# Patient Record
Sex: MALE | Race: BLACK OR AFRICAN AMERICAN | NOT HISPANIC OR LATINO | Employment: OTHER | URBAN - METROPOLITAN AREA
[De-identification: names, ages, dates, MRNs, and addresses within clinical notes are randomized per-mention and may not be internally consistent; named-entity substitution may affect disease eponyms.]

---

## 2017-01-03 ENCOUNTER — TELEPHONE (OUTPATIENT)
Dept: UROLOGY | Facility: CLINIC | Age: 62
End: 2017-01-03

## 2017-01-09 DIAGNOSIS — N52.9 ED (ERECTILE DYSFUNCTION): Primary | ICD-10-CM

## 2017-01-09 RX ORDER — SILDENAFIL 50 MG/1
25-50 TABLET, FILM COATED ORAL DAILY PRN
Qty: 12 TABLET | Refills: 11 | Status: SHIPPED | OUTPATIENT
Start: 2017-01-09 | End: 2018-09-17

## 2017-01-09 NOTE — TELEPHONE ENCOUNTER
sildenafil      Last Written Prescription Date: 7/8/14  Last Fill Quantity: 10,  # refills: 11   Last Office Visit with [facility], [facility] or [facility] prescribing provider: 10/13/16  Refill done per RN refill protocol  Luis Kiser RN, BSN

## 2017-02-02 ENCOUNTER — TELEPHONE (OUTPATIENT)
Dept: OTHER | Facility: CLINIC | Age: 62
End: 2017-02-02

## 2017-02-14 ENCOUNTER — HOSPITAL ENCOUNTER (OUTPATIENT)
Dept: LAB | Facility: CLINIC | Age: 62
Discharge: HOME | End: 2017-02-14
Attending: ORTHOPAEDIC SURGERY | Admitting: ORTHOPAEDIC SURGERY
Payer: COMMERCIAL

## 2017-02-14 DIAGNOSIS — Z47.1 ORTHOPEDIC AFTERCARE FOR JOINT REPLACEMENT: ICD-10-CM

## 2017-02-14 DIAGNOSIS — Z47.1 AFTERCARE FOLLOWING JOINT REPLACEMENT SURGERY: ICD-10-CM

## 2017-02-14 DIAGNOSIS — Z47.1 AFTERCARE FOLLOWING JOINT REPLACEMENT: Primary | ICD-10-CM

## 2017-02-14 LAB
CRP SERPL-MCNC: 19 MG/L (ref 0–8)
ERYTHROCYTE [SEDIMENTATION RATE] IN BLOOD BY WESTERGREN METHOD: 13 MM/H (ref 0–20)

## 2017-02-14 PROCEDURE — 86140 C-REACTIVE PROTEIN: CPT | Performed by: ORTHOPAEDIC SURGERY

## 2017-02-14 PROCEDURE — 36415 COLL VENOUS BLD VENIPUNCTURE: CPT | Performed by: ORTHOPAEDIC SURGERY

## 2017-02-14 PROCEDURE — 85652 RBC SED RATE AUTOMATED: CPT | Performed by: ORTHOPAEDIC SURGERY

## 2017-03-20 ENCOUNTER — TELEPHONE (OUTPATIENT)
Dept: OTHER | Facility: CLINIC | Age: 62
End: 2017-03-20

## 2017-03-20 NOTE — TELEPHONE ENCOUNTER
Pt called with c/o continued leg swelling despite previous direction given by PCP.  Patient had multiple other questions.  He is scheduled to see PCP Wed.  Luis Kiser RN, BSN

## 2017-03-23 DIAGNOSIS — N52.01 ERECTILE DYSFUNCTION DUE TO ARTERIAL INSUFFICIENCY: Primary | ICD-10-CM

## 2017-03-23 DIAGNOSIS — R30.0 DYSURIA: ICD-10-CM

## 2017-03-23 RX ORDER — TADALAFIL 5 MG/1
5 TABLET ORAL DAILY
Qty: 30 TABLET | Refills: 1 | Status: SHIPPED | OUTPATIENT
Start: 2017-03-23 | End: 2018-04-12 | Stop reason: ALTCHOICE

## 2017-03-23 NOTE — TELEPHONE ENCOUNTER
Pt should be rescheduled to address any other issues not already addressed to his satisfaction. We could not help that epic had failed yesterday. See rx for Cialis.

## 2017-03-23 NOTE — TELEPHONE ENCOUNTER
"Patient was UA to see provider because [facility] was not able to be accessed.  Pt spoke with RN about multiple issues in a 45 minute conversation surmised below.     Patient wanted a second opinion for his knee.  He is still UA to tolerate full weight bearing and feels that it will give way if he leans to the left side too much.  Dr. Xie, orthopedic surgeon name given for patient to seek second opinion.  He still has +2 pitting edema on the left lower leg and 2+ on the right lower leg.  He states that the immobility is causing weight gain issues; declined changing diet.    Patient states that he is going to see Dr. Pisano for some of his \"male issues\" and urinary symptoms that he has been experiencing over the last six months.  He states that he feels that he has incomplete bladder emptying, and feels the need to urinate 15 minutes to an hour later, even after attempts to urinate again.  He denies burning sensation.  (UA/UC ordered for PCP vanessa).    He also had concerns about his sexual function:  He states that  He has been using Viagra as prescribed and sometimes take the maximum dose allowed to produce an erection but is not always able to achieve one.  He states that he has the urge but no erection.  He states that when is is not able to produce an erection that he experiences a headache that he describes as only \"slightly bothersome\" for three to four hours after taking the medication.  I asked if the patient would like to attempt another medication to produce erection.  He states that he would like to attempt Cialis. (ordered for PCP vanessa, coupon available for patient as no ED medications are covered with insurance plan).  All questions and concerns answered.   Blood pressure taken per pt request.  146/80 (states he's usually 1- teens/ 60's), HR 52. RR 20.  Routed to PCP Luis Kiser RN, BSN      "

## 2017-03-24 DIAGNOSIS — R30.0 DYSURIA: Primary | ICD-10-CM

## 2017-03-27 ENCOUNTER — HOSPITAL ENCOUNTER (OUTPATIENT)
Dept: LAB | Facility: CLINIC | Age: 62
Discharge: HOME | End: 2017-03-27
Attending: INTERNAL MEDICINE | Admitting: INTERNAL MEDICINE
Payer: COMMERCIAL

## 2017-03-27 DIAGNOSIS — N17.9 ACUTE KIDNEY INJURY (H): ICD-10-CM

## 2017-03-27 DIAGNOSIS — R53.81 PHYSICAL DECONDITIONING: ICD-10-CM

## 2017-03-27 DIAGNOSIS — E78.2 MIXED HYPERLIPIDEMIA: ICD-10-CM

## 2017-03-27 DIAGNOSIS — E87.6 HYPOKALEMIA: ICD-10-CM

## 2017-03-27 DIAGNOSIS — E78.2 MIXED HYPERLIPIDEMIA: Primary | ICD-10-CM

## 2017-03-27 DIAGNOSIS — R30.0 DYSURIA: Primary | ICD-10-CM

## 2017-03-27 LAB
ALBUMIN UR-MCNC: NEGATIVE MG/DL
ANION GAP SERPL CALCULATED.3IONS-SCNC: 8 MMOL/L (ref 3–14)
APPEARANCE UR: CLEAR
BILIRUB UR QL STRIP: NEGATIVE
BUN SERPL-MCNC: 22 MG/DL (ref 7–30)
CALCIUM SERPL-MCNC: 8.7 MG/DL (ref 8.5–10.1)
CHLORIDE SERPL-SCNC: 107 MMOL/L (ref 94–109)
CO2 SERPL-SCNC: 25 MMOL/L (ref 20–32)
COLOR UR AUTO: YELLOW
CREAT SERPL-MCNC: 0.99 MG/DL (ref 0.66–1.25)
CREAT UR-MCNC: 120 MG/DL
GFR SERPL CREATININE-BSD FRML MDRD: 76 ML/MIN/1.7M2
GLUCOSE SERPL-MCNC: 99 MG/DL (ref 70–99)
GLUCOSE UR STRIP-MCNC: NEGATIVE MG/DL
HBA1C MFR BLD: 4.8 % (ref 4.3–6)
HGB UR QL STRIP: NEGATIVE
KETONES UR STRIP-MCNC: NEGATIVE MG/DL
LEUKOCYTE ESTERASE UR QL STRIP: NEGATIVE
MICROALBUMIN UR-MCNC: 6 MG/L
MICROALBUMIN/CREAT UR: 5.06 MG/G CR (ref 0–17)
NITRATE UR QL: NEGATIVE
PH UR STRIP: 5.5 PH (ref 5–7)
POTASSIUM SERPL-SCNC: 3.8 MMOL/L (ref 3.4–5.3)
SODIUM SERPL-SCNC: 140 MMOL/L (ref 133–144)
SP GR UR STRIP: 1.02 (ref 1–1.03)
URN SPEC COLLECT METH UR: NORMAL
UROBILINOGEN UR STRIP-ACNC: 0.2 EU/DL (ref 0.2–1)

## 2017-03-27 PROCEDURE — 82043 UR ALBUMIN QUANTITATIVE: CPT | Performed by: INTERNAL MEDICINE

## 2017-03-27 PROCEDURE — 83036 HEMOGLOBIN GLYCOSYLATED A1C: CPT | Performed by: INTERNAL MEDICINE

## 2017-03-27 PROCEDURE — 36415 COLL VENOUS BLD VENIPUNCTURE: CPT | Performed by: INTERNAL MEDICINE

## 2017-03-27 PROCEDURE — 81003 URINALYSIS AUTO W/O SCOPE: CPT | Performed by: INTERNAL MEDICINE

## 2017-03-27 PROCEDURE — 80048 BASIC METABOLIC PNL TOTAL CA: CPT | Performed by: INTERNAL MEDICINE

## 2017-04-10 DIAGNOSIS — N52.9 ERECTILE DYSFUNCTION, UNSPECIFIED ERECTILE DYSFUNCTION TYPE: Primary | ICD-10-CM

## 2017-07-21 ENCOUNTER — TRANSFERRED RECORDS (OUTPATIENT)
Dept: HEALTH INFORMATION MANAGEMENT | Facility: CLINIC | Age: 62
End: 2017-07-21

## 2017-07-24 ENCOUNTER — HOSPITAL ENCOUNTER (OUTPATIENT)
Dept: LAB | Facility: CLINIC | Age: 62
Discharge: HOME | End: 2017-07-24
Attending: ORTHOPAEDIC SURGERY | Admitting: INTERNAL MEDICINE
Payer: COMMERCIAL

## 2017-07-24 DIAGNOSIS — Z47.1 AFTERCARE FOLLOWING JOINT REPLACEMENT: Primary | ICD-10-CM

## 2017-07-24 LAB
CRP SERPL-MCNC: <2.9 MG/L (ref 0–8)
ERYTHROCYTE [SEDIMENTATION RATE] IN BLOOD BY WESTERGREN METHOD: 11 MM/H (ref 0–20)

## 2017-07-24 PROCEDURE — 85652 RBC SED RATE AUTOMATED: CPT | Performed by: INTERNAL MEDICINE

## 2017-07-24 PROCEDURE — 36415 COLL VENOUS BLD VENIPUNCTURE: CPT | Performed by: INTERNAL MEDICINE

## 2017-07-24 PROCEDURE — 86140 C-REACTIVE PROTEIN: CPT | Performed by: INTERNAL MEDICINE

## 2017-07-25 ENCOUNTER — OFFICE VISIT (OUTPATIENT)
Dept: UROLOGY | Facility: CLINIC | Age: 62
End: 2017-07-25
Payer: COMMERCIAL

## 2017-07-25 VITALS — WEIGHT: 225 LBS | OXYGEN SATURATION: 98 % | HEIGHT: 69 IN | BODY MASS INDEX: 33.33 KG/M2 | HEART RATE: 54 BPM

## 2017-07-25 DIAGNOSIS — N52.1 ERECTILE DYSFUNCTION DUE TO DISEASES CLASSIFIED ELSEWHERE: Primary | ICD-10-CM

## 2017-07-25 LAB
ALBUMIN UR-MCNC: NEGATIVE MG/DL
APPEARANCE UR: CLEAR
BILIRUB UR QL STRIP: NEGATIVE
COLOR UR AUTO: YELLOW
GLUCOSE UR STRIP-MCNC: NEGATIVE MG/DL
HGB UR QL STRIP: NEGATIVE
KETONES UR STRIP-MCNC: NEGATIVE MG/DL
LEUKOCYTE ESTERASE UR QL STRIP: NEGATIVE
NITRATE UR QL: NEGATIVE
PH UR STRIP: 5 PH (ref 5–7)
SP GR UR STRIP: 1.01 (ref 1–1.03)
URN SPEC COLLECT METH UR: NORMAL
UROBILINOGEN UR STRIP-ACNC: 0.2 EU/DL (ref 0.2–1)

## 2017-07-25 PROCEDURE — 99213 OFFICE O/P EST LOW 20 MIN: CPT | Performed by: UROLOGY

## 2017-07-25 PROCEDURE — 81003 URINALYSIS AUTO W/O SCOPE: CPT | Performed by: UROLOGY

## 2017-07-25 RX ORDER — SILDENAFIL 100 MG/1
100 TABLET, FILM COATED ORAL DAILY PRN
Qty: 12 TABLET | Refills: 11 | Status: SHIPPED | OUTPATIENT
Start: 2017-07-25 | End: 2018-04-12

## 2017-07-25 ASSESSMENT — PAIN SCALES - GENERAL: PAINLEVEL: NO PAIN (0)

## 2017-07-25 NOTE — LETTER
7/25/2017       RE: Patience Yao  [id number]  [place] [id number]     Dear Colleague,    Thank you for referring your patient, Patience Yao, to the [facility] [place] [facility]. Please see a copy of my visit note below.    .  History: This is a pleasant to see this pleasant 62-year-old gentleman in follow-up consultation today.  He is troubled about some problems with erectile dysfunction and also a sensation of incomplete bladder emptying.  He has been using sildenafil to help facilitate erections but this has been only partially successful.    He is also noticing feels to be incomplete emptying of the bladder.  He also has some mild frequency of micturition.  However the postvoid residual today is only 107 cc.  Urinalysis is negative.  Most recent PSA is very low at 0.63.  His general health is otherwise satisfactory    Past Medical History:   Diagnosis Date     Arthritis      CAD (coronary artery disease) 2010    a subtotally occluded obtuse marginal vessel which was stented with a drug-coated stent.  He had 50% to 60% LAD disease and 25% to 30% right coronary disease     Colonic polyps 2009    tubular adenomae with mildly dysplastic features     Disturbance of skin sensation      Encephalocele (H) 2009    left frontal     Epilepsy, partial (H) 2009    with secondary generalization     GERD (gastroesophageal reflux disease)      Heart attack (H)     5 years ago. 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      Social History Main Topics     Smoking status: Former Smoker     Packs/day: 0.50     Years: 20.00     Quit date: 8/4/2003     Smokeless tobacco: Never Used     Alcohol use No     Drug use: No     Sexual activity: Yes     Other Topics Concern     Seat Belt Yes     Social History Narrative    SD - CHECKED AT LEAST TWICE YEARLY.    GUNS: NONE.       Past Surgical History:   Procedure Laterality Date     ARTHROPLASTY KNEE Left 5/16/2016    Procedure: ARTHROPLASTY KNEE;  Surgeon: Chet Leonardo MD;  Location: [facility] OR     BACK SURGERY       C ANESTH,DX ARTHROSCOPIC PROC KNEE JOINT      CARTILEGE REPAIR.     C NONSPECIFIC PROCEDURE      stint in heart     CYSTOSCOPY       ESOPHAGOSCOPY, GASTROSCOPY, DUODENOSCOPY (EGD), COMBINED  5/24/2012    Procedure:COMBINED ESOPHAGOSCOPY, GASTROSCOPY, DUODENOSCOPY (EGD); ESOPHAGOSCOPY, GASTROSCOPY, DUODENOSCOPY (EGD) ; Surgeon:GILLIAN JOHNSON; Location: GI     HC COLONOSCOPY THRU STOMA, DIAGNOSTIC  2009    tubular adenomae, recommended annual colonoscopy     KNEE SURGERY Left     repair of cartilage     SPINE SURGERY      repair of herniated disc lumbar     STENT         Family History   Problem Relation Age of Onset     Cardiovascular Mother      CHF, HTN, VASC. INSUFF., DM     Family History Negative Father      Thyroid Disease Sister      HYPOTHYROID     Genitourinary Problems Brother      RENAL FAILURE AND H/O MVA WITH LE PARAPLEGIA     Cancer - colorectal No family hx of          Current Outpatient Prescriptions:      sildenafil (REVATIO/VIAGRA) 100 MG cap/tab, Take 1 tablet (100 mg) by mouth daily as needed for erectile dysfunction Take 30 min to 4 hours before intercourse.  Never use with nitroglycerin, terazosin or doxazosin., Disp: 12 tablet, Rfl: 11     sildenafil (REVATIO/VIAGRA) 50 MG  cap/tab, Take 0.5-1 tablets (25-50 mg) by mouth daily as needed for erectile dysfunction Take 30 min to 4 hours before intercourse.  ., Disp: 12 tablet, Rfl: 11     esomeprazole (NEXIUM) 40 MG capsule, Take 1 capsule (40 mg) by mouth 2 times daily, Disp: 180 capsule, Rfl: 3     simvastatin (ZOCOR) 40 MG tablet, Take 1 tablet (40 mg) by mouth At Bedtime, Disp: 90 tablet, Rfl: 3     metoprolol (LOPRESSOR) 50 MG tablet, Take 1 tablet (50 mg) by mouth 2 times daily, Disp: 180 tablet, Rfl: 3     pregabalin (LYRICA) 25 MG capsule, Take 3 capsules (75 mg) by mouth 2 times daily, Disp: 90 capsule, Rfl: 1     Ranitidine HCl (ZANTAC PO), Take 75 mg by mouth At Bedtime, Disp: , Rfl:      lamoTRIgine (LAMICTAL) 100 MG tablet, 250 mg At Bedtime , Disp: , Rfl: 3     naproxen (NAPROSYN) 500 MG tablet, Take 1 tablet by mouth 2 times daily, Disp: , Rfl:      sildenafil (VIAGRA) 50 MG tablet, Take 1 tablet (50 mg) by mouth daily as needed for erectile dysfunction, Disp: 10 tablet, Rfl: 11     lamoTRIgine (LAMICTAL) 200 MG tablet, Take 200 mg by mouth every morning , Disp: 90 tablet, Rfl: 1     tadalafil (CIALIS) 5 MG tablet, Take 1 tablet (5 mg) by mouth daily Never use with nitroglycerin, terazosin or doxazosin. (Patient not taking: Reported on 7/25/2017), Disp: 30 tablet, Rfl: 1     bumetanide (BUMEX) 2 MG tablet, Take 1 tablet (2 mg) by mouth daily (Patient not taking: Reported on 7/25/2017), Disp: 60 tablet, Rfl: 0     Potassium Chloride ER 20 MEQ TBCR, Take 1 tablet (20 mEq) by mouth daily, Disp: 30 tablet, Rfl: 1     ferrous gluconate (FERGON) 324 (38 FE) MG tablet, Take 1 tablet (324 mg) by mouth daily (Patient not taking: Reported on 7/25/2017), Disp: 100 tablet, Rfl: 1     polyethylene glycol (MIRALAX/GLYCOLAX) powder, Take 17 g by mouth daily as needed for constipation, Disp: 119 g, Rfl:      senna-docusate (SENOKOT-S;PERICOLACE) 8.6-50 MG per tablet, Take 1 tablet by mouth daily as needed for constipation, Disp: 100  "tablet, Rfl:      acetaminophen (TYLENOL) 325 MG tablet, Take 3 tablets (975 mg) by mouth every 8 hours, Disp: 500 tablet, Rfl: 0     tamsulosin (FLOMAX) 0.4 MG 24 hr capsule, Take 1 capsule (0.4 mg) by mouth daily (Patient not taking: Reported on 7/25/2017), Disp: 30 capsule, Rfl: 0     zolpidem (AMBIEN) 10 MG tablet, Take 1 tablet (10 mg) by mouth nightly as needed for sleep at bedtime., Disp: 30 tablet, Rfl: 0     mometasone (NASONEX) 50 MCG/ACT nasal spray, Spray 2 sprays into both nostrils daily as needed, Disp: , Rfl:     10 point ROS of systems including Constitutional, Eyes, Respiratory, Cardiovascular, Gastroenterology, Genitourinary, Integumentary, Muscularskeletal, Psychiatric were all negative except for pertinent positives noted in my HPI.    Examination:   Pulse 54  Ht 1.753 m (5' 9\")  Wt 102.1 kg (225 lb)  SpO2 98%  BMI 33.23 kg/m2  General Impression: Very pleasant gentleman in no acute distress, well-oriented to time place and person  Mental Status: Normal.  HEENT.  There is no evidence of jaundice and mucous membranes are normal  Skin: His skin is otherwise normal to examination  Respiratory System: The respiratory cycle is normal  Lymph Nodes: Not examined  Back/Flank Tenderness: Not examined  Cardiovascular System: Not examined  Abdominal Examination: Not examined  Extremities: Not examined  Genitial: Not examined  Rectal Examination: Good sphincter tone, normal perianal sensation.  Smooth rectal mucosa without hemorrhoids or fissures.  Smooth soft and mildly enlarged prostate without evidence of tenderness, bogginess or nodules.  Seminal vesicles.  Not palpable  Neurologic System: There has been no significant change in his clinical neurological signs previous examination    Impression: The patient has 2 concerns  1.  A sensation of incomplete bladder emptying.  However bladder scan today indicates a postvoid residual of only 107 cc.  He is drinking some caffeinated beverages which I " "recommended he discontinue, and drink water only when thirsty as well as kind out fluids after 6 PM.  In addition taking a regular warm baths in the evening will help relax the pelvic floor muscles.  He continues to take tamsulosin 0.4 mg q.d.  2.  Issues of erectile dysfunction have continued.  We did have a careful discussion about other potential treatments including the use of an erect aid device, intracorporeal pharmacotherapy, and even a penile prosthesis.  These options are not the ones he currently wishes to consider.  I would recommend that he try Viagra in the full dose of 100 mg to see if that is helpful.  He has not been taking the full dose in the past due to cost.  I'm therefore given a paper prescription for Viagra to take to her pharmacy of his discretion and wishes.  I advised him to contact me should he have further concerns.  I did discuss the entire situation with the patient in detail today.  I answered all his questions    Plan: I will see her on a p.r.n. basis    Time: 20 minutes.  Greater than 50% was spent in discussion and consultation    \"This dictation was performed with voice recognition software and may contain errors,  omissions and inadvertent word substitution.\"        Again, thank you for allowing me to participate in the care of your patient.      Sincerely,    Arthur Pisano MD      "

## 2017-07-25 NOTE — PROGRESS NOTES
.  History: This is a pleasant to see this pleasant 62-year-old gentleman in follow-up consultation today.  He is troubled about some problems with erectile dysfunction and also a sensation of incomplete bladder emptying.  He has been using sildenafil to help facilitate erections but this has been only partially successful.    He is also noticing feels to be incomplete emptying of the bladder.  He also has some mild frequency of micturition.  However the postvoid residual today is only 107 cc.  Urinalysis is negative.  Most recent PSA is very low at 0.63.  His general health is otherwise satisfactory    Past Medical History:   Diagnosis Date     Arthritis      CAD (coronary artery disease) 2010    a subtotally occluded obtuse marginal vessel which was stented with a drug-coated stent.  He had 50% to 60% LAD disease and 25% to 30% right coronary disease     Colonic polyps 2009    tubular adenomae with mildly dysplastic features     Disturbance of skin sensation      Encephalocele (H) 2009    left frontal     Epilepsy, partial (H) 2009    with secondary generalization     GERD (gastroesophageal reflux disease)      Heart attack (H)     5 years ago. 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      Social History Main Topics     Smoking status: Former Smoker     Packs/day: 0.50     Years: 20.00     Quit date: 8/4/2003     Smokeless  tobacco: Never Used     Alcohol use No     Drug use: No     Sexual activity: Yes     Other Topics Concern     Seat Belt Yes     Social History Narrative    SD - CHECKED AT LEAST TWICE YEARLY.    GUNS: NONE.       Past Surgical History:   Procedure Laterality Date     ARTHROPLASTY KNEE Left 5/16/2016    Procedure: ARTHROPLASTY KNEE;  Surgeon: Chet Leonardo MD;  Location: [facility] OR     BACK SURGERY       C ANESTH,DX ARTHROSCOPIC PROC KNEE JOINT      CARTILEGE REPAIR.     C NONSPECIFIC PROCEDURE      stint in heart     CYSTOSCOPY       ESOPHAGOSCOPY, GASTROSCOPY, DUODENOSCOPY (EGD), COMBINED  5/24/2012    Procedure:COMBINED ESOPHAGOSCOPY, GASTROSCOPY, DUODENOSCOPY (EGD); ESOPHAGOSCOPY, GASTROSCOPY, DUODENOSCOPY (EGD) ; Surgeon:GILLIAN JOHNSON; Location: GI     HC COLONOSCOPY THRU STOMA, DIAGNOSTIC  2009    tubular adenomae, recommended annual colonoscopy     KNEE SURGERY Left     repair of cartilage     SPINE SURGERY      repair of herniated disc lumbar     STENT         Family History   Problem Relation Age of Onset     Cardiovascular Mother      CHF, HTN, VASC. INSUFF., DM     Family History Negative Father      Thyroid Disease Sister      HYPOTHYROID     Genitourinary Problems Brother      RENAL FAILURE AND H/O MVA WITH LE PARAPLEGIA     Cancer - colorectal No family hx of          Current Outpatient Prescriptions:      sildenafil (REVATIO/VIAGRA) 100 MG cap/tab, Take 1 tablet (100 mg) by mouth daily as needed for erectile dysfunction Take 30 min to 4 hours before intercourse.  Never use with nitroglycerin, terazosin or doxazosin., Disp: 12 tablet, Rfl: 11     sildenafil (REVATIO/VIAGRA) 50 MG cap/tab, Take 0.5-1 tablets (25-50 mg) by mouth daily as needed for erectile dysfunction Take 30 min to 4 hours before intercourse.  ., Disp: 12 tablet, Rfl: 11     esomeprazole (NEXIUM) 40 MG capsule, Take 1 capsule (40 mg) by mouth 2 times daily, Disp: 180 capsule, Rfl: 3     simvastatin (ZOCOR) 40 MG tablet, Take 1  tablet (40 mg) by mouth At Bedtime, Disp: 90 tablet, Rfl: 3     metoprolol (LOPRESSOR) 50 MG tablet, Take 1 tablet (50 mg) by mouth 2 times daily, Disp: 180 tablet, Rfl: 3     pregabalin (LYRICA) 25 MG capsule, Take 3 capsules (75 mg) by mouth 2 times daily, Disp: 90 capsule, Rfl: 1     Ranitidine HCl (ZANTAC PO), Take 75 mg by mouth At Bedtime, Disp: , Rfl:      lamoTRIgine (LAMICTAL) 100 MG tablet, 250 mg At Bedtime , Disp: , Rfl: 3     naproxen (NAPROSYN) 500 MG tablet, Take 1 tablet by mouth 2 times daily, Disp: , Rfl:      sildenafil (VIAGRA) 50 MG tablet, Take 1 tablet (50 mg) by mouth daily as needed for erectile dysfunction, Disp: 10 tablet, Rfl: 11     lamoTRIgine (LAMICTAL) 200 MG tablet, Take 200 mg by mouth every morning , Disp: 90 tablet, Rfl: 1     tadalafil (CIALIS) 5 MG tablet, Take 1 tablet (5 mg) by mouth daily Never use with nitroglycerin, terazosin or doxazosin. (Patient not taking: Reported on 7/25/2017), Disp: 30 tablet, Rfl: 1     bumetanide (BUMEX) 2 MG tablet, Take 1 tablet (2 mg) by mouth daily (Patient not taking: Reported on 7/25/2017), Disp: 60 tablet, Rfl: 0     Potassium Chloride ER 20 MEQ TBCR, Take 1 tablet (20 mEq) by mouth daily, Disp: 30 tablet, Rfl: 1     ferrous gluconate (FERGON) 324 (38 FE) MG tablet, Take 1 tablet (324 mg) by mouth daily (Patient not taking: Reported on 7/25/2017), Disp: 100 tablet, Rfl: 1     polyethylene glycol (MIRALAX/GLYCOLAX) powder, Take 17 g by mouth daily as needed for constipation, Disp: 119 g, Rfl:      senna-docusate (SENOKOT-S;PERICOLACE) 8.6-50 MG per tablet, Take 1 tablet by mouth daily as needed for constipation, Disp: 100 tablet, Rfl:      acetaminophen (TYLENOL) 325 MG tablet, Take 3 tablets (975 mg) by mouth every 8 hours, Disp: 500 tablet, Rfl: 0     tamsulosin (FLOMAX) 0.4 MG 24 hr capsule, Take 1 capsule (0.4 mg) by mouth daily (Patient not taking: Reported on 7/25/2017), Disp: 30 capsule, Rfl: 0     zolpidem (AMBIEN) 10 MG tablet, Take  "1 tablet (10 mg) by mouth nightly as needed for sleep at bedtime., Disp: 30 tablet, Rfl: 0     mometasone (NASONEX) 50 MCG/ACT nasal spray, Spray 2 sprays into both nostrils daily as needed, Disp: , Rfl:     10 point ROS of systems including Constitutional, Eyes, Respiratory, Cardiovascular, Gastroenterology, Genitourinary, Integumentary, Muscularskeletal, Psychiatric were all negative except for pertinent positives noted in my HPI.    Examination:   Pulse 54  Ht 1.753 m (5' 9\")  Wt 102.1 kg (225 lb)  SpO2 98%  BMI 33.23 kg/m2  General Impression: Very pleasant gentleman in no acute distress, well-oriented to time place and person  Mental Status: Normal.  HEENT.  There is no evidence of jaundice and mucous membranes are normal  Skin: His skin is otherwise normal to examination  Respiratory System: The respiratory cycle is normal  Lymph Nodes: Not examined  Back/Flank Tenderness: Not examined  Cardiovascular System: Not examined  Abdominal Examination: Not examined  Extremities: Not examined  Genitial: Not examined  Rectal Examination: Good sphincter tone, normal perianal sensation.  Smooth rectal mucosa without hemorrhoids or fissures.  Smooth soft and mildly enlarged prostate without evidence of tenderness, bogginess or nodules.  Seminal vesicles.  Not palpable  Neurologic System: There has been no significant change in his clinical neurological signs previous examination    Impression: The patient has 2 concerns  1.  A sensation of incomplete bladder emptying.  However bladder scan today indicates a postvoid residual of only 107 cc.  He is drinking some caffeinated beverages which I recommended he discontinue, and drink water only when thirsty as well as kind out fluids after 6 PM.  In addition taking a regular warm baths in the evening will help relax the pelvic floor muscles.  He continues to take tamsulosin 0.4 mg q.d.  2.  Issues of erectile dysfunction have continued.  We did have a careful discussion " "about other potential treatments including the use of an erect aid device, intracorporeal pharmacotherapy, and even a penile prosthesis.  These options are not the ones he currently wishes to consider.  I would recommend that he try Viagra in the full dose of 100 mg to see if that is helpful.  He has not been taking the full dose in the past due to cost.  I'm therefore given a paper prescription for Viagra to take to her pharmacy of his discretion and wishes.  I advised him to contact me should he have further concerns.  I did discuss the entire situation with the patient in detail today.  I answered all his questions    Plan: I will see her on a p.r.n. basis    Time: 20 minutes.  Greater than 50% was spent in discussion and consultation    \"This dictation was performed with voice recognition software and may contain errors,  omissions and inadvertent word substitution.\"      "

## 2017-07-25 NOTE — NURSING NOTE
Icj=[id number]  Pt co freq and incontience  Pt has interrupted sleep. And once he wakes up he voids.  Pt leaks when bladder is too full.  Pt denies dysuria.  Pt states his urine is not cloudy.  Pt has hemmhroids.    JODY Karimi CMA

## 2017-07-25 NOTE — MR AVS SNAPSHOT
"              After Visit Summary   7/25/2017    Patience Yao    MRN: [id number]           Patient Information     Date Of Birth          1955        Visit Information        Provider Department      7/25/2017 4:00 PM Arthur Pisano MD [facility] Urology [facility]        Today's Diagnoses     Erectile dysfunction due to diseases classified elsewhere    -  1       Follow-ups after your visit        Follow-up notes from your care team     Return if symptoms worsen or fail to improve.      Who to contact     If you have questions or need follow up information about today's clinic visit or your schedule please contact [facility] UROLOGY [facility] directly at [phone].  Normal or non-critical lab and imaging results will be communicated to you by MyChart, letter or phone within 4 business days after the clinic has received the results. If you do not hear from us within 7 days, please contact the clinic through [facility] or phone. If you have a critical or abnormal lab result, we will notify you by phone as soon as possible.  Submit refill requests through [facility] or call your pharmacy and they will forward the refill request to us. Please allow 3 business days for your refill to be completed.          Additional Information About Your Visit        MyChart Information     [facility] lets you send messages to your doctor, view your test results, renew your prescriptions, schedule appointments and more. To sign up, go to [email or web address] . Click on \"Log in\" on the left side of the screen, which will take you to the Welcome page. Then click on \"Sign up Now\" on the right side of the page.     You will be asked to enter the access code listed below, as well as some personal information. Please follow the directions to create your username and password.     Your access code is: [email or web address]  Expires: 10/23/2017  5:15 PM     Your access code will " " in 90 days. If you need help or a new code, please call your [place] clinic or [phone].        Care EveryWhere ID     This is your Care EveryWhere ID. This could be used by other organizations to access your [place] medical records  [id number]        Your Vitals Were     Pulse Height Pulse Oximetry BMI (Body Mass Index)          54 1.753 m (5' 9\") 98% 33.23 kg/m2         Blood Pressure from Last 3 Encounters:   10/17/16 137/76   16 120/74   09/15/16 129/67    Weight from Last 3 Encounters:   17 102.1 kg (225 lb)   10/17/16 99.3 kg (219 lb)   16 100 kg (220 lb 6.4 oz)              We Performed the Following     UA without Microscopic          Today's Medication Changes          These changes are accurate as of: 17  5:15 PM.  If you have any questions, ask your nurse or doctor.               These medicines have changed or have updated prescriptions.        Dose/Directions    * sildenafil 50 MG cap/tab   Commonly known as:  REVATIO/VIAGRA   This may have changed:  Another medication with the same name was added. Make sure you understand how and when to take each.   Used for:  Erectile dysfunction   Changed by:  Hakeem Cano MD        Dose:  50 mg   Take 1 tablet (50 mg) by mouth daily as needed for erectile dysfunction   Quantity:  10 tablet   Refills:  11       * sildenafil 50 MG cap/tab   Commonly known as:  REVATIO/VIAGRA   This may have changed:  Another medication with the same name was added. Make sure you understand how and when to take each.   Used for:  ED (erectile dysfunction)   Changed by:  Hakeem Cano MD        Dose:  25-50 mg   Take 0.5-1 tablets (25-50 mg) by mouth daily as needed for erectile dysfunction Take 30 min to 4 hours before intercourse.  .   Quantity:  12 tablet   Refills:  11       * sildenafil 100 MG cap/tab   Commonly known as:  REVATIO/VIAGRA   This may have changed:  You were already taking a medication with the same " name, and this prescription was added. Make sure you understand how and when to take each.   Used for:  Erectile dysfunction due to diseases classified elsewhere   Changed by:  Arthur Pisano MD        Dose:  100 mg   Take 1 tablet (100 mg) by mouth daily as needed for erectile dysfunction Take 30 min to 4 hours before intercourse.  Never use with nitroglycerin, terazosin or doxazosin.   Quantity:  12 tablet   Refills:  11       * Notice:  This list has 3 medication(s) that are the same as other medications prescribed for you. Read the directions carefully, and ask your doctor or other care provider to review them with you.         Where to get your medicines      Some of these will need a paper prescription and others can be bought over the counter.  Ask your nurse if you have questions.     Bring a paper prescription for each of these medications     sildenafil 100 MG cap/tab                Primary Care Provider Office Phone # Fax #    Vinniecody Jose Angel Cano [phone]       [place] [facility] [address] [place] [facility]  [place] [address]        Equal Access to Services     LAURA CAMPOS AH: Hadii aad ku hadasho Soomaali, waaxda luqadaha, qaybta kaalmada adeegyada, waxay idiin haysean borges . So [facility] [phone].    ATENCIÓN: Si habla español, tiene a olivo disposición servicios gratuitos de asistencia lingüística. [facility] [phone].    We comply with applicable federal civil rights laws and [place] laws. We do not discriminate on the basis of race, color, national origin, age, disability sex, sexual orientation or gender identity.            Thank you!     Thank you for choosing [facility]OLOG[facility] [place]  for your care. Our goal is always to provide you with excellent care. Hearing back from our patients is one way we can continue to improve our services. Please take a few minutes to complete the written survey that you may receive in the mail  after your visit with us. Thank you!             Your Updated Medication List - Protect others around you: Learn how to safely use, store and throw away your medicines at www.disposemymeds.org.          This list is accurate as of: 7/25/17  5:15 PM.  Always use your most recent med list.                   Brand Name Dispense Instructions for use Diagnosis    acetaminophen 325 MG tablet    TYLENOL    500 tablet    Take 3 tablets (975 mg) by mouth every 8 hours    Status post total left knee replacement       bumetanide 2 MG tablet    BUMEX    60 tablet    Take 1 tablet (2 mg) by mouth daily    Edema, unspecified type       esomeprazole 40 MG CR capsule    nexIUM    180 capsule    Take 1 capsule (40 mg) by mouth 2 times daily    Gastroesophageal reflux disease with esophagitis       ferrous gluconate 324 (38 FE) MG tablet    FERGON    100 tablet    Take 1 tablet (324 mg) by mouth daily    Anemia       * lamoTRIgine 200 MG tablet    LaMICtal    90 tablet    Take 200 mg by mouth every morning    Seizure disorder (H)       * lamoTRIgine 100 MG tablet    LaMICtal     250 mg At Bedtime        metoprolol 50 MG tablet    LOPRESSOR    180 tablet    Take 1 tablet (50 mg) by mouth 2 times daily    Essential hypertension with goal blood pressure less than 130/80       mometasone 50 MCG/ACT spray    NASONEX     Spray 2 sprays into both nostrils daily as needed        naproxen 500 MG tablet    NAPROSYN     Take 1 tablet by mouth 2 times daily        polyethylene glycol powder    MIRALAX/GLYCOLAX    119 g    Take 17 g by mouth daily as needed for constipation        Potassium Chloride ER 20 MEQ Tbcr     30 tablet    Take 1 tablet (20 mEq) by mouth daily    Localized edema       pregabalin 25 MG capsule    LYRICA    90 capsule    Take 3 capsules (75 mg) by mouth 2 times daily    Convulsions, unspecified convulsion type (H)       senna-docusate 8.6-50 MG per tablet    SENOKOT-S;PERICOLACE    100 tablet    Take 1 tablet by mouth daily  as needed for constipation        * sildenafil 50 MG cap/tab    REVATIO/VIAGRA    10 tablet    Take 1 tablet (50 mg) by mouth daily as needed for erectile dysfunction    Erectile dysfunction       * sildenafil 50 MG cap/tab    REVATIO/VIAGRA    12 tablet    Take 0.5-1 tablets (25-50 mg) by mouth daily as needed for erectile dysfunction Take 30 min to 4 hours before intercourse.  .    ED (erectile dysfunction)       * sildenafil 100 MG cap/tab    REVATIO/VIAGRA    12 tablet    Take 1 tablet (100 mg) by mouth daily as needed for erectile dysfunction Take 30 min to 4 hours before intercourse.  Never use with nitroglycerin, terazosin or doxazosin.    Erectile dysfunction due to diseases classified elsewhere       simvastatin 40 MG tablet    ZOCOR    90 tablet    Take 1 tablet (40 mg) by mouth At Bedtime    Hyperlipidemia LDL goal <100       tadalafil 5 MG tablet    CIALIS    30 tablet    Take 1 tablet (5 mg) by mouth daily Never use with nitroglycerin, terazosin or doxazosin.    Erectile dysfunction due to arterial insufficiency       tamsulosin 0.4 MG capsule    FLOMAX    30 capsule    Take 1 capsule (0.4 mg) by mouth daily    Benign non-nodular prostatic hyperplasia without lower urinary tract symptoms       ZANTAC PO      Take 75 mg by mouth At Bedtime        zolpidem 10 MG tablet    AMBIEN    30 tablet    Take 1 tablet (10 mg) by mouth nightly as needed for sleep at bedtime.    Primary insomnia       * Notice:  This list has 5 medication(s) that are the same as other medications prescribed for you. Read the directions carefully, and ask your doctor or other care provider to review them with you.

## 2017-07-26 ENCOUNTER — TRANSFERRED RECORDS (OUTPATIENT)
Dept: HEALTH INFORMATION MANAGEMENT | Facility: CLINIC | Age: 62
End: 2017-07-26

## 2017-07-31 ENCOUNTER — TELEPHONE (OUTPATIENT)
Dept: UROLOGY | Facility: CLINIC | Age: 62
End: 2017-07-31

## 2017-07-31 NOTE — TELEPHONE ENCOUNTER
Patient called inquiring about a pharmacy to help get his meds covered and said Dr. Pisano was going to get some information for him. Will forward to MD. Vangie Martinez LPN

## 2017-08-03 NOTE — TELEPHONE ENCOUNTER
Called patient with information from MD. Patient has a hard RX at home to sent to outside pharmacy. He will call us if he has any more questions. Vangie Martinez LPN

## 2017-08-08 ENCOUNTER — HOSPITAL ENCOUNTER (OUTPATIENT)
Dept: NUCLEAR MEDICINE | Facility: CLINIC | Age: 62
Setting detail: NUCLEAR MEDICINE
Discharge: HOME | End: 2017-08-08
Attending: ORTHOPAEDIC SURGERY | Admitting: ORTHOPAEDIC SURGERY
Payer: COMMERCIAL

## 2017-08-08 ENCOUNTER — HOSPITAL ENCOUNTER (OUTPATIENT)
Dept: NUCLEAR MEDICINE | Facility: CLINIC | Age: 62
Setting detail: NUCLEAR MEDICINE
End: 2017-08-08
Attending: ORTHOPAEDIC SURGERY
Payer: COMMERCIAL

## 2017-08-08 DIAGNOSIS — Z47.1 AFTERCARE FOLLOWING JOINT REPLACEMENT: ICD-10-CM

## 2017-08-08 DIAGNOSIS — M25.362 KNEE INSTABILITY, LEFT: ICD-10-CM

## 2017-08-08 PROCEDURE — 78315 BONE IMAGING 3 PHASE: CPT

## 2017-08-08 RX ADMIN — Medication 25 MCI.: at 10:21

## 2017-08-10 ENCOUNTER — TRANSFERRED RECORDS (OUTPATIENT)
Dept: HEALTH INFORMATION MANAGEMENT | Facility: CLINIC | Age: 62
End: 2017-08-10

## 2017-08-24 ENCOUNTER — TELEPHONE (OUTPATIENT)
Dept: OTHER | Facility: CLINIC | Age: 62
End: 2017-08-24

## 2017-08-24 DIAGNOSIS — E78.5 HYPERLIPIDEMIA LDL GOAL <100: ICD-10-CM

## 2017-08-24 DIAGNOSIS — E78.2 MIXED HYPERLIPIDEMIA: ICD-10-CM

## 2017-08-24 DIAGNOSIS — I10 BENIGN ESSENTIAL HYPERTENSION: Primary | ICD-10-CM

## 2017-08-24 NOTE — TELEPHONE ENCOUNTER
Pt called and states that he is having leg swelling (pitting edema) and would like a lab for the swelling and appointment to see Dr. Cano.  Noted that annual labs were not done yet  Labs ordered per scope  Patient will get them done at [facility] tomorrow.  Luis Kiser, RN, BSN

## 2017-08-28 ENCOUNTER — TELEPHONE (OUTPATIENT)
Dept: OTHER | Facility: CLINIC | Age: 62
End: 2017-08-28

## 2017-08-28 DIAGNOSIS — K21.00 GASTROESOPHAGEAL REFLUX DISEASE WITH ESOPHAGITIS: Primary | ICD-10-CM

## 2017-08-31 DIAGNOSIS — E78.2 MIXED HYPERLIPIDEMIA: ICD-10-CM

## 2017-08-31 DIAGNOSIS — E78.5 HYPERLIPIDEMIA LDL GOAL <100: ICD-10-CM

## 2017-08-31 DIAGNOSIS — I10 BENIGN ESSENTIAL HYPERTENSION: ICD-10-CM

## 2017-08-31 LAB
ALBUMIN SERPL-MCNC: 4.2 G/DL (ref 3.4–5)
ALP SERPL-CCNC: 98 U/L (ref 40–150)
ALT SERPL W P-5'-P-CCNC: 47 U/L (ref 0–70)
ANION GAP SERPL CALCULATED.3IONS-SCNC: 9 MMOL/L (ref 3–14)
AST SERPL W P-5'-P-CCNC: 29 U/L (ref 0–45)
BASOPHILS # BLD AUTO: 0 10E9/L (ref 0–0.2)
BASOPHILS NFR BLD AUTO: 0.3 %
BILIRUB DIRECT SERPL-MCNC: 0.2 MG/DL (ref 0–0.2)
BILIRUB SERPL-MCNC: 0.8 MG/DL (ref 0.2–1.3)
BUN SERPL-MCNC: 22 MG/DL (ref 7–30)
CALCIUM SERPL-MCNC: 9 MG/DL (ref 8.5–10.1)
CHLORIDE SERPL-SCNC: 106 MMOL/L (ref 94–109)
CHOLEST SERPL-MCNC: 107 MG/DL
CO2 SERPL-SCNC: 25 MMOL/L (ref 20–32)
CREAT SERPL-MCNC: 0.98 MG/DL (ref 0.66–1.25)
DIFFERENTIAL METHOD BLD: ABNORMAL
EOSINOPHIL # BLD AUTO: 0.1 10E9/L (ref 0–0.7)
EOSINOPHIL NFR BLD AUTO: 1.5 %
ERYTHROCYTE [DISTWIDTH] IN BLOOD BY AUTOMATED COUNT: 14.8 % (ref 10–15)
GFR SERPL CREATININE-BSD FRML MDRD: 77 ML/MIN/1.7M2
GLUCOSE SERPL-MCNC: 92 MG/DL (ref 70–99)
HCT VFR BLD AUTO: 38.4 % (ref 40–53)
HDLC SERPL-MCNC: 38 MG/DL
HGB BLD-MCNC: 12.7 G/DL (ref 13.3–17.7)
LDLC SERPL CALC-MCNC: 48 MG/DL
LYMPHOCYTES # BLD AUTO: 1.2 10E9/L (ref 0.8–5.3)
LYMPHOCYTES NFR BLD AUTO: 30.3 %
MCH RBC QN AUTO: 28.4 PG (ref 26.5–33)
MCHC RBC AUTO-ENTMCNC: 33.1 G/DL (ref 31.5–36.5)
MCV RBC AUTO: 86 FL (ref 78–100)
MONOCYTES # BLD AUTO: 0.4 10E9/L (ref 0–1.3)
MONOCYTES NFR BLD AUTO: 10.7 %
NEUTROPHILS # BLD AUTO: 2.3 10E9/L (ref 1.6–8.3)
NEUTROPHILS NFR BLD AUTO: 57.2 %
NONHDLC SERPL-MCNC: 69 MG/DL
PLATELET # BLD AUTO: 170 10E9/L (ref 150–450)
POTASSIUM SERPL-SCNC: 4.2 MMOL/L (ref 3.4–5.3)
PROT SERPL-MCNC: 7.4 G/DL (ref 6.8–8.8)
PSA SERPL-ACNC: 0.93 UG/L (ref 0–4)
RBC # BLD AUTO: 4.47 10E12/L (ref 4.4–5.9)
SODIUM SERPL-SCNC: 140 MMOL/L (ref 133–144)
T4 FREE SERPL-MCNC: 1.21 NG/DL (ref 0.76–1.46)
TRIGL SERPL-MCNC: 107 MG/DL
TSH SERPL DL<=0.005 MIU/L-ACNC: 1.48 MU/L (ref 0.4–4)
WBC # BLD AUTO: 3.9 10E9/L (ref 4–11)

## 2017-08-31 PROCEDURE — 80061 LIPID PANEL: CPT | Performed by: INTERNAL MEDICINE

## 2017-08-31 PROCEDURE — 84439 ASSAY OF FREE THYROXINE: CPT | Performed by: INTERNAL MEDICINE

## 2017-08-31 PROCEDURE — 80076 HEPATIC FUNCTION PANEL: CPT | Performed by: INTERNAL MEDICINE

## 2017-08-31 PROCEDURE — 36415 COLL VENOUS BLD VENIPUNCTURE: CPT | Performed by: INTERNAL MEDICINE

## 2017-08-31 PROCEDURE — 84443 ASSAY THYROID STIM HORMONE: CPT | Performed by: INTERNAL MEDICINE

## 2017-08-31 PROCEDURE — G0103 PSA SCREENING: HCPCS | Performed by: INTERNAL MEDICINE

## 2017-08-31 PROCEDURE — 85025 COMPLETE CBC W/AUTO DIFF WBC: CPT | Performed by: INTERNAL MEDICINE

## 2017-08-31 PROCEDURE — 80048 BASIC METABOLIC PNL TOTAL CA: CPT | Performed by: INTERNAL MEDICINE

## 2017-09-05 DIAGNOSIS — N52.9 ERECTILE DYSFUNCTION, UNSPECIFIED ERECTILE DYSFUNCTION TYPE: ICD-10-CM

## 2017-09-05 LAB
ALBUMIN UR-MCNC: NEGATIVE MG/DL
APPEARANCE UR: CLEAR
BILIRUB UR QL STRIP: NEGATIVE
COLOR UR AUTO: YELLOW
GLUCOSE UR STRIP-MCNC: NEGATIVE MG/DL
HGB UR QL STRIP: NEGATIVE
KETONES UR STRIP-MCNC: NEGATIVE MG/DL
LEUKOCYTE ESTERASE UR QL STRIP: NEGATIVE
NITRATE UR QL: NEGATIVE
PH UR STRIP: 6 PH (ref 5–7)
SOURCE: NORMAL
SP GR UR STRIP: 1.02 (ref 1–1.03)
UROBILINOGEN UR STRIP-ACNC: 0.2 EU/DL (ref 0.2–1)

## 2017-09-05 PROCEDURE — 81003 URINALYSIS AUTO W/O SCOPE: CPT | Performed by: INTERNAL MEDICINE

## 2017-09-06 ENCOUNTER — OFFICE VISIT (OUTPATIENT)
Dept: OTHER | Facility: CLINIC | Age: 62
End: 2017-09-06
Attending: INTERNAL MEDICINE
Payer: COMMERCIAL

## 2017-09-06 VITALS
BODY MASS INDEX: 31.16 KG/M2 | HEART RATE: 56 BPM | OXYGEN SATURATION: 98 % | DIASTOLIC BLOOD PRESSURE: 68 MMHG | SYSTOLIC BLOOD PRESSURE: 117 MMHG | WEIGHT: 210.4 LBS | HEIGHT: 69 IN

## 2017-09-06 DIAGNOSIS — I87.2 VENOUS (PERIPHERAL) INSUFFICIENCY: ICD-10-CM

## 2017-09-06 DIAGNOSIS — L81.1 MELASMA: Primary | ICD-10-CM

## 2017-09-06 DIAGNOSIS — N52.01 ERECTILE DYSFUNCTION DUE TO ARTERIAL INSUFFICIENCY: ICD-10-CM

## 2017-09-06 PROCEDURE — 99211 OFF/OP EST MAY X REQ PHY/QHP: CPT

## 2017-09-06 PROCEDURE — 99214 OFFICE O/P EST MOD 30 MIN: CPT | Mod: ZP | Performed by: INTERNAL MEDICINE

## 2017-09-06 NOTE — MR AVS SNAPSHOT
After Visit Summary   9/6/2017    Patience Yao    MRN: [id number]           Patient Information     Date Of Birth          1955        Visit Information        Provider Department      9/6/2017 11:30 AM Hakeem Cano MD [facility] [place] [facility]      Today's Diagnoses     Melasma    -  1    Venous (peripheral) insufficiency        Erectile dysfunction due to arterial insufficiency           Follow-ups after your visit        Additional Services     DERMATOLOGY REFERRAL       Your provider has referred you to: [place] [facility] [phone]   [email or web address]    Any available dermatologist to address melasma.    Please be aware that coverage of these services is subject to the terms and limitations of your health insurance plan.  Call member services at your health plan with any benefit or coverage questions.      Please bring the following with you to your appointment:    (1) Any X-Rays, CTs or MRIs which have been performed.  Contact the facility where they were done to arrange for  prior to your scheduled appointment.    (2) List of current medications  (3) This referral request   (4) Any documents/labs given to you for this referral                  Who to contact     If you have questions or need follow up information about today's clinic visit or your schedule please contact [facility] directly at [phone].  Normal or non-critical lab and imaging results will be communicated to you by MyChart, letter or phone within 4 business days after the clinic has received the results. If you do not hear from us within 7 days, please contact the clinic through MyChart or phone. If you have a critical or abnormal lab result, we will notify you by phone as soon as possible.  Submit refill requests through [facility] [employer] or call your pharmacy and they will forward the refill  "request to us. Please allow 3 business days for your refill to be completed.          Additional Information About Your Visit        MyChart Information     [employer] lets you send messages to your doctor, view your test results, renew your prescriptions, schedule appointments and more. To sign up, go to [email or web address] . Click on \"Log in\" on the left side of the screen, which will take you to the Welcome page. Then click on \"Sign up Now\" on the right side of the page.     You will be asked to enter the access code listed below, as well as some personal information. Please follow the directions to create your username and password.     Your access code is: [email or web address]  Expires: 10/23/2017  5:15 PM     Your access code will  in 90 days. If you need help or a new code, please call your [place] clinic or [phone].        Care EveryWhere ID     This is your [facility] EveryWhere ID. This could be used by other organizations to access your [place] medical records  [id number]        Your Vitals Were     Pulse Height Pulse Oximetry BMI (Body Mass Index)          56 5' 9\" (1.753 m) 98% 31.07 kg/m2         Blood Pressure from Last 3 Encounters:   17 117/68   10/17/16 137/76   16 120/74    Weight from Last 3 Encounters:   17 210 lb 6.4 oz (95.4 kg)   17 225 lb (102.1 kg)   10/17/16 219 lb (99.3 kg)              We Performed the Following     DERMATOLOGY REFERRAL        Primary Care Provider Office Phone # Fax #    Hakeem Cano [phone]       [place] [facility] [address]  [place] [address]        Equal Access to Services     LAURA CAMPOS : Buddy Power, ferny salinas, erick kaalmada sunny, norma todd. So [facility] [phone].    ATENCIÓN: Si habla español, tiene a olivo disposición servicios gratuitos de asistencia lingüística. Lladrien al [phone].    We comply with applicable federal civil " rights laws and [place] laws. We do not discriminate on the basis of race, color, national origin, age, disability sex, sexual orientation or gender identity.            Thank you!     Thank you for choosing [facility] VASCULAR [place]  for your care. Our goal is always to provide you with excellent care. Hearing back from our patients is one way we can continue to improve our services. Please take a few minutes to complete the written survey that you may receive in the mail after your visit with us. Thank you!             Your Updated Medication List - Protect others around you: Learn how to safely use, store and throw away your medicines at www.disposemymeds.org.          This list is accurate as of: 9/6/17 12:55 PM.  Always use your most recent med list.                   Brand Name Dispense Instructions for use Diagnosis    acetaminophen 325 MG tablet    TYLENOL    500 tablet    Take 3 tablets (975 mg) by mouth every 8 hours    Status post total left knee replacement       bumetanide 2 MG tablet    BUMEX    60 tablet    Take 1 tablet (2 mg) by mouth daily    Edema, unspecified type       esomeprazole 40 MG CR capsule    nexIUM    180 capsule    Take 1 capsule (40 mg) by mouth 2 times daily    Gastroesophageal reflux disease with esophagitis       ferrous gluconate 324 (38 FE) MG tablet    FERGON    100 tablet    Take 1 tablet (324 mg) by mouth daily    Anemia       * lamoTRIgine 200 MG tablet    LaMICtal    90 tablet    Take 200 mg by mouth every morning    Seizure disorder (H)       * lamoTRIgine 100 MG tablet    LaMICtal     250 mg At Bedtime        metoprolol 50 MG tablet    LOPRESSOR    180 tablet    Take 1 tablet (50 mg) by mouth 2 times daily    Essential hypertension with goal blood pressure less than 130/80       mometasone 50 MCG/ACT spray    NASONEX     Spray 2 sprays into both nostrils daily as needed        naproxen 500 MG tablet    NAPROSYN     Take 1 tablet by mouth 2 times daily         polyethylene glycol powder    MIRALAX/GLYCOLAX    119 g    Take 17 g by mouth daily as needed for constipation        Potassium Chloride ER 20 MEQ Tbcr     30 tablet    Take 1 tablet (20 mEq) by mouth daily    Localized edema       pregabalin 25 MG capsule    LYRICA    90 capsule    Take 3 capsules (75 mg) by mouth 2 times daily    Convulsions, unspecified convulsion type (H)       senna-docusate 8.6-50 MG per tablet    SENOKOT-S;PERICOLACE    100 tablet    Take 1 tablet by mouth daily as needed for constipation        * sildenafil 50 MG cap/tab    REVATIO/VIAGRA    10 tablet    Take 1 tablet (50 mg) by mouth daily as needed for erectile dysfunction    Erectile dysfunction       * sildenafil 50 MG cap/tab    REVATIO/VIAGRA    12 tablet    Take 0.5-1 tablets (25-50 mg) by mouth daily as needed for erectile dysfunction Take 30 min to 4 hours before intercourse.  .    ED (erectile dysfunction)       * sildenafil 100 MG cap/tab    REVATIO/VIAGRA    12 tablet    Take 1 tablet (100 mg) by mouth daily as needed for erectile dysfunction Take 30 min to 4 hours before intercourse.  Never use with nitroglycerin, terazosin or doxazosin.    Erectile dysfunction due to diseases classified elsewhere       simvastatin 40 MG tablet    ZOCOR    90 tablet    Take 1 tablet (40 mg) by mouth At Bedtime    Hyperlipidemia LDL goal <100       tadalafil 5 MG tablet    CIALIS    30 tablet    Take 1 tablet (5 mg) by mouth daily Never use with nitroglycerin, terazosin or doxazosin.    Erectile dysfunction due to arterial insufficiency       tamsulosin 0.4 MG capsule    FLOMAX    30 capsule    Take 1 capsule (0.4 mg) by mouth daily    Benign non-nodular prostatic hyperplasia without lower urinary tract symptoms       ZANTAC PO      Take 75 mg by mouth At Bedtime        zolpidem 10 MG tablet    AMBIEN    30 tablet    Take 1 tablet (10 mg) by mouth nightly as needed for sleep at bedtime.    Primary insomnia       * Notice:  This list has 5  medication(s) that are the same as other medications prescribed for you. Read the directions carefully, and ask your doctor or other care provider to review them with you.

## 2017-09-06 NOTE — NURSING NOTE
"Chief Complaint   Patient presents with     RECHECK     Multiple Concerns       Initial /68 (BP Location: Right arm, Patient Position: Chair, Cuff Size: Adult Regular)  Pulse 56  Ht 5' 9\" (1.753 m)  Wt 210 lb 6.4 oz (95.4 kg)  SpO2 98%  BMI 31.07 kg/m2 Estimated body mass index is 31.07 kg/(m^2) as calculated from the following:    Height as of this encounter: 5' 9\" (1.753 m).    Weight as of this encounter: 210 lb 6.4 oz (95.4 kg).  Medication Reconciliation: complete    Face to Face time 5 minutes  Anna Hawk CMA      "

## 2017-09-06 NOTE — PROGRESS NOTES
Patience Yao is a 62 year old male who is presenting at the current time to discuss his diagnosi(es) of :       Melasma  Venous (peripheral) insufficiency  Erectile dysfunction due to arterial insufficiency .      Review Of Systems  Skin: dark facial discoloration  Eyes: negative  Ears/Nose/Throat: negative  Respiratory: No shortness of breath, dyspnea on exertion, cough, or hemoptysis  Cardiovascular: negative  Gastrointestinal: negative  Genitourinary: positive for ED  Musculoskeletal: negative  Neurologic: negative  Psychiatric: negative  Hematologic/Lymphatic/Immunologic: negative  Endocrine: negative    PAST MEDICAL HISTORY:                  Past Medical History:   Diagnosis Date     Arthritis      CAD (coronary artery disease) 2010    a subtotally occluded obtuse marginal vessel which was stented with a drug-coated stent.  He had 50% to 60% LAD disease and 25% to 30% right coronary disease     Colonic polyps 2009    tubular adenomae with mildly dysplastic features     Disturbance of skin sensation      Encephalocele (H) 2009    left frontal     Epilepsy, partial (H) 2009    with secondary generalization     GERD (gastroesophageal reflux disease)      Heart attack (H)     5 years ago. 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     C NONSPECIFIC PROCEDURE      stint in heart     CYSTOSCOPY       ESOPHAGOSCOPY, GASTROSCOPY, DUODENOSCOPY (EGD), COMBINED  5/24/2012    Procedure:COMBINED ESOPHAGOSCOPY, GASTROSCOPY, DUODENOSCOPY (EGD); ESOPHAGOSCOPY, GASTROSCOPY, DUODENOSCOPY (EGD) ; Surgeon:GILLIAN JOHNSON; Location: GI     HC COLONOSCOPY THRU STOMA, DIAGNOSTIC  2009    tubular adenomae, recommended annual colonoscopy     KNEE SURGERY Left     repair of cartilage     SPINE SURGERY      repair of herniated disc lumbar     STENT         CURRENT MEDICATIONS:                  Current Outpatient Prescriptions   Medication Sig Dispense Refill     sildenafil (REVATIO/VIAGRA) 100 MG cap/tab Take 1 tablet (100 mg) by mouth daily as needed for erectile dysfunction Take 30 min to 4 hours before intercourse.  Never use with nitroglycerin, terazosin or doxazosin. 12 tablet 11     tadalafil (CIALIS) 5 MG tablet Take 1 tablet (5 mg) by mouth daily Never use with nitroglycerin, terazosin or doxazosin. 30 tablet 1     sildenafil (REVATIO/VIAGRA) 50 MG cap/tab Take 0.5-1 tablets (25-50 mg) by mouth daily as needed for erectile dysfunction Take 30 min to 4 hours before intercourse.  . 12 tablet 11     esomeprazole (NEXIUM) 40 MG capsule Take 1 capsule (40 mg) by mouth 2 times daily 180 capsule 3     simvastatin (ZOCOR) 40 MG tablet Take 1 tablet (40 mg) by mouth At Bedtime 90 tablet 3     bumetanide (BUMEX) 2 MG tablet Take 1 tablet (2 mg) by mouth daily 60 tablet 0     Potassium Chloride ER 20 MEQ TBCR Take 1 tablet (20 mEq) by mouth daily 30 tablet 1     metoprolol (LOPRESSOR) 50 MG tablet Take 1 tablet (50 mg) by mouth 2 times daily 180 tablet 3     ferrous gluconate (FERGON) 324 (38 FE) MG tablet Take 1 tablet (324 mg) by mouth daily 100 tablet 1     polyethylene glycol (MIRALAX/GLYCOLAX) powder Take 17 g by mouth daily as needed for constipation 119 g      senna-docusate (SENOKOT-S;PERICOLACE) 8.6-50 MG per tablet Take 1 tablet by mouth  daily as needed for constipation 100 tablet      acetaminophen (TYLENOL) 325 MG tablet Take 3 tablets (975 mg) by mouth every 8 hours 500 tablet 0     tamsulosin (FLOMAX) 0.4 MG 24 hr capsule Take 1 capsule (0.4 mg) by mouth daily 30 capsule 0     zolpidem (AMBIEN) 10 MG tablet Take 1 tablet (10 mg) by mouth nightly as needed for sleep at bedtime. 30 tablet 0     pregabalin (LYRICA) 25 MG capsule Take 3 capsules (75 mg) by mouth 2 times daily 90 capsule 1     mometasone (NASONEX) 50 MCG/ACT nasal spray Spray 2 sprays into both nostrils daily as needed       Ranitidine HCl (ZANTAC PO) Take 75 mg by mouth At Bedtime       lamoTRIgine (LAMICTAL) 100 MG tablet 250 mg At Bedtime   3     naproxen (NAPROSYN) 500 MG tablet Take 1 tablet by mouth 2 times daily       sildenafil (VIAGRA) 50 MG tablet Take 1 tablet (50 mg) by mouth daily as needed for erectile dysfunction 10 tablet 11     lamoTRIgine (LAMICTAL) 200 MG tablet Take 200 mg by mouth every morning  90 tablet 1       ALLERGIES:                  Allergies   Allergen Reactions     Codeine      inability to sleep after taking codeine     Vicodin [Hydrocodone-Acetaminophen]      Perspiring, hyper, itchy       SOCIAL HISTORY:                  Social History     Social History     Marital status:      Spouse name: N/A     Number of children: 3     Years of education: N/A     Occupational History      - [employer]      Social History Main Topics     Smoking status: Former Smoker     Packs/day: 0.50     Years: 20.00     Quit date: 8/4/2003     Smokeless tobacco: Never Used     Alcohol use No     Drug use: No     Sexual activity: Yes     Other Topics Concern     Seat Belt Yes     Social History Narrative    SD - CHECKED AT LEAST TWICE YEARLY.    GUNS: NONE.       FAMILY HISTORY:                   Family History   Problem Relation Age of Onset     Cardiovascular Mother      CHF, HTN, VASC. INSUFF., DM     Family History Negative Father      Thyroid  Disease Sister      HYPOTHYROID     Genitourinary Problems Brother      RENAL FAILURE AND H/O MVA WITH LE PARAPLEGIA     Cancer - colorectal No family hx of        .cfope      A/P:    (L81.1) Melasma  (primary encounter diagnosis)  Comment: he is given reassurance about this but wants to see a dermatologist  Plan: DERMATOLOGY REFERRAL           (I87.2) Venous (peripheral) insufficiency  Comment: this is the etiology of his bilateral WARREN; venous competency study results were reviewed wth the patient; he was offered continued compression or sutrgical referral; he chose continued compression garment utilization  Plan:    (N52.01) Erectile dysfunction due to arterial insufficiency  Comment: he is responding well to Viagra, but had multiple questions regarding the etiology of ED  Plan: he is advised to return to Dr. Pisano to discuss other treatment options should he be

## 2017-09-07 NOTE — TELEPHONE ENCOUNTER
PA Denied for esomeprazole magnesium max of 1 capsule per day for 120 days.  Please advise  Luis Kiser RN, BSN

## 2017-09-11 NOTE — TELEPHONE ENCOUNTER
Please appeal the PA. Reason is he has failed to control GERD with once daily dosing. If they deny appeal then send him for a GI referral to consider getting an EGD.

## 2017-10-03 DIAGNOSIS — I10 ESSENTIAL HYPERTENSION WITH GOAL BLOOD PRESSURE LESS THAN 130/80: ICD-10-CM

## 2017-10-03 RX ORDER — METOPROLOL TARTRATE 50 MG
50 TABLET ORAL 2 TIMES DAILY
Qty: 180 TABLET | Refills: 3 | Status: SHIPPED | OUTPATIENT
Start: 2017-10-03 | End: 2018-04-12

## 2017-10-03 NOTE — TELEPHONE ENCOUNTER
metoprolol      Last Written Prescription Date: 9/15/16  Last Fill Quantity: 180, # refills: 3    Last Office Visit with [facility] or [facility] prescribing provider:  9/6/17   Future Office Visit:        BP Readings from Last 3 Encounters:   09/06/17 117/68   10/17/16 137/76   09/28/16 120/74     Refill done per RN refill protocol  Luis Ksier RN, BSN

## 2017-10-17 NOTE — TELEPHONE ENCOUNTER
Appeal denied  GI referral loaded for patient as per previous documentation  Patient notified, referral faxed  Luis Kiser, RN, BSN

## 2017-10-31 DIAGNOSIS — E78.5 HYPERLIPIDEMIA LDL GOAL <100: ICD-10-CM

## 2017-10-31 RX ORDER — SIMVASTATIN 40 MG
TABLET ORAL
Qty: 270 TABLET | Refills: 0 | Status: SHIPPED | OUTPATIENT
Start: 2017-10-31 | End: 2018-04-12

## 2017-10-31 NOTE — TELEPHONE ENCOUNTER
simvastatin     Last Written Prescription Date: 10/28/16  Last Fill Quantity: 90, # refills: 3  Last Office Visit with [facility] [place], [email or web address] or [facility] prescribing provider: 9/6/17       Lab Results   Component Value Date    CHOL 107 08/31/2017     Lab Results   Component Value Date    HDL 38 08/31/2017     Lab Results   Component Value Date    LDL 48 08/31/2017     Lab Results   Component Value Date    TRIG 107 08/31/2017     Lab Results   Component Value Date    CHOLHDLRATIO 2.8 10/07/2015     Refill done per RN refill protocol  Luis Kiser, RN, BSN

## 2017-11-01 ENCOUNTER — TRANSFERRED RECORDS (OUTPATIENT)
Dept: HEALTH INFORMATION MANAGEMENT | Facility: CLINIC | Age: 62
End: 2017-11-01

## 2017-11-07 DIAGNOSIS — K21.00 GASTROESOPHAGEAL REFLUX DISEASE WITH ESOPHAGITIS: ICD-10-CM

## 2017-11-07 RX ORDER — ESOMEPRAZOLE MAGNESIUM 40 MG/1
CAPSULE, DELAYED RELEASE ORAL
Qty: 180 CAPSULE | Refills: 2 | Status: SHIPPED | OUTPATIENT
Start: 2017-11-07 | End: 2020-01-02

## 2017-11-07 NOTE — TELEPHONE ENCOUNTER
esomeprazole      Last Written Prescription Date: 11/2/16  Last Fill Quantity: 180,  # refills: 3   Last Office Visit with [facility], [facility] or [facility] prescribing provider: 9/6/17  Refill done per RN refill protocol  Luis Kiser, RN, BSN                                                  DISPLAY PLAN FREE TEXT

## 2017-11-16 ENCOUNTER — TRANSFERRED RECORDS (OUTPATIENT)
Dept: HEALTH INFORMATION MANAGEMENT | Facility: CLINIC | Age: 62
End: 2017-11-16

## 2017-11-29 ENCOUNTER — TRANSFERRED RECORDS (OUTPATIENT)
Dept: HEALTH INFORMATION MANAGEMENT | Facility: CLINIC | Age: 62
End: 2017-11-29

## 2018-01-29 DIAGNOSIS — I10 HTN (HYPERTENSION): Primary | ICD-10-CM

## 2018-01-29 DIAGNOSIS — I25.10 CAD (CORONARY ARTERY DISEASE): ICD-10-CM

## 2018-01-31 ENCOUNTER — OFFICE VISIT (OUTPATIENT)
Dept: FAMILY MEDICINE | Facility: CLINIC | Age: 63
End: 2018-01-31
Payer: COMMERCIAL

## 2018-01-31 VITALS
WEIGHT: 201.1 LBS | HEART RATE: 63 BPM | RESPIRATION RATE: 14 BRPM | DIASTOLIC BLOOD PRESSURE: 71 MMHG | TEMPERATURE: 97.5 F | OXYGEN SATURATION: 97 % | BODY MASS INDEX: 29.7 KG/M2 | SYSTOLIC BLOOD PRESSURE: 115 MMHG

## 2018-01-31 DIAGNOSIS — N48.1 BALANITIS: ICD-10-CM

## 2018-01-31 DIAGNOSIS — J01.00 ACUTE MAXILLARY SINUSITIS, RECURRENCE NOT SPECIFIED: Primary | ICD-10-CM

## 2018-01-31 PROCEDURE — 99214 OFFICE O/P EST MOD 30 MIN: CPT | Performed by: FAMILY MEDICINE

## 2018-01-31 RX ORDER — AZITHROMYCIN 250 MG/1
TABLET, FILM COATED ORAL
Qty: 6 TABLET | Refills: 0 | Status: SHIPPED | OUTPATIENT
Start: 2018-01-31 | End: 2018-09-17

## 2018-01-31 RX ORDER — MICONAZOLE NITRATE 20 MG/G
CREAM TOPICAL 2 TIMES DAILY
Qty: 20 G | Refills: 3 | Status: SHIPPED | OUTPATIENT
Start: 2018-01-31 | End: 2018-09-17

## 2018-01-31 NOTE — PROGRESS NOTES
SUBJECTIVE:   Patience Yao is a 62 year old male who presents to clinic today for the following health issues:      Patient is here for itchy and painful blisters in the groin area for the past 2 days.    RESPIRATORY SYMPTOMS      Duration: 1 week    Description  nasal congestion, cough and fatigue/malaise    Severity: moderate    Accompanying signs and symptoms: constipation    History (predisposing factors):  none    Precipitating or alleviating factors: sick family members    Therapies tried and outcome:  Tylenol    Rash has redness, red and mucouslymorphology, location glans penis and foreskin groove  and duration one week   It is sticky and painful, slightly  pruritic   Associated symptoms include   History of no known  exposure or contact  He is not diabetic   PAST MEDICAL HISTORY:   Past Medical History:   Diagnosis Date     Arthritis      CAD (coronary artery disease) 2010    a subtotally occluded obtuse marginal vessel which was stented with a drug-coated stent.  He had 50% to 60% LAD disease and 25% to 30% right coronary disease     Colonic polyps 2009    tubular adenomae with mildly dysplastic features     Disturbance of skin sensation      Encephalocele (H) 2009    left frontal     Epilepsy, partial (H) 2009    with secondary generalization     GERD (gastroesophageal reflux disease)      Heart attack     5 years ago. 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     C NONSPECIFIC PROCEDURE      stint in heart     CYSTOSCOPY       ESOPHAGOSCOPY, GASTROSCOPY, DUODENOSCOPY (EGD), COMBINED  5/24/2012    Procedure:COMBINED ESOPHAGOSCOPY, GASTROSCOPY, DUODENOSCOPY (EGD); ESOPHAGOSCOPY, GASTROSCOPY, DUODENOSCOPY (EGD) ; Surgeon:GILLIAN JOHNSON; Location: GI     HC COLONOSCOPY THRU STOMA, DIAGNOSTIC  2009    tubular adenomae, recommended annual colonoscopy     KNEE SURGERY Left     repair of cartilage     SPINE SURGERY      repair of herniated disc lumbar     STENT         FAMILY HISTORY:   Family History   Problem Relation Age of Onset     Cardiovascular Mother      CHF, HTN, VASC. INSUFF., DM     Family History Negative Father      Thyroid Disease Sister      HYPOTHYROID     Genitourinary Problems Brother      RENAL FAILURE AND H/O MVA WITH LE PARAPLEGIA     Cancer - colorectal No family hx of        SOCIAL HISTORY:   Social History   Substance Use Topics     Smoking status: Former Smoker     Packs/day: 0.50     Years: 20.00     Quit date: 8/4/2003     Smokeless tobacco: Never Used     Alcohol use No        REVIEW OF SYSTEMS    Generally has been previously feeling well until this episode. No problems with vision, hearing, dental or neck pain.Has moderate  airborne or ingestion allergy  No chest pain, palpitations, dyspnea, change in bowel habits, blood  in stool or dyspepsia.  No rashes, changing moles, weakness, lassitude or back problems.  No chronic issues . No dysuria  Patient not  a smoker. No problems with significant headaches.  On exam the vital signs are stable  Weight is Body mass index is 29.7 kg/(m^2).   Eyes show ravin   No neck masses or thyromegaly.Ear nose and throat shows normal   No bruits, murmers, rubs or extrasounds. No cardiomegaly or chest wall tenderness. Lungs clear, no abdominal masses or organomegaly. No CVA  "tenderness.  Skin eval has balanitis with raw redness and exudate, no sexual exposure   No hernias, good range of motion neck, back and extremities. No abnormal skin lesions. Normal genitalia. Good peripheral pulses. No adenopathy.  Normal gait and stance. Neck is supple.  Back exam shows hph left knee and back issues, \"I can't balance with my left knee since TKA\"  Previous back issues \" Dr. Artemio Wyman\".      (J01.00) Acute maxillary sinusitis, recurrence not specified  (primary encounter diagnosis)  Comment: with congestion and post nasal drip cough   Plan: azithromycin (ZITHROMAX) 250 MG tablet,         miconazole ( MICONAZOLE NITRATE) 2 % cream            (N48.1) Balanitis  Comment:   Plan: azithromycin (ZITHROMAX) 250 MG tablet,         miconazole ( MICONAZOLE NITRATE) 2 % cream        Use topical on the balanitis, discussed liza soaks       "

## 2018-02-08 ENCOUNTER — TRANSFERRED RECORDS (OUTPATIENT)
Dept: HEALTH INFORMATION MANAGEMENT | Facility: CLINIC | Age: 63
End: 2018-02-08

## 2018-04-05 DIAGNOSIS — I10 HTN (HYPERTENSION): ICD-10-CM

## 2018-04-05 DIAGNOSIS — I25.10 CAD (CORONARY ARTERY DISEASE): ICD-10-CM

## 2018-04-05 LAB
ALBUMIN SERPL-MCNC: 4.4 G/DL (ref 3.4–5)
ALP SERPL-CCNC: 107 U/L (ref 40–150)
ALT SERPL W P-5'-P-CCNC: 62 U/L (ref 0–70)
ANION GAP SERPL CALCULATED.3IONS-SCNC: 1 MMOL/L (ref 3–14)
AST SERPL W P-5'-P-CCNC: 33 U/L (ref 0–45)
BASOPHILS # BLD AUTO: 0 10E9/L (ref 0–0.2)
BASOPHILS NFR BLD AUTO: 0.6 %
BILIRUB DIRECT SERPL-MCNC: 0.2 MG/DL (ref 0–0.2)
BILIRUB SERPL-MCNC: 0.7 MG/DL (ref 0.2–1.3)
BUN SERPL-MCNC: 31 MG/DL (ref 7–30)
CALCIUM SERPL-MCNC: 9.3 MG/DL (ref 8.5–10.1)
CHLORIDE SERPL-SCNC: 107 MMOL/L (ref 94–109)
CHOLEST SERPL-MCNC: 126 MG/DL
CO2 SERPL-SCNC: 32 MMOL/L (ref 20–32)
CREAT SERPL-MCNC: 1.11 MG/DL (ref 0.66–1.25)
DIFFERENTIAL METHOD BLD: ABNORMAL
EOSINOPHIL # BLD AUTO: 0.1 10E9/L (ref 0–0.7)
EOSINOPHIL NFR BLD AUTO: 2.1 %
ERYTHROCYTE [DISTWIDTH] IN BLOOD BY AUTOMATED COUNT: 14.5 % (ref 10–15)
GFR SERPL CREATININE-BSD FRML MDRD: 67 ML/MIN/1.7M2
GLUCOSE SERPL-MCNC: 90 MG/DL (ref 70–99)
HBA1C MFR BLD: 4.8 % (ref 0–6.4)
HCT VFR BLD AUTO: 39 % (ref 40–53)
HDLC SERPL-MCNC: 41 MG/DL
HGB BLD-MCNC: 13.3 G/DL (ref 13.3–17.7)
LDLC SERPL CALC-MCNC: 68 MG/DL
LYMPHOCYTES # BLD AUTO: 1.4 10E9/L (ref 0.8–5.3)
LYMPHOCYTES NFR BLD AUTO: 29.4 %
MCH RBC QN AUTO: 28.9 PG (ref 26.5–33)
MCHC RBC AUTO-ENTMCNC: 34.1 G/DL (ref 31.5–36.5)
MCV RBC AUTO: 85 FL (ref 78–100)
MONOCYTES # BLD AUTO: 0.5 10E9/L (ref 0–1.3)
MONOCYTES NFR BLD AUTO: 9.4 %
NEUTROPHILS # BLD AUTO: 2.9 10E9/L (ref 1.6–8.3)
NEUTROPHILS NFR BLD AUTO: 58.5 %
NONHDLC SERPL-MCNC: 85 MG/DL
PLATELET # BLD AUTO: 168 10E9/L (ref 150–450)
POTASSIUM SERPL-SCNC: 4 MMOL/L (ref 3.4–5.3)
PROT SERPL-MCNC: 7.5 G/DL (ref 6.8–8.8)
RBC # BLD AUTO: 4.61 10E12/L (ref 4.4–5.9)
SODIUM SERPL-SCNC: 140 MMOL/L (ref 133–144)
T3FREE SERPL-MCNC: 2.6 PG/ML (ref 2.3–4.2)
T4 FREE SERPL-MCNC: 1.04 NG/DL (ref 0.76–1.46)
TRIGL SERPL-MCNC: 87 MG/DL
TSH SERPL DL<=0.005 MIU/L-ACNC: 3.16 MU/L (ref 0.4–4)
WBC # BLD AUTO: 4.9 10E9/L (ref 4–11)

## 2018-04-05 PROCEDURE — 84481 FREE ASSAY (FT-3): CPT | Performed by: INTERNAL MEDICINE

## 2018-04-05 PROCEDURE — 83036 HEMOGLOBIN GLYCOSYLATED A1C: CPT | Performed by: FAMILY MEDICINE

## 2018-04-05 PROCEDURE — 80076 HEPATIC FUNCTION PANEL: CPT | Performed by: INTERNAL MEDICINE

## 2018-04-05 PROCEDURE — 84443 ASSAY THYROID STIM HORMONE: CPT | Performed by: FAMILY MEDICINE

## 2018-04-05 PROCEDURE — 80048 BASIC METABOLIC PNL TOTAL CA: CPT | Performed by: INTERNAL MEDICINE

## 2018-04-05 PROCEDURE — 80061 LIPID PANEL: CPT | Performed by: INTERNAL MEDICINE

## 2018-04-05 PROCEDURE — 36415 COLL VENOUS BLD VENIPUNCTURE: CPT | Performed by: INTERNAL MEDICINE

## 2018-04-05 PROCEDURE — 84439 ASSAY OF FREE THYROXINE: CPT | Performed by: FAMILY MEDICINE

## 2018-04-05 PROCEDURE — 85025 COMPLETE CBC W/AUTO DIFF WBC: CPT | Performed by: FAMILY MEDICINE

## 2018-04-12 ENCOUNTER — TELEPHONE (OUTPATIENT)
Dept: OTHER | Facility: CLINIC | Age: 63
End: 2018-04-12

## 2018-04-12 ENCOUNTER — OFFICE VISIT (OUTPATIENT)
Dept: OTHER | Facility: CLINIC | Age: 63
End: 2018-04-12
Attending: INTERNAL MEDICINE
Payer: COMMERCIAL

## 2018-04-12 VITALS
DIASTOLIC BLOOD PRESSURE: 68 MMHG | BODY MASS INDEX: 29.8 KG/M2 | SYSTOLIC BLOOD PRESSURE: 115 MMHG | WEIGHT: 201.8 LBS | HEART RATE: 57 BPM

## 2018-04-12 DIAGNOSIS — K59.00 CONSTIPATION, UNSPECIFIED CONSTIPATION TYPE: ICD-10-CM

## 2018-04-12 DIAGNOSIS — Z00.00 ROUTINE GENERAL MEDICAL EXAMINATION AT A HEALTH CARE FACILITY: Primary | ICD-10-CM

## 2018-04-12 DIAGNOSIS — G89.29 CHRONIC BILATERAL LOW BACK PAIN WITHOUT SCIATICA: ICD-10-CM

## 2018-04-12 DIAGNOSIS — M54.50 CHRONIC BILATERAL LOW BACK PAIN WITHOUT SCIATICA: ICD-10-CM

## 2018-04-12 DIAGNOSIS — I10 ESSENTIAL HYPERTENSION WITH GOAL BLOOD PRESSURE LESS THAN 130/80: ICD-10-CM

## 2018-04-12 DIAGNOSIS — R56.9 CONVULSIONS, UNSPECIFIED CONVULSION TYPE (H): ICD-10-CM

## 2018-04-12 DIAGNOSIS — D36.9 TUBULAR ADENOMA: ICD-10-CM

## 2018-04-12 DIAGNOSIS — R60.9 EDEMA: Primary | ICD-10-CM

## 2018-04-12 DIAGNOSIS — E78.5 HYPERLIPIDEMIA LDL GOAL <100: ICD-10-CM

## 2018-04-12 DIAGNOSIS — N52.01 ERECTILE DYSFUNCTION DUE TO ARTERIAL INSUFFICIENCY: ICD-10-CM

## 2018-04-12 DIAGNOSIS — Z96.652 STATUS POST TOTAL LEFT KNEE REPLACEMENT: ICD-10-CM

## 2018-04-12 DIAGNOSIS — F51.01 PRIMARY INSOMNIA: ICD-10-CM

## 2018-04-12 PROCEDURE — 99396 PREV VISIT EST AGE 40-64: CPT | Mod: ZP | Performed by: INTERNAL MEDICINE

## 2018-04-12 PROCEDURE — 99213 OFFICE O/P EST LOW 20 MIN: CPT | Mod: 25 | Performed by: INTERNAL MEDICINE

## 2018-04-12 PROCEDURE — G0463 HOSPITAL OUTPT CLINIC VISIT: HCPCS

## 2018-04-12 RX ORDER — SILDENAFIL 100 MG/1
100 TABLET, FILM COATED ORAL DAILY PRN
Qty: 12 TABLET | Refills: 11 | Status: ON HOLD | OUTPATIENT
Start: 2018-04-12 | End: 2018-10-29

## 2018-04-12 RX ORDER — BUMETANIDE 2 MG/1
2 TABLET ORAL DAILY
Qty: 90 TABLET | Refills: 3 | Status: SHIPPED | OUTPATIENT
Start: 2018-04-12 | End: 2018-09-17

## 2018-04-12 RX ORDER — SIMVASTATIN 40 MG
40 TABLET ORAL AT BEDTIME
Qty: 90 TABLET | Refills: 3 | Status: SHIPPED | OUTPATIENT
Start: 2018-04-12 | End: 2018-09-17

## 2018-04-12 RX ORDER — FERROUS GLUCONATE 324(38)MG
324 TABLET ORAL DAILY
Qty: 100 TABLET | Refills: 3 | Status: SHIPPED | OUTPATIENT
Start: 2018-04-12 | End: 2018-09-17

## 2018-04-12 RX ORDER — TADALAFIL 5 MG/1
5 TABLET ORAL DAILY
Qty: 30 TABLET | Refills: 1 | Status: CANCELLED | OUTPATIENT
Start: 2018-04-12

## 2018-04-12 RX ORDER — ACETAMINOPHEN 325 MG/1
975 TABLET ORAL EVERY 8 HOURS
Qty: 500 TABLET | Refills: 3 | Status: ON HOLD | OUTPATIENT
Start: 2018-04-12 | End: 2018-10-29

## 2018-04-12 RX ORDER — POLYETHYLENE GLYCOL 3350 17 G/17G
17 POWDER, FOR SOLUTION ORAL DAILY PRN
Qty: 170 G | Refills: 3 | Status: SHIPPED | OUTPATIENT
Start: 2018-04-12 | End: 2018-09-17

## 2018-04-12 RX ORDER — ZOLPIDEM TARTRATE 10 MG/1
10 TABLET ORAL
Qty: 90 TABLET | Refills: 1 | Status: SHIPPED | OUTPATIENT
Start: 2018-04-12 | End: 2019-01-07

## 2018-04-12 RX ORDER — TAMSULOSIN HYDROCHLORIDE 0.4 MG/1
0.4 CAPSULE ORAL DAILY
Qty: 30 CAPSULE | Refills: 0 | Status: CANCELLED | OUTPATIENT
Start: 2018-04-12

## 2018-04-12 RX ORDER — PREGABALIN 25 MG/1
75 CAPSULE ORAL 2 TIMES DAILY
Qty: 540 CAPSULE | Refills: 1 | Status: SHIPPED | OUTPATIENT
Start: 2018-04-12 | End: 2018-04-13

## 2018-04-12 RX ORDER — METOPROLOL TARTRATE 50 MG
50 TABLET ORAL 2 TIMES DAILY
Qty: 180 TABLET | Refills: 3 | Status: SHIPPED | OUTPATIENT
Start: 2018-04-12 | End: 2019-04-29

## 2018-04-12 RX ORDER — AZITHROMYCIN 250 MG/1
TABLET, FILM COATED ORAL
Qty: 6 TABLET | Refills: 0 | Status: CANCELLED | OUTPATIENT
Start: 2018-04-12

## 2018-04-12 RX ORDER — POTASSIUM CHLORIDE 1500 MG/1
20 TABLET, EXTENDED RELEASE ORAL DAILY
Qty: 90 TABLET | Refills: 3 | Status: SHIPPED | OUTPATIENT
Start: 2018-04-12 | End: 2018-04-13

## 2018-04-12 NOTE — TELEPHONE ENCOUNTER
"WHo rx'd Lyrica for the patient originally? That MD should do the PA for Lyrica, as I simply \"inherited\" the Rxing of this med. Please enter in an equivalent dose of any K supplement you can find , associated to the same dx, and route to me for cosign.  "

## 2018-04-12 NOTE — TELEPHONE ENCOUNTER
Patients plan does not cover lyrica or potassium chloride  (looks like it will cover gabapentin, no alternatives listed for potassium chloride ER)  Please advise  Luis Kiser, RN, BSN

## 2018-04-12 NOTE — MR AVS SNAPSHOT
After Visit Summary   4/12/2018    Patience Yao    MRN: [id number]           Patient Information     Date Of Birth          1955        Visit Information        Provider Department      4/12/2018 11:30 AM Hakeem Cano MD [facility]      Today's Diagnoses     Routine general medical examination at a health care facility    -  1    Erectile dysfunction due to arterial insufficiency        Hyperlipidemia LDL goal <100        Essential hypertension with goal blood pressure less than 130/80        chronic knee pain after total left knee replacement 2016, with no firther treatment options per the patient according to Dr. Leonardo        Chronic bilateral low back pain without sciatica        Primary insomnia        Convulsions, unspecified convulsion type (H)        Tubular adenoma        Constipation, unspecified constipation type           Follow-ups after your visit        Additional Services     ORTHOPEDICS ADULT REFERRAL       Your provider has referred you to: [facility] [place] [phone]   [email or web address] regarding continuing knee pain after 2016 Lt TKA by dr. Leonardo.    Dr. Arthur Elias regarding continuing ongoing back pain.    Please be aware that coverage of these services is subject to the terms and limitations of your health insurance plan.  Call member services at your health plan with any benefit or coverage questions.      Please bring the following to your appointment:    >>   Any x-rays, CTs or MRIs which have been performed.  Contact the facility where they were done to arrange for  prior to your scheduled appointment.    >>   List of current medications   >>   This referral request   >>   Any documents/labs given to you for this referral            Follow-Up with Vascular Medicine                 Future tests that were ordered for you  "today     Open Future Orders        Priority Expected Expires Ordered    Follow-Up with Vascular Medicine Routine 2019    CBC with platelets differential Routine 2019    T3 Free Routine 2019    T4 free Routine 2019    TSH Routine 2019    Basic metabolic panel Routine 2019    Hepatic panel Routine 2019    Lipid Profile Routine 2019            Who to contact     If you have questions or need follow up information about today's clinic visit or your schedule please contact [facility] directly at [phone].  Normal or non-critical lab and imaging results will be communicated to you by MyChart, letter or phone within 4 business days after the clinic has received the results. If you do not hear from us within 7 days, please contact the clinic through [employer] or phone. If you have a critical or abnormal lab result, we will notify you by phone as soon as possible.  Submit refill requests through [employer] or call your pharmacy and they will forward the refill request to us. Please allow 3 business days for your refill to be completed.          Additional Information About Your Visit        [employer] Information     [employer] lets you send messages to your doctor, view your test results, renew your prescriptions, schedule appointments and more. To sign up, go to [email or web address] . Click on \"Log in\" on the left side of the screen, which will take you to the Welcome page. Then click on \"Sign up Now\" on the right side of the page.     You will be asked to enter the access code listed below, as well as some personal information. Please follow the directions to create your username and password.     Your access code is: [email or web address]  Expires: 2018  3:12 PM     Your access code will  in 90 days. If " you need help or a new code, please call your [facility] clinic or [phone].        Care EveryWhere ID     This is your Care EveryWhere ID. This could be used by other organizations to access your [facility] medical records  [id number]        Your Vitals Were     Pulse BMI (Body Mass Index)                57 29.8 kg/m2           Blood Pressure from Last 3 Encounters:   04/12/18 115/68   01/31/18 115/71   09/06/17 117/68    Weight from Last 3 Encounters:   04/12/18 201 lb 12.8 oz (91.5 kg)   01/31/18 201 lb 1.6 oz (91.2 kg)   09/06/17 210 lb 6.4 oz (95.4 kg)              We Performed the Following     Follow-Up with Vascular Medicine     [facility]     [facility]ULT [facility]FERR[facility]          Today's Medication Changes          These changes are accurate as of 4/12/18 12:30 PM.  If you have any questions, ask your nurse or doctor.               These medicines have changed or have updated prescriptions.        Dose/Directions    * sildenafil 50 MG tablet   Commonly known as:  VIAGRA   This may have changed:  Another medication with the same name was changed. Make sure you understand how and when to take each.   Used for:  Erectile dysfunction   Changed by:  Hakeem Cano MD        Dose:  50 mg   Take 1 tablet (50 mg) by mouth daily as needed for erectile dysfunction   Quantity:  10 tablet   Refills:  11       * sildenafil 50 MG tablet   Commonly known as:  VIAGRA   This may have changed:  Another medication with the same name was changed. Make sure you understand how and when to take each.   Used for:  ED (erectile dysfunction)   Changed by:  Hakeem Cano MD        Dose:  25-50 mg   Take 0.5-1 tablets (25-50 mg) by mouth daily as needed for erectile dysfunction Take 30 min to 4 hours before intercourse.  .   Quantity:  12 tablet   Refills:  11       * sildenafil 100 MG tablet   Commonly known as:  VIAGRA   This may have changed:  reasons to take this   Used for:  Erectile  dysfunction due to arterial insufficiency   Changed by:  Hakeem Cano MD        Dose:  100 mg   Take 1 tablet (100 mg) by mouth daily as needed Take 30 min to 4 hours before intercourse.  Never use with nitroglycerin, terazosin or doxazosin.   Quantity:  12 tablet   Refills:  11       simvastatin 40 MG tablet   Commonly known as:  ZOCOR   This may have changed:  See the new instructions.   Used for:  Hyperlipidemia LDL goal <100   Changed by:  Hakeem Cano MD        Dose:  40 mg   Take 1 tablet (40 mg) by mouth At Bedtime   Quantity:  90 tablet   Refills:  3       * Notice:  This list has 3 medication(s) that are the same as other medications prescribed for you. Read the directions carefully, and ask your doctor or other care provider to review them with you.      Stop taking these medicines if you haven't already. Please contact your care team if you have questions.     tadalafil 5 MG tablet   Commonly known as:  CIALIS   Stopped by:  Hakeem Cano MD                Where to get your medicines      These medications were sent to [facility] [address] [place] [address] [address] [facility] [address] [facility] [address] [facility] [address]     Phone:  [phone]     acetaminophen 325 MG tablet    bumetanide 2 MG tablet    ferrous gluconate 324 (38 Fe) MG tablet    metoprolol tartrate 50 MG tablet    polyethylene glycol powder    Potassium Chloride ER 20 MEQ Tbcr    simvastatin 40 MG tablet         Some of these will need a paper prescription and others can be bought over the counter.  Ask your nurse if you have questions.     Bring a paper prescription for each of these medications     pregabalin 25 MG capsule    sildenafil 100 MG tablet    zolpidem 10 MG tablet                Primary Care Provider Office Phone # Fax #    Hakeem Cano [phone]       [facility] [address]  [place] [address]         Equal Access to Services     [facility] : Hadii aad ku hadjeremyeduardo Vickiali, waaxda luqadaha, qaybta kamickbrittany correa, norma todd. So [facility] [phone].    ATENCIÓN: Si habla español, tiene a olivo disposición servicios gratuitos de asistencia lingüística. Marcelinoame al [phone].    We comply with applicable federal civil rights laws and [place] laws. We do not discriminate on the basis of race, color, national origin, age, disability, sex, sexual orientation, or gender identity.            Thank you!     Thank you for choosing [facility] [place]  for your care. Our goal is always to provide you with excellent care. Hearing back from our patients is one way we can continue to improve our services. Please take a few minutes to complete the written survey that you may receive in the mail after your visit with us. Thank you!             Your Updated Medication List - Protect others around you: Learn how to safely use, store and throw away your medicines at www.disposemymeds.org.          This list is accurate as of 4/12/18 12:30 PM.  Always use your most recent med list.                   Brand Name Dispense Instructions for use Diagnosis    acetaminophen 325 MG tablet    TYLENOL    500 tablet    Take 3 tablets (975 mg) by mouth every 8 hours    Status post total left knee replacement       azithromycin 250 MG tablet    ZITHROMAX    6 tablet    Two tablets first day, then one tablet daily for four days.    Acute maxillary sinusitis, recurrence not specified, Balanitis       bumetanide 2 MG tablet    BUMEX    90 tablet    Take 1 tablet (2 mg) by mouth daily        esomeprazole 40 MG CR capsule    nexIUM    180 capsule    TAKE 1 CAPSULE BY MOUTH TWICE DAILY    Gastroesophageal reflux disease with esophagitis       ferrous gluconate 324 (38 Fe) MG tablet    FERGON    100 tablet    Take 1 tablet (324 mg) by mouth daily        * lamoTRIgine 200 MG tablet    LaMICtal    90  tablet    Take 200 mg by mouth every morning    Seizure disorder (H)       * lamoTRIgine 100 MG tablet    LaMICtal     250 mg At Bedtime        metoprolol tartrate 50 MG tablet    LOPRESSOR    180 tablet    Take 1 tablet (50 mg) by mouth 2 times daily    Essential hypertension with goal blood pressure less than 130/80       miconazole 2 % cream    KP MICONAZOLE NITRATE    20 g    Apply topically 2 times daily    Acute maxillary sinusitis, recurrence not specified, Balanitis       mometasone 50 MCG/ACT spray    NASONEX     Spray 2 sprays into both nostrils daily as needed        naproxen 500 MG tablet    NAPROSYN     Take 1 tablet by mouth 2 times daily        polyethylene glycol powder    MIRALAX/GLYCOLAX    170 g    Take 17 g by mouth daily as needed for constipation    Constipation, unspecified constipation type       Potassium Chloride ER 20 MEQ Tbcr     90 tablet    Take 1 tablet (20 mEq) by mouth daily        pregabalin 25 MG capsule    LYRICA    540 capsule    Take 3 capsules (75 mg) by mouth 2 times daily    Convulsions, unspecified convulsion type (H)       senna-docusate 8.6-50 MG per tablet    SENOKOT-S;PERICOLACE    100 tablet    Take 1 tablet by mouth daily as needed for constipation        * sildenafil 50 MG tablet    VIAGRA    10 tablet    Take 1 tablet (50 mg) by mouth daily as needed for erectile dysfunction    Erectile dysfunction       * sildenafil 50 MG tablet    VIAGRA    12 tablet    Take 0.5-1 tablets (25-50 mg) by mouth daily as needed for erectile dysfunction Take 30 min to 4 hours before intercourse.  .    ED (erectile dysfunction)       * sildenafil 100 MG tablet    VIAGRA    12 tablet    Take 1 tablet (100 mg) by mouth daily as needed Take 30 min to 4 hours before intercourse.  Never use with nitroglycerin, terazosin or doxazosin.    Erectile dysfunction due to arterial insufficiency       simvastatin 40 MG tablet    ZOCOR    90 tablet    Take 1 tablet (40 mg) by mouth At Bedtime     Hyperlipidemia LDL goal <100       ZANTAC PO      Take 75 mg by mouth At Bedtime        zolpidem 10 MG tablet    AMBIEN    90 tablet    Take 1 tablet (10 mg) by mouth nightly as needed for sleep at bedtime.    Primary insomnia       * Notice:  This list has 5 medication(s) that are the same as other medications prescribed for you. Read the directions carefully, and ask your doctor or other care provider to review them with you.

## 2018-04-12 NOTE — NURSING NOTE
"Chief Complaint   Patient presents with     RECHECK     fu labs       Initial /68 (BP Location: Right arm, Patient Position: Chair, Cuff Size: Adult Large)  Pulse 57  Wt 201 lb 12.8 oz (91.5 kg)  BMI 29.8 kg/m2 Estimated body mass index is 29.8 kg/(m^2) as calculated from the following:    Height as of 9/6/17: 5' 9\" (1.753 m).    Weight as of this encounter: 201 lb 12.8 oz (91.5 kg).  Medication Reconciliation: chani Chester on 4/12/2018 at 11:38 AM      "

## 2018-04-12 NOTE — PROGRESS NOTES
SUBJECTIVE:    CC: Patience Yao is a 62 year old male who presents for:       Routine general medical examination at a health care facility  Erectile dysfunction due to arterial insufficiency  Hyperlipidemia LDL goal <100  Essential hypertension with goal blood pressure less than 130/80  Status post total left knee replacement  Chronic bilateral low back pain without sciatica  Primary insomnia  Convulsions, unspecified convulsion type (H)  Tubular adenoma  Constipation, unspecified constipation type          HISTORIES:    PROBLEM LIST:                   Patient Active Problem List   Diagnosis     Mixed hyperlipidemia     Esophageal reflux     Anxiety state     GERD (gastroesophageal reflux disease)     RIC (obstructive sleep apnea)     HYPERLIPIDEMIA LDL GOAL <100     Health Care Home     S/P TKR (total knee replacement)     Epilepsy (H)     Benign essential hypertension     Physical deconditioning     Constipation       PAST MEDICAL HISTORY:                  Past Medical History:   Diagnosis Date     Arthritis      CAD (coronary artery disease) 2010    a subtotally occluded obtuse marginal vessel which was stented with a drug-coated stent.  He had 50% to 60% LAD disease and 25% to 30% right coronary disease     Colonic polyps 2009    tubular adenomae with mildly dysplastic features     Disturbance of skin sensation      Encephalocele (H) 2009    left frontal     Epilepsy, partial (H) 2009    with secondary generalization     GERD (gastroesophageal reflux disease)      Heart attack     5 years ago. 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     C NONSPECIFIC PROCEDURE      stint in heart     CYSTOSCOPY       ESOPHAGOSCOPY, GASTROSCOPY, DUODENOSCOPY (EGD), COMBINED  5/24/2012    Procedure:COMBINED ESOPHAGOSCOPY, GASTROSCOPY, DUODENOSCOPY (EGD); ESOPHAGOSCOPY, GASTROSCOPY, DUODENOSCOPY (EGD) ; Surgeon:GILLIAN JOHNSON; Location: [facility]     HC COLONOSCOPY THRU STOMA, DIAGNOSTIC  2009    tubular adenomae, recommended annual colonoscopy     KNEE SURGERY Left     repair of cartilage     SPINE SURGERY      repair of herniated disc lumbar     STENT         CURRENT MEDICATIONS:                  Current Outpatient Prescriptions   Medication Sig Dispense Refill     simvastatin (ZOCOR) 40 MG tablet Take 1 tablet (40 mg) by mouth At Bedtime 90 tablet 3     metoprolol tartrate (LOPRESSOR) 50 MG tablet Take 1 tablet (50 mg) by mouth 2 times daily 180 tablet 3     sildenafil (VIAGRA) 100 MG tablet Take 1 tablet (100 mg) by mouth daily as needed Take 30 min to 4 hours before intercourse.  Never use with nitroglycerin, terazosin or doxazosin. 12 tablet 11     Potassium Chloride ER 20 MEQ TBCR Take 1 tablet (20 mEq) by mouth daily 90 tablet 3     bumetanide (BUMEX) 2 MG tablet Take 1 tablet (2 mg) by mouth daily 90 tablet 3     ferrous gluconate (FERGON) 324 (38 Fe) MG tablet Take 1 tablet (324 mg) by mouth daily 100 tablet 3     polyethylene glycol (MIRALAX/GLYCOLAX) powder Take 17 g by mouth daily as needed for constipation 170 g 3     acetaminophen (TYLENOL) 325 MG tablet Take 3 tablets (975 mg) by mouth every 8 hours 500 tablet 3     zolpidem (AMBIEN) 10 MG tablet Take 1 tablet (10 mg) by mouth nightly as needed for sleep at bedtime. 90 tablet 1      pregabalin (LYRICA) 25 MG capsule Take 3 capsules (75 mg) by mouth 2 times daily 540 capsule 1     azithromycin (ZITHROMAX) 250 MG tablet Two tablets first day, then one tablet daily for four days. 6 tablet 0     miconazole (KP MICONAZOLE NITRATE) 2 % cream Apply topically 2 times daily 20 g 3     esomeprazole (NEXIUM) 40 MG CR capsule TAKE 1 CAPSULE BY MOUTH TWICE DAILY 180 capsule 2     sildenafil (REVATIO/VIAGRA) 50 MG cap/tab Take 0.5-1 tablets (25-50 mg) by mouth daily as needed for erectile dysfunction Take 30 min to 4 hours before intercourse.  . 12 tablet 11     senna-docusate (SENOKOT-S;PERICOLACE) 8.6-50 MG per tablet Take 1 tablet by mouth daily as needed for constipation 100 tablet      mometasone (NASONEX) 50 MCG/ACT nasal spray Spray 2 sprays into both nostrils daily as needed       Ranitidine HCl (ZANTAC PO) Take 75 mg by mouth At Bedtime       lamoTRIgine (LAMICTAL) 100 MG tablet 250 mg At Bedtime   3     naproxen (NAPROSYN) 500 MG tablet Take 1 tablet by mouth 2 times daily       sildenafil (VIAGRA) 50 MG tablet Take 1 tablet (50 mg) by mouth daily as needed for erectile dysfunction 10 tablet 11     lamoTRIgine (LAMICTAL) 200 MG tablet Take 200 mg by mouth every morning  90 tablet 1     [DISCONTINUED] simvastatin (ZOCOR) 40 MG tablet TAKE 1 TABLET BY MOUTH EVERY NIGHT AT BEDTIME 270 tablet 0     [DISCONTINUED] metoprolol (LOPRESSOR) 50 MG tablet Take 1 tablet (50 mg) by mouth 2 times daily 180 tablet 3     [DISCONTINUED] sildenafil (REVATIO/VIAGRA) 100 MG cap/tab Take 1 tablet (100 mg) by mouth daily as needed for erectile dysfunction Take 30 min to 4 hours before intercourse.  Never use with nitroglycerin, terazosin or doxazosin. 12 tablet 11     [DISCONTINUED] bumetanide (BUMEX) 2 MG tablet Take 1 tablet (2 mg) by mouth daily 60 tablet 0     [DISCONTINUED] zolpidem (AMBIEN) 10 MG tablet Take 1 tablet (10 mg) by mouth nightly as needed for sleep at bedtime. 30 tablet 0     [DISCONTINUED]  pregabalin (LYRICA) 25 MG capsule Take 3 capsules (75 mg) by mouth 2 times daily 90 capsule 1       ALLERGIES:                  Allergies   Allergen Reactions     Codeine      inability to sleep after taking codeine     Vicodin [Hydrocodone-Acetaminophen]      Perspiring, hyper, itchy       SOCIAL HISTORY:                  Social History     Social History     Marital status:      Spouse name: N/A     Number of children: 3     Years of education: N/A     Occupational History      - [employer]      Social History Main Topics     Smoking status: Former Smoker     Packs/day: 0.50     Years: 20.00     Quit date: 8/4/2003     Smokeless tobacco: Never Used     Alcohol use No     Drug use: No     Sexual activity: Yes     Other Topics Concern     Seat Belt Yes     Social History Narrative    SD - CHECKED AT LEAST TWICE YEARLY.    GUNS: NONE.       FAMILY HISTORY:                   Family History   Problem Relation Age of Onset     Cardiovascular Mother      CHF, HTN, VASC. INSUFF., DM     Family History Negative Father      Thyroid Disease Sister      HYPOTHYROID     Genitourinary Problems Brother      RENAL FAILURE AND H/O MVA WITH LE PARAPLEGIA     Cancer - colorectal No family hx of                              REVIEW OF SYSTEMS:  CONSTITUTIONAL:no malaise, fatigue, or other general symptoms  EYES: no subjective changes in visual acuity, no photophobia  ENT/MOUTH: no complaints of rhinorrhea, nasal congestion, sore throat, hearing changes  RESP:no SOB  CV: no c/o exertional chest pressure or NAGY  GI: No abdominal pain, constipation, change in bowel movements, nausea, pyrosis, BRBPR  :no polyuria or polydipsia, no dysuria, no gross hematuria  MUSCULOSKELATAL:POSITIVE  for chronic low back pain without sciatica for which he has been by multiple MDs across multiple health systems ([facility], [facility]); also c/o chronic left knee pain for which he had Lt TKA 2016 by Dr. Leonardo of [facility], who, per the patient, has  told him he has nothing further to offer the patient (it should be noted the patient seeks multiple opinions regarding the same issues historically form multiple MDs in multiple health systems) - he is now requesting another orthopedic referral to discuss treatment options for both knee and back pain  INTEGUMENTARY/SKIN: no pruritis, rashes, or moles with recent change in size, shape, or pigmentation  BREAST: no lumps, masses, or discharges  NEURO: no gross sensory or motor symptoms, no dizziness, no confusion  ENDOCRINE: no polyuria or polydipsia, no heat or cold intolerance  HEME/ALLERGY/IMMUNE: no fevers, chills, night sweats, or unwanted weight loss  PSYCHIATRIC: no depression, anxiety, or internal stimuli    EXAM:    /68 (BP Location: Right arm, Patient Position: Chair, Cuff Size: Adult Large)  Pulse 57  Wt 201 lb 12.8 oz (91.5 kg)  BMI 29.8 kg/m2  BMI: Body mass index is 29.8 kg/(m^2).  GENERAL APPEARANCE: healthy, alert and no distress   EXAM:  EYES: clear conjunctiva, no cataracts, no obvious fundoscopic abnormalities  HENT: oropharynx, nares, and TMs are WNL  NECK: no JVD, thyromegaly or lymphadenopathy, no cervical bruits  RESP: clear to auscultation without rales, wheezes, or rhonchi  CV: RRR, no murmurs, gallops, or rubs  LYMPH: no cervical , axillary, or inguinal lymphadenopathy appreciated  GI: NABS, ND/NT, no masses or organomegally appreciated  : normal external genitalia and anus, no lumps, masses, or tenderness noted on palpation of the normally sized prostate  MS: no obvoius clinicallly relevant arthropathy, no evidence vasculitis  SKIN: no nevi clinically suspicious for malignancy are noted  NEURO: CN II-XII intact, no localizing sensory or motor abnoramlities noted, DTRs symmetrical bilaterally  PSYCH: Mental status exam reveals the pt to have normal mood and affect. There is no disorder of thought form or content. There is no response to internal stimuli. There is no suicidal or  homicidal ideation.          Component      Latest Ref Rng & Units 4/5/2018   WBC      4.0 - 11.0 10e9/L 4.9   RBC Count      4.4 - 5.9 10e12/L 4.61   Hemoglobin      13.3 - 17.7 g/dL 13.3   Hematocrit      40.0 - 53.0 % 39.0 (L)   MCV      78 - 100 fl 85   MCH      26.5 - 33.0 pg 28.9   MCHC      31.5 - 36.5 g/dL 34.1   RDW      10.0 - 15.0 % 14.5   Platelet Count      150 - 450 10e9/L 168   Diff Method       Automated Method   % Neutrophils      % 58.5   % Lymphocytes      % 29.4   % Monocytes      % 9.4   % Eosinophils      % 2.1   % Basophils      % 0.6   Absolute Neutrophil      1.6 - 8.3 10e9/L 2.9   Absolute Lymphocytes      0.8 - 5.3 10e9/L 1.4   Absolute Monocytes      0.0 - 1.3 10e9/L 0.5   Absolute Eosinophils      0.0 - 0.7 10e9/L 0.1   Absolute Basophils      0.0 - 0.2 10e9/L 0.0   Sodium      133 - 144 mmol/L 140   Potassium      3.4 - 5.3 mmol/L 4.0   Chloride      94 - 109 mmol/L 107   Carbon Dioxide      20 - 32 mmol/L 32   Anion Gap      3 - 14 mmol/L 1 (L)   Glucose      70 - 99 mg/dL 90   Urea Nitrogen      7 - 30 mg/dL 31 (H)   Creatinine      0.66 - 1.25 mg/dL 1.11   GFR Estimate      >60 mL/min/1.7m2 67   GFR Estimate If Black      >60 mL/min/1.7m2 81   Calcium      8.5 - 10.1 mg/dL 9.3   Bilirubin Direct      0.0 - 0.2 mg/dL 0.2   Bilirubin Total      0.2 - 1.3 mg/dL 0.7   Albumin      3.4 - 5.0 g/dL 4.4   Protein Total      6.8 - 8.8 g/dL 7.5   Alkaline Phosphatase      40 - 150 U/L 107   ALT      0 - 70 U/L 62   AST      0 - 45 U/L 33   Cholesterol      <200 mg/dL 126   Triglycerides      <150 mg/dL 87   HDL Cholesterol      >39 mg/dL 41   LDL Cholesterol Calculated      <100 mg/dL 68   Non HDL Cholesterol      <130 mg/dL 85   Hemoglobin A1C      0 - 6.4 % 4.8   TSH      0.40 - 4.00 mU/L 3.16   Free T3      2.3 - 4.2 pg/mL 2.6   T4 Free      0.76 - 1.46 ng/dL 1.04       (Z00.00) Routine general medical examination at a health care facility  (primary encounter diagnosis)  Comment: RTC one  year  Plan: Follow-Up with Vascular Medicine, CBC with         platelets differential, T3 Free, T4 free, TSH,         Basic metabolic panel, Hepatic panel, Lipid         Profile           (N52.01) Erectile dysfunction due to arterial insufficiency  Comment: he needs the below  Plan: sildenafil (VIAGRA) 100 MG tablet, OFFICE/OUTPT        VISIT,EST,LEVL III, Follow-Up with Vascular         Medicine           (E78.5) Hyperlipidemia LDL goal <100  Comment: at goal, no med changes  Plan: simvastatin (ZOCOR) 40 MG tablet, OFFICE/OUTPT         VISIT,EST,LEVL III, Follow-Up with Vascular         Medicine, T3 Free, T4 free, TSH, Lipid Profile           (I10) Essential hypertension with goal blood pressure less than 130/80  Comment: at goal  Plan: metoprolol tartrate (LOPRESSOR) 50 MG tablet,         OFFICE/OUTPT VISIT,EST,LEVL III, Follow-Up with        Vascular Medicine, Basic metabolic panel,         Hepatic panel           (Z96.652) chronic knee pain after total left knee replacement 2016, with no firther treatment options per the patient according to Dr. Leonardo  Comment: I told the patient that id his orthopod who replaced the left knee states there are no other treatment options for ongoing pain, then that is likely truly the case; nonetheless, he insists upon obtianing another opinion so I have referred him to Dr. Easton, also with [facility]  Plan: acetaminophen (TYLENOL) 325 MG tablet,         ORTHOPEDICS ADULT REFERRAL, OFFICE/OUTPT         VISIT,EST,LEVL III, Follow-Up with Vascular         Medicine           (M54.5,  G89.29) Chronic bilateral low back pain without sciatica  Comment: he has seen multiple MDs in multiple health systems, has had back imaging by MRI in other health systems (I do not have those records) , he has been offered no definitive treatment options, he has a history of extreme anxiety but is not clinically anxious presently; he will be given a referral to Dr. Elias to be evaluated  Plan: ORTHOPEDICS  ADULT REFERRAL, OFFICE/OUTPT         VISIT,EST,LEVL III, Follow-Up with Vascular         Medicine           (F51.01) Primary insomnia  Comment: he needs the below  Plan: zolpidem (AMBIEN) 10 MG tablet, OFFICE/OUTPT         VISIT,EST,LEVL III, Follow-Up with Vascular         Medicine           (R56.9) Convulsions, unspecified convulsion type (H)  Comment: he needs the below, and also uses lyrica to treat his chronic pain; I have told him in the past I am not a chronic pain MD; in the future, he will need to receive this form a chronic pain manaagement physician  Plan: pregabalin (LYRICA) 25 MG capsule, OFFICE/OUTPT        VISIT,EST,LEVL III, Follow-Up with Vascular         Medicine           (D36.9) Tubular adenoma  Comment: he needs a colonosocpy in 2019  Plan: OFFICE/OUTPT VISIT,EST,LEVL III, Follow-Up with        Vascular Medicine            (K59.00) Constipation, unspecified constipation type  Comment: he needs the below  Plan: polyethylene glycol (MIRALAX/GLYCOLAX) powder,         OFFICE/OUTPT VISIT,EST,LEVL III, Follow-Up with        Vascular Medicine           Greater than one half the 15 minutes not spent on preventive care during this visit was spent providing aducation and counselling regarding item(s) # 2 onward as delineated above.       I have discussed with patient the risks, benefits, medications, treatment options and modalities.   I have instructed the patient to call or schedule a follow-up appointment if any problems or failure to improve.

## 2018-04-13 ENCOUNTER — TELEPHONE (OUTPATIENT)
Dept: OTHER | Facility: CLINIC | Age: 63
End: 2018-04-13

## 2018-04-13 RX ORDER — POTASSIUM CHLORIDE 1.5 G/1.58G
20 POWDER, FOR SOLUTION ORAL DAILY
Qty: 90 TABLET | Refills: 3 | Status: SHIPPED | OUTPATIENT
Start: 2018-04-13 | End: 2018-09-17

## 2018-04-13 RX ORDER — PREGABALIN 25 MG/1
75 CAPSULE ORAL 2 TIMES DAILY
Qty: 540 CAPSULE | Refills: 1 | Status: SHIPPED | OUTPATIENT
Start: 2018-04-13 | End: 2020-05-27

## 2018-04-13 RX ORDER — GABAPENTIN 300 MG/1
300 CAPSULE ORAL 3 TIMES DAILY
Qty: 270 CAPSULE | Refills: 3 | Status: SHIPPED | OUTPATIENT
Start: 2018-04-13 | End: 2018-04-13

## 2018-04-13 NOTE — LETTER
[facility]  [address]  [place] [address]  Phone: [phone]  Fax: [phone]      April 17, 2018      Patience Yao  [address] [facility] [address]          To whom it may concern,         I would like to appeal decision to deny my patient pregabalin.   He has already tried five or six different medications, although he cannot remember what they were as he sees many different physicians.  This medication is one that he can tolerate the best and informs me that even this medication is not without some side effects but are tolerable.The other medications that he tried he experienced side effects of dizziness, extreme sleepiness and he was unable to safely operate a motor vehicle.      Sincerely,          Hakeem Cano MD    [facility]  [address]  [place] [address]  Phone: [phone]  Fax: [phone]

## 2018-04-13 NOTE — TELEPHONE ENCOUNTER
PA Initiation    Medication: LYRICA 25MG  Insurance Company: [employer] - Phone [phone] Fax [phone]  Pharm[facility] the [facility] [address] [place] [address] [address] [address]  Filling Pharmacy Phone: [phone]  Filling Pharmacy Fax:    Start Date: 4/13/2018

## 2018-04-13 NOTE — TELEPHONE ENCOUNTER
Prior Authorization Retail Medication Request    Medication/Dose: pregabalin  ICD code (if different than what is on RX):    Previously Tried and Failed:  Gabapentin allergy, lamoTRIgine  Rationale:  Medication controls symptoms well    Insurance Name:  [employer] [facility]: [phone]   Insurance ID:  [id number]      Pharmacy Information (if different than what is on RX)  Name:    Phone:

## 2018-04-13 NOTE — TELEPHONE ENCOUNTER
I did not realize that. See Rx for gabapentin, inform patient of change, have him call us if this is not working as well for his pain, and we will do a PA then.

## 2018-04-13 NOTE — TELEPHONE ENCOUNTER
PRIOR AUTHORIZATION DENIED    Medication: LYRICA 25MG - DENIED    Denial Date: 4/13/2018    Denial Rational:     Appeal Information:

## 2018-04-13 NOTE — TELEPHONE ENCOUNTER
Patient called back and states that he has an allergy to gabapentin (fever), allergy list updated.  Lyrica reloaded with previous dose sent to PCP.  RN can initiate PA immediately  Luis Kiser RN, BSN

## 2018-04-17 NOTE — TELEPHONE ENCOUNTER
RN called patient who would like to appeal decision below as he states that he has already tried five or six different medications (although he cannot remember what they were) and that this medication is one that he can tolerate the best.  He states that even this one is not perfect but at least he is not experiencing side effects of  Dizziness, extreme sleepiness and unable to safely operate a motor vehicle.  Letter of appeals created for PCP with the above for review.  Placed in RM 12  Luis Kiser RN, BSN

## 2018-04-17 NOTE — TELEPHONE ENCOUNTER
Appeals letter signed and faxed to   Confirmed delivery via right fax at 1025  Awaiting return response  Luis Kiser RN, BSN

## 2018-05-19 ENCOUNTER — TRANSFERRED RECORDS (OUTPATIENT)
Dept: HEALTH INFORMATION MANAGEMENT | Facility: CLINIC | Age: 63
End: 2018-05-19

## 2018-05-21 ENCOUNTER — TRANSFERRED RECORDS (OUTPATIENT)
Dept: HEALTH INFORMATION MANAGEMENT | Facility: CLINIC | Age: 63
End: 2018-05-21

## 2018-06-11 ENCOUNTER — TRANSFERRED RECORDS (OUTPATIENT)
Dept: HEALTH INFORMATION MANAGEMENT | Facility: CLINIC | Age: 63
End: 2018-06-11

## 2018-07-10 ENCOUNTER — TRANSFERRED RECORDS (OUTPATIENT)
Dept: HEALTH INFORMATION MANAGEMENT | Facility: CLINIC | Age: 63
End: 2018-07-10

## 2018-07-12 ENCOUNTER — TRANSFERRED RECORDS (OUTPATIENT)
Dept: HEALTH INFORMATION MANAGEMENT | Facility: CLINIC | Age: 63
End: 2018-07-12

## 2018-08-15 ENCOUNTER — TELEPHONE (OUTPATIENT)
Dept: OTHER | Facility: CLINIC | Age: 63
End: 2018-08-15

## 2018-08-15 NOTE — TELEPHONE ENCOUNTER
Patient called to report increased acid reflux after stopping Nexium- pt reports he is taking Zantac daily and it has not helped. He is also c/o urinary frequency for the past few days- denies dysuria.    Patient is wondering if he should come into clinic to discuss further.     Will route to covering provider-Please advise.    Mary Kate CONNELLYN, RN

## 2018-09-17 ENCOUNTER — OFFICE VISIT (OUTPATIENT)
Dept: OTHER | Facility: CLINIC | Age: 63
End: 2018-09-17
Attending: INTERNAL MEDICINE
Payer: COMMERCIAL

## 2018-09-17 VITALS
HEART RATE: 52 BPM | BODY MASS INDEX: 30.95 KG/M2 | SYSTOLIC BLOOD PRESSURE: 114 MMHG | OXYGEN SATURATION: 97 % | WEIGHT: 209.6 LBS | DIASTOLIC BLOOD PRESSURE: 72 MMHG

## 2018-09-17 DIAGNOSIS — R33.9 URINARY RETENTION: Primary | ICD-10-CM

## 2018-09-17 DIAGNOSIS — K21.00 GASTROESOPHAGEAL REFLUX DISEASE WITH ESOPHAGITIS: ICD-10-CM

## 2018-09-17 PROCEDURE — G0463 HOSPITAL OUTPT CLINIC VISIT: HCPCS

## 2018-09-17 PROCEDURE — 99213 OFFICE O/P EST LOW 20 MIN: CPT | Mod: ZP | Performed by: INTERNAL MEDICINE

## 2018-09-17 RX ORDER — PANTOPRAZOLE SODIUM 40 MG/1
40 TABLET, DELAYED RELEASE ORAL DAILY
Qty: 180 TABLET | Refills: 3 | Status: ON HOLD | OUTPATIENT
Start: 2018-09-17 | End: 2018-10-29

## 2018-09-17 RX ORDER — LAMOTRIGINE 100 MG/1
250 TABLET ORAL AT BEDTIME
Qty: 60 TABLET | Refills: 3 | Status: CANCELLED | OUTPATIENT
Start: 2018-09-17

## 2018-09-17 RX ORDER — LAMOTRIGINE 200 MG/1
200 TABLET ORAL EVERY MORNING
Qty: 90 TABLET | Refills: 1 | Status: CANCELLED | OUTPATIENT
Start: 2018-09-17

## 2018-09-17 RX ORDER — PREGABALIN 25 MG/1
75 CAPSULE ORAL 2 TIMES DAILY
Qty: 540 CAPSULE | Refills: 1 | Status: CANCELLED | OUTPATIENT
Start: 2018-09-17

## 2018-09-17 RX ORDER — SILDENAFIL 50 MG/1
50 TABLET, FILM COATED ORAL DAILY PRN
Qty: 10 TABLET | Refills: 11 | Status: CANCELLED | OUTPATIENT
Start: 2018-09-17

## 2018-09-17 RX ORDER — METOPROLOL TARTRATE 50 MG
50 TABLET ORAL 2 TIMES DAILY
Qty: 180 TABLET | Refills: 3 | Status: CANCELLED | OUTPATIENT
Start: 2018-09-17

## 2018-09-17 RX ORDER — SILDENAFIL 100 MG/1
100 TABLET, FILM COATED ORAL DAILY PRN
Qty: 12 TABLET | Refills: 11 | Status: CANCELLED | OUTPATIENT
Start: 2018-09-17

## 2018-09-17 RX ORDER — ESOMEPRAZOLE MAGNESIUM 40 MG/1
CAPSULE, DELAYED RELEASE ORAL
Qty: 180 CAPSULE | Refills: 2 | Status: CANCELLED | OUTPATIENT
Start: 2018-09-17

## 2018-09-17 RX ORDER — ACETAMINOPHEN 325 MG/1
975 TABLET ORAL EVERY 8 HOURS
Qty: 500 TABLET | Refills: 3 | Status: CANCELLED | OUTPATIENT
Start: 2018-09-17

## 2018-09-17 RX ORDER — NAPROXEN 500 MG/1
500 TABLET ORAL 2 TIMES DAILY
Qty: 60 TABLET | Status: CANCELLED | OUTPATIENT
Start: 2018-09-17

## 2018-09-17 RX ORDER — SIMVASTATIN 40 MG
40 TABLET ORAL AT BEDTIME
Qty: 90 TABLET | Refills: 3 | Status: CANCELLED | OUTPATIENT
Start: 2018-09-17

## 2018-09-17 RX ORDER — ZOLPIDEM TARTRATE 10 MG/1
10 TABLET ORAL
Qty: 90 TABLET | Refills: 1 | Status: CANCELLED | OUTPATIENT
Start: 2018-09-17

## 2018-09-17 NOTE — MR AVS SNAPSHOT
"              After Visit Summary   2018    Kashmir Yao    MRN: [id number]           Patient Information     Date Of Birth          1955        Visit Information        Provider Department      2018 12:40 PM Hakeem Cano MD [facility] [place]      Today's Diagnoses     Urinary retention    -  1    Gastroesophageal reflux disease with esophagitis           Follow-ups after your visit        Who to contact     If you have questions or need follow up information about today's clinic visit or your schedule please contact [facility] directly at [phone].  Normal or non-critical lab and imaging results will be communicated to you by PVPowerhart, letter or phone within 4 business days after the clinic has received the results. If you do not hear from us within 7 days, please contact the clinic through PVPowerhart or phone. If you have a critical or abnormal lab result, we will notify you by phone as soon as possible.  Submit refill requests through Uploadcare or call your pharmacy and they will forward the refill request to us. Please allow 3 business days for your refill to be completed.          Additional Information About Your Visit        MyChart Information     Uploadcare lets you send messages to your doctor, view your test results, renew your prescriptions, schedule appointments and more. To sign up, go to [email or web address] . Click on \"Log in\" on the left side of the screen, which will take you to the Welcome page. Then click on \"Sign up Now\" on the right side of the page.     You will be asked to enter the access code listed below, as well as some personal information. Please follow the directions to create your username and password.     Your access code is: [email or web address]  Expires: 2018  1:12 PM     Your access code will  in 90 days. If you need help or a new code, please call your [place] " clinic or [phone].        Care EveryWhere ID     This is your Care EveryWhere ID. This could be used by other organizations to access your [place] medical records  [id number]        Your Vitals Were     Pulse Pulse Oximetry BMI (Body Mass Index)             52 97% 30.95 kg/m2          Blood Pressure from Last 3 Encounters:   09/17/18 114/72   04/12/18 115/68   01/31/18 115/71    Weight from Last 3 Encounters:   09/17/18 209 lb 9.6 oz (95.1 kg)   04/12/18 201 lb 12.8 oz (91.5 kg)   01/31/18 201 lb 1.6 oz (91.2 kg)              Today, you had the following     No orders found for display         Today's Medication Changes          These changes are accurate as of 9/17/18  2:17 PM.  If you have any questions, ask your nurse or doctor.               Start taking these medicines.        Dose/Directions    pantoprazole 40 MG EC tablet   Commonly known as:  PROTONIX   Used for:  Gastroesophageal reflux disease with esophagitis   Started by:  Hakeem Cano MD        Dose:  40 mg   Take 1 tablet (40 mg) by mouth daily Take 30-60 minutes before a meal.   Quantity:  180 tablet   Refills:  3            Where to get your medicines      [facility] [address] [place] [address] [address] AT [facility] [id number]  [address] [facility] [address]     Phone:  [phone]     pantoprazole 40 MG EC tablet                Primary Care Provider Office Phone # Fax #    Hakeem Cano [phone] [address] [facility] [address]  [place] [address]        Equal Access to Services     UMESH CAMPOS AH: Hadii deloris larson Soaustyn, waaxda luqadaha, qaybta kaalmada sunny, norma todd. So [facility] [phone].    ATENCIÓN: Si habla español, tiene a olivo disposición servicios gratuitos de asistencia lingüística. Llame al [phone].    We comply with applicable federal civil rights laws and [place] laws. We do not  discriminate on the basis of race, color, national origin, age, disability, sex, sexual orientation, or gender identity.            Thank you!     Thank you for choosing [facility]  for your care. Our goal is always to provide you with excellent care. Hearing back from our patients is one way we can continue to improve our services. Please take a few minutes to complete the written survey that you may receive in the mail after your visit with us. Thank you!             Your Updated Medication List - Protect others around you: Learn how to safely use, store and throw away your medicines at www.disposemymeds.org.          This list is accurate as of 9/17/18  2:17 PM.  Always use your most recent med list.                   Brand Name Dispense Instructions for use Diagnosis    acetaminophen 325 MG tablet    TYLENOL    500 tablet    Take 3 tablets (975 mg) by mouth every 8 hours    Status post total left knee replacement       esomeprazole 40 MG CR capsule    nexIUM    180 capsule    TAKE 1 CAPSULE BY MOUTH TWICE DAILY    Gastroesophageal reflux disease with esophagitis       * lamoTRIgine 200 MG tablet    LaMICtal    90 tablet    Take 200 mg by mouth every morning    Seizure disorder (H)       * lamoTRIgine 100 MG tablet    LaMICtal     250 mg At Bedtime        metoprolol tartrate 50 MG tablet    LOPRESSOR    180 tablet    Take 1 tablet (50 mg) by mouth 2 times daily    Essential hypertension with goal blood pressure less than 130/80       naproxen 500 MG tablet    NAPROSYN     Take 1 tablet by mouth 2 times daily        pantoprazole 40 MG EC tablet    PROTONIX    180 tablet    Take 1 tablet (40 mg) by mouth daily Take 30-60 minutes before a meal.    Gastroesophageal reflux disease with esophagitis       pregabalin 25 MG capsule    LYRICA    540 capsule    Take 3 capsules (75 mg) by mouth 2 times daily    Convulsions, unspecified convulsion type (H)       * sildenafil 50 MG tablet    VIAGRA     10 tablet    Take 1 tablet (50 mg) by mouth daily as needed for erectile dysfunction    Erectile dysfunction       * sildenafil 100 MG tablet    VIAGRA    12 tablet    Take 1 tablet (100 mg) by mouth daily as needed Take 30 min to 4 hours before intercourse.  Never use with nitroglycerin, terazosin or doxazosin.    Erectile dysfunction due to arterial insufficiency       ZANTAC PO      Take 75 mg by mouth At Bedtime        zolpidem 10 MG tablet    AMBIEN    90 tablet    Take 1 tablet (10 mg) by mouth nightly as needed for sleep at bedtime.    Primary insomnia       * Notice:  This list has 4 medication(s) that are the same as other medications prescribed for you. Read the directions carefully, and ask your doctor or other care provider to review them with you.

## 2018-09-17 NOTE — PROGRESS NOTES
Kashmir Yao is a 63 year old male who is presenting at the current time to discuss his diagnosi(es) of :       Gastroesophageal reflux disease with esophagitis  Urinary retention     Review Of Systems  Skin: negative  Eyes: negative  Ears/Nose/Throat: negative  Respiratory: No shortness of breath, dyspnea on exertion, cough, or hemoptysis  Cardiovascular: negative  Gastrointestinal: positive for negative, prominent reflux and solid as well as liquid dysphagia; alleviated with milk and Zantac; started two weeks after running out of Nexium that insurance denied payment for ; last EGD 2012 WNL at [facility]  Genitourinary: sensation of perpetual bladder fullness with a sense of incomplete voiding.  Musculoskeletal: negative  Neurologic: negative  Psychiatric: negative  Hematologic/Lymphatic/Immunologic: negative  Endocrine: negative     PAST MEDICAL HISTORY:                  Past Medical History:   Diagnosis Date     Arthritis      CAD (coronary artery disease) 2010    a subtotally occluded obtuse marginal vessel which was stented with a drug-coated stent.  He had 50% to 60% LAD disease and 25% to 30% right coronary disease     Colonic polyps 2009    tubular adenomae with mildly dysplastic features     Disturbance of skin sensation      Encephalocele (H) 2009    left frontal     Epilepsy, partial (H) 2009    with secondary generalization     GERD (gastroesophageal reflux disease)      Heart attack     5 years ago. 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     C NONSPECIFIC PROCEDURE      stint in heart     CYSTOSCOPY       ESOPHAGOSCOPY, GASTROSCOPY, DUODENOSCOPY (EGD), COMBINED  5/24/2012    Procedure:COMBINED ESOPHAGOSCOPY, GASTROSCOPY, DUODENOSCOPY (EGD); ESOPHAGOSCOPY, GASTROSCOPY, DUODENOSCOPY (EGD) ; Surgeon:GILLIAN JOHNSON; Location: GI     HC COLONOSCOPY THRU STOMA, DIAGNOSTIC  2009    tubular adenomae, recommended annual colonoscopy     KNEE SURGERY Left     repair of cartilage     SPINE SURGERY      repair of herniated disc lumbar     STENT         CURRENT MEDICATIONS:                  Current Outpatient Prescriptions   Medication Sig Dispense Refill     pantoprazole (PROTONIX) 40 MG EC tablet Take 1 tablet (40 mg) by mouth daily Take 30-60 minutes before a meal. 180 tablet 3     acetaminophen (TYLENOL) 325 MG tablet Take 3 tablets (975 mg) by mouth every 8 hours 500 tablet 3     esomeprazole (NEXIUM) 40 MG CR capsule TAKE 1 CAPSULE BY MOUTH TWICE DAILY 180 capsule 2     lamoTRIgine (LAMICTAL) 100 MG tablet 250 mg At Bedtime   3     lamoTRIgine (LAMICTAL) 200 MG tablet Take 200 mg by mouth every morning  90 tablet 1     metoprolol tartrate (LOPRESSOR) 50 MG tablet Take 1 tablet (50 mg) by mouth 2 times daily 180 tablet 3     naproxen (NAPROSYN) 500 MG tablet Take 1 tablet by mouth 2 times daily       pregabalin (LYRICA) 25 MG capsule Take 3 capsules (75 mg) by mouth 2 times daily 540 capsule 1     Ranitidine HCl (ZANTAC PO) Take 75 mg by mouth At Bedtime       sildenafil (VIAGRA) 100 MG tablet Take 1 tablet (100 mg) by mouth daily as needed Take 30 min to 4 hours before intercourse.  Never use with nitroglycerin, terazosin or doxazosin. 12 tablet 11     sildenafil (VIAGRA) 50 MG tablet Take 1 tablet (50 mg) by mouth daily  as needed for erectile dysfunction 10 tablet 11     zolpidem (AMBIEN) 10 MG tablet Take 1 tablet (10 mg) by mouth nightly as needed for sleep at bedtime. 90 tablet 1     [DISCONTINUED] sildenafil (REVATIO/VIAGRA) 50 MG cap/tab Take 0.5-1 tablets (25-50 mg) by mouth daily as needed for erectile dysfunction Take 30 min to 4 hours before intercourse.  . 12 tablet 11       ALLERGIES:                  Allergies   Allergen Reactions     Codeine      inability to sleep after taking codeine     Gabapentin      Fever       Vicodin [Hydrocodone-Acetaminophen]      Perspiring, hyper, itchy       SOCIAL HISTORY:                  Social History     Social History     Marital status:      Spouse name: N/A     Number of children: 3     Years of education: N/A     Occupational History      - [employer].      Social History Main Topics     Smoking status: Former Smoker     Packs/day: 0.50     Years: 20.00     Quit date: 8/4/2003     Smokeless tobacco: Never Used     Alcohol use No     Drug use: No     Sexual activity: Yes     Other Topics Concern     Seat Belt Yes     Social History Narrative    SD - CHECKED AT LEAST TWICE YEARLY.    GUNS: NONE.       FAMILY HISTORY:                   Family History   Problem Relation Age of Onset     Cardiovascular Mother      CHF, HTN, VASC. INSUFF., DM     Family History Negative Father      Thyroid Disease Sister      HYPOTHYROID     Genitourinary Problems Brother      RENAL FAILURE AND H/O MVA WITH LE PARAPLEGIA     Cancer - colorectal No family hx of          Physical exam Reveals:    O/P: WNL  HEENT: WNL  NECK: No JVD, thyromegaly, or lymphadenopathy  HEART: RRR, no murmurs, gallops, or rubs  LUNGS: CTA bilaterally without rales, wheezes, or rhonchi  GI: NABS, nondistended, nontender, soft  EXT:without cyanosis, clubbing, or edema  NEURO: nonfocal  : no flank tenderness    A/P:    (R33.9) Urinary retention  (primary encounter diagnosis)  Comment: he has sxs of urinary  retntion  Plan: he has seen Dr. Pisano regarding this. He had a minimal PVR. Recent UA, PSA, BMP have been unconcenring. He is advised to f/u with Dr. Pisano regarding this concenr    (K21.0) Gastroesophageal reflux disease with esophagitis  Comment: the patient has tried Nexium BID which works but his insurance will not pay for it. He has tried omeprazole which does not work in alleviating GERD sxs as is the case with Zantac; If insurance denies payment for Protonix, we will advise the insurance company that we will refer the patient to GI to consider EGD.  Plan: pantoprazole (PROTONIX) 40 MG EC tablet

## 2018-09-17 NOTE — NURSING NOTE
"Kashmir Yao is a 63 year old male who presents for:  Chief Complaint   Patient presents with     RECHECK     f/u for urinary frequency and acid reflux        Vitals:    Vitals:    09/17/18 1233   BP: 114/72   BP Location: Right arm   Patient Position: Chair   Cuff Size: Adult Regular   Pulse: 52   SpO2: 97%   Weight: 209 lb 9.6 oz (95.1 kg)       BMI:  Estimated body mass index is 30.95 kg/(m^2) as calculated from the following:    Height as of 9/6/17: 5' 9\" (1.753 m).    Weight as of this encounter: 209 lb 9.6 oz (95.1 kg).    Pain Score:  Data Unavailable        Milka Chester MA      "

## 2018-10-28 ENCOUNTER — APPOINTMENT (OUTPATIENT)
Dept: CT IMAGING | Facility: CLINIC | Age: 63
End: 2018-10-28
Attending: EMERGENCY MEDICINE
Payer: COMMERCIAL

## 2018-10-28 ENCOUNTER — HOSPITAL ENCOUNTER (OUTPATIENT)
Facility: CLINIC | Age: 63
Setting detail: OBSERVATION
Discharge: HOME | End: 2018-10-29
Attending: EMERGENCY MEDICINE | Admitting: SURGERY
Payer: COMMERCIAL

## 2018-10-28 DIAGNOSIS — S22.32XA CLOSED FRACTURE OF ONE RIB OF LEFT SIDE, INITIAL ENCOUNTER: ICD-10-CM

## 2018-10-28 PROBLEM — T14.90XA TRAUMA: Status: ACTIVE | Noted: 2018-10-28

## 2018-10-28 LAB
ANION GAP SERPL CALCULATED.3IONS-SCNC: 4 MMOL/L (ref 3–14)
BASOPHILS # BLD AUTO: 0 10E9/L (ref 0–0.2)
BASOPHILS NFR BLD AUTO: 0.3 %
BUN SERPL-MCNC: 30 MG/DL (ref 7–30)
CALCIUM SERPL-MCNC: 9 MG/DL (ref 8.5–10.1)
CHLORIDE SERPL-SCNC: 106 MMOL/L (ref 94–109)
CO2 SERPL-SCNC: 28 MMOL/L (ref 20–32)
CREAT BLD-MCNC: 1.3 MG/DL (ref 0.66–1.25)
CREAT SERPL-MCNC: 1.28 MG/DL (ref 0.66–1.25)
DIFFERENTIAL METHOD BLD: NORMAL
EOSINOPHIL # BLD AUTO: 0.1 10E9/L (ref 0–0.7)
EOSINOPHIL NFR BLD AUTO: 1.3 %
ERYTHROCYTE [DISTWIDTH] IN BLOOD BY AUTOMATED COUNT: 13.6 % (ref 10–15)
GFR SERPL CREATININE-BSD FRML MDRD: 56 ML/MIN/1.7M2
GFR SERPL CREATININE-BSD FRML MDRD: 57 ML/MIN/1.7M2
GLUCOSE SERPL-MCNC: 108 MG/DL (ref 70–99)
HCT VFR BLD AUTO: 40.7 % (ref 40–53)
HGB BLD-MCNC: 13.5 G/DL (ref 13.3–17.7)
IMM GRANULOCYTES # BLD: 0.1 10E9/L (ref 0–0.4)
IMM GRANULOCYTES NFR BLD: 1.3 %
LYMPHOCYTES # BLD AUTO: 0.9 10E9/L (ref 0.8–5.3)
LYMPHOCYTES NFR BLD AUTO: 14.9 %
MCH RBC QN AUTO: 28.2 PG (ref 26.5–33)
MCHC RBC AUTO-ENTMCNC: 33.2 G/DL (ref 31.5–36.5)
MCV RBC AUTO: 85 FL (ref 78–100)
MONOCYTES # BLD AUTO: 0.4 10E9/L (ref 0–1.3)
MONOCYTES NFR BLD AUTO: 6 %
NEUTROPHILS # BLD AUTO: 4.7 10E9/L (ref 1.6–8.3)
NEUTROPHILS NFR BLD AUTO: 76.2 %
NRBC # BLD AUTO: 0 10*3/UL
NRBC BLD AUTO-RTO: 0 /100
PLATELET # BLD AUTO: 181 10E9/L (ref 150–450)
POTASSIUM SERPL-SCNC: 4 MMOL/L (ref 3.4–5.3)
RBC # BLD AUTO: 4.79 10E12/L (ref 4.4–5.9)
SODIUM SERPL-SCNC: 138 MMOL/L (ref 133–144)
WBC # BLD AUTO: 6.2 10E9/L (ref 4–11)

## 2018-10-28 PROCEDURE — 96376 TX/PRO/DX INJ SAME DRUG ADON: CPT | Mod: 59

## 2018-10-28 PROCEDURE — 96375 TX/PRO/DX INJ NEW DRUG ADDON: CPT

## 2018-10-28 PROCEDURE — 74177 CT ABD & PELVIS W/CONTRAST: CPT

## 2018-10-28 PROCEDURE — 82565 ASSAY OF CREATININE: CPT

## 2018-10-28 PROCEDURE — 96374 THER/PROPH/DIAG INJ IV PUSH: CPT

## 2018-10-28 PROCEDURE — 99285 EMERGENCY DEPT VISIT HI MDM: CPT | Mod: 25

## 2018-10-28 PROCEDURE — 25000128 H RX IP 250 OP 636: Performed by: EMERGENCY MEDICINE

## 2018-10-28 PROCEDURE — 80048 BASIC METABOLIC PNL TOTAL CA: CPT | Performed by: EMERGENCY MEDICINE

## 2018-10-28 PROCEDURE — 85025 COMPLETE CBC W/AUTO DIFF WBC: CPT | Performed by: EMERGENCY MEDICINE

## 2018-10-28 PROCEDURE — 25000132 ZZH RX MED GY IP 250 OP 250 PS 637: Performed by: EMERGENCY MEDICINE

## 2018-10-28 RX ORDER — MORPHINE SULFATE 2 MG/ML
2 INJECTION, SOLUTION INTRAMUSCULAR; INTRAVENOUS ONCE
Status: COMPLETED | OUTPATIENT
Start: 2018-10-28 | End: 2018-10-28

## 2018-10-28 RX ORDER — POLYETHYLENE GLYCOL 3350 17 G/17G
17 POWDER, FOR SOLUTION ORAL DAILY PRN
Status: DISCONTINUED | OUTPATIENT
Start: 2018-10-28 | End: 2018-10-29 | Stop reason: HOSPADM

## 2018-10-28 RX ORDER — IOPAMIDOL 755 MG/ML
500 INJECTION, SOLUTION INTRAVASCULAR ONCE
Status: COMPLETED | OUTPATIENT
Start: 2018-10-28 | End: 2018-10-28

## 2018-10-28 RX ORDER — NALOXONE HYDROCHLORIDE 0.4 MG/ML
.1-.4 INJECTION, SOLUTION INTRAMUSCULAR; INTRAVENOUS; SUBCUTANEOUS
Status: DISCONTINUED | OUTPATIENT
Start: 2018-10-28 | End: 2018-10-29 | Stop reason: HOSPADM

## 2018-10-28 RX ORDER — ONDANSETRON 4 MG/1
4 TABLET, ORALLY DISINTEGRATING ORAL EVERY 6 HOURS PRN
Status: DISCONTINUED | OUTPATIENT
Start: 2018-10-28 | End: 2018-10-29 | Stop reason: HOSPADM

## 2018-10-28 RX ORDER — ONDANSETRON 2 MG/ML
4 INJECTION INTRAMUSCULAR; INTRAVENOUS EVERY 6 HOURS PRN
Status: DISCONTINUED | OUTPATIENT
Start: 2018-10-28 | End: 2018-10-29 | Stop reason: HOSPADM

## 2018-10-28 RX ORDER — MORPHINE SULFATE 2 MG/ML
1-2 INJECTION, SOLUTION INTRAMUSCULAR; INTRAVENOUS
Status: DISCONTINUED | OUTPATIENT
Start: 2018-10-28 | End: 2018-10-29 | Stop reason: HOSPADM

## 2018-10-28 RX ORDER — TRAMADOL HYDROCHLORIDE 50 MG/1
50 TABLET ORAL EVERY 6 HOURS PRN
Status: DISCONTINUED | OUTPATIENT
Start: 2018-10-28 | End: 2018-10-29

## 2018-10-28 RX ORDER — OXYCODONE AND ACETAMINOPHEN 5; 325 MG/1; MG/1
1-2 TABLET ORAL EVERY 4 HOURS PRN
Qty: 12 TABLET | Refills: 0 | Status: SHIPPED | OUTPATIENT
Start: 2018-10-28 | End: 2020-01-02

## 2018-10-28 RX ORDER — HYDROMORPHONE HYDROCHLORIDE 1 MG/ML
0.5 INJECTION, SOLUTION INTRAMUSCULAR; INTRAVENOUS; SUBCUTANEOUS ONCE
Status: COMPLETED | OUTPATIENT
Start: 2018-10-28 | End: 2018-10-28

## 2018-10-28 RX ORDER — MORPHINE SULFATE 4 MG/ML
4 INJECTION, SOLUTION INTRAMUSCULAR; INTRAVENOUS ONCE
Status: COMPLETED | OUTPATIENT
Start: 2018-10-28 | End: 2018-10-28

## 2018-10-28 RX ORDER — ACETAMINOPHEN 650 MG/1
650 SUPPOSITORY RECTAL EVERY 4 HOURS PRN
Status: DISCONTINUED | OUTPATIENT
Start: 2018-10-28 | End: 2018-10-29

## 2018-10-28 RX ORDER — SODIUM CHLORIDE 9 MG/ML
INJECTION, SOLUTION INTRAVENOUS CONTINUOUS
Status: DISCONTINUED | OUTPATIENT
Start: 2018-10-28 | End: 2018-10-29 | Stop reason: HOSPADM

## 2018-10-28 RX ORDER — OXYCODONE AND ACETAMINOPHEN 5; 325 MG/1; MG/1
1 TABLET ORAL ONCE
Status: COMPLETED | OUTPATIENT
Start: 2018-10-28 | End: 2018-10-28

## 2018-10-28 RX ORDER — HYDROMORPHONE HCL/0.9% NACL/PF 0.2MG/0.2
0.2 SYRINGE (ML) INTRAVENOUS
Status: CANCELLED | OUTPATIENT
Start: 2018-10-28

## 2018-10-28 RX ORDER — ACETAMINOPHEN 325 MG/1
650 TABLET ORAL EVERY 4 HOURS PRN
Status: DISCONTINUED | OUTPATIENT
Start: 2018-10-28 | End: 2018-10-29

## 2018-10-28 RX ADMIN — MORPHINE SULFATE 4 MG: 4 INJECTION INTRAVENOUS at 22:01

## 2018-10-28 RX ADMIN — Medication 0.5 MG: at 23:34

## 2018-10-28 RX ADMIN — MORPHINE SULFATE 2 MG: 2 INJECTION, SOLUTION INTRAMUSCULAR; INTRAVENOUS at 20:42

## 2018-10-28 RX ADMIN — IOPAMIDOL 100 ML: 755 INJECTION, SOLUTION INTRAVENOUS at 21:43

## 2018-10-28 RX ADMIN — OXYCODONE HYDROCHLORIDE AND ACETAMINOPHEN 1 TABLET: 5; 325 TABLET ORAL at 22:40

## 2018-10-28 RX ADMIN — SODIUM CHLORIDE 65 ML: 9 INJECTION, SOLUTION INTRAVENOUS at 21:43

## 2018-10-28 ASSESSMENT — ENCOUNTER SYMPTOMS
ABDOMINAL PAIN: 1
NAUSEA: 0
BACK PAIN: 0
VOMITING: 0

## 2018-10-28 NOTE — IP AVS SNAPSHOT
MRN:[id number]                      After Visit Summary   10/28/2018    Kashmir Yao    MRN: [id number]           Thank you!     Thank you for choosing [facility] for your care. Our goal is always to provide you with excellent care. Hearing back from our patients is one way we can continue to improve our services. Please take a few minutes to complete the written survey that you may receive in the mail after you visit. If you would like to speak to someone directly about your visit please contact Patient Relations at [phone]. Thank you!          Patient Information     Date Of Birth          1955        About your hospital stay     You were admitted on:  October 28, 2018 You last received care in the:  [facility] Observation Department    You were discharged on:  October 29, 2018        Reason for your hospital stay       7th rib fracture on the left s/p fall                  Who to Call     For medical emergencies, please call 911.  For non-urgent questions about your medical care, please call your primary care provider or clinic, [phone]          Attending Provider     Provider Specialty    John Tabares DO Emergency Medicine    Clif Godoy MD General Surgery       Primary Care Provider Office Phone # Fax #    Hakeem Cano [phone]      After Care Instructions     Activity       Your activity upon discharge: activity as tolerated, use incentive spirometry every hour            Diet       Follow this diet upon discharge: Orders Placed This Encounter      Regular Diet Adult                  Follow-up Appointments     Follow-up and recommended labs and tests        Follow up with primary care provider, Hakeem Cano, within 7 days for hospital follow- up.  The following labs/tests are recommended: Repeat basic metabolic panel in 1 week .                   Follow-up Information     Follow up with  Hakeem Cano MD. Call in 2 days.    Specialty:  Cardiology    Why:  For symptom recheck    Contact information:    [address]  [place] [address]  [phone]          Follow up with [facility] Emergency Department.    Specialty:  EMERGENCY MEDICINE    Contact information:    [address]  [facility] [address] [phone]                Further instructions from your care team       HOME CARE FOLLOWING TRAUMA ADMISSION - NONSURGICAL FRACTURES/ABRASIONS  QING Martinez, JODY Dillon R. O Donnell, TYSON Neff    NEXT APPOINTMENT:  Follow-up with your primary care provider in 2-3 days for recheck of your injuries and overall medical status.  At that time you may address ongoing care recommendations and activity restrictions.    WOUND CARE:  Apply bacitracin to abrasions twice a day and cover sites with non-stick dressings afterwards.      ACTIVITY:  Light Activity -- you may immediately be up and about as tolerated.  Driving -- you may drive when comfortable and off narcotic pain medications.  Light Work -- resume when comfortable off pain medications.  (If you can drive, you probably can work.)  Strenuous Work/Activity -- limit lifting to 10 pounds for 2 weeks.  Restrictions after this time should be recommended by your primary care provider.    DISCOMFORT:  Use pain medications as prescribed by your surgeon.  Take the pain medication with some food, when possible, to minimize side effects.  Expect gradual improvement.    DIET:  Return to diet you were on before surgery.  Drink plenty of fluids.  While taking pain medications, increase dietary fiber or add a fiber supplementation like Metamucil or Citrucel to help prevent constipation - a possible side effect of pain medications.    Surgeon office contact number:  Office Phone:  [phone]      FREQUENTLY ASKED QUESTIONS:    Q:  What can I do to minimize constipation (very hard stools, or  lack of stools)?  A:  Stay well hydrated.  Increase your dietary fiber intake or take a fiber supplement -with plenty of water.  Walk around frequently.  You may consider an over-the-counter stool-softener.  Your Pharmacist can assist you with choosing one that is stocked at your pharmacy.  Constipation is also one of the most common side effects of pain medication.  If you are using pain medication, be pro-active and try to PREVENT problems with constipation by taking the steps above BEFORE constipation becomes a problem.    Q:  Why am I having a hard time sleeping now that I am at home?  A:  Many medications you receive while you are in the hospital can impact your sleep for a number of days after your surgery/hospitalization.  Decreased level of activity and naps during the day may also make sleeping at night difficult.  Try to minimize day-time naps, and get up frequently during the day to walk around your home during your recovery time.  Sleep aides may be of some help, but are not recommended for long-term use.            If you have other questions, please call the office Monday thru Friday between 8am and 5pm to discuss with the nurse or physician assistant.  #[phone]    There is a surgeon ON CALL on weekday evenings and over the weekend in case of urgent need only, and may be contacted at the same number.    If you are having an emergency, call 911 or proceed to your nearest emergency department.    Rib Fracture    You broke one or more ribs. This is called a rib fracture. Rib fractures do not require a cast like other bones. They will heal by themselves in about 4 to 6 weeks. The first 3 to 4 weeks will be the most painful because deep breathing, coughing, or changing position from sitting to lying down, may cause the broken ends to move slightly.  Home care    Rest. You should not be doing any heavy lifting or strenuous exertion until the pain goes away.    It hurts to breathe when you have a  broken rib. This puts you at risk of getting pneumonia from poor airflow through your lungs. To prevent this:  ? Take several very deep breaths once an hour while you're awake. Exhale through pursed lips as if you are blowing up a balloon. If possible, actually blow up a balloon or a rubber glove. This exercise builds up pressure inside the lung and prevents collapse of the small air sacs of the lung. This exercise may cause some pain at the site of injury, which is normal.  ? You may have gotten a breathing exercise device called an incentive spirometer. Use it at least 4 times a day, or as directed.    Apply an ice pack over the injured area for 15 to 20 minutes every 1 to 2 hours. You should do this for the first 24 to 48 hours. You can make an ice pack by filling a plastic bag that seals at the top with ice cubes and then wrapping it with a thin towel. Continue with ice packs as needed for the relief of pain and swelling.    You may use over-the-counter pain medicine to control pain, unless another pain medicine was prescribed. If you have chronic liver or kidney disease or ever had a stomach ulcer or GI bleeding, talk with your healthcare provider before using these medicines.    If your pain is not controlled, contact your healthcare provider. Sometimes a stronger pain medicine may be needed. A nerve block can be done in case of severe pain. It will numb the nerve between the ribs.  Follow-up care  Follow up with your healthcare provider, or as advised. Rarely, a broken rib will cause complications within the first few days that may not be evident during your initial exam. This can include collapsed lung, bleeding around the lung or into the abdomen, or pneumonia. Therefore, watch for the signs below.  If X-rays were taken, you will be told of any new findings that may affect your care.  Call 911  Call 911 if you have:    Dizziness, weakness or fainting    Shortness of breath with or without chest  discomfort    New or worsening abdominal pain    Discomfort in other areas of your upper body such as your shoulders, jaw, neck, or arms  When to seek medical advice  Call your healthcare provider right away if any of these occur:    Increasing chest pain with breathing    Fever of 100.4 F (38 C) or higher, or as directed by your healthcare provider    Congested cough  Date Last Reviewed: 12/3/2015    1993-8431 [employer]. [address] [place] [address], [place] [id number]. All rights reserved. This information is not intended as a substitute for professional medical care. Always follow your healthcare professional's instructions.        Using an Incentive Spirometer    An incentive spirometer is a device that helps you do deep breathing exercises. These exercises expand your lungs, aid in circulation, and help prevent pneumonia. Deep breathing exercises also help you breathe better and improve the function of your lungs by:    Keeping your lungs clear    Strengthening your breathing muscles    Helping prevent respiratory complications or problems  The incentive spirometer gives you a way to take an active part in your recovery. A nurse or therapist will teach you breathing exercises. To do these exercises, you will breathe in through your mouth and not your nose. The incentive spirometer only works correctly if you breathe in through your mouth.  Steps to clear lungs  Step 1. Exhale normally. Then, inhale normally.    Relax and breathe out.  Step 2. Place your lips tightly around the mouthpiece.    Make sure the device is upright and not tilted.  Step 3. Inhale as much air as you can through the mouthpiece (don't breathe through your nose).    Inhale slowly and deeply.    Hold your breath long enough to keep the balls or disk raised for at least 3 to 5 seconds, or as instructed by your healthcare provider.    Some spirometers have an indicator to let you know that you are breathing in too fast. If the  indicator goes off, breathe in more slowly.  Step 4. Repeat the exercise regularly.    Do this exercise every hour while you're awake, or as instructed by your healthcare provider.    If you were taught deep breathing and coughing exercises, do them regularly as instructed by your healthcare provider.   Follow-up care  Make a follow up appointment, or as directed. Also, follow up with your healthcare provider as advised or if your symptoms don't improve or continue to get worse.  When to call your healthcare provider  Call your healthcare provider right away if you have any of the following:    Fever 100.4  (38 C) or higher, or as directed by your healthcare provider    Brownish, bloody, or smelly sputum (phlegm that you cough up)  Call 911  Call 911 if any of these occur:     Shortness of breath that doesn't get better after taking your medicine    Cool, moist, pale or blue skin    Trouble breathing or swallowing, wheezing    Fainting or loss of consciousness    Feeling of dizziness or weakness, or a sudden drop in blood pressure    Feeling very ill    Lightheadedness    Chest pain or rapid heart rate  Date Last Reviewed: 1/1/2017 2000-2017 [employer]. [address] [place] [address]. All rights reserved. This information is not intended as a substitute for professional medical care. Always follow your healthcare professional's instructions.          Pending Results     No orders found for last 3 day(s).            Statement of Approval     Ordered          10/29/18 1541  I have reviewed and agree with all the recommendations and orders detailed in this document.  EFFECTIVE NOW     Approved and electronically signed by:  Shefali Del Rio PA-C           10/29/18 1005  I have reviewed and agree with all the recommendations and orders detailed in this document.  EFFECTIVE NOW     Approved and electronically signed by:  Enmanuel Lee PA-C             Admission Information   "   Date & Time Provider Department Dept. Phone    10/28/2018 Clif Godoy MD [facility] Observation Department [phone]      Your Vitals Were     Blood Pressure Pulse Temperature Respirations Height Weight    138/75 (BP Location: Left arm) 61 97.4  F (36.3  C) (Oral) 20 1.753 m (5' 9\") 94.3 kg (208 lb)    Pulse Oximetry BMI (Body Mass Index)                96% 30.72 kg/m2          [employer] Information     [employer] lets you send messages to your doctor, view your test results, renew your prescriptions, schedule appointments and more. To sign up, go to [email or web address] . Click on \"Log in\" on the left side of the screen, which will take you to the Welcome page. Then click on \"Sign up Now\" on the right side of the page.     You will be asked to enter the access code listed below, as well as some personal information. Please follow the directions to create your username and password.     Your access code is: [email or web address]  Expires: 2018  1:12 PM     Your access code will  in 90 days. If you need help or a new code, please call your [place] clinic or [phone].        Care EveryWhere ID     This is your Care EveryWhere ID. This could be used by other organizations to access your [place] medical records  [id number]        Equal Access to Services     [facility] : Hadii deloris cortez hadjeremyo Soaustyn, waaxda luqadaha, qaybta kaalmada racheleda, norma borges . So [facility] [phone].    ATENCIÓN: Si habla español, tiene a olivo disposición servicios gratuitos de asistencia lingüística. Llame al [phone].    We comply with applicable federal civil rights laws and [place] laws. We do not discriminate on the basis of race, color, national origin, age, disability, sex, sexual orientation, or gender identity.               Review of your medicines      START taking        Dose / Directions    oxyCODONE-acetaminophen 5-325 MG per tablet   Commonly known as:  " PERCOCET        Dose:  1-2 tablet   Take 1-2 tablets by mouth every 4 hours as needed for pain   Quantity:  12 tablet   Refills:  0       traMADol 50 MG tablet   Commonly known as:  ULTRAM   Used for:  Closed fracture of one rib of left side, initial encounter        Dose:  100 mg   Take 2 tablets (100 mg) by mouth every 6 hours as needed for moderate pain   Quantity:  25 tablet   Refills:  0         CONTINUE these medicines which have NOT CHANGED        Dose / Directions    acetaminophen 500 MG tablet   Commonly known as:  TYLENOL        Dose:  1000 mg   Take 1,000 mg by mouth every 8 hours as needed for mild pain   Refills:  0       aspirin 81 MG tablet        Dose:  81 mg   Take 81 mg by mouth daily   Refills:  0       esomeprazole 40 MG CR capsule   Commonly known as:  nexIUM   Used for:  Gastroesophageal reflux disease with esophagitis        TAKE 1 CAPSULE BY MOUTH TWICE DAILY   Quantity:  180 capsule   Refills:  2       * lamoTRIgine 200 MG tablet   Commonly known as:  LaMICtal   Indication:  pt is taking 200 mg every am and 250 mg at night   Used for:  Seizure disorder (H)        Dose:  200 mg   Take 200 mg by mouth every morning   Quantity:  90 tablet   Refills:  1       * lamoTRIgine 100 MG tablet   Commonly known as:  LaMICtal        Dose:  250 mg   250 mg At Bedtime   Refills:  3       metoprolol tartrate 50 MG tablet   Commonly known as:  LOPRESSOR   Used for:  Essential hypertension with goal blood pressure less than 130/80        Dose:  50 mg   Take 1 tablet (50 mg) by mouth 2 times daily   Quantity:  180 tablet   Refills:  3       naproxen 500 MG tablet   Commonly known as:  NAPROSYN        Dose:  1 tablet   Take 1 tablet by mouth 2 times daily   Refills:  0       pregabalin 25 MG capsule   Commonly known as:  LYRICA   Used for:  Convulsions, unspecified convulsion type (H)        Dose:  75 mg   Take 3 capsules (75 mg) by mouth 2 times daily   Quantity:  540 capsule   Refills:  1       sildenafil 50  MG tablet   Commonly known as:  VIAGRA   Used for:  Erectile dysfunction        Dose:  50 mg   Take 1 tablet (50 mg) by mouth daily as needed for erectile dysfunction   Quantity:  10 tablet   Refills:  11       simvastatin 40 MG tablet   Commonly known as:  ZOCOR        Dose:  40 mg   Take 40 mg by mouth At Bedtime   Refills:  0       ZANTAC PO        Dose:  75 mg   Take 75 mg by mouth nightly as needed   Refills:  0       zolpidem 10 MG tablet   Commonly known as:  AMBIEN   Used for:  Primary insomnia        Dose:  10 mg   Take 1 tablet (10 mg) by mouth nightly as needed for sleep at bedtime.   Quantity:  90 tablet   Refills:  1       * Notice:  This list has 2 medication(s) that are the same as other medications prescribed for you. Read the directions carefully, and ask your doctor or other care provider to review them with you.         Where to get your medicines      Some of these will need a paper prescription and others can be bought over the counter. Ask your nurse if you have questions.     Bring a paper prescription for each of these medications     oxyCODONE-acetaminophen 5-325 MG per tablet    traMADol 50 MG tablet                Protect others around you: Learn how to safely use, store and throw away your medicines at www.disposemymeds.org.        Information about OPIOIDS     PRESCRIPTION OPIOIDS: WHAT YOU NEED TO KNOW   We gave you an opioid (narcotic) pain medicine. It is important to manage your pain, but opioids are not always the best choice. You should first try all the other options your care team gave you. Take this medicine for as short a time (and as few doses) as possible.    Some activities can increase your pain, such as bandage changes or therapy sessions. It may help to take your pain medicine 30 to 60 minutes before these activities. Reduce your stress by getting enough sleep, working on hobbies you enjoy and practicing relaxation or meditation. Talk to your care team about ways to  manage your pain beyond prescription opioids.    These medicines have risks:    DO NOT drive when on new or higher doses of pain medicine. These medicines can affect your alertness and reaction times, and you could be arrested for driving under the influence (DUI). If you need to use opioids long-term, talk to your care team about driving.    DO NOT operate heavy machinery    DO NOT do any other dangerous activities while taking these medicines.    DO NOT drink any alcohol while taking these medicines.     If the opioid prescribed includes acetaminophen, DO NOT take with any other medicines that contain acetaminophen. Read all labels carefully. Look for the word  acetaminophen  or  Tylenol.  Ask your pharmacist if you have questions or are unsure.    You can get addicted to pain medicines, especially if you have a history of addiction (chemical, alcohol or substance dependence). Talk to your care team about ways to reduce this risk.    All opioids tend to cause constipation. Drink plenty of water and eat foods that have a lot of fiber, such as fruits, vegetables, prune juice, apple juice and high-fiber cereal. Take a laxative (Miralax, milk of magnesia, Colace, Senna) if you don t move your bowels at least every other day. Other side effects include upset stomach, sleepiness, dizziness, throwing up, tolerance (needing more of the medicine to have the same effect), physical dependence and slowed breathing.    Store your pills in a secure place, locked if possible. We will not replace any lost or stolen medicine. If you don t finish your medicine, please throw away (dispose) as directed by your pharmacist. The [place] Pollution Control Agency has more information about safe disposal: https://www.pca.[facility].mn.us/living-green/managing-unwanted-medications             Medication List: This is a list of all your medications and when to take them. Check marks below indicate your daily home schedule. Keep this list as a  reference.      Medications           Morning Afternoon Evening Bedtime As Needed    acetaminophen 500 MG tablet   Commonly known as:  TYLENOL   Take 1,000 mg by mouth every 8 hours as needed for mild pain   Last time this was given:  975 mg on 10/29/2018  2:18 PM                                aspirin 81 MG tablet   Take 81 mg by mouth daily                                esomeprazole 40 MG CR capsule   Commonly known as:  nexIUM   TAKE 1 CAPSULE BY MOUTH TWICE DAILY                                * lamoTRIgine 200 MG tablet   Commonly known as:  LaMICtal   Take 200 mg by mouth every morning   Last time this was given:  200 mg on 10/29/2018 12:18 PM                                * lamoTRIgine 100 MG tablet   Commonly known as:  LaMICtal   250 mg At Bedtime   Last time this was given:  200 mg on 10/29/2018 12:18 PM                                metoprolol tartrate 50 MG tablet   Commonly known as:  LOPRESSOR   Take 1 tablet (50 mg) by mouth 2 times daily   Last time this was given:  50 mg on 10/29/2018 12:19 PM                                naproxen 500 MG tablet   Commonly known as:  NAPROSYN   Take 1 tablet by mouth 2 times daily                                oxyCODONE-acetaminophen 5-325 MG per tablet   Commonly known as:  PERCOCET   Take 1-2 tablets by mouth every 4 hours as needed for pain   Last time this was given:  1 tablet on 10/28/2018 10:40 PM                                pregabalin 25 MG capsule   Commonly known as:  LYRICA   Take 3 capsules (75 mg) by mouth 2 times daily   Last time this was given:  75 mg on 10/29/2018 12:19 PM                                sildenafil 50 MG tablet   Commonly known as:  VIAGRA   Take 1 tablet (50 mg) by mouth daily as needed for erectile dysfunction                                simvastatin 40 MG tablet   Commonly known as:  ZOCOR   Take 40 mg by mouth At Bedtime                                traMADol 50 MG tablet   Commonly known as:  ULTRAM   Take 2 tablets  (100 mg) by mouth every 6 hours as needed for moderate pain   Last time this was given:  100 mg on 10/29/2018  3:56 PM                                ZANTAC PO   Take 75 mg by mouth nightly as needed                                zolpidem 10 MG tablet   Commonly known as:  AMBIEN   Take 1 tablet (10 mg) by mouth nightly as needed for sleep at bedtime.                                * Notice:  This list has 2 medication(s) that are the same as other medications prescribed for you. Read the directions carefully, and ask your doctor or other care provider to review them with you.

## 2018-10-28 NOTE — IP AVS SNAPSHOT
[facility] Observation Department    [address]    [facility] [address]    Phone:  [phone]                                       After Visit Summary   10/28/2018    Kashmir Yao    MRN: [id number]           After Visit Summary Signature Page     I have received my discharge instructions, and my questions have been answered. I have discussed any challenges I see with this plan with the nurse or doctor.    ..........................................................................................................................................  Patient/Patient Representative Signature      ..........................................................................................................................................  Patient Representative Print Name and Relationship to Patient    ..................................................               ................................................  Date                                   Time    ..........................................................................................................................................  Reviewed by Signature/Title    ...................................................              ..............................................  Date                                               Time          22EPIC Rev 08/18

## 2018-10-29 VITALS
HEIGHT: 69 IN | BODY MASS INDEX: 30.81 KG/M2 | RESPIRATION RATE: 20 BRPM | SYSTOLIC BLOOD PRESSURE: 138 MMHG | OXYGEN SATURATION: 96 % | WEIGHT: 208 LBS | HEART RATE: 61 BPM | TEMPERATURE: 97.4 F | DIASTOLIC BLOOD PRESSURE: 75 MMHG

## 2018-10-29 PROCEDURE — 99219 ZZC INITIAL OBSERVATION CARE,LEVL II: CPT | Performed by: SURGERY

## 2018-10-29 PROCEDURE — G0378 HOSPITAL OBSERVATION PER HR: HCPCS

## 2018-10-29 PROCEDURE — 25000132 ZZH RX MED GY IP 250 OP 250 PS 637: Performed by: PHYSICIAN ASSISTANT

## 2018-10-29 PROCEDURE — 96361 HYDRATE IV INFUSION ADD-ON: CPT

## 2018-10-29 PROCEDURE — 25000132 ZZH RX MED GY IP 250 OP 250 PS 637: Performed by: SURGERY

## 2018-10-29 PROCEDURE — 96376 TX/PRO/DX INJ SAME DRUG ADON: CPT

## 2018-10-29 PROCEDURE — 99204 OFFICE O/P NEW MOD 45 MIN: CPT | Performed by: PHYSICIAN ASSISTANT

## 2018-10-29 PROCEDURE — 99207 ZZC CONSULT E&M CHANGED TO INITIAL LEVEL: CPT | Performed by: PHYSICIAN ASSISTANT

## 2018-10-29 PROCEDURE — 25000128 H RX IP 250 OP 636: Performed by: SURGERY

## 2018-10-29 PROCEDURE — 25000128 H RX IP 250 OP 636

## 2018-10-29 RX ORDER — MORPHINE SULFATE 4 MG/ML
INJECTION, SOLUTION INTRAMUSCULAR; INTRAVENOUS
Status: COMPLETED
Start: 2018-10-29 | End: 2018-10-29

## 2018-10-29 RX ORDER — PREGABALIN 75 MG/1
75 CAPSULE ORAL 2 TIMES DAILY
Status: DISCONTINUED | OUTPATIENT
Start: 2018-10-29 | End: 2018-10-29 | Stop reason: HOSPADM

## 2018-10-29 RX ORDER — HYDROMORPHONE HYDROCHLORIDE 2 MG/1
2-4 TABLET ORAL EVERY 4 HOURS PRN
Status: DISCONTINUED | OUTPATIENT
Start: 2018-10-29 | End: 2018-10-29 | Stop reason: HOSPADM

## 2018-10-29 RX ORDER — PANTOPRAZOLE SODIUM 40 MG/1
40 TABLET, DELAYED RELEASE ORAL
Status: DISCONTINUED | OUTPATIENT
Start: 2018-10-29 | End: 2018-10-29

## 2018-10-29 RX ORDER — SIMVASTATIN 40 MG
40 TABLET ORAL AT BEDTIME
COMMUNITY
End: 2019-12-12

## 2018-10-29 RX ORDER — METOPROLOL TARTRATE 50 MG
50 TABLET ORAL 2 TIMES DAILY
Status: DISCONTINUED | OUTPATIENT
Start: 2018-10-29 | End: 2018-10-29

## 2018-10-29 RX ORDER — METOPROLOL TARTRATE 50 MG
50 TABLET ORAL 2 TIMES DAILY
Status: DISCONTINUED | OUTPATIENT
Start: 2018-10-29 | End: 2018-10-29 | Stop reason: HOSPADM

## 2018-10-29 RX ORDER — IBUPROFEN 400 MG/1
400 TABLET, FILM COATED ORAL 3 TIMES DAILY
Status: DISCONTINUED | OUTPATIENT
Start: 2018-10-29 | End: 2018-10-29

## 2018-10-29 RX ORDER — SIMVASTATIN 40 MG
40 TABLET ORAL AT BEDTIME
Status: DISCONTINUED | OUTPATIENT
Start: 2018-10-29 | End: 2018-10-29 | Stop reason: HOSPADM

## 2018-10-29 RX ORDER — ASPIRIN 81 MG/1
81 TABLET ORAL DAILY
Status: DISCONTINUED | OUTPATIENT
Start: 2018-10-29 | End: 2018-10-29 | Stop reason: HOSPADM

## 2018-10-29 RX ORDER — PREGABALIN 75 MG/1
75 CAPSULE ORAL 2 TIMES DAILY
Status: DISCONTINUED | OUTPATIENT
Start: 2018-10-29 | End: 2018-10-29

## 2018-10-29 RX ORDER — LAMOTRIGINE 100 MG/1
200 TABLET ORAL EVERY MORNING
Status: DISCONTINUED | OUTPATIENT
Start: 2018-10-29 | End: 2018-10-29 | Stop reason: HOSPADM

## 2018-10-29 RX ORDER — TRAMADOL HYDROCHLORIDE 50 MG/1
50-100 TABLET ORAL EVERY 6 HOURS PRN
Status: DISCONTINUED | OUTPATIENT
Start: 2018-10-29 | End: 2018-10-29 | Stop reason: HOSPADM

## 2018-10-29 RX ORDER — ACETAMINOPHEN 325 MG/1
975 TABLET ORAL 3 TIMES DAILY
Status: DISCONTINUED | OUTPATIENT
Start: 2018-10-29 | End: 2018-10-29 | Stop reason: HOSPADM

## 2018-10-29 RX ORDER — TRAMADOL HYDROCHLORIDE 50 MG/1
100 TABLET ORAL EVERY 6 HOURS PRN
Qty: 25 TABLET | Refills: 0 | Status: SHIPPED | OUTPATIENT
Start: 2018-10-29 | End: 2018-11-01

## 2018-10-29 RX ORDER — ZOLPIDEM TARTRATE 5 MG/1
10 TABLET ORAL
Status: DISCONTINUED | OUTPATIENT
Start: 2018-10-29 | End: 2018-10-29 | Stop reason: HOSPADM

## 2018-10-29 RX ORDER — LAMOTRIGINE 100 MG/1
200 TABLET ORAL EVERY MORNING
Status: DISCONTINUED | OUTPATIENT
Start: 2018-10-29 | End: 2018-10-29

## 2018-10-29 RX ORDER — ACETAMINOPHEN 500 MG
1000 TABLET ORAL EVERY 8 HOURS PRN
COMMUNITY

## 2018-10-29 RX ORDER — PANTOPRAZOLE SODIUM 40 MG/1
40 TABLET, DELAYED RELEASE ORAL
Status: DISCONTINUED | OUTPATIENT
Start: 2018-10-29 | End: 2018-10-29 | Stop reason: HOSPADM

## 2018-10-29 RX ADMIN — PREGABALIN 75 MG: 75 CAPSULE ORAL at 12:19

## 2018-10-29 RX ADMIN — LAMOTRIGINE 200 MG: 100 TABLET ORAL at 12:18

## 2018-10-29 RX ADMIN — ACETAMINOPHEN 975 MG: 325 TABLET, FILM COATED ORAL at 14:18

## 2018-10-29 RX ADMIN — PANTOPRAZOLE SODIUM 40 MG: 40 TABLET, DELAYED RELEASE ORAL at 12:18

## 2018-10-29 RX ADMIN — MORPHINE SULFATE 2 MG: 2 INJECTION, SOLUTION INTRAMUSCULAR; INTRAVENOUS at 05:13

## 2018-10-29 RX ADMIN — METOPROLOL TARTRATE 50 MG: 50 TABLET ORAL at 12:19

## 2018-10-29 RX ADMIN — TRAMADOL HYDROCHLORIDE 50 MG: 50 TABLET, COATED ORAL at 01:17

## 2018-10-29 RX ADMIN — LAMOTRIGINE 250 MG: 100 TABLET ORAL at 01:16

## 2018-10-29 RX ADMIN — ACETAMINOPHEN 650 MG: 325 TABLET, FILM COATED ORAL at 01:18

## 2018-10-29 RX ADMIN — SODIUM CHLORIDE: 9 INJECTION, SOLUTION INTRAVENOUS at 01:16

## 2018-10-29 RX ADMIN — TRAMADOL HYDROCHLORIDE 100 MG: 50 TABLET, FILM COATED ORAL at 15:56

## 2018-10-29 RX ADMIN — TRAMADOL HYDROCHLORIDE 100 MG: 50 TABLET, FILM COATED ORAL at 09:44

## 2018-10-29 RX ADMIN — MORPHINE SULFATE 2 MG: 4 INJECTION INTRAVENOUS at 05:12

## 2018-10-29 RX ADMIN — HYDROMORPHONE HYDROCHLORIDE 2 MG: 2 TABLET ORAL at 12:19

## 2018-10-29 ASSESSMENT — PAIN DESCRIPTION - DESCRIPTORS: DESCRIPTORS: ACHING

## 2018-10-29 NOTE — PROGRESS NOTES
"[facility]   General Surgery Progress Note          Assessment and Plan:   Assessment:   Trauma admission for fall with displaced left 7th rib fracture  Pain control improving      Plan:   -Pain control: Tramadol and tyelnol  -frequent IS use  -Increase activity as tolerated  -Hospitalist consulted to help manage medical issues and meds  -Disposition: spoke with hospitalist, plan for discharge later today and will go home when pain under control with oral meds. Discharge instructions in chart. F/u with primary care.         Interval History:   Resting in bed. States his pain is under much better control this morning. He has been up in room, voiding independently and tolerating clears. Has some issues/allergies with narcotics and he is concerned about adequate pain control. He wants to go home.         Physical Exam:   Blood pressure 117/72, pulse 61, temperature 97.7  F (36.5  C), temperature source Oral, resp. rate 20, height 1.753 m (5' 9\"), weight 94.3 kg (208 lb), SpO2 97 %.       Lungs - clear to ausculation bilaterally, no wheezes or rales  Heart - RRR  Abdomen: soft, ND, NT, mild tenderness at left thoracic area          Data:     Recent Labs   Lab Test  10/28/18   2044  04/05/18   1028  08/31/17   1443   HGB  13.5  13.3  12.7*   WBC  6.2  4.9  3.9*          Enmanuel Lee PA-C    As above, more comfortable, planning on discharge later today  Trying to decide on tramadol vs dilaudid for discharge Rx  Clif Godoy MD  10/29/2018 1:15 PM    "

## 2018-10-29 NOTE — CONSULTS
Hospitalist Consultation      Kashmir Yao MRN# [id number]   YOB: 1955 Age: 63 year old   Date of Admission: 10/28/2018     Requesting Physician:  Srinath  Reason for consult: Medical management            Assessment and Plan:   This patient is a 63 year old male with a PMH significant for CAD, encephalopathy, epilepsy, GERD, who sustained a rib fracture after mechanical fall. We were consulted for medical management while patient is admitted for pain control.    1.  Left seventh rib fracture status post fall patient admitted as trauma.:  Pain regimen includes scheduled Tylenol and trial of tramadol versus Dilaudid.  Discussed with primary team, able to discharge with pain meds once pain is controlled.  He will need aggressive incentive spirometry.    2.  History of epilepsy-stable.  No recent exacerbation.  Continue Lamictal.  3.  Hypertension-blood pressure stable currently continue metoprolol  4.  Hyperlipidemia-continue statin  5.  Mild renal insufficiency-creatinine slightly elevated 1.28 today usual baseline is 1.1.  Follow-up with PCP and repeat BMP    Stable from medical standpoint.  Patient is anxious to leave.  Will find pain control that works and otherwise agree with discharge later today.  Follow-up with PCP as outpatient with repeat BMP.           History of Present Illness:   Patient is a 63-year-old gentleman with a past medical history significant coronary disease encephalopathy, epilepsy, history of gait and balance who presents to the emergency room with complaints of left-sided chest pain after a mechanical fall.  History is obtained by speaking with the ER physician patient interview and chart review.  He apparently lost balance and fell on his left side sustaining a left rib fracture.  He has been admitted by the trauma team and we have been requested to see him for medical management.  Patient states that he has allergies to narcotics but is able to tolerate tramadol.   Otherwise no recent illness.  He denies any history of syncope palpitations he states that this is more of a gait imbalance leading to fall.  Currently he states he feels better today, he is trying on tramadol with some improvement in his pain.              Past Medical History:     Past Medical History:   Diagnosis Date     Arthritis      CAD (coronary artery disease) 2010    a subtotally occluded obtuse marginal vessel which was stented with a drug-coated stent.  He had 50% to 60% LAD disease and 25% to 30% right coronary disease     Colonic polyps 2009    tubular adenomae with mildly dysplastic features     Disturbance of skin sensation      Encephalocele (H) 2009    left frontal     Epilepsy, partial (H) 2009    with secondary generalization     GERD (gastroesophageal reflux disease)      Heart attack     5 years ago. 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     C NONSPECIFIC PROCEDURE      stint in heart     CYSTOSCOPY       ESOPHAGOSCOPY, GASTROSCOPY, DUODENOSCOPY (EGD), COMBINED  5/24/2012    Procedure:COMBINED ESOPHAGOSCOPY, GASTROSCOPY, DUODENOSCOPY (EGD); ESOPHAGOSCOPY, GASTROSCOPY, DUODENOSCOPY (EGD) ; Surgeon:GILLIAN JOHNSON; Location: GI     HC COLONOSCOPY THRU STOMA, DIAGNOSTIC  2009    tubular adenomae,  recommended annual colonoscopy     KNEE SURGERY Left     repair of cartilage     SPINE SURGERY      repair of herniated disc lumbar     STENT                   Social History:     Social History   Substance Use Topics     Smoking status: Former Smoker     Packs/day: 0.50     Years: 20.00     Quit date: 8/4/2003     Smokeless tobacco: Never Used     Alcohol use No                 Family History:     family history includes Cardiovascular in his mother; Family History Negative in his father; Genitourinary Problems in his brother; Thyroid Disease in his sister. There is no history of Cancer - colorectal.  Family Hx fully reviewed and is non contributory to this admission.             Allergies:     Allergies   Allergen Reactions     Codeine      inability to sleep after taking codeine     Gabapentin      Fever       Vicodin [Hydrocodone-Acetaminophen]      Perspiring, hyper, itchy             Medications:     Prior to Admission medications    Medication Sig Last Dose Taking? Auth Provider   acetaminophen (TYLENOL) 500 MG tablet Take 1,000 mg by mouth every 8 hours as needed for mild pain 10/28/2018 at Unknown time Yes Unknown, Entered By History   aspirin 81 MG tablet Take 81 mg by mouth daily 10/28/2018 at Unknown time Yes Unknown, Entered By History   esomeprazole (NEXIUM) 40 MG CR capsule TAKE 1 CAPSULE BY MOUTH TWICE DAILY 10/28/2018 at Unknown time Yes Hakeem Cano MD   lamoTRIgine (LAMICTAL) 100 MG tablet 250 mg At Bedtime  10/28/2018 at Unknown time Yes Reported, Patient   lamoTRIgine (LAMICTAL) 200 MG tablet Take 200 mg by mouth every morning  10/28/2018 at Unknown time Yes Hakeem Cano MD   metoprolol tartrate (LOPRESSOR) 50 MG tablet Take 1 tablet (50 mg) by mouth 2 times daily 10/28/2018 at Unknown time Yes Hakeem Cano MD   naproxen (NAPROSYN) 500 MG tablet Take 1 tablet by mouth 2 times daily 10/28/2018 at Unknown time Yes Reported, Patient  "  oxyCODONE-acetaminophen (PERCOCET) 5-325 MG per tablet Take 1-2 tablets by mouth every 4 hours as needed for pain  Yes John Tabares,    pregabalin (LYRICA) 25 MG capsule Take 3 capsules (75 mg) by mouth 2 times daily 10/28/2018 at Unknown time Yes Hakeem Cano MD   Ranitidine HCl (ZANTAC PO) Take 75 mg by mouth nightly as needed  Past Week at Unknown time Yes Unknown, Entered By History   simvastatin (ZOCOR) 40 MG tablet Take 40 mg by mouth At Bedtime 10/28/2018 at Unknown time Yes Unknown, Entered By History   zolpidem (AMBIEN) 10 MG tablet Take 1 tablet (10 mg) by mouth nightly as needed for sleep at bedtime. Past Week at Unknown time Yes Hakeem Cano MD   sildenafil (VIAGRA) 50 MG tablet Take 1 tablet (50 mg) by mouth daily as needed for erectile dysfunction   Hakeem Cano MD               Review of Systems:   A comprehensive greater than 10 system review of systems was carried out.  Pertinent positives and negatives are noted above.  Otherwise negative for contributory info.            Physical Exam:   Vitals were reviewed  Blood pressure 117/72, pulse 61, temperature 97.7  F (36.5  C), temperature source Oral, resp. rate 20, height 1.753 m (5' 9\"), weight 94.3 kg (208 lb), SpO2 97 %.  Exam:    GENERAL:  Comfortable.  PSYCH: pleasant, oriented, No acute distress.  HEENT:  PERRLA. Normal conjunctiva, normal hearing, nasal mucosa and Oropharynx are normal.  NECK:  Supple, no neck vein distention, adenopathy or bruits, normal thyroid.  HEART:  Normal S1, S2 with no murmur, no pericardial rub, S3 or S4.  LUNGS:  Clear to auscultation, normal Respiratory effort. TTP over the left chest wall   ABDOMEN:  Soft, no hepatosplenomegaly, normal bowel sounds.  EXTREMITIES:  No pedal edema, +2 pulses bilateral and equal.  SKIN:  Dry to touch, No rash, wound or ulcerations.  NEUROLOGIC:  Nonfocal with normal cranial nerve and motor power and sensation.            Data: "   Past 24 hours labs, studies, and imaging were reviewed.    Recent Labs  Lab 10/28/18  2044   WBC 6.2   HGB 13.5   HCT 40.7   MCV 85          Recent Labs  Lab 10/28/18  2044 10/28/18  2041     --    POTASSIUM 4.0  --    CHLORIDE 106  --    CO2 28  --    ANIONGAP 4  --    *  --    BUN 30  --    CR 1.28*  --    GFRESTIMATED 57* 56*   GFRESTBLACK 68 67   MOE 9.0  --        Shefali Del Rio PA-C

## 2018-10-29 NOTE — PLAN OF CARE
"Problem: Patient Care Overview  Goal: Plan of Care/Patient Progress Review  Outcome: Improving  Acute Traumatic Pain     1.  Pain controlled with oral analgesia: is taking oral analgesics and states is getting some relief      2.  Vital signs stable: Yes.  /84 (BP Location: Left arm)  Pulse 55  Temp 97.8  F (36.6  C) (Oral)  Resp 20  Ht 1.753 m (5' 9\")  Wt 94.3 kg (208 lb)  SpO2 100%  BMI 30.72 kg/m2        3.  Diagnotic testing complete: N/A      4.  Cleared from consultants (if applicable): No      5. Return to near baseline physical activity: No, needs assist to get OOB to bathroom here in hospital.    Patient has IV fluids infusing.  Taking oral analgesic.  Was shown how to use his I.S.   Explained how important it is to use to help expand his lungs to prevent moisture to build in his lungs, to prevent pneumonia.  His son is here and is going to stay with him.  Educated him on fall precautions and that he should call if he needs to get up to bathroom.         "

## 2018-10-29 NOTE — PLAN OF CARE
Problem: Patient Care Overview  Goal: Plan of Care/Patient Progress Review  Outcome: No Change  ROOM # 208-2    Living Situation (if not independent, order SW consult): lives with spouse  Facility name:  : [facility]    Activity level at baseline: ind  Activity level on admit: 1-2 assist with activity      Patient registered to observation; given Patient Bill of Rights; given the opportunity to ask questions about observation status and their plan of care.  Patient has been oriented to the observation room, bathroom and call light is in place.    Discussed discharge goals and expectations with patient/family.

## 2018-10-29 NOTE — PLAN OF CARE
"Problem: Patient Care Overview  Goal: Plan of Care/Patient Progress Review  Outcome: Improving  Acute Traumatic Pain     1.  Pain controlled with oral analgesia: is taking oral analgesics and states is getting some relief       2.  Vital signs stable: Yes.  BP /75 (BP Location: Left arm)  Pulse 55  Temp 97.6  F (36.4  C) (Oral)  Resp 16  Ht 1.753 m (5' 9\")  Wt 94.3 kg (208 lb)  SpO2 98%  BMI 30.72 kg/m2            3.  Diagnotic testing complete: N/A       4.  Cleared from consultants (if applicable): No       5. Return to near baseline physical activity: No, needs assist to get OOB to bathroom here in hospital.     Patient has IV fluids infusing.  Taking oral analgesic.  Was shown how to use his I.S.   Explained how important it is to use to help expand his lungs to prevent moisture to build in his lungs, to prevent pneumonia.  His son is here and is going to stay with him.  Educated him on fall precautions and that he should call if he needs to get up to bathroom.       "

## 2018-10-29 NOTE — PLAN OF CARE
Problem: Patient Care Overview  Goal: Plan of Care/Patient Progress Review  Outcome: Adequate for Discharge Date Met: 10/29/18  Patient's After Visit Summary was reviewed with patient and/or family.   Patient verbalized understanding of After Visit Summary, recommended follow up and was given an opportunity to ask questions.   Discharge medications sent home with patient/family: YES, Tramadol   Discharged with son        OBSERVATION patient END time: 6:42 PM

## 2018-10-29 NOTE — PLAN OF CARE
Problem: Patient Care Overview  Goal: Plan of Care/Patient Progress Review  Acute Traumatic Pain     1.  Pain controlled with oral analgesia: Yes- improving, pt trying PO Dilaudid.      2.  Vital signs stable: Yes      3.  Diagnotic testing complete: Yes      4.  Cleared from consultants (if applicable): Yes      5. Return to near baseline physical activity: No    Pt A&O, VSS. Plan for probable discharge when pain is under control. PO dilaudid started.

## 2018-10-29 NOTE — H&P
[facility] Surgery Consultation    Kashmir Yao MRN# [id number]   Age: 63 year old YOB: 1955     Date of Admission:  10/28/2018                                Assessment and Plan:   Assessment:   Fall with left seventh rib fracture  Patient Active Problem List    Diagnosis Date Noted     Health Care Home 06/14/2011     Priority: High     EMERGENCY CARE PLAN  Presenting Problem Signs and Symptoms Treatment Plan    Questions or conerns during clinic hours    I will call the clinic directly     Questions or conerns outside clinic hours    I will call the 24 hour nurse line at [phone]    Patient needs to schedule an appointment    I will call the 24 hour scheduling team at [phone] or clinic directly    Same day treatment     I will call the clinic first, nurse line if after hours, urgent care and express care if needed                            DX V65.8 REPLACED WITH 96935 HEALTH CARE HOME (04/08/2013)       Constipation 05/25/2016     Priority: Medium     Epilepsy (H) 05/20/2016     Priority: Medium     Benign essential hypertension 05/20/2016     Priority: Medium     Physical deconditioning 05/20/2016     Priority: Medium     S/P TKR (total knee replacement) 05/16/2016     Priority: Medium     HYPERLIPIDEMIA LDL GOAL <100 10/31/2010     Priority: Medium     GERD (gastroesophageal reflux disease)      Priority: Medium     RIC (obstructive sleep apnea)      Priority: Medium     Esophageal reflux 04/10/2008     Priority: Medium     Anxiety state 04/10/2008     Priority: Medium     Problem list name updated by automated process. Provider to review       Mixed hyperlipidemia 02/14/2007     Priority: Medium           Plan:   Admission is for pain control for his rib fracture  Incentive spirometry to reduce the risk of atelectasis and/or pneumonia  Hospitalist to assist with management of medical problems            Chief Complaint:   Left upper quadrant abdominal pain and left lower back  pain     History is obtained from the patient, electronic health record and emergency department physician         History of Present Illness:   This patient is a 63 year old  male with a significant past medical history of hernia artery disease, encephalocele, epilepsy, GE reflux, hypertension, sleep apnea and a history of spinal cord injury who presents with the following condition requiring a hospital admission: Fall in his bathroom, striking his left upper quadrant/left lower ribs on a shelf.  He tried to ice the area for a few hours but the pain was too significant and he came to the emergency room for evaluation.  There was no dizziness, no head trauma, no loss of consciousness.  He reports pain is worse with twisting and deep breathing.  He does not have a history of falls.  He does report that he has had back (spinal stenosis 4 years ago) and left knee surgery (2 years ago) with resultant weakness of his left leg and is currently undergoing rehab for this.  He feels this was the cause of his fall.            Past Medical History:     Past Medical History:   Diagnosis Date     Arthritis      CAD (coronary artery disease) 2010    a subtotally occluded obtuse marginal vessel which was stented with a drug-coated stent.  He had 50% to 60% LAD disease and 25% to 30% right coronary disease     Colonic polyps 2009    tubular adenomae with mildly dysplastic features     Disturbance of skin sensation      Encephalocele (H) 2009    left frontal     Epilepsy, partial (H) 2009    with secondary generalization     GERD (gastroesophageal reflux disease)      Heart attack     5 years ago. 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     C NONSPECIFIC PROCEDURE      stint in heart     CYSTOSCOPY       ESOPHAGOSCOPY, GASTROSCOPY, DUODENOSCOPY (EGD), COMBINED  5/24/2012    Procedure:COMBINED ESOPHAGOSCOPY, GASTROSCOPY, DUODENOSCOPY (EGD); ESOPHAGOSCOPY, GASTROSCOPY, DUODENOSCOPY (EGD) ; Surgeon:GILLIAN JOHNSON; Location: GI     HC COLONOSCOPY THRU STOMA, DIAGNOSTIC  2009    tubular adenomae, recommended annual colonoscopy     KNEE SURGERY Left     repair of cartilage     SPINE SURGERY      repair of herniated disc lumbar     STENT               Social History:     Social History   Substance Use Topics     Smoking status: Former Smoker     Packs/day: 0.50     Years: 20.00     Quit date: 8/4/2003     Smokeless tobacco: Never Used     Alcohol use No             Family History:     Family History   Problem Relation Age of Onset     Cardiovascular Mother      CHF, HTN, VASC. INSUFF., DM     Family History Negative Father      Thyroid Disease Sister      HYPOTHYROID     Genitourinary Problems Brother      RENAL FAILURE AND H/O MVA WITH LE PARAPLEGIA     Cancer - colorectal No family hx of              Immunizations:     VACCINE/DOSE   Diptheria   DPT   DTAP   HBIG   Hepatitis A   Hepatitis B   HIB   Influenza   Measles   Meningococcal   MMR   Mumps   Pneumococcal   Polio   Rubella   Small Pox   TDAP   Varicella   Zoster             Allergies:     Allergies   Allergen Reactions     Codeine      inability to sleep after taking codeine     Gabapentin      Fever       Vicodin [Hydrocodone-Acetaminophen]      Perspiring, hyper, itchy             Medications:      Current Facility-Administered Medications   Medication     HYDROmorphone (PF) (DILAUDID) injection 0.5 mg     Current Outpatient Prescriptions   Medication Sig     oxyCODONE-acetaminophen (PERCOCET) 5-325 MG per tablet Take 1-2 tablets by mouth every 4 hours as needed for pain     acetaminophen (TYLENOL) 325 MG tablet Take 3 tablets (975 mg) by mouth every 8 hours     esomeprazole (NEXIUM) 40 MG CR capsule TAKE 1 CAPSULE BY MOUTH TWICE DAILY     lamoTRIgine (LAMICTAL) 100 MG tablet 250 mg At Bedtime      lamoTRIgine (LAMICTAL) 200 MG tablet Take 200 mg by mouth every morning      metoprolol tartrate (LOPRESSOR) 50 MG tablet Take 1 tablet (50 mg) by mouth 2 times daily     naproxen (NAPROSYN) 500 MG tablet Take 1 tablet by mouth 2 times daily     pantoprazole (PROTONIX) 40 MG EC tablet Take 1 tablet (40 mg) by mouth daily Take 30-60 minutes before a meal.     pregabalin (LYRICA) 25 MG capsule Take 3 capsules (75 mg) by mouth 2 times daily     Ranitidine HCl (ZANTAC PO) Take 75 mg by mouth At Bedtime     sildenafil (VIAGRA) 100 MG tablet Take 1 tablet (100 mg) by mouth daily as needed Take 30 min to 4 hours before intercourse.  Never use with nitroglycerin, terazosin or doxazosin.     sildenafil (VIAGRA) 50 MG tablet Take 1 tablet (50 mg) by mouth daily as needed for erectile dysfunction     zolpidem (AMBIEN) 10 MG tablet Take 1 tablet (10 mg) by mouth nightly as needed for sleep at bedtime.             Review of Systems:   CV: NEGATIVE for chest pain, palpitations or peripheral edema  C: NEGATIVE for fever, chills, change in weight  E/M: NEGATIVE for ear, mouth and throat problems  R: NEGATIVE for significant cough or SOB          Physical Exam:   All vitals have been reviewed  Patient Vitals for the past 24 hrs:   BP Temp Temp src Pulse Heart Rate Resp SpO2 Height Weight   10/28/18 2317 - - - - - - 98 % - -   10/28/18 2315 - - - - - - 91 % - -   10/28/18 2230 129/74 - - - - - 96 % - -   10/28/18 2159 - - - - - -  "98 % - -   10/28/18 2155 - - - - 63 - 98 % - -   10/28/18 2154 - - - - - - 98 % - -   10/28/18 1943 115/74 97.5  F (36.4  C) Temporal 52 - 18 99 % 1.753 m (5' 9\") 95.3 kg (210 lb)     No intake or output data in the 24 hours ending 10/28/18 2328  Head-atraumatic  Eyes-pupils equally round, reactive to light, extraocular muscles appear intact  Ears-no external trauma, grossly normal acuity  Mouth-  Neck:   supple, symmetrical, trachea midline, skin normal and no stridor     Chest / Breast:   Normal respiratory effort, tenderness left lower ribs     Abdomen:   Left upper quadrant mildly tender to palpation     Musculoskeletal:   no lower extremity pitting edema present  there is no redness, warmth, or swelling of the joints.  Postop changes left knee  full range of motion noted  motor strength is 5 out of 5 all extremities bilaterally             Data:   All laboratory data reviewed  Results for orders placed or performed during the hospital encounter of 10/28/18   CT Chest/Abdomen/Pelvis w Contrast    Narrative    CT CHEST/ABDOMEN/PELVIS WITH CONTRAST 10/28/2018 9:53 PM     HISTORY: Fall, left lower rib and LUQ pain concerning for spleen  injury.      TECHNIQUE: 100mL Isovue-370. Radiation dose for this scan was reduced  using automated exposure control, adjustment of the mA and/or kV  according to patient size, or iterative reconstruction technique.    COMPARISON: CT abdomen pelvis from 9/14/2011.     FINDINGS:   Chest CT: No mediastinal or hilar adenopathy. No evidence of aortic  aneurysm or dissection. Lungs are clear. No pleural fluid. No  pneumothorax. Small mildly displaced left seventh rib fracture.    Abdomen pelvis CT: The liver, gallbladder, spleen, and pancreas are  normal. No adrenal lesions. Kidneys are normal. No retroperitoneal  adenopathy or evidence of aortic aneurysm.    Scans through the pelvis demonstrate a normal-appearing bladder.    No evidence of diverticulitis. No evidence of appendicitis. No " free  air or free fluid.      Impression    IMPRESSION:  1. Small mildly displaced left seventh rib fracture. No pneumothorax  or pleural fluid. Lungs are clear.  2. No evidence of splenic injury or other solid organ injury in the  abdomen or pelvis. Otherwise unremarkable.     SOLO HERNANDEZ MD   CBC with platelets differential   Result Value Ref Range    WBC 6.2 4.0 - 11.0 10e9/L    RBC Count 4.79 4.4 - 5.9 10e12/L    Hemoglobin 13.5 13.3 - 17.7 g/dL    Hematocrit 40.7 40.0 - 53.0 %    MCV 85 78 - 100 fl    MCH 28.2 26.5 - 33.0 pg    MCHC 33.2 31.5 - 36.5 g/dL    RDW 13.6 10.0 - 15.0 %    Platelet Count 181 150 - 450 10e9/L    Diff Method Automated Method     % Neutrophils 76.2 %    % Lymphocytes 14.9 %    % Monocytes 6.0 %    % Eosinophils 1.3 %    % Basophils 0.3 %    % Immature Granulocytes 1.3 %    Nucleated RBCs 0 0 /100    Absolute Neutrophil 4.7 1.6 - 8.3 10e9/L    Absolute Lymphocytes 0.9 0.8 - 5.3 10e9/L    Absolute Monocytes 0.4 0.0 - 1.3 10e9/L    Absolute Eosinophils 0.1 0.0 - 0.7 10e9/L    Absolute Basophils 0.0 0.0 - 0.2 10e9/L    Abs Immature Granulocytes 0.1 0 - 0.4 10e9/L    Absolute Nucleated RBC 0.0    Basic metabolic panel   Result Value Ref Range    Sodium 138 133 - 144 mmol/L    Potassium 4.0 3.4 - 5.3 mmol/L    Chloride 106 94 - 109 mmol/L    Carbon Dioxide 28 20 - 32 mmol/L    Anion Gap 4 3 - 14 mmol/L    Glucose 108 (H) 70 - 99 mg/dL    Urea Nitrogen 30 7 - 30 mg/dL    Creatinine 1.28 (H) 0.66 - 1.25 mg/dL    GFR Estimate 57 (L) >60 mL/min/1.7m2    GFR Estimate If Black 68 >60 mL/min/1.7m2    Calcium 9.0 8.5 - 10.1 mg/dL   Creatinine POCT   Result Value Ref Range    Creatinine 1.3 (H) 0.66 - 1.25 mg/dL    GFR Estimate 56 (L) >60 mL/min/1.7m2    GFR Estimate If Black 67 >60 mL/min/1.7m2          Attestation:  I have reviewed today's vital signs, notes, medications, labs and imaging.  Amount of time performed on this consult: 60 minutes.    Clif Godoy MD

## 2018-10-29 NOTE — ED TRIAGE NOTES
Pt fell on lt side, now pain to lt lower rib area, worse when turning body to the lt.  Denies LOC.

## 2018-10-29 NOTE — PLAN OF CARE
Problem: Patient Care Overview  Goal: Plan of Care/Patient Progress Review  Acute Traumatic Pain     1.  Pain controlled with oral analgesia: Pt states pain is controlled with use of ultram and IV Morphine      2.  Vital signs stable: Yes      3.  Diagnotic testing complete: Yes      4.  Cleared from consultants (if applicable): No      5. Return to near baseline physical activity: No      Pt A&O, Up w/assistance of 2. IV fluids infusing , NS @ 50/HR. Pt states pain is controlled w/ IV morphine and PO Ultram. Pt using IS independently. Probable discharge today if pain is under control and transferring improves.

## 2018-10-29 NOTE — DISCHARGE INSTRUCTIONS
HOME CARE FOLLOWING TRAUMA ADMISSION - NONSURGICAL FRACTURES/ABRASIONS  QING Martinez, JODY Dillon, TYSON Mayo    NEXT APPOINTMENT:  Follow-up with your primary care provider in 2-3 days for recheck of your injuries and overall medical status.  At that time you may address ongoing care recommendations and activity restrictions.    WOUND CARE:  Apply bacitracin to abrasions twice a day and cover sites with non-stick dressings afterwards.      ACTIVITY:  Light Activity -- you may immediately be up and about as tolerated.  Driving -- you may drive when comfortable and off narcotic pain medications.  Light Work -- resume when comfortable off pain medications.  (If you can drive, you probably can work.)  Strenuous Work/Activity -- limit lifting to 10 pounds for 2 weeks.  Restrictions after this time should be recommended by your primary care provider.    DISCOMFORT:  Use pain medications as prescribed by your surgeon.  Take the pain medication with some food, when possible, to minimize side effects.  Expect gradual improvement.    DIET:  Return to diet you were on before surgery.  Drink plenty of fluids.  While taking pain medications, increase dietary fiber or add a fiber supplementation like Metamucil or Citrucel to help prevent constipation - a possible side effect of pain medications.    Surgeon office contact number:  Office Phone:  [phone]      FREQUENTLY ASKED QUESTIONS:    Q:  What can I do to minimize constipation (very hard stools, or lack of stools)?  A:  Stay well hydrated.  Increase your dietary fiber intake or take a fiber supplement -with plenty of water.  Walk around frequently.  You may consider an over-the-counter stool-softener.  Your Pharmacist can assist you with choosing one that is stocked at your pharmacy.  Constipation is also one of the most common side effects of pain medication.  If you are using pain medication, be pro-active and try to PREVENT  problems with constipation by taking the steps above BEFORE constipation becomes a problem.    Q:  Why am I having a hard time sleeping now that I am at home?  A:  Many medications you receive while you are in the hospital can impact your sleep for a number of days after your surgery/hospitalization.  Decreased level of activity and naps during the day may also make sleeping at night difficult.  Try to minimize day-time naps, and get up frequently during the day to walk around your home during your recovery time.  Sleep aides may be of some help, but are not recommended for long-term use.            If you have other questions, please call the office Monday thru Friday between 8am and 5pm to discuss with the nurse or physician assistant.  #[phone]    There is a surgeon ON CALL on weekday evenings and over the weekend in case of urgent need only, and may be contacted at the same number.    If you are having an emergency, call 911 or proceed to your nearest emergency department.    Rib Fracture    You broke one or more ribs. This is called a rib fracture. Rib fractures do not require a cast like other bones. They will heal by themselves in about 4 to 6 weeks. The first 3 to 4 weeks will be the most painful because deep breathing, coughing, or changing position from sitting to lying down, may cause the broken ends to move slightly.  Home care    Rest. You should not be doing any heavy lifting or strenuous exertion until the pain goes away.    It hurts to breathe when you have a broken rib. This puts you at risk of getting pneumonia from poor airflow through your lungs. To prevent this:  ? Take several very deep breaths once an hour while you're awake. Exhale through pursed lips as if you are blowing up a balloon. If possible, actually blow up a balloon or a rubber glove. This exercise builds up pressure inside the lung and prevents collapse of the small air sacs of the lung. This exercise may cause some pain at  the site of injury, which is normal.  ? You may have gotten a breathing exercise device called an incentive spirometer. Use it at least 4 times a day, or as directed.    Apply an ice pack over the injured area for 15 to 20 minutes every 1 to 2 hours. You should do this for the first 24 to 48 hours. You can make an ice pack by filling a plastic bag that seals at the top with ice cubes and then wrapping it with a thin towel. Continue with ice packs as needed for the relief of pain and swelling.    You may use over-the-counter pain medicine to control pain, unless another pain medicine was prescribed. If you have chronic liver or kidney disease or ever had a stomach ulcer or GI bleeding, talk with your healthcare provider before using these medicines.    If your pain is not controlled, contact your healthcare provider. Sometimes a stronger pain medicine may be needed. A nerve block can be done in case of severe pain. It will numb the nerve between the ribs.  Follow-up care  Follow up with your healthcare provider, or as advised. Rarely, a broken rib will cause complications within the first few days that may not be evident during your initial exam. This can include collapsed lung, bleeding around the lung or into the abdomen, or pneumonia. Therefore, watch for the signs below.  If X-rays were taken, you will be told of any new findings that may affect your care.  Call 911  Call 911 if you have:    Dizziness, weakness or fainting    Shortness of breath with or without chest discomfort    New or worsening abdominal pain    Discomfort in other areas of your upper body such as your shoulders, jaw, neck, or arms  When to seek medical advice  Call your healthcare provider right away if any of these occur:    Increasing chest pain with breathing    Fever of 100.4 F (38 C) or higher, or as directed by your healthcare provider    Congested cough  Date Last Reviewed: 12/3/2015    [id number] [employer]. [address] [place] [address]  [place] [address]. All rights reserved. This information is not intended as a substitute for professional medical care. Always follow your healthcare professional's instructions.        Using an Incentive Spirometer    An incentive spirometer is a device that helps you do deep breathing exercises. These exercises expand your lungs, aid in circulation, and help prevent pneumonia. Deep breathing exercises also help you breathe better and improve the function of your lungs by:    Keeping your lungs clear    Strengthening your breathing muscles    Helping prevent respiratory complications or problems  The incentive spirometer gives you a way to take an active part in your recovery. A nurse or therapist will teach you breathing exercises. To do these exercises, you will breathe in through your mouth and not your nose. The incentive spirometer only works correctly if you breathe in through your mouth.  Steps to clear lungs  Step 1. Exhale normally. Then, inhale normally.    Relax and breathe out.  Step 2. Place your lips tightly around the mouthpiece.    Make sure the device is upright and not tilted.  Step 3. Inhale as much air as you can through the mouthpiece (don't breathe through your nose).    Inhale slowly and deeply.    Hold your breath long enough to keep the balls or disk raised for at least 3 to 5 seconds, or as instructed by your healthcare provider.    Some spirometers have an indicator to let you know that you are breathing in too fast. If the indicator goes off, breathe in more slowly.  Step 4. Repeat the exercise regularly.    Do this exercise every hour while you're awake, or as instructed by your healthcare provider.    If you were taught deep breathing and coughing exercises, do them regularly as instructed by your healthcare provider.   Follow-up care  Make a follow up appointment, or as directed. Also, follow up with your healthcare provider as advised or if your symptoms don't improve or  continue to get worse.  When to call your healthcare provider  Call your healthcare provider right away if you have any of the following:    Fever 100.4  (38 C) or higher, or as directed by your healthcare provider    Brownish, bloody, or smelly sputum (phlegm that you cough up)  Call 911  Call 911 if any of these occur:     Shortness of breath that doesn't get better after taking your medicine    Cool, moist, pale or blue skin    Trouble breathing or swallowing, wheezing    Fainting or loss of consciousness    Feeling of dizziness or weakness, or a sudden drop in blood pressure    Feeling very ill    Lightheadedness    Chest pain or rapid heart rate  Date Last Reviewed: 1/1/2017 2000-2017 [employer]. [address] [place] [address], [place] [address]. All rights reserved. This information is not intended as a substitute for professional medical care. Always follow your healthcare professional's instructions.

## 2018-10-29 NOTE — ED NOTES
[facility]  ED Nurse Handoff Report    Kashmir Yao is a 63 year old male   ED Chief complaint: Rib Pain  . ED Diagnosis:   Final diagnoses:   Closed fracture of one rib of left side, initial encounter     Allergies:   Allergies   Allergen Reactions     Codeine      inability to sleep after taking codeine     Gabapentin      Fever       Vicodin [Hydrocodone-Acetaminophen]      Perspiring, hyper, itchy       Code Status: Full Code  Activity level - Baseline/Home:  Independent. Activity Level - Current:   Stand with Assist of 2. Lift room needed: No. Bariatric: No   Needed: No   Isolation: No. Infection: Not Applicable.     Vital Signs:   Vitals:    10/28/18 2159 10/28/18 2230 10/28/18 2315 10/28/18 2317   BP:  129/74     Pulse:       Resp:       Temp:       TempSrc:       SpO2: 98% 96% 91% 98%   Weight:       Height:           Cardiac Rhythm:  ,      Pain level: 0-10 Pain Scale: 8  Patient confused: No. Patient Falls Risk: No.   Elimination Status: Has voided   Patient Report - Initial Complaint: fall. Focused Assessment: Kashmir Yao is a 63 year old male who presents to the emergency department today for evaluation of rib pain. The patient reports he fell about 3 hours ago in his bathroom.  He states he then had severe pain in his left side.  He reports he iced the area and took tylenol with no relief. He states the pain is worse with movement and deep breathing. The patient denies back pain, nausea, vomiting, and syncope   Tests Performed:   CT Chest/Abdomen/Pelvis w Contrast   Final Result   IMPRESSION:   1. Small mildly displaced left seventh rib fracture. No pneumothorax   or pleural fluid. Lungs are clear.   2. No evidence of splenic injury or other solid organ injury in the   abdomen or pelvis. Otherwise unremarkable.       SOLO HERNANDEZ MD        Labs Ordered and Resulted from Time of ED Arrival Up to the Time of Departure from the ED   BASIC METABOLIC PANEL - Abnormal; Notable  for the following:        Result Value    Glucose 108 (*)     Creatinine 1.28 (*)     GFR Estimate 57 (*)     All other components within normal limits   CREATININE POCT - Abnormal; Notable for the following:     Creatinine 1.3 (*)     GFR Estimate 56 (*)     All other components within normal limits   CBC WITH PLATELETS DIFFERENTIAL   PERIPHERAL IV CATHETER   ISTAT CREATININE NURSING POCT       . Abnormal Results: see above.   Treatments provided: see MAR  Family Comments: wife present-appropriate interactions  OBS brochure/video discussed/provided to patient:  Yes  ED Medications:   Medications   HYDROmorphone (PF) (DILAUDID) injection 0.5 mg (not administered)   morphine (PF) injection 2 mg (2 mg Intravenous Given 10/28/18 2042)   iopamidol (ISOVUE-370) solution 500 mL (100 mLs Intravenous Given 10/28/18 2143)   0.9% sodium chloride BOLUS (0 mLs Intravenous Stopped 10/28/18 2145)   morphine (PF) injection 4 mg (4 mg Intravenous Given 10/28/18 2201)   oxyCODONE-acetaminophen (PERCOCET) 5-325 MG per tablet 1 tablet (1 tablet Oral Given 10/28/18 2240)     Drips infusing:  No  For the majority of the shift, the patient's behavior Green. Interventions performed were none.     Severe Sepsis OR Septic Shock Diagnosis Present: No      ED Nurse Name/Phone Number: Pablito Lieberman,   11:24 PM    RECEIVING UNIT ED HANDOFF REVIEW    Above ED Nurse Handoff Report was reviewed: Yes  Reviewed by: Chanelle Louis on October 29, 2018 at 12:21 AM

## 2018-10-29 NOTE — ED PROVIDER NOTES
History     Chief Complaint:  Rib Pain    HPI   Kashmir Yao is a 63 year old male who presents to the emergency department today for evaluation of rib pain. The patient reports he fell about 3 hours ago in his bathroom.  He states he then had severe pain in his left side.  He reports he iced the area and took tylenol with no relief. He states the pain is worse with movement and deep breathing. The patient denies back pain, nausea, vomiting, and syncope.     Allergies:  Codeine  Gabapentin  Vicodin     Medications:    Nexium   Lamictal   Lopressor  Naprosyn  Protonix   Lyrica  Zantac  Viagra   Ambien     Past Medical History:    Arthritis   CAD (coronary artery disease)   Colonic polyps  Disturbance of skin sensation   Encephalocele   Epilepsy, partial   GERD (gastroesophageal reflux disease)   Heart attack   History of spinal cord injury   Hypertension   RIC (obstructive sleep apnea)   Paraneoplastic Antibody  positive  Seizures    Stented coronary artery   Tobacco use disorder   Ventricular Assymetry    Past Surgical History:    Arthroplasty knee  Back surgery  Stint in heart   Stent    Family History:    Mother: CHF, hypertension  Sister: hypothyroid  Brother: renal failure    Social History:  The patient was accompanied to the ED by his wife.  Smoking Status: Former Smoker   Packs/day: 0.5   Years: 20   Quit date: 8/4/2003  Smokeless Tobacco: Never Used  Alcohol Use: Negative   Marital Status:        Review of Systems   Gastrointestinal: Positive for abdominal pain. Negative for nausea and vomiting.   Musculoskeletal: Negative for back pain.        Rib pain   Neurological: Negative for syncope.   All other systems reviewed and are negative.    Physical Exam     Patient Vitals for the past 24 hrs:   BP Temp Temp src Pulse Heart Rate Resp SpO2 Height Weight   10/28/18 2317 - - - - - - 98 % - -   10/28/18 2315 - - - - - - 91 % - -   10/28/18 2230 129/74 - - - - - 96 % - -   10/28/18 2159 - - - - - - 98  "% - -   10/28/18 2155 - - - - 63 - 98 % - -   10/28/18 2154 - - - - - - 98 % - -   10/28/18 1943 115/74 97.5  F (36.4  C) Temporal 52 - 18 99 % 1.753 m (5' 9\") 95.3 kg (210 lb)      Physical Exam  Constitutional: Vital signs reviewed as above.    Head: No external signs of trauma noted.  Eyes: Pupils are equal, round, and reactive to light.   Neck: No JVD noted  Cardiovascular: Normal rate, regular rhythm and normal heart sounds.  No murmur heard. Equal B/L peripheral pulses.  Pulmonary/Chest: Effort normal and breath sounds normal. No respiratory distress. Patient has no wheezes. Patient has no rales. There is left lower chest wall/rib tenderness  Abdominal: Soft. There is LUQ tenderness.   Musculoskeletal/Extremities: No edema noted  Neurological: Patient is alert and oriented to person, place, and time.   Skin: Skin is warm and dry. There is no diaphoresis noted. No chest wall or flank bruising noted.  Psychiatric: The patient appears calm.      Emergency Department Course     Imaging:  Radiology findings were communicated with the patient who voiced understanding of the findings.    CT Chest/Abdomen/Pelvis w Contrast  1. Small mildly displaced left seventh rib fracture. No pneumothorax  or pleural fluid. Lungs are clear.  2. No evidence of splenic injury or other solid organ injury in the  abdomen or pelvis. Otherwise unremarkable.   Reading per radiology    Laboratory:  Laboratory findings were communicated with the patient who voiced understanding of the findings.    CBC: WBC 6.2, HGB 13.5,   BMP: glucose 108 (H), GFR 57 (L) o/w WNL (Creatinine 1.28(H))  Creatinine POCT: 1.3 (H), GFR 56 (L)    Interventions:  2042 Morphine 2 mg IV  2201 Morphine 4 mg IV  2240 Percocet 1 tablet PO    Medications   HYDROmorphone (PF) (DILAUDID) injection 0.5 mg (not administered)   morphine (PF) injection 2 mg (2 mg Intravenous Given 10/28/18 2042)   iopamidol (ISOVUE-370) solution 500 mL (100 mLs Intravenous Given " 10/28/18 2143)   0.9% sodium chloride BOLUS (0 mLs Intravenous Stopped 10/28/18 2145)   morphine (PF) injection 4 mg (4 mg Intravenous Given 10/28/18 2201)   oxyCODONE-acetaminophen (PERCOCET) 5-325 MG per tablet 1 tablet (1 tablet Oral Given 10/28/18 2240)         Emergency Department Course:    1948 Nursing notes and vitals reviewed.    2003 I performed an exam of the patient as documented above.     2044 IV was inserted and blood was drawn for laboratory testing, results above.     2136 The patient was sent for a CT chest/abdomen/pelvis while in the emergency department, results above.      2237 Recheck and update.     2325    Recheck and update. I personally reviewed the laboratory and imaging results with the patient and answered all related questions prior to admission.    2327 D/W Dr. Godoy from surgery. OK for Obs.    Impression & Plan      Medical Decision Making:  This 63-year-old male patient presents the ED after a fall.  Please see the HPI and exam for specifics.  Given the location of his and symptoms of his injury I did order a CT of his chest abdomen and pelvis.  There was no intra-abdominal injury.  A left seventh rib fracture was noted.  The patient still has significant pain and functional limitations.  As such I am going to bring him in to trauma surgery for pain control.    Diagnosis:      ICD-10-CM    1. Closed fracture of one rib of left side, initial encounter S22.32XA      Disposition:   Placed on observation    Discharge Medications:    New Prescriptions    OXYCODONE-ACETAMINOPHEN (PERCOCET) 5-325 MG PER TABLET    Take 1-2 tablets by mouth every 4 hours as needed for pain     Scribe Disclosure:  I, Veda Arreola, am serving as a scribe at 7:47 PM on 10/28/2018 to document services personally performed by John Tabares DO based on my observations and the provider's statements to me.     [facility] EMERGENCY DEPARTMENT     John Tabares DO  10/28/18 1261

## 2018-10-29 NOTE — PHARMACY-ADMISSION MEDICATION HISTORY
Admission medication history interview status for this patient is complete. See [facility] admission navigator for allergy information, prior to admission medications and immunization status.     Medication history interview source(s):Patient  Medication history resources (including written lists, pill bottles, clinic record):None  Primary pharmacy: [facility] [address]    Changes made to PTA medication list:  Added: aspirin, simvastatin  Deleted: viagra (duplicate), pantoprazole (pt states it doesn't work as well as Nexium)  Changed: ranitidine (changed to prn), acetaminophen (changed dose from 325mg to 500mg)    Actions taken by pharmacist (provider contacted, etc):None     Additional medication history information:None    Medication reconciliation/reorder completed by provider prior to medication history? No    Do you take OTC medications (eg tylenol, ibuprofen, fish oil, eye/ear drops, etc)? Y (Y/N)    For patients on insulin therapy: N (Y/N)      Prior to Admission medications    Medication Sig Last Dose Taking? Auth Provider   acetaminophen (TYLENOL) 500 MG tablet Take 1,000 mg by mouth every 8 hours as needed for mild pain 10/28/2018 at Unknown time Yes Unknown, Entered By History   aspirin 81 MG tablet Take 81 mg by mouth daily 10/28/2018 at Unknown time Yes Unknown, Entered By History   esomeprazole (NEXIUM) 40 MG CR capsule TAKE 1 CAPSULE BY MOUTH TWICE DAILY 10/28/2018 at Unknown time Yes Hakeem Cano MD   lamoTRIgine (LAMICTAL) 100 MG tablet 250 mg At Bedtime  10/28/2018 at Unknown time Yes Reported, Patient   lamoTRIgine (LAMICTAL) 200 MG tablet Take 200 mg by mouth every morning  10/28/2018 at Unknown time Yes Hakeem Cano MD   metoprolol tartrate (LOPRESSOR) 50 MG tablet Take 1 tablet (50 mg) by mouth 2 times daily 10/28/2018 at Unknown time Yes Hakeem Cano MD   naproxen (NAPROSYN) 500 MG tablet Take 1 tablet by mouth 2 times daily 10/28/2018 at Unknown  time Yes Reported, Patient   oxyCODONE-acetaminophen (PERCOCET) 5-325 MG per tablet Take 1-2 tablets by mouth every 4 hours as needed for pain  Yes John Tabares,    pregabalin (LYRICA) 25 MG capsule Take 3 capsules (75 mg) by mouth 2 times daily 10/28/2018 at Unknown time Yes Hakeem Cano MD   Ranitidine HCl (ZANTAC PO) Take 75 mg by mouth nightly as needed  Past Week at Unknown time Yes Unknown, Entered By History   simvastatin (ZOCOR) 40 MG tablet Take 40 mg by mouth At Bedtime 10/28/2018 at Unknown time Yes Unknown, Entered By History   zolpidem (AMBIEN) 10 MG tablet Take 1 tablet (10 mg) by mouth nightly as needed for sleep at bedtime. Past Week at Unknown time Yes Hakeem Cano MD   sildenafil (VIAGRA) 50 MG tablet Take 1 tablet (50 mg) by mouth daily as needed for erectile dysfunction   Hakeem Cano MD

## 2018-10-31 ENCOUNTER — TELEPHONE (OUTPATIENT)
Dept: FAMILY MEDICINE | Facility: CLINIC | Age: 63
End: 2018-10-31

## 2018-10-31 NOTE — DISCHARGE SUMMARY
[facility]    Discharge Summary  Surgery    Date of Admission:  10/28/2018  Date of Discharge:  10/29/2018  6:50 PM  Discharging Provider: Enmanuel Lee PA-C and Clif Godoy MD  Discharge Summary Note completed by: Margi Dickerson PA-C on 10/31/2018  Date of Service: The patient was personally seen by Discharging Providers on the day of discharge.    Discharge Diagnoses   Trauma admission after fall  Displaced left 7th fib fracture due to fall  Pain management    History of Present Illness   Kashmir Yao is a 63 year old male who presented with chest pain uncontrolled with home meds and icing after a fall in his bathroom which he attributes to leg weakness s/p spine and left knee surgeries.  He had no dizziness/syncope, head trauma, or loss of consciousness.  Imaging in ED revealed displaced left rib fracture without pneumothorax.  Pt admitted for pain control.  Hospitalist consulted for assistance with home meds and medical management.      Hospital Course   Kashmir Yao was admitted on 10/28/2018.  The following problems were addressed during his hospitalization:  Patient Active Problem List   Diagnosis     Mixed hyperlipidemia     Esophageal reflux     Anxiety state     GERD (gastroesophageal reflux disease)     RIC (obstructive sleep apnea)     HYPERLIPIDEMIA LDL GOAL <100     Health Care Home     S/P TKR (total knee replacement)     Epilepsy (H)     Benign essential hypertension     Physical deconditioning     Constipation     Trauma     Post-operative pain control: was via IV until able to tolerate PO intake and transitioned to PO pain meds.    Remarkable hospital course events: Pt started on IS frequently and was able to attain pain control on oral meds by 10/29.  Mild renal insufficiency noted with creatinine slightly elevated at 1.28.  Please see Hospitalist notes for further details if needed.  Follow-up recommended with PCP with repeat BMP at that time.     Kashmir met all  criteria for release on 10/29/2018  6:50 PM.  He was afebrile, tolerating diet, pain controlled on PO meds, ambulating well, and had return of bowel function.    Medications discontinued or adjusted during this hospitalization: see discharge med list below.    Discharge Instructions and Follow-Up:  Discharge diet: Regular   Discharge activity: Activity as tolerated  Limit lifting with left arm    Discharge follow-up: Follow up with primary care provider   Site care: Ice to area for comfort       Margi Dickerson PA-C      Discharge Disposition   Discharged to home   Condition at discharge: Stable    Pending Results   Unresulted Labs Ordered in the Past 30 Days of this Admission     No orders found from 8/29/2018 to 10/29/2018.          Primary Care Physician   Hakeem Cano    Consultations This Hospital Stay   HOSPITALIST IP CONSULT    Discharge Orders     Reason for your hospital stay   7th rib fracture on the left s/p fall     Follow-up and recommended labs and tests    Follow up with primary care provider, Hakeem Cano, within 7 days for hospital follow- up.  The following labs/tests are recommended: Repeat basic metabolic panel in 1 week .     Activity   Your activity upon discharge: activity as tolerated, use incentive spirometry every hour     Diet   Follow this diet upon discharge: Orders Placed This Encounter     Regular Diet Adult       Discharge Medications   Discharge Medication List as of 10/29/2018  5:54 PM      START taking these medications    Details   oxyCODONE-acetaminophen (PERCOCET) 5-325 MG per tablet Take 1-2 tablets by mouth every 4 hours as needed for pain, Disp-12 tablet, R-0, Local PrintDo not exceed 3000 mg acetaminophen per day from all sources. Do not drive or operate machinery while taking this medication.      traMADol (ULTRAM) 50 MG tablet Take 2 tablets (100 mg) by mouth every 6 hours as needed for moderate pain, Disp-25 tablet, R-0, Local Print          CONTINUE these medications which have NOT CHANGED    Details   acetaminophen (TYLENOL) 500 MG tablet Take 1,000 mg by mouth every 8 hours as needed for mild pain, Historical      aspirin 81 MG tablet Take 81 mg by mouth daily, Historical      esomeprazole (NEXIUM) 40 MG CR capsule TAKE 1 CAPSULE BY MOUTH TWICE DAILY, Disp-180 capsule, R-2, E-Prescribe      !! lamoTRIgine (LAMICTAL) 100 MG tablet 250 mg At Bedtime , R-3, Historical      !! lamoTRIgine (LAMICTAL) 200 MG tablet Take 200 mg by mouth every morning , Disp-90 tablet, R-1, Historical      metoprolol tartrate (LOPRESSOR) 50 MG tablet Take 1 tablet (50 mg) by mouth 2 times daily, Disp-180 tablet, R-3, E-Prescribe      naproxen (NAPROSYN) 500 MG tablet Take 1 tablet by mouth 2 times daily, Historical      pregabalin (LYRICA) 25 MG capsule Take 3 capsules (75 mg) by mouth 2 times daily, Disp-540 capsule, R-1, Local PrintPatient has allergy to gabapentin      Ranitidine HCl (ZANTAC PO) Take 75 mg by mouth nightly as needed , Historical      sildenafil (VIAGRA) 50 MG tablet Take 1 tablet (50 mg) by mouth daily as needed for erectile dysfunction, Disp-10 tablet, R-11, E-Prescribe      simvastatin (ZOCOR) 40 MG tablet Take 40 mg by mouth At Bedtime, Historical      zolpidem (AMBIEN) 10 MG tablet Take 1 tablet (10 mg) by mouth nightly as needed for sleep at bedtime., Disp-90 tablet, R-1, Local Print       !! - Potential duplicate medications found. Please discuss with provider.        Allergies   Allergies   Allergen Reactions     Codeine      inability to sleep after taking codeine     Gabapentin      Fever       Vicodin [Hydrocodone-Acetaminophen]      Perspiring, hyper, itchy     Data   Most Recent 3 CBC's:  Recent Labs   Lab Test  10/28/18   2044  04/05/18   1028  08/31/17   1443   WBC  6.2  4.9  3.9*   HGB  13.5  13.3  12.7*   MCV  85  85  86   PLT  181  168  170      Most Recent 3 BMP's:  Recent Labs   Lab Test  10/28/18   2044  04/05/18   1028  08/31/17    1443   NA  138  140  140   POTASSIUM  4.0  4.0  4.2   CHLORIDE  106  107  106   CO2  28  32  25   BUN  30  31*  22   CR  1.28*  1.11  0.98   ANIONGAP  4  1*  9   MOE  9.0  9.3  9.0   GLC  108*  90  92     Results for orders placed or performed during the hospital encounter of 10/28/18   CT Chest/Abdomen/Pelvis w Contrast    Narrative    CT CHEST/ABDOMEN/PELVIS WITH CONTRAST 10/28/2018 9:53 PM     HISTORY: Fall, left lower rib and LUQ pain concerning for spleen  injury.      TECHNIQUE: 100mL Isovue-370. Radiation dose for this scan was reduced  using automated exposure control, adjustment of the mA and/or kV  according to patient size, or iterative reconstruction technique.    COMPARISON: CT abdomen pelvis from 9/14/2011.     FINDINGS:   Chest CT: No mediastinal or hilar adenopathy. No evidence of aortic  aneurysm or dissection. Lungs are clear. No pleural fluid. No  pneumothorax. Small mildly displaced left seventh rib fracture.    Abdomen pelvis CT: The liver, gallbladder, spleen, and pancreas are  normal. No adrenal lesions. Kidneys are normal. No retroperitoneal  adenopathy or evidence of aortic aneurysm.    Scans through the pelvis demonstrate a normal-appearing bladder.    No evidence of diverticulitis. No evidence of appendicitis. No free  air or free fluid.      Impression    IMPRESSION:  1. Small mildly displaced left seventh rib fracture. No pneumothorax  or pleural fluid. Lungs are clear.  2. No evidence of splenic injury or other solid organ injury in the  abdomen or pelvis. Otherwise unremarkable.     SOLO HERNANDEZ MD

## 2018-10-31 NOTE — TELEPHONE ENCOUNTER
Inpatient discharge from [facility] on 10/29/2018 Closed Fracture Of One Rib Of Left Side, Initial Encounter    Emily Rodas/

## 2018-10-31 NOTE — TELEPHONE ENCOUNTER
ED / Discharge Outreach Protocol    Patient Contact    Attempt # No, not a pt here, see PCP listed  Grace Malone RN, BSN  Message handled by Nurse Triage.

## 2018-11-01 DIAGNOSIS — S22.32XA CLOSED FRACTURE OF ONE RIB OF LEFT SIDE, INITIAL ENCOUNTER: ICD-10-CM

## 2018-11-01 RX ORDER — TRAMADOL HYDROCHLORIDE 50 MG/1
100 TABLET ORAL EVERY 6 HOURS PRN
Qty: 25 TABLET | Refills: 0 | Status: SHIPPED | OUTPATIENT
Start: 2018-11-01 | End: 2020-01-02

## 2018-11-01 NOTE — TELEPHONE ENCOUNTER
Patent called and states that he is still having pain after falling and fracturing rib 10/28/18.  He was discharged on tramadol and only has a few pills left and is still having a lot of pain.  He would like a refill of the medication  Med and pharm loaded for covering provider  Luis Kiser, RN, BSN

## 2018-11-02 ENCOUNTER — TELEPHONE (OUTPATIENT)
Dept: OTHER | Facility: CLINIC | Age: 63
End: 2018-11-02

## 2018-11-02 NOTE — TELEPHONE ENCOUNTER
Patient called asking if refill for Tramadol has been completed. It was faxed yesterday @4230 to [facility] in [place] by Luis CALDERON.     Mary Kate CONNELLYN, RN

## 2018-11-09 ENCOUNTER — OFFICE VISIT (OUTPATIENT)
Dept: OTHER | Facility: CLINIC | Age: 63
End: 2018-11-09
Attending: INTERNAL MEDICINE
Payer: COMMERCIAL

## 2018-11-09 VITALS
DIASTOLIC BLOOD PRESSURE: 84 MMHG | BODY MASS INDEX: 30.98 KG/M2 | HEART RATE: 65 BPM | OXYGEN SATURATION: 97 % | WEIGHT: 209.8 LBS | SYSTOLIC BLOOD PRESSURE: 134 MMHG

## 2018-11-09 DIAGNOSIS — M79.662 PAIN OF LEFT LOWER LEG: Primary | ICD-10-CM

## 2018-11-09 PROCEDURE — 99214 OFFICE O/P EST MOD 30 MIN: CPT | Mod: ZP | Performed by: INTERNAL MEDICINE

## 2018-11-09 PROCEDURE — G0463 HOSPITAL OUTPT CLINIC VISIT: HCPCS

## 2018-11-09 NOTE — MR AVS SNAPSHOT
After Visit Summary   11/9/2018    Kashmir Yao    MRN: [id number]           Patient Information     Date Of Birth          1955        Visit Information        Provider Department      11/9/2018 10:40 AM Hakeem Cano MD [facility]      Today's Diagnoses     Pain of left lower leg    -  1       Follow-ups after your visit        Your next 10 appointments already scheduled     Nov 13, 2018 12:30 PM CST   MR LUMBAR SPINE W/O CONTRAST with [id number]   [facility] ([facility])    [address]  [facility] [address] [phone]           How do I prepare for my exam? (Food and drink instructions) **If you will be receiving sedation or general anesthesia, please see special notes below.**  How do I prepare for my exam? (Other instructions) Take your medicines as usual, unless your doctor tells you not to. Please remove any body piercings and hair extensions before you arrive. Follow your doctor s orders. If you do not, we may have to postpone your exam. You may or may not receive IV contrast for this exam pending the discretion of the Radiologist.  You do not need to do anything special to prepare. **If you will be receiving sedation or general anesthesia, please see special notes below.**  What should I wear:  The MRI machine uses a strong magnet. Please wear clothes without metal (snaps, zippers). A sweatsuit works well, or we may give you a hospital gown.  How long does the exam take: Most tests take 30 to 60 minutes.  HOWEVER, IF YOUR DOCTOR PRESCRIBES ANESTHESIA please plan on spending four to five hours in the recovery room.  What should I bring: Bring a list of your current medicines to your exam (including vitamins, minerals and over-the-counter drugs). Also bring the results of similar scans you may have had.  Do I need a : **If you will be  receiving sedation or general anesthesia, please see special notes below.**  What should I do after the exam? No Restrictions, You may resume normal activities.  What is this test: MRI (magnetic resonance imaging) uses a strong magnet and radio waves to look inside the body. An MRA (magnetic resonance angiogram) does the same thing, but it lets us look at your blood vessels. A computer turns the radio waves into pictures showing cross sections of the body, much like slices of bread. This helps us see any problems more clearly.  Who should I call with questions: Please call the Imaging Department at your exam site with any questions. Directions, parking instructions, and other information is available on our website, [email or web address].  How do I prepare if I m having sedation or anesthesia? **IMPORTANT** THE INSTRUCTIONS BELOW ARE ONLY FOR THOSE PATIENTS WHO HAVE BEEN TOLD THEY WILL RECEIVE SEDATION OR GENERAL ANESTHESIA DURING THEIR MRI PROCEDURE:  IF YOU WILL RECEIVE SEDATION (take medicine to help you relax during your exam): You must get the medicine from your doctor before you arrive. Bring the medicine to the exam. Do not take it at home. Arrive one hour early. Bring someone who can take you home after the test. Your medicine will make you sleepy. After the exam, you may not drive, take a bus or take a taxi by yourself. No eating 8 hours before your exam. You may have clear liquids up until 4 hours before your exam. (Clear liquids include water, clear tea, black coffee and fruit juice without pulp.)  IF YOU WILL RECEIVE ANESTHESIA (be asleep for your exam): Arrive 1 1/2 hours early. Bring someone who can take you home after the test. You may not drive, take a bus or take a taxi by yourself. No eating 8 hours before your exam. You may have clear liquids up until 4 hours before your exam. (Clear liquids include water, clear tea, black coffee and fruit juice without pulp.) You will spend four to five hours in  the recovery room.            Nov 26, 2018  2:10 PM CST   Return Visit with Hakeem Cano MD   [facility] ([facility])    [address]  [place] [address] [phone]              Future tests that were ordered for you today     Open Future Orders        Priority Expected Expires Ordered    MR Lumbar Spine w/o Contrast Routine 11/13/2018 11/13/2018 11/9/2018            Who to contact     If you have questions or need follow up information about today's clinic visit or your schedule please contact [facility] [place] directly at [phone].  Normal or non-critical lab and imaging results will be communicated to you by MyChart, letter or phone within 4 business days after the clinic has received the results. If you do not hear from us within 7 days, please contact the clinic through MyChart or phone. If you have a critical or abnormal lab result, we will notify you by phone as soon as possible.  Submit refill requests through [employer] or call your pharmacy and they will forward the refill request to us. Please allow 3 business days for your refill to be completed.          Additional Information About Your Visit        Care EveryWhere ID     This is your Care EveryWhere ID. This could be used by other organizations to access your [place] medical records  [id number]        Your Vitals Were     Pulse Pulse Oximetry BMI (Body Mass Index)             65 97% 30.98 kg/m2          Blood Pressure from Last 3 Encounters:   11/09/18 134/84   10/29/18 138/75   09/17/18 114/72    Weight from Last 3 Encounters:   11/09/18 209 lb 12.8 oz (95.2 kg)   10/29/18 208 lb (94.3 kg)   09/17/18 209 lb 9.6 oz (95.1 kg)               Primary Care Provider Office Phone # Fax #    Hakeem Cano [phone]       [address]  [place] [address]        Equal Access to Services     LAURA CAMPOS  AH: Buddy ahumadajeremyeduardo Jannet, waaxda luqadaha, qaybta kalaya correa, waxcherise brent mirlandetomas bradfordmarcoxiomara todd. So [facility] [phone].    ATENCIÓN: Si habla español, tiene a olivo disposición servicios gratuitos de asistencia lingüística. Llame al [phone].    We comply with applicable federal civil rights laws and [place] laws. We do not discriminate on the basis of race, color, national origin, age, disability, sex, sexual orientation, or gender identity.            Thank you!     Thank you for choosing [facility] [place]  for your care. Our goal is always to provide you with excellent care. Hearing back from our patients is one way we can continue to improve our services. Please take a few minutes to complete the written survey that you may receive in the mail after your visit with us. Thank you!             Your Updated Medication List - Protect others around you: Learn how to safely use, store and throw away your medicines at www.disposemymeds.org.          This list is accurate as of 11/9/18 11:29 AM.  Always use your most recent med list.                   Brand Name Dispense Instructions for use Diagnosis    acetaminophen 500 MG tablet    TYLENOL     Take 1,000 mg by mouth every 8 hours as needed for mild pain        aspirin 81 MG tablet      Take 81 mg by mouth daily        esomeprazole 40 MG CR capsule    nexIUM    180 capsule    TAKE 1 CAPSULE BY MOUTH TWICE DAILY    Gastroesophageal reflux disease with esophagitis       * lamoTRIgine 200 MG tablet    LaMICtal    90 tablet    Take 200 mg by mouth every morning    Seizure disorder (H)       * lamoTRIgine 100 MG tablet    LaMICtal     250 mg At Bedtime        metoprolol tartrate 50 MG tablet    LOPRESSOR    180 tablet    Take 1 tablet (50 mg) by mouth 2 times daily    Essential hypertension with goal blood pressure less than 130/80       naproxen 500 MG tablet    NAPROSYN     Take 1 tablet by mouth 2 times daily         oxyCODONE-acetaminophen 5-325 MG per tablet    PERCOCET    12 tablet    Take 1-2 tablets by mouth every 4 hours as needed for pain        pregabalin 25 MG capsule    LYRICA    540 capsule    Take 3 capsules (75 mg) by mouth 2 times daily    Convulsions, unspecified convulsion type (H)       sildenafil 50 MG tablet    VIAGRA    10 tablet    Take 1 tablet (50 mg) by mouth daily as needed for erectile dysfunction    Erectile dysfunction       simvastatin 40 MG tablet    ZOCOR     Take 40 mg by mouth At Bedtime        traMADol 50 MG tablet    ULTRAM    25 tablet    Take 2 tablets (100 mg) by mouth every 6 hours as needed for moderate pain    Closed fracture of one rib of left side, initial encounter       ZANTAC PO      Take 75 mg by mouth nightly as needed        zolpidem 10 MG tablet    AMBIEN    90 tablet    Take 1 tablet (10 mg) by mouth nightly as needed for sleep at bedtime.    Primary insomnia       * Notice:  This list has 2 medication(s) that are the same as other medications prescribed for you. Read the directions carefully, and ask your doctor or other care provider to review them with you.

## 2018-11-09 NOTE — NURSING NOTE
"Kashmir Yao is a 63 year old male who presents for:  Chief Complaint   Patient presents with     RECHECK     coordination issues-        Vitals:    Vitals:    11/09/18 1053   BP: 134/84   BP Location: Right arm   Patient Position: Chair   Cuff Size: Adult Large   Pulse: 65   SpO2: 97%   Weight: 209 lb 12.8 oz (95.2 kg)       BMI:  Estimated body mass index is 30.98 kg/(m^2) as calculated from the following:    Height as of 10/29/18: 5' 9\" (1.753 m).    Weight as of this encounter: 209 lb 12.8 oz (95.2 kg).    Pain Score:  Data Unavailable        Milka Chester MA      "

## 2018-11-09 NOTE — PROGRESS NOTES
Kashmir Yao is a 63 year old male who is presenting at the current time to discuss his sxs of gait instability with pain upon standing or walking limiting his ambualtory stability due to left knee pain which does not have an orthopedic etiology per the patient.      Review Of Systems  Skin: negative  Eyes: negative  Ears/Nose/Throat: negative  Respiratory: No shortness of breath, dyspnea on exertion, cough, or hemoptysis  Cardiovascular: negative  Gastrointestinal: negative  Genitourinary: negative  Musculoskeletal: negative  Neurologic: positive for gait instability without focal neurologic sxs  Psychiatric: negative  Hematologic/Lymphatic/Immunologic: negative  Endocrine: negative      PAST MEDICAL HISTORY:                  Past Medical History:   Diagnosis Date     Arthritis      CAD (coronary artery disease) 2010    a subtotally occluded obtuse marginal vessel which was stented with a drug-coated stent.  He had 50% to 60% LAD disease and 25% to 30% right coronary disease     Colonic polyps 2009    tubular adenomae with mildly dysplastic features     Disturbance of skin sensation      Encephalocele (H) 2009    left frontal     Epilepsy, partial (H) 2009    with secondary generalization     GERD (gastroesophageal reflux disease)      Heart attack (H)     5 years ago. 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     C NONSPECIFIC PROCEDURE      stint in heart     CYSTOSCOPY       ESOPHAGOSCOPY, GASTROSCOPY, DUODENOSCOPY (EGD), COMBINED  5/24/2012    Procedure:COMBINED ESOPHAGOSCOPY, GASTROSCOPY, DUODENOSCOPY (EGD); ESOPHAGOSCOPY, GASTROSCOPY, DUODENOSCOPY (EGD) ; Surgeon:GILLIAN JOHNSON; Location: GI     HC COLONOSCOPY THRU STOMA, DIAGNOSTIC  2009    tubular adenomae, recommended annual colonoscopy     KNEE SURGERY Left     repair of cartilage     SPINE SURGERY      repair of herniated disc lumbar     STENT         CURRENT MEDICATIONS:                  Current Outpatient Prescriptions   Medication Sig Dispense Refill     acetaminophen (TYLENOL) 500 MG tablet Take 1,000 mg by mouth every 8 hours as needed for mild pain       aspirin 81 MG tablet Take 81 mg by mouth daily       esomeprazole (NEXIUM) 40 MG CR capsule TAKE 1 CAPSULE BY MOUTH TWICE DAILY 180 capsule 2     lamoTRIgine (LAMICTAL) 100 MG tablet 250 mg At Bedtime   3     lamoTRIgine (LAMICTAL) 200 MG tablet Take 200 mg by mouth every morning  90 tablet 1     metoprolol tartrate (LOPRESSOR) 50 MG tablet Take 1 tablet (50 mg) by mouth 2 times daily 180 tablet 3     naproxen (NAPROSYN) 500 MG tablet Take 1 tablet by mouth 2 times daily       oxyCODONE-acetaminophen (PERCOCET) 5-325 MG per tablet Take 1-2 tablets by mouth every 4 hours as needed for pain 12 tablet 0     pregabalin (LYRICA) 25 MG capsule Take 3 capsules (75 mg) by mouth 2 times daily 540 capsule 1     Ranitidine HCl (ZANTAC PO) Take 75 mg by mouth nightly as needed        sildenafil (VIAGRA) 50 MG tablet Take 1 tablet (50 mg) by mouth daily as needed for erectile dysfunction 10 tablet 11     simvastatin (ZOCOR) 40 MG tablet Take 40 mg by mouth At Bedtime       traMADol (ULTRAM) 50 MG tablet Take 2 tablets (100 mg) by mouth every 6 hours as needed for moderate pain 25 tablet 0     zolpidem (AMBIEN) 10 MG tablet  Take 1 tablet (10 mg) by mouth nightly as needed for sleep at bedtime. 90 tablet 1       ALLERGIES:                  Allergies   Allergen Reactions     Codeine      inability to sleep after taking codeine     Gabapentin      Fever       Vicodin [Hydrocodone-Acetaminophen]      Perspiring, hyper, itchy       SOCIAL HISTORY:                  Social History     Social History     Marital status:      Spouse name: N/A     Number of children: 3     Years of education: N/A     Occupational History      - [employer]      Social History Main Topics     Smoking status: Former Smoker     Packs/day: 0.50     Years: 20.00     Quit date: 8/4/2003     Smokeless tobacco: Never Used     Alcohol use No     Drug use: No     Sexual activity: Yes     Other Topics Concern     Seat Belt Yes     Social History Narrative    SD - CHECKED AT LEAST TWICE YEARLY.    GUNS: NONE.       FAMILY HISTORY:                   Family History   Problem Relation Age of Onset     Cardiovascular Mother      CHF, HTN, VASC. INSUFF., DM     Family History Negative Father      Thyroid Disease Sister      HYPOTHYROID     Genitourinary Problems Brother      RENAL FAILURE AND H/O MVA WITH LE PARAPLEGIA     Cancer - colorectal No family hx of           A/P:    (M79.682) Pain of left lower leg  (primary encounter diagnosis)  Comment: he has had multiple ortho exams per the patient. I do not have those records. Per the patient, his prosthetic knee is correctly seated. Ortho has said, per the patient, that there is NOT an orthopedic issue ausing knee pain and weakness. I explained to the patient we should next check a lumbar MRI. If that is normal, I would defer further diagnostic studies to his neurologist  Plan: MR Lumbar Spine w/o Contrast

## 2018-11-13 ENCOUNTER — HOSPITAL ENCOUNTER (OUTPATIENT)
Dept: MRI IMAGING | Facility: CLINIC | Age: 63
Discharge: HOME | End: 2018-11-13
Attending: INTERNAL MEDICINE | Admitting: INTERNAL MEDICINE
Payer: COMMERCIAL

## 2018-11-13 DIAGNOSIS — M79.662 PAIN OF LEFT LOWER LEG: ICD-10-CM

## 2018-11-13 PROCEDURE — 25500064 ZZH RX 255 OP 636: Performed by: INTERNAL MEDICINE

## 2018-11-13 PROCEDURE — A9585 GADOBUTROL INJECTION: HCPCS | Performed by: INTERNAL MEDICINE

## 2018-11-13 PROCEDURE — 72158 MRI LUMBAR SPINE W/O & W/DYE: CPT

## 2018-11-13 RX ORDER — GADOBUTROL 604.72 MG/ML
10 INJECTION INTRAVENOUS ONCE
Status: COMPLETED | OUTPATIENT
Start: 2018-11-13 | End: 2018-11-13

## 2018-11-13 RX ADMIN — GADOBUTROL 9 ML: 604.72 INJECTION INTRAVENOUS at 14:39

## 2018-11-19 ENCOUNTER — TELEPHONE (OUTPATIENT)
Dept: OTHER | Facility: CLINIC | Age: 63
End: 2018-11-19

## 2018-11-19 NOTE — TELEPHONE ENCOUNTER
He has narrowings as delineated in the report. What can be done or not done about them will be discussed at his visit with me on 11-26.

## 2018-11-26 ENCOUNTER — OFFICE VISIT (OUTPATIENT)
Dept: OTHER | Facility: CLINIC | Age: 63
End: 2018-11-26
Attending: INTERNAL MEDICINE
Payer: COMMERCIAL

## 2018-11-26 VITALS
BODY MASS INDEX: 31.28 KG/M2 | DIASTOLIC BLOOD PRESSURE: 78 MMHG | OXYGEN SATURATION: 98 % | SYSTOLIC BLOOD PRESSURE: 123 MMHG | WEIGHT: 211.8 LBS | HEART RATE: 59 BPM

## 2018-11-26 DIAGNOSIS — M79.662 PAIN OF LEFT LOWER LEG: Primary | ICD-10-CM

## 2018-11-26 PROCEDURE — G0463 HOSPITAL OUTPT CLINIC VISIT: HCPCS

## 2018-11-26 PROCEDURE — 99213 OFFICE O/P EST LOW 20 MIN: CPT | Mod: ZP | Performed by: INTERNAL MEDICINE

## 2018-11-26 NOTE — PROGRESS NOTES
Kashmir Yao is a 63 year old male who is presenting at the current time to discuss his diagnosi(es) of Pain of left lower leg .      Review Of Systems  Skin: negative  Eyes: negative  Ears/Nose/Throat: negative  Respiratory: No shortness of breath, dyspnea on exertion, cough, or hemoptysis  Cardiovascular: negative  Gastrointestinal: negative  Genitourinary: negative  Musculoskeletal: positive for LLE pain which causes his leg to give out intermittently  Neurologic: negative  Psychiatric: negative  Hematologic/Lymphatic/Immunologic: negative  Endocrine: negative      PAST MEDICAL HISTORY:                  Past Medical History:   Diagnosis Date     Arthritis      CAD (coronary artery disease) 2010    a subtotally occluded obtuse marginal vessel which was stented with a drug-coated stent.  He had 50% to 60% LAD disease and 25% to 30% right coronary disease     Colonic polyps 2009    tubular adenomae with mildly dysplastic features     Disturbance of skin sensation      Encephalocele (H) 2009    left frontal     Epilepsy, partial (H) 2009    with secondary generalization     GERD (gastroesophageal reflux disease)      Heart attack (H)     5 years ago. 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     C NONSPECIFIC PROCEDURE       stint in heart     CYSTOSCOPY       ESOPHAGOSCOPY, GASTROSCOPY, DUODENOSCOPY (EGD), COMBINED  5/24/2012    Procedure:COMBINED ESOPHAGOSCOPY, GASTROSCOPY, DUODENOSCOPY (EGD); ESOPHAGOSCOPY, GASTROSCOPY, DUODENOSCOPY (EGD) ; Surgeon:GILLIAN JOHNSON; Location: [facility]     HC COLONOSCOPY THRU STOMA, DIAGNOSTIC  2009    tubular adenomae, recommended annual colonoscopy     KNEE SURGERY Left     repair of cartilage     SPINE SURGERY      repair of herniated disc lumbar     STENT         CURRENT MEDICATIONS:                  Current Outpatient Prescriptions   Medication Sig Dispense Refill     acetaminophen (TYLENOL) 500 MG tablet Take 1,000 mg by mouth every 8 hours as needed for mild pain       aspirin 81 MG tablet Take 81 mg by mouth daily       esomeprazole (NEXIUM) 40 MG CR capsule TAKE 1 CAPSULE BY MOUTH TWICE DAILY 180 capsule 2     lamoTRIgine (LAMICTAL) 100 MG tablet 250 mg At Bedtime   3     lamoTRIgine (LAMICTAL) 200 MG tablet Take 200 mg by mouth every morning  90 tablet 1     metoprolol tartrate (LOPRESSOR) 50 MG tablet Take 1 tablet (50 mg) by mouth 2 times daily 180 tablet 3     naproxen (NAPROSYN) 500 MG tablet Take 1 tablet by mouth 2 times daily       oxyCODONE-acetaminophen (PERCOCET) 5-325 MG per tablet Take 1-2 tablets by mouth every 4 hours as needed for pain 12 tablet 0     pregabalin (LYRICA) 25 MG capsule Take 3 capsules (75 mg) by mouth 2 times daily 540 capsule 1     Ranitidine HCl (ZANTAC PO) Take 75 mg by mouth nightly as needed        sildenafil (VIAGRA) 50 MG tablet Take 1 tablet (50 mg) by mouth daily as needed for erectile dysfunction 10 tablet 11     simvastatin (ZOCOR) 40 MG tablet Take 40 mg by mouth At Bedtime       traMADol (ULTRAM) 50 MG tablet Take 2 tablets (100 mg) by mouth every 6 hours as needed for moderate pain 25 tablet 0     zolpidem (AMBIEN) 10 MG tablet Take 1 tablet (10 mg) by mouth nightly as needed for sleep at bedtime. 90 tablet 1       ALLERGIES:                   Allergies   Allergen Reactions     Codeine      inability to sleep after taking codeine     Gabapentin      Fever       Vicodin [Hydrocodone-Acetaminophen]      Perspiring, hyper, itchy       SOCIAL HISTORY:                  Social History     Social History     Marital status:      Spouse name: N/A     Number of children: 3     Years of education: N/A     Occupational History      - [employer]      Social History Main Topics     Smoking status: Former Smoker     Packs/day: 0.50     Years: 20.00     Quit date: 8/4/2003     Smokeless tobacco: Never Used     Alcohol use No     Drug use: No     Sexual activity: Yes     Other Topics Concern     Seat Belt Yes     Social History Narrative    SD - CHECKED AT LEAST TWICE YEARLY.    GUNS: NONE.       FAMILY HISTORY:                   Family History   Problem Relation Age of Onset     Cardiovascular Mother      CHF, HTN, VASC. INSUFF., DM     Family History Negative Father      Thyroid Disease Sister      HYPOTHYROID     Genitourinary Problems Brother      RENAL FAILURE AND H/O MVA WITH LE PARAPLEGIA     Cancer - colorectal No family hx of               Physical exam Reveals:    O/P: WNL  HEENT: WNL  NECK: No JVD, thyromegaly, or lymphadenopathy  HEART: RRR, no murmurs, gallops, or rubs  LUNGS: CTA bilaterally without rales, wheezes, or rhonchi  GI: NABS, nondistended, nontender, soft  EXT:without cyanosis, clubbing, or edema  NEURO: nonfocal  : no flank tenderness      MR LUMBAR SPINE WITHOUT AND WITH CONTRAST 11/13/2018 3:03 PM     HISTORY: Patient has weakness in his left leg with knee pain, and has  a structurally normal knee per his orthopedic surgeon. Pain of left  lower leg.     TECHNIQUE: Sagittal and transverse T1 and T2, sagittal inversion  recovery, and post gadolinium enhanced sagittal and transverse  T1-weighted pulse sequences.     DOSE: 9 mL Gadavist     FINDINGS: Correlation with 10/28/2018 CT demonstrates 5 nonrib-bearing  or  lumbar-type vertebra with a transitional S1 segment and rudimentary  disc at the S1-2 level. Advanced apophyseal joint degenerative  arthrosis is noted in the mid and lower lumbar spine without  degenerative anterior spondylolisthesis. There is multilevel  degenerative disc disease, most advanced at L4-5 and L2-3 where there  is complete loss of disc height and degenerative retrolisthesis.  Discogenic endplate reactive marrow edema is noted laterally on the  right at L4-5. Vertebral body heights are normal. The conus medullaris  is unremarkable in appearance on the sagittal images.       L1-2: Minimal if any annular bulge. No central or foraminal stenosis.     L2-3: Degenerative retrolisthesis and mild to moderate bilateral  foraminal stenosis. No disc herniation or central stenosis.     L3-4: Lateral annular bulging and mild bilateral foraminal stenosis.  Although there is prominence of the dorsal epidural fat, there is only  borderline narrowing of the central canal.     L4-5: There has been a posterior decompression. No central stenosis.  Loss of height, lateral annular bulging, and endplate spurring results  in severe bilateral L4 foraminal stenosis.     L5-S1: There has been a prior posterior decompression. There is a 0.5  cm synovial cyst along the anteromedial aspect of the left apophyseal  joint. However, there is no apparent central stenosis or S1 nerve root  compression. Moderate to severe foraminal stenosis is noted.     S1-2: No disc bulge or herniation. No central or foraminal stenosis.         IMPRESSION:    1. Lumbar spine segmentation anomaly. There are 5 nonrib-bearing or  lumbar-type vertebra with a transitional S1 segment and disc at the  S1-2 segment.    2. Multilevel degenerative disc disease, most advanced at L4-5 and  L2-3 where there is complete loss of disc height and degenerative  retrolisthesis. Discogenic endplate reactive marrow edema is also  noted laterally on the right at L4-5.    3.  "Posterior decompressions at L4-5 and L5-S1. L5-S1 left 0.5 cm  synovial cyst is noted without apparent neural impingement. No central  stenosis at these levels.    4. Multilevel foraminal stenosis, greatest and severe bilaterally at  L4-5.     YASMIN KAUFFMAN MD         A/P:    (B62.100) Pain of left lower leg  (primary encounter diagnosis)  Comment: since having last been seen, the patient reports no improvement in his ability to walk. He states he \"has been seen by the most famous orthopedic group in [place]\", but does not remember their name, or the healthcare system they are in. He does report having been seen by a Dr. Leonardo about this, but can not recall his first name. I have tried to find records in care everywhere but have been unsuccessful. He states Dr. Leonardo has told him their is no knee or hip ortho issue, and unlikely there is a spinal issue casing his sxs.   Plan: Obtain primary source records. Pt called his wife to get above treating orthopedic group name. I will obtain their records, reviewing them in toto prior to referring to a spinal surgeon in our system (if appropriate to do so).    Greater than one half the fifteen minutes total spent on the pt's visit were spent providing education and counselling to the patient regarding the above matters.        "

## 2018-11-26 NOTE — NURSING NOTE
"Kashmir Yao is a 63 year old male who presents for:  Chief Complaint   Patient presents with     RECHECK     follow up to MRI        Vitals:    Vitals:    11/26/18 1409   BP: 123/78   BP Location: Left arm   Patient Position: Chair   Cuff Size: Adult Regular   Pulse: 59   SpO2: 98%   Weight: 211 lb 12.8 oz (96.1 kg)       BMI:  Estimated body mass index is 31.28 kg/(m^2) as calculated from the following:    Height as of 10/29/18: 5' 9\" (1.753 m).    Weight as of this encounter: 211 lb 12.8 oz (96.1 kg).    Pain Score:  Data Unavailable        Milka Chester MA      "

## 2018-11-26 NOTE — MR AVS SNAPSHOT
After Visit Summary   11/26/2018    Kashmir Yao    MRN: [id number]           Patient Information     Date Of Birth          1955        Visit Information        Provider Department      11/26/2018 2:10 PM Hakeem Cano MD [facility]      Today's Diagnoses     Pain of left lower leg    -  1       Follow-ups after your visit        Who to contact     If you have questions or need follow up information about today's clinic visit or your schedule please contact [facility] directly at [phone].  Normal or non-critical lab and imaging results will be communicated to you by MyChart, letter or phone within 4 business days after the clinic has received the results. If you do not hear from us within 7 days, please contact the clinic through MyChart or phone. If you have a critical or abnormal lab result, we will notify you by phone as soon as possible.  Submit refill requests through [employer] or call your pharmacy and they will forward the refill request to us. Please allow 3 business days for your refill to be completed.          Additional Information About Your Visit        Care EveryWhere ID     This is your Care EveryWhere ID. This could be used by other organizations to access your [place] medical records  [id number]        Your Vitals Were     Pulse Pulse Oximetry BMI (Body Mass Index)             59 98% 31.28 kg/m2          Blood Pressure from Last 3 Encounters:   11/26/18 123/78   11/09/18 134/84   10/29/18 138/75    Weight from Last 3 Encounters:   11/26/18 211 lb 12.8 oz (96.1 kg)   11/09/18 209 lb 12.8 oz (95.2 kg)   10/29/18 208 lb (94.3 kg)              Today, you had the following     No orders found for display       Primary Care Provider Office Phone # Fax #    Hakeem Cano [phone] [address]  [place] [address]        Equal Access  to Services     LAURA CAMPOS : Buddy [employer], wamax salinas, qabhakticrispin martinezmicknorma sims. So [facility] [phone].    ATENCIÓN: Si habla espluca, tiene a olivo disposición servicios gratuitos de asistencia lingüística. Llame al [phone].    We comply with applicable federal civil rights laws and [place] laws. We do not discriminate on the basis of race, color, national origin, age, disability, sex, sexual orientation, or gender identity.            Thank you!     Thank you for choosing [facility] [place]  for your care. Our goal is always to provide you with excellent care. Hearing back from our patients is one way we can continue to improve our services. Please take a few minutes to complete the written survey that you may receive in the mail after your visit with us. Thank you!             Your Updated Medication List - Protect others around you: Learn how to safely use, store and throw away your medicines at www.disposemymeds.org.          This list is accurate as of 11/26/18  3:01 PM.  Always use your most recent med list.                   Brand Name Dispense Instructions for use Diagnosis    acetaminophen 500 MG tablet    TYLENOL     Take 1,000 mg by mouth every 8 hours as needed for mild pain        aspirin 81 MG tablet    ASA     Take 81 mg by mouth daily        esomeprazole 40 MG DR capsule    nexIUM    180 capsule    TAKE 1 CAPSULE BY MOUTH TWICE DAILY    Gastroesophageal reflux disease with esophagitis       * lamoTRIgine 200 MG tablet    LaMICtal    90 tablet    Take 200 mg by mouth every morning    Seizure disorder (H)       * lamoTRIgine 100 MG tablet    LaMICtal     250 mg At Bedtime        metoprolol tartrate 50 MG tablet    LOPRESSOR    180 tablet    Take 1 tablet (50 mg) by mouth 2 times daily    Essential hypertension with goal blood pressure less than 130/80       naproxen 500 MG tablet    NAPROSYN     Take 1 tablet by  mouth 2 times daily        oxyCODONE-acetaminophen 5-325 MG tablet    PERCOCET    12 tablet    Take 1-2 tablets by mouth every 4 hours as needed for pain        pregabalin 25 MG capsule    LYRICA    540 capsule    Take 3 capsules (75 mg) by mouth 2 times daily    Convulsions, unspecified convulsion type (H)       sildenafil 50 MG tablet    VIAGRA    10 tablet    Take 1 tablet (50 mg) by mouth daily as needed for erectile dysfunction    Erectile dysfunction       simvastatin 40 MG tablet    ZOCOR     Take 40 mg by mouth At Bedtime        traMADol 50 MG tablet    ULTRAM    25 tablet    Take 2 tablets (100 mg) by mouth every 6 hours as needed for moderate pain    Closed fracture of one rib of left side, initial encounter       ZANTAC PO      Take 75 mg by mouth nightly as needed        zolpidem 10 MG tablet    AMBIEN    90 tablet    Take 1 tablet (10 mg) by mouth nightly as needed for sleep at bedtime.    Primary insomnia       * Notice:  This list has 2 medication(s) that are the same as other medications prescribed for you. Read the directions carefully, and ask your doctor or other care provider to review them with you.

## 2018-12-05 ENCOUNTER — TELEPHONE (OUTPATIENT)
Dept: OTHER | Facility: CLINIC | Age: 63
End: 2018-12-05

## 2018-12-05 DIAGNOSIS — R26.9 GAIT DIFFICULTY: Primary | ICD-10-CM

## 2018-12-05 NOTE — TELEPHONE ENCOUNTER
Patient called and states that his neurologist asked him to get an MRI of his head for further information and then have the results sent via disc to his neurologist to review.  Please see note in EPIC  MRI loaded for PCP   Luis Kiser RN, BSN

## 2019-01-03 ENCOUNTER — TELEPHONE (OUTPATIENT)
Dept: OTHER | Facility: CLINIC | Age: 64
End: 2019-01-03

## 2019-01-03 NOTE — TELEPHONE ENCOUNTER
Patient called and insists that he have MRI of his brain ordered by PCP.  He states that he spoke with [facility] MD and they will not schedule out side of [place] system to complete the MRI.  He is insistent that PCP order exam.  Please advise  Luis Kiser RN, BSN

## 2019-01-03 NOTE — TELEPHONE ENCOUNTER
Patient advised to have requesting provider fax order to nurse so that he can be scheduled for requested MRI  Patient states understanding  Luis Kiser RN, BSN

## 2019-01-03 NOTE — TELEPHONE ENCOUNTER
I will not order this as I am not treating him for the problem the [facility] MDs are treating him for. He should have it done within the [facility] system, or contact them to insist of them that they order it under their name in our system.

## 2019-01-07 DIAGNOSIS — F51.01 PRIMARY INSOMNIA: ICD-10-CM

## 2019-01-07 RX ORDER — ZOLPIDEM TARTRATE 10 MG/1
10 TABLET ORAL
Qty: 90 TABLET | Refills: 1 | Status: SHIPPED | OUTPATIENT
Start: 2019-01-07 | End: 2020-04-03

## 2019-02-13 ENCOUNTER — ANCILLARY PROCEDURE (OUTPATIENT)
Dept: GENERAL RADIOLOGY | Facility: CLINIC | Age: 64
End: 2019-02-13
Attending: FAMILY MEDICINE
Payer: COMMERCIAL

## 2019-02-13 ENCOUNTER — TRANSFERRED RECORDS (OUTPATIENT)
Dept: HEALTH INFORMATION MANAGEMENT | Facility: CLINIC | Age: 64
End: 2019-02-13

## 2019-02-13 ENCOUNTER — HOSPITAL ENCOUNTER (EMERGENCY)
Facility: CLINIC | Age: 64
Discharge: HOME | End: 2019-02-13
Attending: EMERGENCY MEDICINE | Admitting: EMERGENCY MEDICINE
Payer: COMMERCIAL

## 2019-02-13 ENCOUNTER — OFFICE VISIT (OUTPATIENT)
Dept: ORTHOPEDICS | Facility: CLINIC | Age: 64
End: 2019-02-13
Payer: COMMERCIAL

## 2019-02-13 VITALS
SYSTOLIC BLOOD PRESSURE: 118 MMHG | OXYGEN SATURATION: 97 % | HEIGHT: 69 IN | RESPIRATION RATE: 16 BRPM | DIASTOLIC BLOOD PRESSURE: 70 MMHG | BODY MASS INDEX: 31.84 KG/M2 | TEMPERATURE: 97.9 F | WEIGHT: 215 LBS | HEART RATE: 77 BPM

## 2019-02-13 VITALS
WEIGHT: 211 LBS | HEIGHT: 69 IN | BODY MASS INDEX: 31.25 KG/M2 | SYSTOLIC BLOOD PRESSURE: 132 MMHG | DIASTOLIC BLOOD PRESSURE: 82 MMHG

## 2019-02-13 DIAGNOSIS — G51.0 BELL'S PALSY: ICD-10-CM

## 2019-02-13 DIAGNOSIS — M18.0 OSTEOARTHRITIS OF CARPOMETACARPAL (CMC) JOINT OF BOTH THUMBS: Primary | ICD-10-CM

## 2019-02-13 DIAGNOSIS — M79.642 BILATERAL HAND PAIN: ICD-10-CM

## 2019-02-13 DIAGNOSIS — M79.641 BILATERAL HAND PAIN: ICD-10-CM

## 2019-02-13 DIAGNOSIS — M19.042 LOCALIZED PRIMARY OSTEOARTHRITIS OF LEFT HAND: ICD-10-CM

## 2019-02-13 DIAGNOSIS — M19.041 LOCALIZED OSTEOARTHRITIS OF RIGHT HAND: ICD-10-CM

## 2019-02-13 DIAGNOSIS — M79.642 LEFT HAND PAIN: ICD-10-CM

## 2019-02-13 DIAGNOSIS — M65.342 TRIGGER RING FINGER OF LEFT HAND: ICD-10-CM

## 2019-02-13 LAB — GLUCOSE BLDC GLUCOMTR-MCNC: 121 MG/DL (ref 70–99)

## 2019-02-13 PROCEDURE — 00000146 ZZHCL STATISTIC GLUCOSE BY METER IP

## 2019-02-13 PROCEDURE — 73130 X-RAY EXAM OF HAND: CPT | Mod: LT | Performed by: FAMILY MEDICINE

## 2019-02-13 PROCEDURE — 99283 EMERGENCY DEPT VISIT LOW MDM: CPT

## 2019-02-13 PROCEDURE — 99203 OFFICE O/P NEW LOW 30 MIN: CPT | Performed by: FAMILY MEDICINE

## 2019-02-13 PROCEDURE — 25000125 ZZHC RX 250: Performed by: EMERGENCY MEDICINE

## 2019-02-13 RX ORDER — PREDNISONE 20 MG/1
60 TABLET ORAL ONCE
Status: COMPLETED | OUTPATIENT
Start: 2019-02-13 | End: 2019-02-13

## 2019-02-13 RX ORDER — PREDNISONE 20 MG/1
TABLET ORAL
Qty: 18 TABLET | Refills: 0 | Status: SHIPPED | OUTPATIENT
Start: 2019-02-13 | End: 2019-02-19

## 2019-02-13 RX ADMIN — PREDNISONE 60 MG: 20 TABLET ORAL at 21:49

## 2019-02-13 ASSESSMENT — ENCOUNTER SYMPTOMS
NUMBNESS: 0
FACIAL ASYMMETRY: 1
VOICE CHANGE: 0
TROUBLE SWALLOWING: 0
WEAKNESS: 0
SPEECH DIFFICULTY: 0

## 2019-02-13 ASSESSMENT — MIFFLIN-ST. JEOR
SCORE: 1755.61
SCORE: 1737.47

## 2019-02-13 NOTE — LETTER
2/13/2019         RE: Kashmir Yao  [address] [address] [address]        Dear Colleague,    Thank you for referring your patient, Kashmir Yao, to the [facility]. Please see a copy of my visit note below.    ASSESSMENT & PLAN  Patient Instructions     1. Osteoarthritis of carpometacarpal (CMC) joint of both thumbs    2. Bilateral hand pain    3. Left hand pain    4. Trigger ring finger of left hand    5. Localized primary osteoarthritis of left hand    6. Localized osteoarthritis of right hand      -Patient has bilateral hand wrist pain due to osteoarthritis of multiple joints.  -Patient will start formal physical therapy and home exercise program.  -Patient should avoid oral anti-inflammatories if at all possible.  Patient may take 2 tablets of Tylenol 500 mg 2-3 times a day as needed for pain.  Patient may ice the hands and wrists as needed.  Patient may continue with cockup wrist splints as needed.  -Patient will follow-up in 6 weeks for reevaluation and progression of activity.  -If the ring finger and left hand catches more frequently we can consider a trigger finger injection.  -Call direct clinic number [[phone]] at any time with questions or concerns.    Albert Yeo MD [facility] and Sports Medicine  [facility] [place]          -----    SUBJECTIVE  Kashmir Yao is a/an 63 year old Right handed male who is seen as a self referral for evaluation of bilateral hand pain - left hand worse than right hand. The patient is seen by themselves.    Onset: 1 years(s) ago. Patient describes pain in bilateral hands occurred after multiple falls due to poor balance.  Location of Pain: bilateral radial aspect of hands/wrists and bilateral thumbs  Rating of Pain at worst: 9/10  Rating of Pain Currently: 0/10  Worsened by: pushing up from a chair, use of the hands  Better with: rest/activity avoidance, splints  Treatments tried: rest/activity  avoidance, ice and casting/splinting/bracing  Associated symptoms: no distal numbness or tingling; denies swelling or warmth  Orthopedic history: NO  Relevant surgical history: NO  Social history: social history: retired    Past Medical History:   Diagnosis Date     Arthritis      CAD (coronary artery disease) 2010    a subtotally occluded obtuse marginal vessel which was stented with a drug-coated stent.  He had 50% to 60% LAD disease and 25% to 30% right coronary disease     Colonic polyps 2009    tubular adenomae with mildly dysplastic features     Disturbance of skin sensation      Encephalocele (H) 2009    left frontal     Epilepsy, partial (H) 2009    with secondary generalization     GERD (gastroesophageal reflux disease)      Heart attack (H)     5 years ago. 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   Tobacco Use     Smoking status: Former Smoker     Packs/day: 0.50     Years: 20.00     Pack years: 10.00     Last attempt to quit: 8/4/2003     Years since quitting: 15.5     Smokeless tobacco: Never Used  "  Substance and Sexual Activity     Alcohol use: No     Alcohol/week: 0.0 oz     Drug use: No     Sexual activity: Yes   Other Topics Concern      Service Not Asked     Blood Transfusions Not Asked     Caffeine Concern Not Asked     Occupational Exposure Not Asked     Hobby Hazards Not Asked     Sleep Concern Not Asked     Stress Concern Not Asked     Weight Concern Not Asked     Special Diet Not Asked     Back Care Not Asked     Exercise Not Asked     Bike Helmet Not Asked     Seat Belt Yes     Self-Exams Not Asked     Parent/sibling w/ CABG, MI or angioplasty before 65F 55M? Not Asked   Social History Narrative    SD - CHECKED AT LEAST TWICE YEARLY.    GUNS: NONE.         Patient's past medical, surgical, social, and family histories were reviewed today and no changes are noted.    REVIEW OF SYSTEMS:  10 point ROS is negative other than symptoms noted above in HPI, Past Medical History or as stated below  Constitutional: NEGATIVE for fever, chills, change in weight  Skin: NEGATIVE for worrisome rashes, moles or lesions  GI/: NEGATIVE for bowel or bladder changes  Neuro: NEGATIVE for weakness, dizziness or paresthesias    OBJECTIVE:  /82   Ht 1.753 m (5' 9\")   Wt 95.7 kg (211 lb)   BMI 31.16 kg/m      General: healthy, alert and in no distress  HEENT: no scleral icterus or conjunctival erythema  Skin: no suspicious lesions or rash. No jaundice.  CV: regular rhythm by palpation  Resp: normal respiratory effort without conversational dyspnea   Psych: normal mood and affect  Gait: normal steady gait with appropriate coordination and balance  Neuro: normal light touch sensory exam of the bilateral hands.    MSK:  BILATERAL HAND  Inspection:    No swelling or obvious deformity or asymmetry  Palpation:   Carpals: normal   Metacarpals: normal   Thumb: MCP joint, CMC   Fingers: mild triggering, 4th digit left hand  Range of Motion:    Full active flexion and extension at MCP, PIP, and DIP joints; normal " finger cascade without malrotation.  Wrist pronation, supination, and ulnar/radial deviation normal.  Strength:    5/5 all directions  Special Tests:    Positive: CMC grind, mild palpable triggering of 4th digit left hand    Negative: UCL laxity, Tinel's, Phalen's, Finkelstein's, flexor digitorum superficialis testing, flexor digitorum profundus testing    Independent visualization of the below image:  Recent Results (from the past 24 hour(s))   XR Hand Left G/E 3 Views    Narrative    No acute fracture, dislocation.  Mild first CMC, MCP and IP joint space   narrowing and osteophytes.     Right hand x-rays taken at [facility] on 11/1/2017 reveals no acute fracture, dislocation.  At first CMC MCP and IP joint space narrowing, mild osteophytes and degenerative changes seen.        Albert Yeo MD [facility]      Again, thank you for allowing me to participate in the care of your patient.        Sincerely,        Albert Yeo, MD

## 2019-02-13 NOTE — PATIENT INSTRUCTIONS
1. Osteoarthritis of carpometacarpal (CMC) joint of both thumbs    2. Bilateral hand pain    3. Left hand pain    4. Trigger ring finger of left hand    5. Localized primary osteoarthritis of left hand    6. Localized osteoarthritis of right hand      -Patient has bilateral hand wrist pain due to osteoarthritis of multiple joints.  -Patient will start formal physical therapy and home exercise program.  -Patient should avoid oral anti-inflammatories if at all possible.  Patient may take 2 tablets of Tylenol 500 mg 2-3 times a day as needed for pain.  Patient may ice the hands and wrists as needed.  Patient may continue with cockup wrist splints as needed.  -Patient will follow-up in 6 weeks for reevaluation and progression of activity.  -If the ring finger and left hand catches more frequently we can consider a trigger finger injection.  -Call direct clinic number [[phone]] at any time with questions or concerns.    Albert Yeo MD [facility]  [facility]

## 2019-02-13 NOTE — PROGRESS NOTES
ASSESSMENT & PLAN  Patient Instructions     1. Osteoarthritis of carpometacarpal (CMC) joint of both thumbs    2. Bilateral hand pain    3. Left hand pain    4. Trigger ring finger of left hand    5. Localized primary osteoarthritis of left hand    6. Localized osteoarthritis of right hand      -Patient has bilateral hand wrist pain due to osteoarthritis of multiple joints.  -Patient will start formal physical therapy and home exercise program.  -Patient should avoid oral anti-inflammatories if at all possible.  Patient may take 2 tablets of Tylenol 500 mg 2-3 times a day as needed for pain.  Patient may ice the hands and wrists as needed.  Patient may continue with cockup wrist splints as needed.  -Patient will follow-up in 6 weeks for reevaluation and progression of activity.  -If the ring finger and left hand catches more frequently we can consider a trigger finger injection.  -Call direct clinic number [[phone]] at any time with questions or concerns.    Albert Yeo MD [facility]  [facility]          -----    SUBJECTIVE  Zarinaponce Yao is a/an 63 year old Right handed male who is seen as a self referral for evaluation of bilateral hand pain - left hand worse than right hand. The patient is seen by themselves.    Onset: 1 years(s) ago. Patient describes pain in bilateral hands occurred after multiple falls due to poor balance.  Location of Pain: bilateral radial aspect of hands/wrists and bilateral thumbs  Rating of Pain at worst: 9/10  Rating of Pain Currently: 0/10  Worsened by: pushing up from a chair, use of the hands  Better with: rest/activity avoidance, splints  Treatments tried: rest/activity avoidance, ice and casting/splinting/bracing  Associated symptoms: no distal numbness or tingling; denies swelling or warmth  Orthopedic history: NO  Relevant surgical history: NO  Social history: social history: retired    Past Medical History:   Diagnosis  Date     Arthritis      CAD (coronary artery disease) 2010    a subtotally occluded obtuse marginal vessel which was stented with a drug-coated stent.  He had 50% to 60% LAD disease and 25% to 30% right coronary disease     Colonic polyps 2009    tubular adenomae with mildly dysplastic features     Disturbance of skin sensation      Encephalocele (H) 2009    left frontal     Epilepsy, partial (H) 2009    with secondary generalization     GERD (gastroesophageal reflux disease)      Heart attack (H)     5 years ago. 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   Tobacco Use     Smoking status: Former Smoker     Packs/day: 0.50     Years: 20.00     Pack years: 10.00     Last attempt to quit: 8/4/2003     Years since quitting: 15.5     Smokeless tobacco: Never Used   Substance and Sexual Activity     Alcohol use: No     Alcohol/week: 0.0 oz     Drug use: No     Sexual activity: Yes   Other Topics Concern      Service Not Asked     Blood Transfusions Not Asked     Caffeine Concern Not Asked     Occupational Exposure  "Not Asked     Hobby Hazards Not Asked     Sleep Concern Not Asked     Stress Concern Not Asked     Weight Concern Not Asked     Special Diet Not Asked     Back Care Not Asked     Exercise Not Asked     Bike Helmet Not Asked     Seat Belt Yes     Self-Exams Not Asked     Parent/sibling w/ CABG, MI or angioplasty before 65F 55M? Not Asked   Social History Narrative    SD - CHECKED AT LEAST TWICE YEARLY.    GUNS: NONE.         Patient's past medical, surgical, social, and family histories were reviewed today and no changes are noted.    REVIEW OF SYSTEMS:  10 point ROS is negative other than symptoms noted above in HPI, Past Medical History or as stated below  Constitutional: NEGATIVE for fever, chills, change in weight  Skin: NEGATIVE for worrisome rashes, moles or lesions  GI/: NEGATIVE for bowel or bladder changes  Neuro: NEGATIVE for weakness, dizziness or paresthesias    OBJECTIVE:  /82   Ht 1.753 m (5' 9\")   Wt 95.7 kg (211 lb)   BMI 31.16 kg/m     General: healthy, alert and in no distress  HEENT: no scleral icterus or conjunctival erythema  Skin: no suspicious lesions or rash. No jaundice.  CV: regular rhythm by palpation  Resp: normal respiratory effort without conversational dyspnea   Psych: normal mood and affect  Gait: normal steady gait with appropriate coordination and balance  Neuro: normal light touch sensory exam of the bilateral hands.    MSK:  BILATERAL HAND  Inspection:    No swelling or obvious deformity or asymmetry  Palpation:   Carpals: normal   Metacarpals: normal   Thumb: MCP joint, CMC   Fingers: mild triggering, 4th digit left hand  Range of Motion:    Full active flexion and extension at MCP, PIP, and DIP joints; normal finger cascade without malrotation.  Wrist pronation, supination, and ulnar/radial deviation normal.  Strength:    5/5 all directions  Special Tests:    Positive: CMC grind, mild palpable triggering of 4th digit left hand    Negative: UCL laxity, Tinel's, " Phalen's, Finkelstein's, flexor digitorum superficialis testing, flexor digitorum profundus testing    Independent visualization of the below image:  Recent Results (from the past 24 hour(s))   XR Hand Left G/E 3 Views    Narrative    No acute fracture, dislocation.  Mild first CMC, MCP and IP joint space   narrowing and osteophytes.     Right hand x-rays taken at [facility] on 11/1/2017 reveals no acute fracture, dislocation.  At first CMC MCP and IP joint space narrowing, mild osteophytes and degenerative changes seen.        Albert Yeo MD [facility]

## 2019-02-13 NOTE — ED AVS SNAPSHOT
Emergency Department  [address] [place] [id number]  Phone:  [phone]  Fax:  [phone]                                    Kashmir Yao   MRN: [id number]    Department:   Emergency Department   Date of Visit:  2/13/2019           After Visit Summary Signature Page    I have received my discharge instructions, and my questions have been answered. I have discussed any challenges I see with this plan with the nurse or doctor.    ..........................................................................................................................................  Patient/Patient Representative Signature      ..........................................................................................................................................  Patient Representative Print Name and Relationship to Patient    ..................................................               ................................................  Date                                   Time    ..........................................................................................................................................  Reviewed by Signature/Title    ...................................................              ..............................................  Date                                               Time          22EPIC Rev 08/18

## 2019-02-14 ENCOUNTER — ANCILLARY PROCEDURE (OUTPATIENT)
Dept: GENERAL RADIOLOGY | Facility: CLINIC | Age: 64
End: 2019-02-14
Attending: ORTHOPAEDIC SURGERY
Payer: COMMERCIAL

## 2019-02-14 ENCOUNTER — OFFICE VISIT (OUTPATIENT)
Dept: ORTHOPEDICS | Facility: CLINIC | Age: 64
End: 2019-02-14
Payer: COMMERCIAL

## 2019-02-14 VITALS
WEIGHT: 215 LBS | BODY MASS INDEX: 31.84 KG/M2 | HEIGHT: 69 IN | SYSTOLIC BLOOD PRESSURE: 118 MMHG | DIASTOLIC BLOOD PRESSURE: 88 MMHG

## 2019-02-14 DIAGNOSIS — G89.29 CHRONIC PAIN OF LEFT KNEE: ICD-10-CM

## 2019-02-14 DIAGNOSIS — M25.562 CHRONIC PAIN OF LEFT KNEE: ICD-10-CM

## 2019-02-14 DIAGNOSIS — Z96.652 HISTORY OF TOTAL KNEE ARTHROPLASTY, LEFT: ICD-10-CM

## 2019-02-14 DIAGNOSIS — M25.562 CHRONIC PAIN OF LEFT KNEE: Primary | ICD-10-CM

## 2019-02-14 DIAGNOSIS — R26.81 UNSTEADY GAIT: ICD-10-CM

## 2019-02-14 DIAGNOSIS — G89.29 CHRONIC PAIN OF LEFT KNEE: Primary | ICD-10-CM

## 2019-02-14 PROCEDURE — 73562 X-RAY EXAM OF KNEE 3: CPT | Mod: LT | Performed by: ORTHOPAEDIC SURGERY

## 2019-02-14 PROCEDURE — 99203 OFFICE O/P NEW LOW 30 MIN: CPT | Performed by: ORTHOPAEDIC SURGERY

## 2019-02-14 ASSESSMENT — MIFFLIN-ST. JEOR: SCORE: 1755.61

## 2019-02-14 NOTE — PROGRESS NOTES
HISTORY OF PRESENT ILLNESS:    Kashmir Yao is a 63 year old male who is seen as self referral for left knee pain.     Present symptoms: pain and weakness in left knee after total knee arthroplasty. Location of Pain: left knee anterior , nonradiating. Duration of Pain: 3 year(s). Rating of Pain at worst: 6/10. Rating of Pain Currently: 3/10. Pain is better with: nothing  Pain is worse with: stair climbing and walking.  Associated symptoms: weakness of lower leg, knee and ankle.     Treatments tried to this point: physical therapy.  Orthopedic PMH: left total knee arthroplasty, [facility], Dr Leonardo March 2016,  X-rays completed [facility] May 2018 in [facility].    Past Medical History:   Diagnosis Date     Arthritis      CAD (coronary artery disease) 2010    a subtotally occluded obtuse marginal vessel which was stented with a drug-coated stent.  He had 50% to 60% LAD disease and 25% to 30% right coronary disease     Colonic polyps 2009    tubular adenomae with mildly dysplastic features     Disturbance of skin sensation      Encephalocele (H) 2009    left frontal     Epilepsy, partial (H) 2009    with secondary generalization     GERD (gastroesophageal reflux disease)      Heart attack (H)     5 years ago. 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     C NONSPECIFIC PROCEDURE       stint in heart     CYSTOSCOPY       ESOPHAGOSCOPY, GASTROSCOPY, DUODENOSCOPY (EGD), COMBINED  5/24/2012    Procedure:COMBINED ESOPHAGOSCOPY, GASTROSCOPY, DUODENOSCOPY (EGD); ESOPHAGOSCOPY, GASTROSCOPY, DUODENOSCOPY (EGD) ; Surgeon:GILLIAN JOHNSON; Location: GI     HC COLONOSCOPY THRU STOMA, DIAGNOSTIC  2009    tubular adenomae, recommended annual colonoscopy     KNEE SURGERY Left     repair of cartilage     SPINE SURGERY      repair of herniated disc lumbar     STENT         Family History   Problem Relation Age of Onset     Cardiovascular Mother         CHF, HTN, VASC. INSUFF., DM     Family History Negative Father      Thyroid Disease Sister         HYPOTHYROID     Genitourinary Problems Brother         RENAL FAILURE AND H/O MVA WITH LE PARAPLEGIA     Cancer - colorectal No family hx of        Social History     Socioeconomic History     Marital status:      Spouse name: Not on file     Number of children: 3     Years of education: Not on file     Highest education level: Not on file   Social Needs     Financial resource strain: Not on file     Food insecurity - worry: Not on file     Food insecurity - inability: Not on file     Transportation needs - medical: Not on file     Transportation needs - non-medical: Not on file   Occupational History     Occupation:  - [employer]   Tobacco Use     Smoking status: Former Smoker     Packs/day: 0.50     Years: 20.00     Pack years: 10.00     Last attempt to quit: 8/4/2003     Years since quitting: 15.5     Smokeless tobacco: Never Used   Substance and Sexual Activity     Alcohol use: No     Alcohol/week: 0.0 oz     Drug use: No     Sexual activity: Yes   Other Topics Concern      Service Not Asked     Blood Transfusions Not Asked     Caffeine Concern Not Asked     Occupational Exposure Not Asked     Hobby Hazards Not Asked     Sleep Concern Not Asked     Stress Concern Not Asked     Weight Concern Not Asked     Special Diet Not Asked      Back Care Not Asked     Exercise Not Asked     Bike Helmet Not Asked     Seat Belt Yes     Self-Exams Not Asked     Parent/sibling w/ CABG, MI or angioplasty before 65F 55M? Not Asked   Social History Narrative    SD - CHECKED AT LEAST TWICE YEARLY.    GUNS: NONE.       Current Outpatient Medications   Medication Sig Dispense Refill     acetaminophen (TYLENOL) 500 MG tablet Take 1,000 mg by mouth every 8 hours as needed for mild pain       aspirin 81 MG tablet Take 81 mg by mouth daily       esomeprazole (NEXIUM) 40 MG CR capsule TAKE 1 CAPSULE BY MOUTH TWICE DAILY 180 capsule 2     lamoTRIgine (LAMICTAL) 100 MG tablet 250 mg At Bedtime   3     lamoTRIgine (LAMICTAL) 200 MG tablet Take 200 mg by mouth every morning  90 tablet 1     metoprolol tartrate (LOPRESSOR) 50 MG tablet Take 1 tablet (50 mg) by mouth 2 times daily 180 tablet 3     naproxen (NAPROSYN) 500 MG tablet Take 1 tablet by mouth 2 times daily       oxyCODONE-acetaminophen (PERCOCET) 5-325 MG per tablet Take 1-2 tablets by mouth every 4 hours as needed for pain 12 tablet 0     predniSONE (DELTASONE) 20 MG tablet Take three tablets (= 60mg) each day for 6 (six) days. 18 tablet 0     pregabalin (LYRICA) 25 MG capsule Take 3 capsules (75 mg) by mouth 2 times daily 540 capsule 1     Ranitidine HCl (ZANTAC PO) Take 75 mg by mouth nightly as needed        sildenafil (VIAGRA) 50 MG tablet Take 1 tablet (50 mg) by mouth daily as needed for erectile dysfunction 10 tablet 11     simvastatin (ZOCOR) 40 MG tablet Take 40 mg by mouth At Bedtime       traMADol (ULTRAM) 50 MG tablet Take 2 tablets (100 mg) by mouth every 6 hours as needed for moderate pain 25 tablet 0     zolpidem (AMBIEN) 10 MG tablet Take 1 tablet (10 mg) by mouth nightly as needed for sleep at bedtime. 90 tablet 1       Allergies   Allergen Reactions     Codeine      inability to sleep after taking codeine     Gabapentin      Fever       Vicodin [Hydrocodone-Acetaminophen]      Perspiring, hyper,  "itchy       REVIEW OF SYSTEMS:  CONSTITUTIONAL:  NEGATIVE for fever, chills, change in weight  INTEGUMENTARY/SKIN:  NEGATIVE for worrisome rashes, moles or lesions  EYES:  NEGATIVE for vision changes or irritation  ENT/MOUTH:  NEGATIVE for ear, mouth and throat problems  RESP:  NEGATIVE for significant cough or SOB  BREAST:  NEGATIVE for masses, tenderness or discharge  CV:  NEGATIVE for chest pain, palpitations or peripheral edema  GI:  NEGATIVE for nausea, abdominal pain, heartburn, or change in bowel habits  :  Negative   MUSCULOSKELETAL:  See HPI above  NEURO:  NEGATIVE for weakness, dizziness or paresthesias  ENDOCRINE:  NEGATIVE for temperature intolerance, skin/hair changes  HEME/ALLERGY/IMMUNE:  NEGATIVE for bleeding problems  PSYCHIATRIC:  NEGATIVE for changes in mood or affect      PHYSICAL EXAM:  /88   Ht 1.753 m (5' 9\")   Wt 97.5 kg (215 lb)   BMI 31.75 kg/m    Body mass index is 31.75 kg/m .   GENERAL APPEARANCE: healthy, alert and no distress   HEENT: No apparent thyroid megaly. Clear sclera with normal ocular movement  RESPIRATORY: No labored breathing  SKIN: no suspicious lesions or rashes  NEURO: Normal strength and tone, mentation intact and speech normal  VASCULAR: Good pulses, and capillary refill   LYMPH: no lymphadenopathy   PSYCH:  mentation appears normal and affect normal/bright    MUSCULOSKELETAL:    Left knee incision is healed well  Range of motion is 0-110  No erythema noted  Normal patellofemoral tracking  Slightly unsteady gait pattern  Circulation is intact  No calf swelling  Extensor mechanism is intact  Right knee range of motion is full  He has slight lack of full extension of the left knee when he walks        ASSESSMENT:  Unsteady gait probably from intracranial pathology  No evidence of mechanical problems related to total knee arthroplasty, left      PLAN:  He has been evaluated rather extensively through different physicians including orthopedist at the [facility] " and neurologist whom he follows.  Previous MRI scan lumbar spine demonstrate some disc degenerative pathology but not severe enough to cause his unsteady gait.  With history of a seizure, the main reason for his unsteady gait was felt to be most likely central neurologic issue which has not been identified.  Unfortunately, I do not have anything new to offer to him regarding his left knee.  Follow-up on as-needed basis.  We visualized x-rays from today and confirmed that mechanically there is no particular issues with the components such as loosening.    Imaging Interpretation:   3 views of the left knee demonstrate postsurgical changes of total knee arthroplasty with a satisfactory positions and sizes of the components without any evidence of loosening, shama-prosthetic fractures or other osseous pathology.  Patellofemoral tracking is noted to be satisfactory.      Rick Frias MD  Department of Orthopedic Surgery        Disclaimer: This note consists of symbols derived from keyboarding, dictation and/or voice recognition software. As a result, there may be errors in the script that have gone undetected. Please consider this when interpreting information found in this chart.

## 2019-02-14 NOTE — PATIENT INSTRUCTIONS
Continue to follow-up with a neurologist at [facility]  Follow-up with me on as needed basis

## 2019-02-14 NOTE — LETTER
2/14/2019         RE: Kashmir Yao  [address]  [facility] [address]        Dear Colleague,    Thank you for referring your patient, Kashmir Yao, to the [facility] ORTHOPEDIC SURGERY. Please see a copy of my visit note below.    HISTORY OF PRESENT ILLNESS:    Kashmir Yao is a 63 year old male who is seen as self referral for left knee pain.     Present symptoms: pain and weakness in left knee after total knee arthroplasty. Location of Pain: left knee anterior , nonradiating. Duration of Pain: 3 year(s). Rating of Pain at worst: 6/10. Rating of Pain Currently: 3/10. Pain is better with: nothing  Pain is worse with: stair climbing and walking.  Associated symptoms: weakness of lower leg, knee and ankle.     Treatments tried to this point: physical therapy.  Orthopedic PMH: left total knee arthroplasty, [facility], Dr Leonardo March 2016,  X-rays completed [facility] May 2018 in [facility].    Past Medical History:   Diagnosis Date     Arthritis      CAD (coronary artery disease) 2010    a subtotally occluded obtuse marginal vessel which was stented with a drug-coated stent.  He had 50% to 60% LAD disease and 25% to 30% right coronary disease     Colonic polyps 2009    tubular adenomae with mildly dysplastic features     Disturbance of skin sensation      Encephalocele (H) 2009    left frontal     Epilepsy, partial (H) 2009    with secondary generalization     GERD (gastroesophageal reflux disease)      Heart attack (H)     5 years ago. 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     C NONSPECIFIC PROCEDURE      stint in heart     CYSTOSCOPY       ESOPHAGOSCOPY, GASTROSCOPY, DUODENOSCOPY (EGD), COMBINED  5/24/2012    Procedure:COMBINED ESOPHAGOSCOPY, GASTROSCOPY, DUODENOSCOPY (EGD); ESOPHAGOSCOPY, GASTROSCOPY, DUODENOSCOPY (EGD) ; Surgeon:GILLIAN JOHNSON; Location: GI     HC COLONOSCOPY THRU STOMA, DIAGNOSTIC  2009    tubular adenomae, recommended annual colonoscopy     KNEE SURGERY Left     repair of cartilage     SPINE SURGERY      repair of herniated disc lumbar     STENT         Family History   Problem Relation Age of Onset     Cardiovascular Mother         CHF, HTN, VASC. INSUFF., DM     Family History Negative Father      Thyroid Disease Sister         HYPOTHYROID     Genitourinary Problems Brother         RENAL FAILURE AND H/O MVA WITH LE PARAPLEGIA     Cancer - colorectal No family hx of        Social History     Socioeconomic History     Marital status:      Spouse name: Not on file     Number of children: 3     Years of education: Not on file     Highest education level: Not on file   Social Needs     Financial resource strain: Not on file     Food insecurity - worry: Not on file     Food insecurity - inability: Not on file     Transportation needs - medical: Not on file     Transportation needs - non-medical: Not on file   Occupational History     Occupation:  - [employer]   Tobacco Use     Smoking status: Former Smoker     Packs/day: 0.50     Years: 20.00     Pack years: 10.00     Last attempt to quit: 8/4/2003     Years since quitting: 15.5     Smokeless tobacco: Never Used   Substance and Sexual Activity     Alcohol use: No     Alcohol/week: 0.0 oz     Drug use: No     Sexual activity: Yes   Other Topics Concern       Service Not Asked     Blood Transfusions Not Asked     Caffeine Concern Not Asked     Occupational Exposure Not Asked     Hobby Hazards Not Asked     Sleep Concern Not Asked     Stress Concern Not Asked     Weight Concern Not Asked     Special Diet Not Asked     Back Care Not Asked     Exercise Not Asked     Bike Helmet Not Asked     Seat Belt Yes     Self-Exams Not Asked     Parent/sibling w/ CABG, MI or angioplasty before 65F 55M? Not Asked   Social History Narrative    SD - CHECKED AT LEAST TWICE YEARLY.    GUNS: NONE.       Current Outpatient Medications   Medication Sig Dispense Refill     acetaminophen (TYLENOL) 500 MG tablet Take 1,000 mg by mouth every 8 hours as needed for mild pain       aspirin 81 MG tablet Take 81 mg by mouth daily       esomeprazole (NEXIUM) 40 MG CR capsule TAKE 1 CAPSULE BY MOUTH TWICE DAILY 180 capsule 2     lamoTRIgine (LAMICTAL) 100 MG tablet 250 mg At Bedtime   3     lamoTRIgine (LAMICTAL) 200 MG tablet Take 200 mg by mouth every morning  90 tablet 1     metoprolol tartrate (LOPRESSOR) 50 MG tablet Take 1 tablet (50 mg) by mouth 2 times daily 180 tablet 3     naproxen (NAPROSYN) 500 MG tablet Take 1 tablet by mouth 2 times daily       oxyCODONE-acetaminophen (PERCOCET) 5-325 MG per tablet Take 1-2 tablets by mouth every 4 hours as needed for pain 12 tablet 0     predniSONE (DELTASONE) 20 MG tablet Take three tablets (= 60mg) each day for 6 (six) days. 18 tablet 0     pregabalin (LYRICA) 25 MG capsule Take 3 capsules (75 mg) by mouth 2 times daily 540 capsule 1     Ranitidine HCl (ZANTAC PO) Take 75 mg by mouth nightly as needed        sildenafil (VIAGRA) 50 MG tablet Take 1 tablet (50 mg) by mouth daily as needed for erectile dysfunction 10 tablet 11     simvastatin (ZOCOR) 40 MG tablet Take 40 mg by mouth At Bedtime       traMADol (ULTRAM) 50 MG tablet Take 2 tablets (100 mg) by mouth every 6 hours as needed for moderate pain 25 tablet 0     zolpidem (AMBIEN) 10 MG tablet Take 1  "tablet (10 mg) by mouth nightly as needed for sleep at bedtime. 90 tablet 1       Allergies   Allergen Reactions     Codeine      inability to sleep after taking codeine     Gabapentin      Fever       Vicodin [Hydrocodone-Acetaminophen]      Perspiring, hyper, itchy       REVIEW OF SYSTEMS:  CONSTITUTIONAL:  NEGATIVE for fever, chills, change in weight  INTEGUMENTARY/SKIN:  NEGATIVE for worrisome rashes, moles or lesions  EYES:  NEGATIVE for vision changes or irritation  ENT/MOUTH:  NEGATIVE for ear, mouth and throat problems  RESP:  NEGATIVE for significant cough or SOB  BREAST:  NEGATIVE for masses, tenderness or discharge  CV:  NEGATIVE for chest pain, palpitations or peripheral edema  GI:  NEGATIVE for nausea, abdominal pain, heartburn, or change in bowel habits  :  Negative   MUSCULOSKELETAL:  See HPI above  NEURO:  NEGATIVE for weakness, dizziness or paresthesias  ENDOCRINE:  NEGATIVE for temperature intolerance, skin/hair changes  HEME/ALLERGY/IMMUNE:  NEGATIVE for bleeding problems  PSYCHIATRIC:  NEGATIVE for changes in mood or affect      PHYSICAL EXAM:  /88   Ht 1.753 m (5' 9\")   Wt 97.5 kg (215 lb)   BMI 31.75 kg/m     Body mass index is 31.75 kg/m .   GENERAL APPEARANCE: healthy, alert and no distress   HEENT: No apparent thyroid megaly. Clear sclera with normal ocular movement  RESPIRATORY: No labored breathing  SKIN: no suspicious lesions or rashes  NEURO: Normal strength and tone, mentation intact and speech normal  VASCULAR: Good pulses, and capillary refill   LYMPH: no lymphadenopathy   PSYCH:  mentation appears normal and affect normal/bright    MUSCULOSKELETAL:    Left knee incision is healed well  Range of motion is 0-110  No erythema noted  Normal patellofemoral tracking  Slightly unsteady gait pattern  Circulation is intact  No calf swelling  Extensor mechanism is intact  Right knee range of motion is full  He has slight lack of full extension of the left knee when he walks      "   ASSESSMENT:  Unsteady gait probably from intracranial pathology  No evidence of mechanical problems related to total knee arthroplasty, left      PLAN:  He has been evaluated rather extensively through different physicians including orthopedist at the [facility] and neurologist whom he follows.  Previous MRI scan lumbar spine demonstrate some disc degenerative pathology but not severe enough to cause his unsteady gait.  With history of a seizure, the main reason for his unsteady gait was felt to be most likely central neurologic issue which has not been identified.  Unfortunately, I do not have anything new to offer to him regarding his left knee.  Follow-up on as-needed basis.  We visualized x-rays from today and confirmed that mechanically there is no particular issues with the components such as loosening.    Imaging Interpretation:   3 views of the left knee demonstrate postsurgical changes of total knee arthroplasty with a satisfactory positions and sizes of the components without any evidence of loosening, shama-prosthetic fractures or other osseous pathology.  Patellofemoral tracking is noted to be satisfactory.      Rick Frias MD  Department of Orthopedic Surgery        Disclaimer: This note consists of symbols derived from keyboarding, dictation and/or voice recognition software. As a result, there may be errors in the script that have gone undetected. Please consider this when interpreting information found in this chart.      Again, thank you for allowing me to participate in the care of your patient.        Sincerely,        Rick Frias MD

## 2019-02-14 NOTE — ED NOTES
Bed: ED13  Expected date: 2/13/19  Expected time: 8:47 PM  Means of arrival: Ambulance  Comments:  598 CVA, face twitch ETA 2054

## 2019-02-14 NOTE — ED PROVIDER NOTES
History     Chief Complaint:  Facial Droop    HPI   Kashmir Yao is a 63 year old male with history of seizures, MI, hypertension, CAD, and RIC who presents for evaluation of a left facial droop. Last night he went to bed feeling well. This morning he woke up around 0900, and lay in be for about 1-2 hours. He does not remember feeling abnormal, but when he eventually got up he noticed his left eye was twitching and he then noticed that his left lip was drooping. He initially went to urgent care, and then was sent here to the ED for further evaluation. His main concern is the twitching to his eye, and feels that the left side of his lip feels abnormal. Denies numbness, or change in sensation to his face or extremities, no weakness to extremities, no change in speech, trouble swallowing, change in vision, or trouble walking. Denies any known tick exposure. He has a history of seizures, which his family reports is his whole body shaking, and he passes out. No known cause for his seizures has been found, and he saw his doctor two weeks ago and reports no issues.     Allergies:  Codeine  Gabapentin  Vicodin [Hydrocodone-Acetaminophen]      Medications:    acetaminophen (TYLENOL) 500 MG tablet  aspirin 81 MG tablet  esomeprazole (NEXIUM) 40 MG CR capsule  lamoTRIgine (LAMICTAL) 200 MG tablet  metoprolol tartrate (LOPRESSOR) 50 MG tablet  naproxen (NAPROSYN) 500 MG tablet  oxyCODONE-acetaminophen (PERCOCET) 5-325 MG per tablet  pregabalin (LYRICA) 25 MG capsule  Ranitidine HCl (ZANTAC PO)  sildenafil (VIAGRA) 50 MG tablet  simvastatin (ZOCOR) 40 MG tablet  traMADol (ULTRAM) 50 MG tablet  zolpidem (AMBIEN) 10 MG tablet     Past Medical History:    Arthritis  CAD (coronary artery disease)   Colonic polyps  Encephalocele  Epilepsy, partial  GERD (gastroesophageal reflux disease)   Heart attack  Hypertension  RIC (obstructive sleep apnea)   Tobacco use disorder  Ventricular assymetry    Past Surgical History:   "  Arthroplasty knee, left  Back surgery - repair herniated disc lumbar  Arthroscopic procedure knee joint, cartilage repair  Cardiac stent placement  Cystoscopy  EGD  Colonoscopy    Family History:    Cardiovascular  Hypothyroidism  Renal failure    Social History:  Relationship status:   Tobacco use: Former, quit date 2003.  Alcohol use: No  Drug use: No  The patient presents with his wife, son and daughter.    Marital Status:   [2]     Review of Systems   HENT: Negative for trouble swallowing and voice change.    Eyes: Negative for visual disturbance.   Musculoskeletal: Negative for gait problem.   Neurological: Positive for facial asymmetry. Negative for speech difficulty, weakness and numbness.   All other systems reviewed and are negative.    Physical Exam     Patient Vitals for the past 24 hrs:   BP Temp Temp src Pulse Resp SpO2 Height Weight   02/13/19 2103 137/79 97.9  F (36.6  C) Temporal 57 16 96 % 1.753 m (5' 9\") 97.5 kg (215 lb)        Physical Exam  Constitutional:  male supine  HENT: Unable to close right upper lid fully. Facial asymmetry present, unable to lift right corner mouth fully. No bruits. No signs of trauma.   Eyes: EOM are normal. Pupils are equal, round, and reactive to light.   Neck: Normal range of motion. No JVD present. No cervical adenopathy.  Cardiovascular: Regular rhythm.  Exam reveals no gallop and no friction rub.    No murmur heard.  Pulmonary/Chest: Bilateral breath sounds normal. No wheezes, rhonchi or rales.  Abdominal: Soft. No tenderness. No rebound or guarding.   Musculoskeletal: No edema. No tenderness.   Lymphadenopathy: No lymphadenopathy.   Neurological: Alert and oriented to person, place, and time. Normal strength. Coordination normal. Fluent speech, peripheral right 7th nerve palsy. Motor normal. All major extremities sensation intact to light touch. Gait stable. Finger-nose-finger intact.   Skin: Skin is warm and dry. No rash noted. No " erythema.      Emergency Department Course     Laboratory:  Laboratory findings were communicated with the patient and family who voiced understanding of the findings.    Glucose by meter: 121    Interventions:  2149 Deltasone 60 mg Oral      Emergency Department Course:  Nursing notes and vitals reviewed.    2124 I performed an exam of the patient as documented above.   2135 I answered the family's questions.     Findings and plan explained to the patient and family. Patient discharged home with instructions regarding supportive care, medications, and reasons to return. The importance of close follow-up was reviewed.      I personally reviewed the laboratory results with the patient and family and answered all related questions prior to discharge.    Impression & Plan      Medical Decision Making:  Kashmir Yao is a 63 year old male who presents to the emergency department with a right sided facial droop. The patient woke up around 12 hours ago and he went back to sleep, but when he got up he noted something was not right. He thought it was the left side, and had some twitching of the left eye, however on exam here he has right facial weakness and inability to keep his right eye closed with some slight tearing. Patient has not neck bruits, no heart murmurs, and otherwise is nonfocal. There are narrow lesions in his ear. His symptoms are consistent with Alas's palsy, he has no exposure to ticks or tick bites. I will start him empirically on prednisone 60 mg today, and for 6 more days. His neurologist is at [place], he can follow up there but he does see Nando Cano locally, and he should follow up with him for sure in the next week. I have also talked to family about placing a soft cotton ball over the right eyelid and taping this shut at night to make sure that the eye does dry out.     Diagnosis:    ICD-10-CM    1. Bell's palsy G51.0        Disposition:   Discharged to home     Discharge Medications:  Discharge  Medication List as of 2/13/2019  9:58 PM      START taking these medications    Details   predniSONE (DELTASONE) 20 MG tablet Take three tablets (= 60mg) each day for 6 (six) days., Disp-18 tablet, R-0, Local Print          Scribe Disclosure:  I, Bre Encinas, am serving as a scribe at 9:28 PM on 2/13/2019 to document services personally performed by Chet Hauser MD, based on my observations and the provider's statements to me.     Bre Encinas  2/13/2019    EMERGENCY DEPARTMENT       Chet Hauser MD  02/14/19 0004

## 2019-02-15 ENCOUNTER — TELEPHONE (OUTPATIENT)
Dept: OTHER | Facility: CLINIC | Age: 64
End: 2019-02-15

## 2019-02-15 NOTE — TELEPHONE ENCOUNTER
Called and left a message for the patient to call us back to schedule an appointment with Dr. Cano.     Patient needs an ER follow up for [place] Palsy.     Per Luis Cano's nurse patient should be put on the schedule for Monday 2/18/19 at 12:10pm. Needs to be a 60 minute appointment.

## 2019-02-18 NOTE — TELEPHONE ENCOUNTER
Patient returned call to nurses line and left a voicemail to schedule.     Called him back to schedule and provided the scheduling phone number again and asked him to call that number to schedule appointments with Dr. Cano.

## 2019-02-18 NOTE — TELEPHONE ENCOUNTER
Kashmir called back.  We were given directive by nurse to schedule patient today at 12:10 but since that time is passed, I checked with Ali to verify when she would like patient scheduled.  She ok'd me using the 9:10AM time for tomorrow even though it is a 30 minute spot. Sherita Hensley,

## 2019-02-19 ENCOUNTER — TELEPHONE (OUTPATIENT)
Dept: OTHER | Facility: CLINIC | Age: 64
End: 2019-02-19

## 2019-02-19 ENCOUNTER — OFFICE VISIT (OUTPATIENT)
Dept: OTHER | Facility: CLINIC | Age: 64
End: 2019-02-19
Attending: INTERNAL MEDICINE
Payer: COMMERCIAL

## 2019-02-19 VITALS
SYSTOLIC BLOOD PRESSURE: 122 MMHG | OXYGEN SATURATION: 98 % | HEIGHT: 69 IN | DIASTOLIC BLOOD PRESSURE: 76 MMHG | HEART RATE: 71 BPM | RESPIRATION RATE: 18 BRPM | BODY MASS INDEX: 32.29 KG/M2 | WEIGHT: 218 LBS

## 2019-02-19 DIAGNOSIS — G51.0 BELL'S PALSY: Primary | ICD-10-CM

## 2019-02-19 PROCEDURE — G0463 HOSPITAL OUTPT CLINIC VISIT: HCPCS

## 2019-02-19 PROCEDURE — 99214 OFFICE O/P EST MOD 30 MIN: CPT | Mod: ZP | Performed by: INTERNAL MEDICINE

## 2019-02-19 RX ORDER — PREGABALIN 25 MG/1
75 CAPSULE ORAL 2 TIMES DAILY
Qty: 540 CAPSULE | Refills: 1 | Status: CANCELLED | OUTPATIENT
Start: 2019-02-19

## 2019-02-19 RX ORDER — ACETAMINOPHEN 500 MG
1000 TABLET ORAL EVERY 8 HOURS PRN
Status: CANCELLED | OUTPATIENT
Start: 2019-02-19

## 2019-02-19 RX ORDER — PREDNISONE 10 MG/1
TABLET ORAL
Qty: 30 TABLET | Refills: 0 | Status: SHIPPED | OUTPATIENT
Start: 2019-02-19 | End: 2019-12-12

## 2019-02-19 RX ORDER — VALACYCLOVIR HYDROCHLORIDE 1 G/1
1000 TABLET, FILM COATED ORAL 3 TIMES DAILY
Qty: 21 TABLET | Refills: 0 | Status: SHIPPED | OUTPATIENT
Start: 2019-02-19 | End: 2020-02-28

## 2019-02-19 RX ORDER — TRAMADOL HYDROCHLORIDE 50 MG/1
100 TABLET ORAL EVERY 6 HOURS PRN
Qty: 25 TABLET | Refills: 0 | Status: CANCELLED | OUTPATIENT
Start: 2019-02-19

## 2019-02-19 RX ORDER — METOPROLOL TARTRATE 50 MG
50 TABLET ORAL 2 TIMES DAILY
Qty: 180 TABLET | Refills: 3 | Status: CANCELLED | OUTPATIENT
Start: 2019-02-19

## 2019-02-19 RX ORDER — OXYCODONE AND ACETAMINOPHEN 5; 325 MG/1; MG/1
1-2 TABLET ORAL EVERY 4 HOURS PRN
Qty: 12 TABLET | Refills: 0 | Status: CANCELLED | OUTPATIENT
Start: 2019-02-19

## 2019-02-19 ASSESSMENT — MIFFLIN-ST. JEOR: SCORE: 1769.22

## 2019-02-19 NOTE — TELEPHONE ENCOUNTER
Patient would like to know if vitamin B12 would be helpful in treating his [place] Palsy  Please advise  Luis Kiser, RN, BSN

## 2019-02-19 NOTE — PROGRESS NOTES
Kashmir Yao is a 63 year old male who is presenting at the current time to discuss his diagnosi(es) of     Bell's palsy .    See recent ER records.      Review Of Systems  Skin: negative  Eyes: negative  Ears/Nose/Throat: negative  Respiratory: No shortness of breath, dyspnea on exertion, cough, or hemoptysis  Cardiovascular: negative  Gastrointestinal: negative  Genitourinary: negative  Musculoskeletal: negative  Neurologic: positive for right facial droop  Psychiatric: negative  Hematologic/Lymphatic/Immunologic: negative  Endocrine: negative       PAST MEDICAL HISTORY:                  Past Medical History:   Diagnosis Date     Arthritis      CAD (coronary artery disease) 2010    a subtotally occluded obtuse marginal vessel which was stented with a drug-coated stent.  He had 50% to 60% LAD disease and 25% to 30% right coronary disease     Colonic polyps 2009    tubular adenomae with mildly dysplastic features     Disturbance of skin sensation      Encephalocele (H) 2009    left frontal     Epilepsy, partial (H) 2009    with secondary generalization     GERD (gastroesophageal reflux disease)      Heart attack (H)     5 years ago. 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     C NONSPECIFIC PROCEDURE      stint in heart      CYSTOSCOPY       ESOPHAGOSCOPY, GASTROSCOPY, DUODENOSCOPY (EGD), COMBINED  5/24/2012    Procedure:COMBINED ESOPHAGOSCOPY, GASTROSCOPY, DUODENOSCOPY (EGD); ESOPHAGOSCOPY, GASTROSCOPY, DUODENOSCOPY (EGD) ; Surgeon:GILLIAN JOHNSON; Location: GI     HC COLONOSCOPY THRU STOMA, DIAGNOSTIC  2009    tubular adenomae, recommended annual colonoscopy     KNEE SURGERY Left     repair of cartilage     SPINE SURGERY      repair of herniated disc lumbar     STENT         CURRENT MEDICATIONS:                  Current Outpatient Medications   Medication Sig Dispense Refill     acetaminophen (TYLENOL) 500 MG tablet Take 1,000 mg by mouth every 8 hours as needed for mild pain       aspirin 81 MG tablet Take 81 mg by mouth daily       esomeprazole (NEXIUM) 40 MG CR capsule TAKE 1 CAPSULE BY MOUTH TWICE DAILY 180 capsule 2     lamoTRIgine (LAMICTAL) 100 MG tablet 250 mg At Bedtime   3     lamoTRIgine (LAMICTAL) 200 MG tablet Take 200 mg by mouth every morning  90 tablet 1     naproxen (NAPROSYN) 500 MG tablet Take 1 tablet by mouth 2 times daily       predniSONE (DELTASONE) 10 MG tablet 60 mg daily times seven days then  50 mg daily times one day then  40 mg daily times one day then   30 mg daily times one day then   20 mg daily times one day then  10 mg daily times one day then stop. 30 tablet 0     Ranitidine HCl (ZANTAC PO) Take 75 mg by mouth nightly as needed        sildenafil (VIAGRA) 50 MG tablet Take 1 tablet (50 mg) by mouth daily as needed for erectile dysfunction 10 tablet 11     simvastatin (ZOCOR) 40 MG tablet Take 40 mg by mouth At Bedtime       valACYclovir (VALTREX) 1000 mg tablet Take 1 tablet (1,000 mg) by mouth 3 times daily for 7 days 21 tablet 0     zolpidem (AMBIEN) 10 MG tablet Take 1 tablet (10 mg) by mouth nightly as needed for sleep at bedtime. 90 tablet 1     metoprolol tartrate (LOPRESSOR) 50 MG tablet Take 1 tablet (50 mg) by mouth 2 times daily 180 tablet 3     oxyCODONE-acetaminophen (PERCOCET) 5-325  MG per tablet Take 1-2 tablets by mouth every 4 hours as needed for pain 12 tablet 0     pregabalin (LYRICA) 25 MG capsule Take 3 capsules (75 mg) by mouth 2 times daily 540 capsule 1     traMADol (ULTRAM) 50 MG tablet Take 2 tablets (100 mg) by mouth every 6 hours as needed for moderate pain 25 tablet 0       ALLERGIES:                  Allergies   Allergen Reactions     Codeine      inability to sleep after taking codeine     Gabapentin      Fever       Vicodin [Hydrocodone-Acetaminophen]      Perspiring, hyper, itchy       SOCIAL HISTORY:                  Social History     Socioeconomic History     Marital status:      Spouse name: Not on file     Number of children: 3     Years of education: Not on file     Highest education level: Not on file   Social Needs     Financial resource strain: Not on file     Food insecurity - worry: Not on file     Food insecurity - inability: Not on file     Transportation needs - medical: Not on file     Transportation needs - non-medical: Not on file   Occupational History     Occupation:  - [employer]   Tobacco Use     Smoking status: Former Smoker     Packs/day: 0.50     Years: 20.00     Pack years: 10.00     Last attempt to quit: 8/4/2003     Years since quitting: 15.5     Smokeless tobacco: Never Used   Substance and Sexual Activity     Alcohol use: No     Alcohol/week: 0.0 oz     Drug use: No     Sexual activity: Yes   Other Topics Concern      Service Not Asked     Blood Transfusions Not Asked     Caffeine Concern Not Asked     Occupational Exposure Not Asked     Hobby Hazards Not Asked     Sleep Concern Not Asked     Stress Concern Not Asked     Weight Concern Not Asked     Special Diet Not Asked     Back Care Not Asked     Exercise Not Asked     Bike Helmet Not Asked     Seat Belt Yes     Self-Exams Not Asked     Parent/sibling w/ CABG, MI or angioplasty before 65F 55M? Not Asked   Social History Narrative    SD - CHECKED AT LEAST TWICE  YEARLY.    GUNS: NONE.       FAMILY HISTORY:                   Family History   Problem Relation Age of Onset     Cardiovascular Mother         CHF, HTN, VASC. INSUFF., DM     Family History Negative Father      Thyroid Disease Sister         HYPOTHYROID     Genitourinary Problems Brother         RENAL FAILURE AND H/O MVA WITH LE PARAPLEGIA     Cancer - colorectal No family hx of       Physical exam Reveals:    O/P: WNL  HEENT: WNL  NECK: No JVD, thyromegaly, or lymphadenopathy  HEART: RRR, no murmurs, gallops, or rubs  LUNGS: CTA bilaterally without rales, wheezes, or rhonchi  GI: NABS, nondistended, nontender, soft  EXT:without cyanosis, clubbing, or edema  NEURO: right facial droop, right eye patched presently, patch not taken off to examine OD  : no flank tenderness      A/P:    (G51.0) Bell's palsy  (primary encounter diagnosis)  Comment: he has not responded to a prednisone dose of 60 mg daily for one week, so I will resume this for one more week, then taper down by ten mg daily. I will also add Valtrex, and advise that he be seen by a neurologist locally for f/u. Given that he has not significantly improved after almost a week of treatment, I suspect he may have ongoing problems in this regard , and as such , would like him to establish with a local neurologist.  Plan: predniSONE (DELTASONE) 10 MG tablet,         valACYclovir (VALTREX) 1000 mg tablet

## 2019-02-19 NOTE — PATIENT INSTRUCTIONS
Please go to your pharmacy to  Valtrx and fill prednisone prescription.    You appointment with [facility] at the [place] office on Thursday 2/21/19 at 3pm with Dr Romero  Address:  [address] [place] [address], [address]   Phone

## 2019-02-19 NOTE — PROGRESS NOTES
"Kashmir Yao is a 63 year old male who presents for:  Chief Complaint   Patient presents with     RECHECK     ER follow up for [place] Palsy. Time ok'd by Geena kessler 02/18/19        Vitals:    Vitals:    02/19/19 0912 02/19/19 0913   BP: (!) 144/92 166/90   BP Location: Right arm    Patient Position: Chair    Cuff Size: Adult Regular    Pulse: 71    Resp: 18    SpO2: 98%    Weight: 218 lb (98.9 kg)    Height: 5' 9\" (1.753 m)        BMI:  Estimated body mass index is 32.19 kg/m  as calculated from the following:    Height as of this encounter: 5' 9\" (1.753 m).    Weight as of this encounter: 218 lb (98.9 kg).    Pain Score:  Data Unavailable        Paul Jewell    "

## 2019-02-20 NOTE — TELEPHONE ENCOUNTER
Several attempts to reach patient about the below but voicemail disconnects call.  Luis Kiser, RN, BSN

## 2019-03-22 DIAGNOSIS — E78.5 HYPERLIPIDEMIA LDL GOAL <70: Primary | ICD-10-CM

## 2019-03-22 NOTE — TELEPHONE ENCOUNTER
Dr. Cano has not prescribed Simvastatin per Epic MAR and I explained that I am unable to refill. Kashmir is asking that this medication be prescribed and is nervous because he is now out of it.  Will route to Pam Diaz for review early next week since Dr. Cano is out of office. Per 4/12/18 OV note with  patient is to be on Simvastatin 40 mg daily but no RX sent.   4/12/18 OV:   (E78.5) Hyperlipidemia LDL goal <100  Comment: at goal, no med changes  Plan: simvastatin (ZOCOR) 40 MG tablet, OFFICE/OUTPT         VISIT,EST,LEVL III, Follow-Up with Vascular         Medicine, T3 Free, T4 free, TSH, Lipid Profile    Med and pharm loaded.  Mary Kate Arevalo BSN, RN                      Mary Kate Arevalo BSN, RN

## 2019-03-22 NOTE — TELEPHONE ENCOUNTER
Patience is requesting refill of Simvastatin to [facility], phone: [phone]. Routing to [facility] RN Med Triage.Erika Javed -  at Vascular [facility]

## 2019-03-25 ENCOUNTER — TELEPHONE (OUTPATIENT)
Dept: OTHER | Facility: CLINIC | Age: 64
End: 2019-03-25

## 2019-03-25 NOTE — TELEPHONE ENCOUNTER
Kashmir called to schedule a f/u appointment with Dr. Cano to address several concerns regarding his recent consult with neurology and his leg weakness he is now experiencing.     Mary Kate CONNELLYN, RN

## 2019-03-26 RX ORDER — SIMVASTATIN 40 MG
40 TABLET ORAL AT BEDTIME
Qty: 90 TABLET | Refills: 3 | Status: SHIPPED | OUTPATIENT
Start: 2019-03-26 | End: 2020-05-27

## 2019-03-27 ENCOUNTER — OFFICE VISIT (OUTPATIENT)
Dept: ORTHOPEDICS | Facility: CLINIC | Age: 64
End: 2019-03-27
Payer: COMMERCIAL

## 2019-03-27 VITALS
WEIGHT: 218 LBS | SYSTOLIC BLOOD PRESSURE: 118 MMHG | DIASTOLIC BLOOD PRESSURE: 78 MMHG | HEIGHT: 69 IN | BODY MASS INDEX: 32.29 KG/M2

## 2019-03-27 DIAGNOSIS — M19.041 LOCALIZED OSTEOARTHRITIS OF RIGHT HAND: ICD-10-CM

## 2019-03-27 DIAGNOSIS — M77.8 TENDINITIS OF FINGER OF LEFT HAND: ICD-10-CM

## 2019-03-27 DIAGNOSIS — M19.042 LOCALIZED PRIMARY OSTEOARTHRITIS OF LEFT HAND: Primary | ICD-10-CM

## 2019-03-27 PROCEDURE — 99213 OFFICE O/P EST LOW 20 MIN: CPT | Performed by: FAMILY MEDICINE

## 2019-03-27 ASSESSMENT — MIFFLIN-ST. JEOR: SCORE: 1769.22

## 2019-03-27 NOTE — PROGRESS NOTES
"ASSESSMENT & PLAN  Patient Instructions     1. Localized primary osteoarthritis of left hand    2. Localized osteoarthritis of right hand    3. Tendinitis of finger of left hand      -Patient has ongoing bilateral hand pain due to arthritis and tendinitis.  -Patient will start formal hand therapy and home exercise program.  -Patient may take Tylenol 1000 mg 2-3 times a day as needed for pain.  Patient may continue bracing his wrists as needed.  -Patient will follow-up in 6 weeks for reevaluation and progression of activity.  -Call direct clinic number [[phone]] at any time with questions or concerns.    Albert Yeo MD [facility] Medicine  [facility]          -----    SUBJECTIVE:  Kashmir Yao is a 63 year old male who is seen in follow-up for bilateral hand and wrist pain.They were last seen 2/13/2019.     Since their last visit reports worsening pain. They indicate that their current pain level is 6/10 in left hand and 3/10 in right hand. They have tried rest/activity avoidance, Tylenol, previous imaging (xray 2/13/19) and casting/splinting/bracing. Patient states that he is still having sharp, shooting pain at night in bilateral hands. Patient notes occasional swelling in bilateral hands with left hand worse than right.     The patient is seen with their son.    Patient's past medical, surgical, social, and family histories were reviewed today and no changes are noted.    REVIEW OF SYSTEMS:  Constitutional: NEGATIVE for fever, chills, change in weight  Skin: NEGATIVE for worrisome rashes, moles or lesions  GI/: NEGATIVE for bowel or bladder changes  Neuro: NEGATIVE for weakness, dizziness or paresthesias    OBJECTIVE:  /78   Ht 1.753 m (5' 9\")   Wt 98.9 kg (218 lb)   BMI 32.19 kg/m     General: healthy, alert and in no distress  HEENT: no scleral icterus or conjunctival erythema  Skin: no suspicious lesions or rash. No jaundice.  CV: regular rhythm by " palpation, no pedal edema  Resp: normal respiratory effort without conversational dyspnea   Psych: normal mood and affect  Gait: normal steady gait with appropriate coordination and balance  Neuro: normal light touch sensory exam of the extremities.    MSK:  BILATERAL HAND  Inspection:    No swelling or obvious deformity or asymmetry  Palpation:   Carpals: normal   Metacarpals: normal   Thumb: MCP joint, CMC   Fingers: mild triggering, 4th digit left hand  Range of Motion:    Full active flexion and extension at MCP, PIP, and DIP joints; normal finger cascade without malrotation.  Wrist pronation, supination, and ulnar/radial deviation normal.  Strength:    5/5 all directions  Special Tests:    Positive: CMC grind, mild palpable triggering of 4th digit left hand    Negative: UCL laxity, Tinel's, Phalen's, Finkelstein's, flexor digitorum superficialis testing, flexor digitorum profundus testing        Albert Yeo MD, [facility]

## 2019-03-27 NOTE — LETTER
3/27/2019         RE: Kashmir Yao  [address]  [place] [place] [facility] [address]        Dear Colleague,    Thank you for referring your patient, Kashmir Yao, to the [facility]. Please see a copy of my visit note below.    ASSESSMENT & PLAN  Patient Instructions     1. Localized primary osteoarthritis of left hand    2. Localized osteoarthritis of right hand    3. Tendinitis of finger of left hand      -Patient has ongoing bilateral hand pain due to arthritis and tendinitis.  -Patient will start formal hand therapy and home exercise program.  -Patient may take Tylenol 1000 mg 2-3 times a day as needed for pain.  Patient may continue bracing his wrists as needed.  -Patient will follow-up in 6 weeks for reevaluation and progression of activity.  -Call direct clinic number [[phone]] at any time with questions or concerns.    Albert Yeo MD [facility] and [facility] Medicine  [facility]          -----    SUBJECTIVE:  Kashmir Yao is a 63 year old male who is seen in follow-up for bilateral hand and wrist pain.They were last seen 2/13/2019.     Since their last visit reports worsening pain. They indicate that their current pain level is 6/10 in left hand and 3/10 in right hand. They have tried rest/activity avoidance, Tylenol, previous imaging (xray 2/13/19) and casting/splinting/bracing. Patient states that he is still having sharp, shooting pain at night in bilateral hands. Patient notes occasional swelling in bilateral hands with left hand worse than right.     The patient is seen with their son.    Patient's past medical, surgical, social, and family histories were reviewed today and no changes are noted.    REVIEW OF SYSTEMS:  Constitutional: NEGATIVE for fever, chills, change in weight  Skin: NEGATIVE for worrisome rashes, moles or lesions  GI/: NEGATIVE for bowel or bladder changes  Neuro: NEGATIVE for weakness, dizziness or  "paresthesias    OBJECTIVE:  /78   Ht 1.753 m (5' 9\")   Wt 98.9 kg (218 lb)   BMI 32.19 kg/m      General: healthy, alert and in no distress  HEENT: no scleral icterus or conjunctival erythema  Skin: no suspicious lesions or rash. No jaundice.  CV: regular rhythm by palpation, no pedal edema  Resp: normal respiratory effort without conversational dyspnea   Psych: normal mood and affect  Gait: normal steady gait with appropriate coordination and balance  Neuro: normal light touch sensory exam of the extremities.    MSK:  BILATERAL HAND  Inspection:    No swelling or obvious deformity or asymmetry  Palpation:   Carpals: normal   Metacarpals: normal   Thumb: MCP joint, CMC   Fingers: mild triggering, 4th digit left hand  Range of Motion:    Full active flexion and extension at MCP, PIP, and DIP joints; normal finger cascade without malrotation.  Wrist pronation, supination, and ulnar/radial deviation normal.  Strength:    5/5 all directions  Special Tests:    Positive: CMC grind, mild palpable triggering of 4th digit left hand    Negative: UCL laxity, Tinel's, Phalen's, Finkelstein's, flexor digitorum superficialis testing, flexor digitorum profundus testing        Albert Yeo MD, [facility]          Again, thank you for allowing me to participate in the care of your patient.        Sincerely,        Albert Yeo, MD    "

## 2019-03-27 NOTE — PATIENT INSTRUCTIONS
1. Localized primary osteoarthritis of left hand    2. Localized osteoarthritis of right hand    3. Tendinitis of finger of left hand      -Patient has ongoing bilateral hand pain due to arthritis and tendinitis.  -Patient will start formal hand therapy and home exercise program.  -Patient may take Tylenol 1000 mg 2-3 times a day as needed for pain.  Patient may continue bracing his wrists as needed.  -Patient will follow-up in 6 weeks for reevaluation and progression of activity.  -Call direct clinic number [[phone]] at any time with questions or concerns.    Albert Yeo MD [facility]  [facility] [place]

## 2019-04-04 ENCOUNTER — THERAPY VISIT (OUTPATIENT)
Dept: OCCUPATIONAL THERAPY | Facility: CLINIC | Age: 64
End: 2019-04-04
Payer: COMMERCIAL

## 2019-04-04 DIAGNOSIS — M77.8 TENDINITIS OF FINGER OF LEFT HAND: ICD-10-CM

## 2019-04-04 DIAGNOSIS — M79.641 BILATERAL HAND PAIN: Primary | ICD-10-CM

## 2019-04-04 DIAGNOSIS — M19.042 LOCALIZED PRIMARY OSTEOARTHRITIS OF LEFT HAND: ICD-10-CM

## 2019-04-04 DIAGNOSIS — M19.041 LOCALIZED OSTEOARTHRITIS OF RIGHT HAND: ICD-10-CM

## 2019-04-04 DIAGNOSIS — M79.642 BILATERAL HAND PAIN: Primary | ICD-10-CM

## 2019-04-04 PROCEDURE — 97166 OT EVAL MOD COMPLEX 45 MIN: CPT | Mod: GO | Performed by: OCCUPATIONAL THERAPIST

## 2019-04-04 PROCEDURE — 97760 ORTHOTIC MGMT&TRAING 1ST ENC: CPT | Mod: GO | Performed by: OCCUPATIONAL THERAPIST

## 2019-04-04 NOTE — PROGRESS NOTES
"Hand Therapy Initial Evaluation    Current Date:  4/4/2019    Diagnosis:  Localized primary osteoarthritis of left hand                      Localized osteoarthritis of right hand                      Tendinitis of finger of left hand   DOI/MD order:  3/27/19  Referring MD: Yeo, Albert, MD     X-ray:  \"Mild first CMC, MCP and IP joint space narrowing and osteophytes\"    Subjective:  Kashmir Yao is a 63 year old R hand dominant male.    Patient reports symptoms of pain, stiffness/loss of motion, weakness/loss of strength and edema of the bilateral hands which occurred due to Arthritis. Since onset symptoms are Gradually getting worse.  Special tests:  x-ray.  Previous treatment: none.    General health as reported by patient is: pt did not rate Pertinent medical history includes:see EHR, hx of [place] Palsy  Medical allergies:see EHR.  Surgical history: other: see EHR.  Medication history: see EHR.    Occupational Profile Information:  Prior functional level:  independent-have help in some areas  Barriers include:Fall risk  Mobility: Ambulates with aid of 4WW  Fall risk (concerned about falling, recent history of falling)  Transportation: drives    Objective:  Pain Level Report  VAS(0-10) 4/4/2019   At Rest: 5/10   With Use: 8/10     Report of Pain:  Location:  wrist, thumb, index finger, long finger, ring finger, small finger, pain is worse in the SF  Pain Quality:  Aching and Sharp  Frequency: constant    Pain is worst:  daytime  Exacerbated by:  Weight bearing with walker   Relieved by:  none  Progression:  Worsening   ROM:   Pain Report:  - none    + mild    ++ moderate    +++ severe   Thumb 4/4/2019    AROM(PROM) R L   MP 60 + 60 +   IP 65  65   RAbd  40 40   PAbd 50  50    Kapandji Opposition Scale (0-10/10)  10 10   Wrist      Flexion 60 60   Extension  55 55   Digits     Index  WNL WNL   Middle WNL WNL   Ring      WNL WNL   Small Finger              MCP 0/45 0/WNL            PIP 15/65 15/WNL            DIP " 0/55 0/WNL     Ulnar Drift   Left hand  4/4/2019      Small finger PIP 15 degrees      Thumb Observation/Appearance:  Key: + = present/ - = not observed    4/4/2019   Shoulder deformity present over CMC R:+  L:+   Volar subluxation present R:-  L:+   Edema over the CMC joint R:+  L: +++   Noted collapse of MP into hyperextension during pinch R:-  L:+   Tenderness at CMC R:+  L:+        Stage of Stenosing Tenosynovitis (SST):   4/4/2019   Left Triggering of RF and SF finger   1   Stage 1:  Normal  Stage 2:  Uneven motion of tendon  Stage 3:  Triggering, clicking, catching  Stage 4:  Locking in extension or flexion; unlocked by active motion  Stage 5:  Locking in extension or flexion; unlocked by passive motion  Stage 6:  Finger locked in extension or flexion    Palpation:  Left 4/4/2019   A1 pulley SF and RF finger None     Provocative Tests:  Pain Report:  - none    + mild    ++ moderate    +++ severe     4/4/2019   CMC Joint line/pain R: +  L: +   Finkelstein's R: -  L: -      Strength:   NT due to increased pain after clinical exam of ROM   Edema: Moderate in thumb and wrist of L hand    Assessment:  Patient presents with symptoms consistent with diagnosis of B hand arthritis, with conservative intervention.    Patient s limitations or Problem List includes: Pain, Decreased ROM/motion, weakness, decreased stability of the CMC joint, decreased  and pinch strength of the thumb which interferes with patients ability to perform  Self Care Tasks (dressing), Sleep Patterns, Recreational Activities and Household Chores as compared to previous level of function.    Rehab Potential: Good- Return to full activity, some limitations.    Patient will benefit from skilled Occupational Therapy to increased ROM, flexibility, pinch strength, and stability of the thumb and decrease pain to return to previous activity level and resume normal daily tasks and to reach their rehab potential.    Barriers to Learning:  No  barrier    Communication Issues: Patient appears to be able to clearly communicate and understand verbal and written communication and follow directions correctly.    Chart Review: Chart Review and Simple history review with patient    Identified Performance Deficits: dressing, health management and maintenance, home establishment and management, meal preparation and cleanup, shopping, sleep and leisure activities    Assessment of Occupational Performance:  3-5 Performance Deficits    Clinical Decision Making (Complexity): Moderate complexity    Treatment Explanations:  The following has been discussed with the patient,  Rx ordered/plan of care  Anticipated outcomes  Possible risks and side effects    Plan:  Frequency:  1 X week, once daily  Duration:  for 5 weeks    Treatment Plan:  Modalities:  Paraffin  Therapeutic Exercise: AROM, Isometrics, and Stabilization exercises of the Thumb CMC, including active and resisted abduction, 1st DI strengthening  Manual Techniques: Joint Mobilization or reseating of the trapezium, self MFR to thumb adductor with clip  Orthosis fabrication:  Hand based Thumb Spica, Custom neoprene support   Education: Anatomy of CMC, joint protection principles, adaptive equipment as needed    Discharge Plan:  Achieve all LTG  [place] in home treatment program.  Reach maximal therapeutic benefit.    Home Program:  L Neoprene wrist wrap with custom ulnar sided wrist support  L Edema glove    Next Visit:  Adaptive equipment as needed.  Paraffin   Tendon glides  Gutter for SF

## 2019-04-17 ENCOUNTER — OFFICE VISIT (OUTPATIENT)
Dept: OTHER | Facility: CLINIC | Age: 64
End: 2019-04-17
Attending: INTERNAL MEDICINE
Payer: COMMERCIAL

## 2019-04-17 VITALS
WEIGHT: 219 LBS | BODY MASS INDEX: 32.44 KG/M2 | HEART RATE: 50 BPM | SYSTOLIC BLOOD PRESSURE: 122 MMHG | OXYGEN SATURATION: 99 % | HEIGHT: 69 IN | RESPIRATION RATE: 16 BRPM | DIASTOLIC BLOOD PRESSURE: 74 MMHG

## 2019-04-17 DIAGNOSIS — R60.9 EDEMA, UNSPECIFIED TYPE: ICD-10-CM

## 2019-04-17 DIAGNOSIS — R79.89 LOW VITAMIN D LEVEL: ICD-10-CM

## 2019-04-17 DIAGNOSIS — M54.40 LOW BACK PAIN WITH SCIATICA, SCIATICA LATERALITY UNSPECIFIED, UNSPECIFIED BACK PAIN LATERALITY, UNSPECIFIED CHRONICITY: ICD-10-CM

## 2019-04-17 DIAGNOSIS — G51.0 BELL'S PALSY: Primary | ICD-10-CM

## 2019-04-17 PROCEDURE — G0463 HOSPITAL OUTPT CLINIC VISIT: HCPCS

## 2019-04-17 PROCEDURE — 99215 OFFICE O/P EST HI 40 MIN: CPT | Mod: ZP | Performed by: INTERNAL MEDICINE

## 2019-04-17 PROCEDURE — 40000809 ZZH STATISTIC NO DOCUMENTATION TO SUPPORT CHARGE

## 2019-04-17 ASSESSMENT — MIFFLIN-ST. JEOR: SCORE: 1773.76

## 2019-04-17 NOTE — PROGRESS NOTES
"Kashmir Yao is a 63 year old male who presents for:  Chief Complaint   Patient presents with     RECHECK     f/u to leg weakness- pt requesting appt with Dr. Cano to discuss multiple issues.         Vitals:    Vitals:    04/17/19 1516 04/17/19 1518   BP: 147/85 142/81   BP Location: Left arm Right arm   Patient Position: Chair Chair   Cuff Size: Adult Regular Adult Regular   Pulse: 50    Resp: 16    SpO2: 99%    Weight: 219 lb (99.3 kg)    Height: 5' 9\" (1.753 m)        BMI:  Estimated body mass index is 32.34 kg/m  as calculated from the following:    Height as of this encounter: 5' 9\" (1.753 m).    Weight as of this encounter: 219 lb (99.3 kg).    Pain Score:  Data Unavailable        Paul Jewell    "

## 2019-04-18 ENCOUNTER — TRANSFERRED RECORDS (OUTPATIENT)
Dept: HEALTH INFORMATION MANAGEMENT | Facility: CLINIC | Age: 64
End: 2019-04-18

## 2019-04-29 DIAGNOSIS — I10 ESSENTIAL HYPERTENSION WITH GOAL BLOOD PRESSURE LESS THAN 130/80: ICD-10-CM

## 2019-04-29 RX ORDER — METOPROLOL TARTRATE 50 MG
50 TABLET ORAL 2 TIMES DAILY
Qty: 180 TABLET | Refills: 3 | Status: SHIPPED | OUTPATIENT
Start: 2019-04-29 | End: 2020-04-23

## 2019-04-29 NOTE — TELEPHONE ENCOUNTER
"Last Written Prescription Date:  4/12/18  Last Fill Quantity: 180,  # refills: 3   Last office visit: No previous visit found with prescribing provider:  4/17/19   Future Office Visit:    Requested Prescriptions   Pending Prescriptions Disp Refills     metoprolol tartrate (LOPRESSOR) 50 MG tablet 180 tablet 3     Sig: Take 1 tablet (50 mg) by mouth 2 times daily       Beta-Blockers Protocol Passed - 4/29/2019  7:51 AM        Passed - Blood pressure under 140/90 in past 12 months     BP Readings from Last 3 Encounters:   04/17/19 122/74   03/27/19 118/78   02/19/19 122/76                 Passed - Patient is age 6 or older        Passed - Recent (12 mo) or future (30 days) visit within the authorizing provider's specialty     Patient had office visit in the last 12 months or has a visit in the next 30 days with authorizing provider or within the authorizing provider's specialty.  See \"Patient Info\" tab in inbasket, or \"Choose Columns\" in Meds & Orders section of the refill encounter.              Passed - Medication is active on med list        Prescription approved per [facility] Refill Protocol.  Luis Kiser, RN, BSN    "

## 2019-05-01 ENCOUNTER — TRANSFERRED RECORDS (OUTPATIENT)
Dept: HEALTH INFORMATION MANAGEMENT | Facility: CLINIC | Age: 64
End: 2019-05-01

## 2019-05-01 ENCOUNTER — MEDICAL CORRESPONDENCE (OUTPATIENT)
Dept: HEALTH INFORMATION MANAGEMENT | Facility: CLINIC | Age: 64
End: 2019-05-01

## 2019-05-08 ENCOUNTER — TELEPHONE (OUTPATIENT)
Dept: OTHER | Facility: CLINIC | Age: 64
End: 2019-05-08

## 2019-05-08 NOTE — TELEPHONE ENCOUNTER
Kashmir called  in regards to his recent surgery at [facility]. Dr. Sanders performed a right endoscopic third ventriculostomy for Hydrocephalus and Stenosis Aqueductal on 4/30/19. Kashmir is requesting f/u with Dr. Cano to discuss how he is doing- he is scheduled for 5/14/19 appt with Dr. Cano.     Mary Kate Arevalo BSN, RN

## 2019-05-14 ENCOUNTER — OFFICE VISIT (OUTPATIENT)
Dept: OTHER | Facility: CLINIC | Age: 64
End: 2019-05-14
Attending: INTERNAL MEDICINE
Payer: MEDICARE

## 2019-05-14 VITALS
HEART RATE: 69 BPM | BODY MASS INDEX: 33.62 KG/M2 | OXYGEN SATURATION: 98 % | RESPIRATION RATE: 16 BRPM | DIASTOLIC BLOOD PRESSURE: 74 MMHG | SYSTOLIC BLOOD PRESSURE: 130 MMHG | WEIGHT: 227 LBS | HEIGHT: 69 IN

## 2019-05-14 DIAGNOSIS — G91.9 HYDROCEPHALUS (H): Primary | ICD-10-CM

## 2019-05-14 DIAGNOSIS — R63.5 WEIGHT GAIN: ICD-10-CM

## 2019-05-14 PROCEDURE — G0463 HOSPITAL OUTPT CLINIC VISIT: HCPCS

## 2019-05-14 PROCEDURE — 99214 OFFICE O/P EST MOD 30 MIN: CPT | Mod: ZP | Performed by: INTERNAL MEDICINE

## 2019-05-14 ASSESSMENT — MIFFLIN-ST. JEOR: SCORE: 1810.05

## 2019-05-14 NOTE — PROGRESS NOTES
Kashmir Yao is a 63 year old male who is presenting at the current time to discuss his diagnosi(es) of        Hydrocephalus  Weight gain .      Review Of Systems  Skin: negative  Eyes: negative  Ears/Nose/Throat: negative  Respiratory: No shortness of breath, dyspnea on exertion, cough, or hemoptysis  Cardiovascular: negative  Gastrointestinal: negative  Genitourinary: negative  Musculoskeletal: positive for cranial postop incision  Neurologic: negative  Psychiatric: negative  Hematologic/Lymphatic/Immunologic: negative  Endocrine: negative    PAST MEDICAL HISTORY:                  Past Medical History:   Diagnosis Date     Arthritis      CAD (coronary artery disease) 2010    a subtotally occluded obtuse marginal vessel which was stented with a drug-coated stent.  He had 50% to 60% LAD disease and 25% to 30% right coronary disease     Colonic polyps 2009    tubular adenomae with mildly dysplastic features     Disturbance of skin sensation      Encephalocele (H) 2009    left frontal     Epilepsy, partial (H) 2009    with secondary generalization     GERD (gastroesophageal reflux disease)      Heart attack (H)     5 years ago. 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     C NONSPECIFIC PROCEDURE      stint in heart      CYSTOSCOPY       ESOPHAGOSCOPY, GASTROSCOPY, DUODENOSCOPY (EGD), COMBINED  5/24/2012    Procedure:COMBINED ESOPHAGOSCOPY, GASTROSCOPY, DUODENOSCOPY (EGD); ESOPHAGOSCOPY, GASTROSCOPY, DUODENOSCOPY (EGD) ; Surgeon:GILLIAN JOHNSON; Location: GI     HC COLONOSCOPY THRU STOMA, DIAGNOSTIC  2009    tubular adenomae, recommended annual colonoscopy     KNEE SURGERY Left     repair of cartilage     SPINE SURGERY      repair of herniated disc lumbar     STENT         CURRENT MEDICATIONS:                  Current Outpatient Medications   Medication Sig Dispense Refill     acetaminophen (TYLENOL) 500 MG tablet Take 1,000 mg by mouth every 8 hours as needed for mild pain       aspirin 81 MG tablet Take 81 mg by mouth daily       esomeprazole (NEXIUM) 40 MG CR capsule TAKE 1 CAPSULE BY MOUTH TWICE DAILY 180 capsule 2     lamoTRIgine (LAMICTAL) 100 MG tablet 250 mg At Bedtime   3     lamoTRIgine (LAMICTAL) 200 MG tablet Take 200 mg by mouth every morning  90 tablet 1     metoprolol tartrate (LOPRESSOR) 50 MG tablet Take 1 tablet (50 mg) by mouth 2 times daily 180 tablet 3     naproxen (NAPROSYN) 500 MG tablet Take 1 tablet by mouth 2 times daily       oxyCODONE-acetaminophen (PERCOCET) 5-325 MG per tablet Take 1-2 tablets by mouth every 4 hours as needed for pain 12 tablet 0     predniSONE (DELTASONE) 10 MG tablet 60 mg daily times seven days then  50 mg daily times one day then  40 mg daily times one day then   30 mg daily times one day then   20 mg daily times one day then  10 mg daily times one day then stop. 30 tablet 0     pregabalin (LYRICA) 25 MG capsule Take 3 capsules (75 mg) by mouth 2 times daily 540 capsule 1     Ranitidine HCl (ZANTAC PO) Take 75 mg by mouth nightly as needed        sildenafil (VIAGRA) 50 MG tablet Take 1 tablet (50 mg) by mouth daily as needed for erectile dysfunction 10 tablet 11     simvastatin (ZOCOR) 40 MG tablet Take 1 tablet (40 mg) by mouth At Bedtime 90 tablet 3     simvastatin (ZOCOR) 40 MG  tablet Take 40 mg by mouth At Bedtime       traMADol (ULTRAM) 50 MG tablet Take 2 tablets (100 mg) by mouth every 6 hours as needed for moderate pain 25 tablet 0     zolpidem (AMBIEN) 10 MG tablet Take 1 tablet (10 mg) by mouth nightly as needed for sleep at bedtime. 90 tablet 1     valACYclovir (VALTREX) 1000 mg tablet Take 1 tablet (1,000 mg) by mouth 3 times daily for 7 days 21 tablet 0       ALLERGIES:                  Allergies   Allergen Reactions     Codeine      inability to sleep after taking codeine     Gabapentin      Fever       Vicodin [Hydrocodone-Acetaminophen]      Perspiring, hyper, itchy       SOCIAL HISTORY:                  Social History     Socioeconomic History     Marital status:      Spouse name: Not on file     Number of children: 3     Years of education: Not on file     Highest education level: Not on file   Occupational History     Occupation:  - [employer].   Social Needs     Financial resource strain: Not on file     Food insecurity:     Worry: Not on file     Inability: Not on file     Transportation needs:     Medical: Not on file     Non-medical: Not on file   Tobacco Use     Smoking status: Former Smoker     Packs/day: 0.50     Years: 20.00     Pack years: 10.00     Last attempt to quit: 8/4/2003     Years since quitting: 15.7     Smokeless tobacco: Never Used   Substance and Sexual Activity     Alcohol use: No     Alcohol/week: 0.0 oz     Drug use: No     Sexual activity: Yes   Lifestyle     Physical activity:     Days per week: Not on file     Minutes per session: Not on file     Stress: Not on file   Relationships     Social connections:     Talks on phone: Not on file     Gets together: Not on file     Attends Restorationism service: Not on file     Active member of club or organization: Not on file     Attends meetings of clubs or organizations: Not on file     Relationship status: Not on file     Intimate partner violence:     Fear of current or ex partner:  Not on file     Emotionally abused: Not on file     Physically abused: Not on file     Forced sexual activity: Not on file   Other Topics Concern      Service Not Asked     Blood Transfusions Not Asked     Caffeine Concern Not Asked     Occupational Exposure Not Asked     Hobby Hazards Not Asked     Sleep Concern Not Asked     Stress Concern Not Asked     Weight Concern Not Asked     Special Diet Not Asked     Back Care Not Asked     Exercise Not Asked     Bike Helmet Not Asked     Seat Belt Yes     Self-Exams Not Asked     Parent/sibling w/ CABG, MI or angioplasty before 65F 55M? Not Asked   Social History Narrative    SD - CHECKED AT LEAST TWICE YEARLY.    GUNS: NONE.       FAMILY HISTORY:                   Family History   Problem Relation Age of Onset     Cardiovascular Mother         CHF, HTN, VASC. INSUFF., DM     Family History Negative Father      Thyroid Disease Sister         HYPOTHYROID     Genitourinary Problems Brother         RENAL FAILURE AND H/O MVA WITH LE PARAPLEGIA     Cancer - colorectal No family hx of           Physical exam Reveals:    O/P: WNL  HEENT: WNL, has postop surgical incision which is C/D?I , healing well  NECK: No JVD, thyromegaly, or lymphadenopathy  HEART: RRR, no murmurs, gallops, or rubs  LUNGS: CTA bilaterally without rales, wheezes, or rhonchi  GI: NABS, nondistended, nontender, soft  EXT:without cyanosis, clubbing, or edema  NEURO: nonfocal  : no flank tenderness      A/P:    (G91.9) Hydrocephalus  (primary encounter diagnosis)  Comment: His discharge summary form [facility] is reviewed in Care Everywhere. He states the [facility] team wanted me to see him to refill his diuretic, but there is no diuretic on his discharge summary. He is preset euvolemic. He has no NAGY, his exam is normal. He has no rales. His legs have trace edema, and they are symmetrical. He has no PND, he has no orthopnea. In short, he has no sxs of either CHF, or a PE. He did not bring any of his  documentation from [facility]. He was told by [facility] to get PT locally. He has not made an appointment. I gave him a referral but told him to call to set up an appointment with his orders from [facility] in [facility].   Plan: [facility], [facility] REFERRAL            (R63.5) postop Weight gain  Comment: his weight is up five to six pounds from preop  Plan: he is not showing signs or sxs of respiratory compromise, CHF, or PE. He is told to fax his prescription in from [facility] to me so I may review it.

## 2019-05-14 NOTE — PROGRESS NOTES
"Kashmir Yao is a 63 year old male who presents for:  Chief Complaint   Patient presents with     RECHECK     patient requesting appt to f/u to right endoscopic third ventriculostomy  on 4/30/19 at [facility].         Vitals:    Vitals:    05/14/19 1316   BP: 130/74   BP Location: Right arm   Patient Position: Chair   Cuff Size: Adult Regular   Pulse: 69   Resp: 16   SpO2: 98%   Weight: 227 lb (103 kg)   Height: 5' 9\" (1.753 m)       BMI:  Estimated body mass index is 33.52 kg/m  as calculated from the following:    Height as of this encounter: 5' 9\" (1.753 m).    Weight as of this encounter: 227 lb (103 kg).    Pain Score:  Data Unavailable        Paul Jewell    "

## 2019-05-15 ENCOUNTER — HOSPITAL ENCOUNTER (OUTPATIENT)
Dept: PHYSICAL THERAPY | Facility: CLINIC | Age: 64
Setting detail: THERAPIES SERIES
End: 2019-05-15
Attending: INTERNAL MEDICINE
Payer: MEDICARE

## 2019-05-15 DIAGNOSIS — G91.9 HYDROCEPHALUS (H): ICD-10-CM

## 2019-05-15 PROCEDURE — 97161 PT EVAL LOW COMPLEX 20 MIN: CPT | Mod: GP | Performed by: PHYSICAL THERAPIST

## 2019-05-15 PROCEDURE — 97110 THERAPEUTIC EXERCISES: CPT | Mod: GP | Performed by: PHYSICAL THERAPIST

## 2019-05-21 NOTE — PROGRESS NOTES
05/15/19 1000   Quick Adds   Type of Visit Initial OP PT Evaluation   General Information   Start of Care Date 05/15/19   Referring Physician Sancho Mcrae  (phone [phone]  [facility])   Orders Evaluate and Treat as Indicated   Order Date 05/01/19   Medical Diagnosis Hydrocephalus   Onset of illness/injury or Date of Surgery 05/01/19  (symptoms began 6-8 months ago)   Precautions/Limitations other (see comments)  (no lifting more than 15-20 pounds for 4 weeks,  )   Special Instructions no strenuous actiivity or exercise besides walking x 4 weeks.     Surgical/Medical history reviewed Yes  (no access to [facility] records)   Pertinent history of current problem (include personal factors and/or comorbidities that impact the POC) Kashmir is a pleasant gentleman who reports onset of balance and gait difficulties and progressing frequency of falls along with issues in memory that began 6 months ago.  Feeling pain and weakness in knees. h/o TKA  and back surgery.  Went to see orthopedist for symptoms but could not find orthopedic cause of his issues. With one of his falls he injured his right hand and so saw hand therapy for a few visits.  Incidentally he saw a neuologist for Bell's Palsy and this MD assessed him further which led to his dx of hydrocephalus due to blockage. Fairly quickly  then he was sent to [facility] for neurosurgery consultation and was recommended to have a vetriculostomy procedure in order to drain the CSF by bypassing the obstruction.  this was performed as an alternative to shunt placement.  He will have monitoring over the next several months to be sure that it is successful.  If not successful may need to have shunt placed.   PMH includes TKA and back surgery   Prior level of function comment Pt was active and independent with all cares. Was able to drive, do grocery shopping, yard work, finances and set up all medications.     Current Community Support Family/friend caregiver  (lives with wife)    Patient role/Employment history Retired   Living environment House/[facility]   Home/Community Accessibility Comments flight of stairs in the home with railing.  1 to 2 steps to enter, no railing.    Current Assistive Devices Standard Cane;Front Wheeled Walker   Assistive Devices Comments uses walker first thing in the morning or in the middle of the night.  mostly uses cane outside and a little in the house.  Does walk short distaces without AD.     General Information Comments gets woozy when first sits up.  needs to sit for 10-15 sec.       Fall Risk Screen   Fall screen completed by PT   Have you fallen 2 or more times in the past year? Yes   Have you fallen and had an injury in the past year? Yes   Is patient a fall risk? Yes   Fall screen comments fell and injured hand.  Was seeing hand therapist but did not complete therapy due to needing procedure for hydrocephalus.    Pain   Patient currently in pain Yes   Pain location surgical incision   Pain rating not rated   Pain description comment only when incision is touched   Cognitive Status Examination   Orientation orientation to person, place and time   Level of Consciousness alert   Follows Commands and Answers Questions 100% of the time;able to follow multistep instructions   Personal Safety and Judgment intact   Memory impaired  (reports memory impairment)   Cognitive Comment appears intact, not formally assessed   Integumentary   Integumentary Comments healing incision at top skull, right of midline   Posture   Posture Forward head position   Posture Comments mild forward posture   Range of Motion (ROM)   ROM Quick Adds no deficits were identified   Strength   Strength Comments 4+ to 5/5 bilateral LEs.  Symmetric with good motor recruitment   Transfer Skills   Transfer Comments able to rise to stand and lower to sit to 18inch surface without UE support.    Gait Special Tests   Gait Special Tests 25 FOOT TIMED WALK;DYNAMIC GAIT INDEX   Gait Special Tests 25  Foot Timed Walk   Seconds 31.3   Steps 33 Steps   Comments no AD.     Gait Special Tests Dynamic Gait Index   Score out of 24 10/24   Comments using cane for 2 of items, mostly without cane   Balance   Balance Comments standing feet apart EO 30 sec mild sway, feet together EO 30 sec with increased sway.  SLS L 3 R 1-2   Sensory Examination   Sensory Perception Comments denies numbness/tingling    Coordination   Coordination Comments impaired LE coordination with gait.    Muscle Tone   Muscle Tone no deficits were identified   Modality Interventions   Planned Modality Interventions Comments per PT    Planned Therapy Interventions   Planned Therapy Interventions balance training;gait training;motor coordination training;neuromuscular re-education;strengthening;stretching   Clinical Impression   Criteria for Skilled Therapeutic Interventions Met yes, treatment indicated   PT Diagnosis impaired gait and balance   Influenced by the following impairments deconditioning,    Functional limitations due to impairments risk for falls   Clinical Presentation Stable/Uncomplicated   Clinical Presentation Rationale minimal strength deficits but generally deconditioned and impaired balance.  Has noticed mild improvement in symptoms since surgery.    Clinical Decision Making (Complexity) Low complexity   Therapy Frequency 2 times/Week   Predicted Duration of Therapy Intervention (days/wks) 6 weeks    Risk & Benefits of therapy have been explained Yes   Patient, Family & other staff in agreement with plan of care Yes   GOALS   PT Eval Goals 1;2;3;4   Goal 1   Goal Identifier 1 DGI   Goal Description Patience will score as 20/24 or greater on the DGI without use of an AD in order to demonstrate improved gait abilities and decreased falls risk for home and community ambulation.    Target Date 06/26/19   Goal 2   Goal Identifier 2 Gait speed    Goal Description Patience will be able to ambulate 25 feet in 15 seconds or less and 15 steps  or less in order to demonstrate more efficient and safe gait for community mobility.    Target Date 06/26/19   Goal 3   Goal Identifier 3 household mobility   Goal Description Patience will be able to ambulate in the home without and AD with appropriate and safe gait pattern for progression toward baseline independence.    Target Date 06/26/19   Goal 4   Goal Identifier 4 Stairs   Goal Description Patience will be able to negotiate a flight of stairs with light contact on one railing in a reciprocal pattern both up and down for safe and efficient access to all levels of his home.   Target Date 06/26/19   Total Evaluation Time   PT Eval, Low Complexity Minutes (00874) 47

## 2019-05-21 NOTE — ADDENDUM NOTE
Encounter addended by: Snow Mantilla, PT on: 5/21/2019 3:32 PM   Actions taken: Sign clinical note, Flowsheet accepted

## 2019-06-06 PROBLEM — M79.641 BILATERAL HAND PAIN: Status: RESOLVED | Noted: 2019-04-04 | Resolved: 2019-06-06

## 2019-06-06 PROBLEM — M79.642 BILATERAL HAND PAIN: Status: RESOLVED | Noted: 2019-04-04 | Resolved: 2019-06-06

## 2019-06-11 ENCOUNTER — HOSPITAL ENCOUNTER (OUTPATIENT)
Dept: PHYSICAL THERAPY | Facility: CLINIC | Age: 64
Setting detail: THERAPIES SERIES
End: 2019-06-11
Attending: INTERNAL MEDICINE
Payer: MEDICARE

## 2019-06-11 PROCEDURE — 97116 GAIT TRAINING THERAPY: CPT | Mod: GP | Performed by: PHYSICAL THERAPIST

## 2019-06-11 PROCEDURE — 97112 NEUROMUSCULAR REEDUCATION: CPT | Mod: GP | Performed by: PHYSICAL THERAPIST

## 2019-06-11 PROCEDURE — 97110 THERAPEUTIC EXERCISES: CPT | Mod: GP | Performed by: PHYSICAL THERAPIST

## 2019-06-12 NOTE — PROGRESS NOTES
06/11/19 1400   Signing Clinician's Name / Credentials   Signing clinician's name / credentials Nelly Ramey, PT   Session Number   Session Number 2   Authorization status [employer] - no limits   Progress Note/Recertification   Progress Note Due Date 06/26/19   Goal 1   Goal Description Patience will score as 20/24 or greater on the DGI without use of an AD in order to demonstrate improved gait abilities and decreased falls risk for home and community ambulation.    Target Date 06/26/19   Goal Identifier 1 DGI   Goal 2   Goal Description Patience will be able to ambulate 25 feet in 15 seconds or less and 15 steps or less in order to demonstrate more efficient and safe gait for community mobility.    Target Date 06/26/19   Goal Identifier 2 Gait speed    Goal 3   Goal Description Patience will be able to ambulate in the home without and AD with appropriate and safe gait pattern for progression toward baseline independence.    Target Date 06/26/19   Goal Identifier 3 household mobility   Goal 4   Goal Description Patience will be able to negotiate a flight of stairs with light contact on one railing in a reciprocal pattern both up and down for safe and efficient access to all levels of his home.   Target Date 06/26/19   Goal Identifier 4 Stairs   Subjective Report   Subjective Report states that his sensation , coordination and balance is also improving.  Has an appointment with surgeon at [facility] on Thursday.   Pt wondering if he has to do the BeST program recommended by [facility] as he would have to stay there M-F x 3 weeks.    Therapeutic Procedure/exercise   Therapeutic Procedures: strength, endurance, ROM, flexibillity minutes (79025) 47   Treatment Detail Stance stability on the L, needing UE support B and lightly decreasing hand support.  sitting quad set  2 set x 8 reps with cues, sit to stand with proper alignment of CRISSY and placement of feet for strengthenign LE and cues to continue to bring COM forward past  CRISSY for greater knee and hip extension 5 reps.   Education in HEP.  Education in strengthening and to avoid strenous activities until precautions fully lifted by MD.  Education regarding positioning on bike to assist in avoiding knee pain.  Kineseotape for activation of quads, VMO with Y strip mid quad to past patella and I strip for VMO activation.  Ed in use and removal of the kineseotape.    Skilled Intervention education , cues, facilitation, demonstration of HEP   Patient Response weakness terminal knee extension, history of L TKA.    Progress working on improving his L LE hip extensor strength, VMO strength , general strength to improve balance and motor coordination.    Neuromuscular Re-education   Neuromuscular re-ed of mvmt, balance, coord, kinesthetic sense, posture, proprioception minutes (03292) 10   Skilled Intervention education , cues   Patient Response pt with a lot of questions regarding his impairments, why and treatment of   Treatment Detail Education regarding pt's impairments and neuro re-ed for improved alignment and muscle function. Compensations/ [place]vary movement patterns affecting strength and balance.  How the weakness VMO/terminal knee extension affects his gait and balance, stance stability on the R LE and also how it affects his back - forward flexed posture due to weakness hip extensors and quads causing more demand to back in flexed posture.   Standing challenging forward COM past CRISSY with CGA, holding alignment 10 sec.   education on how the weakness from the hydrocephalous also affected prior weakness L knee.  Education that The [place] Palsy and hydrocephalous are separate.    Gait Training   Gait Training Minutes, includes stair climbing (00848) 9   Skilled Intervention faciliation and cues.    Patient Response decreased mid to end stance L    Treatment Detail gait without a device faciltiation of hip extensors mid to end stance on the L 100 feet x 2.  cues for quad activation/knee  extension mid to end stance as well.    Progress decreased knee and hip extension on the L mid to end stance, mild decreased coordination L LE with gait.    Education   Learner Patient   Readiness Acceptance   Method Explanation;Booklet/handout;Demonstration   Response Verbalizes Understanding;Demonstrates Understanding;Needs Reinforcement   Education Comments Pt with many questions during the session. Ed as above , also ed. that we do not have the BeST program at our facility.  However, we are Certified NDT therapists and we could treat his impairments as well. Pt concerned about having to stay in [place] for 3 weeks.  He appears to have made improvements in his function compared to the PT evaluation, PMR notes from [facility]. and that I feel we could treat him/address his impairments here. I do feel the weakness of VMO, lack of terminal knee extension strength after TKA does play a role .   I did tell him to speak to his MD however regarding if there were specific reasons they wanted him to do the BeST program.    Plan   Plan for next session strengthening terminal knee extension, hip extensors , stance stability R  , working mid to end ranges R LE.    Total Session Time   Timed Code Treatment Minutes 65   Total Treatment Time (sum of timed and untimed services) 65

## 2019-06-19 ENCOUNTER — OFFICE VISIT (OUTPATIENT)
Dept: FAMILY MEDICINE | Facility: CLINIC | Age: 64
End: 2019-06-19
Payer: MEDICARE

## 2019-06-19 VITALS
HEART RATE: 59 BPM | OXYGEN SATURATION: 99 % | WEIGHT: 230.1 LBS | RESPIRATION RATE: 16 BRPM | BODY MASS INDEX: 33.98 KG/M2 | TEMPERATURE: 97.5 F | DIASTOLIC BLOOD PRESSURE: 81 MMHG | SYSTOLIC BLOOD PRESSURE: 131 MMHG

## 2019-06-19 DIAGNOSIS — R35.0 URINARY FREQUENCY: ICD-10-CM

## 2019-06-19 DIAGNOSIS — R30.0 DYSURIA: ICD-10-CM

## 2019-06-19 DIAGNOSIS — R82.90 NONSPECIFIC FINDING ON EXAMINATION OF URINE: ICD-10-CM

## 2019-06-19 DIAGNOSIS — N39.0 ACUTE UTI: Primary | ICD-10-CM

## 2019-06-19 LAB
ALBUMIN UR-MCNC: NEGATIVE MG/DL
APPEARANCE UR: CLEAR
BILIRUB UR QL STRIP: NEGATIVE
COLOR UR AUTO: YELLOW
GLUCOSE UR STRIP-MCNC: NEGATIVE MG/DL
HGB UR QL STRIP: ABNORMAL
KETONES UR STRIP-MCNC: NEGATIVE MG/DL
LEUKOCYTE ESTERASE UR QL STRIP: ABNORMAL
NITRATE UR QL: NEGATIVE
PH UR STRIP: 6 PH (ref 5–7)
RBC #/AREA URNS AUTO: ABNORMAL /HPF
SOURCE: ABNORMAL
SP GR UR STRIP: <=1.005 (ref 1–1.03)
UROBILINOGEN UR STRIP-ACNC: 0.2 EU/DL (ref 0.2–1)
WBC #/AREA URNS AUTO: ABNORMAL /HPF

## 2019-06-19 PROCEDURE — 81001 URINALYSIS AUTO W/SCOPE: CPT | Performed by: PHYSICIAN ASSISTANT

## 2019-06-19 PROCEDURE — 99213 OFFICE O/P EST LOW 20 MIN: CPT | Performed by: PHYSICIAN ASSISTANT

## 2019-06-19 RX ORDER — NITROFURANTOIN 25; 75 MG/1; MG/1
100 CAPSULE ORAL 2 TIMES DAILY
Qty: 10 CAPSULE | Refills: 0 | Status: SHIPPED | OUTPATIENT
Start: 2019-06-19 | End: 2019-06-19

## 2019-06-19 RX ORDER — NITROFURANTOIN 25; 75 MG/1; MG/1
100 CAPSULE ORAL 2 TIMES DAILY
Qty: 14 CAPSULE | Refills: 0 | Status: SHIPPED | OUTPATIENT
Start: 2019-06-19 | End: 2019-12-12

## 2019-06-19 NOTE — PROGRESS NOTES
Subjective     Kashmir Yao is a 64 year old male who presents to clinic today for the following health issues:    HPI   Genitourinary - Male  Onset: 3 days    Description:   Dysuria (painful urination): YES  Hematuria (blood in urine): YES, noticing clots  Frequency: YES  Are you urinating at night : YES  Hesitancy (delay in urine): YES  Retention (unable to empty): Sometimes  Decrease in urinary flow: YES   Incontinence: no     Progression of Symptoms:  worsening    Accompanying Signs & Symptoms:  Fever: no   Back/Flank pain: no   Urethral discharge: no   Testicle lumps/masses/pain: no   Nausea and/or vomiting: no   Abdominal pain: no     History:   History of frequent UTI's: YES, a couple in the past  History of kidney stones: YES, 1-2 small ureteral stones in the past  History of hernias: no   Personal or Family history of Prostate problems: no, reports prostate/PSA checks have been normal in the past.  Sexually active: YES - one partner - wife     Precipitating factors:   None    Alleviating factors:  None    Patient Active Problem List   Diagnosis     Mixed hyperlipidemia     Esophageal reflux     Anxiety state     GERD (gastroesophageal reflux disease)     RIC (obstructive sleep apnea)     HYPERLIPIDEMIA LDL GOAL <100     Health Care Home     S/P TKR (total knee replacement)     Epilepsy (H)     Benign essential hypertension     Physical deconditioning     Constipation     Trauma     Past Surgical History:   Procedure Laterality Date     ARTHROPLASTY KNEE Left 5/16/2016    Procedure: ARTHROPLASTY KNEE;  Surgeon: Chet Leonardo MD;  Location: [facility] OR     BACK SURGERY       C ANESTH,DX ARTHROSCOPIC PROC KNEE JOINT      CARTILEGE REPAIR.     C NONSPECIFIC PROCEDURE      stint in heart     CYSTOSCOPY       ESOPHAGOSCOPY, GASTROSCOPY, DUODENOSCOPY (EGD), COMBINED  5/24/2012    Procedure:COMBINED ESOPHAGOSCOPY, GASTROSCOPY, DUODENOSCOPY (EGD); ESOPHAGOSCOPY, GASTROSCOPY, DUODENOSCOPY (EGD) ; Surgeon:ELIZABETH  GILLIAN NAVARRO; Location:[facility] GI     HC COLONOSCOPY THRU STOMA, DIAGNOSTIC  2009    tubular adenomae, recommended annual colonoscopy     KNEE SURGERY Left     repair of cartilage     SPINE SURGERY      repair of herniated disc lumbar     STENT         Social History     Tobacco Use     Smoking status: Former Smoker     Packs/day: 0.50     Years: 20.00     Pack years: 10.00     Last attempt to quit: 8/4/2003     Years since quitting: 15.8     Smokeless tobacco: Never Used   Substance Use Topics     Alcohol use: No     Alcohol/week: 0.0 oz     Family History   Problem Relation Age of Onset     Cardiovascular Mother         CHF, HTN, VASC. INSUFF., DM     Family History Negative Father      Thyroid Disease Sister         HYPOTHYROID     Genitourinary Problems Brother         RENAL FAILURE AND H/O MVA WITH LE PARAPLEGIA     Cancer - colorectal No family hx of          Current Outpatient Medications   Medication Sig Dispense Refill     nitroFURantoin macrocrystal-monohydrate (MACROBID) 100 MG capsule Take 1 capsule (100 mg) by mouth 2 times daily for 7 days 14 capsule 0     acetaminophen (TYLENOL) 500 MG tablet Take 1,000 mg by mouth every 8 hours as needed for mild pain       aspirin 81 MG tablet Take 81 mg by mouth daily       esomeprazole (NEXIUM) 40 MG CR capsule TAKE 1 CAPSULE BY MOUTH TWICE DAILY 180 capsule 2     lamoTRIgine (LAMICTAL) 100 MG tablet 250 mg At Bedtime   3     lamoTRIgine (LAMICTAL) 200 MG tablet Take 200 mg by mouth every morning  90 tablet 1     metoprolol tartrate (LOPRESSOR) 50 MG tablet Take 1 tablet (50 mg) by mouth 2 times daily 180 tablet 3     naproxen (NAPROSYN) 500 MG tablet Take 1 tablet by mouth 2 times daily       oxyCODONE-acetaminophen (PERCOCET) 5-325 MG per tablet Take 1-2 tablets by mouth every 4 hours as needed for pain 12 tablet 0     predniSONE (DELTASONE) 10 MG tablet 60 mg daily times seven days then  50 mg daily times one day then  40 mg daily times one day then   30 mg daily times  one day then   20 mg daily times one day then  10 mg daily times one day then stop. 30 tablet 0     pregabalin (LYRICA) 25 MG capsule Take 3 capsules (75 mg) by mouth 2 times daily 540 capsule 1     Ranitidine HCl (ZANTAC PO) Take 75 mg by mouth nightly as needed        sildenafil (VIAGRA) 50 MG tablet Take 1 tablet (50 mg) by mouth daily as needed for erectile dysfunction 10 tablet 11     simvastatin (ZOCOR) 40 MG tablet Take 1 tablet (40 mg) by mouth At Bedtime 90 tablet 3     simvastatin (ZOCOR) 40 MG tablet Take 40 mg by mouth At Bedtime       traMADol (ULTRAM) 50 MG tablet Take 2 tablets (100 mg) by mouth every 6 hours as needed for moderate pain 25 tablet 0     valACYclovir (VALTREX) 1000 mg tablet Take 1 tablet (1,000 mg) by mouth 3 times daily for 7 days 21 tablet 0     zolpidem (AMBIEN) 10 MG tablet Take 1 tablet (10 mg) by mouth nightly as needed for sleep at bedtime. 90 tablet 1     Allergies   Allergen Reactions     Codeine      inability to sleep after taking codeine     Gabapentin      Fever       Vicodin [Hydrocodone-Acetaminophen]      Perspiring, hyper, itchy       Reviewed and updated as needed this visit by Provider         Review of Systems    ROS: 10 point ROS neg other than the symptoms noted above in the HPI.        Objective    /81 (BP Location: Right arm, Patient Position: Chair, Cuff Size: Adult Large)   Pulse 59   Temp 97.5  F (36.4  C) (Oral)   Resp 16   Wt 104.4 kg (230 lb 1.6 oz)   SpO2 99%   BMI 33.98 kg/m    Body mass index is 33.98 kg/m .  Physical Exam   Constitutional: He is oriented to person, place, and time. He appears well-developed and well-nourished.   HENT:   Head: Normocephalic and atraumatic.   Mouth/Throat: Oropharynx is clear and moist.   Eyes: Conjunctivae and EOM are normal.   Neck: Normal range of motion.   Cardiovascular: Normal rate, regular rhythm and normal heart sounds.   Pulmonary/Chest: Effort normal and breath sounds normal.   Abdominal: Soft.  Bowel sounds are normal. He exhibits no distension and no mass. There is no tenderness. There is no rebound, no guarding and no CVA tenderness.   Musculoskeletal: Normal range of motion.   Neurological: He is alert and oriented to person, place, and time.   Skin: Skin is warm and dry.   Psychiatric: He has a normal mood and affect. His behavior is normal.   Nursing note and vitals reviewed.     Diagnostic Test Results:  Results for orders placed or performed in visit on 06/19/19 (from the past 24 hour(s))   UA reflex to Microscopic   Result Value Ref Range    Color Urine Yellow     Appearance Urine Clear     Glucose Urine Negative NEG^Negative mg/dL    Bilirubin Urine Negative NEG^Negative    Ketones Urine Negative NEG^Negative mg/dL    Specific Gravity Urine <=1.005 1.003 - 1.035    Blood Urine Large (A) NEG^Negative    pH Urine 6.0 5.0 - 7.0 pH    Protein Albumin Urine Negative NEG^Negative mg/dL    Urobilinogen Urine 0.2 0.2 - 1.0 EU/dL    Nitrite Urine Negative NEG^Negative    Leukocyte Esterase Urine Moderate (A) NEG^Negative    Source Midstream Urine    Urine Microscopic   Result Value Ref Range    WBC Urine 10-25 (A) OTO5^0 - 5 /HPF    RBC Urine O - 2 OTO2^O - 2 /HPF           Assessment & Plan   Problem List Items Addressed This Visit     None      Visit Diagnoses     Acute UTI    -  Primary    Relevant Medications    nitroFURantoin macrocrystal-monohydrate (MACROBID) 100 MG capsule    Dysuria        Relevant Orders    UA reflex to Microscopic (Completed)    Urine Microscopic (Completed)    Nonspecific finding on examination of urine        Urinary frequency        Relevant Orders    UROLOGY ADULT REFERRAL      64 year old male presenting for evaluation of 3 day worsening of baseline urinary symptoms. UA remarkable for leukocyte esterase and WBCs. Referral placed to urology for further evaluation of patient's urinary symptoms as he reports some baseline hesitancy and retention symptoms. Patient will be  started on 7 day course of Macrobid for acute UTI. Will return to care with new or worsening symptoms.       Patient Instructions     Patient Education     Urinary Tract Infections in Men    Urinary tract infections (UTIs) are most often caused by bacteria that invade the urinary tract. The bacteria may come from outside the body. Or they may travel from the skin outside of the rectum into the urethra. Pain in or around the urinary tract is a common symptom for most UTIs. But the only way to know for sure if you have a UTI is to have a urinalysis and urine culture.   Types of UTIs    Cystitis. This is a bladder infection. It is often linked to a blockage from an enlarged prostate. You may have an urgent or frequent need to urinate, and bloody urine. Treatment includes antibiotics and medicine to relax or shrink the prostate. Sometimes you will need surgery.    Urethritis. This is an infection of the urethra. You may have a discharge from the urethra or burning when you urinate. You may also have pain in the urethra or penis. It is treated with antibiotics.    Prostatitis. This is an inflammation or infection of the prostate. You may have an urgent or frequent need to urinate, fever, or burning when you urinate. Or you may have a tender prostate, or a vague feeling of pressure. Prostatitis is treated with a range of medicines, depending on the cause.    Pyelonephritis. This is a kidney infection. If not treated, it can be serious and damage your kidneys. In severe cases you may need to stay in the hospital. You may have a fever and lower back pain.  Medicines to treat a UTI  Most UTIs are treated with antibiotics. These kill the bacteria. The length of time you need to take them depends on the type of infection. Take antibiotics exactly as directed until all of the medicine is gone. If you don't, the infection may not go away and may become harder to treat. For certain types of UTIs, you may be given other medicine  to help treat your symptoms.  Lifestyle changes to treat and prevent UTIs  The lifestyle changes below will help get rid of your current infection. They may also help prevent future UTIs.    Drink plenty of fluids such as water, juice, or other caffeine-free drinks. This helps flush bacteria out of your system.    Empty your bladder when you feel the urge to urinate and before going to sleep. Urine that stays in your bladder promotes infection.    Use condoms during sex. These help prevent UTIs caused by sexually transmitted bacteria.    Keep follow-up appointments with your healthcare provider. He or she can may do tests to make sure the infection has cleared. If needed, more treatment can be started.  Other treatments to prevent UTIs  Most UTIs respond to medicine. But sometimes you will need a procedure or surgery. This can treat an enlarged prostate, or remove a kidney stone or other blockage. Surgery may also treat problems caused by scarring or long-term infections.  Date Last Reviewed: 1/1/2017 2000-2017 [employer] [address] [place] [address], [place] [address]. All rights reserved. This information is not intended as a substitute for professional medical care. Always follow your healthcare professional's instructions.             Return if symptoms worsen or fail to improve.    Ashvin Stevenson PA-C  [facility]

## 2019-06-19 NOTE — PATIENT INSTRUCTIONS
Patient Education     Urinary Tract Infections in Men    Urinary tract infections (UTIs) are most often caused by bacteria that invade the urinary tract. The bacteria may come from outside the body. Or they may travel from the skin outside of the rectum into the urethra. Pain in or around the urinary tract is a common symptom for most UTIs. But the only way to know for sure if you have a UTI is to have a urinalysis and urine culture.   Types of UTIs    Cystitis. This is a bladder infection. It is often linked to a blockage from an enlarged prostate. You may have an urgent or frequent need to urinate, and bloody urine. Treatment includes antibiotics and medicine to relax or shrink the prostate. Sometimes you will need surgery.    Urethritis. This is an infection of the urethra. You may have a discharge from the urethra or burning when you urinate. You may also have pain in the urethra or penis. It is treated with antibiotics.    Prostatitis. This is an inflammation or infection of the prostate. You may have an urgent or frequent need to urinate, fever, or burning when you urinate. Or you may have a tender prostate, or a vague feeling of pressure. Prostatitis is treated with a range of medicines, depending on the cause.    Pyelonephritis. This is a kidney infection. If not treated, it can be serious and damage your kidneys. In severe cases you may need to stay in the hospital. You may have a fever and lower back pain.  Medicines to treat a UTI  Most UTIs are treated with antibiotics. These kill the bacteria. The length of time you need to take them depends on the type of infection. Take antibiotics exactly as directed until all of the medicine is gone. If you don't, the infection may not go away and may become harder to treat. For certain types of UTIs, you may be given other medicine to help treat your symptoms.  Lifestyle changes to treat and prevent UTIs  The lifestyle changes below will help get rid of your  current infection. They may also help prevent future UTIs.    Drink plenty of fluids such as water, juice, or other caffeine-free drinks. This helps flush bacteria out of your system.    Empty your bladder when you feel the urge to urinate and before going to sleep. Urine that stays in your bladder promotes infection.    Use condoms during sex. These help prevent UTIs caused by sexually transmitted bacteria.    Keep follow-up appointments with your healthcare provider. He or she can may do tests to make sure the infection has cleared. If needed, more treatment can be started.  Other treatments to prevent UTIs  Most UTIs respond to medicine. But sometimes you will need a procedure or surgery. This can treat an enlarged prostate, or remove a kidney stone or other blockage. Surgery may also treat problems caused by scarring or long-term infections.  Date Last Reviewed: 1/1/2017 2000-2017 [employer]. [address] [place] [address], [place] [address]. All rights reserved. This information is not intended as a substitute for professional medical care. Always follow your healthcare professional's instructions.

## 2019-07-03 ENCOUNTER — HOSPITAL ENCOUNTER (OUTPATIENT)
Dept: PHYSICAL THERAPY | Facility: CLINIC | Age: 64
Setting detail: THERAPIES SERIES
End: 2019-07-03
Attending: INTERNAL MEDICINE
Payer: MEDICARE

## 2019-07-03 PROCEDURE — 97116 GAIT TRAINING THERAPY: CPT | Mod: GP | Performed by: PHYSICAL THERAPIST

## 2019-07-03 PROCEDURE — 97110 THERAPEUTIC EXERCISES: CPT | Mod: GP | Performed by: PHYSICAL THERAPIST

## 2019-07-08 ENCOUNTER — HOSPITAL ENCOUNTER (OUTPATIENT)
Dept: LAB | Facility: CLINIC | Age: 64
Discharge: HOME | End: 2019-07-08
Attending: INTERNAL MEDICINE | Admitting: INTERNAL MEDICINE
Payer: MEDICARE

## 2019-07-08 ENCOUNTER — HOSPITAL ENCOUNTER (OUTPATIENT)
Dept: PHYSICAL THERAPY | Facility: CLINIC | Age: 64
Setting detail: THERAPIES SERIES
End: 2019-07-08
Attending: INTERNAL MEDICINE
Payer: MEDICARE

## 2019-07-08 DIAGNOSIS — R60.0 LEG EDEMA: ICD-10-CM

## 2019-07-08 DIAGNOSIS — I50.32 CHRONIC HEART FAILURE WITH PRESERVED EJECTION FRACTION (H): Primary | ICD-10-CM

## 2019-07-08 LAB
ANION GAP SERPL CALCULATED.3IONS-SCNC: 8 MMOL/L (ref 3–14)
BUN SERPL-MCNC: 26 MG/DL (ref 7–30)
CALCIUM SERPL-MCNC: 9.1 MG/DL (ref 8.5–10.1)
CHLORIDE SERPL-SCNC: 107 MMOL/L (ref 94–109)
CO2 SERPL-SCNC: 24 MMOL/L (ref 20–32)
CREAT SERPL-MCNC: 1.02 MG/DL (ref 0.66–1.25)
GFR SERPL CREATININE-BSD FRML MDRD: 77 ML/MIN/{1.73_M2}
GLUCOSE SERPL-MCNC: 89 MG/DL (ref 70–99)
NT-PROBNP SERPL-MCNC: 51 PG/ML (ref 0–125)
POTASSIUM SERPL-SCNC: 4.1 MMOL/L (ref 3.4–5.3)
SODIUM SERPL-SCNC: 139 MMOL/L (ref 133–144)

## 2019-07-08 PROCEDURE — 36415 COLL VENOUS BLD VENIPUNCTURE: CPT | Performed by: INTERNAL MEDICINE

## 2019-07-08 PROCEDURE — 80048 BASIC METABOLIC PNL TOTAL CA: CPT | Performed by: INTERNAL MEDICINE

## 2019-07-08 PROCEDURE — 83880 ASSAY OF NATRIURETIC PEPTIDE: CPT | Performed by: INTERNAL MEDICINE

## 2019-07-08 PROCEDURE — 97140 MANUAL THERAPY 1/> REGIONS: CPT | Mod: GP | Performed by: PHYSICAL THERAPIST

## 2019-07-08 PROCEDURE — 97110 THERAPEUTIC EXERCISES: CPT | Mod: GP | Performed by: PHYSICAL THERAPIST

## 2019-07-09 NOTE — PROGRESS NOTES
[facility]        OUTPATIENT PHYSICAL THERAPY FUNCTIONAL EVALUATION  PLAN OF TREATMENT FOR OUTPATIENT REHABILITATION  (COMPLETE FOR INITIAL CLAIMS ONLY)  Patient's Last Name, First Name, M.I.  YOB: 1955  Kashmir Yao     Provider's Name   [facility]   Medical Record No.  [id number]     Start of Care Date:  05/15/19   Onset Date:  05/01/19(symptoms began 6-8 months ago)   Type:     _X__PT   ____OT  ____SLP Medical Diagnosis:  Hydrocephalus     PT Diagnosis:  impaired gait and balance Visits from SOC:  1                              __________________________________________________________________________________  Plan of Treatment/Functional Goals:  balance training, gait training, motor coordination training, neuromuscular re-education, strengthening, stretching           GOALS  1 DGI  Patience will score as 20/24 or greater on the DGI without use of an AD in order to demonstrate improved gait abilities and decreased falls risk for home and community ambulation.   06/26/19    2 Gait speed   Patience will be able to ambulate 25 feet in 15 seconds or less and 15 steps or less in order to demonstrate more efficient and safe gait for community mobility.   06/26/19    3 household mobility  Patience will be able to ambulate in the home without and AD with appropriate and safe gait pattern for progression toward baseline independence.   06/26/19    4 Stairs  Patience will be able to negotiate a flight of stairs with light contact on one railing in a reciprocal pattern both up and down for safe and efficient access to all levels of his home.  06/26/19                                                Therapy Frequency:  2 times/Week   Predicted Duration of Therapy Intervention:  6 weeks     Snow Mantilla, PT                                    I CERTIFY THE NEED FOR THESE  SERVICES FURNISHED UNDER        THIS PLAN OF TREATMENT AND WHILE UNDER MY CARE .             Physician Signature               Date    X_____________________________________________________                      Certification Date From:  05/15/19   Certification Date To:  06/26/19    Referring Provider:  Sancho Mcrae(phone [phone]  [facility])    Initial Assessment  See Epic Evaluation- Start of Care Date: 05/15/19

## 2019-07-09 NOTE — ADDENDUM NOTE
Encounter addended by: Snow Mantilla, PT on: 7/9/2019 12:27 PM   Actions taken: Sign clinical note, Flowsheet accepted

## 2019-07-11 ENCOUNTER — HOSPITAL ENCOUNTER (OUTPATIENT)
Dept: CARDIOLOGY | Facility: CLINIC | Age: 64
Discharge: HOME | End: 2019-07-11
Attending: INTERNAL MEDICINE | Admitting: INTERNAL MEDICINE
Payer: MEDICARE

## 2019-07-11 ENCOUNTER — HOSPITAL ENCOUNTER (OUTPATIENT)
Dept: PHYSICAL THERAPY | Facility: CLINIC | Age: 64
Setting detail: THERAPIES SERIES
End: 2019-07-11
Attending: INTERNAL MEDICINE
Payer: MEDICARE

## 2019-07-11 DIAGNOSIS — R60.0 LEG EDEMA: ICD-10-CM

## 2019-07-11 PROCEDURE — 93306 TTE W/DOPPLER COMPLETE: CPT | Mod: 26 | Performed by: INTERNAL MEDICINE

## 2019-07-11 PROCEDURE — 97110 THERAPEUTIC EXERCISES: CPT | Mod: GP | Performed by: PHYSICAL THERAPIST

## 2019-07-11 PROCEDURE — 97750 PHYSICAL PERFORMANCE TEST: CPT | Mod: GP | Performed by: PHYSICAL THERAPIST

## 2019-07-11 PROCEDURE — 93306 TTE W/DOPPLER COMPLETE: CPT

## 2019-07-11 NOTE — PROGRESS NOTES
07/11/19 1500   Signing Clinician's Name / Credentials   Signing clinician's name / credentials Snow Mantilla PT   Dynamic Gait Index (Pee[employer]e and [employer], 1995)   Gait Level Surface 2  (decreased step clearance left LE)   Change in Gait Speed 2  (mild gait deviations)   Gait and Horizontal Head Turns 2  (veers left with head turn to left)   Gait with Vertical Head Turns 3   Gait and Pivot Turns 2   Step Over Obstacle 1   Step Around Obstacles 2   Steps 2   Total Dynamic Gait Index Score  (A score of 19 or less has been correlated to an increased risk of falls in community dwelling older adults, patients with vestibular disorders, and patients with MS.)   Total Score (out of 24) 16     Dynamic Gait Index (DGI):The DGI is a measure of balance during gait that is reliable and valid for the elderly and individuals with Parkinson's disease, MS, vestibular disorders, or s/p stroke. Gait assistive device used: none    Patient score: 16/24  Scores ?19/24 indicate an increased risk for falls according to Bailey et al 2000  Minimal Detectable Change = 2.9 in community dwelling elderly according to Geoffrey et al 2011    Assessment (rationale for performing, application to patient s function & care plan): progress toward goals, falls risk  Minutes billed as physical performance test: 20

## 2019-07-16 ENCOUNTER — HOSPITAL ENCOUNTER (OUTPATIENT)
Dept: PHYSICAL THERAPY | Facility: CLINIC | Age: 64
Setting detail: THERAPIES SERIES
End: 2019-07-16
Attending: INTERNAL MEDICINE
Payer: MEDICARE

## 2019-07-16 PROCEDURE — 97116 GAIT TRAINING THERAPY: CPT | Mod: GP | Performed by: PHYSICAL THERAPIST

## 2019-07-16 PROCEDURE — 97110 THERAPEUTIC EXERCISES: CPT | Mod: GP | Performed by: PHYSICAL THERAPIST

## 2019-07-16 PROCEDURE — 97140 MANUAL THERAPY 1/> REGIONS: CPT | Mod: GP | Performed by: PHYSICAL THERAPIST

## 2019-07-16 NOTE — PROGRESS NOTES
[facility]      OUTPATIENT PHYSICAL THERAPY  PLAN OF TREATMENT FOR OUTPATIENT REHABILITATION    Patient's Last Name, First Name, M.I.                YOB: 1955  Kashmir Yao                        Provider's Name  [facility] Medical Record No.  [id number]                               Onset Date: 5/1/19   Start of Care Date: 5/15/19   Type:     _X_PT   ___OT   ___SLP Medical Diagnosis: hydrocephalus                       PT Diagnosis: impaired gait and balance      _________________________________________________________________________________  Plan of Treatment:    Frequency/Duration: 12 visits in 90 days      Goals:  Goal Identifier 1 DGI   Goal Description Patience will score as 20/24 or greater on the DGI without use of an AD in order to demonstrate improved gait abilities and decreased falls risk for home and community ambulation.    Target Date 06/26/19   Date Met      Progress: in progress     Goal Identifier 2 Gait speed    Goal Description Patience will be able to ambulate 25 feet in 15 seconds or less and 15 steps or less in order to demonstrate more efficient and safe gait for community mobility.    Target Date 06/26/19   Date Met     Progress: in progress     Goal Identifier 3 household mobility   Goal Description Patience will be able to ambulate in the home without and AD with appropriate and safe gait pattern for progression toward baseline independence.    Target Date 06/26/19   Date Met      Progress:Patience is partially meeting this goal. He is ambulating household distances without an assistive device , however, he continues to have antalgic gait on the L LE and forward flexed trunk.  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   Target Date 06/26/19   Date Met   progressing to this goal   Progress:           Progress Toward Goals:   Progress this reporting period: Patience has only been seen for his eval and one visit during the original cert period.  He continues to be appropriate for skilled PT intervention.      Certification date from 6/27/19   to 9/24/19.    Nelly Ramey, PT          I CERTIFY THE NEED FOR THESE SERVICES FURNISHED UNDER        THIS PLAN OF TREATMENT AND WHILE UNDER MY CARE .             Physician Signature               Date    X_____________________________________________________                      Referring Provider: Dr. Sancho Mcrae (phone [phone] [facility])

## 2019-07-26 ENCOUNTER — HOSPITAL ENCOUNTER (OUTPATIENT)
Dept: PHYSICAL THERAPY | Facility: CLINIC | Age: 64
Setting detail: THERAPIES SERIES
End: 2019-07-26
Attending: INTERNAL MEDICINE
Payer: MEDICARE

## 2019-07-26 PROCEDURE — 97140 MANUAL THERAPY 1/> REGIONS: CPT | Mod: GP | Performed by: PHYSICAL THERAPIST

## 2019-07-26 PROCEDURE — 97116 GAIT TRAINING THERAPY: CPT | Mod: GP | Performed by: PHYSICAL THERAPIST

## 2019-07-26 PROCEDURE — 97112 NEUROMUSCULAR REEDUCATION: CPT | Mod: GP | Performed by: PHYSICAL THERAPIST

## 2019-07-26 PROCEDURE — 97110 THERAPEUTIC EXERCISES: CPT | Mod: GP | Performed by: PHYSICAL THERAPIST

## 2019-07-30 ENCOUNTER — HOSPITAL ENCOUNTER (OUTPATIENT)
Dept: PHYSICAL THERAPY | Facility: CLINIC | Age: 64
Setting detail: THERAPIES SERIES
End: 2019-07-30
Attending: INTERNAL MEDICINE
Payer: MEDICARE

## 2019-07-30 PROCEDURE — 97140 MANUAL THERAPY 1/> REGIONS: CPT | Mod: GP | Performed by: PHYSICAL THERAPIST

## 2019-07-30 PROCEDURE — 97110 THERAPEUTIC EXERCISES: CPT | Mod: GP | Performed by: PHYSICAL THERAPIST

## 2019-07-30 PROCEDURE — 97112 NEUROMUSCULAR REEDUCATION: CPT | Mod: GP | Performed by: PHYSICAL THERAPIST

## 2019-08-01 ENCOUNTER — HOSPITAL ENCOUNTER (OUTPATIENT)
Dept: PHYSICAL THERAPY | Facility: CLINIC | Age: 64
Setting detail: THERAPIES SERIES
End: 2019-08-01
Attending: INTERNAL MEDICINE
Payer: MEDICARE

## 2019-08-01 PROCEDURE — 97140 MANUAL THERAPY 1/> REGIONS: CPT | Mod: GP | Performed by: PHYSICAL THERAPIST

## 2019-08-01 PROCEDURE — 97110 THERAPEUTIC EXERCISES: CPT | Mod: GP | Performed by: PHYSICAL THERAPIST

## 2019-08-06 ENCOUNTER — HOSPITAL ENCOUNTER (OUTPATIENT)
Dept: PHYSICAL THERAPY | Facility: CLINIC | Age: 64
Setting detail: THERAPIES SERIES
End: 2019-08-06
Attending: INTERNAL MEDICINE
Payer: MEDICARE

## 2019-08-06 PROCEDURE — 97140 MANUAL THERAPY 1/> REGIONS: CPT | Mod: GP | Performed by: PHYSICAL THERAPIST

## 2019-08-06 PROCEDURE — 97116 GAIT TRAINING THERAPY: CPT | Mod: GP | Performed by: PHYSICAL THERAPIST

## 2019-08-06 PROCEDURE — 97110 THERAPEUTIC EXERCISES: CPT | Mod: GP | Performed by: PHYSICAL THERAPIST

## 2019-08-13 ENCOUNTER — HOSPITAL ENCOUNTER (OUTPATIENT)
Dept: PHYSICAL THERAPY | Facility: CLINIC | Age: 64
Setting detail: THERAPIES SERIES
End: 2019-08-13
Attending: INTERNAL MEDICINE
Payer: MEDICARE

## 2019-08-13 PROCEDURE — 97116 GAIT TRAINING THERAPY: CPT | Mod: GP | Performed by: PHYSICAL THERAPIST

## 2019-08-13 PROCEDURE — 97140 MANUAL THERAPY 1/> REGIONS: CPT | Mod: GP | Performed by: PHYSICAL THERAPIST

## 2019-08-13 PROCEDURE — 97110 THERAPEUTIC EXERCISES: CPT | Mod: GP | Performed by: PHYSICAL THERAPIST

## 2019-08-27 ENCOUNTER — HOSPITAL ENCOUNTER (OUTPATIENT)
Dept: PHYSICAL THERAPY | Facility: CLINIC | Age: 64
Setting detail: THERAPIES SERIES
End: 2019-08-27
Attending: INTERNAL MEDICINE
Payer: MEDICARE

## 2019-08-27 PROCEDURE — 97140 MANUAL THERAPY 1/> REGIONS: CPT | Mod: GP | Performed by: PHYSICAL THERAPIST

## 2019-08-27 PROCEDURE — 97112 NEUROMUSCULAR REEDUCATION: CPT | Mod: GP | Performed by: PHYSICAL THERAPIST

## 2019-08-27 PROCEDURE — 97110 THERAPEUTIC EXERCISES: CPT | Mod: GP | Performed by: PHYSICAL THERAPIST

## 2019-08-30 ENCOUNTER — HOSPITAL ENCOUNTER (OUTPATIENT)
Dept: PHYSICAL THERAPY | Facility: CLINIC | Age: 64
Setting detail: THERAPIES SERIES
End: 2019-08-30
Attending: INTERNAL MEDICINE
Payer: MEDICARE

## 2019-08-30 PROCEDURE — 97112 NEUROMUSCULAR REEDUCATION: CPT | Mod: GP | Performed by: PHYSICAL THERAPIST

## 2019-08-30 PROCEDURE — 97110 THERAPEUTIC EXERCISES: CPT | Mod: GP | Performed by: PHYSICAL THERAPIST

## 2019-09-05 ENCOUNTER — HOSPITAL ENCOUNTER (OUTPATIENT)
Dept: PHYSICAL THERAPY | Facility: CLINIC | Age: 64
Setting detail: THERAPIES SERIES
End: 2019-09-05
Attending: INTERNAL MEDICINE
Payer: MEDICARE

## 2019-09-05 PROCEDURE — 97110 THERAPEUTIC EXERCISES: CPT | Mod: GP | Performed by: PHYSICAL THERAPIST

## 2019-09-09 ENCOUNTER — APPOINTMENT (OUTPATIENT)
Dept: GENERAL RADIOLOGY | Facility: CLINIC | Age: 64
End: 2019-09-09
Attending: EMERGENCY MEDICINE
Payer: MEDICARE

## 2019-09-09 ENCOUNTER — HOSPITAL ENCOUNTER (EMERGENCY)
Facility: CLINIC | Age: 64
Discharge: HOME | End: 2019-09-09
Attending: EMERGENCY MEDICINE | Admitting: EMERGENCY MEDICINE
Payer: MEDICARE

## 2019-09-09 VITALS
TEMPERATURE: 98.4 F | HEART RATE: 51 BPM | DIASTOLIC BLOOD PRESSURE: 76 MMHG | RESPIRATION RATE: 18 BRPM | SYSTOLIC BLOOD PRESSURE: 135 MMHG | OXYGEN SATURATION: 98 %

## 2019-09-09 DIAGNOSIS — K59.00 CONSTIPATION, UNSPECIFIED CONSTIPATION TYPE: ICD-10-CM

## 2019-09-09 DIAGNOSIS — R10.9 ABDOMINAL PAIN, UNSPECIFIED ABDOMINAL LOCATION: ICD-10-CM

## 2019-09-09 PROCEDURE — 99283 EMERGENCY DEPT VISIT LOW MDM: CPT

## 2019-09-09 PROCEDURE — 74019 RADEX ABDOMEN 2 VIEWS: CPT

## 2019-09-09 PROCEDURE — 25000132 ZZH RX MED GY IP 250 OP 250 PS 637: Mod: GY | Performed by: EMERGENCY MEDICINE

## 2019-09-09 RX ORDER — DICYCLOMINE HCL 20 MG
20 TABLET ORAL ONCE
Status: COMPLETED | OUTPATIENT
Start: 2019-09-09 | End: 2019-09-09

## 2019-09-09 RX ORDER — OXYCODONE HYDROCHLORIDE 5 MG/1
5 TABLET ORAL ONCE
Status: COMPLETED | OUTPATIENT
Start: 2019-09-09 | End: 2019-09-09

## 2019-09-09 RX ORDER — POLYETHYLENE GLYCOL 3350 17 G/17G
1 POWDER, FOR SOLUTION ORAL DAILY
Qty: 527 G | Refills: 0 | Status: SHIPPED | OUTPATIENT
Start: 2019-09-09 | End: 2019-12-12

## 2019-09-09 RX ADMIN — DICYCLOMINE HYDROCHLORIDE 20 MG: 20 TABLET ORAL at 07:36

## 2019-09-09 RX ADMIN — OXYCODONE HYDROCHLORIDE 5 MG: 5 TABLET ORAL at 08:52

## 2019-09-09 ASSESSMENT — ENCOUNTER SYMPTOMS
NAUSEA: 1
CONSTIPATION: 1
COUGH: 1
ABDOMINAL PAIN: 1
APPETITE CHANGE: 0
VOMITING: 0

## 2019-09-09 NOTE — ED AVS SNAPSHOT
[facility]  [address]  [facility] [address]  Phone:  [phone]  Fax:  [phone]                                    Kashmir Yao   MRN: [id number]    Department:  [facility]   Date of Visit:  9/9/2019           After Visit Summary Signature Page    I have received my discharge instructions, and my questions have been answered. I have discussed any challenges I see with this plan with the nurse or doctor.    ..........................................................................................................................................  Patient/Patient Representative Signature      ..........................................................................................................................................  Patient Representative Print Name and Relationship to Patient    ..................................................               ................................................  Date                                   Time    ..........................................................................................................................................  Reviewed by Signature/Title    ...................................................              ..............................................  Date                                               Time          22EPIC Rev 08/18

## 2019-09-09 NOTE — ED TRIAGE NOTES
Here for abdominal pain and constipation. Last BM 10 days ago, taking ducolax but only have alittle out. Denies any n/v. ABCs intact.

## 2019-09-09 NOTE — ED PROVIDER NOTES
History     Chief Complaint:    Abdominal Pain    The history is provided by the patient.      Kashmir Yao is a 64 year old male with a history of hypertension, hyperlipidemia, coronary artery disease, epilepsy, and a coronary stent placement who presents with abdominal pain and constipation. The patient reports not having a bowel movement for the last 10 days. He has tried taking dulcolax, but it has not been very successful. He reports nausea, but denies vomiting. The patient denies any loss of appetite. He also reports having a persistent, productive cough for the last 3 weeks. The patient is still able to pass gas.The patient denies being around anyone else who is sick lately. Of note: the patient recently had a brain surgery to drain excess fluids.    Allergies:  Codeine  Gabapentin  Vicodin [Hydrocodone-Acetaminophen]    Medications:    Tylenol  Aspirin 81 mg  Nexium  Lamictal  Lopressor  Naprosyn  Percocet  Deltasone  Lyrica  Viagra  Zocor  Ultram  Klor-con  Bumex    Past Medical History:    Ataxia  Hydrocephalus  Arteriosclerosis of coronary artery\  Constipation  Chronic low back pain  Physical deconditioning  Spinal stenosis of lumbar region  Anxiety  Arthritis  Coronary artery disease  Colonic polyps  Disturbance of skin sensation  Encephalocele  Epilepsy, partial  GERD  Heart attack  Coronary artery disease  Dyslipidemia  Sleep apnea  History of spinal cord injury  Hypertension  Obstructive sleep apnea  Paraneoplastic antibody positive  Seizures  Stented coronary artery  Tobacco use disorder  Ventricular Asymmetry  Mixed hyperlipidemia  Anxiety state  Stenosis Aqueductal  Radiculopathy lumbar fifth right  Concussion    Past Surgical History:    Arthroplasty knee  Back surgery  C anesth, dx arthroscopic proc knee joint  Stint in heart  Cystoscopy  EGD, combined  HC colonoscopy thru stoma, diagnostic  Knee surgery - repair of cartilage  Repair of herniated disc lumbar  Coronary stent placement  Brain  surgery  Laminoplasty  Ventriculostomy endoscopy    Family History:    Coronary artery disease - father  Coronary artery disease - brother  Cardiovascular (CHF, HYPERTENSION, Vasc. Insuff., DM) - mother  Hypothyroid - sister  Renal failure - brother  Diabetes - father  Stroke - brother  Diabetes - father  Stroke - father  Coronary artery disease - mother    Social History:  Negative for tobacco use - former smoker, quit 2003.  Negative for alcohol use.  Negative for drug use.  Patient accompanied by his son to the ER.  Marital Status:   [2]    Review of Systems   Constitutional: Negative for appetite change.   Respiratory: Positive for cough.    Gastrointestinal: Positive for abdominal pain, constipation and nausea. Negative for vomiting.   All other systems reviewed and are negative.    Physical Exam     Patient Vitals for the past 24 hrs:   BP Temp Temp src Pulse Heart Rate Resp SpO2   09/09/19 0807 -- -- -- -- -- -- 98 %   09/09/19 0806 (!) 146/85 -- -- 50 -- -- --   09/09/19 0600 (!) 158/100 98.4  F (36.9  C) Oral 108 108 18 95 %     Physical Exam    Constitutional: Vital signs reviewed as above.   HENT:    Head: No external signs of trauma noted.   Eyes: Conjunctivae are normal. Pupils are equal, round, and reactive to light.   Cardiovascular:    Normal rate at the time of my exam, regular rhythm and normal heart sounds.     Exam reveals no friction rub.     No murmur heard.  Pulmonary/Chest:    Effort normal and breath sounds normal.    No respiratory distress.    There are no wheezes.    There are no rales.   Gastrointestinal:    Soft.    Bowel sounds normal.    There is no distension.    There is mild B/L upper abdominal tenderness.    There is no rebound or guarding.   Musculoskeletal:    Normal range of motion.    Normal Tone  Neurological: Patient is alert and oriented to person, place, and time.   Skin: Skin is warm and dry. Patient is not diaphoretic  Psychiatric: The patient appears  "calm      Emergency Department Course     Imaging:  Radiology findings were communicated with the patient who voiced understanding of the findings.    XR  Abdomen, 2 vw, flat and upright:   Since CT exam of 2018, there remains no  intraperitoneal air. No dilated air-filled loops of small bowel. Mild  amount of stool scattered throughout portions of the colon, as per radiology.     Interventions:  0736: Bentyl 20 mg PO  0852: Roxicodone 5 mg PO    Emergency Department Course:  Past medical records, nursing notes, and vitals reviewed.    0712: I performed an exam of the patient as documented above.     The patient was sent for an abdomen x-ray while in the emergency department, results above.     0818: Patient rechecked and updated.      I personally reviewed the imaging results with the Patient and answered all related questions prior to discharge.    Findings and plan explained to the Patient. Patient discharged home with instructions regarding supportive care, medications, and reasons to return. The importance of close follow-up was reviewed.      Impression & Plan     Medical Decision Makin-year-old male patient presents the ED due to abdominal pain.  Please see the HPI and exam for specifics.  The patient notes that he is a constipation basically all of his life.  He did try some Dulcolax recently but noted that it was  as of August of this year.  After taking it he noted quite a bit of abdominal discomfort prompting his ED visit.  He states that he has had a bowel movement today.  He notes that these did not seem like small clint but not quite the consistency of a \"normal\" bowel movement.     The patient also noted that over the last couple hours she began to feel more lightheaded.  The patient does take metoprolol but states that he has not had any dose changes of any of his medication in quite a while.  His heart rate was initially triaged as tachycardic though it seems normal at the " time of my examination bradycardic at rate about 50 during most of his ED stay.  Sitting down the patient otherwise does not feel bad.  He states that he has not taken his Viagra recently.  I will encourage him to perhaps hold the metoprolol if he continues to feel lightheaded and certainly talk with his primary care physician about this as well.    An x-ray was ordered that did not show evidence of obstruction.  The patient was given Bentyl but did not note any change in his symptoms.  At this time I believe the patient can be discharged.  He has expressed interest in trying magnesium citrate and MiraLAX at home.  He should return if there are worsening symptoms including vomiting, fever, or worsening in uncontrollable pain but otherwise follow-up in the outpatient setting.  Anticipatory guidance given prior to discharge.    Discharge Diagnosis:    ICD-10-CM   1. Abdominal pain, unspecified abdominal location R10.9   2. Constipation, unspecified constipation type K59.00     Disposition:  Discharged to home.    Discharge Medications:  New Prescriptions    MAGNESIUM CITRATE SOLUTION    Take 296 mLs by mouth once for 1 dose    POLYETHYLENE GLYCOL (MIRALAX) POWDER    Take 17 g (1 capful) by mouth daily     Scribe Disclosure:  ISophia, am serving as a scribe at 7:21 AM on 9/9/2019 to document services personally performed by John Tabares DO based on my observations and the provider's statements to me.     Sophia Meadows  9/9/2019   [facility] EMERGENCY DEPARTMENT       John Tabares DO  09/09/19 3760

## 2019-09-09 NOTE — ED NOTES
Pt states his medications he takes at home make him dizzy.    Skin normal color for race, warm, dry and intact. No evidence of rash.

## 2019-09-12 ENCOUNTER — HOSPITAL ENCOUNTER (OUTPATIENT)
Dept: PHYSICAL THERAPY | Facility: CLINIC | Age: 64
Setting detail: THERAPIES SERIES
End: 2019-09-12
Attending: INTERNAL MEDICINE
Payer: MEDICARE

## 2019-09-12 PROCEDURE — 97110 THERAPEUTIC EXERCISES: CPT | Mod: GP | Performed by: PHYSICAL THERAPIST

## 2019-09-20 ENCOUNTER — TRANSFERRED RECORDS (OUTPATIENT)
Dept: HEALTH INFORMATION MANAGEMENT | Facility: CLINIC | Age: 64
End: 2019-09-20

## 2019-09-24 ENCOUNTER — TRANSFERRED RECORDS (OUTPATIENT)
Dept: HEALTH INFORMATION MANAGEMENT | Facility: CLINIC | Age: 64
End: 2019-09-24

## 2019-09-30 ENCOUNTER — HOSPITAL ENCOUNTER (OUTPATIENT)
Dept: PHYSICAL THERAPY | Facility: CLINIC | Age: 64
Setting detail: THERAPIES SERIES
End: 2019-09-30
Attending: INTERNAL MEDICINE
Payer: MEDICARE

## 2019-09-30 PROCEDURE — 97116 GAIT TRAINING THERAPY: CPT | Mod: GP | Performed by: PHYSICAL THERAPIST

## 2019-09-30 PROCEDURE — 97110 THERAPEUTIC EXERCISES: CPT | Mod: GP | Performed by: PHYSICAL THERAPIST

## 2019-09-30 PROCEDURE — 97112 NEUROMUSCULAR REEDUCATION: CPT | Mod: GP | Performed by: PHYSICAL THERAPIST

## 2019-10-03 ENCOUNTER — ANCILLARY PROCEDURE (OUTPATIENT)
Dept: GENERAL RADIOLOGY | Facility: CLINIC | Age: 64
End: 2019-10-03
Attending: NURSE PRACTITIONER
Payer: MEDICARE

## 2019-10-03 ENCOUNTER — OFFICE VISIT (OUTPATIENT)
Dept: FAMILY MEDICINE | Facility: CLINIC | Age: 64
End: 2019-10-03
Payer: MEDICARE

## 2019-10-03 VITALS
HEART RATE: 52 BPM | BODY MASS INDEX: 35 KG/M2 | RESPIRATION RATE: 16 BRPM | OXYGEN SATURATION: 99 % | WEIGHT: 223 LBS | TEMPERATURE: 98.1 F | SYSTOLIC BLOOD PRESSURE: 116 MMHG | DIASTOLIC BLOOD PRESSURE: 69 MMHG | HEIGHT: 67 IN

## 2019-10-03 DIAGNOSIS — R05.9 COUGH: ICD-10-CM

## 2019-10-03 DIAGNOSIS — J20.9 ACUTE BRONCHITIS WITH SYMPTOMS > 10 DAYS: Primary | ICD-10-CM

## 2019-10-03 PROCEDURE — 71046 X-RAY EXAM CHEST 2 VIEWS: CPT

## 2019-10-03 PROCEDURE — 99213 OFFICE O/P EST LOW 20 MIN: CPT | Performed by: NURSE PRACTITIONER

## 2019-10-03 RX ORDER — BENZONATATE 200 MG/1
200 CAPSULE ORAL 3 TIMES DAILY PRN
Qty: 30 CAPSULE | Refills: 1 | Status: SHIPPED | OUTPATIENT
Start: 2019-10-03 | End: 2019-12-12

## 2019-10-03 RX ORDER — PREDNISONE 20 MG/1
40 TABLET ORAL DAILY
Qty: 10 TABLET | Refills: 0 | Status: SHIPPED | OUTPATIENT
Start: 2019-10-03 | End: 2019-12-12

## 2019-10-03 RX ORDER — AZITHROMYCIN 250 MG/1
TABLET, FILM COATED ORAL
Qty: 6 TABLET | Refills: 0 | Status: SHIPPED | OUTPATIENT
Start: 2019-10-03 | End: 2019-12-12

## 2019-10-03 ASSESSMENT — MIFFLIN-ST. JEOR: SCORE: 1759.11

## 2019-10-03 NOTE — PROGRESS NOTES
Subjective     Kashmir Yao is a 64 year old male who presents to clinic today for the following health issues:    HPI   Acute Illness   Acute illness concerns: cough  Onset: 2 months    Fever: no    Chills/Sweats: no    Headache (location?): no    Sinus Pressure:no    Conjunctivitis:  no    Ear Pain: no    Rhinorrhea: no    Congestion: no    Sore Throat: no     Cough: YES-non-productive    Wheeze: no    Decreased Appetite: no    Nausea: no    Vomiting: no    Diarrhea:  no    Dysuria/Freq.: no    Fatigue/Achiness: no    Sick/Strep Exposure: no     Therapies Tried and outcome:   Dry cough for several months with occasional mucous production.  Wheezing is present at times.  Cough is triggered by taking deep breaths and with talking.  Denies seasonal allergies or history of asthma.  History of smoking, stopped in 2003.  Has GERD that is well controlled with Nexium and ranitidine.  Denies chest pain, shortness of breath or lower extremity edema.         Current Outpatient Medications   Medication Sig Dispense Refill     azithromycin (ZITHROMAX) 250 MG tablet Two tablets first day, then one tablet daily for four days. 6 tablet 0     benzonatate (TESSALON) 200 MG capsule Take 1 capsule (200 mg) by mouth 3 times daily as needed for cough 30 capsule 1     predniSONE (DELTASONE) 20 MG tablet Take 2 tablets (40 mg) by mouth daily 10 tablet 0     acetaminophen (TYLENOL) 500 MG tablet Take 1,000 mg by mouth every 8 hours as needed for mild pain       aspirin 81 MG tablet Take 81 mg by mouth daily       esomeprazole (NEXIUM) 40 MG CR capsule TAKE 1 CAPSULE BY MOUTH TWICE DAILY 180 capsule 2     lamoTRIgine (LAMICTAL) 100 MG tablet 250 mg At Bedtime   3     lamoTRIgine (LAMICTAL) 200 MG tablet Take 200 mg by mouth every morning  90 tablet 1     metoprolol tartrate (LOPRESSOR) 50 MG tablet Take 1 tablet (50 mg) by mouth 2 times daily 180 tablet 3     naproxen (NAPROSYN) 500 MG tablet Take 1 tablet by mouth 2 times daily        oxyCODONE-acetaminophen (PERCOCET) 5-325 MG per tablet Take 1-2 tablets by mouth every 4 hours as needed for pain 12 tablet 0     polyethylene glycol (MIRALAX) powder Take 17 g (1 capful) by mouth daily 527 g 0     predniSONE (DELTASONE) 10 MG tablet 60 mg daily times seven days then  50 mg daily times one day then  40 mg daily times one day then   30 mg daily times one day then   20 mg daily times one day then  10 mg daily times one day then stop. 30 tablet 0     pregabalin (LYRICA) 25 MG capsule Take 3 capsules (75 mg) by mouth 2 times daily 540 capsule 1     Ranitidine HCl (ZANTAC PO) Take 75 mg by mouth nightly as needed        sildenafil (VIAGRA) 50 MG tablet Take 1 tablet (50 mg) by mouth daily as needed for erectile dysfunction 10 tablet 11     simvastatin (ZOCOR) 40 MG tablet Take 1 tablet (40 mg) by mouth At Bedtime 90 tablet 3     simvastatin (ZOCOR) 40 MG tablet Take 40 mg by mouth At Bedtime       traMADol (ULTRAM) 50 MG tablet Take 2 tablets (100 mg) by mouth every 6 hours as needed for moderate pain 25 tablet 0     valACYclovir (VALTREX) 1000 mg tablet Take 1 tablet (1,000 mg) by mouth 3 times daily for 7 days 21 tablet 0     zolpidem (AMBIEN) 10 MG tablet Take 1 tablet (10 mg) by mouth nightly as needed for sleep at bedtime. 90 tablet 1     BP Readings from Last 3 Encounters:   10/03/19 116/69   09/09/19 135/76   06/19/19 131/81    Wt Readings from Last 3 Encounters:   10/03/19 101.2 kg (223 lb)   06/19/19 104.4 kg (230 lb 1.6 oz)   05/14/19 103 kg (227 lb)                    Reviewed and updated as needed this visit by Provider         Review of Systems   ROS COMP: CONSTITUTIONAL: NEGATIVE for fever, chills, change in weight  ENT/MOUTH: NEGATIVE for ear, mouth and throat problems  RESP: NEGATIVE for significant cough or SOB  CV: NEGATIVE for chest pain, palpitations or peripheral edema  PSYCHIATRIC: NEGATIVE for changes in mood or affect      Objective    /69 (BP Location: Right arm, Patient  "Position: Chair, Cuff Size: Adult Large)   Pulse 52   Temp 98.1  F (36.7  C) (Oral)   Resp 16   Ht 1.708 m (5' 7.25\")   Wt 101.2 kg (223 lb)   SpO2 99%   BMI 34.67 kg/m    Body mass index is 34.67 kg/m .  Physical Exam   GENERAL: healthy, alert and no distress  HENT: ear canals and TM's normal, nose and mouth without ulcers or lesions  NECK: no adenopathy, no asymmetry, masses, or scars and thyroid normal to palpation  RESP: lungs clear to auscultation - no rales, rhonchi or wheezes  CV: regular rate and rhythm, normal S1 S2, no S3 or S4, no murmur, click or rub, no peripheral edema   PSYCH: mentation appears normal, affect normal/bright            Assessment & Plan   Assessment      Plan  1. Acute bronchitis with symptoms > 10 days: CXR appears without infiltrate, patient informed.  Prolonged cough, denies allergies as a cause, GERD is well controlled.  Will treat with azithromycin and prednisone.  Take tessalon as needed tid.    - azithromycin (ZITHROMAX) 250 MG tablet; Two tablets first day, then one tablet daily for four days.  Dispense: 6 tablet; Refill: 0  - benzonatate (TESSALON) 200 MG capsule; Take 1 capsule (200 mg) by mouth 3 times daily as needed for cough  Dispense: 30 capsule; Refill: 1  - predniSONE (DELTASONE) 20 MG tablet; Take 2 tablets (40 mg) by mouth daily  Dispense: 10 tablet; Refill: 0    2. Cough  - XR Chest 2 Views; Future    BMI:   Estimated body mass index is 34.67 kg/m  as calculated from the following:    Height as of this encounter: 1.708 m (5' 7.25\").    Weight as of this encounter: 101.2 kg (223 lb).       Return in about 4 weeks (around 10/31/2019). if cough does not resolve, sooner as needed.      Susan Haase, APRN [facility]"

## 2019-10-07 ENCOUNTER — HOSPITAL ENCOUNTER (OUTPATIENT)
Dept: PHYSICAL THERAPY | Facility: CLINIC | Age: 64
Setting detail: THERAPIES SERIES
End: 2019-10-07
Attending: INTERNAL MEDICINE
Payer: MEDICARE

## 2019-10-07 PROCEDURE — 97112 NEUROMUSCULAR REEDUCATION: CPT | Mod: GP | Performed by: PHYSICAL THERAPIST

## 2019-10-07 PROCEDURE — 97140 MANUAL THERAPY 1/> REGIONS: CPT | Mod: GP | Performed by: PHYSICAL THERAPIST

## 2019-10-07 NOTE — PROGRESS NOTES
"PROGRESS NOTE     09/30/19 1300   Signing Clinician's Name / Credentials   Signing clinician's name / credentials Nestor Ramey, PT   Session Number   Session Number 16   Authorization status [employer] - no limits   Goal 1   Goal Identifier 1 DGI   Goal Description Patience will score as 20/24 or greater on the DGI without use of an AD in order to demonstrate improved gait abilities and decreased falls risk for home and community ambulation.    Target Date 11/30/19   Goal 2   Goal Identifier 2 Gait speed    Goal Description Patience will be able to ambulate 25 feet in 15 seconds or less and 15 steps or less in order to demonstrate more efficient and safe gait for community mobility.    Target Date 06/26/19   Date Met 07/11/19   Goal 3   Goal Identifier 3 household mobility   Goal Description Patience will be able to ambulate in the home without an AD with appropriate and safe gait pattern for progression toward baseline independence.    Target Date 09/24/19   Date Met 08/01/19   Goal 4   Goal Identifier 4 Stairs   Goal Description Patience will be able to negotiate a flight of stairs with light contact on one railing in a reciprocal pattern both up and down for safe and efficient access to all levels of his home.   Target Date 11/30/19   Subjective Report   Subjective Report \"not good\"  states that he is in pain   Knee to the ankle on the R.   He states that he went to the [facility] - orthopedic surgeon and received a cortizone injection in the R knee.   Only had a day and a half or relief and plans on calling surgeon back   Dr. Chet Leonardo.    Other possibilities include theory - \"chicken or egg\"  is it the back or the knee.    Therapeutic Procedure/exercise   Therapeutic Procedures: strength, endurance, ROM, flexibillity minutes (77533) 35   Skilled Intervention cues, education   Patient Response OMNI 5-6 with stepper, HR 74 bpm after stepper.    Treatment Detail r stepper seat starting at 19, change to 18 cues for greater " knee extension, femoral neutral, on the R  alternating useing UE and LE  and just LE total time 20 min , WL b/w 1-5.      kineseotape for shin splints R lower leg with ant tib inhibition and myofascial Y strips x 2 with anchor medial and tails to lateral.   ed on stepper at gym try to have greater knee extension and activation of posterior LE and foot alignment increase intensity to challenge strength of the LE's      Progress tolerated stepper.  Taping to see if this will assist iwth pain R lower leg knee down.  May be due to alignment and force to ant tibialis muscles vs spinal pathology   Neuromuscular Re-education   Neuromuscular re-ed of mvmt, balance, coord, kinesthetic sense, posture, proprioception minutes (02934) 10   Skilled Intervention assessment of alignment and education on how affecting muscle functoin   Patient Response keeps COM posterior with static stand and gait,  He had ER femoral and tibia, decreased activation gluteals R and inner thigh L. rotated pelvis in tranverse plane forward with correction of femoral and tibial alignment .    Treatment Detail education and faciliation into better alignment in all planes, pt feels twisted when femur R neutral and pelvis netural.  Standing reqular stance and working on anterior sway/COM past CRISSY.    Gait Training   Gait Training Minutes, includes stair climbing (71492) 8   Skilled Intervention cues, tibial alignment and longer step mid to end stance.    Patient Response short steps, hesitant to bring COM forward past CRISSY Mid to end stance B   Treatment Detail gait with correction tibial alignment - neutral - toes out on the R/tibial ER. 50-90 feet x 2  no device.   Education   Learner Patient   Readiness Acceptance   Method Explanation;Demonstration   Response Verbalizes Understanding;Needs Reinforcement   Education Comments as above.  To contact his orthopedic MD regarding the knee injection not working (as pt wondering if he should do this) also ok to  tell MD that he is working with PT to address alignmen tand muscle function.   MD may want to assess back.    Plan   Plan for next session correct pelvic alignment in transverse plane, gluteal strengthening R and forward CRISSY over ankles B   Comments   Comments PN covers dates from 5/11/19 to 9/30/19 .   Pt has demonstrated improvement since start of treatment, He has less pain in the L leg and L knee, less cramping pain from proximal hips down into the legs.  He continues to have pain R side knee down and points to the anterior tib.   Areas with impairments include difficulty bringing COM forward past CRISSY , gluteal strengthening on the R, and improve alignment tibia in rotational plane - tibial ER occuring.  He continues to hold shoulders posterior to pelvis keeping hips in a slight locked position, challenged in open pack positions on the R with weakness hip stabilizers, hamstrings and adductors.   He has multiple co morbidites affecting his movement and function including most recent hydrocephalus, L knee TKA, back surgery.  He has many questions at each session  He has missed some due to no shows.  He continues to be appropriate for PT and wishes to continue.    Anticipate ability to continue to improve with continuation of HEP and regular attendance to PT. Goals 1, and 4 not met.  Continue to be appropriate.  New to be met date of 11/30/19   Total Session Time   Timed Code Treatment Minutes 53   Total Treatment Time (sum of timed and untimed services) 53

## 2019-10-09 ENCOUNTER — HOSPITAL ENCOUNTER (OUTPATIENT)
Dept: PHYSICAL THERAPY | Facility: CLINIC | Age: 64
Setting detail: THERAPIES SERIES
End: 2019-10-09
Attending: INTERNAL MEDICINE
Payer: MEDICARE

## 2019-10-09 PROCEDURE — 97110 THERAPEUTIC EXERCISES: CPT | Mod: GP | Performed by: PHYSICAL THERAPIST

## 2019-10-09 PROCEDURE — 97112 NEUROMUSCULAR REEDUCATION: CPT | Mod: GP | Performed by: PHYSICAL THERAPIST

## 2019-10-14 ENCOUNTER — HOSPITAL ENCOUNTER (OUTPATIENT)
Dept: PHYSICAL THERAPY | Facility: CLINIC | Age: 64
Setting detail: THERAPIES SERIES
End: 2019-10-14
Attending: INTERNAL MEDICINE
Payer: MEDICARE

## 2019-10-14 PROCEDURE — 97140 MANUAL THERAPY 1/> REGIONS: CPT | Mod: GP | Performed by: PHYSICAL THERAPIST

## 2019-10-14 PROCEDURE — 97112 NEUROMUSCULAR REEDUCATION: CPT | Mod: GP | Performed by: PHYSICAL THERAPIST

## 2019-10-14 PROCEDURE — 97110 THERAPEUTIC EXERCISES: CPT | Mod: GP | Performed by: PHYSICAL THERAPIST

## 2019-10-16 ENCOUNTER — HOSPITAL ENCOUNTER (OUTPATIENT)
Dept: PHYSICAL THERAPY | Facility: CLINIC | Age: 64
Setting detail: THERAPIES SERIES
End: 2019-10-16
Attending: INTERNAL MEDICINE
Payer: MEDICARE

## 2019-10-16 PROCEDURE — 97140 MANUAL THERAPY 1/> REGIONS: CPT | Mod: GP | Performed by: PHYSICAL THERAPIST

## 2019-10-16 PROCEDURE — 97110 THERAPEUTIC EXERCISES: CPT | Mod: GP | Performed by: PHYSICAL THERAPIST

## 2019-10-24 ENCOUNTER — HOSPITAL ENCOUNTER (OUTPATIENT)
Dept: PHYSICAL THERAPY | Facility: CLINIC | Age: 64
Setting detail: THERAPIES SERIES
End: 2019-10-24
Attending: INTERNAL MEDICINE
Payer: MEDICARE

## 2019-10-24 PROCEDURE — 97140 MANUAL THERAPY 1/> REGIONS: CPT | Mod: GP | Performed by: PHYSICAL THERAPIST

## 2019-10-24 PROCEDURE — 97112 NEUROMUSCULAR REEDUCATION: CPT | Mod: GP | Performed by: PHYSICAL THERAPIST

## 2019-10-31 ENCOUNTER — HOSPITAL ENCOUNTER (OUTPATIENT)
Dept: PHYSICAL THERAPY | Facility: CLINIC | Age: 64
Setting detail: THERAPIES SERIES
End: 2019-10-31
Attending: INTERNAL MEDICINE
Payer: MEDICARE

## 2019-10-31 PROCEDURE — 97112 NEUROMUSCULAR REEDUCATION: CPT | Mod: GP | Performed by: PHYSICAL THERAPIST

## 2019-10-31 PROCEDURE — 97750 PHYSICAL PERFORMANCE TEST: CPT | Mod: GP | Performed by: PHYSICAL THERAPIST

## 2019-11-01 NOTE — PROGRESS NOTES
10/31/19 1600   Signing Clinician's Name / Credentials   Signing clinician's name / credentials Snow Mantilla PT   Dynamic Gait Index ([employer]ee[employer]e and [employer]ar [place], 1995)   Gait Level Surface 2  (mild deviations, slow pace)   Change in Gait Speed 3   Gait and Horizontal Head Turns 2  (hesitant to turn head, slows down a little.  No stagger)   Gait with Vertical Head Turns 3   Gait and Pivot Turns 2   Step Over Obstacle 1   Step Around Obstacles 2   Steps 2   Total Dynamic Gait Index Score  (A score of 19 or less has been correlated to an increased risk of falls in community dwelling older adults, patients with vestibular disorders, and patients with MS.)   Total Score (out of 24) 17

## 2019-11-04 ENCOUNTER — OFFICE VISIT (OUTPATIENT)
Dept: FAMILY MEDICINE | Facility: CLINIC | Age: 64
End: 2019-11-04
Payer: MEDICARE

## 2019-11-04 ENCOUNTER — HOSPITAL ENCOUNTER (OUTPATIENT)
Dept: PHYSICAL THERAPY | Facility: CLINIC | Age: 64
Setting detail: THERAPIES SERIES
End: 2019-11-04
Attending: INTERNAL MEDICINE
Payer: MEDICARE

## 2019-11-04 VITALS
TEMPERATURE: 97.8 F | OXYGEN SATURATION: 100 % | WEIGHT: 227.06 LBS | DIASTOLIC BLOOD PRESSURE: 80 MMHG | BODY MASS INDEX: 35.3 KG/M2 | HEART RATE: 63 BPM | SYSTOLIC BLOOD PRESSURE: 138 MMHG

## 2019-11-04 DIAGNOSIS — R05.3 CHRONIC COUGH: Primary | ICD-10-CM

## 2019-11-04 DIAGNOSIS — H61.22 EXCESSIVE CERUMEN IN EAR CANAL, LEFT: ICD-10-CM

## 2019-11-04 PROCEDURE — 97112 NEUROMUSCULAR REEDUCATION: CPT | Mod: GP | Performed by: PHYSICAL THERAPIST

## 2019-11-04 PROCEDURE — 99213 OFFICE O/P EST LOW 20 MIN: CPT | Mod: 25 | Performed by: PHYSICIAN ASSISTANT

## 2019-11-04 PROCEDURE — 97116 GAIT TRAINING THERAPY: CPT | Mod: GP,KX | Performed by: PHYSICAL THERAPIST

## 2019-11-04 PROCEDURE — 69209 REMOVE IMPACTED EAR WAX UNI: CPT | Performed by: PHYSICIAN ASSISTANT

## 2019-11-04 RX ORDER — FLUTICASONE PROPIONATE 50 MCG
1 SPRAY, SUSPENSION (ML) NASAL DAILY
Qty: 16 G | Refills: 0 | Status: SHIPPED | OUTPATIENT
Start: 2019-11-04 | End: 2020-05-27

## 2019-11-04 RX ORDER — BENZONATATE 200 MG/1
200 CAPSULE ORAL 3 TIMES DAILY PRN
Qty: 30 CAPSULE | Refills: 0 | Status: SHIPPED | OUTPATIENT
Start: 2019-11-04 | End: 2020-01-02

## 2019-11-04 RX ORDER — CETIRIZINE HYDROCHLORIDE 10 MG/1
10 TABLET ORAL DAILY
Qty: 90 TABLET | Refills: 0 | Status: SHIPPED | OUTPATIENT
Start: 2019-11-04 | End: 2020-08-21

## 2019-11-04 RX ORDER — METHYLPREDNISOLONE 4 MG
TABLET, DOSE PACK ORAL
Qty: 21 TABLET | Refills: 0 | Status: SHIPPED | OUTPATIENT
Start: 2019-11-04 | End: 2019-12-12

## 2019-11-04 NOTE — PROGRESS NOTES
Subjective     Kashmir Yao is a 64 year old male who presents to clinic today for the following health issues:    HPI   Acute Illness   Acute illness concerns: cough   Onset: about three months     Fever: no    Chills/Sweats: no    Headache (location?): no    Sinus Pressure:no    Conjunctivitis:  no    Ear Pain: no    Rhinorrhea: YES    Congestion: no    Sore Throat: no     Cough: YES-non-productive    Wheeze: no    Decreased Appetite: no    Nausea: no    Vomiting: no    Diarrhea:  no    Dysuria/Freq.: no    Fatigue/Achiness: Yes     Sick/Strep Exposure: no     Therapies Tried and outcome: azithromycin, prednisone with some relief for the first two weeks     Uses Cpap mask at night. Wonders if this is causing his cough. Cleans mask every other day. Changes filters every other week.         Patient Active Problem List   Diagnosis     Mixed hyperlipidemia     Esophageal reflux     Anxiety state     GERD (gastroesophageal reflux disease)     RIC (obstructive sleep apnea)     HYPERLIPIDEMIA LDL GOAL <100     Health Care Home     S/P TKR (total knee replacement)     Epilepsy (H)     Benign essential hypertension     Physical deconditioning     Constipation     Trauma     Past Surgical History:   Procedure Laterality Date     ARTHROPLASTY KNEE Left 5/16/2016    Procedure: ARTHROPLASTY KNEE;  Surgeon: Chet Leonardo MD;  Location:  OR     BACK SURGERY       C ANESTH,DX ARTHROSCOPIC PROC KNEE JOINT      CARTILEGE REPAIR.     C NONSPECIFIC PROCEDURE      stint in heart     CYSTOSCOPY       ESOPHAGOSCOPY, GASTROSCOPY, DUODENOSCOPY (EGD), COMBINED  5/24/2012    Procedure:COMBINED ESOPHAGOSCOPY, GASTROSCOPY, DUODENOSCOPY (EGD); ESOPHAGOSCOPY, GASTROSCOPY, DUODENOSCOPY (EGD) ; Surgeon:GILLIAN JOHNSON; Location: GI     HC COLONOSCOPY THRU STOMA, DIAGNOSTIC  2009    tubular adenomae, recommended annual colonoscopy     KNEE SURGERY Left     repair of cartilage     SPINE SURGERY      repair of herniated disc lumbar      STENT         Social History     Tobacco Use     Smoking status: Former Smoker     Packs/day: 0.50     Years: 20.00     Pack years: 10.00     Last attempt to quit: 2003     Years since quittin.2     Smokeless tobacco: Never Used   Substance Use Topics     Alcohol use: No     Alcohol/week: 0.0 standard drinks     Family History   Problem Relation Age of Onset     Cardiovascular Mother         CHF, HTN, VASC. INSUFF., DM     Family History Negative Father      Thyroid Disease Sister         HYPOTHYROID     Genitourinary Problems Brother         RENAL FAILURE AND H/O MVA WITH LE PARAPLEGIA     Cancer - colorectal No family hx of          Current Outpatient Medications   Medication Sig Dispense Refill     acetaminophen (TYLENOL) 500 MG tablet Take 1,000 mg by mouth every 8 hours as needed for mild pain       aspirin 81 MG tablet Take 81 mg by mouth daily       azithromycin (ZITHROMAX) 250 MG tablet Two tablets first day, then one tablet daily for four days. 6 tablet 0     benzonatate (TESSALON) 200 MG capsule Take 1 capsule (200 mg) by mouth 3 times daily as needed for cough 30 capsule 1     esomeprazole (NEXIUM) 40 MG CR capsule TAKE 1 CAPSULE BY MOUTH TWICE DAILY 180 capsule 2     lamoTRIgine (LAMICTAL) 100 MG tablet 250 mg At Bedtime   3     lamoTRIgine (LAMICTAL) 200 MG tablet Take 200 mg by mouth every morning  90 tablet 1     metoprolol tartrate (LOPRESSOR) 50 MG tablet Take 1 tablet (50 mg) by mouth 2 times daily 180 tablet 3     naproxen (NAPROSYN) 500 MG tablet Take 1 tablet by mouth 2 times daily       oxyCODONE-acetaminophen (PERCOCET) 5-325 MG per tablet Take 1-2 tablets by mouth every 4 hours as needed for pain 12 tablet 0     predniSONE (DELTASONE) 10 MG tablet 60 mg daily times seven days then  50 mg daily times one day then  40 mg daily times one day then   30 mg daily times one day then   20 mg daily times one day then  10 mg daily times one day then stop. 30 tablet 0     predniSONE  (DELTASONE) 20 MG tablet Take 2 tablets (40 mg) by mouth daily 10 tablet 0     pregabalin (LYRICA) 25 MG capsule Take 3 capsules (75 mg) by mouth 2 times daily 540 capsule 1     Ranitidine HCl (ZANTAC PO) Take 75 mg by mouth nightly as needed        sildenafil (VIAGRA) 50 MG tablet Take 1 tablet (50 mg) by mouth daily as needed for erectile dysfunction 10 tablet 11     simvastatin (ZOCOR) 40 MG tablet Take 1 tablet (40 mg) by mouth At Bedtime 90 tablet 3     simvastatin (ZOCOR) 40 MG tablet Take 40 mg by mouth At Bedtime       traMADol (ULTRAM) 50 MG tablet Take 2 tablets (100 mg) by mouth every 6 hours as needed for moderate pain 25 tablet 0     valACYclovir (VALTREX) 1000 mg tablet Take 1 tablet (1,000 mg) by mouth 3 times daily for 7 days 21 tablet 0     zolpidem (AMBIEN) 10 MG tablet Take 1 tablet (10 mg) by mouth nightly as needed for sleep at bedtime. 90 tablet 1     Allergies   Allergen Reactions     Codeine      inability to sleep after taking codeine     Gabapentin      Fever       Vicodin [Hydrocodone-Acetaminophen]      Perspiring, hyper, itchy         Reviewed and updated as needed this visit by Provider         Review of Systems   ROS COMP: Constitutional, HEENT, cardiovascular, pulmonary, gi and gu systems are negative, except as otherwise noted.        Objective    /80 (BP Location: Right arm, Patient Position: Chair, Cuff Size: Adult Large)   Pulse 63   Temp 97.8  F (36.6  C) (Oral)   Wt 103 kg (227 lb 1 oz)   SpO2 100%   BMI 35.30 kg/m    Body mass index is 35.3 kg/m .         Physical Exam   GENERAL: healthy, alert and no distress  EYES: Eyes grossly normal to inspection, PERRL and conjunctivae and sclerae normal  HENT: normal cephalic/atraumatic, right ear: normal: no effusions, no erythema, normal landmarks, left ear: occluded with wax, nose and mouth without ulcers or lesions, oropharynx clear and oral mucous membranes moist  RESP: lungs clear to auscultation - no rales, rhonchi or  wheezes  CV: regular rate and rhythm, normal S1 S2, no S3 or S4, no murmur, click or rub, no peripheral edema and peripheral pulses strong  MS: no gross musculoskeletal defects noted, no edema  SKIN: no suspicious lesions or rashes  NEURO: Normal strength and tone, mentation intact and speech normal  PSYCH: mentation appears normal, affect normal/bright  LYMPH: no cervical, supraclavicular, axillary, or inguinal adenopathy    Diagnostic Test Results:  none         Assessment & Plan     (R05) Chronic cough  (primary encounter diagnosis)  Comment: Possibly allergy or post-nasal drip related. Will try medrol dose pack as there may be some inflammation. Start allergy medication and Flonase. Try tessalon if covered.    Plan: fluticasone (FLONASE) 50 MCG/ACT nasal spray,         methylPREDNISolone (MEDROL DOSEPAK) 4 MG tablet        therapy pack, cetirizine (ZYRTEC) 10 MG tablet,        benzonatate (TESSALON) 200 MG capsule              (H61.22) Excessive cerumen in ear canal, left    Comment: Ear lavage was unsuccessful. Will use debrox and return for nurse only ear lavage in about 1 week.    Plan: carbamide peroxide (DEBROX) 6.5 % otic         solution, HC REMOVAL IMPACTED CERUMEN         IRRIGATION/LVG UNILAT              Patient Instructions   Start taking Zyrtec daily.    Use Flonase nasal spray once a day.    Take the complete course of the steroid.    Use tessalon as needed up to 3 times a day.    Use debrox ear drops and return for a nurse only ear wash in about 1 week.      No follow-ups on file.    Shady Treviño PA-C  [facility]

## 2019-11-04 NOTE — PATIENT INSTRUCTIONS
Start taking Zyrtec daily.    Use Flonase nasal spray once a day.    Take the complete course of the steroid.    Use tessalon as needed up to 3 times a day.    Use debrox ear drops and return for a nurse only ear wash in about 1 week.

## 2019-11-11 ENCOUNTER — ALLIED HEALTH/NURSE VISIT (OUTPATIENT)
Dept: FAMILY MEDICINE | Facility: CLINIC | Age: 64
End: 2019-11-11
Payer: MEDICARE

## 2019-11-11 DIAGNOSIS — H61.21 IMPACTED CERUMEN OF RIGHT EAR: Primary | ICD-10-CM

## 2019-11-11 PROCEDURE — 99207 ZZC NO CHARGE NURSE ONLY: CPT

## 2019-11-11 NOTE — PROGRESS NOTES
Patient identified using two patient identifiers.  Ear exam showing wax occlusion completed by provider.  Solution: warm water was placed in the right ear(s) via syringe. Provider checked ear and recommended to still use ear drops. Per Shady Llamas MA on 11/11/2019 at 2:35 PM

## 2019-11-19 ENCOUNTER — HOSPITAL ENCOUNTER (OUTPATIENT)
Dept: PHYSICAL THERAPY | Facility: CLINIC | Age: 64
Setting detail: THERAPIES SERIES
End: 2019-11-19
Attending: INTERNAL MEDICINE
Payer: MEDICARE

## 2019-11-19 DIAGNOSIS — G91.8 OTHER HYDROCEPHALUS (H): Primary | ICD-10-CM

## 2019-11-19 PROCEDURE — 97112 NEUROMUSCULAR REEDUCATION: CPT | Mod: GP,KX | Performed by: PHYSICAL THERAPIST

## 2019-11-21 ENCOUNTER — HOSPITAL ENCOUNTER (OUTPATIENT)
Dept: PHYSICAL THERAPY | Facility: CLINIC | Age: 64
Setting detail: THERAPIES SERIES
End: 2019-11-21
Attending: INTERNAL MEDICINE
Payer: MEDICARE

## 2019-11-21 PROCEDURE — 97112 NEUROMUSCULAR REEDUCATION: CPT | Mod: GP | Performed by: PHYSICAL THERAPIST

## 2019-11-21 PROCEDURE — 97110 THERAPEUTIC EXERCISES: CPT | Mod: GP | Performed by: PHYSICAL THERAPIST

## 2019-11-21 PROCEDURE — 97116 GAIT TRAINING THERAPY: CPT | Mod: GP | Performed by: PHYSICAL THERAPIST

## 2019-12-02 ENCOUNTER — TELEPHONE (OUTPATIENT)
Dept: FAMILY MEDICINE | Facility: CLINIC | Age: 64
End: 2019-12-02

## 2019-12-02 NOTE — TELEPHONE ENCOUNTER
Patient calling and states was in 10/3/19 and saw Ana Paula for a cough.  Had CXR and was given Tessalon, Prednisone and Z-Willie.  Came back for 2nd visit with Shady 11/4/19 and given Flonase, Zyrtec, Tessalon and Prednisone.  States still has ongoing cough.  States was told if not better may need MRI.  I do not see note of that.  He is wondering if needs to come back?  Please advise.  Also if to come in please advise on plan due to access.  Beth Quintero RN

## 2019-12-04 NOTE — TELEPHONE ENCOUNTER
Called patient back.  States up all night coughing.  Offered appointment with Ana Paula on Friday.  States can not come between 12-3 pm due to he has prayer during that time.  Discussed RX with Ana Paula.  no other options for cough other than OTC if Tessalong not covered.  Can also but Tessalon RX if desired.  Advised to come 12/12/19 and arrive at 3:10 pm.  Message to GERI Cole to schedule.  Beth Quintero RN

## 2019-12-05 ENCOUNTER — HOSPITAL ENCOUNTER (OUTPATIENT)
Dept: PHYSICAL THERAPY | Facility: CLINIC | Age: 64
Setting detail: THERAPIES SERIES
End: 2019-12-05
Attending: INTERNAL MEDICINE
Payer: MEDICARE

## 2019-12-05 PROCEDURE — 97112 NEUROMUSCULAR REEDUCATION: CPT | Mod: GP,KX | Performed by: PHYSICAL THERAPIST

## 2019-12-05 PROCEDURE — 97750 PHYSICAL PERFORMANCE TEST: CPT | Mod: GP | Performed by: PHYSICAL THERAPIST

## 2019-12-05 PROCEDURE — 97110 THERAPEUTIC EXERCISES: CPT | Mod: GP | Performed by: PHYSICAL THERAPIST

## 2019-12-05 NOTE — PROGRESS NOTES
12/05/19 1300   Signing Clinician's Name / Credentials   Signing clinician's name / credentials Nelly Ramey PT   Dynamic Gait Index (Peewee and Spear [place], 1995)   Gait Level Surface 2  (mild antalgic on the L)   Change in Gait Speed 3   Gait and Horizontal Head Turns 3   Gait with Vertical Head Turns 2   Gait and Pivot Turns 3   Step Over Obstacle 3   Step Around Obstacles 3   Steps 2   Total Dynamic Gait Index Score  (A score of 19 or less has been correlated to an increased risk of falls in community dwelling older adults, patients with vestibular disorders, and patients with MS.)   Total Score (out of 24) 21     Dynamic Gait Index (DGI):The DGI is a measure of balance during gait that is reliable and valid for the elderly and individuals with Parkinson's disease, MS, vestibular disorders, or s/p stroke. Gait assistive device used: None    Patient score: 21/24  Scores ?19/24 indicate an increased risk for falls according to Bailey et al 2000  Minimal Detectable Change = 2.9 in community dwelling elderly according to Geoffrey et al 2011    Assessment (rationale for performing, application to patient s function & care plan): fall risk, progress  Minutes billed as physical performance test 15

## 2019-12-05 NOTE — PROGRESS NOTES
[facility]      OUTPATIENT PHYSICAL THERAPY  PLAN OF TREATMENT FOR OUTPATIENT REHABILITATION    Patient's Last Name, First Name, M.I.                YOB: 1955  Kashmir Yao                        Provider's Name  [facility] Medical Record No.  [id number]                               Onset Date: 5/1/19   Start of Care Date: 5/15/19   Type:     _X_PT   ___OT   ___SLP Medical Diagnosis: hydrocephalus                       PT Diagnosis: impaired gait and balance      _________________________________________________________________________________  Plan of Treatment:    Frequency/Duration: 4 sessions in 45 days     Goals:  Goal Identifier 1 DGI   Goal Description Patience will score as 20/24 or greater on the DGI without use of an AD in order to demonstrate improved gait abilities and decreased falls risk for home and community ambulation.    Target Date 11/30/19   Date Met     Progress:     Goal Identifier 2 Gait speed    Goal Description Patience will be able to ambulate 25 feet in 15 seconds or less and 15 steps or less in order to demonstrate more efficient and safe gait for community mobility.    Target Date 06/26/19   Date Met  07/11/19   Progress:     Goal Identifier 3 household mobility   Goal Description Patience will be able to ambulate in the home without an AD with appropriate and safe gait pattern for progression toward baseline independence.    Target Date 09/24/19   Date Met  08/01/19   Progress:     Goal Identifier 4 Stairs   Goal Description Patience will be able to negotiate a flight of stairs with light contact on one railing in a reciprocal pattern both up and down for safe and efficient access to all levels of his home.   Target Date 11/30/19   Date Met     Progress:         Progress Toward Goals:   Progress this reporting period: Naveen continues to make  progress with his strength, balance and activity tolerance. He continues to have difficulties with motor planning and motor coordination.   He has mentioned difficulty with STM and will be having an OT evaluation to assess this.     Will see pt for 2-4 more sessions with focus on motor planning and motor coordination and solidify HEP    Certification date from 11/27/19 to 1/10/20.    Nelly Ramey, PT          I CERTIFY THE NEED FOR THESE SERVICES FURNISHED UNDER        THIS PLAN OF TREATMENT AND WHILE UNDER MY CARE .             Physician Signature               Date    X_____________________________________________________                      Referring Provider:  Dr. Sancho Mcrae (phone [phone] [facility])

## 2019-12-10 ENCOUNTER — HOSPITAL ENCOUNTER (OUTPATIENT)
Dept: OCCUPATIONAL THERAPY | Facility: CLINIC | Age: 64
Setting detail: THERAPIES SERIES
End: 2019-12-10
Attending: INTERNAL MEDICINE
Payer: MEDICARE

## 2019-12-10 ENCOUNTER — HOSPITAL ENCOUNTER (OUTPATIENT)
Dept: PHYSICAL THERAPY | Facility: CLINIC | Age: 64
Setting detail: THERAPIES SERIES
End: 2019-12-10
Attending: INTERNAL MEDICINE
Payer: MEDICARE

## 2019-12-10 PROCEDURE — 97165 OT EVAL LOW COMPLEX 30 MIN: CPT | Mod: GO | Performed by: OCCUPATIONAL THERAPIST

## 2019-12-10 PROCEDURE — 97535 SELF CARE MNGMENT TRAINING: CPT | Mod: GO | Performed by: OCCUPATIONAL THERAPIST

## 2019-12-10 PROCEDURE — 97112 NEUROMUSCULAR REEDUCATION: CPT | Mod: GP | Performed by: PHYSICAL THERAPIST

## 2019-12-10 PROCEDURE — 97166 OT EVAL MOD COMPLEX 45 MIN: CPT | Mod: GO | Performed by: OCCUPATIONAL THERAPIST

## 2019-12-10 PROCEDURE — 97750 PHYSICAL PERFORMANCE TEST: CPT | Mod: GP | Performed by: PHYSICAL THERAPIST

## 2019-12-10 NOTE — PROGRESS NOTES
12/10/19 1500   Signing Clinician's Name / Credentials   Signing clinician's name / credentials Nestor Ramey PT   Dynamic Gait Index (Peewee and Spear [place], 1995)   Gait Level Surface 2   Change in Gait Speed 3   Gait and Horizontal Head Turns 3   Gait with Vertical Head Turns 3   Gait and Pivot Turns 3   Step Over Obstacle 3   Step Around Obstacles 3   Steps 2   Total Dynamic Gait Index Score  (A score of 19 or less has been correlated to an increased risk of falls in community dwelling older adults, patients with vestibular disorders, and patients with MS.)   Total Score (out of 24) 22     Dynamic Gait Index (DGI):The DGI is a measure of balance during gait that is reliable and valid for the elderly and individuals with Parkinson's disease, MS, vestibular disorders, or s/p stroke. Gait assistive device used: None    Patient score: 22/24  Scores ?19/24 indicate an increased risk for falls according to Bailey et al 2000  Minimal Detectable Change = 2.9 in community dwelling elderly according to Hale et al 2011    Assessment (rationale for performing, application to patient s function & care plan): progress   Minutes billed as physical performance test  11

## 2019-12-10 NOTE — PROGRESS NOTES
Outpatient Physical Therapy Discharge Note     Patient: Kashmir Yao  : 1955    Beginning/End Dates of Reporting Period:  5/15/19 to 12/10/2019    Referring Provider: Dr. Sancho Mcrae ([facility])    Therapy Diagnosis: impaired gait and balance     Client Self Report: Had his OT eval today and will see OT for a couple more times.  He states taht he has been doing his exercises issued last time except for the soccer ball foot drills.  He feels he is ready for d/c and is without questions.     Objective Measurements:      DGI - 12/10/19 22/24.    19   Eval 10/24    25 foot walk  - eval 31.3 sec, 33 steps, no Assistive device. 19 10.8 sec, 18 steps without device      SLS - eval  L 3 sec   R 1-2 seconds      19 R 8  L 2         Goals:  Goal Identifier 1 DGI   Goal Description Patience will score as 20/24 or greater on the DGI without use of an AD in order to demonstrate improved gait abilities and decreased falls risk for home and community ambulation.    Target Date 19   Date Met  19   Progress:  As above     Goal Identifier 2 Gait speed    Goal Description Patience will be able to ambulate 25 feet in 15 seconds or less and 15 steps or less in order to demonstrate more efficient and safe gait for community mobility.    Target Date 19   Date Met  19   Progress:as above     Goal Identifier 3 household mobility   Goal Description Patience will be able to ambulate in the home without an AD with appropriate and safe gait pattern for progression toward baseline independence.    Target Date 19   Date Met  19   Progress: ambulating at all times without an assistive device.  Still challenged with balance with sudden distractions in gait or disrtaction when turning  This is being addressed with HEP/multi tasking with motor coordination drills.      Goal Identifier 4 Stairs   Goal Description Patience will be able to negotiate a flight of stairs with light  contact on one railing in a reciprocal pattern both up and down for safe and efficient access to all levels of his home.   Target Date 11/30/19   Date Met  12/05/19   Progress:              Progress Toward Goals:   Progress this reporting period: Naveen has made good progress with PT as noted above. Initially he had decreased tolerance to standing due to c/o back pain and L knee pain.  He currently has not c/o of either for several weeks.  He has also had improved L knee strength and ROM /extension with previous knee surgery.  He is exercising at a gym and is doing coordination drills as well for motor coordination, balance.    He is without questions and appropriate for discharge.     Plan:  Discharge from therapy.    Discharge:    Reason for Discharge: Patient has met all goals.    Equipment Issued: HEP    Discharge Plan: Patient to continue home program.

## 2019-12-11 NOTE — PROGRESS NOTES
Cognitive Assessment of [place]    SUMMARY OF TEST:  The Cognitive Assessment of [place] (CAM) is a standardized measure used to assess cognitive abilities and deficits.  The CAM is formatted to assess cognitive skills in the areas of attention, orientation, memory, following directions, temporal awareness, discrimination, sequencing, calculation, planning, verbal safety/judgment, problem solving and abstract reasoning.    DATE OF TESTIN/10/2019    RESULTS OF TESTING:    The patient scores with no impairment in Remote memory, Recent memory, Visual neglect, Appropriate yes/no, One step verbal directions, Written directions, Immediate auditory memory, Immediate motor memory, Temporal awareness, Matching, Object identification, Motor recall/recognition, [place] recognition, Money skills mental manipulation, Single digit math skills, Multiple digit math skills and Abstract reasoning    The patient scores with mild impairment in Planning and Safety/judgment    The patient scores with moderate impairment in Attention, Visual memory/sequencing forward, Visual memory/sequencing backward, Auditory recall/recognition, Auditory memory/sequencing forward, Auditory memory/sequencing backward, Simple problem solving and Moderate problem solving    The patient scores with severe impairment in Mental flexibility and Complex problem solving      INTERPRETATION OF TEST RESULTS:      Kashmir demonstrates significant deficits in problem solving and organizational skills, mental flexibility, visual/auditory memory, and attention to task. These deficits likely impact Kashmir's ability to safely and independently complete higher level IADL tasks such as medication management, financial management, shopping, driving, and meal preparation.     Factors affecting performance:    New or complex medical issue: Brain Surgery Noted 2019 secondary to Hydrocephalus     Recommendations: Recommend that Kashmir have over sight and  supervision with higher level cognitive tasks such as financial management, medication management, and driving at this time until further assessment and intervention is provided. Recommend occupational therapy skilled intervention to address cognitive deficits to ensure safe and independent completion of IADLs.     TIME ADMINISTERING TEST: 40 minutes    TIME FOR INTERPRETATION AND PREPARATION OF REPORT: 35 minutes    TOTAL TIME: 75 minutes    Adriane Montejo OTR/L  Occupational Therapist     [facility] [employer]  [address] [place], [place]  [email or web address]   Cell or pager: [phone]  Employed by [facility]

## 2019-12-11 NOTE — PROGRESS NOTES
12/10/19 1300   Quick Adds   Quick Adds Certification   Type of Visit Initial Outpatient Occupational Therapy Evaluation       Present No   General Information   Start Of Care Date 12/10/19   Referring Physician Hakeem Cano MD   Orders Evaluate and treat as indicated   Other Orders Cognitive Assessment. Pt reporting difficulties with STM and wanting to know strategies to improve   Orders Date 11/20/19   Medical Diagnosis Other hydrocephalus (H) G91.8  - Primary    Onset of Illness/Injury or Date of Surgery 04/30/19   Surgical/Medical History Reviewed Yes   Additional Occupational Profile Info/Pertinent History of Current Problem Kashmir is 64 year old male who presents to occupational therapy evaluation due to concerns regarding short term memory loss and cognitive deficits, likely secondary to brain surgery that occurred on 4/30/2019 through the [facility] system for treating Hydrocephalus. Kashmir does demonstrate definite memory deficits as he is a poor historian, lacking details when narrating past information, and often recalling details incorrectly. He is currently receiving physical therapy services at his clinic location ([facility]). PMH includes TKA and back surgery.   Role/Living Environment   Current Community Support Family/friend caregiver  (Wife offers support; 19 year old child. )   Patient role/Employment history Retired;Disabled   Community/Avocational Activities Exercise at the gym; social activities; watching TV   Current Living Environment House   Number of Stairs to Enter Home 2   Number of Stairs Within Home 8-10   Primary Bathroom Location/Comments Upstairs near the bedroom and on the main living area   Home/Community Accessibility Comments No concerns regarding accessibility in his home or the community.   Prior Responsibilities - IADL Yardwork;Shopping;Medication management;Finances;Driving   Role/Living Environment Comments Kashmir  lives at home with his wife and 19 year old son. He also has a 24-year old son who is living outside the home. Prior to brain surgery, Kashmir was completing all I/ADLs independently. Since his surgery, he reports continued completion of these tasks, except for yardwork, which he has hired someone to complete. Kashmir reports most difficulty recalling new names and detailed information.    Patient/family Goals Statement Kashmir would like to see improvements in his memory and cognitive skills.    Pain   Patient currently in pain No   Fall Risk Screen   Fall screen completed by OT   Have you fallen 2 or more times in the past year? Yes   Have you fallen and had an injury in the past year? Yes   Is patient a fall risk? Yes   Abuse Screen (yes response referral indicated)   Feels Unsafe at Home or Work/School no   Feels Threatened by Someone no   Does Anyone Try to Keep You From Having Contact with Others or Doing Things Outside Your Home? no   Physical Signs of Abuse Present no   Cognitive Status Examination   Orientation Orientation to person, place and time   Level of Consciousness Alert   Follows Commands and Answers Questions Able to follow multistep instructions   Personal Safety and Judgment Intact   Memory Impaired   Attention Alternating attention impaired, difficulty shifting between tasks;Divided attention impaired, difficulty with simultaneous tasks   Organization/Problem Solving Categorization of information impaired;Prioritizing of information/tasks impaired   Executive Function Cognitive flexibility impaired;Planning ability impaired   Cognitive Comment aKshmir demonstrates significant deficits in problem solving and organizational skills, mental flexibility, visual/auditory memory, and attention to task. He reports similar deficits as observed during evaluation, but is most aware of memory impairment. He does appear to lack awareness of higher level planning/organizational skill deficits.    Visual  Perception   Visual Perception Wears glasses  (Cheaters for near distance reading)   Visual Perception Comments Kashmir reports no difficulties with visual perceputal skills. No obvious deficits identified. Recommend further assessment.     Strength   Strength Comments WFL   Hand Strength   Hand Strength Comments WFL   Muscle Tone   Muscle Tone No deficits were identified   Coordination   Coordination Comments Kashmir reports no concerns related to fine motor coordination.    Bathing   Level of [place] - Bathing independent   Upper Body Dressing   Level of [place]: Dress Upper Body independent   Lower Body Dressing   Level of [place]: Dress Lower Body independent   Toileting   Level of [place]: Toilet independent   Grooming   Level of [place]: Grooming independent   Eating/Self-Feeding   Level of [place]: Eating independent   Instrumental Activities of Daily Living Assessment   IADL Assessment/Observations Kashmir continues to complete medication management, financial management, driving, and shopping independently. When discussing how these tasks are completed, he reports walking down all aisles at the grocery store (vs remembering where all items are located) in order to complete the shopping. He states that he utilizes automatic payments and pillboxes for completion of financial and medication management. Recommendation of over sight with these higher level cognitive tasks until further assessment and skilled intervention take place.    Planned Therapy Interventions   Planned Therapy Interventions IADL training;Cognitive skills;Therapeutic activities;Cognitive performance testing   Adult OT Eval Goals   OT Eval Goals (Adult) 1;2;3;4    OT Goal 1   Goal Identifier Driving   Goal Description Kashmir will demonstrate WFLs visual reaction time and divided attention skills using compensatory strategies prn, for safe independent community mobility (crossing the street, driving etc.) and  increased safety with ADL/IADLs at home  by scoring WNLs on all modes of the Dynavision.   Target Date 03/09/20    OT Goal 2   Goal Identifier Medication Management   Goal Description Patient will independently set up personal medications, using a pillbox, on 2 separate occasions for increased accuracy and independence with managing medications.   Target Date 03/09/20    OT Goal 3   Goal Identifier Financial Management   Goal Description Kashmir will demonstrate the ability to complete moderately complex tasks requiring numerical reasoning, problem solving and new learning skills with 90% accuracy to complete financial, home and community tasks accurately, for maximum independence to function at or near baseline at home and in community.   Target Date 03/09/20   OT Goal 4   Goal Identifier Problem Solving/Sequencing   Goal Description Kashmir will be able to complete a multi-item errand list with at least 90% accuracy in the appropriate amount of time and be most efficient to improve problem solving ability and sequencing in order to plan his day, complete sequencing tasks, etc. for return to independence with grocery shopping and running errands in the community.   Target Date 03/09/20   Clinical Impression   Criteria for Skilled Therapeutic Interventions Met Yes, treatment indicated   OT Diagnosis Decreased I/ADL   Influenced by the following impairments Memory Impairments, Higher level Cognitive Skill Deficits, Poor divided attention skills   Assessment of Occupational Performance 3-5 Performance Deficits   Identified Performance Deficits Driving, Medication Management, Financial Management, Errands/Shopping   Clinical Decision Making (Complexity) Moderate complexity   Therapy Frequency 1x/week   Predicted Duration of Therapy Intervention (days/wks) 8 weeks   Risks and Benefits of Treatment have been explained. Yes   Patient, Family & other staff in agreement with plan of care Yes   Clinical Impression Comments  Kashmir is 64 year old male who presents to occupational therapy evaluation due to concerns regarding short term memory loss and cognitive deficits, likely secondary to brain surgery that occurred on 4/30/2019 through the [facility] system for treating Hydrocephalus. Kashmir does demonstrate definite memory deficits as he is a poor historian, lacking details when narrating past information, and often recalling details incorrectly. He is currently receiving physical therapy services at his clinic location ([facility]). PMH includes TKA and back surgery. Kashmir participates in the CAM, demonstrating significant cognitive deficits in the areas of problem solving and organizational skills, mental flexibility, visual/auditory memory, and attention to task. Recommend occupational therapy services for improvements in the above listed areas, as well as instruction regarding compensatory strategies for increased safety and independence in IADL completion.    Education Assessment   Barriers To Learning Cognitive   Preferred Learning Style Demonstration;Pictures/video   Therapy Certification   Certification date from 12/10/19   Certification date to 03/09/20   Certification I certify the need for these services furnished under this plan of treatment and while under my care.  (Physician co-signature of this document indicates review and certification of the therapy plan)   Total Evaluation Time   OT Eval, Moderate Complexity Minutes (61030) 50     Adriane Montejo, OTR/L  Occupational Therapist     [facility]  [employer]  [address]  [place], [place]  [email or web address]   Cell or pager: [phone]  Employed by [facility]

## 2019-12-11 NOTE — PROGRESS NOTES
[facility]          OUTPATIENT OCCUPATIONAL THERAPY  EVALUATION  PLAN OF TREATMENT FOR OUTPATIENT REHABILITATION  (COMPLETE FOR INITIAL CLAIMS ONLY)  Patient's Last Name, First Name, M.I.  YOB: 1955  Kashmir Yao                        Provider's Name  [facility] Medical Record No.  [id number]                               Onset Date:     04/30/19   Start of Care Date:     12/10/19   Type:     ___PT   _X_OT   ___SLP Medical Diagnosis:     Other hydrocephalus (H) G91.8  - Primary                           OT Diagnosis:     Decreased I/ADL Visits from SOC:  1   _________________________________________________________________________________  Plan of Treatment/Functional Goals:  IADL training, Cognitive skills, Therapeutic activities, Cognitive performance testing                    Goals  Goal Identifier: Driving  Goal Description: Kashmir will demonstrate WFLs visual reaction time and divided attention skills using compensatory strategies prn, for safe independent community mobility (crossing the street, driving etc.) and increased safety with ADL/IADLs at home  by scoring WNLs on all modes of the Dynavision.  Target Date: 03/09/20     Goal Identifier: Medication Management  Goal Description: Patient will independently set up personal medications, using a pillbox, on 2 separate occasions for increased accuracy and independence with managing medications.  Target Date: 03/09/20     Goal Identifier: Financial Management  Goal Description: Kashmir will demonstrate the ability to complete moderately complex tasks requiring numerical reasoning, problem solving and new learning skills with 90% accuracy to complete financial, home and community tasks accurately, for maximum independence to function at or near baseline at home and in community.  Target Date: 03/09/20     Goal  Identifier: Problem Solving/Sequencing  Goal Description: Kashmir will be able to complete a multi-item errand list with at least 90% accuracy in the appropriate amount of time and be most efficient to improve problem solving ability and sequencing in order to plan his day, complete sequencing tasks, etc. for return to independence with grocery shopping and running errands in the community.  Target Date: 03/09/20       Therapy Frequency: 1x/week     Predicted Duration of Therapy Intervention (days/wks): 8 weeks  Adriane Montejo OT          I CERTIFY THE NEED FOR THESE SERVICES FURNISHED UNDER        THIS PLAN OF TREATMENT AND WHILE UNDER MY CARE     (Physician co-signature of this document indicates review and certification of the therapy plan).                 ,    Certification date from: 12/10/19, Certification date to: 03/09/20               Referring Physician: Hakeem Cano MD     Initial Assessment        See Epic Evaluation      Start Of Care Date: 12/10/19

## 2019-12-31 ENCOUNTER — HOSPITAL ENCOUNTER (OUTPATIENT)
Dept: OCCUPATIONAL THERAPY | Facility: CLINIC | Age: 64
Setting detail: THERAPIES SERIES
End: 2019-12-31
Attending: INTERNAL MEDICINE
Payer: MEDICARE

## 2019-12-31 PROCEDURE — 97535 SELF CARE MNGMENT TRAINING: CPT | Mod: GO | Performed by: OCCUPATIONAL THERAPIST

## 2020-01-02 ENCOUNTER — TELEPHONE (OUTPATIENT)
Dept: FAMILY MEDICINE | Facility: CLINIC | Age: 65
End: 2020-01-02

## 2020-01-02 ENCOUNTER — ANCILLARY PROCEDURE (OUTPATIENT)
Dept: GENERAL RADIOLOGY | Facility: CLINIC | Age: 65
End: 2020-01-02
Attending: FAMILY MEDICINE
Payer: MEDICARE

## 2020-01-02 ENCOUNTER — OFFICE VISIT (OUTPATIENT)
Dept: FAMILY MEDICINE | Facility: CLINIC | Age: 65
End: 2020-01-02
Payer: MEDICARE

## 2020-01-02 VITALS
HEART RATE: 61 BPM | WEIGHT: 235 LBS | DIASTOLIC BLOOD PRESSURE: 70 MMHG | TEMPERATURE: 98.5 F | SYSTOLIC BLOOD PRESSURE: 132 MMHG | OXYGEN SATURATION: 100 % | BODY MASS INDEX: 36.53 KG/M2

## 2020-01-02 DIAGNOSIS — E66.01 MORBID OBESITY (H): ICD-10-CM

## 2020-01-02 DIAGNOSIS — J43.1 PANLOBULAR EMPHYSEMA (H): ICD-10-CM

## 2020-01-02 DIAGNOSIS — R05.9 COUGH: ICD-10-CM

## 2020-01-02 DIAGNOSIS — R41.89 COGNITIVE CHANGE: ICD-10-CM

## 2020-01-02 DIAGNOSIS — G47.33 OSA (OBSTRUCTIVE SLEEP APNEA): ICD-10-CM

## 2020-01-02 DIAGNOSIS — M19.041 PRIMARY OSTEOARTHRITIS OF BOTH HANDS: ICD-10-CM

## 2020-01-02 DIAGNOSIS — G40.802 OTHER EPILEPSY WITHOUT STATUS EPILEPTICUS, NOT INTRACTABLE (H): ICD-10-CM

## 2020-01-02 DIAGNOSIS — Z23 NEED FOR PROPHYLACTIC VACCINATION AND INOCULATION AGAINST INFLUENZA: ICD-10-CM

## 2020-01-02 DIAGNOSIS — M19.042 PRIMARY OSTEOARTHRITIS OF BOTH HANDS: ICD-10-CM

## 2020-01-02 DIAGNOSIS — K21.9 GASTROESOPHAGEAL REFLUX DISEASE, ESOPHAGITIS PRESENCE NOT SPECIFIED: Primary | ICD-10-CM

## 2020-01-02 PROBLEM — R51.9 HEADACHE: Status: ACTIVE | Noted: 2020-01-02

## 2020-01-02 LAB
FEF 25/75: 66
FEV-1: 88
FEV1/FVC: 83
FVC: 104

## 2020-01-02 PROCEDURE — 90662 IIV NO PRSV INCREASED AG IM: CPT | Performed by: FAMILY MEDICINE

## 2020-01-02 PROCEDURE — G0009 ADMIN PNEUMOCOCCAL VACCINE: HCPCS | Performed by: FAMILY MEDICINE

## 2020-01-02 PROCEDURE — 99214 OFFICE O/P EST MOD 30 MIN: CPT | Mod: 25 | Performed by: FAMILY MEDICINE

## 2020-01-02 PROCEDURE — G0008 ADMIN INFLUENZA VIRUS VAC: HCPCS | Performed by: FAMILY MEDICINE

## 2020-01-02 PROCEDURE — 90670 PCV13 VACCINE IM: CPT | Performed by: FAMILY MEDICINE

## 2020-01-02 PROCEDURE — 94010 BREATHING CAPACITY TEST: CPT | Performed by: FAMILY MEDICINE

## 2020-01-02 PROCEDURE — 71046 X-RAY EXAM CHEST 2 VIEWS: CPT

## 2020-01-02 RX ORDER — NAPROXEN 500 MG/1
500 TABLET ORAL 2 TIMES DAILY
Qty: 90 TABLET | Refills: 0 | COMMUNITY
Start: 2020-01-02 | End: 2020-04-13

## 2020-01-02 RX ORDER — FAMOTIDINE 40 MG/1
40 TABLET, FILM COATED ORAL DAILY
Qty: 90 TABLET | Refills: 3 | Status: SHIPPED | OUTPATIENT
Start: 2020-01-02 | End: 2020-05-27

## 2020-01-02 NOTE — TELEPHONE ENCOUNTER
1/2/19    Pt called wondering why his Naproxen was discontinue today? Pt stated that he needs it for his back pain.    Please contact Pt @ [phone]

## 2020-01-02 NOTE — TELEPHONE ENCOUNTER
PCP:    Pt also calling wanting to know why his acid reflux medications were changed. I see in your note you mentioning his ongoing cough. Was this the reason for changing acid meds?    Shirley Dubois RN -- [facility]

## 2020-01-02 NOTE — TELEPHONE ENCOUNTER
Called patient and discussed questions.  Advised to call back if increased GI symptoms with Famotidine.  [facility] pharmacist told him not to take Naproxen when using Voltaren gel.  [facility] does normally take Naproxen twice a day.  Put back on med list.  Beth Quintero RN

## 2020-01-02 NOTE — PROGRESS NOTES
Subjective     Kashmir Yao is a 64 year old male who presents to clinic today for the following health issues:    HPI   Concern - Cough  Onset: follow up     Progression of Symptoms: He has had this a few months.  He has been treated a variety of ways including prednisone.  He always improves but always comes back      Therapies Tried and outcome: the medication he was given at appt. Helped at the time but when medication stopped the cough came back      Hand Pain    Onset: 2 weeks he was evaluated remotely with a diagnosis of osteoarthritis.    Description:   Location: right hand  Character: Cramping    Intensity: severe 8-9/10 pain skill    Progression of Symptoms: same    Accompanying Signs & Symptoms:  Other symptoms: radiation of pain to wrist    History:   Previous similar pain: no       Precipitating factors:   Trauma or overuse: no - had brain surg 8 months ago and was dizzy and used his hands to help support him when falling    Alleviating factors:    Therapies Tried and outcome: Advil - helps and Naproxen       Gastroesophageal reflux disease, esophagitis presence not specified  (primary encounter diagnosis)- No heartburn. Stopped taking Zantac because of recent recall.     Morbid obesity (H)- Patient talks about trying to exercise and follow a [employer] program to lose weight without success.  His weight is increasing  Wt Readings from Last 2 Encounters:   01/02/20 106.6 kg (235 lb)   11/04/19 103 kg (227 lb 1 oz)       Other epilepsy without status epilepticus, not intractable (H)-he is on antiepileptics    RIC (obstructive sleep apnea)- Using CPAP, acquired new machine about 2 months ago.         Cough- Patient complains of a persistent dry cough. No congestion. No heartburn. Stopped taking Zantac.  Cough appeared about the same time    Cognitive change- Patient had brain surgery 8 months ago x3. Post surgery he has struggled with balance, he has fallen multiple times, he used his hands to  "help support him when falling. \"Struggling with memories.\"  He was told that he will improve for 1 year    First complaint of headache thereafter talks a lot his cough.  This appears to be word substitution .  May be related to his neurosurgery    Need for prophylactic vaccination and inoculation against influenza- Patient accepts.        Past Medical History:   Diagnosis Date     Arthritis      CAD (coronary artery disease) 2010    a subtotally occluded obtuse marginal vessel which was stented with a drug-coated stent.  He had 50% to 60% LAD disease and 25% to 30% right coronary disease     Cognitive change 1/2/2020     Colonic polyps 2009    tubular adenomae with mildly dysplastic features     Disturbance of skin sensation      Encephalocele (H) 2009    left frontal     Epilepsy, partial (H) 2009    with secondary generalization     GERD (gastroesophageal reflux disease)      Heart attack (H)     5 years ago. 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     C NONSPECIFIC PROCEDURE      stint in heart     CYSTOSCOPY       ESOPHAGOSCOPY, GASTROSCOPY, DUODENOSCOPY (EGD), COMBINED  5/24/2012    Procedure:COMBINED ESOPHAGOSCOPY, GASTROSCOPY, DUODENOSCOPY (EGD); ESOPHAGOSCOPY, GASTROSCOPY, DUODENOSCOPY (EGD) ; " Surgeon:GILLIAN JOHNSON; Location: GI     HC COLONOSCOPY THRU STOMA, DIAGNOSTIC  2009    tubular adenomae, recommended annual colonoscopy     KNEE SURGERY Left     repair of cartilage     SPINE SURGERY      repair of herniated disc lumbar     STENT       VENTRICULOSTOMY      [place]       Family History   Problem Relation Age of Onset     Cardiovascular Mother         CHF, HTN, VASC. INSUFF., DM     Family History Negative Father      Thyroid Disease Sister         HYPOTHYROID     Genitourinary Problems Brother         RENAL FAILURE AND H/O MVA WITH LE PARAPLEGIA     Cancer - colorectal No family hx of        Social History     Tobacco Use     Smoking status: Former Smoker     Packs/day: 0.50     Years: 20.00     Pack years: 10.00     Last attempt to quit: 2003     Years since quittin.4     Smokeless tobacco: Never Used   Substance Use Topics     Alcohol use: No     Alcohol/week: 0.0 standard drinks       Reviewed and updated as needed this visit by Provider       Review of Systems   CONSTITUTIONAL: NEGATIVE for fever, chills  CV: NEGATIVE for chest pain, palpitations or peripheral edema  GI: NEGATIVE for nausea, abdominal pain, heartburn, or change in bowel habits  He describes his hand pain as stocking glove  This document serves as a record of the services and decisions personally performed and made by Chad Noel MD. It was created on his behalf by Silvino Ewing, a trained medical scribe. The creation of this document is based on the provider's statements to the medical scribe.  Silvino Ewing 2020 11:22 AM          Objective    /70 (BP Location: Right arm, Patient Position: Sitting, Cuff Size: Adult Large)   Pulse 61   Temp 98.5  F (36.9  C) (Oral)   Wt 106.6 kg (235 lb)   SpO2 100%   BMI 36.53 kg/m    Body mass index is 36.53 kg/m .     Physical Exam     HENT: ear canals clear after lavage and TM's normal, nose and mouth without ulcers or lesions   RESP: lungs clear to auscultation -  no rales, no rhonchi, no wheezes  Hands without synovitis  Chest x-ray clear.  Spirometry shows obstructive defect  Diagnostic Test Results:  Labs reviewed in Epic  FEV1/FVC ratio 65%    Assessment & Plan   Problem List Items Addressed This Visit        Nervous and Auditory    Epilepsy (H)     No seizures on current therapy            Respiratory    RIC (obstructive sleep apnea)     Compliance discussed positive strokes given         Cough     OLMAN emphysema.  He is appalled at the amount of steroid he has received in the last year.  We shall not offer an oral steroid pulse         Relevant Medications    Fluticasone-Umeclidin-Vilanterol (TRELEGY ELLIPTA) 100-62.5-25 MCG/INH oral inhaler    Other Relevant Orders    Spirometry, Breathing Capacity    XR Chest 2 Views (Completed)    Spirometry, Breathing Capacity: Normal Order, Clinic Performed (Completed)    COPD ACTION PLAN (Completed)    Panlobular emphysema (H)     Reversibility has not been studied         Relevant Medications    Fluticasone-Umeclidin-Vilanterol (TRELEGY ELLIPTA) 100-62.5-25 MCG/INH oral inhaler       Digestive    Esophageal reflux - Primary     Resume substituting Pepcid         Relevant Medications    famotidine (PEPCID) 40 MG tablet    Obesity (BMI 35.0-39.9) with comorbidity (H)     Discussed dietary modification            Musculoskeletal and Integumentary    Primary osteoarthritis of both hands     Treat as needed exacerbation.  I am concerned about the possibility of central pain after his neurosurgery.  He will bring this up with his neurologist at [place]         Relevant Medications    diclofenac (VOLTAREN) 1 % topical gel       Other    Cognitive change     Post neurosurgery.  He should improve for the rest of the year.  Memory defects evident today         Need for prophylactic vaccination and inoculation against influenza    Relevant Orders    INFLUENZA (HIGH DOSE) 3 VALENT VACCINE [96706] (Completed)    Vaccine Administration, Each  Additional [65255] (Completed)          Accepts Pneumonia and Influenza vaccine.    Return in about 6 weeks (around 2/13/2020).    The information in this document, created by the medical scribe for me, accurately reflects the services I personally performed and the decisions made by me. I have reviewed and approved this document for accuracy prior to leaving the patient care area.  January 2, 2020 11:22 AM    Chad Noel MD  [facility]

## 2020-01-02 NOTE — PATIENT INSTRUCTIONS
30 grams fiber (3 servings vegetables /  fruits each meal)  1 serving carbohydrate (bread potatoes) daily                             And/or                  Fast MWF until supper  Tell neurologist about hand pain

## 2020-01-02 NOTE — TELEPHONE ENCOUNTER
The GI meds were changed because of his concerns with contaminants  He said he was not taking naproxen  Re enter if he is  He has cognitive defects, thus the questions  Chad Noel MD

## 2020-01-02 NOTE — LETTER
My COPD Action Plan     Name: Kashmir Yao    YOB: 1955   Date: 1/2/2020    My doctor: Chad Noel MD   My clinic: [address] [address] [phone]  My Controller Medicine:    Dose:      My Rescue Medicine: Albuterol (Proair/Ventolin/Proventil) inhaler   Dose: 108     My Flare Up Medicine: n/a   Dose:  FEV-1 (no units)   Date Value   01/02/2020 88     FEV1/FVC (no units)   Date Value   01/02/2020 83      My COPD Severity: Mild = FeV1 > 80%      Use of Oxygen: Oxygen Not Prescribed      Make sure you've had your pneumonia   vaccines.          GREEN ZONE       Doing well today      Usual level of activity and exercise    Usual amount of cough and mucus    No shortness of breath    Usual level of health (thinking clearly, sleeping well, feel like eating) Actions:      Take daily medicines    Use oxygen as prescribed    Follow regular exercise and diet plan    Avoid cigarette smoke and other irritants that harm the lungs           YELLOW ZONE          Having a bad day or flare up      Short of breath more than usual    A lot more sputum (mucus) than usual    Sputum looks yellow, green, tan, brown or bloody    More coughing or wheezing    Fever or chills    Less energy; trouble completing activities    Trouble thinking or focusing    Using quick relief inhaler or nebulizer more often    Poor sleep; symptoms wake me up    Do not feel like eating Actions:      Get plenty of rest    Take daily medicines    Use quick relief inhaler every  hours    If you use oxygen, call you doctor to see if you should adjust your oxygen    Do breathing exercises or other things to help you relax    Let a loved one, friend or neighbor know you are feeling worse    Call your care team if you have 2 or more symptoms.  Start taking steroids or antibiotics if directed by your care team           RED ZONE       Need medical care now      Severe shortness of  breath (feel you can't breathe)    Fever, chills    Not enough breath to do any activity    Trouble coughing up mucus, walking or talking    Blood in mucus    Frequent coughing   Rescue medicines are not working    Not able to sleep because of breathing    Feel confused or drowsy    Chest pain    Actions:      Call your health care team.  If you cannot reach your care team, call 911 or go to the emergency room.        Annual Reminders:  Meet with Care Team, Flu Shot every Fall  Pharmacy:    [facility] EPRESCRIB[facility] ONLY  [id number] - [place], [place] - [address]  [facility] [place] [address] [id number] - [place], [place] - [address]  AT [facility] [address]  [id number] - [place], [place] - [address] AT [place] [address]

## 2020-01-03 NOTE — ASSESSMENT & PLAN NOTE
OLMAN emphysema.  He is appalled at the amount of steroid he has received in the last year.  We shall not offer an oral steroid pulse

## 2020-01-03 NOTE — ASSESSMENT & PLAN NOTE
This appears to be word substitution.  After initial complaint, no further concerns.  Headaches denied

## 2020-01-03 NOTE — ASSESSMENT & PLAN NOTE
Treat as needed exacerbation.  I am concerned about the possibility of central pain after his neurosurgery.  He will bring this up with his neurologist at [place]

## 2020-01-08 ENCOUNTER — TELEPHONE (OUTPATIENT)
Dept: FAMILY MEDICINE | Facility: CLINIC | Age: 65
End: 2020-01-08

## 2020-01-08 NOTE — TELEPHONE ENCOUNTER
Patient calling, was seen by Dr. Noel on 1/2/2020 and is not feeling better. His cough has not gotten better and is sometimes worse. He states that he sometimes is coughing for 5 min at a time and is very tired afterward. He is not sure if there is another recommendation or if he needs to be seen again. Please return call at [phone]       Shelley Martinez-Patient Rep

## 2020-01-08 NOTE — TELEPHONE ENCOUNTER
Reason for Call:  Other call back    Detailed comments: Patient has a follow up question regarding his last visit. He says he was advised to stop taken Naproxen. Wondering if there is an alternative he can take because he has been taking Naproxen for the last 2 years twice a day for his back. Please advise. Thank you.    Phone Number Patient can be reached at: Home number on file [phone] (home)    Best Time: Anytime.    Can we leave a detailed message on this number? YES    Call taken on 1/8/2020 at 5:09 PM by Donnie Delatorre

## 2020-01-09 NOTE — TELEPHONE ENCOUNTER
1/9/2020    Pt returned called would like a RN to call back when possible.   [phone]     Meagan Osman Patient Representive

## 2020-01-09 NOTE — TELEPHONE ENCOUNTER
Called the Pt to discuss below. No answer. Unable to leave VM as mailbox is full. Will need to recall.     Shirley Dubois RN -- [facility]

## 2020-01-22 ENCOUNTER — TELEPHONE (OUTPATIENT)
Dept: OTHER | Facility: CLINIC | Age: 65
End: 2020-01-22

## 2020-01-22 ENCOUNTER — HOSPITAL ENCOUNTER (OUTPATIENT)
Dept: OCCUPATIONAL THERAPY | Facility: CLINIC | Age: 65
Setting detail: THERAPIES SERIES
End: 2020-01-22
Attending: INTERNAL MEDICINE
Payer: MEDICARE

## 2020-01-22 DIAGNOSIS — R79.89 LOW VITAMIN D LEVEL: ICD-10-CM

## 2020-01-22 DIAGNOSIS — Z00.00 ANNUAL PHYSICAL EXAM: ICD-10-CM

## 2020-01-22 DIAGNOSIS — M19.041 PRIMARY OSTEOARTHRITIS OF BOTH HANDS: Primary | ICD-10-CM

## 2020-01-22 DIAGNOSIS — M19.042 PRIMARY OSTEOARTHRITIS OF BOTH HANDS: Primary | ICD-10-CM

## 2020-01-22 DIAGNOSIS — E78.5 HYPERLIPIDEMIA LDL GOAL <70: ICD-10-CM

## 2020-01-22 DIAGNOSIS — R26.9 GAIT DIFFICULTY: ICD-10-CM

## 2020-01-22 DIAGNOSIS — Z12.5 SCREENING FOR PROSTATE CANCER: Primary | ICD-10-CM

## 2020-01-22 DIAGNOSIS — I10 ESSENTIAL HYPERTENSION WITH GOAL BLOOD PRESSURE LESS THAN 130/80: ICD-10-CM

## 2020-01-22 DIAGNOSIS — G51.0 BELL'S PALSY: ICD-10-CM

## 2020-01-22 DIAGNOSIS — R60.9 EDEMA, UNSPECIFIED TYPE: ICD-10-CM

## 2020-01-22 DIAGNOSIS — K21.00 GASTROESOPHAGEAL REFLUX DISEASE WITH ESOPHAGITIS: ICD-10-CM

## 2020-01-22 DIAGNOSIS — F51.01 PRIMARY INSOMNIA: ICD-10-CM

## 2020-01-22 PROCEDURE — 97535 SELF CARE MNGMENT TRAINING: CPT | Mod: GO | Performed by: OCCUPATIONAL THERAPIST

## 2020-01-22 NOTE — TELEPHONE ENCOUNTER
Patient left voice message requesting information for disability parking.    Patient was due for an annual exam in April of 2019.    Called him back, left voice message indicating that Kashmir needs to have fasting labs and an office visit one week later.  His forms for disability parking can be addressed at the office visit.    Routing to scheduling to arrange for fasting labs and an office visit one week later.  (Non-urgent appointment).    Kavita Ochoa RN BSN  [facility]            I

## 2020-01-22 NOTE — TELEPHONE ENCOUNTER
Pt calling about hand pain he states he discuss this with Dr. Noel at his last OV and MD suggested he discuss with neurology     Pt states neuro does not feel this is from previous surgery and suggested pt see PCP again.     Pt did not want to talk further to RN in [place] he wants AV RN to call him back to discuss this and help schedule OV is needed.     Margi Choe RN

## 2020-01-23 NOTE — TELEPHONE ENCOUNTER
LM for patient to call us back to schedule to arrange for fasting labs and an office visit one week later.  (Non-urgent appointment).

## 2020-01-23 NOTE — TELEPHONE ENCOUNTER
Called patient back.  States he called neurology and nurse spoke to doctor and they advised it is not from his surgery.  He is feeling he is back to square one and looking for help.  Wondering if Dr. Noel wants to see him again or knows someone he should see.  Please advise.  Beth Quintero RN

## 2020-01-29 ENCOUNTER — HOSPITAL ENCOUNTER (OUTPATIENT)
Dept: OCCUPATIONAL THERAPY | Facility: CLINIC | Age: 65
Setting detail: THERAPIES SERIES
End: 2020-01-29
Attending: INTERNAL MEDICINE
Payer: MEDICARE

## 2020-01-29 DIAGNOSIS — M79.641 BILATERAL HAND PAIN: ICD-10-CM

## 2020-01-29 DIAGNOSIS — M19.042 LOCALIZED PRIMARY OSTEOARTHRITIS OF LEFT HAND: Primary | ICD-10-CM

## 2020-01-29 DIAGNOSIS — M19.041 LOCALIZED OSTEOARTHRITIS OF RIGHT HAND: ICD-10-CM

## 2020-01-29 DIAGNOSIS — M77.8 TENDINITIS OF FINGER OF LEFT HAND: ICD-10-CM

## 2020-01-29 DIAGNOSIS — M79.642 BILATERAL HAND PAIN: ICD-10-CM

## 2020-01-29 PROCEDURE — 97535 SELF CARE MNGMENT TRAINING: CPT | Mod: GO | Performed by: OCCUPATIONAL THERAPIST

## 2020-01-29 NOTE — TELEPHONE ENCOUNTER
Pt calls, informed, provided phone number to call and schedule, pt agrees  Grace Malone RN, BSN  Message handled by Nurse Triage.

## 2020-02-03 ENCOUNTER — OFFICE VISIT (OUTPATIENT)
Dept: ORTHOPEDICS | Facility: CLINIC | Age: 65
End: 2020-02-03
Payer: MEDICARE

## 2020-02-03 VITALS
HEIGHT: 67 IN | BODY MASS INDEX: 36.88 KG/M2 | DIASTOLIC BLOOD PRESSURE: 78 MMHG | WEIGHT: 235 LBS | SYSTOLIC BLOOD PRESSURE: 126 MMHG

## 2020-02-03 DIAGNOSIS — E78.5 HYPERLIPIDEMIA LDL GOAL <100: ICD-10-CM

## 2020-02-03 DIAGNOSIS — M19.042 PRIMARY OSTEOARTHRITIS OF BOTH HANDS: ICD-10-CM

## 2020-02-03 DIAGNOSIS — R20.0 NUMBNESS IN BOTH HANDS: Primary | ICD-10-CM

## 2020-02-03 DIAGNOSIS — R79.89 LOW VITAMIN D LEVEL: ICD-10-CM

## 2020-02-03 DIAGNOSIS — R60.9 EDEMA, UNSPECIFIED TYPE: ICD-10-CM

## 2020-02-03 DIAGNOSIS — I10 ESSENTIAL HYPERTENSION WITH GOAL BLOOD PRESSURE LESS THAN 130/80: ICD-10-CM

## 2020-02-03 DIAGNOSIS — Z00.00 ROUTINE GENERAL MEDICAL EXAMINATION AT A HEALTH CARE FACILITY: ICD-10-CM

## 2020-02-03 DIAGNOSIS — Z12.5 SCREENING FOR PROSTATE CANCER: ICD-10-CM

## 2020-02-03 DIAGNOSIS — M19.041 PRIMARY OSTEOARTHRITIS OF BOTH HANDS: ICD-10-CM

## 2020-02-03 LAB
BASOPHILS # BLD AUTO: 0 10E9/L (ref 0–0.2)
BASOPHILS NFR BLD AUTO: 0.5 %
DIFFERENTIAL METHOD BLD: ABNORMAL
EOSINOPHIL # BLD AUTO: 0.1 10E9/L (ref 0–0.7)
EOSINOPHIL NFR BLD AUTO: 1.2 %
ERYTHROCYTE [DISTWIDTH] IN BLOOD BY AUTOMATED COUNT: 14 % (ref 10–15)
HCT VFR BLD AUTO: 36.1 % (ref 40–53)
HGB BLD-MCNC: 12.3 G/DL (ref 13.3–17.7)
LYMPHOCYTES # BLD AUTO: 1 10E9/L (ref 0.8–5.3)
LYMPHOCYTES NFR BLD AUTO: 25 %
MCH RBC QN AUTO: 28 PG (ref 26.5–33)
MCHC RBC AUTO-ENTMCNC: 34.1 G/DL (ref 31.5–36.5)
MCV RBC AUTO: 82 FL (ref 78–100)
MONOCYTES # BLD AUTO: 0.4 10E9/L (ref 0–1.3)
MONOCYTES NFR BLD AUTO: 10.7 %
NEUTROPHILS # BLD AUTO: 2.6 10E9/L (ref 1.6–8.3)
NEUTROPHILS NFR BLD AUTO: 62.6 %
PLATELET # BLD AUTO: 149 10E9/L (ref 150–450)
RBC # BLD AUTO: 4.4 10E12/L (ref 4.4–5.9)
T3FREE SERPL-MCNC: 2.9 PG/ML (ref 2.3–4.2)
WBC # BLD AUTO: 4.1 10E9/L (ref 4–11)

## 2020-02-03 PROCEDURE — 85025 COMPLETE CBC W/AUTO DIFF WBC: CPT | Performed by: INTERNAL MEDICINE

## 2020-02-03 PROCEDURE — 82043 UR ALBUMIN QUANTITATIVE: CPT | Performed by: INTERNAL MEDICINE

## 2020-02-03 PROCEDURE — 36415 COLL VENOUS BLD VENIPUNCTURE: CPT | Performed by: INTERNAL MEDICINE

## 2020-02-03 PROCEDURE — 80061 LIPID PANEL: CPT | Performed by: INTERNAL MEDICINE

## 2020-02-03 PROCEDURE — 99214 OFFICE O/P EST MOD 30 MIN: CPT | Performed by: FAMILY MEDICINE

## 2020-02-03 PROCEDURE — 80076 HEPATIC FUNCTION PANEL: CPT | Performed by: INTERNAL MEDICINE

## 2020-02-03 PROCEDURE — G0103 PSA SCREENING: HCPCS | Performed by: INTERNAL MEDICINE

## 2020-02-03 PROCEDURE — 80048 BASIC METABOLIC PNL TOTAL CA: CPT | Performed by: INTERNAL MEDICINE

## 2020-02-03 PROCEDURE — 82306 VITAMIN D 25 HYDROXY: CPT | Performed by: INTERNAL MEDICINE

## 2020-02-03 PROCEDURE — 84439 ASSAY OF FREE THYROXINE: CPT | Performed by: INTERNAL MEDICINE

## 2020-02-03 PROCEDURE — 84481 FREE ASSAY (FT-3): CPT | Performed by: INTERNAL MEDICINE

## 2020-02-03 PROCEDURE — 84443 ASSAY THYROID STIM HORMONE: CPT | Performed by: INTERNAL MEDICINE

## 2020-02-03 ASSESSMENT — MIFFLIN-ST. JEOR: SCORE: 1813.54

## 2020-02-03 NOTE — LETTER
2/3/2020         RE: Kashmir Yao  [address]  [place] [facility] [address]        Dear Colleague,    Thank you for referring your patient, Kashmir Yao, to the [facility]. Please see a copy of my visit note below.    ASSESSMENT & PLAN    1. Numbness in both hands    2. Primary osteoarthritis of both hands      Suspect that hand pain and burning is related to nerve (carpal tunnel)  EMG ordered and pending results can discuss options    Follow-up in the office 5-7 days after EMG to discuss results / next steps.    -----    SUBJECTIVE:  Kashmir Yao is a 64 year old male who is seen in follow up for bilateral hand pain. They were last seen on 3/27/19 by Dr. Yeo who referred patient to hand therapy. Patient completed 1 session of therapy.    Since their last visit reports worsening pain. He notes pain in bilateral palms, fingers, and volar aspect of wrists. He also notes pain over the dorsal aspect of right hand. He notes pain in right hand is worse than pain in left. He describes pain is burning and occurs most often early in the morning and lateral in the evening (sunrise and sunset). He reports pain is worse with pushing up out of a chair. They indicate that their current pain level is 3/10 and pain at worst is 10/10. They have tried rest/activity avoidance, ice, heat, Tylenol, ibuprofen, cock up wrist braces, diclofenac gel (has made his skin very dry), and previous imaging (xray 2/13/19 left hand, 11/1/17 right hand @ [facility]).      The patient is seen by themselves.    Patient's past medical, surgical, social, and family histories were reviewed today and no pertinent history related to patient's presenting problem.    REVIEW OF SYSTEMS:  Constitutional: NEGATIVE for fever, chills, change in weight  Skin: NEGATIVE for worrisome rashes, moles or lesions  GI/: NEGATIVE for bowel or bladder changes  Neuro: NEGATIVE for weakness, dizziness or paresthesias    OBJECTIVE:  /78    "Ht 1.708 m (5' 7.25\")   Wt 106.6 kg (235 lb)   BMI 36.53 kg/m      General: healthy, alert and in no distress  HEENT: no scleral icterus or conjunctival erythema  Skin: no suspicious lesions or rash. No jaundice.  CV: regular rhythm by palpation, no pedal edema  Resp: normal respiratory effort without conversational dyspnea   Psych: normal mood and affect  Gait: normal steady gait with appropriate coordination and balance  Neuro: normal light touch sensory exam of the extremities although he perceives burning in the median distribution especially in the morning  MSK:  bilateral HAND  Inspection:    No swelling or obvious deformity or asymmetry  Palpation:  Diffuse wrist tenderness   Range of Motion:  Full flexion and extension of the wrist  Strength:     full, pinch strength slightly diminished   Special Tests:    Positive: Tinel's, Phalen's    Cheryl Castro DO [facility]            Again, thank you for allowing me to participate in the care of your patient.        Sincerely,        Cheryl Castro DO    "

## 2020-02-03 NOTE — PROGRESS NOTES
"ASSESSMENT & PLAN    1. Numbness in both hands    2. Primary osteoarthritis of both hands      Suspect that hand pain and burning is related to nerve (carpal tunnel)  EMG ordered and pending results can discuss options    Follow-up in the office 5-7 days after EMG to discuss results / next steps.    -----    SUBJECTIVE:  Kashmir Yao is a 64 year old male who is seen in follow up for bilateral hand pain. They were last seen on 3/27/19 by Dr. Yeo who referred patient to hand therapy. Patient completed 1 session of therapy.    Since their last visit reports worsening pain. He notes pain in bilateral palms, fingers, and volar aspect of wrists. He also notes pain over the dorsal aspect of right hand. He notes pain in right hand is worse than pain in left. He describes pain is burning and occurs most often early in the morning and lateral in the evening (sunrise and sunset). He reports pain is worse with pushing up out of a chair. They indicate that their current pain level is 3/10 and pain at worst is 10/10. They have tried rest/activity avoidance, ice, heat, Tylenol, ibuprofen, cock up wrist braces, diclofenac gel (has made his skin very dry), and previous imaging (xray 2/13/19 left hand, 11/1/17 right hand @ [facility]).      The patient is seen by themselves.    Patient's past medical, surgical, social, and family histories were reviewed today and no pertinent history related to patient's presenting problem.    REVIEW OF SYSTEMS:  Constitutional: NEGATIVE for fever, chills, change in weight  Skin: NEGATIVE for worrisome rashes, moles or lesions  GI/: NEGATIVE for bowel or bladder changes  Neuro: NEGATIVE for weakness, dizziness or paresthesias    OBJECTIVE:  /78   Ht 1.708 m (5' 7.25\")   Wt 106.6 kg (235 lb)   BMI 36.53 kg/m     General: healthy, alert and in no distress  HEENT: no scleral icterus or conjunctival erythema  Skin: no suspicious lesions or rash. No jaundice.  CV: regular rhythm by palpation, no " pedal edema  Resp: normal respiratory effort without conversational dyspnea   Psych: normal mood and affect  Gait: normal steady gait with appropriate coordination and balance  Neuro: normal light touch sensory exam of the extremities although he perceives burning in the median distribution especially in the morning  MSK:  bilateral HAND  Inspection:    No swelling or obvious deformity or asymmetry  Palpation:  Diffuse wrist tenderness   Range of Motion:  Full flexion and extension of the wrist  Strength:     full, pinch strength slightly diminished   Special Tests:    Positive: Tinel's, Phalen's    Cheryl Castro DO [facility]

## 2020-02-03 NOTE — PATIENT INSTRUCTIONS
1. Numbness in both hands    2. Primary osteoarthritis of both hands      Suspect that hand pain and burning is related to nerve (carpal tunnel)  EMG ordered and pending results can discuss options    Follow-up in the office 5-7 days after EMG to discuss results / next steps.

## 2020-02-04 LAB
ALBUMIN SERPL-MCNC: 4.1 G/DL (ref 3.4–5)
ALP SERPL-CCNC: 104 U/L (ref 40–150)
ALT SERPL W P-5'-P-CCNC: 44 U/L (ref 0–70)
ANION GAP SERPL CALCULATED.3IONS-SCNC: 5 MMOL/L (ref 3–14)
AST SERPL W P-5'-P-CCNC: 24 U/L (ref 0–45)
BILIRUB DIRECT SERPL-MCNC: 0.2 MG/DL (ref 0–0.2)
BILIRUB SERPL-MCNC: 0.6 MG/DL (ref 0.2–1.3)
BUN SERPL-MCNC: 26 MG/DL (ref 7–30)
CALCIUM SERPL-MCNC: 9.1 MG/DL (ref 8.5–10.1)
CHLORIDE SERPL-SCNC: 111 MMOL/L (ref 94–109)
CHOLEST SERPL-MCNC: 122 MG/DL
CO2 SERPL-SCNC: 23 MMOL/L (ref 20–32)
CREAT SERPL-MCNC: 0.93 MG/DL (ref 0.66–1.25)
CREAT UR-MCNC: 141 MG/DL
DEPRECATED CALCIDIOL+CALCIFEROL SERPL-MC: 14 UG/L (ref 20–75)
GFR SERPL CREATININE-BSD FRML MDRD: 85 ML/MIN/{1.73_M2}
GLUCOSE SERPL-MCNC: 94 MG/DL (ref 70–99)
HDLC SERPL-MCNC: 36 MG/DL
LDLC SERPL CALC-MCNC: 64 MG/DL
MICROALBUMIN UR-MCNC: 10 MG/L
MICROALBUMIN/CREAT UR: 7.38 MG/G CR (ref 0–17)
NONHDLC SERPL-MCNC: 86 MG/DL
POTASSIUM SERPL-SCNC: 3.8 MMOL/L (ref 3.4–5.3)
PROT SERPL-MCNC: 7.3 G/DL (ref 6.8–8.8)
PSA SERPL-ACNC: 1.31 UG/L (ref 0–4)
SODIUM SERPL-SCNC: 139 MMOL/L (ref 133–144)
T4 FREE SERPL-MCNC: 1.09 NG/DL (ref 0.76–1.46)
TRIGL SERPL-MCNC: 109 MG/DL
TSH SERPL DL<=0.005 MIU/L-ACNC: 2.21 MU/L (ref 0.4–4)

## 2020-02-05 ENCOUNTER — TRANSFERRED RECORDS (OUTPATIENT)
Dept: HEALTH INFORMATION MANAGEMENT | Facility: CLINIC | Age: 65
End: 2020-02-05

## 2020-02-07 ENCOUNTER — TELEPHONE (OUTPATIENT)
Dept: OTHER | Facility: CLINIC | Age: 65
End: 2020-02-07

## 2020-02-07 ENCOUNTER — TELEPHONE (OUTPATIENT)
Dept: ORTHOPEDICS | Facility: CLINIC | Age: 65
End: 2020-02-07

## 2020-02-07 NOTE — TELEPHONE ENCOUNTER
Received preliminary EMG results completed on 2/5/2020 from Dr. Cesar's office.    Impression:  1.  Moderately severe right carpal tunnel syndrome  2.  Mild left carpal tunnel syndrome  3.  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    Patient has appointment scheduled on 2/11/2020 to review results.    Kellie Contreras ATC

## 2020-02-07 NOTE — TELEPHONE ENCOUNTER
EMG results reviewed.  Plan was to follow-up in the  to discuss results and next steps.  Has follow-up scheduled for 2/10/20.    Cheryl Castro DO, CAM  [facility]

## 2020-02-07 NOTE — TELEPHONE ENCOUNTER
Patient called to cancel 1:10 pm appt with Dr. Cano on Monday  Feb. 10 2020. Appt cancelled in Epic. I attempted to call Nixoncarlyponce back but he did not - holding spot on 2/12/20 at 0840 to rescheduled OV. Routing to scheduling to contact and get scheduled.    Mary Kate ENCINAS, RN    [facility]  Office: [phone]  Fax: [phone]

## 2020-02-10 NOTE — TELEPHONE ENCOUNTER
LM for patient to call us back to reschedule his 02/10/20 appointment with Dr Cano.  Holding spot on 2/12/20 at 0840. This is our 2nd and final attempt to reschedule this appointment.

## 2020-02-11 ENCOUNTER — OFFICE VISIT (OUTPATIENT)
Dept: ORTHOPEDICS | Facility: CLINIC | Age: 65
End: 2020-02-11
Payer: MEDICARE

## 2020-02-11 VITALS
HEIGHT: 67 IN | WEIGHT: 235 LBS | SYSTOLIC BLOOD PRESSURE: 114 MMHG | DIASTOLIC BLOOD PRESSURE: 72 MMHG | BODY MASS INDEX: 36.88 KG/M2

## 2020-02-11 DIAGNOSIS — G56.03 CARPAL TUNNEL SYNDROME, BILATERAL: Primary | ICD-10-CM

## 2020-02-11 PROCEDURE — 99214 OFFICE O/P EST MOD 30 MIN: CPT | Performed by: FAMILY MEDICINE

## 2020-02-11 ASSESSMENT — MIFFLIN-ST. JEOR: SCORE: 1813.54

## 2020-02-11 NOTE — PROGRESS NOTES
"ASSESSMENT & PLAN    1. Carpal tunnel syndrome, bilateral      Reviewed EMG results, right wrist has severe carpal tunnel syndrome. Left wrist is mild  Given severity, recommend surgical consultation and referral placed.    Follow-up with Dr. Frias    -----    SUBJECTIVE:  Kashmir aYo is a 64 year old male who is seen in follow-up for bilateral hand pain. They were last seen 2/3/2020 and had EMG of bilateral upper extremity completed 2/5/20.     Since their last visit reports 0% - (About the same as last time). They indicate that their current pain level is 4/10. They have tried rest/activity avoidance, ice, heat, Tylenol, naproxen, ibuprofen, cock up wrist braces, diclofenac gel (has made his skin very dry), and previous imaging (xray 2/13/19 left hand, 11/1/17 right hand @ [facility]).      The patient is seen by themselves.    Patient's past medical, surgical, social, and family histories were reviewed today and no pertinent history related to patient's presenting problem.    REVIEW OF SYSTEMS:  Constitutional: NEGATIVE for fever, chills, change in weight  Skin: NEGATIVE for worrisome rashes, moles or lesions  GI/: NEGATIVE for bowel or bladder changes  Neuro: NEGATIVE for weakness, dizziness or paresthesias    OBJECTIVE:  /72   Ht 1.708 m (5' 7.25\")   Wt 106.6 kg (235 lb)   BMI 36.53 kg/m     General: healthy, alert and in no distress  HEENT: no scleral icterus or conjunctival erythema  Skin: no suspicious lesions or rash. No jaundice.  CV: regular rhythm by palpation, no pedal edema  Resp: normal respiratory effort without conversational dyspnea   Psych: normal mood and affect  Gait: normal steady gait with appropriate coordination and balance  Neuro: normal light touch sensory exam of the extremities.    MSK:  Deferred    Independent visualization of the below image:  EMG completed on 2/5/20  Interpretation:  This is an abnormal study.  Today's study is indicative of a moderately severe disorder of the " right median nerve at the wrist as seen in the carpal tunnel syndrome.  There was evidence for a mild disorder of the left median nerve at the wrist is seen in the carpal tunnel syndrome.  There is also evidence for mild bilateral ulnar neuropathy is at the elbow.  The right median neuropathy at the wrist is the most significant finding.    Patient's conditions were thoroughly discussed during today's visit with greater than 50% of the visit spent counseling the patient with total time spent face-to-face with the patient being 25 minutes.    Cheryl Castro DO [facility]

## 2020-02-11 NOTE — PATIENT INSTRUCTIONS
1. Carpal tunnel syndrome, bilateral      Reviewed EMG results, right wrist has severe carpal tunnel syndrome. Left wrist is mild  Given severity, recommend surgical consultation and referral placed.    Follow-up with Dr. Frias

## 2020-02-11 NOTE — LETTER
"    2/11/2020         RE: Kashmir Yao  [address] [facility] [address]        Dear Colleague,    Thank you for referring your patient, Kashmir Yao, to the [facility]. Please see a copy of my visit note below.    ASSESSMENT & PLAN    1. Carpal tunnel syndrome, bilateral      Reviewed EMG results, right wrist has severe carpal tunnel syndrome. Left wrist is mild  Given severity, recommend surgical consultation and referral placed.    Follow-up with Dr. Frias    -----    SUBJECTIVE:  Kashmir Yao is a 64 year old male who is seen in follow-up for bilateral hand pain. They were last seen 2/3/2020 and had EMG of bilateral upper extremity completed 2/5/20.     Since their last visit reports 0% - (About the same as last time). They indicate that their current pain level is 4/10. They have tried rest/activity avoidance, ice, heat, Tylenol, naproxen, ibuprofen, cock up wrist braces, diclofenac gel (has made his skin very dry), and previous imaging (xray 2/13/19 left hand, 11/1/17 right hand @ [facility]).      The patient is seen by themselves.    Patient's past medical, surgical, social, and family histories were reviewed today and no pertinent history related to patient's presenting problem.    REVIEW OF SYSTEMS:  Constitutional: NEGATIVE for fever, chills, change in weight  Skin: NEGATIVE for worrisome rashes, moles or lesions  GI/: NEGATIVE for bowel or bladder changes  Neuro: NEGATIVE for weakness, dizziness or paresthesias    OBJECTIVE:  /72   Ht 1.708 m (5' 7.25\")   Wt 106.6 kg (235 lb)   BMI 36.53 kg/m      General: healthy, alert and in no distress  HEENT: no scleral icterus or conjunctival erythema  Skin: no suspicious lesions or rash. No jaundice.  CV: regular rhythm by palpation, no pedal edema  Resp: normal respiratory effort without conversational dyspnea   Psych: normal mood and affect  Gait: normal steady gait with appropriate coordination and balance  Neuro: " normal light touch sensory exam of the extremities.    MSK:  Deferred    Independent visualization of the below image:  EMG completed on 2/5/20  Interpretation:  This is an abnormal study.  Today's study is indicative of a moderately severe disorder of the right median nerve at the wrist as seen in the carpal tunnel syndrome.  There was evidence for a mild disorder of the left median nerve at the wrist is seen in the carpal tunnel syndrome.  There is also evidence for mild bilateral ulnar neuropathy is at the elbow.  The right median neuropathy at the wrist is the most significant finding.    Patient's conditions were thoroughly discussed during today's visit with greater than 50% of the visit spent counseling the patient with total time spent face-to-face with the patient being 25 minutes.    Cheryl Castro DO [facility]          Again, thank you for allowing me to participate in the care of your patient.        Sincerely,        Cheryl Castro DO

## 2020-02-13 ENCOUNTER — OFFICE VISIT (OUTPATIENT)
Dept: FAMILY MEDICINE | Facility: CLINIC | Age: 65
End: 2020-02-13
Payer: MEDICARE

## 2020-02-13 ENCOUNTER — TELEPHONE (OUTPATIENT)
Dept: ORTHOPEDICS | Facility: CLINIC | Age: 65
End: 2020-02-13

## 2020-02-13 ENCOUNTER — OFFICE VISIT (OUTPATIENT)
Dept: ORTHOPEDICS | Facility: CLINIC | Age: 65
End: 2020-02-13
Payer: MEDICARE

## 2020-02-13 VITALS
RESPIRATION RATE: 16 BRPM | HEART RATE: 60 BPM | TEMPERATURE: 98.3 F | SYSTOLIC BLOOD PRESSURE: 112 MMHG | HEIGHT: 67 IN | WEIGHT: 235 LBS | OXYGEN SATURATION: 100 % | DIASTOLIC BLOOD PRESSURE: 74 MMHG | BODY MASS INDEX: 36.88 KG/M2

## 2020-02-13 VITALS
WEIGHT: 235 LBS | BODY MASS INDEX: 35.61 KG/M2 | HEIGHT: 68 IN | SYSTOLIC BLOOD PRESSURE: 112 MMHG | DIASTOLIC BLOOD PRESSURE: 74 MMHG

## 2020-02-13 DIAGNOSIS — R60.0 PEDAL EDEMA: ICD-10-CM

## 2020-02-13 DIAGNOSIS — G56.01 CARPAL TUNNEL SYNDROME OF RIGHT WRIST: Primary | ICD-10-CM

## 2020-02-13 DIAGNOSIS — R05.9 COUGH: Primary | ICD-10-CM

## 2020-02-13 DIAGNOSIS — R41.89 COGNITIVE CHANGE: ICD-10-CM

## 2020-02-13 DIAGNOSIS — G56.03 BILATERAL CARPAL TUNNEL SYNDROME: ICD-10-CM

## 2020-02-13 DIAGNOSIS — Z00.00 ENCOUNTER FOR PREVENTIVE CARE: ICD-10-CM

## 2020-02-13 DIAGNOSIS — K21.9 GASTROESOPHAGEAL REFLUX DISEASE, ESOPHAGITIS PRESENCE NOT SPECIFIED: ICD-10-CM

## 2020-02-13 DIAGNOSIS — G56.23 ULNAR NEUROPATHY OF BOTH UPPER EXTREMITIES: ICD-10-CM

## 2020-02-13 DIAGNOSIS — L30.9 DERMATITIS: ICD-10-CM

## 2020-02-13 PROCEDURE — 99214 OFFICE O/P EST MOD 30 MIN: CPT | Performed by: ORTHOPAEDIC SURGERY

## 2020-02-13 PROCEDURE — 99214 OFFICE O/P EST MOD 30 MIN: CPT | Performed by: FAMILY MEDICINE

## 2020-02-13 ASSESSMENT — MIFFLIN-ST. JEOR
SCORE: 1813.54
SCORE: 1817.51

## 2020-02-13 NOTE — PROGRESS NOTES
Subjective     Kashmir Yao is a 64 year old male who presents to clinic today for the following health issues:    HPI   Acute Illness   Acute illness concerns: Cough  Onset: follow up     Fever: no     Chills/Sweats: no     Headache (location?): no     Sinus Pressure:no    Conjunctivitis:  no    Ear Pain: no    Rhinorrhea: no     Congestion: no     Sore Throat: no      Cough: YES-non-productive    Wheeze: no     Decreased Appetite: no     Nausea: no     Vomiting: no     Diarrhea:  no     Dysuria/Freq.: no     Fatigue/Achiness: YES    Sick/Strep Exposure: no      Therapies Tried and outcome:     Edema in both legs x 7-8 months and rash on left arm x week     Cough  (primary encounter diagnosis) longstanding, exam unrevealing.  Multidirectional empiric therapies ineffective.  Gastroesophageal reflux disease, esophagitis presence not specified no heartburn on famotidine  Bilateral carpal tunnel syndrome right is severe.  Ulnar neuropathy of both upper extremities mild bilateral  Encounter for preventive care Universal HIV testing introduced, rationale reviewed, all questions answered, permission granted to test  Dermatitis 4 x 55 patch resolving eczema left forearm smaller right forearm.  Asymptomatic.  He blames Voltaren gel   pedal edema longstanding.  Previous evaluation reassuring.  He has compression hose at home that he does not wear.    Past Medical History:   Diagnosis Date     Arthritis      Bilateral carpal tunnel syndrome 2/13/2020     CAD (coronary artery disease) 2010    a subtotally occluded obtuse marginal vessel which was stented with a drug-coated stent.  He had 50% to 60% LAD disease and 25% to 30% right coronary disease     Cognitive change 1/2/2020     Colonic polyps 2009    tubular adenomae with mildly dysplastic features     Disturbance of skin sensation      Encephalocele (H) 2009    left frontal     Epilepsy, partial (H) 2009    with secondary generalization     GERD (gastroesophageal reflux  disease)      Heart attack (H)     5 years ago. 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     C NONSPECIFIC PROCEDURE      stint in heart     CYSTOSCOPY       ESOPHAGOSCOPY, GASTROSCOPY, DUODENOSCOPY (EGD), COMBINED  2012    Procedure:COMBINED ESOPHAGOSCOPY, GASTROSCOPY, DUODENOSCOPY (EGD); ESOPHAGOSCOPY, GASTROSCOPY, DUODENOSCOPY (EGD) ; Surgeon:GILLIAN JOHNSON; Location: [facility]     HC COLONOSCOPY THRU STOMA, DIAGNOSTIC      tubular adenomae, recommended annual colonoscopy     KNEE SURGERY Left     repair of cartilage     SPINE SURGERY      repair of herniated disc lumbar     STENT       VENTRICULOSTOMY      [place]       Family History   Problem Relation Age of Onset     Cardiovascular Mother         CHF, HTN, VASC. INSUFF., DM     Family History Negative Father      Thyroid Disease Sister         HYPOTHYROID     Genitourinary Problems Brother         RENAL FAILURE AND H/O MVA WITH LE PARAPLEGIA     Cancer - colorectal No family hx of        Social History     Tobacco Use     Smoking status: Former Smoker     Packs/day: 0.50     Years: 20.00     Pack years: 10.00     Last attempt to quit: 2003     Years since quittin.5  "    Smokeless tobacco: Never Used   Substance Use Topics     Alcohol use: No     Alcohol/week: 0.0 standard drinks           Reviewed and updated as needed this visit by Provider         Review of Systems   Constitutional: Negative for fever.   Respiratory: Positive for cough.    Cardiovascular: Positive for chest pain.   Gastrointestinal: Negative for heartburn.   Skin: Positive for rash.            Objective    There were no vitals taken for this visit.  There is no height or weight on file to calculate BMI.   /74 (BP Location: Right arm, Patient Position: Chair, Cuff Size: Adult Large)   Pulse 60   Temp 98.3  F (36.8  C) (Oral)   Resp 16   Ht 1.708 m (5' 7.25\")   Wt 106.6 kg (235 lb)   SpO2 100%   BMI 36.53 kg/m      Physical Exam  Cardiovascular:      Rate and Rhythm: Regular rhythm.   Musculoskeletal:      Right lower leg: Edema present.      Left lower leg: Edema present.   Skin:     Findings: Rash present.      Comments: Post inflammatory eczematous patches forearms   Neurological:      Mental Status: He is alert.      Comments: Slightly scattered train of thought        Problem List Items Addressed This Visit        Nervous and Auditory    Bilateral carpal tunnel syndrome     Bilateral, severe one side. Severe Right. He is pursuing surgical release          Ulnar neuropathy of both upper extremities     On recent EMG.  He is pursuing carpal tunnel release            Respiratory    Cough - Primary     Unchanged with our empiric therapies.evaluation unrevealing. Broaden data base           Relevant Orders    PULMONARY MEDICINE REFERRAL       Digestive    GERD (gastroesophageal reflux disease)     Previously diagnosed.  On Pepcid            Musculoskeletal and Integumentary    Dermatitis     Appears to be resolving.  Forearms bilaterally.  He blames Voltaren gel.  No therapies         Pedal edema       Other    Cognitive change     Clinically evident.           Other Visit Diagnoses     Encounter " for preventive care        Relevant Orders    Hepatitis C antibody    HIV Antigen Antibody Combo            Chad Noel MD

## 2020-02-13 NOTE — TELEPHONE ENCOUNTER
/Scheduled surgery    Type of surgery: right carpal tunnel release  Location of surgery: Other: [facility]  Date and time of surgery: 3/11/20 @ 1:30pm   Surgeon: Altagracia  Pre-Op Appt Date: local  Post-Op Appt Date: 3/19/20  Packet sent out: Yes  Pre-cert/Authorization completed:  No  Date: 2/13/20      Paz Castañeda, Surgery Scheduler

## 2020-02-13 NOTE — LETTER
2/13/2020         RE: Kashmir Yao  [id number] [facility] [id number]        Dear Colleague,    Thank you for referring your patient, Kashmir Yao, to the [facility] ORTHOPEDIC SURGERY. Please see a copy of my visit note below.    HISTORY OF PRESENT ILLNESS:    Kashmir Yao is a 64 year old male who is seen in consultation at the request of Dr. Cheryl Castro for  Bilateral carpal tunnel syndrome. Patient is right hand dominant, and retired.       Present symptoms: Right> left hand. Patient reports extreme pain and tingling of his hands. He states at sunrise and sunset he has extreme pain in his hands bilaterally.  He reports symptoms are present from the wrists to his finger tips encompassing his entire hand. He reports mild relief of symptoms with tapping his fingers on a table top.   Current pain level: 4/10, Worst pain level: 10/10  Treatments tried to this point: bracing, topical cream, warm water soaking  Orthopedic PMH: hand arthritis, bilateral carpal tunnel syndrome     Past Medical History:   Diagnosis Date     Arthritis      Bilateral carpal tunnel syndrome 2/13/2020     CAD (coronary artery disease) 2010    a subtotally occluded obtuse marginal vessel which was stented with a drug-coated stent.  He had 50% to 60% LAD disease and 25% to 30% right coronary disease     Cognitive change 1/2/2020     Colonic polyps 2009    tubular adenomae with mildly dysplastic features     Disturbance of skin sensation      Encephalocele (H) 2009    left frontal     Epilepsy, partial (H) 2009    with secondary generalization     GERD (gastroesophageal reflux disease)      Heart attack (H)     5 years ago. 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     C NONSPECIFIC PROCEDURE      stint in heart     CYSTOSCOPY       ESOPHAGOSCOPY, GASTROSCOPY, DUODENOSCOPY (EGD), COMBINED  5/24/2012    Procedure:COMBINED ESOPHAGOSCOPY, GASTROSCOPY, DUODENOSCOPY (EGD); ESOPHAGOSCOPY, GASTROSCOPY, DUODENOSCOPY (EGD) ; Surgeon:GILLIAN JOHNSON; Location: [facility]     HC COLONOSCOPY THRU STOMA, DIAGNOSTIC  2009    tubular adenomae, recommended annual colonoscopy     KNEE SURGERY Left     repair of cartilage     SPINE SURGERY      repair of herniated disc lumbar     STENT       VENTRICULOSTOMY      [place]       Family History   Problem Relation Age of Onset     Cardiovascular Mother         CHF, HTN, VASC. INSUFF., DM     Family History Negative Father      Thyroid Disease Sister         HYPOTHYROID     Genitourinary Problems Brother         RENAL FAILURE AND H/O MVA WITH LE PARAPLEGIA     Cancer - colorectal No family hx of        Social History     Socioeconomic History     Marital status:      Spouse name: Not on file     Number of children: 3     Years of education: Not on file     Highest education level: Not on file   Occupational History     Occupation:  - [employer]   Social Needs     Financial resource strain: Not on file     Food insecurity:     Worry: Not on file     Inability: Not on file     Transportation needs:     Medical: Not on file     Non-medical: Not on file   Tobacco Use     Smoking status: Former Smoker     Packs/day: 0.50     Years:  20.00     Pack years: 10.00     Last attempt to quit: 2003     Years since quittin.5     Smokeless tobacco: Never Used   Substance and Sexual Activity     Alcohol use: No     Alcohol/week: 0.0 standard drinks     Drug use: No     Sexual activity: Yes     Partners: Female   Lifestyle     Physical activity:     Days per week: Not on file     Minutes per session: Not on file     Stress: Not on file   Relationships     Social connections:     Talks on phone: Not on file     Gets together: Not on file     Attends Buddhist service: Not on file     Active member of club or organization: Not on file     Attends meetings of clubs or organizations: Not on file     Relationship status: Not on file     Intimate partner violence:     Fear of current or ex partner: Not on file     Emotionally abused: Not on file     Physically abused: Not on file     Forced sexual activity: Not on file   Other Topics Concern      Service Not Asked     Blood Transfusions Not Asked     Caffeine Concern Not Asked     Occupational Exposure Not Asked     Hobby Hazards Not Asked     Sleep Concern Not Asked     Stress Concern Not Asked     Weight Concern Not Asked     Special Diet Not Asked     Back Care Not Asked     Exercise Not Asked     Bike Helmet Not Asked     Seat Belt Yes     Self-Exams Not Asked     Parent/sibling w/ CABG, MI or angioplasty before 65F 55M? Not Asked   Social History Narrative    SD - CHECKED AT LEAST TWICE YEARLY.    GUNS: NONE.       Current Outpatient Medications   Medication Sig Dispense Refill     acetaminophen (TYLENOL) 500 MG tablet Take 1,000 mg by mouth every 8 hours as needed for mild pain       aspirin 81 MG tablet Take 81 mg by mouth daily       cetirizine (ZYRTEC) 10 MG tablet Take 1 tablet (10 mg) by mouth daily 90 tablet 0     famotidine (PEPCID) 40 MG tablet Take 1 tablet (40 mg) by mouth daily 90 tablet 3     fluticasone (FLONASE) 50 MCG/ACT nasal spray Spray 1 spray into both nostrils daily 16  g 0     Fluticasone-Umeclidin-Vilanterol (TRELEGY ELLIPTA) 100-62.5-25 MCG/INH oral inhaler Inhale 1 puff into the lungs daily 1 Inhaler 3     lamoTRIgine (LAMICTAL) 100 MG tablet 250 mg At Bedtime   3     lamoTRIgine (LAMICTAL) 200 MG tablet Take 200 mg by mouth every morning  90 tablet 1     metoprolol tartrate (LOPRESSOR) 50 MG tablet Take 1 tablet (50 mg) by mouth 2 times daily 180 tablet 3     naproxen (NAPROSYN) 500 MG tablet Take 1 tablet (500 mg) by mouth 2 times daily 90 tablet 0     pregabalin (LYRICA) 25 MG capsule Take 3 capsules (75 mg) by mouth 2 times daily 540 capsule 1     sildenafil (VIAGRA) 50 MG tablet Take 1 tablet (50 mg) by mouth daily as needed for erectile dysfunction 10 tablet 11     simvastatin (ZOCOR) 40 MG tablet Take 1 tablet (40 mg) by mouth At Bedtime 90 tablet 3     zolpidem (AMBIEN) 10 MG tablet Take 1 tablet (10 mg) by mouth nightly as needed for sleep at bedtime. 90 tablet 1     valACYclovir (VALTREX) 1000 mg tablet Take 1 tablet (1,000 mg) by mouth 3 times daily for 7 days 21 tablet 0       Allergies   Allergen Reactions     Codeine      inability to sleep after taking codeine     Gabapentin      Fever       Vicodin [Hydrocodone-Acetaminophen]      Perspiring, hyper, itchy       REVIEW OF SYSTEMS:  CONSTITUTIONAL:  NEGATIVE for fever, chills, change in weight  INTEGUMENTARY/SKIN:  NEGATIVE for worrisome rashes, moles or lesions  EYES:  NEGATIVE for vision changes or irritation  ENT/MOUTH:  NEGATIVE for ear, mouth and throat problems  RESP:  Asthma, sleep apnea  BREAST:  NEGATIVE for masses, tenderness or discharge  CV:  Chest pains, hypertension  GI:  Reflux/ heart burn, constipation  :  Negative   MUSCULOSKELETAL:  See HPI above  NEURO:  seizures  ENDOCRINE:  NEGATIVE for temperature intolerance, skin/hair changes  HEME/ALLERGY/IMMUNE:  NEGATIVE for bleeding problems  PSYCHIATRIC:  anxiety    PHYSICAL EXAM:  /74 (BP Location: Right arm, Patient Position: Chair, Cuff  "Size: Adult Regular)   Ht 1.715 m (5' 7.5\")   Wt 106.6 kg (235 lb)   BMI 36.26 kg/m     Body mass index is 36.26 kg/m .   GENERAL APPEARANCE: healthy, alert and no distress   HEENT: No apparent thyroid megaly. Clear sclera with normal ocular movement  RESPIRATORY: No labored breathing  SKIN: no suspicious lesions or rashes  NEURO: Normal strength and tone, mentation intact and speech normal  VASCULAR: Good pulses, and capillary refill   LYMPH: no lymphadenopathy   PSYCH:  mentation appears normal and affect normal/bright    MUSCULOSKELETAL:  He walks in without significant limp  Right foot is slightly externally rotated when he walks  Parents of the upper extremities is basically symmetrical  Minimal thenar eminence atrophy, right  No noticeable dorsal interosseous atrophy  Wrist range of motion is full  No swelling in the wrist, bilateral  Grossly intact sensation at the fingertips  Normal pinching and  strength  Normal adduction and abduction finger strength, bilateral  Negative Tinel signs at the palms  Negative Tinel signs at the cubital tunnels  No catching or locking with the finger movements throughout          ASSESSMENT:  Moderate to severe right carpal tunnel syndrome  Mild the left carpal tunnel syndrome  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 Cesar's office performed on 2/5/20 shows, copy of report can be found in Media:   Moderately-severe disorder of the right median nerve at the wrist as seen in the carpal tunnel syndrome.  There was evidence of a mild disorder of the left median nerve at the wrist as seen in the carpal tunnel syndrome.  There is also evidence for mild bilateral ulnar neuropathies at the elbow. The right median neuropathy at the wrist is most significant finding.     PLAN:        The EMG report from Dr. Cesar's office was reviewed and findings were explained.  He feels that the right hand symptoms are severe that he wants to go ahead with the " surgical intervention.  Given the fact that right ulnar nerve involvement is relatively mild, we recommended considering carpal tunnel release only for the time being.  He understands that if little finger sensation is bothersome enough in the future, we may have to come back and consider ulnar nerve transposition.  If he does well with the right hand surgery, he will consider doing the same thing on the left.  Nature of the surgery and potential complications were discussed.  Choices for anesthesia were discussed.  He feels comfortable doing the surgery with a local anesthetic alone.  Right carpal tunnel release will be scheduled according to his convenience.    Imaging Interpretation:   None taken today.      Rick Frias MD  Department of Orthopedic Surgery        Disclaimer: This note consists of symbols derived from keyboarding, dictation and/or voice recognition software. As a result, there may be errors in the script that have gone undetected. Please consider this when interpreting information found in this chart.      Again, thank you for allowing me to participate in the care of your patient.        Sincerely,        Rick Frias MD

## 2020-02-13 NOTE — PROGRESS NOTES
HISTORY OF PRESENT ILLNESS:    Kashmir Yao is a 64 year old male who is seen in consultation at the request of Dr. Cheryl Castro for  Bilateral carpal tunnel syndrome. Patient is right hand dominant, and retired.       Present symptoms: Right> left hand. Patient reports extreme pain and tingling of his hands. He states at sunrise and sunset he has extreme pain in his hands bilaterally.  He reports symptoms are present from the wrists to his finger tips encompassing his entire hand. He reports mild relief of symptoms with tapping his fingers on a table top.   Current pain level: 4/10, Worst pain level: 10/10  Treatments tried to this point: bracing, topical cream, warm water soaking  Orthopedic PMH: hand arthritis, bilateral carpal tunnel syndrome     Past Medical History:   Diagnosis Date     Arthritis      Bilateral carpal tunnel syndrome 2/13/2020     CAD (coronary artery disease) 2010    a subtotally occluded obtuse marginal vessel which was stented with a drug-coated stent.  He had 50% to 60% LAD disease and 25% to 30% right coronary disease     Cognitive change 1/2/2020     Colonic polyps 2009    tubular adenomae with mildly dysplastic features     Disturbance of skin sensation      Encephalocele (H) 2009    left frontal     Epilepsy, partial (H) 2009    with secondary generalization     GERD (gastroesophageal reflux disease)      Heart attack (H)     5 years ago. 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     C NONSPECIFIC PROCEDURE      stint in heart     CYSTOSCOPY       ESOPHAGOSCOPY, GASTROSCOPY, DUODENOSCOPY (EGD), COMBINED  2012    Procedure:COMBINED ESOPHAGOSCOPY, GASTROSCOPY, DUODENOSCOPY (EGD); ESOPHAGOSCOPY, GASTROSCOPY, DUODENOSCOPY (EGD) ; Surgeon:GILLIAN JOHNSON; Location: GI     HC COLONOSCOPY THRU STOMA, DIAGNOSTIC      tubular adenomae, recommended annual colonoscopy     KNEE SURGERY Left     repair of cartilage     SPINE SURGERY      repair of herniated disc lumbar     STENT       VENTRICULOSTOMY      [place]       Family History   Problem Relation Age of Onset     Cardiovascular Mother         CHF, HTN, VASC. INSUFF., DM     Family History Negative Father      Thyroid Disease Sister         HYPOTHYROID     Genitourinary Problems Brother         RENAL FAILURE AND H/O MVA WITH LE PARAPLEGIA     Cancer - colorectal No family hx of        Social History     Socioeconomic History     Marital status:      Spouse name: Not on file     Number of children: 3     Years of education: Not on file     Highest education level: Not on file   Occupational History     Occupation:  - [employer]   Social Needs     Financial resource strain: Not on file     Food insecurity:     Worry: Not on file     Inability: Not on file     Transportation needs:     Medical: Not on file     Non-medical: Not on file   Tobacco Use     Smoking status: Former Smoker     Packs/day: 0.50     Years: 20.00     Pack years: 10.00     Last attempt to quit: 2003     Years since quittin.5     Smokeless tobacco: Never Used   Substance and Sexual Activity     Alcohol use: No     Alcohol/week: 0.0 standard drinks     Drug use: No     Sexual  activity: Yes     Partners: Female   Lifestyle     Physical activity:     Days per week: Not on file     Minutes per session: Not on file     Stress: Not on file   Relationships     Social connections:     Talks on phone: Not on file     Gets together: Not on file     Attends Sikh service: Not on file     Active member of club or organization: Not on file     Attends meetings of clubs or organizations: Not on file     Relationship status: Not on file     Intimate partner violence:     Fear of current or ex partner: Not on file     Emotionally abused: Not on file     Physically abused: Not on file     Forced sexual activity: Not on file   Other Topics Concern      Service Not Asked     Blood Transfusions Not Asked     Caffeine Concern Not Asked     Occupational Exposure Not Asked     Hobby Hazards Not Asked     Sleep Concern Not Asked     Stress Concern Not Asked     Weight Concern Not Asked     Special Diet Not Asked     Back Care Not Asked     Exercise Not Asked     Bike Helmet Not Asked     Seat Belt Yes     Self-Exams Not Asked     Parent/sibling w/ CABG, MI or angioplasty before 65F 55M? Not Asked   Social History Narrative    SD - CHECKED AT LEAST TWICE YEARLY.    GUNS: NONE.       Current Outpatient Medications   Medication Sig Dispense Refill     acetaminophen (TYLENOL) 500 MG tablet Take 1,000 mg by mouth every 8 hours as needed for mild pain       aspirin 81 MG tablet Take 81 mg by mouth daily       cetirizine (ZYRTEC) 10 MG tablet Take 1 tablet (10 mg) by mouth daily 90 tablet 0     famotidine (PEPCID) 40 MG tablet Take 1 tablet (40 mg) by mouth daily 90 tablet 3     fluticasone (FLONASE) 50 MCG/ACT nasal spray Spray 1 spray into both nostrils daily 16 g 0     Fluticasone-Umeclidin-Vilanterol (TRELEGY ELLIPTA) 100-62.5-25 MCG/INH oral inhaler Inhale 1 puff into the lungs daily 1 Inhaler 3     lamoTRIgine (LAMICTAL) 100 MG tablet 250 mg At Bedtime   3     lamoTRIgine (LAMICTAL) 200 MG tablet  "Take 200 mg by mouth every morning  90 tablet 1     metoprolol tartrate (LOPRESSOR) 50 MG tablet Take 1 tablet (50 mg) by mouth 2 times daily 180 tablet 3     naproxen (NAPROSYN) 500 MG tablet Take 1 tablet (500 mg) by mouth 2 times daily 90 tablet 0     pregabalin (LYRICA) 25 MG capsule Take 3 capsules (75 mg) by mouth 2 times daily 540 capsule 1     sildenafil (VIAGRA) 50 MG tablet Take 1 tablet (50 mg) by mouth daily as needed for erectile dysfunction 10 tablet 11     simvastatin (ZOCOR) 40 MG tablet Take 1 tablet (40 mg) by mouth At Bedtime 90 tablet 3     zolpidem (AMBIEN) 10 MG tablet Take 1 tablet (10 mg) by mouth nightly as needed for sleep at bedtime. 90 tablet 1     valACYclovir (VALTREX) 1000 mg tablet Take 1 tablet (1,000 mg) by mouth 3 times daily for 7 days 21 tablet 0       Allergies   Allergen Reactions     Codeine      inability to sleep after taking codeine     Gabapentin      Fever       Vicodin [Hydrocodone-Acetaminophen]      Perspiring, hyper, itchy       REVIEW OF SYSTEMS:  CONSTITUTIONAL:  NEGATIVE for fever, chills, change in weight  INTEGUMENTARY/SKIN:  NEGATIVE for worrisome rashes, moles or lesions  EYES:  NEGATIVE for vision changes or irritation  ENT/MOUTH:  NEGATIVE for ear, mouth and throat problems  RESP:  Asthma, sleep apnea  BREAST:  NEGATIVE for masses, tenderness or discharge  CV:  Chest pains, hypertension  GI:  Reflux/ heart burn, constipation  :  Negative   MUSCULOSKELETAL:  See HPI above  NEURO:  seizures  ENDOCRINE:  NEGATIVE for temperature intolerance, skin/hair changes  HEME/ALLERGY/IMMUNE:  NEGATIVE for bleeding problems  PSYCHIATRIC:  anxiety    PHYSICAL EXAM:  /74 (BP Location: Right arm, Patient Position: Chair, Cuff Size: Adult Regular)   Ht 1.715 m (5' 7.5\")   Wt 106.6 kg (235 lb)   BMI 36.26 kg/m    Body mass index is 36.26 kg/m .   GENERAL APPEARANCE: healthy, alert and no distress   HEENT: No apparent thyroid megaly. Clear sclera with normal ocular " movement  RESPIRATORY: No labored breathing  SKIN: no suspicious lesions or rashes  NEURO: Normal strength and tone, mentation intact and speech normal  VASCULAR: Good pulses, and capillary refill   LYMPH: no lymphadenopathy   PSYCH:  mentation appears normal and affect normal/bright    MUSCULOSKELETAL:  He walks in without significant limp  Right foot is slightly externally rotated when he walks  Parents of the upper extremities is basically symmetrical  Minimal thenar eminence atrophy, right  No noticeable dorsal interosseous atrophy  Wrist range of motion is full  No swelling in the wrist, bilateral  Grossly intact sensation at the fingertips  Normal pinching and  strength  Normal adduction and abduction finger strength, bilateral  Negative Tinel signs at the palms  Negative Tinel signs at the cubital tunnels  No catching or locking with the finger movements throughout          ASSESSMENT:  Moderate to severe right carpal tunnel syndrome  Mild the left carpal tunnel syndrome  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 Cesar's office performed on 2/5/20 shows, copy of report can be found in Media:   Moderately-severe disorder of the right median nerve at the wrist as seen in the carpal tunnel syndrome.  There was evidence of a mild disorder of the left median nerve at the wrist as seen in the carpal tunnel syndrome.  There is also evidence for mild bilateral ulnar neuropathies at the elbow. The right median neuropathy at the wrist is most significant finding.     PLAN:        The EMG report from Dr. Cesar's office was reviewed and findings were explained.  He feels that the right hand symptoms are severe that he wants to go ahead with the surgical intervention.  Given the fact that right ulnar nerve involvement is relatively mild, we recommended considering carpal tunnel release only for the time being.  He understands that if little finger sensation is bothersome enough in the  future, we may have to come back and consider ulnar nerve transposition.  If he does well with the right hand surgery, he will consider doing the same thing on the left.  Nature of the surgery and potential complications were discussed.  Choices for anesthesia were discussed.  He feels comfortable doing the surgery with a local anesthetic alone.  Right carpal tunnel release will be scheduled according to his convenience.    Imaging Interpretation:   None taken today.      Rick Frias MD  Department of Orthopedic Surgery        Disclaimer: This note consists of symbols derived from keyboarding, dictation and/or voice recognition software. As a result, there may be errors in the script that have gone undetected. Please consider this when interpreting information found in this chart.

## 2020-02-14 PROBLEM — R60.0 PEDAL EDEMA: Status: ACTIVE | Noted: 2020-02-14

## 2020-02-14 ASSESSMENT — ENCOUNTER SYMPTOMS
COUGH: 1
HEARTBURN: 0
FEVER: 0

## 2020-02-18 ENCOUNTER — ALLIED HEALTH/NURSE VISIT (OUTPATIENT)
Dept: FAMILY MEDICINE | Facility: CLINIC | Age: 65
End: 2020-02-18
Payer: MEDICARE

## 2020-02-18 VITALS — DIASTOLIC BLOOD PRESSURE: 78 MMHG | SYSTOLIC BLOOD PRESSURE: 118 MMHG | HEART RATE: 56 BPM

## 2020-02-18 DIAGNOSIS — Z00.00 ENCOUNTER FOR PREVENTIVE CARE: ICD-10-CM

## 2020-02-18 DIAGNOSIS — I10 BENIGN ESSENTIAL HYPERTENSION: Primary | ICD-10-CM

## 2020-02-18 PROCEDURE — 87389 HIV-1 AG W/HIV-1&-2 AB AG IA: CPT | Performed by: FAMILY MEDICINE

## 2020-02-18 PROCEDURE — 99207 ZZC NO CHARGE NURSE ONLY: CPT

## 2020-02-18 PROCEDURE — G0472 HEP C SCREEN HIGH RISK/OTHER: HCPCS | Performed by: FAMILY MEDICINE

## 2020-02-18 PROCEDURE — 36415 COLL VENOUS BLD VENIPUNCTURE: CPT | Performed by: FAMILY MEDICINE

## 2020-02-18 NOTE — LETTER
[facility]  [address] [place], [address]  [phone]        February 20, 2020    Kashmir Yao                                                                                                                                                       [address] [facility] [id number]            Dear Kashmir,    The screening tests are normal.  There are done once a lifetime.  Thank you for coming in       Results for orders placed or performed in visit on 02/18/20   HIV Antigen Antibody Combo     Status: None   Result Value Ref Range    HIV Antigen Antibody Combo Nonreactive NR^Nonreactive       Hepatitis C antibody     Status: None   Result Value Ref Range    Hepatitis C Antibody Nonreactive NR^Nonreactive             JAGDISH GILBERT

## 2020-02-18 NOTE — PROGRESS NOTES
Kashmir Yao is a 64 year old patient who comes in today for a Blood Pressure check.  Initial BP:  /78 (BP Location: Right arm, Patient Position: Chair, Cuff Size: Adult Large)   Pulse 56      Data Unavailable  Disposition: pt was wondering why is BP is being rechecked, states his BP is always good    BP Readings from Last 6 Encounters:   02/18/20 118/78   02/13/20 112/74   02/13/20 112/74   02/11/20 114/72   02/03/20 126/78   01/02/20 132/70       Talked with Roxanna RN and Dena RN and we reviewed his note from a week ago, but there was no indication of needing BP rechecked.  Talked with patient and he states would like us to talk with Dr. ALEJANDRE before he leaves, because he is not wanting to come back to clinic.  Talked with Dr. ALEJANDRE and he states unsure why he was scheduled for nurse only BP, but had his labs done today. Informed patient unsure, also he wanted his referral to Pulmonology printed off so he can call and schedule. Printed off for patient, also informed him will call him if anything arises.    Jenni Chapman/[facility]---[facility]

## 2020-02-19 LAB
HCV AB SERPL QL IA: NONREACTIVE
HIV 1+2 AB+HIV1 P24 AG SERPL QL IA: NONREACTIVE

## 2020-02-20 NOTE — RESULT ENCOUNTER NOTE
The screening tests are normal.  There are done once a lifetime.  Thank you for coming in  JAGDISH GILBERT

## 2020-02-28 ENCOUNTER — OFFICE VISIT (OUTPATIENT)
Dept: INTERNAL MEDICINE | Facility: CLINIC | Age: 65
End: 2020-02-28
Payer: MEDICARE

## 2020-02-28 VITALS
OXYGEN SATURATION: 98 % | HEART RATE: 50 BPM | BODY MASS INDEX: 34.69 KG/M2 | WEIGHT: 228.9 LBS | RESPIRATION RATE: 18 BRPM | HEIGHT: 68 IN | SYSTOLIC BLOOD PRESSURE: 90 MMHG | DIASTOLIC BLOOD PRESSURE: 56 MMHG | TEMPERATURE: 97.6 F

## 2020-02-28 DIAGNOSIS — R21 RASH: Primary | ICD-10-CM

## 2020-02-28 PROCEDURE — 99213 OFFICE O/P EST LOW 20 MIN: CPT | Performed by: INTERNAL MEDICINE

## 2020-02-28 RX ORDER — FLUOCINONIDE 0.5 MG/G
OINTMENT TOPICAL 2 TIMES DAILY PRN
Qty: 30 G | Refills: 0 | Status: SHIPPED | OUTPATIENT
Start: 2020-02-28 | End: 2023-04-10

## 2020-02-28 ASSESSMENT — MIFFLIN-ST. JEOR: SCORE: 1789.84

## 2020-02-28 NOTE — PATIENT INSTRUCTIONS
Try this prescription ointment to the areas of rash on the arms twice daily as needed.     For the whole body dry skin, recommend using Eucerin cream or Aquaphor ointment.     If no improvement recommend calling one of the listed phone numbers to see the dermatologist.

## 2020-02-28 NOTE — NURSING NOTE
"BP 90/56 (BP Location: Left arm, Patient Position: Sitting, Cuff Size: Adult Large)   Pulse 50   Temp 97.6  F (36.4  C) (Oral)   Resp 18   Ht 1.715 m (5' 7.5\")   Wt 103.8 kg (228 lb 14.4 oz)   SpO2 98%   BMI 35.32 kg/m    Jen Diaz CMA    "

## 2020-02-28 NOTE — PROGRESS NOTES
"Subjective     Kashmir Yao is a 64 year old male who presents to clinic today for the following health issues:  Skin rash    HPI   The patient has recently noted a slightly itchy red rash on his forearms.   He reports using topical ketoprofen gel a while ago. He is not aware of exposures or contacts with a similar rash.     Past medical, family and social histories as well as medications reviewed and updated as needed.  Reviewed and updated as needed this visit by Provider         Review of Systems   As above. No systemic illness symptoms.         Vitals: BP 90/56 (BP Location: Left arm, Patient Position: Sitting, Cuff Size: Adult Large)   Pulse 50   Temp 97.6  F (36.4  C) (Oral)   Resp 18   Ht 1.715 m (5' 7.5\")   Wt 103.8 kg (228 lb 14.4 oz)   SpO2 98%   BMI 35.32 kg/m    BMI= Body mass index is 35.32 kg/m .     Physical Exam   GENERAL: healthy, alert and no distress  SKIN: three erythematous eczematous small patches, each about 2-3 cm2 in diameter, located over the left proximal forearm, the right proximal forearm, and the left ulnar wrist.   Each one has a follicular, scabbed and eczematous pattern to the erythematous rash.         Assessment & Plan     (R21) Rash  (primary encounter diagnosis)  Comment: Dry, follicular and eczematous appearance to each patch, raising a suspicion for dry skin with secondary scratching/excoriation.   Will offer symptomatic treatment, dermatology consult if not improving.   Plan: DERMATOLOGY REFERRAL, fluocinonide (LIDEX) 0.05        % external ointment           BMI:   Estimated body mass index is 35.32 kg/m  as calculated from the following:    Height as of this encounter: 1.715 m (5' 7.5\").    Weight as of this encounter: 103.8 kg (228 lb 14.4 oz).   Weight management plan: deferred        Patient Instructions   Try this prescription ointment to the areas of rash on the arms twice daily as needed.     For the whole body dry skin, recommend using Eucerin cream or " Aquaphor ointment.     If no improvement recommend calling one of the listed phone numbers to see the dermatologist.       No follow-ups on file.    Kody Casarez MD,   [facility]

## 2020-03-03 ENCOUNTER — TRANSFERRED RECORDS (OUTPATIENT)
Dept: HEALTH INFORMATION MANAGEMENT | Facility: CLINIC | Age: 65
End: 2020-03-03

## 2020-03-11 ENCOUNTER — TRANSFERRED RECORDS (OUTPATIENT)
Dept: HEALTH INFORMATION MANAGEMENT | Facility: CLINIC | Age: 65
End: 2020-03-11

## 2020-03-12 ENCOUNTER — OFFICE VISIT (OUTPATIENT)
Dept: ORTHOPEDICS | Facility: CLINIC | Age: 65
End: 2020-03-12
Payer: MEDICARE

## 2020-03-12 ENCOUNTER — TELEPHONE (OUTPATIENT)
Dept: ORTHOPEDICS | Facility: CLINIC | Age: 65
End: 2020-03-12

## 2020-03-12 DIAGNOSIS — G89.18 POST-OP PAIN: Primary | ICD-10-CM

## 2020-03-12 PROCEDURE — 99024 POSTOP FOLLOW-UP VISIT: CPT | Performed by: PHYSICIAN ASSISTANT

## 2020-03-12 RX ORDER — TRAMADOL HYDROCHLORIDE 50 MG/1
50-100 TABLET ORAL EVERY 6 HOURS PRN
Qty: 20 TABLET | Refills: 0 | Status: SHIPPED | OUTPATIENT
Start: 2020-03-12 | End: 2020-05-27

## 2020-03-12 RX ORDER — TRAMADOL HYDROCHLORIDE 50 MG/1
50 TABLET ORAL EVERY 6 HOURS PRN
Qty: 20 TABLET | Refills: 0 | Status: SHIPPED | OUTPATIENT
Start: 2020-03-12 | End: 2020-03-12

## 2020-03-12 RX ORDER — CELECOXIB 100 MG/1
100 CAPSULE ORAL 2 TIMES DAILY
Qty: 10 CAPSULE | Refills: 0 | Status: SHIPPED | OUTPATIENT
Start: 2020-03-12 | End: 2020-05-21

## 2020-03-12 NOTE — TELEPHONE ENCOUNTER
Patient had right carpal tunnel surgery on 3/11/20 under local anesthesia      Patient stated that he is in a lot of pain and has been taking the prescribed pain medicine every 4 hours.  He now only has 2 left and can't sleep at night due to the pain.  Patient requesting refill and advice on how to manage his pain.   He can be reached at [phone]/        Paz Castañeda Surgery Scheduler

## 2020-03-12 NOTE — PATIENT INSTRUCTIONS
Elevate the hand and apply ice to the area for up to 20 minutes every 2 hours.    Celebrex every 12 hours.  Tramadol  mg every 6 hours.    Move fingers every hour for a couple minutes.

## 2020-03-12 NOTE — PROGRESS NOTES
HISTORY OF PRESENT ILLNESS:    Kashmir Yao is a 64 year old male who is seen in follow up for Right CTR, DOS 03/11/20 Carpal tunnel release.  Present symptoms: Pt returns early due to complaints of overwhelming pain.  States could not sleep last night, taking 10 mg oxycodone Q 4 hours, has 2 left.  Taking tylenol and elevating.  Has not removed dressing.  Moving fingers, not cold, sensation intact.  States pain is burning, squeezing at all 5 fingers. 9-10/10.   Is perseverating on statement Dr. Frias made in surgery, that the tunnel was very tight.  Denies Chest pain, Calve pain, Fever, Chills.    Current Treatment: Postop.    PHYSICAL EXAM:  No vitals taken for this visit.  There is no weight on file to calculate BMI.   GENERAL APPEARANCE: healthy, alert and no distress   PSYCH: mentation appears normal and affect normal/bright.      MSK:  Right:  Volar wrist.  Presents in clean surgical dressing, removed for exam.  Incision clean and dry, Sutures present, healing.  no incisional erythema.   Ecchymosis present at palm and volar forearm.  Edema min at wrist, hand and digits.  CMS: shama incisional numbness, otherwise grossly intact to digits.  Warm with cap refill and sensation to light touch at all digits.  AROM: mild restriction in palmar wrist finger flexion, otherwise grossly  NL finger and hand motion..      ASSESSMENT:  Kashmir Yao is a 64 year old male  S/P  Right CTR, DOS 03/11/20 Carpal tunnel release.  - CTR.    Unclear etiology of the pain as all neurologic and circulatory intact with no signs of drainage or infection.  Most likely mild hematoma in setting of previously irritated median nerve.    PLAN:  - Surgical dressing removed and replaced with adaptic, gauze, cast padding and loosely wrapped ace.  - Celebrex and tramadol.  - RICE  - move fingers.    Return to clinic PRN and for suture removal as scheduled.    Hakeem Ortega PA-C    Dept. Orthopedic Surgery  [facility]      3/12/2020

## 2020-03-12 NOTE — TELEPHONE ENCOUNTER
Pt seen in clinic today for follow up .    Hakeem Ortega PA-C  [facility] - Surgery

## 2020-03-12 NOTE — LETTER
3/12/2020         RE: Kashmir Yao  [address] [facility] [id number]        Dear Colleague,    Thank you for referring your patient, Kashmir Yao, to the [facility] ORTHOPEDIC SURGERY. Please see a copy of my visit note below.    HISTORY OF PRESENT ILLNESS:    Kashmir Yao is a 64 year old male who is seen in follow up for Right CTR, DOS 03/11/20 Carpal tunnel release.  Present symptoms: Pt returns early due to complaints of overwhelming pain.  States could not sleep last night, taking 10 mg oxycodone Q 4 hours, has 2 left.  Taking tylenol and elevating.  Has not removed dressing.  Moving fingers, not cold, sensation intact.  States pain is burning, squeezing at all 5 fingers. 9-10/10.   Is perseverating on statement Dr. Frias made in surgery, that the tunnel was very tight.  Denies Chest pain, Calve pain, Fever, Chills.    Current Treatment: Postop.    PHYSICAL EXAM:  No vitals taken for this visit.  There is no weight on file to calculate BMI.   GENERAL APPEARANCE: healthy, alert and no distress   PSYCH: mentation appears normal and affect normal/bright.      MSK:  Right:  Volar wrist.  Presents in clean surgical dressing, removed for exam.  Incision clean and dry, Sutures present, healing.  no incisional erythema.   Ecchymosis present at palm and volar forearm.  Edema min at wrist, hand and digits.  CMS: shama incisional numbness, otherwise grossly intact to digits.  Warm with cap refill and sensation to light touch at all digits.  AROM: mild restriction in palmar wrist finger flexion, otherwise grossly  NL finger and hand motion..      ASSESSMENT:  Kashmir Yao is a 64 year old male  S/P  Right CTR, DOS 03/11/20 Carpal tunnel release.  - CTR.    Unclear etiology of the pain as all neurologic and circulatory intact with no signs of drainage or infection.  Most likely mild hematoma in setting of previously irritated median nerve.    PLAN:  - Surgical dressing removed and replaced with  adaptic, gauze, cast padding and loosely wrapped ace.  - Celebrex and tramadol.  - RICE  - move fingers.    Return to clinic PRN and for suture removal as scheduled.    Hakeem Ortega PA-C    Dept. Orthopedic Surgery  Health system     3/12/2020          Again, thank you for allowing me to participate in the care of your patient.        Sincerely,        Hakeem Ortega PA-C

## 2020-03-19 ENCOUNTER — OFFICE VISIT (OUTPATIENT)
Dept: ORTHOPEDICS | Facility: CLINIC | Age: 65
End: 2020-03-19
Payer: MEDICARE

## 2020-03-19 DIAGNOSIS — Z47.89 ORTHOPEDIC AFTERCARE: Primary | ICD-10-CM

## 2020-03-19 PROCEDURE — 99024 POSTOP FOLLOW-UP VISIT: CPT | Performed by: PHYSICIAN ASSISTANT

## 2020-03-19 NOTE — PROGRESS NOTES
HISTORY OF PRESENT ILLNESS:    Kashmir Yao is a 64 year old male who is seen in follow up for Right CTR, DOS  3/11/20, Dr. Frias, Carpal tunnel release.  Present symptoms: Pt reports  improvement to CT symptoms.  Is  keeping covered. Pt is lightly using hand for activities.  Pain improved today.  Finished tramadol, No new complaints.  Denies Chest pain, Calve pain, Fever, Chills.    Current Treatment: Postop.    PHYSICAL EXAM:  There were no vitals taken for this visit.  There is no weight on file to calculate BMI.   GENERAL APPEARANCE: healthy, alert and no distress   PSYCH: mentation appears normal and affect normal/bright    MSK:  Right:  Volar wrist.  Incision clean and dry, Sutures present, healing.  No incisional erythema.   No Ecchymosis.  Edema mild at wrist, hand and digits.  CMS: shama incisional numbness, otherwise grossly intact to digits.  AROM: mild restriction in palmar wrist flexion, otherwise WNL.      ASSESSMENT:  Kashmir Yao is a 64 year old male  S/P Right CTR, DOS  3/11/20, Dr. Frias, Carpal tunnel release- CTR.  Symptom and surgical pain improvement. Healing.  We discussed that CT symptoms may improve for several months after surgery.    PLAN:  - Surgery discussed, images reviewed if applicable, and all questions were answered at this time.  - Sutures removed with sterile technique, steri-strips applied in usual fashion, care instructions given and verbally acknowledged.  - Medications: Taper RX pain meds, OTC PRN.  - Physical Therapy: As instructed / RICE and PROM.  - AAT    Return to clinic PRN.    Hakeem Ortega PA-C    Dept. Orthopedic Surgery  [facility]     3/19/2020

## 2020-03-20 NOTE — PROGRESS NOTES
Outpatient Occupational Therapy Progress Note     Patient: Kashmir Yao  : 1955    Beginning/End Dates of Reporting Period:  12/10/2019 to 3/20/2020    Referring Provider: Hakeem Cano MD    Therapy Diagnosis: Decreased I/ADL    Client Self Report: Kashmir is a 64 year old male who is being seen for concerns related to memory and cognitive impairments impacting his ability to complete IADLs safely and independently. He has attended 4 skilled occupational therapy treatment sessions this reporting period with absences due to conflicting appointments and difficulty remembering appointments due to memory impairments. During this reporting period, Kashmir experienced significant pain in his wrist which was diagnosed with carpal tunnel syndrome.    Goals:     Goal Identifier Driving   Goal Description Kashmir will demonstrate WFLs visual reaction time and divided attention skills using compensatory strategies prn, for safe independent community mobility (crossing the street, driving etc.) and increased safety with ADL/IADLs at home  by scoring WNLs on all modes of the [employer].   Target Date 20   Date Met   Goal not addressed   Progress: Due to limited number of treatment sessions and focus on cognitive functioning, this goal has not been addressed this reporting period. Plan to discharge goal area at this time to focus on other goal areas.      Goal Identifier Medication Management   Goal Description Patient will independently set up personal medications, using a pillbox, on 2 separate occasions for increased accuracy and independence with managing medications.   Target Date 20   Date Met   Goal not directly addressed.   Progress: Goal not directly addressed this reporting period due to very few treatment sessions. However, the skills necessary to organize and set-up his medications were addressed. Plan to continue with goal area. New target date: 2020.     Goal Identifier  Financial Management   Goal Description Kashmir will demonstrate the ability to complete moderately complex tasks requiring numerical reasoning, problem solving and new learning skills with 90% accuracy to complete financial, home and community tasks accurately, for maximum independence to function at or near baseline at home and in community.   Target Date 03/09/20   Date Met   Not Met.   Progress: Goal not met. Kashmir continues to require at least MOD cueing in order to complete moderately complex tasks, and MAX cueing for new learning. He has most difficulty with tasks that require complex problem solving skills and mental flexibility. Continue with goal area for improved higher level thinking skills for increased independence/safety at home and in the community. New Target: 6/18/2020.     Goal Identifier Problem Solving/Sequencing   Goal Description Kashmir will be able to complete a multi-item errand list with at least 90% accuracy in the appropriate amount of time and be most efficient to improve problem solving ability and sequencing in order to plan his day, complete sequencing tasks, etc. for return to independence with grocery shopping and running errands in the community.   Target Date 03/09/20   Date Met   Not Met.    Progress: Goal not met. Kashmir continues to require support and assistance with problem solving, sequencing, and multi-step directions/tasks. Continue with goal area for increased independence in the community (grocery shopping, running errands, etc.).         Progress Toward Goals:   Progress this reporting period: Kashmir has made limited progress this reporting period, most significantly due to limited attendance secondary to conflicting appointments, illness, and memory difficulties.   Plan:  Continue therapy per current plan of care. Recommend continued skilled occupational therapy intervention for increased independence and safety in IADLs.     Discharge:  No. Discharge is planned  when there is a plateau in progress or all goals are met. Kashmir continues to demonstrate need for skilled occupational therapy services.  This plan of care will be reviewed in approximately 90 days and updated as needed.                                                                                         [facility]      OUTPATIENT OCCUPATIONAL THERAPY  PLAN OF TREATMENT FOR OUTPATIENT REHABILITATION    Patient's Last Name, First Name, M.I.                YOB: 1955  Kashmir Yao                        Provider's Name  [facility] Medical Record No.  [id number]                               Onset Date: 4/30/2019   Start of Care Date: 12/10/2019   Type:     ___PT   _X_OT   ___SLP Medical Diagnosis: Other hydrocephalus (H) G91.8                       OT Diagnosis: Decreased I/ADL      _________________________________________________________________________________  Plan of Treatment:    Frequency/Duration: 1x/week     Goals:    Goal Identifier Medication Management   Goal Description Patient will independently set up personal medications, using a pillbox, on 2 separate occasions for increased accuracy and independence with managing medications.   Target Date 6/16/2020   Date Met      Progress:     Goal Identifier Financial Management   Goal Description Kashmir will demonstrate the ability to complete moderately complex tasks requiring numerical reasoning, problem solving and new learning skills with 90% accuracy to complete financial, home and community tasks accurately, for maximum independence to function at or near baseline at home and in community.   Target Date 6/16/2020   Date Met      Progress:     Goal Identifier Problem Solving/Sequencing   Goal Description Kashmir will be able to complete a multi-item errand list with at least 90% accuracy in the appropriate amount of time and be most efficient to improve problem solving ability and  sequencing in order to plan his day, complete sequencing tasks, etc. for return to independence with grocery shopping and running errands in the community.   Target Date 6/16/2020   Date Met      Progress:           Progress Toward Goals:   Progress this reporting period:  Kashmir has made limited progress this reporting period, most significantly due to limited attendance secondary to conflicting appointments, illness, and memory difficulties. Recommend continued skilled occupational therapy intervention for increased independence and safety in IADLs.    Certification date from 3/9/2020 to 6/18/2020.             I CERTIFY THE NEED FOR THESE SERVICES FURNISHED UNDER        THIS PLAN OF TREATMENT AND WHILE UNDER MY CARE     (Physician co-signature of this document indicates review and certification of the therapy plan).                Referring Provider: MD Adriane Rose, OTR/L  Occupational Therapist     [facility] [employer]  [address] [place], [place]  [email or web address]   Cell or pager: [phone]  Employed by [facility]

## 2020-04-02 DIAGNOSIS — F51.01 PRIMARY INSOMNIA: ICD-10-CM

## 2020-04-03 NOTE — TELEPHONE ENCOUNTER
Unable to refill.  Med and pharm loaded.    Mary Kate CONNELLYN, RN    [facility]  [facility]  Office: [phone]  Fax: [phone]

## 2020-04-06 RX ORDER — ZOLPIDEM TARTRATE 10 MG/1
TABLET ORAL
Qty: 90 TABLET | Refills: 0 | Status: SHIPPED | OUTPATIENT
Start: 2020-04-06 | End: 2021-08-04

## 2020-04-06 NOTE — TELEPHONE ENCOUNTER
Faxed to pharmacy.    Mary Kate CONNELLYN, RN    [facility]  Office: [phone]  Fax: [phone]

## 2020-04-07 ENCOUNTER — TELEPHONE (OUTPATIENT)
Dept: ORTHOPEDICS | Facility: CLINIC | Age: 65
End: 2020-04-07

## 2020-04-07 DIAGNOSIS — M65.30 TRIGGER FINGER OF LEFT HAND, UNSPECIFIED FINGER: ICD-10-CM

## 2020-04-07 DIAGNOSIS — G56.01 CARPAL TUNNEL SYNDROME OF RIGHT WRIST: Primary | ICD-10-CM

## 2020-04-07 NOTE — TELEPHONE ENCOUNTER
Returned call to patient.   He states he will be 4 weeks s/p right carpal tunnel surgery, DOS: 3/11/20. He states that he continues to have severe pain in his wrist that radiates to his 4 finger tips, and up to his elbow.  Burning sensation in his fingers.     He states prior to surgery he was very weak, but is now able to lift a glass with his right hand.  He states that he has increased pain in doing so, and a heaviness sensation.   He also notes after being on the phone for 9 minutes his right hand and arm have become extremely painful, and he does not think this is normal. He states his pain feels different than prior to surgery.     Writer assured patient that depending on how long he had symptoms of carpal tunnel, and how irritated his nerve was it may take months for his symptoms to resolve. He is concerned that during surgery his nerve was irritated or damaged causing his prolonged symptoms.     Patient utilizes Tylenol for pain, and has tried use of warm water, cold water, and icing with short term relief. He states he frequently moves his fingers as instructed, but has done no further physical therapy.   Patient unable to take NSAIDS due to seizure disorder.     Please call patient to discuss further.     Bailey Junior, ATC

## 2020-04-07 NOTE — TELEPHONE ENCOUNTER
Reason for call:  Nixoncarlyponce would like a call back from Nando or his Nurse.  He is 4 weeks PO and still has lots of pain. Not sure if this is normal.    Phone number to reach patient:  Cell number on file:    Telephone Information:   Mobile [phone]       Best Time:  any    Can we leave a detailed message on this number?  NO

## 2020-04-08 NOTE — TELEPHONE ENCOUNTER
Spoke with pt for 21 minutes.    Still having pain from hand to elbow with light use, but overall improved strength and decreased numbness, incision barely visible.  Using hand during day lightly.    Already on Lyrica and Tylenol.    Second complaint, trigger finger on left ring catching more, no pain.  Has to physically unlock.    Recommend Hand therapy, will place order and hope they can do virtual visits, pt states has computer used for video chat previously.    Pt to follow up if therapy not an option or after 6 weeks of therapy.    Hakeem Ortega PA-C  [facility] - Surgery

## 2020-04-10 ENCOUNTER — VIRTUAL VISIT (OUTPATIENT)
Dept: OCCUPATIONAL THERAPY | Facility: CLINIC | Age: 65
End: 2020-04-10
Attending: PHYSICIAN ASSISTANT
Payer: MEDICARE

## 2020-04-10 DIAGNOSIS — M25.541 ARTHRALGIA OF RIGHT HAND: ICD-10-CM

## 2020-04-10 DIAGNOSIS — G56.01 CARPAL TUNNEL SYNDROME OF RIGHT WRIST: ICD-10-CM

## 2020-04-10 DIAGNOSIS — Z47.89 AFTERCARE FOLLOWING SURGERY OF THE MUSCULOSKELETAL SYSTEM: ICD-10-CM

## 2020-04-10 DIAGNOSIS — M65.30 TRIGGER FINGER OF LEFT HAND, UNSPECIFIED FINGER: ICD-10-CM

## 2020-04-10 DIAGNOSIS — M25.531 RIGHT WRIST PAIN: ICD-10-CM

## 2020-04-10 DIAGNOSIS — M16.0 PRIMARY OSTEOARTHRITIS OF BOTH HIPS: Primary | ICD-10-CM

## 2020-04-10 PROCEDURE — 97110 THERAPEUTIC EXERCISES: CPT | Mod: 95 | Performed by: OCCUPATIONAL THERAPIST

## 2020-04-10 PROCEDURE — 97165 OT EVAL LOW COMPLEX 30 MIN: CPT | Mod: 95 | Performed by: OCCUPATIONAL THERAPIST

## 2020-04-10 PROCEDURE — 97112 NEUROMUSCULAR REEDUCATION: CPT | Mod: 95 | Performed by: OCCUPATIONAL THERAPIST

## 2020-04-10 NOTE — TELEPHONE ENCOUNTER
naproxen (NAPROSYN) 500 MG tablet      Last Written Prescription Date:  1/2/20  Last Fill Quantity: 90,  # refills: 0   Last office visit: 2/19/20    Unable to fill per [facility] protocol.    NSAID Medications Sgauuw98/10/2020 04:21 PM   Patient is age 6-64 years Protocol Details    Normal CBC on file in past 12 months        Medication and pharmacy loaded.

## 2020-04-10 NOTE — LETTER
"DEPARTMENT OF HEALTH AND HUMAN SERVICES  CENTERS FOR MEDICARE & MEDICAID SERVICES    PLAN/UPDATED PLAN OF PROGRESS FOR OUTPATIENT REHABILITATION    PATIENTS NAME:  Kashmir Yao   : 1955  PROVIDER NUMBER:  [id number]  HICN:  [id number]  PROVIDER NAME: [facility]  MEDICAL RECORD NUMBER: [id number]   START OF CARE DATE:    SOC Date: 04/10/20   TYPE:  OT  PRIMARY/TREATMENT DIAGNOSIS: Carpal tunnel syndrome of right wrist  Trigger finger of left hand, unspecified finger  Arthralgia of right hand  Right wrist pain  Aftercare following surgery of the musculoskeletal system  VISITS FROM START OF CARE:  Rxs Used: 1     Occupational/Hand Therapy Virtual Initial Visit      The patient has been notified of following:     \"This virtual visit will be conducted between you and your provider. We have found that certain health care needs can be provided without the need for physical presence.  This service lets us provide the care you need with a virtual visit.\"    Due to external, as well as internal [facility] management of the COVID-19 Virus, Kashmir Yao was not seen in our clinic.  As a substitution, we implemented a virtual visit to manage this patient's condition utilizing the My[employer] virtual visit platform via the patient s existing code.  The provider, Radha Mason, reviewed the patient's chart, PTRx prescription, and spoke with the patient to determine the following telemedicine visit is appropriate and effective for the patient's care.    The following type of visit was completed:   Video Visit:  The [employer] platform uses a synchronous HIPAA compliant video stream for this patient encounter.       Current Date:  4/10/2020    Subjective:  Kashmir Yao is a 64 year old right hand dominant male.    Diagnosis:Right Carpal Tunnel Syndrome  DOI:  unsure  DOS: 3/12/20 (MD order date 20)  Procedure: CTR    Patient reports symptoms of pain, stiffness/loss of motion, weakness/loss of strength and " edema of the right hand which occurred due to gradual onset. Since onset symptoms are unchanged. Special tests:  none.  Previous treatment: surgery. General health as reported by patient is good.  Pertinent medical history includes: Heart     PATIENTS NAME:  Kashmir Yao   : 1955        Problems, Osteoarthritis, Seizures.  Medical allergies: none.  Surgical history: orthopedic: knee, heart: stent, other: esophagoscopy, gastroscopy, duodenoscopy.  Medication history: Pain, Sleep.    Occupational Profile Information:  Current occupation is retired  Prior functional level:  no limitations  Barriers include:none  Mobility: No difficulty  Transportation: drives    Objective:  Pain Level (Scale 0-10)   4/10/2020   At Rest 0   With Use 10     Pain Description  Date 4/10/2020   Location R Carpal Tunnel that radiates to elbow and fingergips   Pain Quality Aching, Burning and Shooting   Frequency intermittent     Pain is worst  daytime or nighttime   Exacerbated by  movement, gripping, lifting, carrying   Relieved by rest   Progression unchanged     Edema (Circumference measured in cm)  Moderate per visual observation     Scar   Sensitivity: mild sensitivity to touch per pt report  Quality:  Tight and thick per pt report    Sensation   WNL throughout all nerve distributions; per patient report    ROM  Pain Report: - none  + mild    ++ moderate    +++ severe   Wrist 4/10/2020 4/10/2020   AROM (PROM) L R   Extension 68 55   Flexion 40 40     PATIENTS NAME:  Kashmir Yao   : 1955          Strength   (Measured in pounds)  Pt reports  is not weak, but pain present that limits gripping    Assessment:  Patient presents with symptoms consistent with diagnosis of right wrist Carpal Tunnel Syndrome, with surgical intervention.    Patient's limitations or Problem List includes:  Pain, Decreased ROM/motion, Increased edema, Sensory disturbance, Adherent scarring and Adherence in connective tissue of the right  wrist and hand which interferes with the patient's ability to perform Self Care Tasks (dressing, eating, bathing), Recreational Activities and Household Chores as compared to previous level of function.    Rehab Potential:  Excellent - Return to full activity, no limitations    Patient will benefit from skilled Occupational Therapy to increase ROM, flexibility, overall strength and  strength and decrease pain, edema and adherence of scarring to return to previous activity level and resume normal daily tasks and to reach their rehab potential.    Barriers to Learning:  No barrier    Communication Issues:  Patient appears to be able to clearly communicate and understand verbal and written communication and follow directions correctly.    Chart Review: Chart Review, Brief history including review of medical and/or therapy records relating to the presenting problem and Simple history review with patient    Identified Performance Deficits: bathing/showering, toileting, dressing, feeding, hygiene and grooming, home establishment and management, meal preparation and cleanup and leisure activities    Assessment of Occupational Performance:  5 or more Performance Deficits    Clinical Decision Making (Complexity): Low complexity    Treatment Explanation:  The following has been discussed with the patient:    RX ordered/plan of care  Anticipated outcomes  Possible risks and side effects    Plan:  Frequency:  2 X week, once daily  Duration:  for 4 weeks            PATIENTS NAME:  Kashmir Yao   : 1955      Treatment Plan:    Therapeutic Exercise:    AROM, AAROM, PROM, Tendon Gliding, Blocking, Place and Hold, Contract Relax, Isotonics and Isometrics  Neuromuscular re-ed:   Nerve Gliding, Proprioceptive Training and Posture  Manual Techniques:   Scar mobilization  Orthotic Fabrication:    OTC Static orthosis and Forearm based orthosis  Self Care:    Self Care Tasks and Ergonomic Considerations    Discharge Plan:     Achieve all LTG.  Independent in home treatment program.  Reach maximal therapeutic benefit.    Home Exercise Program:  Exercise Name: Virtual Visit Tips for Patients  Exercise Name: Finger Active Range of Motion Tendon Glides Fist Series, Sets: 1 - Reps: 10 - Sessions: 2, Notes: hold each position 5 seconds  Do 50% effort, do with arm on table  Exercise Name: Finger Active Range of Motion FDS Flexor Tendon Gliding, Sets: 1 - Reps: 10 - Sessions: 2, Notes: Do 50% effort, it's ok to not bend finger all the way--you can do it part way  Exercise Name: Nerve Gliding Proximal Median, Sets: 1 - Reps: 10 - Sessions: 5, Notes: Stop the video at 19 seconds  Hold for 5 seconds  Exercise Name: scar massage-circular, Sets: 1 - Reps: 1 - Sessions: 2--can do more if helpful, Notes: 3 minutes at a time    Next Visit:  Check pain level  Progress HEP to 75% to full effort if able  Progress Median nerve glide if able  Scar mobilization    Virtual visit contact time    Time of service began: 2:27 PM  Time of service ended: 3:09 PM  Total Time for set up, visit, and documentation: 60 minutes    Payor: MEDICARE / Plan: MEDICARE / Product Type: Medicare /     Procedure Code/s   Therapeutic Exercise (40077): 10 minutes  Neuromuscular Re-education (35152): 15 minutes    PATIENTS NAME:  Kashmir Yao   : 1955      I have reviewed the note as documented above.  This accurately captures the substance of my conversation with the patient.  Provider location: [facility]/[place] ([facility]/State)  Patient location: home    ___________________________________________________      I have reviewed and certified the need for these services and plan of treatment while under my care.        PHYSICIAN'S SIGNATURE:   _____________________________________ Date___________      HERB Hopkins    Certification period: Beginning of Cert date period: 04/10/20 End of Cert period date: 20     Functional Level Progress Report: Please see  "attached \"Goal Flow sheet for Functional level.\"    ___X_____ Continue Services or       ________ DC Services                Service dates: SOC Date: 04/10/20  to present                                                                     "

## 2020-04-10 NOTE — LETTER
"DEPARTMENT OF HEALTH AND HUMAN SERVICES  CENTERS FOR MEDICARE & MEDICAID SERVICES    PLAN/UPDATED PLAN OF PROGRESS FOR OUTPATIENT REHABILITATION    PATIENTS NAME:  Kashmir Yao   : 1955  PROVIDER NUMBER:    [id number]  HICN:  [id number]  PROVIDER NAME: [facility]  MEDICAL RECORD NUMBER: [id number]   START OF CARE DATE:  SOC Date: 04/10/20   TYPE:  PT  PRIMARY/TREATMENT DIAGNOSIS: Carpal tunnel syndrome of right wrist  Trigger finger of left hand, unspecified finger  Arthralgia of right hand  Right wrist pain  Aftercare following surgery of the musculoskeletal system  VISITS FROM START OF CARE:  Rxs Used: 1     Occupational/Hand Therapy Virtual Initial Visit      The patient has been notified of following:     \"This virtual visit will be conducted between you and your provider. We have found that certain health care needs can be provided without the need for physical presence.  This service lets us provide the care you need with a virtual visit.\"    Due to external, as well as internal [facility] management of the COVID-19 Virus, Kashmir Yao was not seen in our clinic.  As a substitution, we implemented a virtual visit to manage this patient's condition utilizing the [employer] virtual visit platform via the patient s existing code.  The provider, Radha Mason, reviewed the patient's chart, PTRx prescription, and spoke with the patient to determine the following telemedicine visit is appropriate and effective for the patient's care.    The following type of visit was completed:   Video Visit:  The [employer] platform uses a synchronous HIPAA compliant video stream for this patient encounter.       Current Date:  4/10/2020    Subjective:  Kashmir Yao is a 64 year old right hand dominant male.    Diagnosis:Right Carpal Tunnel Syndrome  DOI:  unsure  DOS: 3/12/20 (MD order date 20)  Procedure: CTR              PATIENTS NAME:  Kashmir Yao   : 1955      Patient reports symptoms of " pain, stiffness/loss of motion, weakness/loss of strength and edema of the right hand which occurred due to gradual onset. Since onset symptoms are unchanged. Special tests:  none.  Previous treatment: surgery. General health as reported by patient is good.  Pertinent medical history includes: Heart Problems, Osteoarthritis, Seizures.  Medical allergies: none.  Surgical history: orthopedic: knee, heart: stent, other: esophagoscopy, gastroscopy, duodenoscopy.  Medication history: Pain, Sleep.    Occupational Profile Information:  Current occupation is retired  Prior functional level:  no limitations  Barriers include:none  Mobility: No difficulty  Transportation: drives    Objective:  Pain Level (Scale 0-10)   4/10/2020   At Rest 0   With Use 10     Pain Description  Date 4/10/2020   Location R Carpal Tunnel that radiates to elbow and fingergips   Pain Quality Aching, Burning and Shooting   Frequency intermittent     Pain is worst  daytime or nighttime   Exacerbated by  movement, gripping, lifting, carrying   Relieved by rest   Progression unchanged     Edema (Circumference measured in cm)  Moderate per visual observation     Scar   Sensitivity: mild sensitivity to touch per pt report  Quality:  Tight and thick per pt report    Sensation   WNL throughout all nerve distributions; per patient report              PATIENTS NAME:  Kashmir Yao   : 1955      ROM  Pain Report: - none  + mild    ++ moderate    +++ severe   Wrist 4/10/2020 4/10/2020   AROM (PROM) L R   Extension 68 55   Flexion 40 40     Strength   (Measured in pounds)  Pt reports  is not weak, but pain present that limits gripping    Assessment:  Patient presents with symptoms consistent with diagnosis of right wrist Carpal Tunnel Syndrome, with surgical intervention.    Patient's limitations or Problem List includes:  Pain, Decreased ROM/motion, Increased edema, Sensory disturbance, Adherent scarring and Adherence in connective tissue of the  right wrist and hand which interferes with the patient's ability to perform Self Care Tasks (dressing, eating, bathing), Recreational Activities and Household Chores as compared to previous level of function.    Rehab Potential:  Excellent - Return to full activity, no limitations    Patient will benefit from skilled Occupational Therapy to increase ROM, flexibility, overall strength and  strength and decrease pain, edema and adherence of scarring to return to previous activity level and resume normal daily tasks and to reach their rehab potential.    Barriers to Learning:  No barrier    Communication Issues:  Patient appears to be able to clearly communicate and understand verbal and written communication and follow directions correctly.    Chart Review: Chart Review, Brief history including review of medical and/or therapy records relating to the presenting problem and Simple history review with patient    Identified Performance Deficits: bathing/showering, toileting, dressing, feeding, hygiene and grooming, home establishment and management, meal preparation and cleanup and leisure activities    Assessment of Occupational Performance:  5 or more Performance Deficits    Clinical Decision Making (Complexity): Low complexity    Treatment Explanation:  The following has been discussed with the patient:    RX ordered/plan of care  Anticipated outcomes  Possible risks and side effects        PATIENTS NAME:  Kashmir Yao   : 1955            Plan:  Frequency:  2 X week, once daily  Duration:  for 4 weeks    Treatment Plan:    Therapeutic Exercise:    AROM, AAROM, PROM, Tendon Gliding, Blocking, Place and Hold, Contract Relax, Isotonics and Isometrics  Neuromuscular re-ed:   Nerve Gliding, Proprioceptive Training and Posture  Manual Techniques:   Scar mobilization  Orthotic Fabrication:    OTC Static orthosis and Forearm based orthosis  Self Care:    Self Care Tasks and Ergonomic Considerations    Discharge  Plan:    Achieve all LTG.  Independent in home treatment program.  Reach maximal therapeutic benefit.    Home Exercise Program:  Exercise Name: Virtual Visit Tips for Patients  Exercise Name: Finger Active Range of Motion Tendon Glides Fist Series, Sets: 1 - Reps: 10 - Sessions: 2, Notes: hold each position 5 seconds  Do 50% effort, do with arm on table  Exercise Name: Finger Active Range of Motion FDS Flexor Tendon Gliding, Sets: 1 - Reps: 10 - Sessions: 2, Notes: Do 50% effort, it's ok to not bend finger all the way--you can do it part way  Exercise Name: Nerve Gliding Proximal Median, Sets: 1 - Reps: 10 - Sessions: 5, Notes: Stop the video at 19 seconds  Hold for 5 seconds  Exercise Name: scar massage-circular, Sets: 1 - Reps: 1 - Sessions: 2--can do more if helpful, Notes: 3 minutes at a time    Next Visit:  Check pain level  Progress HEP to 75% to full effort if able  Progress Median nerve glide if able  Scar mobilization              PATIENTS NAME:  Kashmir Yao   : 1955        Virtual visit contact time    Time of service began: 2:27 PM  Time of service ended: 3:09 PM  Total Time for set up, visit, and documentation: 60 minutes    Payor: MEDICARE / Plan: MEDICARE / Product Type: Medicare /     Procedure Code/s   Therapeutic Exercise (96981): 10 minutes  Neuromuscular Re-education (18216): 15 minutes    I have reviewed the note as documented above.  This accurately captures the substance of my conversation with the patient.  Provider location: [facility]/[place] ([facility]/State)  Patient location: home    ___________________________________________________            I have reviewed and certified the need for these services and plan of treatment while under my care.        PHYSICIAN'S SIGNATURE:   _____________________________________ Date___________     Hakeem ESTEVEZ    Certification period:  Beginning of Cert date period: 04/10/20 to  End of Cert period date: 20     Functional Level  "Progress Report: Please see attached \"Goal Flow sheet for Functional level.\"    ____X____ Continue Services or       ________ DC Services                Service dates: From  SOC Date: 04/10/20 date to present                         "

## 2020-04-13 ENCOUNTER — VIRTUAL VISIT (OUTPATIENT)
Dept: OCCUPATIONAL THERAPY | Facility: CLINIC | Age: 65
End: 2020-04-13
Payer: MEDICARE

## 2020-04-13 DIAGNOSIS — M25.541 ARTHRALGIA OF RIGHT HAND: ICD-10-CM

## 2020-04-13 DIAGNOSIS — Z47.89 AFTERCARE FOLLOWING SURGERY OF THE MUSCULOSKELETAL SYSTEM: ICD-10-CM

## 2020-04-13 DIAGNOSIS — G56.01 CARPAL TUNNEL SYNDROME OF RIGHT WRIST: ICD-10-CM

## 2020-04-13 DIAGNOSIS — M25.531 RIGHT WRIST PAIN: ICD-10-CM

## 2020-04-13 PROBLEM — M65.30 TRIGGER FINGER OF LEFT HAND, UNSPECIFIED FINGER: Status: ACTIVE | Noted: 2020-04-13

## 2020-04-13 PROCEDURE — 97110 THERAPEUTIC EXERCISES: CPT | Mod: 95 | Performed by: OCCUPATIONAL THERAPIST

## 2020-04-13 RX ORDER — NAPROXEN 500 MG/1
TABLET ORAL
Qty: 60 TABLET | Refills: 3 | Status: SHIPPED | OUTPATIENT
Start: 2020-04-13 | End: 2020-08-19

## 2020-04-13 NOTE — PROGRESS NOTES
"Occupational/Hand Therapy Virtual Follow Up Visit      The patient has been notified of following:     \"This virtual visit will be conducted between you and your provider. We have found that certain health care needs can be provided without the need for physical presence.  This service lets us provide the care you need with a virtual visit.\"    Due to external, as well as internal [facility] management of the COVID-19 Virus, Kashmir Yao was not seen in our clinic.  As a substitution, we implemented a virtual visit to manage this patient's condition utilizing the [employer] virtual visit platform via the patient s existing code.  The provider, Radha Mason, reviewed the patient's chart, PTRx prescription, and spoke with the patient to determine the following telemedicine visit is appropriate and effective for the patient's care.    The following type of visit was completed:   Video Visit:  The [employer] platform uses a synchronous HIPAA compliant video stream for this patient encounter.      S: Kashmir Yao is a 64 year old male. Connected virtually on the [employer] platform to discuss their condition/progress.   The exercises you gave me are really painful.  My fingers and palm are really hurting.  My ring finger on my left hand is getting worse.  Now it is staring to lock with pain too.    My right hand hurts anytime I use it.  Every time I do the exercises, it gets harder to get the pain to go away.  I have been doing the exercises with about 50% effort--the exercises feel like there is a lot of pain    The massage helps a lot: it gives me a lot of relief      O: Patient demonstrated HEP exercises.  Reviewed all of the and adjusted as follows:     PTRx Content from today's visit:  Exercise Name: Virtual Visit Tips for Patients    Exercise Name: Finger Active Range of Motion Tendon Glides Fist Series, Sets: 1 - Reps: 10 - Sessions: 2, Notes: hold each position 5 seconds  Do 50% effort, do with arm on table  Do not " do straight fist  Can you left hand to hep bend fingers down into fist, don't do step 3--straight fist    Exercise Name: Finger Active Range of Motion FDS Flexor Tendon Gliding, Sets: 1 - Reps: 10 - Sessions: 2, Notes: Do 50% effort, it's ok to not bend finger all the way--you can do it part way  Do middle and ring finger together    Exercise Name: Nerve Gliding Proximal Median, Sets: 1 - Reps: 10 - Sessions: 5, Notes: Stop the video at 19 seconds  Hold for 5 seconds    Exercise Name: scar massage-circular, Sets: 1 - Reps: 1 - Sessions: 2--can do more if helpful, Notes: 3 minutes at a time  Also massage on back of hand    Exercise Name: Nerve Flossing Highly Irritable Median Nerve - FDS, Sets: 1 - Reps: 10 - Sessions: 2, Notes: Don't move head    A:   Patient's symptoms are resolving.  Patient is progressing as expected.  Patient is ready to progress to more complex exercises.  Patient is ready to progress to next level of protocol.  Patient is becoming more independent in home exercise program  Response to therapy has shown an improvement in  ROM   Response to therapy has shown lack of progress in  pain level    P: Patient will continue with the exercise program assigned on their PTRx code and will add the following measures to manage their pain/condition: adjusted HEP, passive nerve glides     Current treatment program is being advanced to more complex exercises.      Virtual visit contact time    Time of service began: 2:30 PM  Time of service ended: 3:10 PM  Total Time for set up, visit, and documentation: 50 minutes    Payor: MEDICARE / Plan: MEDICARE / Product Type: Medicare /   .  Procedure Code/s   Therapeutic Exercise (09333): 40 minutes    I have reviewed the note as documented above.  This accurately captures the substance of my conversation with the patient.  Provider location: [facility]/[place] ([facility]/State)  Patient location: Home

## 2020-04-13 NOTE — PROGRESS NOTES
"Occupational/Hand Therapy Virtual Initial Visit      The patient has been notified of following:     \"This virtual visit will be conducted between you and your provider. We have found that certain health care needs can be provided without the need for physical presence.  This service lets us provide the care you need with a virtual visit.\"    Due to external, as well as internal [facility] management of the COVID-19 Virus, Kashmir Yao was not seen in our clinic.  As a substitution, we implemented a virtual visit to manage this patient's condition utilizing the [employer] virtual visit platform via the patient s existing code.  The provider, Radha Mason, reviewed the patient's chart, PTRx prescription, and spoke with the patient to determine the following telemedicine visit is appropriate and effective for the patient's care.    The following type of visit was completed:   Video Visit:  The [employer] platform uses a synchronous HIPAA compliant video stream for this patient encounter.       Current Date:  4/10/2020    Subjective:  Kashmir Yao is a 64 year old right hand dominant male.    Diagnosis:Right Carpal Tunnel Syndrome  DOI:  unsure  DOS: 3/12/20 (MD order date 4/8/20)  Procedure: CTR    Patient reports symptoms of pain, stiffness/loss of motion, weakness/loss of strength and edema of the right hand which occurred due to gradual onset. Since onset symptoms are unchanged. Special tests:  none.  Previous treatment: surgery. General health as reported by patient is good.  Pertinent medical history includes: Heart Problems, Osteoarthritis, Seizures.  Medical allergies: none.  Surgical history: orthopedic: knee, heart: stent, other: esophagoscopy, gastroscopy, duodenoscopy.  Medication history: Pain, Sleep.    Occupational Profile Information:  Current occupation is retired  Prior functional level:  no limitations  Barriers include:none  Mobility: No difficulty  Transportation: drives    Objective:  Pain Level " (Scale 0-10)   4/10/2020   At Rest 0   With Use 10     Pain Description  Date 4/10/2020   Location R Carpal Tunnel that radiates to elbow and fingergips   Pain Quality Aching, Burning and Shooting   Frequency intermittent     Pain is worst  daytime or nighttime   Exacerbated by  movement, gripping, lifting, carrying   Relieved by rest   Progression unchanged     Edema (Circumference measured in cm)  Moderate per visual observation     Scar   Sensitivity: mild sensitivity to touch per pt report  Quality:  Tight and thick per pt report    Sensation   WNL throughout all nerve distributions; per patient report    ROM  Pain Report: - none  + mild    ++ moderate    +++ severe   Wrist 4/10/2020 4/10/2020   AROM (PROM) L R   Extension 68 55   Flexion 40 40     Strength   (Measured in pounds)  Pt reports  is not weak, but pain present that limits gripping    Assessment:  Patient presents with symptoms consistent with diagnosis of right wrist Carpal Tunnel Syndrome, with surgical intervention.    Patient's limitations or Problem List includes:  Pain, Decreased ROM/motion, Increased edema, Sensory disturbance, Adherent scarring and Adherence in connective tissue of the right wrist and hand which interferes with the patient's ability to perform Self Care Tasks (dressing, eating, bathing), Recreational Activities and Household Chores as compared to previous level of function.    Rehab Potential:  Excellent - Return to full activity, no limitations    Patient will benefit from skilled Occupational Therapy to increase ROM, flexibility, overall strength and  strength and decrease pain, edema and adherence of scarring to return to previous activity level and resume normal daily tasks and to reach their rehab potential.    Barriers to Learning:  No barrier    Communication Issues:  Patient appears to be able to clearly communicate and understand verbal and written communication and follow directions correctly.    Chart  Review: Chart Review, Brief history including review of medical and/or therapy records relating to the presenting problem and Simple history review with patient    Identified Performance Deficits: bathing/showering, toileting, dressing, feeding, hygiene and grooming, home establishment and management, meal preparation and cleanup and leisure activities    Assessment of Occupational Performance:  5 or more Performance Deficits    Clinical Decision Making (Complexity): Low complexity    Treatment Explanation:  The following has been discussed with the patient:    RX ordered/plan of care  Anticipated outcomes  Possible risks and side effects    Plan:  Frequency:  2 X week, once daily  Duration:  for 4 weeks    Treatment Plan:    Therapeutic Exercise:    AROM, AAROM, PROM, Tendon Gliding, Blocking, Place and Hold, Contract Relax, Isotonics and Isometrics  Neuromuscular re-ed:   Nerve Gliding, Proprioceptive Training and Posture  Manual Techniques:   Scar mobilization  Orthotic Fabrication:    OTC Static orthosis and Forearm based orthosis  Self Care:    Self Care Tasks and Ergonomic Considerations    Discharge Plan:    Achieve all LTG.  Independent in home treatment program.  Reach maximal therapeutic benefit.    Home Exercise Program:  Exercise Name: Virtual Visit Tips for Patients  Exercise Name: Finger Active Range of Motion Tendon Glides Fist Series, Sets: 1 - Reps: 10 - Sessions: 2, Notes: hold each position 5 seconds  Do 50% effort, do with arm on table  Exercise Name: Finger Active Range of Motion FDS Flexor Tendon Gliding, Sets: 1 - Reps: 10 - Sessions: 2, Notes: Do 50% effort, it's ok to not bend finger all the way--you can do it part way  Exercise Name: Nerve Gliding Proximal Median, Sets: 1 - Reps: 10 - Sessions: 5, Notes: Stop the video at 19 seconds  Hold for 5 seconds  Exercise Name: scar massage-circular, Sets: 1 - Reps: 1 - Sessions: 2--can do more if helpful, Notes: 3 minutes at a time    Next  Visit:  Check pain level  Progress HEP to 75% to full effort if able  Progress Median nerve glide if able  Scar mobilization    Virtual visit contact time    Time of service began: 2:27 PM  Time of service ended: 3:09 PM  Total Time for set up, visit, and documentation: 60 minutes    Payor: MEDICARE / Plan: MEDICARE / Product Type: Medicare /     Procedure Code/s   Therapeutic Exercise (16451): 10 minutes  Neuromuscular Re-education (67842): 15 minutes    I have reviewed the note as documented above.  This accurately captures the substance of my conversation with the patient.  Provider location: [facility]/[place] ([facility]/State)  Patient location: home    ___________________________________________________

## 2020-04-16 ENCOUNTER — VIRTUAL VISIT (OUTPATIENT)
Dept: OCCUPATIONAL THERAPY | Facility: CLINIC | Age: 65
End: 2020-04-16
Payer: MEDICARE

## 2020-04-16 DIAGNOSIS — G56.01 CARPAL TUNNEL SYNDROME OF RIGHT WRIST: ICD-10-CM

## 2020-04-16 DIAGNOSIS — M25.541 ARTHRALGIA OF RIGHT HAND: ICD-10-CM

## 2020-04-16 DIAGNOSIS — Z47.89 AFTERCARE FOLLOWING SURGERY OF THE MUSCULOSKELETAL SYSTEM: ICD-10-CM

## 2020-04-16 DIAGNOSIS — M25.531 RIGHT WRIST PAIN: ICD-10-CM

## 2020-04-16 PROCEDURE — 97110 THERAPEUTIC EXERCISES: CPT | Mod: 95 | Performed by: OCCUPATIONAL THERAPIST

## 2020-04-16 NOTE — PROGRESS NOTES
"Occupational/Hand Therapy Virtual Follow Up Visit    The patient has been notified of following:     \"This virtual visit will be conducted between you and your provider. We have found that certain health care needs can be provided without the need for physical presence.  This service lets us provide the care you need with a virtual visit.\"    Due to external, as well as internal [facility] management of the COVID-19 Virus, Kashmir Yao was not seen in our clinic.  As a substitution, we implemented a virtual visit to manage this patient's condition utilizing the [employer] virtual visit platform via the patient s existing code.  The provider, Radha Mason, reviewed the patient's chart, PTRx prescription, and spoke with the patient to determine the following telemedicine visit is appropriate and effective for the patient's care.    The following type of visit was completed:   Video Visit:  The [employer] platform uses a synchronous HIPAA compliant video stream for this patient encounter.      S: Kashmir Yao is a 64 year old male. Connected virtually on the [employer] platform to discuss their condition/progress.   It's a little bit better sine Monday.  It's much better than it was last week.  These electric shocks don't come as strong and there is more strength in my fingers.  My exercises are going fine.  My left hand ring finger is still bothersome.  The strength is the biggest problem right now.  I can't squeeze without feeling like I am going to drop it or get an electric shock.      O: Patient demonstrated previous HEP well.  Pt reported and demonstrated understanding of updated HEP.  Discussed need for left hand trigger splint.  Pt demonstrated locking trigger on the left ring finger.  Will mail patient a splint to help with this (volar aspect of ring orthosis with dorsal aspect free with strap).    PTRx Content from today's visit:  Exercise Name: Hand Strengthening Gripping, Sets: 1 - Reps: 10 repetitions of the " sequence - Sessions: 2, Notes: Start gently, keep pain minimal  Place ball on table, lightly push fingertips into ball while on table    A:   Patient's symptoms are resolving.  Patient is progressing as expected.  Patient is ready to progress to more complex exercises.  Patient is becoming more independent in home exercise program  Response to therapy has shown an improvement in  pain level    P: Patient will continue with the exercise program assigned on their PTRx code and will add the following measures to manage their pain/condition: splint for the left hand, strengthening for the right hand     Current treatment program is being advanced to more complex exercises.      Virtual visit contact time    Time of service began: 3:30 PM  Time of service ended: 4:05 PM  Total Time for set up, visit, and documentation: 42 minutes    Payor: MEDICARE / Plan: MEDICARE / Product Type: Medicare /   .  Procedure Code/s   Therapeutic Exercise (65318): 23 minutes  Orthotic Fabrication (91141): 12 min    I have reviewed the note as documented above.  This accurately captures the substance of my conversation with the patient.  Provider location: [facility] [place] ([facility]/State)  Patient location: home

## 2020-04-23 ENCOUNTER — VIRTUAL VISIT (OUTPATIENT)
Dept: OCCUPATIONAL THERAPY | Facility: CLINIC | Age: 65
End: 2020-04-23
Payer: MEDICARE

## 2020-04-23 DIAGNOSIS — G56.01 CARPAL TUNNEL SYNDROME OF RIGHT WRIST: ICD-10-CM

## 2020-04-23 DIAGNOSIS — Z47.89 AFTERCARE FOLLOWING SURGERY OF THE MUSCULOSKELETAL SYSTEM: ICD-10-CM

## 2020-04-23 DIAGNOSIS — M25.531 RIGHT WRIST PAIN: ICD-10-CM

## 2020-04-23 DIAGNOSIS — I10 ESSENTIAL HYPERTENSION WITH GOAL BLOOD PRESSURE LESS THAN 130/80: ICD-10-CM

## 2020-04-23 DIAGNOSIS — M25.541 ARTHRALGIA OF RIGHT HAND: ICD-10-CM

## 2020-04-23 PROCEDURE — 97110 THERAPEUTIC EXERCISES: CPT | Mod: 95 | Performed by: OCCUPATIONAL THERAPIST

## 2020-04-23 RX ORDER — METOPROLOL TARTRATE 50 MG
TABLET ORAL
Qty: 180 TABLET | Refills: 1 | Status: SHIPPED | OUTPATIENT
Start: 2020-04-23 | End: 2020-12-01

## 2020-04-23 NOTE — PROGRESS NOTES
"Occupational/Hand Therapy Virtual Follow Up Visit    The patient has been notified of following:     \"This virtual visit will be conducted between you and your provider. We have found that certain health care needs can be provided without the need for physical presence.  This service lets us provide the care you need with a virtual visit.\"    Due to external, as well as internal [facility] management of the COVID-19 Virus, Kashmir Yao was not seen in our clinic.  As a substitution, we implemented a virtual visit to manage this patient's condition utilizing the [employer] virtual visit platform via the patient s existing code.  The provider, Radha Mason, reviewed the patient's chart, PTRx prescription, and spoke with the patient to determine the following telemedicine visit is appropriate and effective for the patient's care.    The following type of visit was completed:   Video Visit:  The [employer] platform uses a synchronous HIPAA compliant video stream for this patient encounter.      S: Kashmir Yao is a 64 year old male. Connected virtually on the PTRx platform to discuss their condition/progress. My right hand is much better.  I have been improving.  I am doing the strengthening and moving my fingers a lot.  The tip of my middle finger is sensitive to touch and it feels hard.  It also gets some electric shocks for the surgery to that finger.      O: Patient demonstrated thickness/tenderness in the long finger and sensitivity with touch on the scar.     PTRx Content from today's visit:  Exercise Name: scar massage-circular, Sets: 1 - Reps: 1 - Sessions: 2--can do more if helpful, Notes: 3 minutes at a time  Also massage on back of hand  ADDED: Massage to middle finger    Exercise Name: Nerve Flossing Highly Irritable Median Nerve - FDS, Sets: 1 - Reps: 10 - Sessions: 2, Notes: Don't move head--Reviewed    Desensitization with a wash cloth for 30 seconds 3x/day.    A:   Patient's symptoms are " resolving.  Patient is progressing as expected.  Patient is ready to progress to more complex exercises.  Patient is becoming more independent in home exercise program  Response to therapy has shown an improvement in  pain level and strength    P: Patient will continue with the exercise program assigned on their PTRx code and will add the following measures to manage their pain/condition: see above     Current treatment program is being advanced to more complex exercises.    Next Visit: Discuss splint that was sent to patient    Virtual visit contact time    Time of service began: 3:40 PM  Time of service ended: 4:10 PM  Total Time for set up, visit, and documentation: 40 minutes    Payor: MEDICARE / Plan: MEDICARE / Product Type: Medicare /   .  Procedure Code/s   Therapeutic Exercise (73218): 25 minutes  Neuromuscular Re-education (90467): 5 minutes    I have reviewed the note as documented above.  This accurately captures the substance of my conversation with the patient.  Provider location: [facility]/[place] ([facility]/State)  Patient location: Home

## 2020-04-23 NOTE — TELEPHONE ENCOUNTER
"Last Written Prescription Date:  4/29/19  Last Fill Quantity: 180,  # refills: 3   Last office visit: No previous visit found with prescribing provider:  5/14/19   Future Office Visit:    Requested Prescriptions   Pending Prescriptions Disp Refills     metoprolol tartrate (LOPRESSOR) 50 MG tablet [Pharmacy Med Name: METOPROLOL TARTRATE 50MG TABLETS] 180 tablet 3     Sig: TAKE 1 TABLET BY MOUTH TWICE DAILY.       Beta-Blockers Protocol Passed - 4/23/2020  5:41 AM        Passed - Blood pressure under 140/90 in past 12 months     BP Readings from Last 3 Encounters:   02/28/20 90/56   02/18/20 118/78   02/13/20 112/74                 Passed - Patient is age 6 or older        Passed - Recent (12 mo) or future (30 days) visit within the authorizing provider's specialty     Patient has had an office visit with the authorizing provider or a provider within the authorizing providers department within the previous 12 mos or has a future within next 30 days. See \"Patient Info\" tab in inbasket, or \"Choose Columns\" in Meds & Orders section of the refill encounter.              Passed - Medication is active on med list           Prescription approved per [facility] Refill Protocol.    Mary Kate ENCINAS, RN    [facility]  Office: [phone]  Fax: [phone]        "

## 2020-04-30 ENCOUNTER — VIRTUAL VISIT (OUTPATIENT)
Dept: OCCUPATIONAL THERAPY | Facility: CLINIC | Age: 65
End: 2020-04-30
Payer: MEDICARE

## 2020-04-30 DIAGNOSIS — G56.01 CARPAL TUNNEL SYNDROME OF RIGHT WRIST: ICD-10-CM

## 2020-04-30 DIAGNOSIS — M25.541 ARTHRALGIA OF RIGHT HAND: ICD-10-CM

## 2020-04-30 DIAGNOSIS — Z47.89 AFTERCARE FOLLOWING SURGERY OF THE MUSCULOSKELETAL SYSTEM: ICD-10-CM

## 2020-04-30 DIAGNOSIS — M25.531 RIGHT WRIST PAIN: Primary | ICD-10-CM

## 2020-04-30 PROCEDURE — 97112 NEUROMUSCULAR REEDUCATION: CPT | Mod: 95 | Performed by: OCCUPATIONAL THERAPIST

## 2020-04-30 PROCEDURE — 97110 THERAPEUTIC EXERCISES: CPT | Mod: 95 | Performed by: OCCUPATIONAL THERAPIST

## 2020-04-30 PROCEDURE — 97140 MANUAL THERAPY 1/> REGIONS: CPT | Mod: 95 | Performed by: OCCUPATIONAL THERAPIST

## 2020-04-30 NOTE — PROGRESS NOTES
"Occupational/Hand Therapy Virtual Follow Up Visit      The patient has been notified of following:     \"This virtual visit will be conducted between you and your provider. We have found that certain health care needs can be provided without the need for physical presence.  This service lets us provide the care you need with a virtual visit.\"    Due to external, as well as internal [facility] management of the COVID-19 Virus, Kashmir Yao was not seen in our clinic.  As a substitution, we implemented a virtual visit to manage this patient's condition utilizing the [email or web address] virtual visit platform via the patient s existing code.  The provider, Lin Lee, reviewed the patient's chart, PTRx prescription, and spoke with the patient to determine the following telemedicine visit is appropriate and effective for the patient's care.    The following type of visit was completed:   Video Visit:  The [email or web address] platform uses a synchronous HIPAA compliant video stream for this patient encounter.        S: Kashmir Yao is a 64 year old male. Connected virtually on the [employer] platform to discuss their condition/progress. He notes that the triggering of his left ring finger is worsening. He has not yet received his splint in the mail. His long finger continues to feel \"jammed\" and has occasional shooting sensations. Pain of all other fingers is improving. He also notes that his  is improving.    O: Patient demonstrated tenderness with palpation at the A1 pulley of the ring finger. He is unable to fully passively flex the ring finger due to pain. Patient demonstrates a full fist and thumb opposition to the small finger. Wrist extension/flexion is 53/35 degrees.    PTRx content from today's visit:    Exercise Name: Virtual Visit Tips for Patients  Exercise Name: Finger Active Range of Motion Tendon Glides Fist Series, Sets: 1 - Reps: 10 - Sessions: 2, Notes: hold each position 5 seconds  Do 50% effort, do with arm on " table  Do not do straight fist  Can you left hand to hep bend fingers down into fist, don't do step 3--straight fist  Exercise Name: Finger Active Range of Motion FDS Flexor Tendon Gliding, Sets: 1 - Reps: 10 - Sessions: 2, Notes: Do 50% effort, it's ok to not bend finger all the way--you can do it part way  Do middle and ring finger together  Exercise Name: Nerve Gliding Proximal Median, Sets: 1 - Reps: 10 - Sessions: 5, Notes: Stop the video at 19 seconds  Hold for 5 seconds  Exercise Name: scar massage-circular, Sets: 1 - Reps: 1 - Sessions: 2--can do more if helpful, Notes: 3 minutes at a time  Also massage on back of hand  Massage to middle finger    Exercise Name: Nerve Flossing Highly Irritable Median Nerve - FDS, Sets: 1 - Reps: 10 - Sessions: 2, Notes: Don't move head  Exercise Name: Thumb Active Range of Motion Opposition, Sets: 1 - Reps: 10, Notes: 3-4x/day, 5 second hold  Exercise Name: Hand Strengthening Gripping, Sets: 1 - Reps: 10 repetitions of the sequence - Sessions: 2, Notes: Start gently, keep pain minimal  Place ball on table, lightly push fingertips into ball while on table  Exercise Name: Finger Passive Range of Motion Composite Flexion - Reps: 5 (Trigger finger only) - Sessions: 2-3, Notes: Only bend as far as pain allows.  Exercise Name: Scar Massage - 3 vector - Reps: 2-3 minutes - Sessions: 2-3, Notes: For trigger finger: Massage at the base of the ring finger. Use the technique described in the video  Exercise Name: Education Sheet Hand - Sessions: 3, Notes: Desensitization with wash cloth--rub wash cloth over scar for 30 seconds.  Should feel slightly irritating.  Can you other textures as well.      Until you get a splint for your trigger finger, use medical tape to tape around the middle knuckle to limit how much your ring finger bends. The goal is to prevent triggering throughout the day.    A:   Patient is progressing as expected.  Response to therapy has shown an improvement in  wrist range of motion and strength    P: Patient will continue with the exercise program assigned on their PTRx code.     Virtual visit contact time    Time of service began: 1:40 PM  Time of service ended: 2:22 PM  Total Time for set up, visit, and documentation: 42 minutes    Payor: MEDICARE / Plan: MEDICARE / Product Type: Medicare /   .  Procedure Code/s   Therapeutic Exercise (01191): 20 minutes  Manual Therapy (72765) 10 minutes  Neuromuscular Re-education (97752): 8 minutes    I have reviewed the note as documented above.  This accurately captures the substance of my conversation with the patient.  Provider location: [place], [place] ([facility]/State)  Patient location: Home

## 2020-05-21 PROBLEM — M25.531 RIGHT WRIST PAIN: Status: RESOLVED | Noted: 2020-04-13 | Resolved: 2020-05-21

## 2020-05-21 PROBLEM — M25.541 ARTHRALGIA OF RIGHT HAND: Status: RESOLVED | Noted: 2020-04-13 | Resolved: 2020-05-21

## 2020-05-21 PROBLEM — Z47.89 AFTERCARE FOLLOWING SURGERY OF THE MUSCULOSKELETAL SYSTEM: Status: RESOLVED | Noted: 2020-04-13 | Resolved: 2020-05-21

## 2020-05-21 PROBLEM — G56.01 CARPAL TUNNEL SYNDROME OF RIGHT WRIST: Status: RESOLVED | Noted: 2020-04-13 | Resolved: 2020-05-21

## 2020-05-26 ENCOUNTER — VIRTUAL VISIT (OUTPATIENT)
Dept: FAMILY MEDICINE | Facility: CLINIC | Age: 65
End: 2020-05-26
Payer: MEDICARE

## 2020-05-26 DIAGNOSIS — Z53.9 NO SHOW: Primary | ICD-10-CM

## 2020-05-26 NOTE — PROGRESS NOTES
"Kashmir Yao is a 65 year old male who is being evaluated via a billable video visit.      The patient has been notified of following:     \"This video visit will be conducted via a call between you and your physician/provider. We have found that certain health care needs can be provided without the need for an in-person physical exam.  This service lets us provide the care you need with a video conversation.  If a prescription is necessary we can send it directly to your pharmacy.  If lab work is needed we can place an order for that and you can then stop by our lab to have the test done at a later time.    Video visits are billed at different rates depending on your insurance coverage.  Please reach out to your insurance provider with any questions.    If during the course of the call the physician/provider feels a video visit is not appropriate, you will not be charged for this service.\"    Patient has given verbal consent for Video visit?     How would you like to obtain your AVS?     Patient would like the video invitation sent by:     Will anyone else be joining your video visit?   Vijay Yao is a 65 year old male who presents today via video visit for the following health issues:    HPI         Video Start Time:             Reviewed and updated as needed this visit by Provider         Review of Systems         Objective    There were no vitals taken for this visit.  Estimated body mass index is 35.32 kg/m  as calculated from the following:    Height as of 2/28/20: 1.715 m (5' 7.5\").    Weight as of 2/28/20: 103.8 kg (228 lb 14.4 oz).  Physical Exam                         Video-Visit Details    Type of service:  Video Visit    Video End Time:    Originating Location (pt. Location):     Distant Location (provider location):  [place]     Platform used for Video Visit:     No follow-ups on file.               "

## 2020-05-27 ENCOUNTER — VIRTUAL VISIT (OUTPATIENT)
Dept: FAMILY MEDICINE | Facility: CLINIC | Age: 65
End: 2020-05-27
Payer: MEDICARE

## 2020-05-27 DIAGNOSIS — K21.9 GASTROESOPHAGEAL REFLUX DISEASE, ESOPHAGITIS PRESENCE NOT SPECIFIED: ICD-10-CM

## 2020-05-27 DIAGNOSIS — J43.1 PANLOBULAR EMPHYSEMA (H): ICD-10-CM

## 2020-05-27 DIAGNOSIS — G40.802 OTHER EPILEPSY WITHOUT STATUS EPILEPTICUS, NOT INTRACTABLE (H): ICD-10-CM

## 2020-05-27 DIAGNOSIS — E78.5 HYPERLIPIDEMIA LDL GOAL <70: ICD-10-CM

## 2020-05-27 DIAGNOSIS — Z00.00 ENCOUNTER FOR MEDICARE ANNUAL WELLNESS EXAM: Primary | ICD-10-CM

## 2020-05-27 DIAGNOSIS — R05.9 COUGH: ICD-10-CM

## 2020-05-27 DIAGNOSIS — G56.02 LEFT CARPAL TUNNEL SYNDROME: ICD-10-CM

## 2020-05-27 PROCEDURE — G0439 PPPS, SUBSEQ VISIT: HCPCS | Mod: 95 | Performed by: FAMILY MEDICINE

## 2020-05-27 RX ORDER — PREGABALIN 25 MG/1
75 CAPSULE ORAL 2 TIMES DAILY
Qty: 540 CAPSULE | Refills: 1 | Status: SHIPPED | OUTPATIENT
Start: 2020-05-27 | End: 2020-06-19

## 2020-05-27 RX ORDER — PANTOPRAZOLE SODIUM 40 MG/1
40 TABLET, DELAYED RELEASE ORAL DAILY
Qty: 90 TABLET | Refills: 1 | Status: SHIPPED | OUTPATIENT
Start: 2020-05-27 | End: 2020-12-01

## 2020-05-27 RX ORDER — SIMVASTATIN 40 MG
40 TABLET ORAL AT BEDTIME
Qty: 90 TABLET | Refills: 3 | Status: SHIPPED | OUTPATIENT
Start: 2020-05-27 | End: 2021-05-24

## 2020-05-27 RX ORDER — IPRATROPIUM BROMIDE 42 UG/1
2 SPRAY, METERED NASAL 4 TIMES DAILY
Qty: 3 BOX | Refills: 1 | Status: SHIPPED | OUTPATIENT
Start: 2020-05-27 | End: 2020-06-19

## 2020-05-27 NOTE — PATIENT INSTRUCTIONS
Patient Education   Personalized Prevention Plan  You are due for the preventive services outlined below.  Your care team is available to assist you in scheduling these services.  If you have already completed any of these items, please share that information with your care team to update in your medical record.  Health Maintenance Due   Topic Date Due     Zoster (Shingles) Vaccine (1 of 2) 05/26/2004     Discuss Advance Care Planning  05/15/2018     Annual Wellness Visit  05/26/2019     Diptheria Tetanus Pertussis (DTAP/TDAP/TD) Vaccine (3 - Td) 07/10/2019      Stop Trelegy, Pepcid  New nasal spray, stomach medicine at pharmacy

## 2020-05-27 NOTE — ASSESSMENT & PLAN NOTE
"EGD 2012 \"Established gastro-esophageal reflux disease,\" at that time excessive throat clearing.  Changed to PPI  "

## 2020-05-27 NOTE — PROGRESS NOTES
"SUBJECTIVE:   Kashmir Yao is a 65 year old male who presents for Preventive Visit.  Kashmir Yao is a 65 year old male who is being evaluated via a billable video visit.      The patient has been notified of following:     \"This video visit will be conducted via a call between you and your physician/provider. We have found that certain health care needs can be provided without the need for an in-person physical exam.  This service lets us provide the care you need with a video conversation.  If a prescription is necessary we can send it directly to your pharmacy.  If lab work is needed we can place an order for that and you can then stop by our lab to have the test done at a later time.  Video visits are billed at different rates depending on your insurance coverage.  Please reach out to your insurance provider with any questions.  If during the course of the call the physician/provider feels a video visit is not appropriate, you will not be charged for this service.\"  Patient has given verbal consent for Video visit? Yes  How would you like to obtain your AVS? Mail a copy  Patient would like the video invitation sent by: Text to cell phone: [phone]  Will anyone else be joining your video visit? No  Are you in the first 12 months of your Medicare Part B coverage?  no    Physical Health:    In general, how would you rate your overall physical health? good    Outside of work, how many days during the week do you exercise? 6-7 days/week    Outside of work, approximately how many minutes a day do you exercise?30-45 minutes    If you drink alcohol do you typically have >3 drinks per day or >7 drinks per week? No    Do you usually eat at least 4 servings of fruit and vegetables a day, include whole grains & fiber and avoid regularly eating high fat or \"junk\" foods? NO    Do you have any problems taking medications regularly?  No    Do you have any side effects from medications? none    Needs assistance for the " following daily activities: no assistance needed    Which of the following safety concerns are present in your home?  lack of grab bars in the bathroom     Hearing impairment: No    In the past 6 months, have you been bothered by leaking of urine? no    Mental Health:    In general, how would you rate your overall mental or emotional health? good  PHQ-2 Score:      Do you feel safe in your environment? Yes    Have you ever done Advance Care Planning? (For example, a Health Directive, POLST, or a discussion with a medical provider or your loved ones about your wishes): No, advance care planning information given to patient to review.  Patient declined advance care planning discussion at this time.    Additional concerns to address?  YES      Panlobular emphysema (H) pulmonology felt this was not present  Left carpal tunnel syndrome remains post right release  Hyperlipidemia LDL goal <70 on statin  Cough longstanding responsive to prednisone pulses  Gastroesophageal reflux disease, esophagitis presence not specified considered to be an established diagnosis, absent on remote EGD   other epilepsy without status epilepticus, not intractable (H) needs Lyrica refills.  He should not run out    Fall risk:  Fallen 2 or more times in the past year?: No  Any fall with injury in the past year?: No    Cognitive Screenin) Repeat 3 items (Leader, Season, Table)    2) Clock draw:   3) 3 item recall:   Results: Patient has known cognitive changes  Mini-CogTM Copyright LUIS Dickey. Licensed by the author for use in [facility]; reprinted with permission ([email or web address]@.[email or web address]). All rights reserved.      Do you have sleep apnea, excessive snoring or daytime drowsiness?: yes claims compliance with CPAP 1-year-old        Concern - Dry Cough  Onset: follow up    Progression of Symptoms:  same    Therapies Tried and outcome: Steroid helps may be reduced with current regimen, not eliminated    Reviewed and updated as needed this  visit by clinical staff  Tobacco  Allergies  Meds  Med Hx  Surg Hx  Fam Hx  Soc Hx        Reviewed and updated as needed this visit by Provider        Social History     Tobacco Use     Smoking status: Former Smoker     Packs/day: 0.50     Years: 20.00     Pack years: 10.00     Last attempt to quit: 2003     Years since quittin.8     Smokeless tobacco: Never Used   Substance Use Topics     Alcohol use: No     Alcohol/week: 0.0 standard drinks                           Current providers sharing in care for this patient include:   Patient Care Team:  Hakeem Cano MD as PCP - General  Chad Noel MD as Assigned PCP    The following health maintenance items are reviewed in Epic and correct as of today:  Health Maintenance   Topic Date Due     URINE DRUG SCREEN  1955     ZOSTER IMMUNIZATION (1 of 2) 2004     ADVANCE CARE PLANNING  05/15/2018     MEDICARE ANNUAL WELLNESS VISIT  2019     DTAP/TDAP/TD IMMUNIZATION (3 - Td) 07/10/2019     ANNUAL REVIEW OF HM ORDERS  10/03/2020     PNEUMOCOCCAL IMMUNIZATION 65+ LOW/MEDIUM RISK (2 of 2 - PPSV23) 2021     FALL RISK ASSESSMENT  2021     LIPID  2025     COLORECTAL CANCER SCREENING  2026     SPIROMETRY  Completed     HEPATITIS C SCREENING  Completed     COPD ACTION PLAN  Completed     PHQ-2  Completed     INFLUENZA VACCINE  Completed     AORTIC ANEURYSM SCREENING (SYSTEM ASSIGNED)  Completed     IPV IMMUNIZATION  Aged Out     MENINGITIS IMMUNIZATION  Aged Out     Past Medical History:   Diagnosis Date     Arthritis      Bilateral carpal tunnel syndrome 2020     CAD (coronary artery disease) 2010    a subtotally occluded obtuse marginal vessel which was stented with a drug-coated stent.  He had 50% to 60% LAD disease and 25% to 30% right coronary disease     Cognitive change 2020     Colonic polyps 2009    tubular adenomae with mildly dysplastic features     Disturbance of skin sensation       Encephalocele (H) 2009    left frontal     Epilepsy, partial (H) 2009    with secondary generalization     GERD (gastroesophageal reflux disease)      Heart attack (H)     5 years ago. 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     C NONSPECIFIC PROCEDURE      stint in heart     CARPAL TUNNEL RELEASE RT/LT Right 03/11/2020     CYSTOSCOPY       ESOPHAGOSCOPY, GASTROSCOPY, DUODENOSCOPY (EGD), COMBINED  5/24/2012    Procedure:COMBINED ESOPHAGOSCOPY, GASTROSCOPY, DUODENOSCOPY (EGD); ESOPHAGOSCOPY, GASTROSCOPY, DUODENOSCOPY (EGD) ; Surgeon:GILLIAN JOHNSON; Location: GI     HC COLONOSCOPY THRU STOMA, DIAGNOSTIC  2009    tubular adenomae, recommended annual colonoscopy     KNEE SURGERY Left     repair of cartilage     SPINE SURGERY      repair of herniated disc lumbar     STENT       VENTRICULOSTOMY      [place]       Family History   Problem Relation Age of Onset     Cardiovascular Mother         CHF, HTN, VASC. INSUFF., DM     No Known Problems Father      Thyroid Disease Sister         HYPOTHYROID     Genitourinary Problems Brother         RENAL FAILURE AND H/O MVA WITH LE PARAPLEGIA     No Known Problems Sister      No Known Problems  "Brother      No Known Problems Brother      Cancer - colorectal No family hx of        Social History     Tobacco Use     Smoking status: Former Smoker     Packs/day: 0.50     Years: 20.00     Pack years: 10.00     Last attempt to quit: 2003     Years since quittin.8     Smokeless tobacco: Never Used   Substance Use Topics     Alcohol use: No     Alcohol/week: 0.0 standard drinks           ROS:  Except for his nonproductive cough, patient feels well.  No headaches no seizures no visual disturbance no dysphasia no chest pain no palpitations no heartburn no constipation no dysuria rashes quiescent.  A complete review of systems is otherwise negative    OBJECTIVE:   There were no vitals taken for this visit. Estimated body mass index is 35.32 kg/m  as calculated from the following:    Height as of 20: 1.715 m (5' 7.5\").    Weight as of 20: 103.8 kg (228 lb 14.4 oz).  EXAM:   Affect is attentive  He has slight ptosis on the right  Speech is coherent fluid  No rash is evident  No cough is observed  No other evaluation because of video limitations    Diagnostic Test Results:  Labs reviewed in Epic    ASSESSMENT / PLAN:     Problem List Items Addressed This Visit        Nervous and Auditory    Epilepsy (H)     On 3 agents.  Needs pregabalin refill         Relevant Medications    pregabalin (LYRICA) 25 MG capsule    Left carpal tunnel syndrome     Right status post release         Relevant Medications    pregabalin (LYRICA) 25 MG capsule       Respiratory    Cough     Chronic.  Nonproductive.  Responsive to steroid pulse.  Improved with current Pepcid.  Stop Trelegy, changed PPI, alternate nasal agent         Relevant Medications    pantoprazole (PROTONIX) 40 MG EC tablet    ipratropium (ATROVENT) 0.06 % nasal spray    Panlobular emphysema (H)     By CT. PFT's per Pulmonology trivial, if present at all         Relevant Medications    ipratropium (ATROVENT) 0.06 % nasal spray       Digestive    Esophageal " "reflux     EGD 2012 \"Established gastro-esophageal reflux disease,\" at that time excessive throat clearing.  Changed to PPI         Relevant Medications    pantoprazole (PROTONIX) 40 MG EC tablet       Endocrine    Hyperlipidemia LDL goal <70     LDL Cholesterol Calculated   Date Value Ref Range Status   02/03/2020 64 <100 mg/dL Final     Comment:     Desirable:       <100 mg/dl     Simvastatin         Relevant Medications    simvastatin (ZOCOR) 40 MG tablet      Other Visit Diagnoses     Encounter for Medicare annual wellness exam    -  Primary      Health maintenance reviewed is reasonably up-to-date    COUNSELING:  Reviewed preventive health counseling, as reflected in patient instructions    Estimated body mass index is 35.32 kg/m  as calculated from the following:    Height as of 2/28/20: 1.715 m (5' 7.5\").    Weight as of 2/28/20: 103.8 kg (228 lb 14.4 oz).    Weight management plan: Maintain     reports that he quit smoking about 16 years ago. He has a 10.00 pack-year smoking history. He has never used smokeless tobacco.      Appropriate preventive services were discussed with this patient, including applicable screening as appropriate for cardiovascular disease, diabetes, osteopenia/osteoporosis, and glaucoma.  As appropriate for age/gender, discussed screening for colorectal cancer, prostate cancer, breast cancer, and cervical cancer. Checklist reviewing preventive services available has been given to the patient.    Reviewed patients plan of care and provided an AVS. The Basic Care Plan (routine screening as documented in Health Maintenance) for Kashmir meets the Care Plan requirement. This Care Plan has been established and reviewed with the Patient.    Counseling Resources:  ATP IV Guidelines  Pooled Cohorts Equation Calculator  Breast Cancer Risk Calculator  FRAX Risk Assessment  ICSI Preventive Guidelines  Dietary Guidelines for Americans, 2010  USDA's MyPlate  ASA Prophylaxis  Lung CA " "Screening    Chad Noel MD  [facility]      Subjective     Kashmir Yao is a 65 year old male who presents today via video visit for the following health issues:    HPI         Video Start Time: 11:12 A            Reviewed and updated as needed this visit by Provider         Review of Systems         Objective    There were no vitals taken for this visit.  Estimated body mass index is 35.32 kg/m  as calculated from the following:    Height as of 2/28/20: 1.715 m (5' 7.5\").    Weight as of 2/28/20: 103.8 kg (228 lb 14.4 oz).  Physical Exam                         Video-Visit Details    Type of service:  Video Visit    Video End Time:11:46 A    Originating Location (pt. Location): Home    Distant Location (provider location):  [facility]     Platform used for Video Visit: Well    No follow-ups on file.       Chad Noel MD      "

## 2020-05-27 NOTE — ASSESSMENT & PLAN NOTE
LDL Cholesterol Calculated   Date Value Ref Range Status   02/03/2020 64 <100 mg/dL Final     Comment:     Desirable:       <100 mg/dl     Simvastatin

## 2020-05-28 ENCOUNTER — TELEPHONE (OUTPATIENT)
Dept: OTHER | Facility: CLINIC | Age: 65
End: 2020-05-28

## 2020-05-28 NOTE — TELEPHONE ENCOUNTER
Prior Authorization Approval    Authorization Effective Date: 2/28/2020  Authorization Expiration Date: 5/28/2021  Medication: Pregabalin- APPROVED   Approved Dose/Quantity:  Reference #:     Insurance Company: [employer] - Phone [phone] Fax [phone]  Expected CoPay:       CoPay Card Available:      Foundation Assistance Needed:    Which Pharmacy is filling the prescription (Not needed for infusion/clinic administered): [id number] [place], [place] - [address] [facility] [address]  Pharmacy Notified: Yes  Patient Notified: Comment:  **Instructed pharmacy to notify patient when script is ready to /ship.**

## 2020-05-28 NOTE — TELEPHONE ENCOUNTER
PA  Seizure disorder  Adverse gabapentin reaction, pregabalin tolerated  Continuation of therapy   Previous PA 2018  Chad Noel MD

## 2020-05-28 NOTE — TELEPHONE ENCOUNTER
Fax from [facility]  that Pregabalin 25 mg 3 caps twice a day not covered.  Past PA message from 4/12/18.  Allergy to Gabapentin per that note.  Do you want PA?  Please advise.  Beth Quintero RN    Phone- [phone]  ID- [id number]

## 2020-05-28 NOTE — TELEPHONE ENCOUNTER
Central Prior Authorization Team  Phone: [phone]    PA Initiation    Medication: Pregabalin  Insurance Company: [employer] - Phone [phone] Fax [phone]  Pharmacy Filling the Rx: [id number] - [place], [place] - [address] AT [facility] [address]  Filling Pharmacy Phone: [phone]  Filling Pharmacy Fax:    Start Date: 5/28/2020

## 2020-06-10 ENCOUNTER — TELEPHONE (OUTPATIENT)
Dept: FAMILY MEDICINE | Facility: CLINIC | Age: 65
End: 2020-06-10

## 2020-06-10 NOTE — TELEPHONE ENCOUNTER
Medication Question or Refill  Who is calling: Kashmir  What medication are you calling about (include dose and sig)?: pregabalin (LYRICA) 25 MG capsule   Who prescribed the medication?: Dr. Noel  Do you need a refill? No  Do you have any questions or concerns?  Yes: Patient needs a new Rx for Lyrica to be sent to his pharmacy, patient states the pharmacy gave him the generic form and he can't take that medication because it give him a rash.   [facility]ed Pharmacy: [facility] of [address]  Okay to leave a detailed message?: Yes at Home number on file [phone] (home)    Erika Leahy

## 2020-06-11 RX ORDER — PREGABALIN 25 MG/1
75 CAPSULE ORAL 2 TIMES DAILY
Qty: 540 CAPSULE | Refills: 1 | OUTPATIENT
Start: 2020-06-11

## 2020-06-11 NOTE — TELEPHONE ENCOUNTER
Patient only can take brand form of the pregabalin not genetic form, Patient stated that genetic form of pregabalin can give him a rash.      Please advise. Routed to Dr. Bert Martin RN

## 2020-06-19 ENCOUNTER — VIRTUAL VISIT (OUTPATIENT)
Dept: FAMILY MEDICINE | Facility: CLINIC | Age: 65
End: 2020-06-19
Payer: MEDICARE

## 2020-06-19 DIAGNOSIS — L30.9 DERMATITIS: ICD-10-CM

## 2020-06-19 DIAGNOSIS — R05.9 COUGH: Primary | ICD-10-CM

## 2020-06-19 DIAGNOSIS — L29.9 PRURITIC DISORDER: ICD-10-CM

## 2020-06-19 DIAGNOSIS — R41.89 COGNITIVE CHANGE: ICD-10-CM

## 2020-06-19 DIAGNOSIS — G40.802 OTHER EPILEPSY WITHOUT STATUS EPILEPTICUS, NOT INTRACTABLE (H): ICD-10-CM

## 2020-06-19 PROCEDURE — 99442 ZZC PHYSICIAN TELEPHONE EVALUATION 11-20 MIN: CPT | Performed by: FAMILY MEDICINE

## 2020-06-19 RX ORDER — PREGABALIN 25 MG/1
75 CAPSULE ORAL 2 TIMES DAILY
Qty: 540 CAPSULE | Refills: 1 | Status: SHIPPED | OUTPATIENT
Start: 2020-06-19 | End: 2021-01-18

## 2020-06-19 RX ORDER — IPRATROPIUM BROMIDE 42 UG/1
2 SPRAY, METERED NASAL 4 TIMES DAILY
Qty: 3 BOX | Refills: 3 | Status: SHIPPED | OUTPATIENT
Start: 2020-06-19 | End: 2023-04-10

## 2020-06-19 NOTE — PROGRESS NOTES
"Kashmir Yao is a 65 year old male who is being evaluated via a billable video visit.      The patient has been notified of following:     \"This video visit will be conducted via a call between you and your physician/provider. We have found that certain health care needs can be provided without the need for an in-person physical exam.  This service lets us provide the care you need with a video conversation.  If a prescription is necessary we can send it directly to your pharmacy.  If lab work is needed we can place an order for that and you can then stop by our lab to have the test done at a later time.    Video visits are billed at different rates depending on your insurance coverage.  Please reach out to your insurance provider with any questions.    If during the course of the call the physician/provider feels a video visit is not appropriate, you will not be charged for this service.\"    Patient has given verbal consent for Video visit? Yes    How would you like to obtain your AVS? Mail a copy  Patient would like the video invitation sent by: Send to e-mail at: [email or web address]    Will anyone else be joining your video visit? No      Subjective     Kashmir Yao is a 65 year old male who presents today via video visit for the following health issues:    HPI  Concern - cough  Onset: follow up        Progression of Symptoms:  improving    Therapies Tried and outcome: Still taking medication ipratropium nasal spray and pantoprazole    He started itching after starting medication -    we would like Brand name and not generic     Video was dysfunctional.  Converted to telephone visit        Cough  (primary encounter diagnosis) this is improving.    Other epilepsy without status epilepticus, not intractable (H) he wants name brand pregabalin, as the generic is caused him to have dermatitis   Dermatitis at the moment is inactive  Pruritic disorder he reports that he is started itching and wonders if this is " related to his pantoprazole  Cognitive change he does have some occasional difficulties with associations    Past Medical History:   Diagnosis Date     Arthritis      Bilateral carpal tunnel syndrome 2/13/2020     CAD (coronary artery disease) 2010    a subtotally occluded obtuse marginal vessel which was stented with a drug-coated stent.  He had 50% to 60% LAD disease and 25% to 30% right coronary disease     Cognitive change 1/2/2020     Colonic polyps 2009    tubular adenomae with mildly dysplastic features     Disturbance of skin sensation      Encephalocele (H) 2009    left frontal     Epilepsy, partial (H) 2009    with secondary generalization     GERD (gastroesophageal reflux disease)      Heart attack (H)     5 years ago. 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     C NONSPECIFIC PROCEDURE      stint in heart     CARPAL TUNNEL RELEASE RT/LT Right 03/11/2020     CYSTOSCOPY       ESOPHAGOSCOPY, GASTROSCOPY, DUODENOSCOPY (EGD), COMBINED  5/24/2012    Procedure:COMBINED ESOPHAGOSCOPY, GASTROSCOPY, DUODENOSCOPY (EGD); ESOPHAGOSCOPY, GASTROSCOPY, DUODENOSCOPY (EGD) ; Surgeon:GILLIAN JOHNSON; Location: GI     HC COLONOSCOPY THRU  "STOMA, DIAGNOSTIC  2009    tubular adenomae, recommended annual colonoscopy     KNEE SURGERY Left     repair of cartilage     SPINE SURGERY      repair of herniated disc lumbar     STENT       VENTRICULOSTOMY      [place]       Family History   Problem Relation Age of Onset     Cardiovascular Mother         CHF, HTN, VASC. INSUFF., DM     No Known Problems Father      Thyroid Disease Sister         HYPOTHYROID     Genitourinary Problems Brother         RENAL FAILURE AND H/O MVA WITH LE PARAPLEGIA     No Known Problems Sister      No Known Problems Brother      No Known Problems Brother      Cancer - colorectal No family hx of        Social History     Tobacco Use     Smoking status: Former Smoker     Packs/day: 0.50     Years: 20.00     Pack years: 10.00     Last attempt to quit: 2003     Years since quittin.8     Smokeless tobacco: Never Used   Substance Use Topics     Alcohol use: No     Alcohol/week: 0.0 standard drinks           Reviewed and updated as needed this visit by Provider  Allergies  Meds  Med Hx  Surg Hx         Review of Systems   No fever chest pain dyspnea rash      Objective    There were no vitals taken for this visit.  Estimated body mass index is 35.32 kg/m  as calculated from the following:    Height as of 20: 1.715 m (5' 7.5\").    Weight as of 20: 103.8 kg (228 lb 14.4 oz).  Physical Exam     No respiratory findings are audible            Assessment & Plan   Problem List Items Addressed This Visit        Nervous and Auditory    Epilepsy (H)     He can certainly have name brand.  Previously these medications were prescribed by Dr. Cano, Dr. Cano was general internal medicine with an interest in vascular, and has decided to do just vascular, therefore he will not prescribe these agents any longer         Relevant Medications    LYRICA 25 MG capsule    Pruritic disorder     Discussed with him he hepatic metabolism of numerous medications that he takes         Relevant " Medications    ipratropium (ATROVENT) 0.06 % nasal spray    Other Relevant Orders    CBC with platelets and differential    Protein electrophoresis    *UA reflex to Microscopic and Culture ([place] [facility] (except [place] and [place])    Protein immunofixation urine    Comprehensive metabolic panel (BMP + Alb, Alk Phos, ALT, AST, Total. Bili, TP)    TSH with free T4 reflex    XR Chest 2 Views       Respiratory    Cough - Primary     Improving after long evaluation.  And many trials of therapy         Relevant Medications    ipratropium (ATROVENT) 0.06 % nasal spray       Musculoskeletal and Integumentary    Dermatitis     He attributes this to generic gabapentin.  Inactive at moment            Other    Cognitive change     Stable                    13 minutes    Return in about 2 months (around 8/19/2020) for recheck, virtual visit.    Chad Noel MD  [facility]      Video-Visit Details    Type of service:  Telephone visit    Video End Time patient has trouble with video.  This is converted to telephone visit      Return in about 2 months (around 8/19/2020) for recheck, virtual visit.       Chad Noel MD

## 2020-06-19 NOTE — LETTER
[facility]  [id number] [place] [id number] [phone]          June 19, 2020    Kashmir Yao                                                                                                                     [address] [facility] [id number]            Re  Kashmir,  Mr. Yao develops dermatitis to generic pregabalin.  He has demonstrated much better tolerance of the name brand Lyrica, which he tolerates without dermatitis.  Your approval of this agent is appreciated in advance      Sincerely,         Chad Noel MD

## 2020-06-21 NOTE — ASSESSMENT & PLAN NOTE
He can certainly have name brand.  Previously these medications were prescribed by Dr. Cano, Dr. Cano was general internal medicine with an interest in vascular, and has decided to do just vascular, therefore he will not prescribe these agents any longer

## 2020-06-22 ENCOUNTER — TELEPHONE (OUTPATIENT)
Dept: FAMILY MEDICINE | Facility: CLINIC | Age: 65
End: 2020-06-22

## 2020-06-22 NOTE — TELEPHONE ENCOUNTER
Central Prior Authorization Team   Phone: [phone]    PA Initiation    Medication: LYRICA 25 MG capsule  Insurance Company: [employer] - Phone [phone] Fax [phone]  Pharmacy Filling the Rx: [id number] [place], [place] - [address] AT [facility] [address]  Filling Pharmacy Phone: [phone]  Filling Pharmacy Fax: [phone]  Start Date: 6/22/2020

## 2020-06-22 NOTE — TELEPHONE ENCOUNTER
Prior Authorization Approval    Authorization Effective Date: 6/22/2020  Authorization Expiration Date: 6/21/2021  Medication: LYRICA 25 MG-APPROVED  Approved Dose/Quantity:    Reference #:     Insurance Company: [email or web address] - Phone [phone] Fax [phone]  Expected CoPay:       CoPay Card Available:      Foundation Assistance Needed:    Which Pharmacy is filling the prescription (Not needed for infusion/clinic administered): [id number] [address] [facility] AT [facility] [id number]  Pharmacy Notified: Yes  Patient Notified: Yes  **Instructed pharmacy to notify patient when script is ready to /ship.**

## 2020-07-24 NOTE — PROGRESS NOTES
"Pre-Visit Planning     Future Appointments   Date Time Provider Department Center   7/28/2020 11:45 AM CRXR1 [facility] CR   7/28/2020  2:50 PM Chad Noel MD [facility] CR     Arrival Time for this Appointment:  2:50 PM   Appointment Notes for this encounter:      PH: [phone]  f/u scrubbed    Questionnaires Reviewed/Assigned  Additional questionnaires assigned  {SCRIPTING IF PT ANSWERS \"Hi, my name is Kendal STEIN RN, PAL and I am calling on behalf of your provider's office at [facility].  I am calling to confirm and prep your upcoming appointment on [see above for date/time/place/provider].  I see that you are coming in for a CXR @ 1145 & Dr. Noel will review your CXR and do your f/u with you @ 1450. Are there any additional questions or concerns you'd like to review with your provider during your visit?\"   \"I may need some of medications refilled, but I would like to wait to until we can talk about my x-ray results and see how I'm doing.\"      Patient preferred phone number: [phone]      Spoke to patient via phone. Are there any additional questions or concerns you'd like to review with your provider during your visit? No    Patient does not have additional questions or concerns.        Visit is not preventive.    Meds  Is there anything on your medication list that needs to be updated? YES    Current Outpatient Medications   Medication     acetaminophen (TYLENOL) 500 MG tablet     aspirin 81 MG tablet     cetirizine (ZYRTEC) 10 MG tablet     fluocinonide (LIDEX) 0.05 % external ointment     ipratropium (ATROVENT) 0.06 % nasal spray     lamoTRIgine (LAMICTAL) 100 MG tablet     lamoTRIgine (LAMICTAL) 200 MG tablet     LYRICA 25 MG capsule     metoprolol tartrate (LOPRESSOR) 50 MG tablet     naproxen (NAPROSYN) 500 MG tablet     pantoprazole (PROTONIX) 40 MG EC tablet     sildenafil (VIAGRA) 50 MG tablet     simvastatin (ZOCOR) 40 MG tablet     zolpidem (AMBIEN) 10 MG tablet     No current " "facility-administered medications for this visit.      Which pharmacy do you prefer to use for medications during this visit if needed? [facility] , [address]    Do you need refills on any of your medications? Uncertain @ this time.      Health Maintenance Due   Topic Date Due     ZOSTER IMMUNIZATION (1 of 2) 05/26/2004     DTAP/TDAP/TD IMMUNIZATION (3 - Td) 07/10/2019           MyChart  Patient is not active on MyChart. Encouraged [employer] activation.           Call Summary  \"Thank you for your time today.  If anything comes up before your appointment, please feel free to contact us at [phone].\"    Kendal Guzman RN [facility]    "

## 2020-07-28 ENCOUNTER — VIRTUAL VISIT (OUTPATIENT)
Dept: FAMILY MEDICINE | Facility: CLINIC | Age: 65
End: 2020-07-28
Payer: MEDICARE

## 2020-07-28 DIAGNOSIS — E78.5 HYPERLIPIDEMIA LDL GOAL <70: ICD-10-CM

## 2020-07-28 DIAGNOSIS — J43.1 PANLOBULAR EMPHYSEMA (H): ICD-10-CM

## 2020-07-28 DIAGNOSIS — N52.9 VASCULOGENIC ERECTILE DYSFUNCTION, UNSPECIFIED VASCULOGENIC ERECTILE DYSFUNCTION TYPE: ICD-10-CM

## 2020-07-28 DIAGNOSIS — R39.14 BENIGN PROSTATIC HYPERPLASIA WITH INCOMPLETE BLADDER EMPTYING: Primary | ICD-10-CM

## 2020-07-28 DIAGNOSIS — R05.9 COUGH: ICD-10-CM

## 2020-07-28 DIAGNOSIS — N40.1 BENIGN PROSTATIC HYPERPLASIA WITH INCOMPLETE BLADDER EMPTYING: Primary | ICD-10-CM

## 2020-07-28 PROCEDURE — 99443 ZZC PHYSICIAN TELEPHONE EVALUATION 21-30 MIN: CPT | Performed by: FAMILY MEDICINE

## 2020-07-28 NOTE — PROGRESS NOTES
"Kashmir Yao is a 65 year old male who is being evaluated via a billable video visit.      The patient has been notified of following:     \"This video visit will be conducted via a call between you and your physician/provider. We have found that certain health care needs can be provided without the need for an in-person physical exam.  This service lets us provide the care you need with a video conversation.  If a prescription is necessary we can send it directly to your pharmacy.  If lab work is needed we can place an order for that and you can then stop by our lab to have the test done at a later time.    Video visits are billed at different rates depending on your insurance coverage.  Please reach out to your insurance provider with any questions.    If during the course of the call the physician/provider feels a video visit is not appropriate, you will not be charged for this service.\"    Patient has given verbal consent for Video visit? Yes  How would you like to obtain your AVS? Mail a copy  If you are dropped from the video visit, the video invite should be resent to: Send to e-mail at: [email or web address]  Will anyone else be joining your video visit? No    Pt cannot make video work. Converted to telephone  Subjective     Kashmir Yao is a 65 year old male who presents today via video visit for the following health issues:    HPI    Follow up for chest xray. Patient is still having mild cough      Benign prostatic hyperplasia with incomplete bladder emptying  (primary encounter diagnosis) also dribbling. He has seen \"Urolift device\" on TV, would like to pursue  Vasculogenic erectile dysfunction, unspecified vasculogenic erectile dysfunction type he hopes the device will work for this as well. Viagra with some effect, not robust  Panlobular emphysema (H)minimal per [facility]  Cough   \" 90% gone\" with PPI, atrovent nasal  Hyperlipidemia LDL goal <70 statin therapy    Video Start Time: 3:30 PM        Past " Medical History:   Diagnosis Date     Arthritis      Benign prostatic hyperplasia with incomplete bladder emptying 7/28/2020     Bilateral carpal tunnel syndrome 2/13/2020     CAD (coronary artery disease) 2010    a subtotally occluded obtuse marginal vessel which was stented with a drug-coated stent.  He had 50% to 60% LAD disease and 25% to 30% right coronary disease     Cognitive change 1/2/2020     Colonic polyps 2009    tubular adenomae with mildly dysplastic features     Disturbance of skin sensation      Encephalocele (H) 2009    left frontal     Epilepsy, partial (H) 2009    with secondary generalization     GERD (gastroesophageal reflux disease)      Heart attack (H)     5 years ago. 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     C NONSPECIFIC PROCEDURE      stint in heart     CARPAL TUNNEL RELEASE RT/LT Right 03/11/2020     CYSTOSCOPY       ESOPHAGOSCOPY, GASTROSCOPY, DUODENOSCOPY (EGD), COMBINED  5/24/2012    Procedure:COMBINED ESOPHAGOSCOPY, GASTROSCOPY, DUODENOSCOPY (EGD); ESOPHAGOSCOPY, GASTROSCOPY, DUODENOSCOPY (EGD) ; Surgeon:GILLIAN JOHNSON; Location: GI     HC  "COLONOSCOPY THRU STOMA, DIAGNOSTIC      tubular adenomae, recommended annual colonoscopy     KNEE SURGERY Left     repair of cartilage     SPINE SURGERY      repair of herniated disc lumbar     STENT       VENTRICULOSTOMY      [place]       Family History   Problem Relation Age of Onset     Cardiovascular Mother         CHF, HTN, VASC. INSUFF., DM     No Known Problems Father      Thyroid Disease Sister         HYPOTHYROID     Genitourinary Problems Brother         RENAL FAILURE AND H/O MVA WITH LE PARAPLEGIA     No Known Problems Sister      No Known Problems Brother      No Known Problems Brother      Cancer - colorectal No family hx of        Social History     Tobacco Use     Smoking status: Former Smoker     Packs/day: 0.50     Years: 20.00     Pack years: 10.00     Last attempt to quit: 2003     Years since quittin.9     Smokeless tobacco: Never Used   Substance Use Topics     Alcohol use: No     Alcohol/week: 0.0 standard drinks      He reports his teen age son \"lies\", would like a referral for .  We are not his son's provider    Reviewed and updated as needed this visit by Provider  Allergies  Meds  Med Hx  Surg Hx         Review of Systems   No fever, dysuria      Objective             Physical Exam   Speech is clear  No cough        PSA   Date Value Ref Range Status   2020 1.31 0 - 4 ug/L Final     Comment:     Assay Method:  Chemiluminescence using Siemens Vista analyzer             Assessment & Plan   Problem List Items Addressed This Visit        Respiratory    Cough     Ooptimized, multifactorial, extensive data base. Continue atrovent PPI         Panlobular emphysema (H)     Minimal per [facility]. None per PFT            Endocrine    Hyperlipidemia LDL goal <70     Simvastatin            Urinary    Benign prostatic hyperplasia with incomplete bladder emptying - Primary     Discussed medical therapy, Rx. Refer         Relevant Medications    finasteride (PROSCAR) 5 MG tablet    " Other Relevant Orders    UROLOGY ADULT REFERRAL       Other    Vasculogenic erectile dysfunction     He wishes to stop vaiagra, seek referral. We shall facilitate. Urolift is a transurethral implant              Recommend he ask his son's provider to refer to . We cannot do so.    He had an appt for XR only that he missed. Reschedule          Return in about 2 days (around 7/30/2020).    Chad Noel MD  [facility]      Video-Visit Details    Type of service:  Telephone visit telephone visit    24 minutes    Return in about 2 days (around 7/30/2020).  For XR lab      Chad Noel MD

## 2020-07-28 NOTE — Clinical Note
Needs lab XR only visit (with reminder: cognitive defects) virtual visit a few days post (next week)

## 2020-07-29 ENCOUNTER — APPOINTMENT (OUTPATIENT)
Dept: GENERAL RADIOLOGY | Facility: CLINIC | Age: 65
End: 2020-07-29
Payer: MEDICARE

## 2020-07-29 ENCOUNTER — ANCILLARY PROCEDURE (OUTPATIENT)
Dept: GENERAL RADIOLOGY | Facility: CLINIC | Age: 65
End: 2020-07-29
Attending: FAMILY MEDICINE
Payer: MEDICARE

## 2020-07-29 DIAGNOSIS — L29.9 PRURITIC DISORDER: ICD-10-CM

## 2020-07-29 LAB
ALBUMIN UR-MCNC: NEGATIVE MG/DL
APPEARANCE UR: CLEAR
BILIRUB UR QL STRIP: NEGATIVE
COLOR UR AUTO: YELLOW
DIFFERENTIAL METHOD BLD: ABNORMAL
EOSINOPHIL # BLD AUTO: 0.1 10E9/L (ref 0–0.7)
EOSINOPHIL NFR BLD AUTO: 2 %
ERYTHROCYTE [DISTWIDTH] IN BLOOD BY AUTOMATED COUNT: 14.6 % (ref 10–15)
GLUCOSE UR STRIP-MCNC: NEGATIVE MG/DL
HCT VFR BLD AUTO: 36.3 % (ref 40–53)
HGB BLD-MCNC: 12.4 G/DL (ref 13.3–17.7)
HGB UR QL STRIP: NEGATIVE
KETONES UR STRIP-MCNC: NEGATIVE MG/DL
LEUKOCYTE ESTERASE UR QL STRIP: NEGATIVE
LYMPHOCYTES # BLD AUTO: 1.1 10E9/L (ref 0.8–5.3)
LYMPHOCYTES NFR BLD AUTO: 24 %
MCH RBC QN AUTO: 27.7 PG (ref 26.5–33)
MCHC RBC AUTO-ENTMCNC: 34.2 G/DL (ref 31.5–36.5)
MCV RBC AUTO: 81 FL (ref 78–100)
MONOCYTES # BLD AUTO: 0.4 10E9/L (ref 0–1.3)
MONOCYTES NFR BLD AUTO: 9 %
NEUTROPHILS # BLD AUTO: 3.1 10E9/L (ref 1.6–8.3)
NEUTROPHILS NFR BLD AUTO: 65 %
NITRATE UR QL: NEGATIVE
PH UR STRIP: 6 PH (ref 5–7)
PLATELET # BLD AUTO: 153 10E9/L (ref 150–450)
PLATELET # BLD EST: ABNORMAL 10*3/UL
RBC # BLD AUTO: 4.47 10E12/L (ref 4.4–5.9)
RBC MORPH BLD: ABNORMAL
SOURCE: NORMAL
SP GR UR STRIP: 1.02 (ref 1–1.03)
UROBILINOGEN UR STRIP-ACNC: 0.2 EU/DL (ref 0.2–1)
WBC # BLD AUTO: 4.7 10E9/L (ref 4–11)

## 2020-07-29 PROCEDURE — 85025 COMPLETE CBC W/AUTO DIFF WBC: CPT | Performed by: FAMILY MEDICINE

## 2020-07-29 PROCEDURE — 00000402 ZZHCL STATISTIC TOTAL PROTEIN: Performed by: FAMILY MEDICINE

## 2020-07-29 PROCEDURE — 36415 COLL VENOUS BLD VENIPUNCTURE: CPT | Performed by: FAMILY MEDICINE

## 2020-07-29 PROCEDURE — 86335 IMMUNFIX E-PHORSIS/URINE/CSF: CPT | Performed by: FAMILY MEDICINE

## 2020-07-29 PROCEDURE — 71046 X-RAY EXAM CHEST 2 VIEWS: CPT

## 2020-07-29 PROCEDURE — 84165 PROTEIN E-PHORESIS SERUM: CPT | Performed by: FAMILY MEDICINE

## 2020-07-29 PROCEDURE — 81003 URINALYSIS AUTO W/O SCOPE: CPT | Performed by: FAMILY MEDICINE

## 2020-07-29 RX ORDER — FINASTERIDE 5 MG/1
5 TABLET, FILM COATED ORAL DAILY
Qty: 90 TABLET | Refills: 1 | Status: SHIPPED | OUTPATIENT
Start: 2020-07-29 | End: 2021-08-04

## 2020-07-29 NOTE — LETTER
July 31, 2020      Kashmir Yao  [address] [facility] [address]        Dear ,    We are writing to inform you of your test results.    Tests are all OK, except for a trivial anemia. This is causing you no harm.  I would like to do more  Tests about that. Please make a lab only appointment in the next month or two     Resulted Orders   CBC with platelets and differential   Result Value Ref Range    WBC 4.7 4.0 - 11.0 10e9/L    RBC Count 4.47 4.4 - 5.9 10e12/L    Hemoglobin 12.4 (L) 13.3 - 17.7 g/dL      Comment:      Results confirmed by repeat test    Hematocrit 36.3 (L) 40.0 - 53.0 %      Comment:      Results confirmed by repeat test    MCV 81 78 - 100 fl    MCH 27.7 26.5 - 33.0 pg    MCHC 34.2 31.5 - 36.5 g/dL    RDW 14.6 10.0 - 15.0 %    Platelet Count 153 150 - 450 10e9/L    % Neutrophils 65.0 %    % Lymphocytes 24.0 %    % Monocytes 9.0 %    % Eosinophils 2.0 %    Absolute Neutrophil 3.1 1.6 - 8.3 10e9/L    Absolute Lymphocytes 1.1 0.8 - 5.3 10e9/L    Absolute Monocytes 0.4 0.0 - 1.3 10e9/L    Absolute Eosinophils 0.1 0.0 - 0.7 10e9/L    RBC Morphology Consistent with reported results     Platelet Estimate       Automated count confirmed.  Platelet morphology is normal.    Diff Method Manual Differential       Comment:      Verified by smear review   Protein electrophoresis   Result Value Ref Range    Albumin Fraction 4.3 3.7 - 5.1 g/dL    Alpha 1 Fraction 0.4 0.2 - 0.4 g/dL    Alpha 2 Fraction 0.7 0.5 - 0.9 g/dL    Beta Fraction 0.9 0.6 - 1.0 g/dL    Gamma Fraction 1.0 0.7 - 1.6 g/dL    Monoclonal Peak 0.0 0.0 g/dL    ELP Interpretation:       Essentially normal electrophoretic pattern.  No obvious monoclonal proteins seen.   Pathologic significance requires clinical correlation.  ARLEN Torres M.D., Ph.D.,   Pathologist.     *UA reflex to Microscopic and Culture ([place] [facility] [place] [facility] (except [place] and [place])   Result Value Ref Range    Color Urine Yellow     Appearance  Urine Clear     Glucose Urine Negative NEG^Negative mg/dL    Bilirubin Urine Negative NEG^Negative    Ketones Urine Negative NEG^Negative mg/dL    Specific Gravity Urine 1.020 1.003 - 1.035    Blood Urine Negative NEG^Negative    pH Urine 6.0 5.0 - 7.0 pH    Protein Albumin Urine Negative NEG^Negative mg/dL    Urobilinogen Urine 0.2 0.2 - 1.0 EU/dL    Nitrite Urine Negative NEG^Negative    Leukocyte Esterase Urine Negative NEG^Negative    Source Midstream Urine    Protein immunofixation urine   Result Value Ref Range    Immunofix ELP Urine       No monoclonal protein seen on immunofixation.  Pathological significance requires clinical   correlation.        Comment:      ARLEN Torres M.D., Ph.D.   Comprehensive metabolic panel (BMP + Alb, Alk Phos, ALT, AST, Total. Bili, TP)   Result Value Ref Range    Sodium 139 133 - 144 mmol/L    Potassium 4.3 3.4 - 5.3 mmol/L    Chloride 109 94 - 109 mmol/L    Carbon Dioxide 25 20 - 32 mmol/L    Anion Gap 5 3 - 14 mmol/L    Glucose 89 70 - 99 mg/dL    Urea Nitrogen 25 7 - 30 mg/dL    Creatinine 1.04 0.66 - 1.25 mg/dL    GFR Estimate 74 >60 mL/min/[1.73_m2]      Comment:      Non  GFR Calc  Starting 12/18/2018, serum creatinine based estimated GFR (eGFR) will be   calculated using the Chronic Kidney Disease Epidemiology Collaboration   (CKD-EPI) equation.      GFR Estimate If Black 86 >60 mL/min/[1.73_m2]      Comment:       GFR Calc  Starting 12/18/2018, serum creatinine based estimated GFR (eGFR) will be   calculated using the Chronic Kidney Disease Epidemiology Collaboration   (CKD-EPI) equation.      Calcium 9.1 8.5 - 10.1 mg/dL    Bilirubin Total 0.6 0.2 - 1.3 mg/dL    Albumin 4.1 3.4 - 5.0 g/dL    Protein Total 7.5 6.8 - 8.8 g/dL    Alkaline Phosphatase 113 40 - 150 U/L    ALT 45 0 - 70 U/L    AST 23 0 - 45 U/L   TSH with free T4 reflex   Result Value Ref Range    TSH 2.48 0.40 - 4.00 mU/L       If you have any questions or concerns, please  call the clinic at the number listed above.       Sincerely,        [facility]

## 2020-07-29 NOTE — LETTER
July 30, 2020      Kashmir Yao  [address] [address] [facility] [address]        Dear ,    We are writing to inform you of your test results. XRay is OK       Resulted Orders   XR Chest 2 Views    Narrative    XR CHEST 2 VW 7/29/2020 1:13 PM    HISTORY: Pruritic disorder    COMPARISON: 1/2/2020      Impression    IMPRESSION: No consolidation. No pleural effusions. No pneumothorax.  Unchanged cardiac size.    SHARON CAMPBELL MD       If you have any questions or concerns, please call the clinic at the number listed above.       Sincerely,        Chda Noel MD

## 2020-07-30 LAB
ALBUMIN SERPL ELPH-MCNC: 4.3 G/DL (ref 3.7–5.1)
ALBUMIN SERPL-MCNC: 4.1 G/DL (ref 3.4–5)
ALP SERPL-CCNC: 113 U/L (ref 40–150)
ALPHA1 GLOB SERPL ELPH-MCNC: 0.4 G/DL (ref 0.2–0.4)
ALPHA2 GLOB SERPL ELPH-MCNC: 0.7 G/DL (ref 0.5–0.9)
ALT SERPL W P-5'-P-CCNC: 45 U/L (ref 0–70)
ANION GAP SERPL CALCULATED.3IONS-SCNC: 5 MMOL/L (ref 3–14)
AST SERPL W P-5'-P-CCNC: 23 U/L (ref 0–45)
B-GLOBULIN SERPL ELPH-MCNC: 0.9 G/DL (ref 0.6–1)
BILIRUB SERPL-MCNC: 0.6 MG/DL (ref 0.2–1.3)
BUN SERPL-MCNC: 25 MG/DL (ref 7–30)
CALCIUM SERPL-MCNC: 9.1 MG/DL (ref 8.5–10.1)
CHLORIDE SERPL-SCNC: 109 MMOL/L (ref 94–109)
CO2 SERPL-SCNC: 25 MMOL/L (ref 20–32)
CREAT SERPL-MCNC: 1.04 MG/DL (ref 0.66–1.25)
GAMMA GLOB SERPL ELPH-MCNC: 1 G/DL (ref 0.7–1.6)
GFR SERPL CREATININE-BSD FRML MDRD: 74 ML/MIN/{1.73_M2}
GLUCOSE SERPL-MCNC: 89 MG/DL (ref 70–99)
M PROTEIN SERPL ELPH-MCNC: 0 G/DL
POTASSIUM SERPL-SCNC: 4.3 MMOL/L (ref 3.4–5.3)
PROT ELPH PNL UR ELPH: NORMAL
PROT PATTERN SERPL ELPH-IMP: NORMAL
PROT SERPL-MCNC: 7.5 G/DL (ref 6.8–8.8)
SODIUM SERPL-SCNC: 139 MMOL/L (ref 133–144)
TSH SERPL DL<=0.005 MIU/L-ACNC: 2.48 MU/L (ref 0.4–4)

## 2020-07-31 DIAGNOSIS — D64.9 ANEMIA, UNSPECIFIED TYPE: Primary | ICD-10-CM

## 2020-07-31 NOTE — RESULT ENCOUNTER NOTE
Tests are all OK, except for a trivial anemia. This is causing you no harm.  I would like to do more  Tests about that. Please make a lab only appointment in the next month or two  JAGDISH GILBERT

## 2020-08-06 ENCOUNTER — NURSE TRIAGE (OUTPATIENT)
Dept: OTHER | Facility: CLINIC | Age: 65
End: 2020-08-06

## 2020-08-06 ENCOUNTER — VIRTUAL VISIT (OUTPATIENT)
Dept: FAMILY MEDICINE | Facility: CLINIC | Age: 65
End: 2020-08-06
Payer: MEDICARE

## 2020-08-06 DIAGNOSIS — B02.9 HERPES ZOSTER WITHOUT COMPLICATION: Primary | ICD-10-CM

## 2020-08-06 DIAGNOSIS — G91.8 OTHER HYDROCEPHALUS (H): ICD-10-CM

## 2020-08-06 DIAGNOSIS — I50.32 CHRONIC HEART FAILURE WITH PRESERVED EJECTION FRACTION (H): ICD-10-CM

## 2020-08-06 RX ORDER — VALACYCLOVIR HYDROCHLORIDE 1 G/1
1000 TABLET, FILM COATED ORAL 3 TIMES DAILY
Qty: 30 TABLET | Refills: 0 | Status: SHIPPED | OUTPATIENT
Start: 2020-08-06 | End: 2020-09-08

## 2020-08-06 RX ORDER — PREGABALIN 100 MG
100 CAPSULE ORAL 2 TIMES DAILY
Qty: 20 CAPSULE | Refills: 0 | Status: SHIPPED | OUTPATIENT
Start: 2020-08-06 | End: 2021-08-04

## 2020-08-06 NOTE — TELEPHONE ENCOUNTER
"    Additional Information    Negative: Sounds like a life-threatening emergency to the triager    Negative: Possible contact with poison ivy or oak    Negative: Insect bite(s) suspected    Negative: Athlete's Foot suspected (i.e., itchy rash between the toes)    Negative: Jock Itch suspected (i.e., itchy rash on inner thighs near genital area)    Negative: Wound infection suspected (i.e., pain, spreading redness, or pus; in a cut, puncture, scrape or sutured wound)    Negative: Rash of external female genital area (vulva)    Negative: Rash of penis or scrotum    Negative: Small spot, skin growth, or mole    Negative: Fever and localized purple or blood-colored spots or dots that are not from injury or friction    Negative: Fever and localized rash is very painful    Negative: Patient sounds very sick or weak to the triager    Painful rash with multiple small blisters grouped together (i.e., dermatomal distribution or 'band' or 'stripe')    Answer Assessment - Initial Assessment Questions  1. APPEARANCE of RASH: \"Describe the rash.\"       Red painful, sensitive   2. LOCATION: \"Where is the rash located?\"       Right side of trunk and back   3. NUMBER: \"How many spots are there?\"       Wide spread   4. SIZE: \"How big are the spots?\" (Inches, centimeters or compare to size of a coin)       Most of my side wraps around to back   5. ONSET: \"When did the rash start?\"       Several day   6. ITCHING: \"Does the rash itch?\" If so, ask: \"How bad is the itch?\"  (Scale 1-10; or mild, moderate, severe)      No   7. PAIN: \"Does the rash hurt?\" If so, ask: \"How bad is the pain?\"  (Scale 1-10; or mild, moderate, severe)      Moderate 6/10 \"very sensitive\"   8. OTHER SYMPTOMS: \"Do you have any other symptoms?\" (e.g., fever)      No   9. PREGNANCY: \"Is there any chance you are pregnant?\" \"When was your last menstrual period?\"     NA    Protocols used: RASH OR REDNESS - [id number]      "

## 2020-08-06 NOTE — PROGRESS NOTES
"Kashmir Yao is a 65 year old male who is being evaluated via a billable video visit.       The patient has been notified of following:     \"This video visit will be conducted via a call between you and your physician/provider. We have found that certain health care needs can be provided without the need for an in-person physical exam.  This service lets us provide the care you need with a video conversation.  If a prescription is necessary we can send it directly to your pharmacy.  If lab work is needed we can place an order for that and you can then stop by our lab to have the test done at a later time.    Video visits are billed at different rates depending on your insurance coverage.  Please reach out to your insurance provider with any questions.    If during the course of the call the physician/provider feels a video visit is not appropriate, you will not be charged for this service.\"    Patient has given verbal consent for Video visit? Yes  How would you like to obtain your AVS? Mail a copy  If you are dropped from the video visit, the video invite should be resent to: Text to cell phone: [phone] e-mail [email or web address]  Will anyone else be joining your video visit? No    Subjective     Kashmir Yao is a 65 year old male who presents today via video visit for the following health issues:    HPI    Rash  Onset: 2 days ago    Description:   Location: right side on the flank area.   Character: painful, red  Itching (Pruritis): YES    Progression of Symptoms:  same    Accompanying Signs & Symptoms:  Fever: no   Body aches or joint pain: YES  Sore throat symptoms: no   Recent cold symptoms: YES    History:   Previous similar rash: no     Precipitating factors:   Exposure to similar rash: no   New exposures: None   Recent travel: no          Video Start Time: 11:45        Patient Active Problem List   Diagnosis     Hyperlipidemia LDL goal <70     Esophageal reflux     Anxiety state     RIC " (obstructive sleep apnea)     Health Care Home     S/P TKR (total knee replacement)     Epilepsy (H)     Benign essential hypertension     Physical deconditioning     Constipation     Trauma     Cognitive change     Obesity (BMI 35.0-39.9) with comorbidity (H)     Primary osteoarthritis of both hands     Headache     Need for prophylactic vaccination and inoculation against influenza     Cough     Panlobular emphysema (H)     Left carpal tunnel syndrome     Ulnar neuropathy of both upper extremities     Dermatitis     Pedal edema     Trigger finger of left hand, unspecified finger     Pruritic disorder     Benign prostatic hyperplasia with incomplete bladder emptying     Vasculogenic erectile dysfunction     Anemia     Past Surgical History:   Procedure Laterality Date     ARTHROPLASTY KNEE Left 2016    Procedure: ARTHROPLASTY KNEE;  Surgeon: Chet Leonardo MD;  Location:  OR     BACK SURGERY       C ANESTH,DX ARTHROSCOPIC PROC KNEE JOINT      CARTILEGE REPAIR.     C NONSPECIFIC PROCEDURE      stint in heart     CARPAL TUNNEL RELEASE RT/LT Right 2020     CYSTOSCOPY       ESOPHAGOSCOPY, GASTROSCOPY, DUODENOSCOPY (EGD), COMBINED  2012    Procedure:COMBINED ESOPHAGOSCOPY, GASTROSCOPY, DUODENOSCOPY (EGD); ESOPHAGOSCOPY, GASTROSCOPY, DUODENOSCOPY (EGD) ; Surgeon:GILLIAN JOHNSON; Location: GI     HC COLONOSCOPY THRU STOMA, DIAGNOSTIC      tubular adenomae, recommended annual colonoscopy     KNEE SURGERY Left     repair of cartilage     SPINE SURGERY      repair of herniated disc lumbar     STENT       VENTRICULOSTOMY      [place]       Social History     Tobacco Use     Smoking status: Former Smoker     Packs/day: 0.50     Years: 20.00     Pack years: 10.00     Last attempt to quit: 2003     Years since quittin.0     Smokeless tobacco: Never Used   Substance Use Topics     Alcohol use: No     Alcohol/week: 0.0 standard drinks     Family History   Problem Relation Age of Onset      Cardiovascular Mother         CHF, HTN, VASC. INSUFF., DM     No Known Problems Father      Thyroid Disease Sister         HYPOTHYROID     Genitourinary Problems Brother         RENAL FAILURE AND H/O MVA WITH LE PARAPLEGIA     No Known Problems Sister      No Known Problems Brother      No Known Problems Brother      Cancer - colorectal No family hx of          Current Outpatient Medications   Medication Sig Dispense Refill     LYRICA 100 MG capsule Take 1 capsule (100 mg) by mouth 2 times daily 20 capsule 0     valACYclovir (VALTREX) 1000 mg tablet Take 1 tablet (1,000 mg) by mouth 3 times daily for 10 days 30 tablet 0     acetaminophen (TYLENOL) 500 MG tablet Take 1,000 mg by mouth every 8 hours as needed for mild pain       aspirin 81 MG tablet Take 81 mg by mouth daily       cetirizine (ZYRTEC) 10 MG tablet Take 1 tablet (10 mg) by mouth daily 90 tablet 0     finasteride (PROSCAR) 5 MG tablet Take 1 tablet (5 mg) by mouth daily 90 tablet 1     fluocinonide (LIDEX) 0.05 % external ointment Apply topically 2 times daily as needed (rash) 30 g 0     ipratropium (ATROVENT) 0.06 % nasal spray Spray 2 sprays into both nostrils 4 times daily 3 Box 3     lamoTRIgine (LAMICTAL) 100 MG tablet 250 mg At Bedtime   3     lamoTRIgine (LAMICTAL) 200 MG tablet Take 200 mg by mouth every morning  90 tablet 1     LYRICA 25 MG capsule Take 3 capsules (75 mg) by mouth 2 times daily 540 capsule 1     metoprolol tartrate (LOPRESSOR) 50 MG tablet TAKE 1 TABLET BY MOUTH TWICE DAILY. 180 tablet 1     naproxen (NAPROSYN) 500 MG tablet TAKE 1 TABLET BY MOUTH EVERY 12 HOURS AS NEEDED. TAKE 2 HOURS AFTER ANY ASPIRIN 60 tablet 3     pantoprazole (PROTONIX) 40 MG EC tablet Take 1 tablet (40 mg) by mouth daily 90 tablet 1     simvastatin (ZOCOR) 40 MG tablet Take 1 tablet (40 mg) by mouth At Bedtime 90 tablet 3     zolpidem (AMBIEN) 10 MG tablet TAKE 1 TABLET BY MOUTH NIGHTLY EVERY NIGHT AT BEDTIME AS NEEDED FOR SLEEP. 90 tablet 0     Allergies    Allergen Reactions     Codeine      inability to sleep after taking codeine     Gabapentin      Fever       Vicodin [Hydrocodone-Acetaminophen]      Perspiring, hyper, itchy       Reviewed and updated as needed this visit by Provider         Review of Systems   CONSTITUTIONAL: NEGATIVE for fever, chills, change in weight  CV: NEGATIVE for chest pain, palpitations or peripheral edema      Objective             Physical Exam     GENERAL: Healthy, alert and no distress  SKIN: red vesicular/papuler rash spread in streaks on the Rt side of the flank area, into the abdomen and back area.   PSYCH: Mentation appears normal, affect normal/bright, judgement and insight intact, normal speech and appearance well-groomed.              Assessment & Plan     1. Herpes zoster without complication  Sever, start on   - valACYclovir (VALTREX) 1000 mg tablet; Take 1 tablet (1,000 mg) by mouth 3 times daily for 10 days  Dispense: 30 tablet; Refill: 0  Given the severity of pain, will increase lyrica to 100 mg twice daily, and recheck in 1 to 2 weeks, if no improvement will incrase dose further  - LYRICA 100 MG capsule; Take 1 capsule (100 mg) by mouth 2 times daily  Dispense: 20 capsule; Refill: 0           No follow-ups on file.    Milton Gamboa MD  [facility]      Video-Visit Details    Type of service:  Video Visit    Video End Time:2:20 pm    Originating Location (pt. Location): Home    Distant Location (provider location):  [facility]     Platform used for Video Visit: [email or web address]    No follow-ups on file.       Milton Gamboa MD

## 2020-08-08 DIAGNOSIS — M16.0 PRIMARY OSTEOARTHRITIS OF BOTH HIPS: ICD-10-CM

## 2020-08-10 RX ORDER — NAPROXEN 500 MG/1
TABLET ORAL
Qty: 60 TABLET | Refills: 3 | OUTPATIENT
Start: 2020-08-10

## 2020-08-10 NOTE — TELEPHONE ENCOUNTER
naproxen (NAPROSYN) 500 MG tablet  Last Written Prescription Date:  4/13/20  Last Fill Quantity: 60,  # refills: 3     Last office visit: 5/14/19    Refill denied - patient needs appointment.

## 2020-08-21 ENCOUNTER — TELEPHONE (OUTPATIENT)
Dept: FAMILY MEDICINE | Facility: CLINIC | Age: 65
End: 2020-08-21

## 2020-08-21 ENCOUNTER — VIRTUAL VISIT (OUTPATIENT)
Dept: FAMILY MEDICINE | Facility: CLINIC | Age: 65
End: 2020-08-21
Payer: MEDICARE

## 2020-08-21 DIAGNOSIS — B02.9 HERPES ZOSTER WITHOUT COMPLICATION: Primary | ICD-10-CM

## 2020-08-21 PROCEDURE — 99213 OFFICE O/P EST LOW 20 MIN: CPT | Mod: 95 | Performed by: PHYSICIAN ASSISTANT

## 2020-08-21 RX ORDER — TRIAMCINOLONE ACETONIDE 1 MG/G
CREAM TOPICAL 2 TIMES DAILY
Qty: 80 G | Refills: 0 | Status: SHIPPED | OUTPATIENT
Start: 2020-08-21 | End: 2023-04-10

## 2020-08-21 RX ORDER — HYDROXYZINE HYDROCHLORIDE 25 MG/1
25 TABLET, FILM COATED ORAL EVERY 8 HOURS PRN
Qty: 30 TABLET | Refills: 0 | Status: SHIPPED | OUTPATIENT
Start: 2020-08-21 | End: 2021-08-04

## 2020-08-21 NOTE — TELEPHONE ENCOUNTER
Routed FYI to , pt will sign up for [email or web address], sent link,  inder it down :-)  Grace Malone RN, BSN  Message handled by CLINIC NURSE.

## 2020-08-21 NOTE — TELEPHONE ENCOUNTER
Pt calls, miserable with shingles, wants another virtual visit, rash still bothersome, itchy and painful, declines UC visit, virtual visit scheduled today per pt request, pt just picked up Lyrica recently and started this recently, [facility]    1. Herpes zoster without complication  Sever, start on   - valACYclovir (VALTREX) 1000 mg tablet; Take 1 tablet (1,000 mg) by mouth 3 times daily for 10 days  Dispense: 30 tablet; Refill: 0  Given the severity of pain, will increase lyrica to 100 mg twice daily, and recheck in 1 to 2 weeks, if no improvement will incrase dose further  - LYRICA 100 MG capsule; Take 1 capsule (100 mg) by mouth 2 times daily  Dispense: 20 capsule; Refill: 0     Grace Malone, RN, BSN  Message handled by CLINIC NURSE.

## 2020-08-21 NOTE — PROGRESS NOTES
"Kashmir Yao is a 65 year old male who is being evaluated via a billable video visit.      The patient has been notified of following:     \"This video visit will be conducted via a call between you and your physician/provider. We have found that certain health care needs can be provided without the need for an in-person physical exam.  This service lets us provide the care you need with a video conversation.  If a prescription is necessary we can send it directly to your pharmacy.  If lab work is needed we can place an order for that and you can then stop by our lab to have the test done at a later time.    Video visits are billed at different rates depending on your insurance coverage.  Please reach out to your insurance provider with any questions.    If during the course of the call the physician/provider feels a video visit is not appropriate, you will not be charged for this service.\"    Patient has given verbal consent for Video visit? Yes  How would you like to obtain your AVS? Mail a copy  If you are dropped from the video visit, the video invite should be resent to: Send to e-mail at: [email or web address]  Will anyone else be joining your video visit? No    Subjective     Kashmir Yao is a 65 year old male who presents today via video visit for the following health issues:    HPI      General Follow Up    Concern: shingles  Problem started: follow up from 8/6/2020  Progression of symptoms: better but still having pain and still extremely itchy. No redness or warmth. No fever or chills. Finished valtrex without side effects.       Video Start Time: 2:25      Review of Systems   Constitutional, HEENT, skin, cardiovascular, pulmonary, gi and gu systems are negative, except as otherwise noted.      Objective           Vitals:  No vitals were obtained today due to virtual visit.    Physical Exam     GENERAL: Healthy, alert and no distress  EYES: Eyes grossly normal to inspection.  No discharge or erythema, " or obvious scleral/conjunctival abnormalities.  RESP: No audible wheeze, cough, or visible cyanosis.  No visible retractions or increased work of breathing.    SKIN: scabbed papular rash noted on right thoracic root extending along flank. No redness or warmth. No obvious weeping.   NEURO: Cranial nerves grossly intact.  Mentation and speech appropriate for age.  PSYCH: Mentation appears normal, affect normal/bright, judgement and insight intact, normal speech and appearance well-groomed.      None         Assessment & Plan     Herpes zoster without complication  Appears healing  As expected. Itching  Likely sign  Of healing. Educated patient. Pain improving. Atarax prn for itching  As well as kenalog. otc lidocaine can be used as well. Educated on this. If worsening,  Call clinic. If no improvement in 1 week,  Call clinic.   - hydrOXYzine (ATARAX) 25 MG tablet; Take 1 tablet (25 mg) by mouth every 8 hours as needed for itching  - triamcinolone (KENALOG) 0.1 % external cream; Apply topically 2 times daily  -Medication use and side effects discussed with the patient. Patient is in complete understanding and agreement with plan.          No follow-ups on file.    Nabor Manley PA-C  [facility]      Video-Visit Details    Type of service:  Video Visit    Video End Time:2:40 PM    Originating Location (pt. Location): Home    Distant Location (provider location):  [facility]     Platform used for Video Visit: [employer]

## 2020-08-24 ENCOUNTER — TELEPHONE (OUTPATIENT)
Dept: ORTHOPEDICS | Facility: CLINIC | Age: 65
End: 2020-08-24

## 2020-08-24 NOTE — TELEPHONE ENCOUNTER
Patient left voicemail stating he had carpal tunnel release surgery earlier this year and is still having some problems. He would like to talk to the doctor or nurse to get an appointment to be seen.   He can be reached at: [phone]    Patient had  R CTR on 3/11/20 by Dr. Frias.     Phone call to patient. He is still having numbness in 3 fingertips and also gets tingling and an electric shock feeling. He has done physical therapy as directed but continues to have issues.   He also is having trigger finger of left ring finger. Physical therapy provided him with a splint but when he takes it off, his finger locks up. Appointment scheduled for 8/27/20 at 11:00.     JACE Ramirez RN

## 2020-08-27 ENCOUNTER — TELEPHONE (OUTPATIENT)
Dept: ORTHOPEDICS | Facility: CLINIC | Age: 65
End: 2020-08-27

## 2020-08-27 ENCOUNTER — OFFICE VISIT (OUTPATIENT)
Dept: ORTHOPEDICS | Facility: CLINIC | Age: 65
End: 2020-08-27
Payer: MEDICARE

## 2020-08-27 DIAGNOSIS — M65.311 TRIGGER FINGER OF RIGHT THUMB: Primary | ICD-10-CM

## 2020-08-27 DIAGNOSIS — R20.2 RIGHT HAND PARESTHESIA: ICD-10-CM

## 2020-08-27 DIAGNOSIS — M65.342 TRIGGER RING FINGER OF LEFT HAND: ICD-10-CM

## 2020-08-27 PROCEDURE — 99214 OFFICE O/P EST MOD 30 MIN: CPT | Performed by: ORTHOPAEDIC SURGERY

## 2020-08-27 ASSESSMENT — MIFFLIN-ST. JEOR: SCORE: 1826.12

## 2020-08-27 NOTE — TELEPHONE ENCOUNTER
Called patient to schedule surgery. After speaking to the patient, he would like to have surgery on the right thumb and carpal tunnel instead of the left ring trigger finger release.     Please place new surgery order.     Paz Castañeda, Surgery Scheduler

## 2020-08-27 NOTE — PROGRESS NOTES
HISTORY OF PRESENT ILLNESS:    Kashmir Yao is a 65 year old male who is seen in follow up for right hand pain and and discomfort s/p carpal tunnel surgery DOS:3/20/20.  Patient is right hand dominant, and not currently working, retired.    Patient reports continue pain of right thumb, clicking of right thumb. He reports pain with gripping and grabbing. Tingling and numbness of right index, long, and ring fingers. He has occasional burning sensation at incision site.  Patient reports no pain currently, pain with use of his hand- difficulties with gripping and graspig 10/10.     Left ring finger locking and triggering. He reports frequent locking of the finger, and uses splint daily to support the finger.  Patient reports no pain in the left ring finger while using splint, severe pain when the digit locks and forced into extension.  Treatments tried to this point: hand therapy, splinting of the left ring finger.  No recent use of right wrist splint.  Past Medical History: Unchanged from the visit of 2/13/20. Please refer to that note. -- recent bout of shingles resolved over the last 2-3 weeks.        REVIEW OF SYSTEMS:  CONSTITUTIONAL:  NEGATIVE for fever, chills, change in weight  INTEGUMENTARY/SKIN:  NEGATIVE for worrisome rashes, moles or lesions  EYES:  NEGATIVE for vision changes or irritation  ENT/MOUTH:  NEGATIVE for ear, mouth and throat problems  RESP:  Asthma, sleep apnea  BREAST:  NEGATIVE for masses, tenderness or discharge  CV:  Chest pains, hypertension  GI:  Reflux/ heart burn, constipation  :  Negative   MUSCULOSKELETAL:  See HPI above  NEURO:  seizures  ENDOCRINE:  NEGATIVE for temperature intolerance, skin/hair changes  HEME/ALLERGY/IMMUNE:  NEGATIVE for bleeding problems  PSYCHIATRIC:  anxiety       PHYSICAL EXAM:  There were no vitals taken for this visit.  There is no height or weight on file to calculate BMI.   GENERAL APPEARANCE: healthy, alert and no distress   SKIN: no suspicious  lesions or rashes  NEURO: Normal strength and tone, mentation intact and speech normal  VASCULAR:  good pulses, and cappillary refill   LYMPH: no lymphadenopathy   PSYCH:  mentation appears normal and affect normal/bright    MSK:    Right palm incision is healed well  Right thumb MCP joint with a deformity consistent with underlying DJD  Catching and slight locking with a thumb movement  Tenderness at the A1 pulley of the thumb  Decreased sensation of the index, long and ring fingers, right compared to left  Left ring finger with catching and locking  A1 pulley tenderness of the left ring finger as well      IMAGING INTERPRETATION: None today     ASSESSMENT:  Status post a right carpal tunnel release with persisting paresthesia even though his strength is improved  Right trigger thumb  Left ring trigger finger    PLAN:  Specifically related to persisting paresthesia symptoms of the right hand despite carpal tunnel release, we may need to follow-up with another EMG.  I recommended waiting another 6 months with a full ear recovery has taken place.  Based on the fact that he has noted improvement in his strength, some improvement has taken place.  We then had a long discussion as to the nature of trigger thumb and the left ring finger.  We talked about embolization which he already has tried on the left ring finger.  We talked about cortisone injection which he does not wants to pursue.  For that reason, surgical intervention of trigger finger release would be reasonable.  He wants to proceed with the surgery.  He is more bothered by left ring trigger finger function wise even though the pain is worse on the right thumb.  We also talked about the choices for anesthesia as we did for carpal tunnel surgery.  He feels comfortable doing the surgery under local anesthesia.  We will set up for left ring trigger finger release first.  All the questions were answered.           Rick Frias MD  Dept. Orthopedic Surgery  [facility]       Disclaimer: This note consists of symbols derived from keyboarding, dictation and/or voice recognition software. As a result, there may be errors in the script that have gone undetected. Please consider this when interpreting information found in this chart.

## 2020-08-27 NOTE — LETTER
8/27/2020         RE: Kashmir Yao  [id number] [facility] [place] [id number]        Dear Colleague,    Thank you for referring your patient, Kashmir Yao, to the [facility]. Please see a copy of my visit note below.    HISTORY OF PRESENT ILLNESS:    Kashmir Yao is a 65 year old male who is seen in follow up for right hand pain and and discomfort s/p carpal tunnel surgery DOS:3/20/20.  Patient is right hand dominant, and not currently working, retired.    Patient reports continue pain of right thumb, clicking of right thumb. He reports pain with gripping and grabbing. Tingling and numbness of right index, long, and ring fingers. He has occasional burning sensation at incision site.  Patient reports no pain currently, pain with use of his hand- difficulties with gripping and graspig 10/10.     Left ring finger locking and triggering. He reports frequent locking of the finger, and uses splint daily to support the finger.  Patient reports no pain in the left ring finger while using splint, severe pain when the digit locks and forced into extension.  Treatments tried to this point: hand therapy, splinting of the left ring finger.  No recent use of right wrist splint.  Past Medical History: Unchanged from the visit of 2/13/20. Please refer to that note. -- recent bout of shingles resolved over the last 2-3 weeks.        REVIEW OF SYSTEMS:  CONSTITUTIONAL:  NEGATIVE for fever, chills, change in weight  INTEGUMENTARY/SKIN:  NEGATIVE for worrisome rashes, moles or lesions  EYES:  NEGATIVE for vision changes or irritation  ENT/MOUTH:  NEGATIVE for ear, mouth and throat problems  RESP:  Asthma, sleep apnea  BREAST:  NEGATIVE for masses, tenderness or discharge  CV:  Chest pains, hypertension  GI:  Reflux/ heart burn, constipation  :  Negative   MUSCULOSKELETAL:  See HPI above  NEURO:  seizures  ENDOCRINE:  NEGATIVE for temperature intolerance, skin/hair changes  HEME/ALLERGY/IMMUNE:   NEGATIVE for bleeding problems  PSYCHIATRIC:  anxiety       PHYSICAL EXAM:  There were no vitals taken for this visit.  There is no height or weight on file to calculate BMI.   GENERAL APPEARANCE: healthy, alert and no distress   SKIN: no suspicious lesions or rashes  NEURO: Normal strength and tone, mentation intact and speech normal  VASCULAR:  good pulses, and cappillary refill   LYMPH: no lymphadenopathy   PSYCH:  mentation appears normal and affect normal/bright    MSK:    Right palm incision is healed well  Right thumb MCP joint with a deformity consistent with underlying DJD  Catching and slight locking with a thumb movement  Tenderness at the A1 pulley of the thumb  Decreased sensation of the index, long and ring fingers, right compared to left  Left ring finger with catching and locking  A1 pulley tenderness of the left ring finger as well      IMAGING INTERPRETATION: None today     ASSESSMENT:  Status post a right carpal tunnel release with persisting paresthesia even though his strength is improved  Right trigger thumb  Left ring trigger finger    PLAN:  Specifically related to persisting paresthesia symptoms of the right hand despite carpal tunnel release, we may need to follow-up with another EMG.  I recommended waiting another 6 months with a full ear recovery has taken place.  Based on the fact that he has noted improvement in his strength, some improvement has taken place.  We then had a long discussion as to the nature of trigger thumb and the left ring finger.  We talked about embolization which he already has tried on the left ring finger.  We talked about cortisone injection which he does not wants to pursue.  For that reason, surgical intervention of trigger finger release would be reasonable.  He wants to proceed with the surgery.  He is more bothered by left ring trigger finger function wise even though the pain is worse on the right thumb.  We also talked about the choices for anesthesia as  we did for carpal tunnel surgery.  He feels comfortable doing the surgery under local anesthesia.  We will set up for left ring trigger finger release first.  All the questions were answered.           Rick Frias MD  Dept. Orthopedic Surgery  [facility]       Disclaimer: This note consists of symbols derived from keyboarding, dictation and/or voice recognition software. As a result, there may be errors in the script that have gone undetected. Please consider this when interpreting information found in this chart.      Again, thank you for allowing me to participate in the care of your patient.        Sincerely,        Rick Frias MD

## 2020-08-31 ENCOUNTER — TELEPHONE (OUTPATIENT)
Dept: ORTHOPEDICS | Facility: CLINIC | Age: 65
End: 2020-08-31

## 2020-08-31 NOTE — TELEPHONE ENCOUNTER
Returned call to patient.   Patient states that he would like to pursue right trigger thumb surgery before addressing his left ring finger.   Patient states that his right thumb is more painful and bothersome and needs to be addressed first.     Orders for right thumb trigger finger release placed by Dr. Frias on 8/28/20.    I informed patient that I will route message to Surgery Scheduler, Paz to call and assist with coordination.     Bailey Junior, ATC

## 2020-08-31 NOTE — TELEPHONE ENCOUNTER
Patient called to triage line, stating he has an upcoming hand surgery with Dr Frias and has several questions about the surgery. Patient did not specify which questions he had, but requested a call back from Dr Frias or his Assistant.      Routing to [facility] Orth[facility] pool        Isael Werner on 8/31/2020 at 1:22 PM

## 2020-09-01 DIAGNOSIS — Z11.59 ENCOUNTER FOR SCREENING FOR OTHER VIRAL DISEASES: Primary | ICD-10-CM

## 2020-09-01 NOTE — TELEPHONE ENCOUNTER
Spoke to patient about scheduling surgery.     Patient set a date for the right trigger finger release, but had concerns about on-going pain from right carpal tunnel surgery from March, and wondered if it's related. Patient would like a callback regarding this ongoing pain.    Type of surgery: right thumb trigger finger release  Location of surgery: Other: [facility]  Date and time of surgery: 9/15/20 @ 2:30pm   Surgeon: Altagracia   Pre-Op Appt Date: local  Post-Op Appt Date: 9/24/20  Packet sent out: emailed per pt request  Pre-cert/Authorization completed:  No  Date: 9/1/20    Paz Castañeda Surgery Scheduler

## 2020-09-02 NOTE — PROGRESS NOTES
Pre-Visit Planning :     Future Appointments   Date Time Provider Department Center   9/8/2020 10:40 AM Chad Noel MD CRFP CR   9/12/2020 11:30 AM LV COVID LAB LVLAB [facility]   9/24/2020  1:00 PM Hakeem Ortega PA-C [facility]      Arrival Time for this Appointment: 10:20 AM      Appointment Notes for this encounter:    Chronic coughing  & ER F/U from 9/4-Bell's palsy    Questionnaires Reviewed/Assigned:   N/A    Spoke to patient via phone. Are there any additional questions or concerns you'd like to review with your provider during your visit? N/A     Last OV with provider:  7/28/2020-Benign prostatic hyperplasia with incomplete bladder emptying, Vasculogenic erectile dysfunction, unspecified vasculogenic erectile dysfunction type, Panlobular emphysema (H), Cough , Hyperlipidemia LDL goal <70                    Hospital ER Visits:  9/4/2020-[facility] ER-Bell's palsy    Is the visit preventive? No                   Specialty Visits:  8/27/2020-[facility]-Trigger finger of right thumb,Trigger ring finger of left hand,Right hand paresthesia                        Imaging and Lab Review:7/29/2020- [facility]-CXR-2 Views  HX: Pruritic disorder IMPRESSION: No consolidation. No pleural effusions. No pneumothorax.Unchanged cardiac size.    Recent Procedures:  N/A    MEDS ;    Current Outpatient Medications   Medication     acetaminophen (TYLENOL) 500 MG tablet     aspirin 81 MG tablet     finasteride (PROSCAR) 5 MG tablet     fluocinonide (LIDEX) 0.05 % external ointment     hydrOXYzine (ATARAX) 25 MG tablet     ipratropium (ATROVENT) 0.06 % nasal spray     lamoTRIgine (LAMICTAL) 100 MG tablet     lamoTRIgine (LAMICTAL) 200 MG tablet     LYRICA 100 MG capsule     LYRICA 25 MG capsule     metoprolol tartrate (LOPRESSOR) 50 MG tablet     naproxen (NAPROSYN) 500 MG tablet     pantoprazole (PROTONIX) 40 MG EC tablet     simvastatin (ZOCOR) 40 MG tablet     triamcinolone (KENALOG) 0.1 % external cream      valACYclovir (VALTREX) 1000 mg tablet     zolpidem (AMBIEN) 10 MG tablet     No current facility-administered medications for this visit.         Is there anything on your medication list that needs to be updated?  Please check w/pt @ check-in     Health Maintenance Due   Topic Date Due     ZOSTER IMMUNIZATION (1 of 2) 05/26/2004     DTAP/TDAP/TD IMMUNIZATION (3 - Td) 07/10/2019     INFLUENZA VACCINE (1) 09/01/2020       Preferred pharmacy: [id number] [address] [facility] AT [facility] [address]    MyChart: PENDING!!!!!     Patient preferred phone number: [phone]    Kendal Guzman, Registered Nurse, Patient Advocate [facility]   [phone]

## 2020-09-02 NOTE — TELEPHONE ENCOUNTER
Left a vmail for patient to return call.     Per Dr Frias's notes on 8/27/20 recommendation to wait another 6 months before follow up with another EMG.       Paz Castañeda, Surgery Scheduler

## 2020-09-04 ENCOUNTER — APPOINTMENT (OUTPATIENT)
Dept: MRI IMAGING | Facility: CLINIC | Age: 65
End: 2020-09-04
Attending: EMERGENCY MEDICINE
Payer: MEDICARE

## 2020-09-04 ENCOUNTER — HOSPITAL ENCOUNTER (EMERGENCY)
Facility: CLINIC | Age: 65
Discharge: HOME | End: 2020-09-04
Attending: EMERGENCY MEDICINE | Admitting: EMERGENCY MEDICINE
Payer: MEDICARE

## 2020-09-04 ENCOUNTER — NURSE TRIAGE (OUTPATIENT)
Dept: FAMILY MEDICINE | Facility: CLINIC | Age: 65
End: 2020-09-04

## 2020-09-04 VITALS
RESPIRATION RATE: 16 BRPM | TEMPERATURE: 97.9 F | DIASTOLIC BLOOD PRESSURE: 79 MMHG | OXYGEN SATURATION: 96 % | SYSTOLIC BLOOD PRESSURE: 144 MMHG | HEART RATE: 52 BPM

## 2020-09-04 DIAGNOSIS — G51.0 BELL'S PALSY: ICD-10-CM

## 2020-09-04 LAB
ANION GAP SERPL CALCULATED.3IONS-SCNC: 6 MMOL/L (ref 3–14)
BUN SERPL-MCNC: 25 MG/DL (ref 7–30)
CALCIUM SERPL-MCNC: 9.2 MG/DL (ref 8.5–10.1)
CHLORIDE SERPL-SCNC: 107 MMOL/L (ref 94–109)
CO2 SERPL-SCNC: 25 MMOL/L (ref 20–32)
CREAT SERPL-MCNC: 1.12 MG/DL (ref 0.66–1.25)
ERYTHROCYTE [DISTWIDTH] IN BLOOD BY AUTOMATED COUNT: 14.6 % (ref 10–15)
GFR SERPL CREATININE-BSD FRML MDRD: 68 ML/MIN/{1.73_M2}
GLUCOSE BLDC GLUCOMTR-MCNC: 100 MG/DL (ref 70–99)
GLUCOSE SERPL-MCNC: 104 MG/DL (ref 70–99)
HCT VFR BLD AUTO: 39.2 % (ref 40–53)
HGB BLD-MCNC: 12.8 G/DL (ref 13.3–17.7)
INTERPRETATION ECG - MUSE: NORMAL
MCH RBC QN AUTO: 27.5 PG (ref 26.5–33)
MCHC RBC AUTO-ENTMCNC: 32.7 G/DL (ref 31.5–36.5)
MCV RBC AUTO: 84 FL (ref 78–100)
PLATELET # BLD AUTO: 169 10E9/L (ref 150–450)
POTASSIUM SERPL-SCNC: 3.9 MMOL/L (ref 3.4–5.3)
RBC # BLD AUTO: 4.66 10E12/L (ref 4.4–5.9)
SODIUM SERPL-SCNC: 138 MMOL/L (ref 133–144)
WBC # BLD AUTO: 5.1 10E9/L (ref 4–11)

## 2020-09-04 PROCEDURE — 70553 MRI BRAIN STEM W/O & W/DYE: CPT | Mod: 59

## 2020-09-04 PROCEDURE — 99285 EMERGENCY DEPT VISIT HI MDM: CPT | Mod: 25

## 2020-09-04 PROCEDURE — 93005 ELECTROCARDIOGRAM TRACING: CPT

## 2020-09-04 PROCEDURE — 85027 COMPLETE CBC AUTOMATED: CPT | Performed by: EMERGENCY MEDICINE

## 2020-09-04 PROCEDURE — 25500064 ZZH RX 255 OP 636: Performed by: EMERGENCY MEDICINE

## 2020-09-04 PROCEDURE — 80048 BASIC METABOLIC PNL TOTAL CA: CPT | Performed by: EMERGENCY MEDICINE

## 2020-09-04 PROCEDURE — 00000146 ZZHCL STATISTIC GLUCOSE BY METER IP

## 2020-09-04 PROCEDURE — 25000131 ZZH RX MED GY IP 250 OP 636 PS 637: Mod: GY | Performed by: EMERGENCY MEDICINE

## 2020-09-04 PROCEDURE — A9585 GADOBUTROL INJECTION: HCPCS | Performed by: EMERGENCY MEDICINE

## 2020-09-04 RX ORDER — POLYVINYL ALCOHOL 14 MG/ML
2 SOLUTION/ DROPS OPHTHALMIC PRN
Qty: 15 ML | Refills: 1 | Status: SHIPPED | OUTPATIENT
Start: 2020-09-04 | End: 2023-04-10

## 2020-09-04 RX ORDER — PREDNISONE 20 MG/1
60 TABLET ORAL ONCE
Status: COMPLETED | OUTPATIENT
Start: 2020-09-04 | End: 2020-09-04

## 2020-09-04 RX ORDER — PREDNISONE 20 MG/1
60 TABLET ORAL DAILY
Qty: 21 TABLET | Refills: 0 | Status: SHIPPED | OUTPATIENT
Start: 2020-09-05 | End: 2020-09-12

## 2020-09-04 RX ORDER — GADOBUTROL 604.72 MG/ML
15 INJECTION INTRAVENOUS ONCE
Status: COMPLETED | OUTPATIENT
Start: 2020-09-04 | End: 2020-09-04

## 2020-09-04 RX ADMIN — GADOBUTROL 11 ML: 604.72 INJECTION INTRAVENOUS at 12:13

## 2020-09-04 RX ADMIN — PREDNISONE 60 MG: 20 TABLET ORAL at 10:49

## 2020-09-04 ASSESSMENT — ENCOUNTER SYMPTOMS
FATIGUE: 1
WEAKNESS: 0
NUMBNESS: 0
TROUBLE SWALLOWING: 0
FACIAL ASYMMETRY: 1
VOMITING: 0
FEVER: 0
NAUSEA: 0

## 2020-09-04 NOTE — ED PROVIDER NOTES
History     Chief Complaint:  Facial Droop    HPI   Kashmir Yao is a 65 year old male with a history of hyperlipidemia, hypertension, CAD and Bell's palsy who presents with facial droop. The patient reported that his left sided facial droop started yesterday and has progressively gotten worse. He noticed that he was drooling out of the left side of his mouth and his left eye feels very heavy although his vision in this eye is unchanged. He denies any numbness in the face or numbness or weakness in his left side extremities. The patient does reports some heaviness to his left shoulder. Per chart review and the patient, he had Bell's Palsy last year. He felt like he never got back to 100% but his family thought he was mostly back to normal. He states that he otherwise feels tired but denied any fever, vomiting, or difficulty swallowing. On exam, patient was present to month and year.     Allergies:  Codeine  Gabapentin  Vicodin [Hydrocodone-Acetaminophen]     Medications:    Aspirin 81   Finasteride   Atarax   Lamictal   Lyrica   Lopressor   Naprosyn   Protonix   Ambien      Past Medical History:    hyperlipidemia   Epilepsy   hypertension   Panlobular emphysema   CHF with preserved ejection fracture   CAD  GERD     Past Surgical History:    Arthroplasty knee-left   Back surgery   Cartilage repair- left knee   Stent in heart   Carpal tunnel release   Cystoscopy   EGD combined   Colonoscopy   Repair herniated lumbar disc   Ventriculostomy     Family History:    CHF  hypertension   Vascular insufficiency   DM     Social History:  Tobacco use: Former smoker, quit in 2003   Alcohol use: No  Drug use: No  PCP: Chad Noel  Marital Status:   [2]    Review of Systems   Constitutional: Positive for fatigue. Negative for fever.   HENT: Negative for trouble swallowing.    Gastrointestinal: Negative for nausea and vomiting.   Neurological: Positive for facial asymmetry. Negative for weakness and numbness.   All  other systems reviewed and are negative.      Physical Exam     Patient Vitals for the past 24 hrs:   BP Temp Temp src Pulse Resp SpO2   09/04/20 1131 -- -- -- -- -- 96 %   09/04/20 1115 -- -- -- -- -- 98 %   09/04/20 1104 -- 97.9  F (36.6  C) Oral 52 16 98 %   09/04/20 1100 (!) 144/79 -- -- -- -- --       Physical Exam    General:   Pleasant, age appropriate.  HEENT:    Oropharynx is moist, without lesions or trismus.     TMs and EAC normal   Eyes:    Conjunctiva normal     PERRL     EOMs intact  Neck:    Supple, no meningismus.     CV:     Regular rate and rhythm.      No murmurs, rubs or gallops.       No unilateral leg swelling.       2+ radial pulses bilateral.       No lower extremity edema.  PULM:    Clear to auscultation bilateral.       No respiratory distress.      Good air exchange.     No rales or wheezing.     No stridor.  ABD:    Soft, non-tender, non-distended.       No pulsatile masses.       No rebound, guarding or rigidity.  MSK:     No gross deformity to all four extremities.   LYMPH:   No cervical lymphadenopathy.  NEURO:   Alert & O x 3.     Left CN VII weakness       Mild-moderate involvement of forehead      Otherwise CN II-XII intact     Speech is clear with no aphasia.       Finger to nose within normal limits.  No pronator drift.       Strength is 5/5 in all 4 extremities.  Sensation is intact.       Normal muscular tone, no tremor.  Skin:    Warm, dry and intact.    Psych:    Mood is good and affect is appropriate.        Emergency Department Course   ECG:  Indication: Facial Droop  Time: 1036  Vent. Rate 55 bpm. WV interval 170. QRS duration 98. QT/QTc 444/424. P-R-T axis 55 -27 27.  Sinus bradycardia. Otherwise normal ECG. No significant change compared to EKG dated 6/21/16. Read time: 1046      Imaging:  Radiographic findings were communicated with the patient who voiced understanding of the findings.    MR Brain w/o and w/ contrast:   1. Abnormal contrast enhancement of the IAC portion  and geniculate   ganglion of the left 7th cranial nerve consistent with patient's   history of Bell's palsy. This may represent inflammation of the nerve.   2. Moderate diffuse cerebral volume loss again noted.   3. Otherwise, normal MRI of the skull base and internal auditory   canals bilaterally, as per radiology.     Laboratory:  CBC: WBC: 5.1, HGB: 12.8 (L), PLT: 169  BMP: Glucose 104 (H), o/w WNL (Creatinine: 1.12)    1103 Glucose by meter: 100 (H)    Interventions:  1049 Deltasone 60 mg PO     Emergency Department Course:  Nursing notes and vitals reviewed. (5485) I performed an exam of the patient as documented above.     IV inserted. Medicine administered as documented above. Blood drawn. This was sent to the lab for further testing, results above.    The patient was sent for a brain MRI while in the emergency department, findings above.   EKG obtained in the ED, see results above.     (3453) I rechecked the patient and discussed the results of his workup thus far.     Findings and plan explained to the Patient. Patient discharged home with instructions regarding supportive care, medications, and reasons to return. The importance of close follow-up was reviewed. The patient was prescribed Deltasone.     I personally reviewed the laboratory results with the Patient and answered all related questions prior to discharge.     Impression & Plan      Medical Decision Makin-year-old male with a history of Bell's palsy presented to the ED with left-sided facial droop.  He does have novel left cranial nerve VII weakness from baseline.  There is mild weakness involving the forehead but it was uncertain whether this was residual deficit from his prior Bell's palsy thus unable to clinically rule out stroke.  Basic laboratory studies were unremarkable.  MRI of the brain revealed no stroke although there is abnormal signal of the left cranial nerve VII consistent with Bell's palsy.  Patient initiated on prednisone  and will continue on a 7-day course of prednisone.  Artificial tears as needed.  Follow-up with PCP to ensure improvement.    Diagnosis:    ICD-10-CM    1. Bell's palsy  G51.0        Disposition:  discharged to home    Discharge Medications:  Discharge Medication List as of 9/4/2020  1:01 PM      START taking these medications    Details   polyvinyl alcohol (LIQUIFILM TEARS) 1.4 % ophthalmic solution Place 2 drops Into the left eye as needed for dry eyes, Disp-15 mL,R-1, Local Print      predniSONE (DELTASONE) 20 MG tablet Take 3 tablets (60 mg) by mouth daily for 7 days, Disp-21 tablet,R-0, Local Print           Scribe Disclosure:  I, Suha Nguyen, am serving as a scribe on 9/4/2020 at 10:35 AM to personally document services performed by Miguelito Bourne MD based on my observations and the provider's statements to me.     Suha Nguyen  9/4/2020   [facility] EMERGENCY DEPARTMENT       Miguelito Bourne MD  09/04/20 6991

## 2020-09-04 NOTE — ED AVS SNAPSHOT
[facility]  [address]  [facility] [address]  Phone:  [phone]  Fax:  [phone]                                    Kashmir Yao   MRN: [id number]    Department:  [facility]   Date of Visit:  9/4/2020           After Visit Summary Signature Page    I have received my discharge instructions, and my questions have been answered. I have discussed any challenges I see with this plan with the nurse or doctor.    ..........................................................................................................................................  Patient/Patient Representative Signature      ..........................................................................................................................................  Patient Representative Print Name and Relationship to Patient    ..................................................               ................................................  Date                                   Time    ..........................................................................................................................................  Reviewed by Signature/Title    ...................................................              ..............................................  Date                                               Time          22EPIC Rev 08/18

## 2020-09-04 NOTE — TELEPHONE ENCOUNTER
Patient called stating that he is starting to have left side facial drooping and having a hard time drinking and drooling. Patient states that he did have Bell's Palsy about a year ago and feels like it has returned.    Advised that patient be evaluated in the ED as soon is possible to rule out possible stroke. Patient asked if can just be seen in clinic and advised that clinic does not have proper equipment to evaluate him to rule out stroke and to verify Bell's Palsy.    Patient agrees to go to ED at [facility] to be evaluated.    Ariel MATOS RN, BSN

## 2020-09-04 NOTE — ED TRIAGE NOTES
"Yesterday pt noted left sided facial droop, states his eye feels \"heavy\", also is drooling. Denies weakness in extremities. Hx of bells palsy about a year ago.   "

## 2020-09-08 ENCOUNTER — OFFICE VISIT (OUTPATIENT)
Dept: FAMILY MEDICINE | Facility: CLINIC | Age: 65
End: 2020-09-08
Payer: MEDICARE

## 2020-09-08 VITALS
HEART RATE: 65 BPM | SYSTOLIC BLOOD PRESSURE: 128 MMHG | OXYGEN SATURATION: 98 % | WEIGHT: 235 LBS | BODY MASS INDEX: 36.26 KG/M2 | TEMPERATURE: 97.7 F | DIASTOLIC BLOOD PRESSURE: 64 MMHG | RESPIRATION RATE: 16 BRPM

## 2020-09-08 DIAGNOSIS — Z12.5 SPECIAL SCREENING EXAMINATION FOR NEOPLASM OF PROSTATE: ICD-10-CM

## 2020-09-08 DIAGNOSIS — Z23 NEED FOR PROPHYLACTIC VACCINATION AND INOCULATION AGAINST INFLUENZA: ICD-10-CM

## 2020-09-08 DIAGNOSIS — Z23 ENCOUNTER FOR IMMUNIZATION: ICD-10-CM

## 2020-09-08 DIAGNOSIS — R35.0 BENIGN PROSTATIC HYPERPLASIA WITH URINARY FREQUENCY: Primary | ICD-10-CM

## 2020-09-08 DIAGNOSIS — Z23 NEED FOR SHINGLES VACCINE: ICD-10-CM

## 2020-09-08 DIAGNOSIS — R05.9 COUGH: ICD-10-CM

## 2020-09-08 DIAGNOSIS — N52.9 VASCULOGENIC ERECTILE DYSFUNCTION, UNSPECIFIED VASCULOGENIC ERECTILE DYSFUNCTION TYPE: ICD-10-CM

## 2020-09-08 DIAGNOSIS — D64.9 ANEMIA, UNSPECIFIED TYPE: ICD-10-CM

## 2020-09-08 DIAGNOSIS — N40.1 BENIGN PROSTATIC HYPERPLASIA WITH URINARY FREQUENCY: Primary | ICD-10-CM

## 2020-09-08 DIAGNOSIS — G51.0 BELL'S PALSY: ICD-10-CM

## 2020-09-08 PROBLEM — R39.14 BENIGN PROSTATIC HYPERPLASIA WITH INCOMPLETE BLADDER EMPTYING: Status: RESOLVED | Noted: 2020-07-28 | Resolved: 2020-09-08

## 2020-09-08 LAB
DIFFERENTIAL METHOD BLD: ABNORMAL
ERYTHROCYTE [DISTWIDTH] IN BLOOD BY AUTOMATED COUNT: 15.3 % (ref 10–15)
HCT VFR BLD AUTO: 39.5 % (ref 40–53)
HGB BLD-MCNC: 13.4 G/DL (ref 13.3–17.7)
LYMPHOCYTES # BLD AUTO: 1.4 10E9/L (ref 0.8–5.3)
LYMPHOCYTES NFR BLD AUTO: 14 %
MCH RBC QN AUTO: 27.7 PG (ref 26.5–33)
MCHC RBC AUTO-ENTMCNC: 33.9 G/DL (ref 31.5–36.5)
MCV RBC AUTO: 82 FL (ref 78–100)
MONOCYTES # BLD AUTO: 0.5 10E9/L (ref 0–1.3)
MONOCYTES NFR BLD AUTO: 5 %
NEUTROPHILS # BLD AUTO: 8.4 10E9/L (ref 1.6–8.3)
NEUTROPHILS NFR BLD AUTO: 81 %
PLATELET # BLD AUTO: 212 10E9/L (ref 150–450)
PLATELET # BLD EST: ABNORMAL 10*3/UL
RBC # BLD AUTO: 4.84 10E12/L (ref 4.4–5.9)
RBC MORPH BLD: NORMAL
RETICS # AUTO: 100.4 10E9/L (ref 25–95)
RETICS/RBC NFR AUTO: 2.1 % (ref 0.5–2)
VIT B12 SERPL-MCNC: 399 PG/ML (ref 193–986)
WBC # BLD AUTO: 10.3 10E9/L (ref 4–11)

## 2020-09-08 PROCEDURE — 83540 ASSAY OF IRON: CPT | Performed by: FAMILY MEDICINE

## 2020-09-08 PROCEDURE — 36415 COLL VENOUS BLD VENIPUNCTURE: CPT | Performed by: FAMILY MEDICINE

## 2020-09-08 PROCEDURE — 82607 VITAMIN B-12: CPT | Performed by: FAMILY MEDICINE

## 2020-09-08 PROCEDURE — 90715 TDAP VACCINE 7 YRS/> IM: CPT | Performed by: FAMILY MEDICINE

## 2020-09-08 PROCEDURE — 83010 ASSAY OF HAPTOGLOBIN QUANT: CPT | Performed by: FAMILY MEDICINE

## 2020-09-08 PROCEDURE — 90662 IIV NO PRSV INCREASED AG IM: CPT | Performed by: FAMILY MEDICINE

## 2020-09-08 PROCEDURE — G0008 ADMIN INFLUENZA VIRUS VAC: HCPCS | Mod: 59 | Performed by: FAMILY MEDICINE

## 2020-09-08 PROCEDURE — 85045 AUTOMATED RETICULOCYTE COUNT: CPT | Performed by: FAMILY MEDICINE

## 2020-09-08 PROCEDURE — 82728 ASSAY OF FERRITIN: CPT | Performed by: FAMILY MEDICINE

## 2020-09-08 PROCEDURE — 90471 IMMUNIZATION ADMIN: CPT | Performed by: FAMILY MEDICINE

## 2020-09-08 PROCEDURE — 83550 IRON BINDING TEST: CPT | Performed by: FAMILY MEDICINE

## 2020-09-08 PROCEDURE — G0103 PSA SCREENING: HCPCS | Performed by: FAMILY MEDICINE

## 2020-09-08 PROCEDURE — 99214 OFFICE O/P EST MOD 30 MIN: CPT | Mod: 25 | Performed by: FAMILY MEDICINE

## 2020-09-08 PROCEDURE — 85025 COMPLETE CBC W/AUTO DIFF WBC: CPT | Performed by: FAMILY MEDICINE

## 2020-09-08 PROCEDURE — 84466 ASSAY OF TRANSFERRIN: CPT | Performed by: FAMILY MEDICINE

## 2020-09-08 NOTE — TELEPHONE ENCOUNTER
Left vmail for patient to return call. Moved surgery time up due a cancelled case. DOS is still 9/15/20    Paz Castañeda Surgery Scheduler

## 2020-09-08 NOTE — PROGRESS NOTES
Subjective     Kashmir Yao is a 65 year old male who presents to clinic today for the following health issues:    HPI       Acute Illness  Acute illness concerns: cough   Onset/Duration: Ongoing   Symptoms:  Fever: no  Chills/Sweats: no  Headache (location?): no  Sinus Pressure: no  Conjunctivitis:  no  Ear Pain: no  Rhinorrhea: no  Congestion: no  Sore Throat: no  Cough: YES-non-productive  Wheeze: YES-  Decreased Appetite: no  Nausea: no  Vomiting: no  Diarrhea: no  Dysuria/Freq.: no  Dysuria or Hematuria: no  Fatigue/Achiness: YES  Sick/Strep Exposure: no  Therapies tried and outcome: None  Benign prostatic hyperplasia with urinary frequency  (primary encounter diagnosis) urinary frequency . Previously evaluated. Currently on finasteride, failed tamsulosin  Special screening examination for neoplasm of prostate needed for referral  Anemia, unspecified type remotely  Encounter for immunization  Need for shingles vaccine offered  Need for prophylactic vaccination and inoculation against influenza offerd  Vasculogenic erectile dysfunction, unspecified vasculogenic erectile dysfunction type he believes this began with finasteride  Cough previously resolved, returned with onset of Bell's Palsy     Past Medical History:   Diagnosis Date     Anemia 7/31/2020     Arthritis      Bell's palsy 9/8/2020     Benign prostatic hyperplasia with incomplete bladder emptying 7/28/2020     Bilateral carpal tunnel syndrome 2/13/2020     CAD (coronary artery disease) 2010    a subtotally occluded obtuse marginal vessel which was stented with a drug-coated stent.  He had 50% to 60% LAD disease and 25% to 30% right coronary disease     Cognitive change 1/2/2020     Colonic polyps 2009    tubular adenomae with mildly dysplastic features     Disturbance of skin sensation      Encephalocele (H) 2009    left frontal     Epilepsy, partial (H) 2009    with secondary generalization     GERD (gastroesophageal reflux disease)      Heart  attack (H)     5 years ago. 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     C NONSPECIFIC PROCEDURE      stint in heart     CARPAL TUNNEL RELEASE RT/LT Right 03/11/2020     CYSTOSCOPY       ESOPHAGOSCOPY, GASTROSCOPY, DUODENOSCOPY (EGD), COMBINED  5/24/2012    Procedure:COMBINED ESOPHAGOSCOPY, GASTROSCOPY, DUODENOSCOPY (EGD); ESOPHAGOSCOPY, GASTROSCOPY, DUODENOSCOPY (EGD) ; Surgeon:GILLIAN JOHNSON; Location: GI     HC COLONOSCOPY THRU STOMA, DIAGNOSTIC  2009    tubular adenomae, recommended annual colonoscopy     KNEE SURGERY Left     repair of cartilage     SPINE SURGERY      repair of herniated disc lumbar     STENT       VENTRICULOSTOMY      [place]       Family History   Problem Relation Age of Onset     Cardiovascular Mother         CHF, HTN, VASC. INSUFF., DM     No Known Problems Father      Thyroid Disease Sister         HYPOTHYROID     Genitourinary Problems Brother         RENAL FAILURE AND H/O MVA WITH LE PARAPLEGIA     No Known Problems Sister      No Known Problems Brother      No Known Problems Brother      Cancer - colorectal No family hx of        Social History     Tobacco  Use     Smoking status: Former Smoker     Packs/day: 0.50     Years: 20.00     Pack years: 10.00     Last attempt to quit: 2003     Years since quittin.1     Smokeless tobacco: Never Used   Substance Use Topics     Alcohol use: No     Alcohol/week: 0.0 standard drinks         Review of Systems  As above. No dysuria        Objective    /64 (BP Location: Right arm, Patient Position: Chair, Cuff Size: Adult Large)   Pulse 65   Temp 97.7  F (36.5  C) (Oral)   Resp 16   Wt 106.6 kg (235 lb)   SpO2 98%   BMI (P) 36.26 kg/m    Body mass index is 36.26 kg/m  (pended).  Physical Exam   L peripheral VII Palsy  Conjunctivae not injected            Assessment & Plan   Problem List Items Addressed This Visit        Nervous and Auditory    Bell's palsy     L ocular occlusion discussed, geovanni hx            Respiratory    Cough     Responsive to PPI and ipratropium. Returned with Bell's Palsy. Monitor            Urinary    Benign prostatic hyperplasia with urinary frequency - Primary     Previously evaluated. Tamsulosin ineffective current finasteride ineffective         Relevant Orders    UROLOGY ADULT REFERRAL       Hematologic    Anemia     Remotely. Broaden data base              Other    Need for prophylactic vaccination and inoculation against influenza    Relevant Orders    FLUZONE HIGH DOSE 65+  [13681] (Completed)    Vasculogenic erectile dysfunction     he believes this has occurred only since starting finasteride. Records indicate longer Hx.            Other Visit Diagnoses     Special screening examination for neoplasm of prostate        Relevant Orders    PSA, screen (Completed)    Encounter for immunization        Relevant Orders    TDAP VACCINE (ADACEL) (Completed)    Need for shingles vaccine                       Return in about 4 months (around 2021) for Routine Visit.    Chad Noel MD  [facility]

## 2020-09-09 DIAGNOSIS — K59.00 CONSTIPATION, UNSPECIFIED CONSTIPATION TYPE: Primary | ICD-10-CM

## 2020-09-09 DIAGNOSIS — E61.1 IRON DEFICIENCY: ICD-10-CM

## 2020-09-09 PROBLEM — D64.9 ANEMIA: Status: RESOLVED | Noted: 2020-07-31 | Resolved: 2020-09-09

## 2020-09-09 LAB
FERRITIN SERPL-MCNC: 21 NG/ML (ref 26–388)
HAPTOGLOB SERPL-MCNC: 86 MG/DL (ref 32–197)
IRON SATN MFR SERPL: 14 % (ref 15–46)
IRON SERPL-MCNC: 61 UG/DL (ref 35–180)
PSA SERPL-ACNC: 0.19 UG/L (ref 0–4)
TIBC SERPL-MCNC: 435 UG/DL (ref 240–430)
TRANSFERRIN SERPL-MCNC: 319 MG/DL (ref 210–360)

## 2020-09-09 RX ORDER — SENNOSIDES A AND B 8.6 MG/1
1 TABLET, FILM COATED ORAL 3 TIMES DAILY
Qty: 270 TABLET | Refills: 3 | Status: SHIPPED | OUTPATIENT
Start: 2020-09-09 | End: 2022-12-22

## 2020-09-09 RX ORDER — MULTIPLE VITAMINS W/ MINERALS TAB 9MG-400MCG
1 TAB ORAL DAILY
Qty: 90 TABLET | Refills: 3 | Status: SHIPPED | OUTPATIENT
Start: 2020-09-09 | End: 2021-10-21

## 2020-09-09 RX ORDER — FERROUS SULFATE 325(65) MG
325 TABLET, DELAYED RELEASE (ENTERIC COATED) ORAL 3 TIMES DAILY
Qty: 270 TABLET | Refills: 3 | Status: SHIPPED | OUTPATIENT
Start: 2020-09-09 | End: 2022-06-24

## 2020-09-12 DIAGNOSIS — Z11.59 ENCOUNTER FOR SCREENING FOR OTHER VIRAL DISEASES: ICD-10-CM

## 2020-09-12 PROCEDURE — U0003 INFECTIOUS AGENT DETECTION BY NUCLEIC ACID (DNA OR RNA); SEVERE ACUTE RESPIRATORY SYNDROME CORONAVIRUS 2 (SARS-COV-2) (CORONAVIRUS DISEASE [COVID-19]), AMPLIFIED PROBE TECHNIQUE, MAKING USE OF HIGH THROUGHPUT TECHNOLOGIES AS DESCRIBED BY CMS-2020-01-R: HCPCS | Performed by: ORTHOPAEDIC SURGERY

## 2020-09-13 LAB
SARS-COV-2 RNA SPEC QL NAA+PROBE: NOT DETECTED
SPECIMEN SOURCE: NORMAL

## 2020-09-15 ENCOUNTER — TRANSFERRED RECORDS (OUTPATIENT)
Dept: HEALTH INFORMATION MANAGEMENT | Facility: CLINIC | Age: 65
End: 2020-09-15

## 2020-09-18 DIAGNOSIS — Z11.59 ENCOUNTER FOR SCREENING FOR OTHER VIRAL DISEASES: Primary | ICD-10-CM

## 2020-09-21 ENCOUNTER — TELEPHONE (OUTPATIENT)
Dept: FAMILY MEDICINE | Facility: CLINIC | Age: 65
End: 2020-09-21

## 2020-09-21 NOTE — TELEPHONE ENCOUNTER
Patient calls,    1) [place] palsy, patient feels his condition is not improving, has issues with speech and visual issues, also impacting patients eating and sleeping. Patient finished antibiotic regimen prescribed by Dr. Noel. Patient was seen or this 09/08/2020    2) Shingles, Patient reports over the last couple of days it has gotten worse. Patient reports itching and skin changing colors and has similar symptoms to when he was last seen for shingles. Last seen for this issue 08/06/2020    Patient requesting these issues addressed as soon as possible, patient reports both conditions are not improving, and they may be getting worse. Patient open to virtual visit for shingles (saw Dr. Gamboa for this last), patient open to any treatment for these 2 conditions. Please advise and re-route to inform    Tristan Allen RN

## 2020-09-22 NOTE — PROGRESS NOTES
"Pre-Visit Planning :     Future Appointments   Date Time Provider Department Center   9/23/2020 11:00 AM Chad Noel MD CR[facility] CR   9/24/2020  1:00 PM Hakeem Ortega PA-C [facility]      Arrival Time for this Appointment: 10:45 AM   VIDEO VISIT-short [phone]   Appointment Notes for this encounter:    [place] Palsey exacerbation & Shingles still \"acting up\" (Pink-reddish & still itchy)    *Scheduled Colonoscopy W/Dr. Song @ [facility] 10/8/2020*     Questionnaires Reviewed/Assigned:   N/A    Spoke to patient via phone. Are there any additional questions or concerns you'd like to review with your provider during your visit? No      Last OV with provider:  9/8/2020;Benign prostatic hyperplasia with urinary frequency, Special screening examination for neoplasm of prostate, Anemia, unspecified type, Encounter for immunization, Need for shingles vaccine, Need for prophylactic vaccination and inoculation against influenza, Vasculogenic erectile dysfunction, unspecified vasculogenic erectile dysfunction type, Cough, Bell's palsy           Hospital ER Visits:  9/4/2020; [facility];Miguelito Bourne MD; Alas's palsy     Is the visit preventive? No                   Specialty Visits:  8/27/2020; [facility] ORTHOPEDIC SURGERY; Dr. Rick Frias; Trigger finger of right thumb,Trigger ring finger of left hand, Right hand paresthesia                     Imaging and Lab Review:7/29/2020; XR CHEST 2 VIEWS; Pruritic Disorder; IMPRESSION: No consolidation. No pleural effusions. No pneumothorax.Unchanged cardiac size    Recent Procedures:  N/A    MEDS ;    Current Outpatient Medications   Medication     acetaminophen (TYLENOL) 500 MG tablet     aspirin 81 MG tablet     ferrous sulfate (FE TABS) 325 (65 Fe) MG EC tablet     finasteride (PROSCAR) 5 MG tablet     fluocinonide (LIDEX) 0.05 % external ointment     hydrOXYzine (ATARAX) 25 MG tablet     ipratropium (ATROVENT) 0.06 % nasal spray     lamoTRIgine " (LAMICTAL) 100 MG tablet     lamoTRIgine (LAMICTAL) 200 MG tablet     LYRICA 100 MG capsule     LYRICA 25 MG capsule     metoprolol tartrate (LOPRESSOR) 50 MG tablet     multivitamin w/minerals (MULTI-VITAMIN) tablet     naproxen (NAPROSYN) 500 MG tablet     pantoprazole (PROTONIX) 40 MG EC tablet     polyvinyl alcohol (LIQUIFILM TEARS) 1.4 % ophthalmic solution     senna (SENOKOT) 8.6 MG tablet     simvastatin (ZOCOR) 40 MG tablet     triamcinolone (KENALOG) 0.1 % external cream     zolpidem (AMBIEN) 10 MG tablet     No current facility-administered medications for this visit.         Is there anything on your medication list that needs to be updated?  Pt will let us know during VV     There are no preventive care reminders to display for this patient.    Preferred pharmacy: [facility] #[id number] [place], [place] - [address] AT [facility] [address]    MyChart:PT DECLINED!!     Patient preferred phone number: [phone]    Kendal Guzman, Registered Nurse, Patient Advocate Liaison [facility]   [phone]

## 2020-09-22 NOTE — TELEPHONE ENCOUNTER
"Pt called back and symptoms discussed.  Virtual appointment scheduled with Dr. Noel for Wednesday 9/23/2020 @1100 for pt's Bell's Palsy exacerbation and c/o his shingles ,\"Still acting up.\"  Pt states after taking ABX, the color of the area of his shingles has become pink-reddish and has recently starting itching again.      Pt Allergic to codein, Vicodin & gabapentin.      And uses: [facility] #[id number] [facility] [address] AT [facility] [address]      Kendal Guzman, Registered Nurse, Patient Advocate [facility]  [phone]    "

## 2020-09-23 ENCOUNTER — VIRTUAL VISIT (OUTPATIENT)
Dept: FAMILY MEDICINE | Facility: CLINIC | Age: 65
End: 2020-09-23
Payer: MEDICARE

## 2020-09-23 ENCOUNTER — OFFICE VISIT (OUTPATIENT)
Dept: ORTHOPEDICS | Facility: CLINIC | Age: 65
End: 2020-09-23
Payer: MEDICARE

## 2020-09-23 DIAGNOSIS — Z09 POSTOP CHECK: Primary | ICD-10-CM

## 2020-09-23 DIAGNOSIS — B02.9 HERPES ZOSTER WITHOUT COMPLICATION: Primary | ICD-10-CM

## 2020-09-23 DIAGNOSIS — G51.0 BELL'S PALSY: ICD-10-CM

## 2020-09-23 PROCEDURE — 99207 ZZC NO CHARGE LOS: CPT | Performed by: ORTHOPAEDIC SURGERY

## 2020-09-23 PROCEDURE — 99214 OFFICE O/P EST MOD 30 MIN: CPT | Mod: 95 | Performed by: FAMILY MEDICINE

## 2020-09-23 RX ORDER — VALACYCLOVIR HYDROCHLORIDE 1 G/1
1000 TABLET, FILM COATED ORAL 3 TIMES DAILY
Qty: 30 TABLET | Refills: 0 | Status: SHIPPED | OUTPATIENT
Start: 2020-09-23 | End: 2020-12-01

## 2020-09-23 NOTE — LETTER
9/23/2020         RE: Kashmir Yao  [address]  [facility] [address]        Dear Colleague,    Thank you for referring your patient, Kashmir Yao, to the [facility]TH[facility]. Please see a copy of my visit note below.    Subjective:  Kashmir Yao is a 65 year old male who is seen in f/u up for left ring trigger finger release, DOS: 9/15/20.  Patient is here today 8 days s/p the above procedure. He reports the right hand is doing well.  His dressings were clean and intact.  He has been limiting use of the right hand, and reports his pain is minimal. He has been using ibuprofen as needed.       Objective: Right thumb incision is healing well.  Sutures are removed.    Imaging studies: None today    Assessment: Status post right trigger thumb release, doing well    Plan:   Scar tissue massaging was instructed today.  He wants to proceed with left ring trigger finger release under local anesthesia.  Perioperative order was placed.      Rick Frias MD  Dept. Orthopedic Surgery  [facility]       Disclaimer: This note consists of symbols derived from keyboarding, dictation and/or voice recognition software. As a result, there may be errors in the script that have gone undetected. Please consider this when interpreting information found in this chart.      Again, thank you for allowing me to participate in the care of your patient.        Sincerely,        Rick Frias MD

## 2020-09-23 NOTE — PROGRESS NOTES
Subjective:  Kashmir Yao is a 65 year old male who is seen in f/u up for left ring trigger finger release, DOS: 9/15/20.  Patient is here today 8 days s/p the above procedure. He reports the right hand is doing well.  His dressings were clean and intact.  He has been limiting use of the right hand, and reports his pain is minimal. He has been using ibuprofen as needed.       Objective: Right thumb incision is healing well.  Sutures are removed.    Imaging studies: None today    Assessment: Status post right trigger thumb release, doing well    Plan:   Scar tissue massaging was instructed today.  He wants to proceed with left ring trigger finger release under local anesthesia.  Perioperative order was placed.      Rick Frias MD  Dept. Orthopedic Surgery  [facility]       Disclaimer: This note consists of symbols derived from keyboarding, dictation and/or voice recognition software. As a result, there may be errors in the script that have gone undetected. Please consider this when interpreting information found in this chart.

## 2020-09-23 NOTE — PROGRESS NOTES
"Kashmir Yao is a 65 year old male who is being evaluated via a billable video visit.      The patient has been notified of following:     \"This video visit will be conducted via a call between you and your physician/provider. We have found that certain health care needs can be provided without the need for an in-person physical exam.  This service lets us provide the care you need with a video conversation.  If a prescription is necessary we can send it directly to your pharmacy.  If lab work is needed we can place an order for that and you can then stop by our lab to have the test done at a later time.    Video visits are billed at different rates depending on your insurance coverage.  Please reach out to your insurance provider with any questions.    If during the course of the call the physician/provider feels a video visit is not appropriate, you will not be charged for this service.\"    Patient has given verbal consent for Video visit? Yes  How would you like to obtain your AVS? Mail a copy  If you are dropped from the video visit, the video invite should be resent to: Text to cell phone: [phone]  Will anyone else be joining your video visit? No    Subjective     Kashmir Yao is a 65 year old male who presents today via video visit for the following health issues:    HPI    Concern - pt's Bell's Palsy exacerbation and c/o his shingles   Onset: 2 WEEKS AGO  Description: still having the sx, [place] palsy has not changed, and shingles started a week ago   Intensity: moderate  Progression of Symptoms:  same  Accompanying Signs & Symptoms: Shingles sx getting worse x wk started, dryness and pink, itching same sx months ago  Previous history of similar problem: has had x 2 weeks, shingles months ago      Video Start Time: 11:15 AM    Past Medical History:   Diagnosis Date     Anemia 7/31/2020     Arthritis      Bell's palsy 9/8/2020     Benign prostatic hyperplasia with incomplete bladder emptying " 7/28/2020     Bilateral carpal tunnel syndrome 2/13/2020     CAD (coronary artery disease) 2010    a subtotally occluded obtuse marginal vessel which was stented with a drug-coated stent.  He had 50% to 60% LAD disease and 25% to 30% right coronary disease     Cognitive change 1/2/2020     Colonic polyps 2009    tubular adenomae with mildly dysplastic features     Disturbance of skin sensation      Encephalocele (H) 2009    left frontal     Epilepsy, partial (H) 2009    with secondary generalization     GERD (gastroesophageal reflux disease)      Heart attack (H)     5 years ago. Cardiology 3 months age     History of spinal cord injury      Hypertension      Iron deficiency 9/9/2020     RIC (obstructive sleep apnea)      Paraneoplastic Antibody  positive[799.89BS] 2009    mildly elevated alpha 3 ganglionic acetylcholine receptor and JEOVANNY 65 receptor  antibody     Pedal edema 2/14/2020     Primary osteoarthritis of both hands 1/2/2020     Seizures (H)     Last seizure 2012     Stented coronary artery      Tobacco use disorder      Ulnar neuropathy of both upper extremities 2/13/2020     Vasculogenic erectile dysfunction 7/28/2020     Ventricular Assymetry 2009    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     C NONSPECIFIC PROCEDURE      stint in heart     CARPAL TUNNEL RELEASE RT/LT Right 03/11/2020    Right carpal tunnel release, Dr. Rick Frias, [facility]     CYSTOSCOPY       ESOPHAGOSCOPY, GASTROSCOPY, DUODENOSCOPY (EGD), COMBINED  5/24/2012    Procedure:COMBINED ESOPHAGOSCOPY, GASTROSCOPY, DUODENOSCOPY (EGD); ESOPHAGOSCOPY, GASTROSCOPY, DUODENOSCOPY (EGD) ; Surgeon:GILLIAN JOHNSON; Location: GI     HC COLONOSCOPY THRU STOMA,  DIAGNOSTIC  2009    tubular adenomae, recommended annual colonoscopy     KNEE SURGERY Left     repair of cartilage     SPINE SURGERY      repair of herniated disc lumbar     STENT       VENTRICULOSTOMY      [place]       Family History   Problem Relation Age of Onset     Cardiovascular Mother         CHF, HTN, VASC. INSUFF., DM     No Known Problems Father      Thyroid Disease Sister         HYPOTHYROID     Genitourinary Problems Brother         RENAL FAILURE AND H/O MVA WITH LE PARAPLEGIA     No Known Problems Sister      No Known Problems Brother      No Known Problems Brother      Cancer - colorectal No family hx of        Social History     Tobacco Use     Smoking status: Former Smoker     Packs/day: 0.50     Years: 20.00     Pack years: 10.00     Last attempt to quit: 2003     Years since quittin.1     Smokeless tobacco: Never Used   Substance Use Topics     Alcohol use: No     Alcohol/week: 0.0 standard drinks         Review of Systems   No conjunctival injection.  No systemic symptoms      Objective           Vitals:  No vitals were obtained today due to virtual visit.    Physical Exam     Zoster rash extends anteriorly in a dermatomal pattern from his previous site          Assessment & Plan   Problem List Items Addressed This Visit        Nervous and Auditory    Bell's palsy     He is concerned that this has not resolved.  He has some drooling.  Discussed natural history            Infectious/Inflammatory    Herpes zoster without complication - Primary     We are able to visualize vesicles on an erythematous base extending from his previous site of shingles on the lateral r right abdominal wall anterior in a dermatomal pattern.  This was not present previously.  [address]         Relevant Medications    valACYclovir (VALTREX) 1000 mg tablet                   Return in about 3 weeks (around 10/14/2020) for F2F.    Chad Noel MD  [facility]      Video-Visit Details    Type of  service:  Video Visit    Video End Time:11:56 A    Originating Location (pt. Location): Home    Distant Location (provider location):  [facility]     Platform used for Video Visit: Carmita

## 2020-09-24 NOTE — ASSESSMENT & PLAN NOTE
We are able to visualize vesicles on an erythematous base extending from his previous site of shingles on the lateral r right abdominal wall anterior in a dermatomal pattern.  This was not present previously.  [place]

## 2020-10-01 ENCOUNTER — TELEPHONE (OUTPATIENT)
Dept: ORTHOPEDICS | Facility: CLINIC | Age: 65
End: 2020-10-01

## 2020-10-01 DIAGNOSIS — Z11.59 ENCOUNTER FOR SCREENING FOR OTHER VIRAL DISEASES: Primary | ICD-10-CM

## 2020-10-01 NOTE — TELEPHONE ENCOUNTER
Scheduled surgery    Whit: please place COVID order.    Type of surgery: left ring trigger finger   Location of surgery: Other: [facility]  Date and time of surgery: 10/19/20 @ 1:55pm   Surgeon: Altagracia   Pre-Op Appt Date: local  Post-Op Appt Date: 10/29/20  Packet sent out: Yes  Pre-cert/Authorization completed:  No  Date: 10/1/20    Paz Castañeda, Surgery Scheduler

## 2020-10-05 DIAGNOSIS — Z11.59 ENCOUNTER FOR SCREENING FOR OTHER VIRAL DISEASES: ICD-10-CM

## 2020-10-05 PROCEDURE — U0003 INFECTIOUS AGENT DETECTION BY NUCLEIC ACID (DNA OR RNA); SEVERE ACUTE RESPIRATORY SYNDROME CORONAVIRUS 2 (SARS-COV-2) (CORONAVIRUS DISEASE [COVID-19]), AMPLIFIED PROBE TECHNIQUE, MAKING USE OF HIGH THROUGHPUT TECHNOLOGIES AS DESCRIBED BY CMS-2020-01-R: HCPCS | Performed by: INTERNAL MEDICINE

## 2020-10-06 LAB
SARS-COV-2 RNA SPEC QL NAA+PROBE: NOT DETECTED
SPECIMEN SOURCE: NORMAL

## 2020-10-06 NOTE — PROGRESS NOTES
Pre-Visit Planning :     Future Appointments   Date Time Provider Department Center   10/7/2020  2:40 PM Chad Noel MD [facility]   10/15/2020 11:30 AM [facility] COVID LAB [facility]   10/29/2020 11:20 AM Rick Frias MD [facility]      Arrival Time for this Appointment:  2:25 PM    Appointment Notes for this encounter:    VIDEO VISIT-short [phone]   Follow Up Jem Buck       Questionnaires Reviewed/Assigned:   Yes-PHQ-9, JEOVANNY-7    Spoke to patient via phone. Are there any additional questions or concerns you'd like to review with your provider during your visit? N/A      Last OV with provider:  9/23/2020; Herpes zoster without complication, Bell's palsy          Hospital ER Visits:  9/4/2020;[facility];  Dr. Miguelito Bourne; Bell's palsy     Is the visit preventive? No              Specialty Visits:  9/23/2020; [facility] ;  Dr. Rick Frias; Postop check;  left ring trigger finger release, DOS: 9/15/20                    Imaging and Lab Review:9/4/2020; MRI OF THE BRAIN WITHOUT AND WITH CONTRAST;MRI OF THE SKULL BASE WITHOUT AND WITH CONTRAST ; HISTORY:  Left facial droop.; IMPRESSION: 1. Abnormal contrast enhancement of               the IAC portion and geniculate ganglion of the left 7th cranial nerve consistent with patient's history of Bell's palsy. This may represent inflammation of the nerve. 2. Moderate diffuse cerebral volume loss again noted. 3. Otherwise, normal MRI                   of the skull base and internal auditory canals bilaterally.     Recent Procedures:Dr. Rick Frias   left ring trigger finger release, DOS: 9/15/20    MEDS ;    Current Outpatient Medications   Medication     acetaminophen (TYLENOL) 500 MG tablet     aspirin 81 MG tablet     ferrous sulfate (FE TABS) 325 (65 Fe) MG EC tablet     finasteride (PROSCAR) 5 MG tablet     fluocinonide (LIDEX) 0.05 % external ointment     hydrOXYzine (ATARAX) 25 MG tablet     ipratropium (ATROVENT) 0.06 % nasal spray     lamoTRIgine  "(LAMICTAL) 100 MG tablet     lamoTRIgine (LAMICTAL) 200 MG tablet     LYRICA 100 MG capsule     LYRICA 25 MG capsule     metoprolol tartrate (LOPRESSOR) 50 MG tablet     multivitamin w/minerals (MULTI-VITAMIN) tablet     naproxen (NAPROSYN) 500 MG tablet     pantoprazole (PROTONIX) 40 MG EC tablet     polyvinyl alcohol (LIQUIFILM TEARS) 1.4 % ophthalmic solution     senna (SENOKOT) 8.6 MG tablet     simvastatin (ZOCOR) 40 MG tablet     triamcinolone (KENALOG) 0.1 % external cream     valACYclovir (VALTREX) 1000 mg tablet     zolpidem (AMBIEN) 10 MG tablet     No current facility-administered medications for this visit.       Is there anything on your medication list that needs to be updated?  No     There are no preventive care reminders to display for this patient.    Preferred pharmacy: [id number] [address] AT [facility] [address]    MyChart: PT DECLINED!    Questionnaire Review :    If MyChart INACTIVE \"Please plan on arriving early so that you have time to complete your questionnaires prior to seeing your provider.\"       Patient preferred phone number: [phone]    Kendal Guzman, Registered Nurse, Patient Advocate [facility]   [phone]    "

## 2020-10-07 ENCOUNTER — VIRTUAL VISIT (OUTPATIENT)
Dept: FAMILY MEDICINE | Facility: CLINIC | Age: 65
End: 2020-10-07
Payer: MEDICARE

## 2020-10-07 DIAGNOSIS — G51.0 BELL'S PALSY: Primary | ICD-10-CM

## 2020-10-07 DIAGNOSIS — R35.0 BENIGN PROSTATIC HYPERPLASIA WITH URINARY FREQUENCY: ICD-10-CM

## 2020-10-07 DIAGNOSIS — N40.1 BENIGN PROSTATIC HYPERPLASIA WITH URINARY FREQUENCY: ICD-10-CM

## 2020-10-07 DIAGNOSIS — N52.9 VASCULOGENIC ERECTILE DYSFUNCTION, UNSPECIFIED VASCULOGENIC ERECTILE DYSFUNCTION TYPE: ICD-10-CM

## 2020-10-07 DIAGNOSIS — B02.8 HERPES ZOSTER WITH COMPLICATION: ICD-10-CM

## 2020-10-07 PROCEDURE — 99442 PR PHYSICIAN TELEPHONE EVALUATION 11-20 MIN: CPT | Mod: 95 | Performed by: FAMILY MEDICINE

## 2020-10-07 ASSESSMENT — ENCOUNTER SYMPTOMS
DYSURIA: 0
FEVER: 0

## 2020-10-07 NOTE — ASSESSMENT & PLAN NOTE
Taping eye at night. Hypoesthesia. Occ jaw pain. Chew dyscoordination.. Discussed geovanni history. No further therapies. Wait 6 mos, re assess residua.

## 2020-10-07 NOTE — PROGRESS NOTES
"Kashmir Yao is a 65 year old male who is being evaluated via a billable video visit.      The patient has been notified of following:     \"This video visit will be conducted via a call between you and your physician/provider. We have found that certain health care needs can be provided without the need for an in-person physical exam.  This service lets us provide the care you need with a video conversation.  If a prescription is necessary we can send it directly to your pharmacy.  If lab work is needed we can place an order for that and you can then stop by our lab to have the test done at a later time.    Video visits are billed at different rates depending on your insurance coverage.  Please reach out to your insurance provider with any questions.    If during the course of the call the physician/provider feels a video visit is not appropriate, you will not be charged for this service.\"    Patient has given verbal consent for Video visit? Yes  How would you like to obtain your AVS? Mail a copy  If you are dropped from the video visit, the video invite should be resent to: Text to cell phone: [phone]  Will anyone else be joining your video visit? No  Converted to telephone visit  Subjective     Kashmir Yao is a 65 year old male who presents today via video visit for the following health issues:    HPI     Concern - Follow up [place] Palsy and Shingles  Onset: month  Description: All on the left side of face-unable to feel left side mouth and nose -numbness, jaw very sensitive to ear on the left side, hard to eat. Painful at times, -Left eye has been tearing and burning sensation   Intensity: moderate  Progression of Symptoms:  same    Therapies tried and outcome: meds for Shingles-finished, is feeling better,       Past Medical History:   Diagnosis Date     Anemia 7/31/2020     Arthritis      Bell's palsy 9/8/2020     Benign prostatic hyperplasia with incomplete bladder emptying 7/28/2020     Bilateral " carpal tunnel syndrome 2/13/2020     CAD (coronary artery disease) 2010    a subtotally occluded obtuse marginal vessel which was stented with a drug-coated stent.  He had 50% to 60% LAD disease and 25% to 30% right coronary disease     Cognitive change 1/2/2020     Colonic polyps 2009    tubular adenomae with mildly dysplastic features     Disturbance of skin sensation      Encephalocele (H) 2009    left frontal     Epilepsy, partial (H) 2009    with secondary generalization     GERD (gastroesophageal reflux disease)      Heart attack (H)     5 years ago. Cardiology 3 months age     Herpes zoster with complication 10/7/2020     History of spinal cord injury      Hypertension      Iron deficiency 9/9/2020     RIC (obstructive sleep apnea)      Paraneoplastic Antibody  positive[799.89BS] 2009    mildly elevated alpha 3 ganglionic acetylcholine receptor and JEOVANNY 65 receptor  antibody     Pedal edema 2/14/2020     Primary osteoarthritis of both hands 1/2/2020     Seizures (H)     Last seizure 2012     Stented coronary artery      Tobacco use disorder      Ulnar neuropathy of both upper extremities 2/13/2020     Vasculogenic erectile dysfunction 7/28/2020     Ventricular Assymetry 2009    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     CARPAL TUNNEL RELEASE RT/LT Right 03/11/2020    Right carpal tunnel release, Dr. Rick Frias, [facility]     CYSTOSCOPY       ESOPHAGOSCOPY, GASTROSCOPY, DUODENOSCOPY (EGD), COMBINED  5/24/2012    Procedure:COMBINED ESOPHAGOSCOPY, GASTROSCOPY, DUODENOSCOPY (EGD); ESOPHAGOSCOPY, GASTROSCOPY, DUODENOSCOPY (EGD) ; Surgeon:GILLIAN JOHNSON; Location: GI     HC COLONOSCOPY THRU STOMA, DIAGNOSTIC  2009    tubular  "davidee, recommended annual colonoscopy     KNEE SURGERY Left     repair of cartilage     SPINE SURGERY      repair of herniated disc lumbar     STENT       VENTRICULOSTOMY      [facility] NONSPECIFIC PROCEDURE      stint in heart       Family History   Problem Relation Age of Onset     Cardiovascular Mother         CHF, HTN, VASC. INSUFF., DM     No Known Problems Father      Thyroid Disease Sister         HYPOTHYROID     Genitourinary Problems Brother         RENAL FAILURE AND H/O MVA WITH LE PARAPLEGIA     No Known Problems Sister      No Known Problems Brother      No Known Problems Brother      Cancer - colorectal No family hx of        Social History     Tobacco Use     Smoking status: Former Smoker     Packs/day: 0.50     Years: 20.00     Pack years: 10.00     Quit date: 2003     Years since quittin.1     Smokeless tobacco: Never Used   Substance Use Topics     Alcohol use: No     Alcohol/week: 0.0 standard drinks             Review of Systems   Constitutional: Negative for fever.   Genitourinary: Negative for dysuria.   Skin: Positive for rash.            Objective           Vitals:  No vitals were obtained today due to virtual visit.    Physical Exam  Constitutional:       Comments: Speech fluent   Neurological:      Mental Status: He is alert.                        Assessment & Plan   Problem List Items Addressed This Visit        Nervous and Auditory    Bell's palsy - Primary     Taping eye at night. Hypoesthesia. Occ jaw pain. Chew dyscoordination.. Discussed geovanni history. No further therapies. Wait 6 mos, re assess residua.            Urinary    Benign prostatic hyperplasia with urinary frequency     Referred earlier, elizabeth mclean contact info. Re refer         Relevant Orders    UROLOGY ADULT REFERRAL       Infectious/Inflammatory    Herpes zoster with complication     Post antiviral, rash fading, pruritis down \"80%\"  vacinate            Other    Vasculogenic erectile dysfunction     Referred " earlier, he lost contact info. Re refer         Relevant Orders    UROLOGY ADULT REFERRAL                    Return in about 3 months (around 1/7/2021) for recheck.    Chad Noel MD  [facility]      Video-Visit Details    Type of service:  telephone visit  16 minutes  Chad Noel MD

## 2020-10-08 ENCOUNTER — HOSPITAL ENCOUNTER (OUTPATIENT)
Facility: CLINIC | Age: 65
Discharge: HOME | End: 2020-10-08
Attending: INTERNAL MEDICINE | Admitting: INTERNAL MEDICINE
Payer: MEDICARE

## 2020-10-08 VITALS
SYSTOLIC BLOOD PRESSURE: 121 MMHG | HEART RATE: 62 BPM | HEIGHT: 69 IN | DIASTOLIC BLOOD PRESSURE: 71 MMHG | OXYGEN SATURATION: 94 % | BODY MASS INDEX: 34.7 KG/M2 | RESPIRATION RATE: 18 BRPM

## 2020-10-08 LAB
COLONOSCOPY: NORMAL
UPPER GI ENDOSCOPY: NORMAL

## 2020-10-08 PROCEDURE — 45380 COLONOSCOPY AND BIOPSY: CPT | Performed by: INTERNAL MEDICINE

## 2020-10-08 PROCEDURE — G0500 MOD SEDAT ENDO SERVICE >5YRS: HCPCS | Performed by: INTERNAL MEDICINE

## 2020-10-08 PROCEDURE — 99153 MOD SED SAME PHYS/QHP EA: CPT | Performed by: INTERNAL MEDICINE

## 2020-10-08 PROCEDURE — 250N000011 HC RX IP 250 OP 636: Performed by: INTERNAL MEDICINE

## 2020-10-08 PROCEDURE — 88305 TISSUE EXAM BY PATHOLOGIST: CPT | Mod: 26 | Performed by: PATHOLOGY

## 2020-10-08 PROCEDURE — 88305 TISSUE EXAM BY PATHOLOGIST: CPT | Mod: TC | Performed by: INTERNAL MEDICINE

## 2020-10-08 PROCEDURE — 250N000009 HC RX 250: Performed by: INTERNAL MEDICINE

## 2020-10-08 PROCEDURE — 43235 EGD DIAGNOSTIC BRUSH WASH: CPT | Performed by: INTERNAL MEDICINE

## 2020-10-08 RX ORDER — FENTANYL CITRATE 50 UG/ML
INJECTION, SOLUTION INTRAMUSCULAR; INTRAVENOUS PRN
Status: DISCONTINUED | OUTPATIENT
Start: 2020-10-08 | End: 2020-10-08 | Stop reason: HOSPADM

## 2020-10-08 RX ORDER — PROCHLORPERAZINE MALEATE 5 MG
5 TABLET ORAL EVERY 6 HOURS PRN
Status: DISCONTINUED | OUTPATIENT
Start: 2020-10-08 | End: 2020-10-08 | Stop reason: HOSPADM

## 2020-10-08 RX ORDER — ONDANSETRON 2 MG/ML
4 INJECTION INTRAMUSCULAR; INTRAVENOUS EVERY 6 HOURS PRN
Status: DISCONTINUED | OUTPATIENT
Start: 2020-10-08 | End: 2020-10-08 | Stop reason: HOSPADM

## 2020-10-08 RX ORDER — ONDANSETRON 2 MG/ML
4 INJECTION INTRAMUSCULAR; INTRAVENOUS
Status: DISCONTINUED | OUTPATIENT
Start: 2020-10-08 | End: 2020-10-08 | Stop reason: HOSPADM

## 2020-10-08 RX ORDER — LIDOCAINE 40 MG/G
CREAM TOPICAL
Status: DISCONTINUED | OUTPATIENT
Start: 2020-10-08 | End: 2020-10-08 | Stop reason: HOSPADM

## 2020-10-08 RX ORDER — NALOXONE HYDROCHLORIDE 0.4 MG/ML
.1-.4 INJECTION, SOLUTION INTRAMUSCULAR; INTRAVENOUS; SUBCUTANEOUS
Status: DISCONTINUED | OUTPATIENT
Start: 2020-10-08 | End: 2020-10-08 | Stop reason: HOSPADM

## 2020-10-08 RX ORDER — FLUMAZENIL 0.1 MG/ML
0.2 INJECTION, SOLUTION INTRAVENOUS
Status: DISCONTINUED | OUTPATIENT
Start: 2020-10-08 | End: 2020-10-08 | Stop reason: HOSPADM

## 2020-10-08 RX ORDER — ONDANSETRON 4 MG/1
4 TABLET, ORALLY DISINTEGRATING ORAL EVERY 6 HOURS PRN
Status: DISCONTINUED | OUTPATIENT
Start: 2020-10-08 | End: 2020-10-08 | Stop reason: HOSPADM

## 2020-10-08 NOTE — LETTER
September 18, 2020      Kashmir Yao  [address] [facility] [address]        Dear Kashmir,     Please be aware that coverage of these services is subject to the terms and limitations of your health insurance plan.  Call member services at your health plan with any benefit or coverage questions.    Thank you for choosing [facility]. You are scheduled for the following service(s):    Date:  10/08/2020  Thursday             Procedure:  COLONOSCOPY  Doctor:        Dr. Kevin Song   Arrival Time:  9:30 am *Enter and check in at the Main Hospital Entrance*  Procedure Time:  10:00 am      Location:   [facility]        Endoscopy Department, First Floor         [address] [place], [place] [address] [phone] or [phone] (Critical access hospital) to reschedule          MIRALAX -GATORADE  PREP  Colonoscopy is the most accurate test to detect colon polyps and colon cancer; and the only test where polyps can be removed. During this procedure, a doctor examines the lining of your large intestine and rectum through a flexible tube.   Transportation  You must arrange for a ride for the day of your procedure with a responsible adult. A taxi , Uber, etc, is not an option unless you are accompanied by a responsible adult. If you fail to arrange transportation with a responsible adult, your procedure will be cancelled and rescheduled.    Purchase the  following supplies at your local pharmacy:  - 2 (two) bisacodyl tablets: each tablet contains 5 mg.  (Dulcolax  laxative NOT Dulcolax  stool softener)   - 1 (one) 8.3 oz bottle of Polyethylene Glycol (PEG) 3350 Powder   (MiraLAX , Smooth LAX , ClearLAX  or equivalent)  - 64 oz Gatorade    Regular Gatorade, Gatorade G2 , Powerade , Powerade Zero  or Pedialyte  is acceptable. Red colored flavors are not allowed; all other colors (yellow, green, orange, purple and blue) are okay. It is also okay to buy two 2.12 oz  packets of powdered Gatorade that can be mixed with water to a total volume of 64 oz of liquid.  - 1 (one) 10 oz bottle of Magnesium Citrate (Red colored flavors are not allowed)  It is also okay for you to use a 0.5 oz package of powdered magnesium citrate (17 g) mixed with 10 oz of water.      PREPARATION FOR COLONOSCOPY    7 days before:    Discontinue fiber supplements and medications containing iron. This includes Metamucil  and Fibercon ; and multivitamins with iron.    3 days before:    Begin a low-fiber diet. A low-fiber diet helps making the cleanout more effective.     Examples of a low-fiber diet include (but are not limited to): white bread, white rice, pasta, crackers, fish, chicken, eggs, ground beef, creamy peanut butter, cooked/steamed/boiled vegetables, canned fruit, bananas, melons, milk, plain yogurt cheese, salad dressing and other condiments.     The following are not allowed on a low-fiber diet: seeds, nuts, popcorn, bran, whole wheat, corn, quinoa, raw fruits and vegetables, berries and dried fruit, beans and lentils.    For additional details on low-fiber diet, please refer to the table on the last page.    2 days before:    Continue the low-fiber diet.     Drink at least 8 glasses of water throughout the day.     Stop eating solid foods at 11:45 pm.  1 day before:    In the morning: begin a clear liquid diet (liquids you can see through).     Examples of a clear liquid diet include: water, clear broth or bouillon, Gatorade, Pedialyte or Powerade, carbonated and non-carbonated soft drinks (Sprite , 7-Up , ginger ale), strained fruit juices without pulp (apple, white grape, white cranberry), Jell-O  and popsicles.     The following are not allowed on a clear liquid diet: red liquids, alcoholic beverages, dairy products (milk, creamer, and yogurt), protein shakes, creamy broths, juice with pulp and chewing tobacco.    At noon: take 2 (two) bisacodyl tablets     At 4 (and no later than 6pm):  start drinking the Miralax-Gatorade preparation (8.3 oz of Miralax mixed with 64 oz of Gatorade in a large pitcher). Drink 1(one) 8 oz glass every 15 minutes thereafter, until the mixture is gone.    COLON CLEANSING TIPS: drink adequate amounts of fluids before and after your colon cleansing to prevent dehydration. Stay near a toilet because you will have diarrhea. Even if you are sitting on the toilet, continue to drink the cleansing solution every 15 minutes. If you feel nauseous or vomit, rinse your mouth with water, take a 15 to 30-minute-break and then continue drinking the solution. You will be uncomfortable until the stool has flushed from your colon (in about 2 to 4 hours). You may feel chilled.    Day of your procedure  You may take all of your morning medications including blood pressure medications, blood thinners (if you have not been instructed to stop these by our office), methadone, anti-seizure medications with sips of water 3 hours prior to your procedure or earlier. Do not take insulin or vitamins prior to your procedure. Continue the clear liquid diet.   4 hours prior: drink 10 oz of magnesium citrate. It may be easier to drink it with a straw.    STOP consuming all liquids after that.     Do not take anything by mouth during this time.     Allow extra time to travel to your procedure as you may need to stop and use a restroom along the way.    You are ready for the procedure, if you followed all instructions and your stool is no longer formed, but clear or yellow liquid. If you are unsure whether your colon is clean, please call our office at [phone] before you leave for your appointment.    Bring the following to your procedure:  - Insurance Card/Photo ID.   - List of current medications including over-the-counter medications and supplements.   - Your rescue inhaler if you currently use one to control asthma.    Canceling or rescheduling your appointment:   If you must cancel or reschedule  your appointment, please call [phone] as soon as possible.      COLONOSCOPY PRE-PROCEDURE CHECKLIST    If you have diabetes, ask your regular doctor for diet and medication restrictions.  If you take an anticoagulant or anti-platelet medication (such as Coumadin , Lovenox , Pradaxa , Xarelto , Eliquis , etc.), please call your primary doctor for advice on holding this medication.  If you take aspirin you may continue to do so.  If you are or may be pregnant, please discuss the risks and benefits of this procedure with your doctor.        What happens during a colonoscopy?    Plan to spend up to two hours, starting at registration time, at the endoscopy center the day of your procedure. The colonoscopy takes an average of 15 to 30 minutes. Recovery time is about 30 minutes.      Before the exam:    You will change into a gown.    Your medical history and medication list will be reviewed with you, unless that has been done over the phone prior to the procedure.     A nurse will insert an intravenous (IV) line into your hand or arm.    The doctor will meet with you and will give you a consent form to sign.  During the exam:     Medicine will be given through the IV line to help you relax.     Your heart rate and oxygen levels will be monitored. If your blood pressure is low, you may be given fluids through the IV line.     The doctor will insert a flexible hollow tube, called a colonoscope, into your rectum. The scope will be advanced slowly through the large intestine (colon).    You may have a feeling of fullness or pressure.     If an abnormal tissue or a polyp is found, the doctor may remove it through the endoscope for closer examination, or biopsy. Tissue removal is painless    After the exam:           Any tissue samples removed during the exam will be sent to a lab for evaluation. It may take 5-7 working days for you to be notified of the results.     A nurse will provide you with complete discharge  instructions before you leave the endoscopy center. Be sure to ask the nurse for specific instructions if you take blood thinners such as Aspirin, Coumadin or Plavix.     The doctor will prepare a full report for you and for the physician who referred you for the procedure.     Your doctor will talk with you about the initial results of your exam.      Medication given during the exam will prohibit you from driving for the rest of the day.     Following the exam, you may resume your normal diet. Your first meal should be light, no greasy foods. Avoid alcohol until the next day.     You may resume your regular activities the day after the procedure.         LOW-FIBER DIET    Foods RECOMMENDED Foods to AVOID   Breads, Cereal, Rice and Pasta:   White bread, rolls, biscuits, croissant and ken toast.   Waffles, Georgian toast and pancakes.   White rice, noodles, pasta, macaroni and peeled cooked potatoes.   Plain crackers and saltines.   Cooked cereals: farina, cream of rice.   Cold cereals: Puffed Rice , Rice Krispies , Corn Flakes  and Special K    Breads, Cereal, Rice and Pasta:   Breads or rolls with nuts, seeds or fruit.   Whole wheat, pumpernickel, rye breads and cornbread.   Potatoes with skin, brown or wild rice, and kasha (buckwheat).     Vegetables:   Tender cooked and canned vegetables without seeds: carrots, asparagus tips, green or wax beans, pumpkin, spinach, lima beans. Vegetables:   Raw or steamed vegetables.   Vegetables with seeds.   Sauerkraut.   Winter squash, peas, broccoli, Brussel sprouts, cabbage, onions, cauliflower, baked beans, peas and corn.   Fruits:   Strained fruit juice.   Canned fruit, except pineapple.   Ripe bananas and melon. Fruits:   Prunes and prune juice.   Raw fruits.   Dried fruits: figs, dates and raisins.   Milk/Dairy:   Milk: plain or flavored.   Yogurt, custard and ice cream.   Cheese and cottage cheese Milk/Dairy:     Meat and other proteins:   ground, well-cooked tender  beef, lamb, ham, veal, pork, fish, poultry and organ meats.   Eggs.   Peanut butter without nuts. Meat and other proteins:   Tough, fibrous meats with gristle.   Dry beans, peas and lentils.   Peanut butter with nuts.   Tofu.   Fats, Snack, Sweets, Condiments and Beverages:   Margarine, butter, oils, mayonnaise, sour cream and salad dressing, plain gravy.   Sugar, hard candy, clear jelly, honey and syrup.   Spices, cooked herbs, bouillon, broth and soups made with allowed vegetable, ketchup and mustard.   Coffee, tea and carbonated drinks.   Plain cakes, cookies and pretzels.   Gelatin, plain puddings, custard, ice cream, sherbet and popsicles. Fats, Snack, Sweets, Condiments and Beverages:   Nuts, seeds and coconut.   Jam, marmalade and preserves.   Pickles, olives, relish and horseradish.   All desserts containing nuts, seeds, dried fruit and coconut; or made from whole grains or bran.   Candy made with nuts or seeds.   Popcorn.     DIRECTIONS TO THE ENDOSCOPY DEPARTMENT    From the north ([facility])  Take [address] South, exit on [address] [address]. Get into the left hand leonel, turn left (east), go one-half mile to [address] and turn left. Go north to the second stoplight, take a right on [address] and follow it to the Main Hospital entrance.    From the south ([facility])  Take 35N to the 35E split and exit on [address] [address]. On [address] [address], turn left (west) to [address]. Turn right (north) on [address]. Go north to the second stoplight, take a right on [address] and follow it to the Main Hospital entrance.    From the east via 35E ([facility])  Take [address]E south to [address] [address] exit. Turn right on [address] [address]. Go west to [address]. Turn right (north) on [address]. Go to the second stoplight, take a right on [address] to the Main Hospital entrance.    From the east via [address] ([facility])  Take [address]  to [address]. Turn left (south) on [address] to [address], turn left (east) on [address] and follow it to the Main Hospital entrance.    From the west via [address] 13 ([place])  Take [address] east to [address]. Turn right (south) on [address] to [address], turn left (east) on [address] and follow it to the Main Hospital entrance.

## 2020-10-08 NOTE — DISCHARGE INSTRUCTIONS
The patient has received a copy of the Provation  report the doctor has written and discharge instructions have been discussed with the patient and responsible adult.  All questions were addressed and answered prior to patient discharge.      Understanding Colon and Rectal Polyps     The colon has a smooth lining composed of millions of cells.     The colon (also called the large intestine) is a muscular tube that forms the last part of the digestive tract. It absorbs water and stores food waste. The colon is about 4 to 6 feet long. The rectum is the last 6 inches of the colon. The colon and rectum have a smooth lining composed of millions of cells. Changes in these cells can lead to growths in the colon that can become cancerous and should be removed.     When the Colon Lining Changes  Changes that occur in the cells that line the colon or rectum can lead to growths called polyps. Over a period of years, polyps can turn cancerous. Removing polyps early may prevent cancer from ever forming.      Polyps  Polyps are fleshy clumps of tissue that form on the lining of the colon or rectum. Small polyps are usually benign (not cancerous). However, over time, cells in a polyp can change and become cancerous. The larger a polyp grows, the more likely this is to happen. Also, certain types of polyps known as adenomatous polyps are considered premalignant. This means that they will almost always become cancerous if they re not removed.          Cancer  Almost all colorectal cancers start when polyp cells begin growing abnormally. As a cancerous tumor grows, it may involve more and more of the colon or rectum. In time, cancer can also grow beyond the colon or rectum and spread to nearby organs or to glands called lymph nodes. The cells can also travel to other parts of the body. This is known as metastasis. The earlier a cancerous tumor is removed, the better the chance of preventing its spread.        [id number] Arielle  [employer], [address] [place] [address], [place] [address]. All rights reserved. This information is not intended as a substitute for professional medical care. Always follow your healthcare professional's instructions.

## 2020-10-08 NOTE — H&P
Pre-Endoscopy History and Physical     Kashmir Yao MRN# [id number]   YOB: 1955 Age: 65 year old     Date of Procedure: 10/8/2020  Primary care provider: Chad Noel  Type of Endoscopy: Colonoscopy with possible biopsy, possible polypectomy and Gastroscopy with possible biopsy, possible dilation  Reason for Procedure: anemia  Type of Anesthesia Anticipated: Conscious Sedation    HPI:    Kashmir is a 65 year old male who will be undergoing the above procedure.      A history and physical has been performed. The patient's medications and allergies have been reviewed. The risks and benefits of the procedure and the sedation options and risks were discussed with the patient.  All questions were answered and informed consent was obtained.      He denies a personal or family history of anesthesia complications or bleeding disorders.     Patient Active Problem List   Diagnosis     Hyperlipidemia LDL goal <70     Esophageal reflux     Anxiety state     RIC (obstructive sleep apnea)     Health Care Home     S/P TKR (total knee replacement)     Epilepsy (H)     Benign essential hypertension     Physical deconditioning     Constipation     Trauma     Cognitive change     Obesity (BMI 35.0-39.9) with comorbidity (H)     Primary osteoarthritis of both hands     Headache     Need for prophylactic vaccination and inoculation against influenza     Cough     Panlobular emphysema (H)     Left carpal tunnel syndrome     Ulnar neuropathy of both upper extremities     Dermatitis     Pedal edema     Trigger finger of left hand, unspecified finger     Pruritic disorder     Vasculogenic erectile dysfunction     Other hydrocephalus (H)     Chronic heart failure with preserved ejection fraction (H)     Bell's palsy     Benign prostatic hyperplasia with urinary frequency     Iron deficiency     Herpes zoster without complication     Herpes zoster with complication        Past Medical History:   Diagnosis Date     Anemia  7/31/2020     Arthritis      Bell's palsy 9/8/2020     Benign prostatic hyperplasia with incomplete bladder emptying 7/28/2020     Bilateral carpal tunnel syndrome 2/13/2020     CAD (coronary artery disease) 2010    a subtotally occluded obtuse marginal vessel which was stented with a drug-coated stent.  He had 50% to 60% LAD disease and 25% to 30% right coronary disease     Cognitive change 1/2/2020     Colonic polyps 2009    tubular adenomae with mildly dysplastic features     Disturbance of skin sensation      Encephalocele (H) 2009    left frontal     Epilepsy, partial (H) 2009    with secondary generalization     GERD (gastroesophageal reflux disease)      Heart attack (H)     5 years ago. Cardiology 3 months age     Herpes zoster with complication 10/7/2020     History of spinal cord injury      Hypertension      Iron deficiency 9/9/2020     RIC (obstructive sleep apnea)      Paraneoplastic Antibody  positive[799.89BS] 2009    mildly elevated alpha 3 ganglionic acetylcholine receptor and JEOVANNY 65 receptor  antibody     Pedal edema 2/14/2020     Primary osteoarthritis of both hands 1/2/2020     Seizures (H)     Last seizure 2012     Stented coronary artery      Tobacco use disorder      Ulnar neuropathy of both upper extremities 2/13/2020     Vasculogenic erectile dysfunction 7/28/2020     Ventricular Assymetry 2009    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     CARPAL TUNNEL RELEASE RT/LT Right 03/11/2020    Right carpal tunnel release, Dr. Rick Frias, [facility]     CYSTOSCOPY       ESOPHAGOSCOPY, GASTROSCOPY, DUODENOSCOPY (EGD), COMBINED  5/24/2012    Procedure:COMBINED ESOPHAGOSCOPY, GASTROSCOPY, DUODENOSCOPY (EGD);  ESOPHAGOSCOPY, GASTROSCOPY, DUODENOSCOPY (EGD) ; Surgeon:GILLIAN JOHNSON; Location: GI     HC COLONOSCOPY THRU STOMA, DIAGNOSTIC  2009    tubular adenomae, recommended annual colonoscopy     KNEE SURGERY Left     repair of cartilage     SPINE SURGERY      repair of herniated disc lumbar     STENT       VENTRICULOSTOMY      [facility] NONSPECIFIC PROCEDURE      stint in heart       Social History     Tobacco Use     Smoking status: Former Smoker     Packs/day: 0.50     Years: 20.00     Pack years: 10.00     Quit date: 2003     Years since quittin.1     Smokeless tobacco: Never Used   Substance Use Topics     Alcohol use: No     Alcohol/week: 0.0 standard drinks       Family History   Problem Relation Age of Onset     Cardiovascular Mother         CHF, HTN, VASC. INSUFF., DM     No Known Problems Father      Thyroid Disease Sister         HYPOTHYROID     Genitourinary Problems Brother         RENAL FAILURE AND H/O MVA WITH LE PARAPLEGIA     No Known Problems Sister      No Known Problems Brother      No Known Problems Brother      Cancer - colorectal No family hx of      Colon Cancer No family hx of        Prior to Admission medications    Medication Sig Start Date End Date Taking? Authorizing Provider   aspirin 81 MG tablet Take 81 mg by mouth daily   Yes Unknown, Entered By History   ferrous sulfate (FE TABS) 325 (65 Fe) MG EC tablet Take 1 tablet (325 mg) by mouth 3 times daily 20  Yes Chad Noel MD   lamoTRIgine (LAMICTAL) 100 MG tablet 250 mg At Bedtime  14  Yes Reported, Patient   LYRICA 100 MG capsule Take 1 capsule (100 mg) by mouth 2 times daily 20  Yes Milton Gamboa MD   metoprolol tartrate (LOPRESSOR) 50 MG tablet TAKE 1 TABLET BY MOUTH TWICE DAILY. 20  Yes Hakeem Cano MD   pantoprazole (PROTONIX) 40 MG EC tablet Take 1 tablet (40 mg) by mouth daily 20  Yes Chad Noel MD   simvastatin (ZOCOR) 40 MG tablet Take 1 tablet (40 mg) by mouth At Bedtime  5/27/20  Yes Chad Noel MD   acetaminophen (TYLENOL) 500 MG tablet Take 1,000 mg by mouth every 8 hours as needed for mild pain    Unknown, Entered By History   finasteride (PROSCAR) 5 MG tablet Take 1 tablet (5 mg) by mouth daily 7/29/20   Chad Noel MD   fluocinonide (LIDEX) 0.05 % external ointment Apply topically 2 times daily as needed (rash) 2/28/20   Kody Casarez MD   hydrOXYzine (ATARAX) 25 MG tablet Take 1 tablet (25 mg) by mouth every 8 hours as needed for itching 8/21/20   Nabor Manley PA-C   ipratropium (ATROVENT) 0.06 % nasal spray Spray 2 sprays into both nostrils 4 times daily 6/19/20   Chad Noel MD   lamoTRIgine (LAMICTAL) 200 MG tablet Take 200 mg by mouth every morning  4/26/12   Hakeem Cano MD   LYRICA 25 MG capsule Take 3 capsules (75 mg) by mouth 2 times daily 6/19/20   Chad Noel MD   multivitamin w/minerals (MULTI-VITAMIN) tablet Take 1 tablet by mouth daily 9/9/20   Chad Noel MD   naproxen (NAPROSYN) 500 MG tablet TAKE 1 TABLET BY MOUTH EVERY 12 HOURS AS NEEDED. TAKE 2 HOURS AFTER ANY ASPIRIN 8/21/20   Stefanie Brown PA-C   polyvinyl alcohol (LIQUIFILM TEARS) 1.4 % ophthalmic solution Place 2 drops Into the left eye as needed for dry eyes 9/4/20   Miguelito Bourne MD   senna (SENOKOT) 8.6 MG tablet Take 1 tablet by mouth 3 times daily 9/9/20   Chad Noel MD   triamcinolone (KENALOG) 0.1 % external cream Apply topically 2 times daily 8/21/20   Nabor Manley PA-C   valACYclovir (VALTREX) 1000 mg tablet Take 1 tablet (1,000 mg) by mouth 3 times daily for 10 days 9/23/20 10/3/20  Chad Noel MD   zolpidem (AMBIEN) 10 MG tablet TAKE 1 TABLET BY MOUTH NIGHTLY EVERY NIGHT AT BEDTIME AS NEEDED FOR SLEEP. 4/6/20   Hakeem Cano MD       Allergies   Allergen Reactions     Codeine      inability to sleep after taking codeine     Gabapentin      Fever       Vicodin [Hydrocodone-Acetaminophen]      Perspiring,  "hyper, itchy        REVIEW OF SYSTEMS:   5 point ROS negative except as noted above in HPI, including Gen., Resp., CV, GI &  system review.    PHYSICAL EXAM:   Ht 1.753 m (5' 9\")   BMI 34.70 kg/m   Estimated body mass index is 34.7 kg/m  as calculated from the following:    Height as of this encounter: 1.753 m (5' 9\").    Weight as of 9/8/20: 106.6 kg (235 lb).   GENERAL APPEARANCE: alert, and oriented  MENTAL STATUS: alert  AIRWAY EXAM: Mallampatti Class I (visualization of the soft palate, fauces, uvula, anterior and posterior pillars)  RESP: lungs clear to auscultation - no rales, rhonchi or wheezes  CV: regular rates and rhythm  DIAGNOSTICS:    Not indicated    IMPRESSION   ASA Class 2 - Mild systemic disease    PLAN:   Plan for Colonoscopy with possible biopsy, possible polypectomy and Gastroscopy with possible biopsy, possible dilation. We discussed the risks, benefits and alternatives and the patient wished to proceed.    The above has been forwarded to the consulting provider.      Signed Electronically by: Kevin Song MD  October 8, 2020          "

## 2020-10-09 LAB — COPATH REPORT: NORMAL

## 2020-10-15 DIAGNOSIS — Z11.59 ENCOUNTER FOR SCREENING FOR OTHER VIRAL DISEASES: ICD-10-CM

## 2020-10-15 PROCEDURE — U0003 INFECTIOUS AGENT DETECTION BY NUCLEIC ACID (DNA OR RNA); SEVERE ACUTE RESPIRATORY SYNDROME CORONAVIRUS 2 (SARS-COV-2) (CORONAVIRUS DISEASE [COVID-19]), AMPLIFIED PROBE TECHNIQUE, MAKING USE OF HIGH THROUGHPUT TECHNOLOGIES AS DESCRIBED BY CMS-2020-01-R: HCPCS | Performed by: ORTHOPAEDIC SURGERY

## 2020-10-15 PROCEDURE — 36416 COLLJ CAPILLARY BLOOD SPEC: CPT | Performed by: ORTHOPAEDIC SURGERY

## 2020-10-16 LAB
SARS-COV-2 RNA SPEC QL NAA+PROBE: NOT DETECTED
SPECIMEN SOURCE: NORMAL

## 2020-10-19 ENCOUNTER — TRANSFERRED RECORDS (OUTPATIENT)
Dept: HEALTH INFORMATION MANAGEMENT | Facility: CLINIC | Age: 65
End: 2020-10-19

## 2020-10-29 ENCOUNTER — OFFICE VISIT (OUTPATIENT)
Dept: ORTHOPEDICS | Facility: CLINIC | Age: 65
End: 2020-10-29
Payer: MEDICARE

## 2020-10-29 DIAGNOSIS — M65.311 TRIGGER FINGER OF RIGHT THUMB: ICD-10-CM

## 2020-10-29 DIAGNOSIS — Z48.89 POSTOPERATIVE VISIT: ICD-10-CM

## 2020-10-29 DIAGNOSIS — G56.01 CARPAL TUNNEL SYNDROME OF RIGHT WRIST: Primary | ICD-10-CM

## 2020-10-29 PROCEDURE — 99024 POSTOP FOLLOW-UP VISIT: CPT | Performed by: ORTHOPAEDIC SURGERY

## 2020-10-29 NOTE — LETTER
10/29/2020         RE: Kashmir Yao  [address]  [facility] [address]        Dear Colleague,    Thank you for referring your patient, Kashmir Yao, to the [facility] [place]. Please see a copy of my visit note below.    Subjective:  Kashmir Yao is a 65 year old male who is seen in f/u up for Left ring finger trigger finger release, DOS: 10/19/20.  Soft dressings intact, incision is clean and dry. Limited use of the left hand post-operative.  Patient would like to attend Hand Therapy regarding right thumb, and hand weakness and discomfort s/p trigger thumb release, and right carpal tunnel release.       Objective: Incision site is healing well  Fingers moving well without catching or locking    Imaging studies: None today    Assessment: Status post trigger finger release, left the ring finger, doing well    Plan:   Sutures were removed using sterile technque and steri-strips applied.     Because of ongoing pain in his right hand, he will be sent to hand therapy for range of motion and strengthening.    Otherwise follow-up as needed.      Rick Frias MD  Dept. Orthopedic Surgery  [facility]       Disclaimer: This note consists of symbols derived from keyboarding, dictation and/or voice recognition software. As a result, there may be errors in the script that have gone undetected. Please consider this when interpreting information found in this chart.        Again, thank you for allowing me to participate in the care of your patient.        Sincerely,        Rick Frias MD

## 2020-10-29 NOTE — PATIENT INSTRUCTIONS
Hand therapy orders have been placed.   You can call [phone] to schedule at your convenience.       Call my office with any questions or concerns, [phone].  Follow up as needed.

## 2020-10-29 NOTE — PROGRESS NOTES
Subjective:  Kashmir Yao is a 65 year old male who is seen in f/u up for Left ring finger trigger finger release, DOS: 10/19/20.  Soft dressings intact, incision is clean and dry. Limited use of the left hand post-operative.  Patient would like to attend Hand Therapy regarding right thumb, and hand weakness and discomfort s/p trigger thumb release, and right carpal tunnel release.       Objective: Incision site is healing well  Fingers moving well without catching or locking    Imaging studies: None today    Assessment: Status post trigger finger release, left the ring finger, doing well    Plan:   Sutures were removed using sterile technque and steri-strips applied.     Because of ongoing pain in his right hand, he will be sent to hand therapy for range of motion and strengthening.    Otherwise follow-up as needed.      Rick Frias MD  Dept. Orthopedic Surgery  [facility]       Disclaimer: This note consists of symbols derived from keyboarding, dictation and/or voice recognition software. As a result, there may be errors in the script that have gone undetected. Please consider this when interpreting information found in this chart.

## 2020-11-03 ENCOUNTER — THERAPY VISIT (OUTPATIENT)
Dept: OCCUPATIONAL THERAPY | Facility: CLINIC | Age: 65
End: 2020-11-03
Payer: MEDICARE

## 2020-11-03 DIAGNOSIS — M65.311 TRIGGER FINGER OF RIGHT THUMB: ICD-10-CM

## 2020-11-03 DIAGNOSIS — M79.641 BILATERAL HAND PAIN: ICD-10-CM

## 2020-11-03 DIAGNOSIS — M79.642 BILATERAL HAND PAIN: ICD-10-CM

## 2020-11-03 DIAGNOSIS — G56.01 CARPAL TUNNEL SYNDROME OF RIGHT WRIST: ICD-10-CM

## 2020-11-03 PROCEDURE — 97110 THERAPEUTIC EXERCISES: CPT | Mod: GO | Performed by: OCCUPATIONAL THERAPIST

## 2020-11-03 PROCEDURE — 97165 OT EVAL LOW COMPLEX 30 MIN: CPT | Mod: GO | Performed by: OCCUPATIONAL THERAPIST

## 2020-11-03 NOTE — LETTER
DEPARTMENT OF HEALTH AND HUMAN SERVICES  CENTERS FOR MEDICARE & MEDICAID SERVICES    PLAN/UPDATED PLAN OF PROGRESS FOR OUTPATIENT REHABILITATION    PATIENTS NAME:  Kashmir Yao   : 1955  PROVIDER NUMBER:  [id number]  [id number]  PROVIDER NAME: [facility]  MEDICAL RECORD NUMBER: [id number]   START OF CARE DATE:    SOC Date: 20   TYPE:  OT  PRIMARY/TREATMENT DIAGNOSIS: (Pertinent Medical Diagnosis)  Carpal tunnel syndrome of right wrist  Trigger finger of right thumb  Bilateral hand pain    VISITS FROM START OF CARE:  Rxs Used: 1     Hand Therapy Initial Evaluation  Current Date:  11/3/2020    Diagnosis: R CTR, R Trigger thumb release, L Trigger release  MD order: 10/29/20  DOS:   R thumb: 3 months ago    R wrist: 2020  L RF: 2 weeks ago  Procedure:  CTR, Trigger release      Subjective:  Kashmir CANDELARIA Yao is a 65 year old male.    Patient reports symptoms of the bilateral hands which occurred due to use over time - pt used to fall frequently and used to loose balance. Pt has since had a brain surgery to help improve balance. Since onset symptoms are Gradually getting better.  General health as reported by patient is good.  Pertinent medical history includes:Heart Problems, High Blood Pressure, Seizures, Sleep Disorder/Apnea  Medical allergies:none.  Surgical history: orthopedic: Trigger fingers, knee, CTR.  Medication history: see EMR.    Current occupation is none: Retired    Occupational Profile Information:  Right hand dominant  Prior functional level:  no limitations  Patient reports symptoms of pain, stiffness/loss of motion, edema, numbness and tingling   Special tests:  x-ray.    Previous treatment: Surgery  Barriers include:none  Mobility: No difficulty  Transportation: drives  Currently not working due to : retired  Leisure activities/hobbies: going to the gym  PATIENTS NAME:  Kashmir Yao   : 1955    Objective:  Pain Level (Scale 0-10)   11/3/2020 11/3/2020    Side Right Left   At Rest 3/10 3-4/10   With Use 7-8/10 8-9/10     Pain Description  Date 11/3/2020   Location Right: Wrist, MP of thumb, palm, fingers  Left: RF, near A1   Pain Quality Aching, Sharp and Shooting   Frequency constant     Pain is worst  daytime   Exacerbated by  Using TV remote   Relieved by rest   Progression Unchanged     Edema (Circumference measured in cm)  Mild   11/3/2020 11/3/2020    R L   DWC 18.7 19.3      Edema (Circumference measured in cm)   11/3/2020 11/3/2020   Ring R L   P1 6.6 6.8   PIP     P2 5.4 5.9     Scar  Wrist:  11/3/20:   R wrist: Sensitivity: Mild over scar, mild pain at proximal aspect of scar Quality:  Well healed, mild adherence  R thumb: Mild sensitivity over scar, well healed and slightly raised  L RF: Mild sensitivity, slightly open with light red surrounding incision sight. Healing, no sign of infection. Moderate constant pain over scar.    Sensation   11/3/20: Constant paresthesias through R hand and IF, MF, and RF. Intermittent shooting through hands.          PATIENTS NAME:  Kashmir Yao   : 1955    ROM  Pain Report: - none  + mild    ++ moderate    +++ severe   Wrist 11/3/2020 11/3/2020   AROM (PROM) R L   Extension 60 66   Flexion 55 45   RD     UD       ROM  Hand 11/3/2020 11/3/2020   AROM(PROM) R L   Ring MP 0/70 0/45   PIP 0/80 0/55   DIP 0/60 0/63   MCGRATH 210 163       Thumb 11/3/2020 11/3/2020   AROM  (PROM) R L   MP -27/60 0/60   IP HE/67 HE/   RABD     PABD       Palpation  Pain Report:  - none  + mild    ++ moderate    +++ severe    Date 11/3/2020 11/3/2020   Side R L   Ring finger A1 Pulley  4/10   Ring finger A3 Pulley/PIP  3/10   CMC 3-4/10    Thumb A1 3-10      Strength  Not tested d/t pain    Assessment:  Patient presents with symptoms consistent with diagnosis of right wrist thumb pain and left ring finger pain, with surgical intervention.    Patient's limitations or Problem List includes:  Pain, Decreased ROM/motion, Increased edema,  Weakness, Sensory disturbance, Adherent scarring, Decreased  and Decreased pinch of the right wrist and thumb and left Ring finger which interferes with the patient's ability to perform Self Care Tasks (dressing, eating, bathing, hygiene/toileting), Sleep Patterns, Recreational Activities, Household Chores and Driving  as compared to previous level of function.  PATIENTS NAME:  Kashmir Yao   : 1955    Rehab Potential:  Excellent - Return to full activity, no limitations    Patient will benefit from skilled Occupational Therapy to increase ROM,  strength, pinch strength, stability of thumb and sensation and decrease pain to return to previous activity level and resume normal daily tasks and to reach their rehab potential.    Barriers to Learning:  No barrier    Communication Issues:  Patient appears to be able to clearly communicate and understand verbal and written communication and follow directions correctly.    Chart Review: Chart Review and Simple history review with patient    Identified Performance Deficits: bathing/showering, dressing, hygiene and grooming, driving and community mobility, health management and maintenance, home establishment and management, meal preparation and cleanup, shopping, sleep and leisure activities    Assessment of Occupational Performance:  5 or more Performance Deficits    Clinical Decision Making (Complexity): Low complexity    Treatment Explanation:  The following has been discussed with the patient:    RX ordered/plan of care  Anticipated outcomes  Possible risks and side effects    Plan:  Frequency:  1 X week, once daily  Duration:  for 8 weeks    Treatment Plan:    Modalities:    US   Therapeutic Exercise:    AROM, AAROM, PROM, Tendon Gliding, Blocking, Isotonics and Isometrics  Therapeutic Activities:   Functional activities   Neuromuscular re-ed:   Nerve Gliding and Stabilization  Manual Techniques:   Joint mobilization, Scar mobilization and Myofascial  "release  Orthotic Fabrication:    Static orthosis  Self Care:    Ergonomic Considerations    Discharge Plan:    Achieve all LTG.  Independent in home treatment program.  Reach maximal therapeutic benefit.    PATIENTS NAME:  Kashmir Yao   : 1955    Home Exercise Program:  Finger PROM  Median nerve gliding  Warmth  Scar massage    Next Visit:  Consider orthosis eusebia.  Scar massage  US  Nerve gliding                  Caregiver Signature/Credentials ______________________________ Date ________       Treating Provider: ISMAEL Elena/L    I have reviewed and certified the need for these services and plan of treatment while under my care.        PHYSICIAN'S SIGNATURE:   _____________________________________  Date___________    Asa Kt Frias MD    Certification period: Beginning of Cert date period: 20 End of Cert period date: 21     Functional Level Progress Report: Please see attached \"Goal Flow sheet for Functional level.\"    ___X_____ Continue Services or       ________ DC Services                Service dates: SOC Date: 20  to present                                                                     "

## 2020-11-03 NOTE — PROGRESS NOTES
Hand Therapy Initial Evaluation    Current Date:  11/3/2020    Diagnosis: R CTR, R Trigger thumb release, L Trigger release  MD order: 10/29/20  DOI: ***  DOS: ***  Procedure:  CTR, Trigger release  Post:  ***w ***d      Subjective:  Kashmir Yao is a 65 year old male.    Patient reports symptoms of the bilateral hands which occurred due to use over time - pt used to fall frequently and used to loose balance. Pt has since had a brain surgery to help improve balance. Since onset symptoms are Gradually getting better.  General health as reported by patient is good.  Pertinent medical history includes:Heart Problems, High Blood Pressure, Seizures, Sleep Disorder/Apnea  Medical allergies:none.  Surgical history: orthopedic: Trigger fingers, knee, CTR.  Medication history: see EMR.    Current occupation is none: Retired    Occupational Profile Information:  Right hand dominant  Prior functional level:  no limitations  Patient reports symptoms of pain, stiffness/loss of motion, edema, numbness and tingling   Special tests:  x-ray.    Previous treatment: Surgery  Barriers include:none  Mobility: No difficulty  Transportation: drives  Currently not working due to : retired  Leisure activities/hobbies: going to the gym  Other: ***    Functional Outcome Measure:   ***    Objective:  Pain Level (Scale 0-10)   11/3/2020 11/3/2020   Side Right Left   At Rest 3/10 3-4/10   With Use 7-8/10 8-9/10     Pain Description  Date 11/3/2020   Location Right: Wrist, MP of thumb, palm, fingers  Left: RF, near A1   Pain Quality Aching, Sharp and Shooting   Frequency constant     Pain is worst  daytime   Exacerbated by  Using TV remote   Relieved by rest   Progression Unchanged     Edema (Circumference measured in cm)  Mild   11/3/2020 11/3/2020    R L   DWC 18.7 19.3      Edema (Circumference measured in cm)   11/3/2020 11/3/2020   Ring R L   P1 6.6 6.8   PIP     P2 5.4 5.9     Scar  Wrist:  Sensitivity: Mild over scar, mild pain at  proximal aspect of scar Quality:  Well healed, mild adherence  R thumb: Mild sensitivity over scar, well healed and slightly raised  L RF: Mild sensitivity, slightly open with light red surrounding incision sight. Healing, no sign of infection. Moderate constant pain over scar.    Sensation   11/3/20: Constant paresthesias through R hand and IF, MF, and RF. Intermittent shooting through hands.    ROM  Pain Report: - none  + mild    ++ moderate    +++ severe   Wrist 11/3/2020 11/3/2020   AROM (PROM) R L   Extension 60 66   Flexion 55 45   RD     UD         ROM  Hand 11/3/2020 11/3/2020   AROM(PROM) R L   Ring MP 0/70 0/45   PIP 0/80 0/55   DIP 0/60 0/63   MCGRATH           Thumb 11/3/2020 11/3/2020   AROM  (PROM) R L   MP -27/60 0/60   IP HE/67 HE/67   RABD     PABD       Palpation  Pain Report:  - none  + mild    ++ moderate    +++ severe   *** Finger 11/3/2020 11/3/2020   Side R L   Ring finger A1 Pulley  4/10   Ring finger A3 Pulley/PIP  3/10   CMC 3-4/10    Thumb A1 3-4/10    FA Extensors       Stage of Stenosing Tenosynovitis (SST)     11/3/2020   *** Finger ***   Stage 1:  Normal  Stage 2:  Uneven motion of tendon  Stage 3:  Triggering, clicking, catching  Stage 4:  Locking in extension or flexion; unlocked by active motion  Stage 5:  Locking in extension or flexion; unlocked by passive motion  Stage 6:  Finger locked in extension or flexion      Strength   (Measured in pounds)  Not tested d/t pain  Pain Report: - none  + mild    ++ moderate    +++ severe    11/3/2020 11/3/2020   Trials *** ***   1  2  3     Average *** ***     Lat Pinch 11/3/2020 11/3/2020   Trials *** ***   1  2  3     Average *** ***     3 Pt Pinch 11/3/2020 11/3/2020   Trials *** ***   1  2  3     Average *** ***     Assessment:  Patient presents with symptoms consistent with diagnosis of {RIGHT/LEFT/BILATERAL HAND THERAPY:971951} Carpal Tunnel Syndrome, with surgical intervention.    Patient's limitations or Problem List includes:   "{LIMITATIONS/PROBLEM LIST REHAB:776524} of the {RIGHT/LEFT/BILATERAL HAND THERAPY:672055} which interferes with the patient's ability to perform {TASKS REHAB:952341} as compared to previous level of function.    Rehab Potential:  {POTENTIAL REHAB:969860}    Patient will benefit from skilled Occupational Therapy to {INCREASE/DECREASE ACTIVITY/SYMPTOM:057274} to return to previous activity level and resume normal daily tasks and to reach their rehab potential.    Barriers to Learning:  {BARRIERS TO LEARNING3:164971}    Communication Issues:  {COMMUNICATION ISSUES:437519::\"Patient appears to be able to clearly communicate and understand verbal and written communication and follow directions correctly.\"}    Chart Review: {OT EVAL chart review:564530}    Identified Performance Deficits: {FHCOCCUPATIONALPERFORMANCEDEFICITS:388957}    Assessment of Occupational Performance:  {Performance Deficits:752127}    Clinical Decision Making (Complexity): {Complexity:332042}    Treatment Explanation:  The following has been discussed with the patient:    RX ordered/plan of care  Anticipated outcomes  Possible risks and side effects    Plan:  Frequency:  {FREQUENCY OF TREATMENT:457827}  Duration:  {DURATION OF TREATMENT:672004}    Treatment Plan:    Modalities:    {MODALITIES REHAB:903770}   Therapeutic Exercise:    {THERAPEUTIC EXERCISE:324357}  Therapeutic Activities:   Functional activities   ***  Neuromuscular re-ed:   {NEURO MUSCULAR RE-EDUCATION:993107}  Manual Techniques:   {Manual techniques:973651}  Orthotic Fabrication:    {SPLINTIN}  Self Care:    {SELF CARE HAND THERAPY:784308}    Discharge Plan:    Achieve all LTG.  Independent in home treatment program.  Reach maximal therapeutic benefit.    Home Exercise Program:  ***    Next Visit:  ***              "

## 2020-11-03 NOTE — PROGRESS NOTES
Hand Therapy Initial Evaluation    Current Date:  11/3/2020    Diagnosis: R CTR, R Trigger thumb release, L Trigger release  MD order: 10/29/20  DOS:   R thumb: 3 months ago    R wrist: March 2020  L RF: 2 weeks ago  Procedure:  CTR, Trigger release      Subjective:  Kashmir Yao is a 65 year old male.    Patient reports symptoms of the bilateral hands which occurred due to use over time - pt used to fall frequently and used to loose balance. Pt has since had a brain surgery to help improve balance. Since onset symptoms are Gradually getting better.  General health as reported by patient is good.  Pertinent medical history includes:Heart Problems, High Blood Pressure, Seizures, Sleep Disorder/Apnea  Medical allergies:none.  Surgical history: orthopedic: Trigger fingers, knee, CTR.  Medication history: see EMR.    Current occupation is none: Retired    Occupational Profile Information:  Right hand dominant  Prior functional level:  no limitations  Patient reports symptoms of pain, stiffness/loss of motion, edema, numbness and tingling   Special tests:  x-ray.    Previous treatment: Surgery  Barriers include:none  Mobility: No difficulty  Transportation: drives  Currently not working due to : retired  Leisure activities/hobbies: going to the gym      Objective:  Pain Level (Scale 0-10)   11/3/2020 11/3/2020   Side Right Left   At Rest 3/10 3-4/10   With Use 7-8/10 8-9/10     Pain Description  Date 11/3/2020   Location Right: Wrist, MP of thumb, palm, fingers  Left: RF, near A1   Pain Quality Aching, Sharp and Shooting   Frequency constant     Pain is worst  daytime   Exacerbated by  Using TV remote   Relieved by rest   Progression Unchanged     Edema (Circumference measured in cm)  Mild   11/3/2020 11/3/2020    R L   DWC 18.7 19.3      Edema (Circumference measured in cm)   11/3/2020 11/3/2020   Ring R L   P1 6.6 6.8   PIP     P2 5.4 5.9     Scar  Wrist:  11/3/20:   R wrist: Sensitivity: Mild over scar, mild pain  at proximal aspect of scar Quality:  Well healed, mild adherence  R thumb: Mild sensitivity over scar, well healed and slightly raised  L RF: Mild sensitivity, slightly open with light red surrounding incision sight. Healing, no sign of infection. Moderate constant pain over scar.    Sensation   11/3/20: Constant paresthesias through R hand and IF, MF, and RF. Intermittent shooting through hands.    ROM  Pain Report: - none  + mild    ++ moderate    +++ severe   Wrist 11/3/2020 11/3/2020   AROM (PROM) R L   Extension 60 66   Flexion 55 45   RD     UD         ROM  Hand 11/3/2020 11/3/2020   AROM(PROM) R L   Ring MP 0/70 0/45   PIP 0/80 0/55   DIP 0/60 0/63   MCGRATH 210 163         Thumb 11/3/2020 11/3/2020   AROM  (PROM) R L   MP -27/60 0/60   IP HE/67 HE/67   RABD     PABD       Palpation  Pain Report:  - none  + mild    ++ moderate    +++ severe    Date 11/3/2020 11/3/2020   Side R L   Ring finger A1 Pulley  4/10   Ring finger A3 Pulley/PIP  3/10   CMC 3-4/10    Thumb A1 3-4/10      Strength  Not tested d/t pain      Assessment:  Patient presents with symptoms consistent with diagnosis of right wrist thumb pain and left ring finger pain, with surgical intervention.    Patient's limitations or Problem List includes:  Pain, Decreased ROM/motion, Increased edema, Weakness, Sensory disturbance, Adherent scarring, Decreased  and Decreased pinch of the right wrist and thumb and left Ring finger which interferes with the patient's ability to perform Self Care Tasks (dressing, eating, bathing, hygiene/toileting), Sleep Patterns, Recreational Activities, Household Chores and Driving  as compared to previous level of function.    Rehab Potential:  Excellent - Return to full activity, no limitations    Patient will benefit from skilled Occupational Therapy to increase ROM,  strength, pinch strength, stability of thumb and sensation and decrease pain to return to previous activity level and resume normal daily tasks and  to reach their rehab potential.    Barriers to Learning:  No barrier    Communication Issues:  Patient appears to be able to clearly communicate and understand verbal and written communication and follow directions correctly.    Chart Review: Chart Review and Simple history review with patient    Identified Performance Deficits: bathing/showering, dressing, hygiene and grooming, driving and community mobility, health management and maintenance, home establishment and management, meal preparation and cleanup, shopping, sleep and leisure activities    Assessment of Occupational Performance:  5 or more Performance Deficits    Clinical Decision Making (Complexity): Low complexity    Treatment Explanation:  The following has been discussed with the patient:    RX ordered/plan of care  Anticipated outcomes  Possible risks and side effects    Plan:  Frequency:  1 X week, once daily  Duration:  for 8 weeks    Treatment Plan:    Modalities:    US   Therapeutic Exercise:    AROM, AAROM, PROM, Tendon Gliding, Blocking, Isotonics and Isometrics  Therapeutic Activities:   Functional activities   Neuromuscular re-ed:   Nerve Gliding and Stabilization  Manual Techniques:   Joint mobilization, Scar mobilization and Myofascial release  Orthotic Fabrication:    Static orthosis  Self Care:    Ergonomic Considerations    Discharge Plan:    Achieve all LTG.  Independent in home treatment program.  Reach maximal therapeutic benefit.    Home Exercise Program:  Finger PROM  Median nerve gliding  Warmth  Scar massage    Next Visit:  Consider orthosis eusebia.  Scar massage  US  Nerve gliding

## 2020-11-05 PROBLEM — M79.642 BILATERAL HAND PAIN: Status: ACTIVE | Noted: 2020-11-05

## 2020-11-05 PROBLEM — M79.641 BILATERAL HAND PAIN: Status: ACTIVE | Noted: 2020-11-05

## 2020-11-09 DIAGNOSIS — I10 ESSENTIAL HYPERTENSION WITH GOAL BLOOD PRESSURE LESS THAN 130/80: ICD-10-CM

## 2020-11-10 ENCOUNTER — THERAPY VISIT (OUTPATIENT)
Dept: OCCUPATIONAL THERAPY | Facility: CLINIC | Age: 65
End: 2020-11-10
Payer: MEDICARE

## 2020-11-10 DIAGNOSIS — M79.642 BILATERAL HAND PAIN: ICD-10-CM

## 2020-11-10 DIAGNOSIS — M79.641 BILATERAL HAND PAIN: ICD-10-CM

## 2020-11-10 PROCEDURE — 97112 NEUROMUSCULAR REEDUCATION: CPT | Mod: GO | Performed by: OCCUPATIONAL THERAPIST

## 2020-11-10 PROCEDURE — 97110 THERAPEUTIC EXERCISES: CPT | Mod: GO | Performed by: OCCUPATIONAL THERAPIST

## 2020-11-10 PROCEDURE — 97760 ORTHOTIC MGMT&TRAING 1ST ENC: CPT | Mod: GO | Performed by: OCCUPATIONAL THERAPIST

## 2020-11-10 PROCEDURE — 97140 MANUAL THERAPY 1/> REGIONS: CPT | Mod: GO | Performed by: OCCUPATIONAL THERAPIST

## 2020-11-10 RX ORDER — METOPROLOL TARTRATE 50 MG
TABLET ORAL
Qty: 180 TABLET | Refills: 1 | OUTPATIENT
Start: 2020-11-10

## 2020-11-10 NOTE — TELEPHONE ENCOUNTER
metoprolol tartrate (LOPRESSOR) 50 MG tablet\Last Written Prescription Date:  4/23/20  Last Fill Quantity: 180,  # refills: 1     Last office visit: 5/14/19    Patient did not show for office visit 2/19/20    Refill denied - patient needs appointment.    Kavita Ochoa RN BSN  [facility]  [phone]

## 2020-11-17 ENCOUNTER — THERAPY VISIT (OUTPATIENT)
Dept: OCCUPATIONAL THERAPY | Facility: CLINIC | Age: 65
End: 2020-11-17
Payer: MEDICARE

## 2020-11-17 DIAGNOSIS — M79.641 BILATERAL HAND PAIN: ICD-10-CM

## 2020-11-17 DIAGNOSIS — M79.642 BILATERAL HAND PAIN: ICD-10-CM

## 2020-11-17 PROCEDURE — 97140 MANUAL THERAPY 1/> REGIONS: CPT | Mod: GO | Performed by: OCCUPATIONAL THERAPIST

## 2020-11-17 PROCEDURE — 97110 THERAPEUTIC EXERCISES: CPT | Mod: GO | Performed by: OCCUPATIONAL THERAPIST

## 2020-11-17 PROCEDURE — 97035 APP MDLTY 1+ULTRASOUND EA 15: CPT | Mod: GO | Performed by: OCCUPATIONAL THERAPIST

## 2020-11-19 ENCOUNTER — THERAPY VISIT (OUTPATIENT)
Dept: OCCUPATIONAL THERAPY | Facility: CLINIC | Age: 65
End: 2020-11-19
Payer: MEDICARE

## 2020-11-19 DIAGNOSIS — M79.642 BILATERAL HAND PAIN: ICD-10-CM

## 2020-11-19 DIAGNOSIS — M79.641 BILATERAL HAND PAIN: ICD-10-CM

## 2020-11-19 PROCEDURE — 97140 MANUAL THERAPY 1/> REGIONS: CPT | Mod: GO | Performed by: OCCUPATIONAL THERAPIST

## 2020-11-19 PROCEDURE — 97035 APP MDLTY 1+ULTRASOUND EA 15: CPT | Mod: GO | Performed by: OCCUPATIONAL THERAPIST

## 2020-11-24 ENCOUNTER — THERAPY VISIT (OUTPATIENT)
Dept: OCCUPATIONAL THERAPY | Facility: CLINIC | Age: 65
End: 2020-11-24
Payer: MEDICARE

## 2020-11-24 DIAGNOSIS — M79.642 BILATERAL HAND PAIN: ICD-10-CM

## 2020-11-24 DIAGNOSIS — M79.641 BILATERAL HAND PAIN: ICD-10-CM

## 2020-11-24 DIAGNOSIS — I10 ESSENTIAL HYPERTENSION WITH GOAL BLOOD PRESSURE LESS THAN 130/80: ICD-10-CM

## 2020-11-24 PROCEDURE — 97110 THERAPEUTIC EXERCISES: CPT | Mod: GO | Performed by: OCCUPATIONAL THERAPIST

## 2020-11-24 PROCEDURE — 97140 MANUAL THERAPY 1/> REGIONS: CPT | Mod: GO | Performed by: OCCUPATIONAL THERAPIST

## 2020-11-24 PROCEDURE — 97760 ORTHOTIC MGMT&TRAING 1ST ENC: CPT | Mod: GO | Performed by: OCCUPATIONAL THERAPIST

## 2020-11-24 RX ORDER — METOPROLOL TARTRATE 50 MG
TABLET ORAL
Qty: 180 TABLET | Refills: 1 | OUTPATIENT
Start: 2020-11-24

## 2020-11-30 ENCOUNTER — TELEPHONE (OUTPATIENT)
Dept: FAMILY MEDICINE | Facility: CLINIC | Age: 65
End: 2020-11-30

## 2020-11-30 NOTE — TELEPHONE ENCOUNTER
"Pt calls clinic with multiple complaints: 1)Bell's Palsy flare up-L eye & mouth.  States he is unable to close his left eye and he has been using eye gtts and wearing an eye patch @ [facility], \"but it's not helping, and I have a drooling problem with my mouth.\"       2) Shingles  To right middle & side of body/back.  Pt states it just started to flare up a week ago and denies any open sores but states,\"it's very itchy.\"      3)birth inder beneath R eye changing/growing.  Pt states his birthmark, \"used be flat but now there's skin growing and it's getting bigger and I'm getting worried and I don't know what to do.  I'd like Dr. Noel to look @ it.\"     4) Needs refill for Lopressor 50 mg.  \"My pharmacy called me and told me I don't have any refills left on it.\"    Pt informed by RN he needs to be assessed by Dr. Noel face to face for these issues and an appointment was made for pt for Tuesday 12/1/2020 @ 4:10 PM w/a 3:50 PM arrival time.  Pt V/U understanding.      Kendal Guzman, Registered Nurse, Patient [facility]ocate Liaison [facility]  [phone]    "

## 2020-12-01 ENCOUNTER — OFFICE VISIT (OUTPATIENT)
Dept: FAMILY MEDICINE | Facility: CLINIC | Age: 65
End: 2020-12-01
Payer: MEDICARE

## 2020-12-01 VITALS
OXYGEN SATURATION: 99 % | TEMPERATURE: 97.7 F | WEIGHT: 239 LBS | DIASTOLIC BLOOD PRESSURE: 85 MMHG | BODY MASS INDEX: 35.29 KG/M2 | HEART RATE: 67 BPM | SYSTOLIC BLOOD PRESSURE: 145 MMHG | RESPIRATION RATE: 12 BRPM

## 2020-12-01 DIAGNOSIS — N52.9 VASCULOGENIC ERECTILE DYSFUNCTION, UNSPECIFIED VASCULOGENIC ERECTILE DYSFUNCTION TYPE: ICD-10-CM

## 2020-12-01 DIAGNOSIS — R35.0 BENIGN PROSTATIC HYPERPLASIA WITH URINARY FREQUENCY: ICD-10-CM

## 2020-12-01 DIAGNOSIS — K21.9 GASTROESOPHAGEAL REFLUX DISEASE WITHOUT ESOPHAGITIS: ICD-10-CM

## 2020-12-01 DIAGNOSIS — R41.89 COGNITIVE CHANGE: ICD-10-CM

## 2020-12-01 DIAGNOSIS — G51.0 BELL'S PALSY: Primary | ICD-10-CM

## 2020-12-01 DIAGNOSIS — B07.8 COMMON WART: ICD-10-CM

## 2020-12-01 DIAGNOSIS — I10 BENIGN ESSENTIAL HYPERTENSION: ICD-10-CM

## 2020-12-01 DIAGNOSIS — B02.29 POST HERPETIC NEURALGIA: ICD-10-CM

## 2020-12-01 DIAGNOSIS — N40.1 BENIGN PROSTATIC HYPERPLASIA WITH URINARY FREQUENCY: ICD-10-CM

## 2020-12-01 PROCEDURE — 17110 DESTRUCTION B9 LES UP TO 14: CPT | Performed by: FAMILY MEDICINE

## 2020-12-01 PROCEDURE — 99214 OFFICE O/P EST MOD 30 MIN: CPT | Mod: 25 | Performed by: FAMILY MEDICINE

## 2020-12-01 RX ORDER — METOPROLOL TARTRATE 50 MG
50 TABLET ORAL 2 TIMES DAILY
Qty: 180 TABLET | Refills: 1 | Status: SHIPPED | OUTPATIENT
Start: 2020-12-01 | End: 2021-06-15

## 2020-12-01 RX ORDER — ESOMEPRAZOLE MAGNESIUM 40 MG/1
40 CAPSULE, DELAYED RELEASE ORAL
Qty: 90 CAPSULE | Refills: 3 | Status: SHIPPED | OUTPATIENT
Start: 2020-12-01 | End: 2020-12-10

## 2020-12-01 NOTE — PROGRESS NOTES
Subjective     Kashmir Yao is a 65 year old male who presents to clinic today for the following health issues:    HPI         Concern - Follow up Bell's Palsy and Shingles  Onset: x 2 wks with [place], Shingles sx came back with itching and redness   Description: Still having sx of Bell's and Shingles -rt side of abdomen   Intensity: moderate  Progression of Symptoms:  same  Accompanying Signs & Symptoms: having troubles with the left side of face    Patient's dermatome of his previous shingles itches and he is concerned that this is a return of his shingles.  In addition he is having trouble with his Bell's palsy diagnosed September in hopes of antibiotics might cure him  He stopped his Protonix when his symptoms resolved.  They have returned in the last week and he would like to be on Nexium which he has been on in the past  He is having erectile dysfunction and wonders if his finasteride contributes  He has a dark lesion below his right eye that he knows should be inspected.  He thinks it is growing    Past Medical History:   Diagnosis Date     Anemia 7/31/2020     Arthritis      Bell's palsy 9/8/2020     Benign prostatic hyperplasia with incomplete bladder emptying 7/28/2020     Bilateral carpal tunnel syndrome 2/13/2020     CAD (coronary artery disease) 2010    a subtotally occluded obtuse marginal vessel which was stented with a drug-coated stent.  He had 50% to 60% LAD disease and 25% to 30% right coronary disease     Cognitive change 1/2/2020     Colonic polyps 2009    tubular adenomae with mildly dysplastic features     Common wart 12/2/2020     Disturbance of skin sensation      Encephalocele (H) 2009    left frontal     Epilepsy, partial (H) 2009    with secondary generalization     GERD (gastroesophageal reflux disease)      Heart attack (H)     5 years ago. Cardiology 3 months age     Herpes zoster with complication 10/7/2020     History of spinal cord injury      Hypertension      Iron deficiency  9/9/2020     RIC (obstructive sleep apnea)      Paraneoplastic Antibody  positive[799.89BS] 2009    mildly elevated alpha 3 ganglionic acetylcholine receptor and JEOVANNY 65 receptor  antibody     Pedal edema 2/14/2020     Post herpetic neuralgia 12/2/2020     Primary osteoarthritis of both hands 1/2/2020     Seizures (H)     Last seizure 2012     Stented coronary artery      Tobacco use disorder      Ulnar neuropathy of both upper extremities 2/13/2020     Vasculogenic erectile dysfunction 7/28/2020     Ventricular Assymetry 2009    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     CARPAL TUNNEL RELEASE RT/LT Right 03/11/2020    Right carpal tunnel release, Dr. Rick Frias, [facility]     COLONOSCOPY N/A 10/08/2020    Procedure: COLONOSCOPY WITH POLYPECTOMIES using cold jumbo forceps;  Surgeon: Kevin Song MD;  Location:  GI     CYSTOSCOPY       ESOPHAGOSCOPY, GASTROSCOPY, DUODENOSCOPY (EGD), COMBINED  05/24/2012    Procedure:COMBINED ESOPHAGOSCOPY, GASTROSCOPY, DUODENOSCOPY (EGD); ESOPHAGOSCOPY, GASTROSCOPY, DUODENOSCOPY (EGD) ; Surgeon:GILLIAN JOHNSON; Location: GI     ESOPHAGOSCOPY, GASTROSCOPY, DUODENOSCOPY (EGD), COMBINED N/A 10/08/2020    Procedure: ESOPHAGOGASTRODUODENOSCOPY (EGD) (fv);  Surgeon: Kevin Song MD;  Location: [facility]     HC COLONOSCOPY THRU STOMA, DIAGNOSTIC  2009    tubular adenomae, recommended annual colonoscopy     KNEE SURGERY Left     repair of cartilage     RELEASE TRIGGER FINGER Left 10/19/2020    Left ring finger trigger finger release at A1 pulley, Dr. Rick Frias, [facility]     SPINE SURGERY      repair of herniated disc lumbar     STENT       VENTRICULOSTOMY      [facility]  NONSPECIFIC PROCEDURE      stint in heart       Family History   Problem Relation Age of Onset     Cardiovascular Mother         CHF, HTN, VASC. INSUFF., DM     No Known Problems Father      Thyroid Disease Sister         HYPOTHYROID     Genitourinary Problems Brother         RENAL FAILURE AND H/O MVA WITH LE PARAPLEGIA     No Known Problems Sister      No Known Problems Brother      No Known Problems Brother      Cancer - colorectal No family hx of      Colon Cancer No family hx of        Social History     Tobacco Use     Smoking status: Former Smoker     Packs/day: 0.50     Years: 20.00     Pack years: 10.00     Quit date: 2003     Years since quittin.3     Smokeless tobacco: Never Used   Substance Use Topics     Alcohol use: No     Alcohol/week: 0.0 standard drinks       Review of Systems   He is using a patch on his eye at night it regularly gets irritated unresponsive to drops he bites his cheek he drools he has erectile dysfunction.  He falls asleep readily after episodes of nocturia.  He is frustrated by his urinary frequency in the day no fevers no dysuria      Objective    BP (!) 145/85 (BP Location: Right arm, Patient Position: Chair, Cuff Size: Adult Regular)   Pulse 67   Temp 97.7  F (36.5  C) (Oral)   Resp 12   Wt 108.4 kg (239 lb)   SpO2 99%   BMI 35.29 kg/m    Body mass index is 35.29 kg/m .  Physical Exam   Conjunctivie noninjected.  Left-sided Bell's palsy  He has a postinflammatory hyperpigmented rash where his shingles were but no active eruption  Right lateral lower lid shows a typical verrucous papule.    Discussed options.  Cryotherapy applied            Assessment & Plan   Problem List Items Addressed This Visit     Esophageal reflux     Upper GI endoscopy reassuring.  He was on Protonix, but stopped it when his symptoms resolved.  Heartburn again in the last week he would like Nexium.  He believes his insurance will resist.  Protonix however was covered and effective he has  "been using over-the-counter omeprazole, but would prefer insurance to pay         Relevant Medications    esomeprazole (NEXIUM) 40 MG DR capsule    Benign essential hypertension     Beta-blocker, previously prescribed by vascular, he has not seen them for over a year and they declined to refill.  He has been out 2 weeks.  Resume         Cognitive change     He appears to have some defects in his comprehension         Vasculogenic erectile dysfunction     He wonders if his finasteride might be causing erectile dysfunction.  Discussed pharmacology.  I recommend he discuss this with urology         Bell's palsy - Primary     Diagnosis in September.  His left eye becomes irritated he bites his left cheek he drools.  He may have additional recovery, but much of this may be permanent.  We shall investigate surgical options to improve his wellbeing.  Anticipate recovery for a minimum of 6 months         Benign prostatic hyperplasia with urinary frequency     He is seeing urology in the near future.  He has decided his nocturia does not bother him, urinary frequency he blames on \"water pill\".  He has never been on a diuretic we discover that he considers his finasteride a diuretic because of his symptoms         Common wart     Typical verrucous papule right lateral lower eyelid.  Cryotherapy         Relevant Orders    DESTRUCT BENIGN LESION, UP TO 14 (Completed)    Post herpetic neuralgia     Painless itch.  He was concerned that this met return of his shingles.  Reassurance                  BMI:   Estimated body mass index is 35.29 kg/m  as calculated from the following:    Height as of 10/8/20: 1.753 m (5' 9\").    Weight as of this encounter: 108.4 kg (239 lb).   Weight management plan: Address at health maintenance             Return in about 2 weeks (around 12/15/2020) for BP Recheck.    Chad Noel MD  [facility]"

## 2020-12-01 NOTE — PROGRESS NOTES
Pre-Visit Planning :     Future Appointments   Date Time Provider Department Center   12/1/2020 12:00 PM Corry De oSuza, OT [facility]   12/1/2020  4:10 PM Chad Noel MD [facility]   12/3/2020 11:00 AM Corry De Souza, OT [facility]   12/8/2020 12:00 PM Ap, Corry, OT [facility]   1/4/2021  2:00 PM Jose Angel Lawson MD [facility]      Arrival Time for this Appointment:  3:50 PM      Appointment Notes for this encounter:    1)Bell's Palsy flare up-L eye & mouth 2) Shingles right side of body 3)birth inder beneath R eye changing (growing) 4) Needs refill for Lopressor 50 mg     Questionnaires Reviewed/Assigned:   YES-PHQ-9, JEOVANNY-7    Spoke to patient via phone. Are there any additional questions or concerns you'd like to review with your provider during your visit? NO      Last OV with provider:  10/7/2020; Bell's palsy; Benign prostatic hyperplasia with urinary frequency; Vasculogenic erectile dysfunction, unspecified vasculogenic erectile dysfunction type; Herpes zoster with complication        Hospital ER Visits:  9/4/2020; [facility];Miguelito Bourne MD   Alas's palsy    Is the visit preventive? NO                   Specialty Visits:  10/29/2020; [facility];Rick Frias MD-Orthopedic Surgery; Carpal tunnel syndrome of right wrist; Trigger finger of right thumb; Postoperative visit                          Imaging and Lab Review:9/4/2020; MRI OF THE BRAIN WITHOUT AND WITH CONTRAST;MRI OF THE SKULL BASE WITHOUT AND WITH CONTRAST ; HISTORY:  Left facial droop; IMPRESSION:                         1. Abnormal contrast enhancement of the IAC portion and geniculate ganglion of the left 7th cranial nerve consistent with patient's history of Bell's palsy. This may represent inflammation of the nerve. 2. Moderate diffuse cerebral volume                        loss again noted. 3. Otherwise, normal MRI of the skull base and internal auditory canals  "bilaterally    Recent Procedures:  10/8/2020; [facility] Gordon; Dr. Song; ESOPHAGOGASTRODUODENOSCOPY (EGD) (fv; COLONOSCOPY WITH POLYPECTOMIES using cold jumbo forceps; SPECIMEN(S):   Colon polyps x2, transverse FINAL DIAGNOSIS: Colon, transverse, polypectomies (2): - Tubular adenomas (2). - Negative for high-grade dysplasia and malignancy. Findings:The esophagus was normal. The stomach was normal.   The examined duodenum:- Normal esophagus.- Normal stomach- Normal examined duodenum.- No specimens collected.     MEDS ;    Current Outpatient Medications   Medication     acetaminophen (TYLENOL) 500 MG tablet     aspirin 81 MG tablet     ferrous sulfate (FE TABS) 325 (65 Fe) MG EC tablet     finasteride (PROSCAR) 5 MG tablet     fluocinonide (LIDEX) 0.05 % external ointment     hydrOXYzine (ATARAX) 25 MG tablet     ipratropium (ATROVENT) 0.06 % nasal spray     lamoTRIgine (LAMICTAL) 100 MG tablet     lamoTRIgine (LAMICTAL) 200 MG tablet     LYRICA 100 MG capsule     LYRICA 25 MG capsule     metoprolol tartrate (LOPRESSOR) 50 MG tablet     multivitamin w/minerals (MULTI-VITAMIN) tablet     naproxen (NAPROSYN) 500 MG tablet     pantoprazole (PROTONIX) 40 MG EC tablet     polyvinyl alcohol (LIQUIFILM TEARS) 1.4 % ophthalmic solution     senna (SENOKOT) 8.6 MG tablet     simvastatin (ZOCOR) 40 MG tablet     triamcinolone (KENALOG) 0.1 % external cream     valACYclovir (VALTREX) 1000 mg tablet     zolpidem (AMBIEN) 10 MG tablet     No current facility-administered medications for this visit.       Is there anything on your medication list that needs to be updated?  NO     There are no preventive care reminders to display for this patient.    Preferred pharmacy: [id number] - [facility] [address] [place] [facility] [address]    MyChart: PT DECLINED!    Questionnaire Review :  If MyChart INACTIVE \"Please plan on arriving early so that you have time to complete your questionnaires prior to seeing " "your provider.\"       Patient preferred phone number: [phone]    Kendal Guzman, Registered Nurse, Patient Advocate [facility]   [phone]    "

## 2020-12-02 PROBLEM — B07.8 COMMON WART: Status: ACTIVE | Noted: 2020-12-02

## 2020-12-02 PROBLEM — B02.8 HERPES ZOSTER WITH COMPLICATION: Status: RESOLVED | Noted: 2020-10-07 | Resolved: 2020-12-02

## 2020-12-02 PROBLEM — B02.29 POST HERPETIC NEURALGIA: Status: ACTIVE | Noted: 2020-12-02

## 2020-12-02 PROBLEM — B02.9 HERPES ZOSTER WITHOUT COMPLICATION: Status: RESOLVED | Noted: 2020-09-23 | Resolved: 2020-12-02

## 2020-12-02 NOTE — ASSESSMENT & PLAN NOTE
"He is seeing urology in the near future.  He has decided his nocturia does not bother him, urinary frequency he blames on \"water pill\".  He has never been on a diuretic we discover that he considers his finasteride a diuretic because of his symptoms  "

## 2020-12-02 NOTE — ASSESSMENT & PLAN NOTE
Diagnosis in September.  His left eye becomes irritated he bites his left cheek he drools.  He may have additional recovery, but much of this may be permanent.  We shall investigate surgical options to improve his wellbeing.  Anticipate recovery for a minimum of 6 months

## 2020-12-02 NOTE — ASSESSMENT & PLAN NOTE
Beta-blocker, previously prescribed by vascular, he has not seen them for over a year and they declined to refill.  He has been out 2 weeks.  Resume

## 2020-12-02 NOTE — ASSESSMENT & PLAN NOTE
Upper GI endoscopy reassuring.  He was on Protonix, but stopped it when his symptoms resolved.  Heartburn again in the last week he would like Nexium.  He believes his insurance will resist.  Protonix however was covered and effective he has been using over-the-counter omeprazole, but would prefer insurance to pay

## 2020-12-02 NOTE — ASSESSMENT & PLAN NOTE
He wonders if his finasteride might be causing erectile dysfunction.  Discussed pharmacology.  I recommend he discuss this with urology

## 2020-12-03 ENCOUNTER — TELEPHONE (OUTPATIENT)
Dept: FAMILY MEDICINE | Facility: CLINIC | Age: 65
End: 2020-12-03

## 2020-12-03 NOTE — TELEPHONE ENCOUNTER
Pt interested in ocular plastic surgery, perhaps facial plastic surgery for Bell's residual. Do you have any such specialists?   BW           Sorry, I was not able to responds to your telephone encounter.  The message said, that I do not have permission to that encounter.  They are booked out until the middle of January.        [facility] [phone]      Ade-Referrals

## 2020-12-08 ENCOUNTER — THERAPY VISIT (OUTPATIENT)
Dept: OCCUPATIONAL THERAPY | Facility: CLINIC | Age: 65
End: 2020-12-08
Payer: MEDICARE

## 2020-12-08 DIAGNOSIS — M79.642 BILATERAL HAND PAIN: ICD-10-CM

## 2020-12-08 DIAGNOSIS — M79.641 BILATERAL HAND PAIN: ICD-10-CM

## 2020-12-08 PROCEDURE — 97035 APP MDLTY 1+ULTRASOUND EA 15: CPT | Mod: GO | Performed by: OCCUPATIONAL THERAPIST

## 2020-12-08 PROCEDURE — 97140 MANUAL THERAPY 1/> REGIONS: CPT | Mod: GO | Performed by: OCCUPATIONAL THERAPIST

## 2020-12-08 PROCEDURE — 97110 THERAPEUTIC EXERCISES: CPT | Mod: GO | Performed by: OCCUPATIONAL THERAPIST

## 2020-12-08 PROCEDURE — 97112 NEUROMUSCULAR REEDUCATION: CPT | Mod: GO | Performed by: OCCUPATIONAL THERAPIST

## 2020-12-09 ENCOUNTER — TELEPHONE (OUTPATIENT)
Dept: FAMILY MEDICINE | Facility: CLINIC | Age: 65
End: 2020-12-09

## 2020-12-09 DIAGNOSIS — K21.9 GASTROESOPHAGEAL REFLUX DISEASE WITHOUT ESOPHAGITIS: Primary | ICD-10-CM

## 2020-12-09 DIAGNOSIS — M16.0 PRIMARY OSTEOARTHRITIS OF BOTH HIPS: ICD-10-CM

## 2020-12-10 RX ORDER — OMEPRAZOLE 40 MG/1
40 CAPSULE, DELAYED RELEASE ORAL DAILY
Qty: 90 CAPSULE | Refills: 3 | Status: SHIPPED | OUTPATIENT
Start: 2020-12-10 | End: 2021-11-15

## 2020-12-10 NOTE — TELEPHONE ENCOUNTER
He was that Nexium was unlikely to be covered, but he insisted.  No PA  OPmeprazole sent  Chad Noel MD

## 2020-12-10 NOTE — TELEPHONE ENCOUNTER
Pt notified that Omeprazole (Prilosec) 40 mg, 1 tablet daily, rx sent to [facility] in [place] @ [address] [facility] [address] [phone].    Kendal Guzman, Registered Nurse, Patient Advocate [facility]  [phone]

## 2020-12-10 NOTE — TELEPHONE ENCOUNTER
Dr. Noel- reason for call states esomperazole not covered.  States alternative is omeprazole.  I see previous PA from Dr. Cano in 2015.  Appears he was on Omeprazole prior to that.  Please advise.  Beth Quintero RN

## 2020-12-11 NOTE — TELEPHONE ENCOUNTER
Routing refill request to provider for review/approval because:  Labs not current:  Normal CBC, age and BP  Grace Malone RN, BSN  Message handled by CLINIC NURSE.

## 2020-12-12 RX ORDER — NAPROXEN 500 MG/1
TABLET ORAL
Qty: 60 TABLET | Refills: 3 | Status: SHIPPED | OUTPATIENT
Start: 2020-12-12 | End: 2021-04-21

## 2020-12-15 ENCOUNTER — ALLIED HEALTH/NURSE VISIT (OUTPATIENT)
Dept: FAMILY MEDICINE | Facility: CLINIC | Age: 65
End: 2020-12-15
Payer: MEDICARE

## 2020-12-15 ENCOUNTER — THERAPY VISIT (OUTPATIENT)
Dept: OCCUPATIONAL THERAPY | Facility: CLINIC | Age: 65
End: 2020-12-15
Payer: MEDICARE

## 2020-12-15 VITALS — DIASTOLIC BLOOD PRESSURE: 82 MMHG | SYSTOLIC BLOOD PRESSURE: 132 MMHG

## 2020-12-15 DIAGNOSIS — M79.642 BILATERAL HAND PAIN: ICD-10-CM

## 2020-12-15 DIAGNOSIS — Z01.30 BLOOD PRESSURE CHECK: Primary | ICD-10-CM

## 2020-12-15 DIAGNOSIS — M79.641 BILATERAL HAND PAIN: ICD-10-CM

## 2020-12-15 PROCEDURE — 97140 MANUAL THERAPY 1/> REGIONS: CPT | Mod: GO | Performed by: OCCUPATIONAL THERAPIST

## 2020-12-15 PROCEDURE — 97035 APP MDLTY 1+ULTRASOUND EA 15: CPT | Mod: GO | Performed by: OCCUPATIONAL THERAPIST

## 2020-12-15 PROCEDURE — 97112 NEUROMUSCULAR REEDUCATION: CPT | Mod: GO | Performed by: OCCUPATIONAL THERAPIST

## 2020-12-15 PROCEDURE — 97760 ORTHOTIC MGMT&TRAING 1ST ENC: CPT | Mod: GO | Performed by: OCCUPATIONAL THERAPIST

## 2020-12-15 PROCEDURE — 99207 PR NO CHARGE NURSE ONLY: CPT

## 2020-12-15 NOTE — PROGRESS NOTES
Kashmir Yao is a 65 year old patient who comes in today for a Blood Pressure check.  Initial BP:  BP (!) (P) 142/92 (BP Location: Left arm, Patient Position: Chair, Cuff Size: Adult Large)   2nd bp After 10 mts 132/82   Pt also wants to let know he hs persistent cough in nights. And want to know what dr suggest.  Disposition: results routed to provider

## 2020-12-17 ENCOUNTER — THERAPY VISIT (OUTPATIENT)
Dept: OCCUPATIONAL THERAPY | Facility: CLINIC | Age: 65
End: 2020-12-17
Payer: MEDICARE

## 2020-12-17 DIAGNOSIS — M79.642 BILATERAL HAND PAIN: ICD-10-CM

## 2020-12-17 DIAGNOSIS — M79.641 BILATERAL HAND PAIN: ICD-10-CM

## 2020-12-17 PROCEDURE — 97112 NEUROMUSCULAR REEDUCATION: CPT | Mod: GO | Performed by: OCCUPATIONAL THERAPIST

## 2020-12-17 PROCEDURE — 97140 MANUAL THERAPY 1/> REGIONS: CPT | Mod: GO | Performed by: OCCUPATIONAL THERAPIST

## 2020-12-17 PROCEDURE — 97035 APP MDLTY 1+ULTRASOUND EA 15: CPT | Mod: GO | Performed by: OCCUPATIONAL THERAPIST

## 2020-12-22 ENCOUNTER — THERAPY VISIT (OUTPATIENT)
Dept: OCCUPATIONAL THERAPY | Facility: CLINIC | Age: 65
End: 2020-12-22
Payer: MEDICARE

## 2020-12-22 DIAGNOSIS — M79.642 BILATERAL HAND PAIN: ICD-10-CM

## 2020-12-22 DIAGNOSIS — M79.641 BILATERAL HAND PAIN: ICD-10-CM

## 2020-12-22 PROCEDURE — 97110 THERAPEUTIC EXERCISES: CPT | Mod: GO | Performed by: OCCUPATIONAL THERAPIST

## 2020-12-22 PROCEDURE — 97112 NEUROMUSCULAR REEDUCATION: CPT | Mod: GO | Performed by: OCCUPATIONAL THERAPIST

## 2020-12-22 NOTE — PROGRESS NOTES
Outpatient Occupational Therapy Discharge Note     Patient: Kashmir Yao  : 1955    Beginning/End Dates of Reporting Period:  19 to 2020 (last seen 20)    Referring Provider: Hakeem Cano MD    Therapy Diagnosis: Other hydrocephalus (H) G91.8  - Primary     Client Self Report: Kashmir reports pain in the top portion of his right hand, through the top part of his thumb and first two fingers. He describes the pain as a burning, tingling sort of pain. He is trying to schedule with a neurologist or orthopedic doctor to figure out the cause of the pain. Reports forgetting memory log for OT at home.     Objective Measurements:  See CAM testing 12/10/19       Goals:     Goal Identifier Medication Management   Goal Description Patient will independently set up personal medications, using a pillbox, on 2 separate occasions for increased accuracy and independence with managing medications.   Target Date 20   Date Met      Progress: See below.     Goal Identifier Financial Management   Goal Description Kashmir will demonstrate the ability to complete moderately complex tasks requiring numerical reasoning, problem solving and new learning skills with 90% accuracy to complete financial, home and community tasks accurately, for maximum independence to function at or near baseline at home and in community.   Target Date 20   Date Met      Progress:  See below.     Goal Identifier Problem Solving/Sequencing   Goal Description Kashmir will be able to complete a multi-item errand list with at least 90% accuracy in the appropriate amount of time and be most efficient to improve problem solving ability and sequencing in order to plan his day, complete sequencing tasks, etc. for return to independence with grocery shopping and running errands in the community.   Target Date 20   Date Met      Progress:  See below.     Progress Toward Goals:   Progress limited due to only 3  treatment sessions beyond evaluation, all goals not met due to limited number of treatment sessions and complexity of goals.  Patient seen to address: Memory Impairments, Higher level Cognitive Skill Deficits, Poor divided attention skills.    Plan:  Discharge from therapy.    Discharge:    Reason for Discharge: Patient has failed to schedule further appointments.    Equipment Issued: N/A    Discharge Plan: Patient to continue home program.

## 2020-12-22 NOTE — LETTER
DEPARTMENT OF HEALTH AND HUMAN SERVICES  CENTERS FOR MEDICARE & MEDICAID SERVICES    PLAN/UPDATED PLAN OF PROGRESS FOR OUTPATIENT REHABILITATION    PATIENTS NAME:  Kashmir Yao   : 1955  PROVIDER NUMBER:  [id number]  HICN: [id number]  PROVIDER NAME: [facility]  MEDICAL RECORD NUMBER: [id number]   START OF CARE DATE:    SOC Date: 20   TYPE:  OT  PRIMARY/TREATMENT DIAGNOSIS: (Pertinent Medical Diagnosis)  Bilateral hand pain    VISITS FROM START OF CARE:  Rxs Used: 9     Progress Note - Hand Therapy  Reporting period is 11/3/20 to 20.    Subjectve:   Subjective changes as noted by patient:  I'm getting a lot of sharp pain in the R hand. The L hand is improved.  Functional changes noted by patient:  Improvement in Self Care Tasks (dressing, eating) and Household Chores  No Change to Pain on the R hand  Patient has noted adverse reaction to:  None    Objective:  Changes noted in objective findings: Yes, see below . Overall improvement on L hand, minimal improvement on Right hand.    Current Date:  11/3/2020    Diagnosis: R CTR, R Trigger thumb release, L Trigger release  MD order: 10/29/20  DOS:   R thumb: 3 months ago    R wrist: 2020  L RF: 2 weeks ago  Procedure:  CTR, Trigger release      Pain Level (Scale 0-10)   11/3/2020 11/3/2020 12/22/20 12/22/20   Side Right Left R L   At Rest 3/10 3-410 1-2/10 1-2/10   With Use 7-8/10 8-9/10 9-10/10 5-6/10                 PATIENTS NAME:  Kashmir Yao   : 1955    Pain Description  Date 11/3/2020 12/22/20   Location Right: Wrist, MP of thumb, palm, fingers  Left: RF, near A1 Right: Wrist, MP of thumb, palm, fingers    Left: A1, volar wrist   Pain Quality Aching, Sharp and Shooting Aching, shooting, tender, sharp   Frequency constant   R: Constant  L: intermittant   Pain is worst  daytime Daytime   Exacerbated by  Using TV remote Using remote, lifting, gripping   Relieved by rest rest   Progression Unchanged L: Graduallly  improving  R: Improvement to strength, not pain     Edema (Circumference measured in cm)  Mild   11/3/2020 11/3/2020    R L   DWC 18.7 19.3      Edema (Circumference measured in cm)   11/3/2020 11/3/2020 12/22/20   Ring R L L   P1 6.6 6.8 6.5   PIP      P2 5.4 5.9 5.8     Scar  Wrist:  11/3/20:   R wrist: Sensitivity: Mild over scar, mild pain at proximal aspect of scar Quality:  Well healed, mild adherence  R thumb: Mild sensitivity over scar, well healed and slightly raised  L RF: Mild sensitivity, slightly open with light red surrounding incision sight. Healing, no sign of infection. Moderate constant pain over scar.    20:   R wrist: Mild sensitivity, improving  R thumb:Mild sensitivity, improving  L RF: Very mild sensitivity, greatly improved, closed with minimal adherence.            PATIENTS NAME:  Nixon Yaobharat   : 1955    Sensation   11/3/20: Constant paresthesias through R hand and IF, MF, and RF. Intermittent shooting through hands.  20:  Paresthesias are intermittent, present with use. L hand intermittent tingling through IF, MF, RF with use.    ROM  Pain Report: - none  + mild    ++ moderate    +++ severe   Wrist 11/3/2020 11/3/2020   AROM (PROM) R L   Extension 60 66   Flexion 55 45   RD     UD         ROM  Hand 11/3/2020 11/3/2020 12/22/20    AROM(PROM) R L L    Ring MP 0/70 0/45 /60    PIP 0/80 0/55 /80    DIP 0/60 0/63 /75    MCGRATH 210 163 215          Thumb 11/3/2020 11/3/2020   AROM  (PROM) R L   MP -27/60 0/60   IP HE/67 HE/   RABD     PABD       Palpation  Pain Report:  - none  + mild    ++ moderate    +++ severe    Date 11/3/2020 11/3/2020 12/22/20 12/22/20   Side R L R    Ring finger A1 Pulley  4/10  3-4/10   Ring finger A3 Pulley/PIP  3/10  0/10   CMC 3-4/10  3-4/10    Thumb A1 3-4/10  3-4/10      Strength  11/3/20 Not tested d/t pain        PATIENTS NAME:  Kashmir Yao   : 1955     (Measured in pounds)  Pain Report: - none  + mild    ++ moderate    +++ severe     2020   Trials R L   1  2  3 9 5   Average       Lat Pinch 2020   Trials R L   1  2  3 6 6   Average       3 Pt Pinch 2020   Trials R L   1  2  3 8 8   Average       Assessment:  Response to therapy has been improvement to:  ROM of Fingers: All Planes  Strength:   and pinch  Pain:  frequency is less, intensity of pain is decreased on L hand.  Response to therapy has been lack of progress in:  Pain:  Minimal change in pain on R hand  Paresthesias:  Minimal improvement in pain on R hand. Paresthesias are less constant, but still frequent.    Overall Assessment:  Patient is becoming more independent in home exercise program  Patient would benefit from continued therapy to achieve rehab potential  STG/LTG:  STGoals have been reviewed;  see goal sheet for details and updates.  LTGoals have been reviewed;  see goal sheet for details and updates.    I have re-evaluated this patient and find that the nature, scope, duration and intensity of the therapy is appropriate for the medical condition of the patient.      Plan:  Frequency:  1 X week, once daily  Duration:  for 8 weeks              PATIENTS NAME:  Kashmir Yao   : 1955    Treatment Plan:    Modalities:    US   Therapeutic Exercise:    AROM, AAROM, PROM, Tendon Gliding, Blocking, Isotonics and Isometrics  Therapeutic Activities:   Functional activities   Neuromuscular re-ed:   Nerve Gliding and Stabilization  Manual Techniques:   Joint mobilization, Scar mobilization and Myofascial release  Orthotic Fabrication:    Static orthosis  Self Care:    Ergonomic Considerations    Discharge Plan:    Achieve all LTG.  Independent in home treatment program.  Reach maximal therapeutic benefit.    Home Exercise Program:  Finger PROM  Median nerve gliding  Warmth  Scar massage, Cica care  Compression sleeves  Pec stretch and Scapular retraction    Next Visit:  Consider orthosis eusebia.  Scar massage  US  Nerve  "gliding      Caregiver Signature/Credentials ______________________________ Date ________       Treating Provider: YOANA Elena    I have reviewed and certified the need for these services and plan of treatment while under my care.      PHYSICIAN'S SIGNATURE:   ____________________________________  Date___________    Asa Kt Frias MD  Certification period: Beginning of Cert date period: (P) 02/01/21 End of Cert period date: (P) 04/30/21   Functional Level Progress Report: Please see attached \"Goal Flow sheet for Functional level.\"  ___X_____ Continue Services or       ________ DC Services              Service dates: SOC Date: 11/03/20  to present                                                                     "

## 2020-12-22 NOTE — LETTER
DEPARTMENT OF HEALTH AND HUMAN SERVICES  CENTERS FOR MEDICARE & MEDICAID SERVICES    PLAN/UPDATED PLAN OF PROGRESS FOR OUTPATIENT REHABILITATION    PATIENTS NAME:  Kashmir Yao   : 1955  PROVIDER NUMBER:  [id number]  HICN:  [id number]   PROVIDER NAME: [facility]  MEDICAL RECORD NUMBER: [id number]   START OF CARE DATE: 20   TYPE:  OT  PRIMARY/TREATMENT DIAGNOSIS:   Bilateral hand pain    VISITS FROM START OF CARE:  Rxs Used: 9     Progress Note - Hand Therapy  Reporting period is 11/3/20 to 20.    Subjectve:   Subjective changes as noted by patient:  I'm getting a lot of sharp pain in the R hand. The L hand is improved.  Functional changes noted by patient:  Improvement in Self Care Tasks (dressing, eating) and Household Chores  No Change to Pain on the R hand  Patient has noted adverse reaction to:  None    Objective:  Changes noted in objective findings: Yes, see below . Overall improvement on L hand, minimal improvement on Right hand.    Current Date:  11/3/2020    Diagnosis: R CTR, R Trigger thumb release, L Trigger release  MD order: 10/29/20  DOS:   R thumb: 3 months ago    R wrist: 2020  L RF: 2 weeks ago  Procedure:  CTR, Trigger release      Pain Level (Scale 0-10)   11/3/2020 11/3/2020 12/22/20 12/22/20   Side Right Left R L   At Rest 310 3-410 1-2/10 1-2/10   With Use 7-8/10 8-9/10 9-10/10 5-6/10                           PATIENTS NAME:  Kashmir Yao   : 1955    Pain Description  Date 11/3/2020 12/22/20   Location Right: Wrist, MP of thumb, palm, fingers  Left: RF, near A1 Right: Wrist, MP of thumb, palm, fingers    Left: A1, volar wrist   Pain Quality Aching, Sharp and Shooting Aching, shooting, tender, sharp   Frequency constant   R: Constant  L: intermittant   Pain is worst  daytime Daytime   Exacerbated by  Using TV remote Using remote, lifting, gripping   Relieved by rest rest   Progression Unchanged L: Graduallly improving  R: Improvement to  strength, not pain     Edema (Circumference measured in cm)  Mild   11/3/2020 11/3/2020    R L   DWC 18.7 19.3      Edema (Circumference measured in cm)   11/3/2020 11/3/2020 12/22/20   Ring R L L   P1 6.6 6.8 6.5   PIP      P2 5.4 5.9 5.8     Scar  Wrist:  11/3/20:   R wrist: Sensitivity: Mild over scar, mild pain at proximal aspect of scar Quality:  Well healed, mild adherence  R thumb: Mild sensitivity over scar, well healed and slightly raised  L RF: Mild sensitivity, slightly open with light red surrounding incision sight. Healing, no sign of infection. Moderate constant pain over scar.    20:   R wrist: Mild sensitivity, improving  R thumb:Mild sensitivity, improving  L RF: Very mild sensitivity, greatly improved, closed with minimal adherence.    Sensation   11/3/20: Constant paresthesias through R hand and IF, MF, and RF. Intermittent shooting through hands.  20:  Paresthesias are intermittent, present with use. L hand intermittent tingling through IF, MF, RF with use.                PATIENTS NAME:  Kashmir Yao   : 1955    ROM  Pain Report: - none  + mild    ++ moderate    +++ severe   Wrist 11/3/2020 11/3/2020   AROM (PROM) R L   Extension 60 66   Flexion 55 45   RD     UD         ROM  Hand 11/3/2020 11/3/2020 12/22/20    AROM(PROM) R L L    Ring MP 0/70 0/45 /60    PIP 0/80 0/55 /80    DIP 0/60 0/63 /75    MCGRATH 210 163 215          Thumb 11/3/2020 11/3/2020   AROM  (PROM) R L   MP -27/60 0/60   IP    RABD     PABD       Palpation  Pain Report:  - none  + mild    ++ moderate    +++ severe    Date 11/3/2020 11/3/2020 12/22/20 12/22/20   Side R L R    Ring finger A1 Pulley  4/10  3-4/10   Ring finger A3 Pulley/PIP  3/10  0/10   CMC 3-4/10  3-4/10    Thumb A1 3-4/10  3-4/10      Strength  11/3/20 Not tested d/t pain     (Measured in pounds)  Pain Report: - none  + mild    ++ moderate    +++ severe    2020   Trials R L   1  2  3 9 5   Average                    PATIENTS NAME:  Kashmir Yao   : 1955    Lat Pinch 2020   Trials R L   1  2  3 6 6   Average       3 Pt Pinch 2020   Trials R L   1  2  3 8 8   Average       Assessment:  Response to therapy has been improvement to:  ROM of Fingers: All Planes  Strength:   and pinch  Pain:  frequency is less, intensity of pain is decreased on L hand.  Response to therapy has been lack of progress in:  Pain:  Minimal change in pain on R hand  Paresthesias:  Minimal improvement in pain on R hand. Paresthesias are less constant, but still frequent.    Overall Assessment:  Patient is becoming more independent in home exercise program  Patient would benefit from continued therapy to achieve rehab potential  STG/LTG:  STGoals have been reviewed;  see goal sheet for details and updates.  LTGoals have been reviewed;  see goal sheet for details and updates.    I have re-evaluated this patient and find that the nature, scope, duration and intensity of the therapy is appropriate for the medical condition of the patient.      Plan:  Frequency:  1 X week, once daily  Duration:  for 8 weeks    Treatment Plan:    Modalities:    US   Therapeutic Exercise:    AROM, AAROM, PROM, Tendon Gliding, Blocking, Isotonics and Isometrics  Therapeutic Activities:   Functional activities   Neuromuscular re-ed:   Nerve Gliding and Stabilization  Manual Techniques:   Joint mobilization, Scar mobilization and Myofascial release  Orthotic Fabrication:    Static orthosis  Self Care:    Ergonomic Considerations          PATIENTS NAME:  Kashmir Yao   : 1955    Discharge Plan:    Achieve all LTG.  Independent in home treatment program.  Reach maximal therapeutic benefit.    Home Exercise Program:  Finger PROM  Median nerve gliding  Warmth  Scar massage, Cica care  Compression sleeves  Pec stretch and Scapular retraction    Next Visit:  Consider orthosis eusebia.  Scar massage  US  Nerve  "gliding        Caregiver Signature/Credentials ______________________________ Date ________       Treating Provider: YOANA Elena    I have reviewed and certified the need for these services and plan of treatment while under my care.        PHYSICIAN'S SIGNATURE:   _____________________________________  Date___________                          Asa Kt Frias MD    Certification period: Beginning of Cert date period: 02/01/20 End of Cert period date: 04/30/20     Functional Level Progress Report: Please see attached \"Goal Flow sheet for Functional level.\"    ___X_____ Continue Services or       ________ DC Services                Service dates: SOC Date: 11/03/20  to present                                                                     "

## 2020-12-22 NOTE — PROGRESS NOTES
Progress Note - Hand Therapy  Reporting period is 11/3/20 to 12/22/20.    Subjectve:   Subjective changes as noted by patient:  I'm getting a lot of sharp pain in the R hand. The L hand is improved.  Functional changes noted by patient:  Improvement in Self Care Tasks (dressing, eating) and Household Chores  No Change to Pain on the R hand  Patient has noted adverse reaction to:  None    Objective:  Changes noted in objective findings: Yes, see below . Overall improvement on L hand, minimal improvement on Right hand.    Current Date:  11/3/2020    Diagnosis: R CTR, R Trigger thumb release, L Trigger release  MD order: 10/29/20  DOS:   R thumb: 3 months ago    R wrist: March 2020  L RF: 2 weeks ago  Procedure:  CTR, Trigger release      Pain Level (Scale 0-10)   11/3/2020 11/3/2020 12/22/20 12/22/20   Side Right Left R L   At Rest 3/10 3-4/10 1-2/10 1-2/10   With Use 7-8/10 8-9/10 9-10/10 5-6/10     Pain Description  Date 11/3/2020 12/22/20   Location Right: Wrist, MP of thumb, palm, fingers  Left: RF, near A1 Right: Wrist, MP of thumb, palm, fingers    Left: A1, volar wrist   Pain Quality Aching, Sharp and Shooting Aching, shooting, tender, sharp   Frequency constant   R: Constant  L: intermittant   Pain is worst  daytime Daytime   Exacerbated by  Using TV remote Using remote, lifting, gripping   Relieved by rest rest   Progression Unchanged L: Graduallly improving  R: Improvement to strength, not pain     Edema (Circumference measured in cm)  Mild   11/3/2020 11/3/2020    R L   DWC 18.7 19.3      Edema (Circumference measured in cm)   11/3/2020 11/3/2020 12/22/20   Ring R L L   P1 6.6 6.8 6.5   PIP      P2 5.4 5.9 5.8     Scar  Wrist:  11/3/20:   R wrist: Sensitivity: Mild over scar, mild pain at proximal aspect of scar Quality:  Well healed, mild adherence  R thumb: Mild sensitivity over scar, well healed and slightly raised  L RF: Mild sensitivity, slightly open with light red surrounding incision sight. Healing,  no sign of infection. Moderate constant pain over scar.    12/22/20:   R wrist: Mild sensitivity, improving  R thumb:Mild sensitivity, improving  L RF: Very mild sensitivity, greatly improved, closed with minimal adherence.    Sensation   11/3/20: Constant paresthesias through R hand and IF, MF, and RF. Intermittent shooting through hands.  12/22/20:  Paresthesias are intermittent, present with use. L hand intermittent tingling through IF, MF, RF with use.    ROM  Pain Report: - none  + mild    ++ moderate    +++ severe   Wrist 11/3/2020 11/3/2020   AROM (PROM) R L   Extension 60 66   Flexion 55 45   RD     UD         ROM  Hand 11/3/2020 11/3/2020 12/22/20    AROM(PROM) R L L    Ring MP 0/70 0/45 /60    PIP 0/80 0/55 /80    DIP 0/60 0/63 /75    MCGRATH 210 163 215          Thumb 11/3/2020 11/3/2020   AROM  (PROM) R L   MP -27/60 0/60   IP HE/67 HE/67   RABD     PABD       Palpation  Pain Report:  - none  + mild    ++ moderate    +++ severe    Date 11/3/2020 11/3/2020 12/22/20 12/22/20   Side R L R    Ring finger A1 Pulley  4/10  3-4/10   Ring finger A3 Pulley/PIP  3/10  0/10   CMC 3-4/10  3-4/10    Thumb A1 3-4/10  3-4/10      Strength  11/3/20 Not tested d/t pain     (Measured in pounds)  Pain Report: - none  + mild    ++ moderate    +++ severe    12/22/2020 12/22/2020   Trials R L   1  2  3 9 5   Average       Lat Pinch 12/22/2020 12/22/2020   Trials R L   1  2  3 6 6   Average       3 Pt Pinch 12/22/2020 12/22/2020   Trials R L   1  2  3 8 8   Average       Assessment:  Response to therapy has been improvement to:  ROM of Fingers: All Planes  Strength:   and pinch  Pain:  frequency is less, intensity of pain is decreased on L hand.  Response to therapy has been lack of progress in:  Pain:  Minimal change in pain on R hand  Paresthesias:  Minimal improvement in pain on R hand. Paresthesias are less constant, but still frequent.    Overall Assessment:  Patient is becoming more independent in home exercise  program  Patient would benefit from continued therapy to achieve rehab potential  STG/LTG:  STGoals have been reviewed;  see goal sheet for details and updates.  LTGoals have been reviewed;  see goal sheet for details and updates.    I have re-evaluated this patient and find that the nature, scope, duration and intensity of the therapy is appropriate for the medical condition of the patient.      Plan:  Frequency:  1 X week, once daily  Duration:  for 8 weeks    Treatment Plan:    Modalities:    US   Therapeutic Exercise:    AROM, AAROM, PROM, Tendon Gliding, Blocking, Isotonics and Isometrics  Therapeutic Activities:   Functional activities   Neuromuscular re-ed:   Nerve Gliding and Stabilization  Manual Techniques:   Joint mobilization, Scar mobilization and Myofascial release  Orthotic Fabrication:    Static orthosis  Self Care:    Ergonomic Considerations    Discharge Plan:    Achieve all LTG.  Independent in home treatment program.  Reach maximal therapeutic benefit.    Home Exercise Program:  Finger PROM  Median nerve gliding  Warmth  Scar massage, Cica care  Compression sleeves  Pec stretch and Scapular retraction    Next Visit:  Consider orthosis eusebia.  Scar massage  US  Nerve gliding

## 2020-12-24 ENCOUNTER — THERAPY VISIT (OUTPATIENT)
Dept: OCCUPATIONAL THERAPY | Facility: CLINIC | Age: 65
End: 2020-12-24
Payer: MEDICARE

## 2020-12-24 DIAGNOSIS — M79.642 BILATERAL HAND PAIN: ICD-10-CM

## 2020-12-24 DIAGNOSIS — M79.641 BILATERAL HAND PAIN: ICD-10-CM

## 2020-12-24 PROCEDURE — 97035 APP MDLTY 1+ULTRASOUND EA 15: CPT | Mod: GO | Performed by: OCCUPATIONAL THERAPIST

## 2020-12-24 PROCEDURE — 97140 MANUAL THERAPY 1/> REGIONS: CPT | Mod: GO | Performed by: OCCUPATIONAL THERAPIST

## 2020-12-29 ENCOUNTER — THERAPY VISIT (OUTPATIENT)
Dept: OCCUPATIONAL THERAPY | Facility: CLINIC | Age: 65
End: 2020-12-29
Payer: MEDICARE

## 2020-12-29 DIAGNOSIS — M79.642 BILATERAL HAND PAIN: ICD-10-CM

## 2020-12-29 DIAGNOSIS — M79.641 BILATERAL HAND PAIN: ICD-10-CM

## 2020-12-29 PROCEDURE — 97035 APP MDLTY 1+ULTRASOUND EA 15: CPT | Mod: GO | Performed by: OCCUPATIONAL THERAPIST

## 2020-12-29 PROCEDURE — 97140 MANUAL THERAPY 1/> REGIONS: CPT | Mod: GO | Performed by: OCCUPATIONAL THERAPIST

## 2020-12-29 PROCEDURE — 97112 NEUROMUSCULAR REEDUCATION: CPT | Mod: GO | Performed by: OCCUPATIONAL THERAPIST

## 2021-01-04 ENCOUNTER — OFFICE VISIT (OUTPATIENT)
Dept: UROLOGY | Facility: CLINIC | Age: 66
End: 2021-01-04
Attending: FAMILY MEDICINE
Payer: MEDICARE

## 2021-01-04 VITALS
HEIGHT: 69 IN | DIASTOLIC BLOOD PRESSURE: 72 MMHG | BODY MASS INDEX: 35.4 KG/M2 | SYSTOLIC BLOOD PRESSURE: 138 MMHG | OXYGEN SATURATION: 100 % | WEIGHT: 239 LBS | HEART RATE: 55 BPM

## 2021-01-04 DIAGNOSIS — N40.0 BPH (BENIGN PROSTATIC HYPERPLASIA): Primary | ICD-10-CM

## 2021-01-04 DIAGNOSIS — N52.9 VASCULOGENIC ERECTILE DYSFUNCTION, UNSPECIFIED VASCULOGENIC ERECTILE DYSFUNCTION TYPE: ICD-10-CM

## 2021-01-04 DIAGNOSIS — N40.1 BENIGN PROSTATIC HYPERPLASIA WITH URINARY FREQUENCY: ICD-10-CM

## 2021-01-04 DIAGNOSIS — R35.0 BENIGN PROSTATIC HYPERPLASIA WITH URINARY FREQUENCY: ICD-10-CM

## 2021-01-04 LAB
ALBUMIN UR-MCNC: NEGATIVE MG/DL
APPEARANCE UR: CLEAR
BILIRUB UR QL STRIP: NEGATIVE
COLOR UR AUTO: YELLOW
GLUCOSE UR STRIP-MCNC: NEGATIVE MG/DL
HGB UR QL STRIP: NEGATIVE
KETONES UR STRIP-MCNC: NEGATIVE MG/DL
LEUKOCYTE ESTERASE UR QL STRIP: NEGATIVE
NITRATE UR QL: NEGATIVE
PH UR STRIP: 5.5 PH (ref 5–7)
SOURCE: NORMAL
SP GR UR STRIP: >1.03 (ref 1–1.03)
UROBILINOGEN UR STRIP-ACNC: 0.2 EU/DL (ref 0.2–1)

## 2021-01-04 PROCEDURE — 81003 URINALYSIS AUTO W/O SCOPE: CPT | Performed by: STUDENT IN AN ORGANIZED HEALTH CARE EDUCATION/TRAINING PROGRAM

## 2021-01-04 PROCEDURE — 51798 US URINE CAPACITY MEASURE: CPT | Performed by: STUDENT IN AN ORGANIZED HEALTH CARE EDUCATION/TRAINING PROGRAM

## 2021-01-04 PROCEDURE — 99204 OFFICE O/P NEW MOD 45 MIN: CPT | Mod: 25 | Performed by: STUDENT IN AN ORGANIZED HEALTH CARE EDUCATION/TRAINING PROGRAM

## 2021-01-04 RX ORDER — TADALAFIL 5 MG/1
5 TABLET ORAL EVERY 24 HOURS
Qty: 30 TABLET | Refills: 3 | Status: SHIPPED | OUTPATIENT
Start: 2021-01-04 | End: 2021-01-07

## 2021-01-04 ASSESSMENT — MIFFLIN-ST. JEOR: SCORE: 1854.48

## 2021-01-04 ASSESSMENT — PAIN SCALES - GENERAL: PAINLEVEL: NO PAIN (0)

## 2021-01-04 NOTE — LETTER
1/4/2021       RE: Kashmir Yao  [address] [facility] [id number]     Dear Colleague,    Thank you for referring your patient, Kashmir Yao, to the [facility] [place] at [facility]. Please see a copy of my visit note below.    [facility]  Main Office: [address]  [address]  [place] [address]       CHIEF COMPLAINT:  Lower urinary tract symptoms and erectile dysfunction    HISTORY:   66 yo M with lower urinary tract symptoms of urinary urgency and frequency and feeling of incomplete emptying, also erectile dysfunction    Has been on flomax in the past, did not seem to help urinary symptoms. Was started on proscar about 6 months ago. Saw PCP again 4 months ago and noted the finasteride was not working well.    Also having problems with erections. Does not feel firm. He is able to have orgasm. Has tried viagra in the past but seems to cause him to have premature ejaculation, and still does not seem that firm. Last time he tried this was about a year ago. Normal libido. No longer has nocturnal erections. Does not masturbate.    Last time his testosterone was checked was in 2013, and was 379 ng/dL    AUA symptom score 3-1-2-1-1-1-1 = 10 moderate, QOL 2 mostly satisfied      PAST MEDICAL HISTORY:   Past Medical History:   Diagnosis Date     Anemia 7/31/2020     Arthritis      Bell's palsy 9/8/2020     Benign prostatic hyperplasia with incomplete bladder emptying 7/28/2020     Bilateral carpal tunnel syndrome 2/13/2020     CAD (coronary artery disease) 2010    a subtotally occluded obtuse marginal vessel which was stented with a drug-coated stent.  He had 50% to 60% LAD disease and 25% to 30% right coronary disease     Cognitive change 1/2/2020     Colonic polyps 2009    tubular adenomae with mildly dysplastic features     Common wart 12/2/2020     Disturbance of skin sensation      Encephalocele (H) 2009    left frontal      Epilepsy, partial (H) 2009    with secondary generalization     GERD (gastroesophageal reflux disease)      Heart attack (H)     5 years ago. Cardiology 3 months age     Herpes zoster with complication 10/7/2020     History of spinal cord injury      Hypertension      Iron deficiency 9/9/2020     RIC (obstructive sleep apnea)      Paraneoplastic Antibody  positive[799.89BS] 2009    mildly elevated alpha 3 ganglionic acetylcholine receptor and JEOVANNY 65 receptor  antibody     Pedal edema 2/14/2020     Post herpetic neuralgia 12/2/2020     Primary osteoarthritis of both hands 1/2/2020     Seizures (H)     Last seizure 2012     Stented coronary artery      Tobacco use disorder      Ulnar neuropathy of both upper extremities 2/13/2020     Vasculogenic erectile dysfunction 7/28/2020     Ventricular Assymetry 2009    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     CARPAL TUNNEL RELEASE RT/LT Right 03/11/2020    Right carpal tunnel release, Dr. Rick Frias, [facility]     COLONOSCOPY N/A 10/08/2020    Procedure: COLONOSCOPY WITH POLYPECTOMIES using cold jumbo forceps;  Surgeon: Kevin Song MD;  Location:  GI     CYSTOSCOPY       ESOPHAGOSCOPY, GASTROSCOPY, DUODENOSCOPY (EGD), COMBINED  05/24/2012    Procedure:COMBINED ESOPHAGOSCOPY, GASTROSCOPY, DUODENOSCOPY (EGD); ESOPHAGOSCOPY, GASTROSCOPY, DUODENOSCOPY (EGD) ; Surgeon:GILLIAN JOHNSON; Location: GI     ESOPHAGOSCOPY, GASTROSCOPY, DUODENOSCOPY (EGD), COMBINED N/A 10/08/2020    Procedure: ESOPHAGOGASTRODUODENOSCOPY (EGD) (fv);  Surgeon: Kevin Song MD;  Location:  GI     HC COLONOSCOPY THRU STOMA, DIAGNOSTIC  2009    tubular adenomae, recommended  annual colonoscopy     KNEE SURGERY Left     repair of cartilage     RELEASE TRIGGER FINGER Left 10/19/2020    Left ring finger trigger finger release at A1 pulley, Dr. Rick Frias, [facility]     SPINE SURGERY      repair of herniated disc lumbar     STENT       VENTRICULOSTOMY      [facility] NONSPECIFIC PROCEDURE      stint in heart       FAMILY HISTORY:   Family History   Problem Relation Age of Onset     Cardiovascular Mother         CHF, HTN, VASC. INSUFF., DM     No Known Problems Father      Thyroid Disease Sister         HYPOTHYROID     Genitourinary Problems Brother         RENAL FAILURE AND H/O MVA WITH LE PARAPLEGIA     No Known Problems Sister      No Known Problems Brother      No Known Problems Brother      Cancer - colorectal No family hx of      Colon Cancer No family hx of        SOCIAL HISTORY:   Social History     Tobacco Use     Smoking status: Former Smoker     Packs/day: 0.50     Years: 20.00     Pack years: 10.00     Quit date: 2003     Years since quittin.4     Smokeless tobacco: Never Used   Substance Use Topics     Alcohol use: No     Alcohol/week: 0.0 standard drinks          Allergies   Allergen Reactions     Codeine      inability to sleep after taking codeine     Gabapentin      Fever       Vicodin [Hydrocodone-Acetaminophen]      Perspiring, hyper, itchy         Current Outpatient Medications:      acetaminophen (TYLENOL) 500 MG tablet, Take 1,000 mg by mouth every 8 hours as needed for mild pain, Disp: , Rfl:      aspirin 81 MG tablet, Take 81 mg by mouth daily, Disp: , Rfl:      ferrous sulfate (FE TABS) 325 (65 Fe) MG EC tablet, Take 1 tablet (325 mg) by mouth 3 times daily, Disp: 270 tablet, Rfl: 3     finasteride (PROSCAR) 5 MG tablet, Take 1 tablet (5 mg) by mouth daily, Disp: 90 tablet, Rfl: 1     fluocinonide (LIDEX) 0.05 % external ointment, Apply topically 2 times daily as needed (rash), Disp: 30 g, Rfl: 0     hydrOXYzine (ATARAX) 25 MG tablet, Take 1  tablet (25 mg) by mouth every 8 hours as needed for itching, Disp: 30 tablet, Rfl: 0     ipratropium (ATROVENT) 0.06 % nasal spray, Spray 2 sprays into both nostrils 4 times daily, Disp: 3 Box, Rfl: 3     lamoTRIgine (LAMICTAL) 100 MG tablet, 250 mg At Bedtime , Disp: , Rfl: 3     lamoTRIgine (LAMICTAL) 200 MG tablet, Take 200 mg by mouth every morning , Disp: 90 tablet, Rfl: 1     LYRICA 100 MG capsule, Take 1 capsule (100 mg) by mouth 2 times daily, Disp: 20 capsule, Rfl: 0     LYRICA 25 MG capsule, Take 3 capsules (75 mg) by mouth 2 times daily, Disp: 540 capsule, Rfl: 1     metoprolol tartrate (LOPRESSOR) 50 MG tablet, Take 1 tablet (50 mg) by mouth 2 times daily, Disp: 180 tablet, Rfl: 1     multivitamin w/minerals (MULTI-VITAMIN) tablet, Take 1 tablet by mouth daily, Disp: 90 tablet, Rfl: 3     naproxen (NAPROSYN) 500 MG tablet, TAKE 1 TABLET BY MOUTH EVERY 12 HOURS AS NEEDED. TAKE 2 HOURS APART FROM ANY ASPIRIN, Disp: 60 tablet, Rfl: 3     omeprazole (PRILOSEC) 40 MG DR capsule, Take 1 capsule (40 mg) by mouth daily, Disp: 90 capsule, Rfl: 3     polyvinyl alcohol (LIQUIFILM TEARS) 1.4 % ophthalmic solution, Place 2 drops Into the left eye as needed for dry eyes, Disp: 15 mL, Rfl: 1     senna (SENOKOT) 8.6 MG tablet, Take 1 tablet by mouth 3 times daily, Disp: 270 tablet, Rfl: 3     simvastatin (ZOCOR) 40 MG tablet, Take 1 tablet (40 mg) by mouth At Bedtime, Disp: 90 tablet, Rfl: 3     triamcinolone (KENALOG) 0.1 % external cream, Apply topically 2 times daily, Disp: 80 g, Rfl: 0     zolpidem (AMBIEN) 10 MG tablet, TAKE 1 TABLET BY MOUTH NIGHTLY EVERY NIGHT AT BEDTIME AS NEEDED FOR SLEEP., Disp: 90 tablet, Rfl: 0    Review Of Systems:  Skin: No rash, pruritis, or skin pigmentation  Eyes: No changes in vision  Ears/Nose/Throat: No changes in hearing, no nosebleeds  Respiratory: No shortness of breath, dyspnea on exertion, cough, or hemoptysis  Cardiovascular: No chest pain or palpitations  Gastrointestinal: No  "diarrhea or constipation. No abdominal pain. No hematochezia  Genitourinary: see HPI  Musculoskeletal: No pain or swelling of joints, normal range of motion  Neurologic: No weakness or tremors  Psychiatric: No recent changes in memory or mood  Hematologic/Lymphatic/Immunologic: No easy bruising or enlarged lymph nodes  Endocrine: No weight gain or loss      PHYSICAL EXAM:    /72   Pulse 55   Ht 1.753 m (5' 9\")   Wt 108.4 kg (239 lb)   SpO2 100%   BMI 35.29 kg/m    General appearance: In NAD, conversant  HEENT: Normocephalic and atraumatic, anicteric sclera  Cardiovascular: Not examined  Respiratory: normal, non-labored breathing  Gastrointestinal: negative, Abdomen soft, non-tender, and non-distended.   Musculoskeletal: Not Examined  Peripheral Vascular/extremity: No peripheral edema  Skin: Normal temperature, turgor, and texture. No rash  Psychiatric: Appropriate affect, alert and oriented to person, place, and time    Penis: circ phallus, orthotopic and patent meatus. Somewhat buried by his prepubic fat pad  Scrotal Skin: Not Examined   Testicles: Normal bilat  Digital Rectal Exam: small firm prostate    Cystoscopy: Not done      PSA:   Component PSA   Latest Ref Rng & Units 0 - 4 ug/L   1/10/2007 0.40   9/29/2008 0.64   1/27/2010 0.54   1/5/2011 0.53   5/15/2013 0.53   10/7/2015 0.68   7/15/2016 2.01   8/10/2016 0.63   8/31/2017 0.93   2/3/2020 1.31   9/8/2020 0.19       UA RESULTS:  Results for FREDDIE BACON (MRN [id number]) as of 1/4/2021 14:43   Ref. Range 1/4/2021 14:29   Color Urine Unknown Yellow   Appearance Urine Unknown Clear   Glucose Urine Latest Ref Range: NEG^Negative mg/dL Negative   Bilirubin Urine Latest Ref Range: NEG^Negative  Negative   Ketones Urine Latest Ref Range: NEG^Negative mg/dL Negative   Specific Gravity Urine Latest Ref Range: 1.003 - 1.035  >1.030   pH Urine Latest Ref Range: 5.0 - 7.0 pH 5.5   Protein Albumin Urine Latest Ref Range: NEG^Negative mg/dL Negative "   Urobilinogen Urine Latest Ref Range: 0.2 - 1.0 EU/dL 0.2   Nitrite Urine Latest Ref Range: NEG^Negative  Negative   Blood Urine Latest Ref Range: NEG^Negative  Negative   Leukocyte Esterase Urine Latest Ref Range: NEG^Negative  Negative   Source Unknown Midstream Urine     Bladder Scan:  PVR 3 cc    Other Labs:      Imaging Studies: None      CLINICAL IMPRESSION:   64 yo M with lower urinary tract symptoms urgency and frequency and feeling of incomplete emptying, as well as erectile dysfunction.    In terms of lower urinary tract symptoms, he is emptying well with low PVR of 3 ml. He has a small firm prostate with a low PSA. Alpha blockers and finasteride have not been very effective.    For ED, discussed options including PDEVI, ICI, MARCUS, IPP.    PLAN:   Trial daily tadalafil 5 mg which should help both the urinary symptoms and the erectile dysfunction  Check morning testosterone  Return for cystoscopy  If no response to PDEVI, may try alternative PDEVI vs. ICI    Jose Angel Lawson MD   [facility]  [phone] clinic phone

## 2021-01-04 NOTE — NURSING NOTE
"Chief Complaint   Patient presents with     Benign Prostatic Hypertrophy     Patient here today for AUASS     Urgency     Patient here today for PVR       Blood pressure 138/72, pulse 55, height 1.753 m (5' 9\"), weight 108.4 kg (239 lb), SpO2 100 %. Body mass index is 35.29 kg/m .    Patient Active Problem List   Diagnosis     Hyperlipidemia LDL goal <70     Esophageal reflux     Anxiety state     RIC (obstructive sleep apnea)     Health Care Home     S/P TKR (total knee replacement)     Epilepsy (H)     Benign essential hypertension     Physical deconditioning     Constipation     Trauma     Cognitive change     Obesity (BMI 35.0-39.9) with comorbidity (H)     Primary osteoarthritis of both hands     Headache     Need for prophylactic vaccination and inoculation against influenza     Cough     Panlobular emphysema (H)     Left carpal tunnel syndrome     Ulnar neuropathy of both upper extremities     Dermatitis     Pedal edema     Trigger finger of left hand, unspecified finger     Pruritic disorder     Vasculogenic erectile dysfunction     Other hydrocephalus (H)     Chronic heart failure with preserved ejection fraction (H)     Bell's palsy     Benign prostatic hyperplasia with urinary frequency     Iron deficiency     Bilateral hand pain     Common wart     Post herpetic neuralgia       Allergies   Allergen Reactions     Codeine      inability to sleep after taking codeine     Gabapentin      Fever       Vicodin [Hydrocodone-Acetaminophen]      Perspiring, hyper, itchy       Current Outpatient Medications   Medication Sig Dispense Refill     acetaminophen (TYLENOL) 500 MG tablet Take 1,000 mg by mouth every 8 hours as needed for mild pain       aspirin 81 MG tablet Take 81 mg by mouth daily       ferrous sulfate (FE TABS) 325 (65 Fe) MG EC tablet Take 1 tablet (325 mg) by mouth 3 times daily 270 tablet 3     finasteride (PROSCAR) 5 MG tablet Take 1 tablet (5 mg) by mouth daily 90 tablet 1     fluocinonide (LIDEX) " 0.05 % external ointment Apply topically 2 times daily as needed (rash) 30 g 0     hydrOXYzine (ATARAX) 25 MG tablet Take 1 tablet (25 mg) by mouth every 8 hours as needed for itching 30 tablet 0     ipratropium (ATROVENT) 0.06 % nasal spray Spray 2 sprays into both nostrils 4 times daily 3 Box 3     lamoTRIgine (LAMICTAL) 100 MG tablet 250 mg At Bedtime   3     lamoTRIgine (LAMICTAL) 200 MG tablet Take 200 mg by mouth every morning  90 tablet 1     LYRICA 100 MG capsule Take 1 capsule (100 mg) by mouth 2 times daily 20 capsule 0     LYRICA 25 MG capsule Take 3 capsules (75 mg) by mouth 2 times daily 540 capsule 1     metoprolol tartrate (LOPRESSOR) 50 MG tablet Take 1 tablet (50 mg) by mouth 2 times daily 180 tablet 1     multivitamin w/minerals (MULTI-VITAMIN) tablet Take 1 tablet by mouth daily 90 tablet 3     naproxen (NAPROSYN) 500 MG tablet TAKE 1 TABLET BY MOUTH EVERY 12 HOURS AS NEEDED. TAKE 2 HOURS APART FROM ANY ASPIRIN 60 tablet 3     omeprazole (PRILOSEC) 40 MG DR capsule Take 1 capsule (40 mg) by mouth daily 90 capsule 3     polyvinyl alcohol (LIQUIFILM TEARS) 1.4 % ophthalmic solution Place 2 drops Into the left eye as needed for dry eyes 15 mL 1     senna (SENOKOT) 8.6 MG tablet Take 1 tablet by mouth 3 times daily 270 tablet 3     simvastatin (ZOCOR) 40 MG tablet Take 1 tablet (40 mg) by mouth At Bedtime 90 tablet 3     triamcinolone (KENALOG) 0.1 % external cream Apply topically 2 times daily 80 g 0     zolpidem (AMBIEN) 10 MG tablet TAKE 1 TABLET BY MOUTH NIGHTLY EVERY NIGHT AT BEDTIME AS NEEDED FOR SLEEP. 90 tablet 0       Social History     Tobacco Use     Smoking status: Former Smoker     Packs/day: 0.50     Years: 20.00     Pack years: 10.00     Quit date: 2003     Years since quittin.4     Smokeless tobacco: Never Used   Substance Use Topics     Alcohol use: No     Alcohol/week: 0.0 standard drinks     Drug use: No     UA RESULTS:  Recent Labs   Lab Test 21  1429 19  1003  06/19/19  1003   COLOR Yellow   < > Yellow   APPEARANCE Clear   < > Clear   URINEGLC Negative   < > Negative   URINEBILI Negative   < > Negative   URINEKETONE Negative   < > Negative   SG >1.030   < > <=1.005   UBLD Negative   < > Large*   URINEPH 5.5   < > 6.0   PROTEIN Negative   < > Negative   UROBILINOGEN 0.2   < > 0.2   NITRITE Negative   < > Negative   LEUKEST Negative   < > Moderate*   RBCU  --   --  O - 2   WBCU  --   --  10-25*    < > = values in this interval not displayed.         PVR 3ML      [facility]  1/4/2021  2:30 PM

## 2021-01-04 NOTE — PROGRESS NOTES
[facility]  Main Office: [address]  [address]  [place] [address]       CHIEF COMPLAINT:  Lower urinary tract symptoms and erectile dysfunction    HISTORY:   64 yo M with lower urinary tract symptoms of urinary urgency and frequency and feeling of incomplete emptying, also erectile dysfunction    Has been on flomax in the past, did not seem to help urinary symptoms. Was started on proscar about 6 months ago. Saw PCP again 4 months ago and noted the finasteride was not working well.    Also having problems with erections. Does not feel firm. He is able to have orgasm. Has tried viagra in the past but seems to cause him to have premature ejaculation, and still does not seem that firm. Last time he tried this was about a year ago. Normal libido. No longer has nocturnal erections. Does not masturbate.    Last time his testosterone was checked was in 2013, and was 379 ng/dL    AUA symptom score 3-1-2-1-1-1-1 = 10 moderate, QOL 2 mostly satisfied      PAST MEDICAL HISTORY:   Past Medical History:   Diagnosis Date     Anemia 7/31/2020     Arthritis      Bell's palsy 9/8/2020     Benign prostatic hyperplasia with incomplete bladder emptying 7/28/2020     Bilateral carpal tunnel syndrome 2/13/2020     CAD (coronary artery disease) 2010    a subtotally occluded obtuse marginal vessel which was stented with a drug-coated stent.  He had 50% to 60% LAD disease and 25% to 30% right coronary disease     Cognitive change 1/2/2020     Colonic polyps 2009    tubular adenomae with mildly dysplastic features     Common wart 12/2/2020     Disturbance of skin sensation      Encephalocele (H) 2009    left frontal     Epilepsy, partial (H) 2009    with secondary generalization     GERD (gastroesophageal reflux disease)      Heart attack (H)     5 years ago. Cardiology 3 months age     Herpes zoster with complication 10/7/2020     History of spinal cord injury      Hypertension      Iron deficiency 9/9/2020     RIC  (obstructive sleep apnea)      Paraneoplastic Antibody  positive[799.89BS] 2009    mildly elevated alpha 3 ganglionic acetylcholine receptor and JEOVANNY 65 receptor  antibody     Pedal edema 2/14/2020     Post herpetic neuralgia 12/2/2020     Primary osteoarthritis of both hands 1/2/2020     Seizures (H)     Last seizure 2012     Stented coronary artery      Tobacco use disorder      Ulnar neuropathy of both upper extremities 2/13/2020     Vasculogenic erectile dysfunction 7/28/2020     Ventricular Assymetry 2009    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     CARPAL TUNNEL RELEASE RT/LT Right 03/11/2020    Right carpal tunnel release, Dr. Rick Frias, [facility]     COLONOSCOPY N/A 10/08/2020    Procedure: COLONOSCOPY WITH POLYPECTOMIES using cold jumbo forceps;  Surgeon: Kevin Song MD;  Location:  GI     CYSTOSCOPY       ESOPHAGOSCOPY, GASTROSCOPY, DUODENOSCOPY (EGD), COMBINED  05/24/2012    Procedure:COMBINED ESOPHAGOSCOPY, GASTROSCOPY, DUODENOSCOPY (EGD); ESOPHAGOSCOPY, GASTROSCOPY, DUODENOSCOPY (EGD) ; Surgeon:GILLIAN JOHNSON; Location: GI     ESOPHAGOSCOPY, GASTROSCOPY, DUODENOSCOPY (EGD), COMBINED N/A 10/08/2020    Procedure: ESOPHAGOGASTRODUODENOSCOPY (EGD) (fv);  Surgeon: Kevin Song MD;  Location: [facility] COLONOSCOPY THRU STOMA, DIAGNOSTIC  2009    tubular adenomae, recommended annual colonoscopy     KNEE SURGERY Left     repair of cartilage     RELEASE TRIGGER FINGER Left 10/19/2020    Left ring finger trigger finger release at A1 pulley, Dr. Rick Frias, [facility]     SPINE SURGERY      repair of herniated disc lumbar     STENT       VENTRICULOSTOMY      [facility]  NONSPECIFIC PROCEDURE      stint in heart       FAMILY HISTORY:   Family History   Problem Relation Age of Onset     Cardiovascular Mother         CHF, HTN, VASC. INSUFF., DM     No Known Problems Father      Thyroid Disease Sister         HYPOTHYROID     Genitourinary Problems Brother         RENAL FAILURE AND H/O MVA WITH LE PARAPLEGIA     No Known Problems Sister      No Known Problems Brother      No Known Problems Brother      Cancer - colorectal No family hx of      Colon Cancer No family hx of        SOCIAL HISTORY:   Social History     Tobacco Use     Smoking status: Former Smoker     Packs/day: 0.50     Years: 20.00     Pack years: 10.00     Quit date: 2003     Years since quittin.4     Smokeless tobacco: Never Used   Substance Use Topics     Alcohol use: No     Alcohol/week: 0.0 standard drinks          Allergies   Allergen Reactions     Codeine      inability to sleep after taking codeine     Gabapentin      Fever       Vicodin [Hydrocodone-Acetaminophen]      Perspiring, hyper, itchy         Current Outpatient Medications:      acetaminophen (TYLENOL) 500 MG tablet, Take 1,000 mg by mouth every 8 hours as needed for mild pain, Disp: , Rfl:      aspirin 81 MG tablet, Take 81 mg by mouth daily, Disp: , Rfl:      ferrous sulfate (FE TABS) 325 (65 Fe) MG EC tablet, Take 1 tablet (325 mg) by mouth 3 times daily, Disp: 270 tablet, Rfl: 3     finasteride (PROSCAR) 5 MG tablet, Take 1 tablet (5 mg) by mouth daily, Disp: 90 tablet, Rfl: 1     fluocinonide (LIDEX) 0.05 % external ointment, Apply topically 2 times daily as needed (rash), Disp: 30 g, Rfl: 0     hydrOXYzine (ATARAX) 25 MG tablet, Take 1 tablet (25 mg) by mouth every 8 hours as needed for itching, Disp: 30 tablet, Rfl: 0     ipratropium (ATROVENT) 0.06 % nasal spray, Spray 2 sprays into both nostrils 4 times daily, Disp: 3 Box, Rfl: 3     lamoTRIgine (LAMICTAL) 100 MG tablet, 250 mg At Bedtime , Disp: , Rfl: 3     lamoTRIgine (LAMICTAL) 200  MG tablet, Take 200 mg by mouth every morning , Disp: 90 tablet, Rfl: 1     LYRICA 100 MG capsule, Take 1 capsule (100 mg) by mouth 2 times daily, Disp: 20 capsule, Rfl: 0     LYRICA 25 MG capsule, Take 3 capsules (75 mg) by mouth 2 times daily, Disp: 540 capsule, Rfl: 1     metoprolol tartrate (LOPRESSOR) 50 MG tablet, Take 1 tablet (50 mg) by mouth 2 times daily, Disp: 180 tablet, Rfl: 1     multivitamin w/minerals (MULTI-VITAMIN) tablet, Take 1 tablet by mouth daily, Disp: 90 tablet, Rfl: 3     naproxen (NAPROSYN) 500 MG tablet, TAKE 1 TABLET BY MOUTH EVERY 12 HOURS AS NEEDED. TAKE 2 HOURS APART FROM ANY ASPIRIN, Disp: 60 tablet, Rfl: 3     omeprazole (PRILOSEC) 40 MG DR capsule, Take 1 capsule (40 mg) by mouth daily, Disp: 90 capsule, Rfl: 3     polyvinyl alcohol (LIQUIFILM TEARS) 1.4 % ophthalmic solution, Place 2 drops Into the left eye as needed for dry eyes, Disp: 15 mL, Rfl: 1     senna (SENOKOT) 8.6 MG tablet, Take 1 tablet by mouth 3 times daily, Disp: 270 tablet, Rfl: 3     simvastatin (ZOCOR) 40 MG tablet, Take 1 tablet (40 mg) by mouth At Bedtime, Disp: 90 tablet, Rfl: 3     triamcinolone (KENALOG) 0.1 % external cream, Apply topically 2 times daily, Disp: 80 g, Rfl: 0     zolpidem (AMBIEN) 10 MG tablet, TAKE 1 TABLET BY MOUTH NIGHTLY EVERY NIGHT AT BEDTIME AS NEEDED FOR SLEEP., Disp: 90 tablet, Rfl: 0    Review Of Systems:  Skin: No rash, pruritis, or skin pigmentation  Eyes: No changes in vision  Ears/Nose/Throat: No changes in hearing, no nosebleeds  Respiratory: No shortness of breath, dyspnea on exertion, cough, or hemoptysis  Cardiovascular: No chest pain or palpitations  Gastrointestinal: No diarrhea or constipation. No abdominal pain. No hematochezia  Genitourinary: see HPI  Musculoskeletal: No pain or swelling of joints, normal range of motion  Neurologic: No weakness or tremors  Psychiatric: No recent changes in memory or mood  Hematologic/Lymphatic/Immunologic: No easy bruising or enlarged  "lymph nodes  Endocrine: No weight gain or loss      PHYSICAL EXAM:    /72   Pulse 55   Ht 1.753 m (5' 9\")   Wt 108.4 kg (239 lb)   SpO2 100%   BMI 35.29 kg/m    General appearance: In NAD, conversant  HEENT: Normocephalic and atraumatic, anicteric sclera  Cardiovascular: Not examined  Respiratory: normal, non-labored breathing  Gastrointestinal: negative, Abdomen soft, non-tender, and non-distended.   Musculoskeletal: Not Examined  Peripheral Vascular/extremity: No peripheral edema  Skin: Normal temperature, turgor, and texture. No rash  Psychiatric: Appropriate affect, alert and oriented to person, place, and time    Penis: circ phallus, orthotopic and patent meatus. Somewhat buried by his prepubic fat pad  Scrotal Skin: Not Examined   Testicles: Normal bilat  Digital Rectal Exam: small firm prostate    Cystoscopy: Not done      PSA:   Component PSA   Latest Ref Rng & Units 0 - 4 ug/L   1/10/2007 0.40   9/29/2008 0.64   1/27/2010 0.54   1/5/2011 0.53   5/15/2013 0.53   10/7/2015 0.68   7/15/2016 2.01   8/10/2016 0.63   8/31/2017 0.93   2/3/2020 1.31   9/8/2020 0.19       UA RESULTS:  Results for FREDDIE BACON (MRN [id number]) as of 1/4/2021 14:43   Ref. Range 1/4/2021 14:29   Color Urine Unknown Yellow   Appearance Urine Unknown Clear   Glucose Urine Latest Ref Range: NEG^Negative mg/dL Negative   Bilirubin Urine Latest Ref Range: NEG^Negative  Negative   Ketones Urine Latest Ref Range: NEG^Negative mg/dL Negative   Specific Gravity Urine Latest Ref Range: 1.003 - 1.035  >1.030   pH Urine Latest Ref Range: 5.0 - 7.0 pH 5.5   Protein Albumin Urine Latest Ref Range: NEG^Negative mg/dL Negative   Urobilinogen Urine Latest Ref Range: 0.2 - 1.0 EU/dL 0.2   Nitrite Urine Latest Ref Range: NEG^Negative  Negative   Blood Urine Latest Ref Range: NEG^Negative  Negative   Leukocyte Esterase Urine Latest Ref Range: NEG^Negative  Negative   Source Unknown Midstream Urine     Bladder Scan:  PVR 3 cc    Other Labs: "      Imaging Studies: None      CLINICAL IMPRESSION:   64 yo M with lower urinary tract symptoms urgency and frequency and feeling of incomplete emptying, as well as erectile dysfunction.    In terms of lower urinary tract symptoms, he is emptying well with low PVR of 3 ml. He has a small firm prostate with a low PSA. Alpha blockers and finasteride have not been very effective.    For ED, discussed options including PDEVI, ICI, MARCUS, IPP.    PLAN:   Trial daily tadalafil 5 mg which should help both the urinary symptoms and the erectile dysfunction  Check morning testosterone  Return for cystoscopy  If no response to PDEVI, may try alternative PDEVI vs. ICI    Jose Angel Lawson MD   [facility]  [phone] clinic phone

## 2021-01-05 ENCOUNTER — TELEPHONE (OUTPATIENT)
Dept: ORTHOPEDICS | Facility: CLINIC | Age: 66
End: 2021-01-05

## 2021-01-05 ENCOUNTER — TELEPHONE (OUTPATIENT)
Dept: UROLOGY | Facility: CLINIC | Age: 66
End: 2021-01-05

## 2021-01-05 ENCOUNTER — THERAPY VISIT (OUTPATIENT)
Dept: OCCUPATIONAL THERAPY | Facility: CLINIC | Age: 66
End: 2021-01-05
Payer: MEDICARE

## 2021-01-05 DIAGNOSIS — G56.01 CARPAL TUNNEL SYNDROME OF RIGHT WRIST: Primary | ICD-10-CM

## 2021-01-05 DIAGNOSIS — M79.641 BILATERAL HAND PAIN: ICD-10-CM

## 2021-01-05 DIAGNOSIS — M79.642 BILATERAL HAND PAIN: ICD-10-CM

## 2021-01-05 PROCEDURE — 97112 NEUROMUSCULAR REEDUCATION: CPT | Mod: GO | Performed by: OCCUPATIONAL THERAPIST

## 2021-01-05 PROCEDURE — 97140 MANUAL THERAPY 1/> REGIONS: CPT | Mod: GO | Performed by: OCCUPATIONAL THERAPIST

## 2021-01-05 PROCEDURE — 97035 APP MDLTY 1+ULTRASOUND EA 15: CPT | Mod: GO | Performed by: OCCUPATIONAL THERAPIST

## 2021-01-05 NOTE — TELEPHONE ENCOUNTER
Patient stopped at the  after therapy requesting a referral to see Dr. Love for a second opinion.    Marva Dumont ATC

## 2021-01-05 NOTE — TELEPHONE ENCOUNTER
[facility] Call Center    Phone Message    May a detailed message be left on voicemail: yes     Reason for Call: Medication Question or concern regarding medication   Prescription Clarification  Name of Medication: tadalafil (CIALIS) 5 MG tablet  Prescribing Provider:    Pharmacy: [facility] #[id number] [place], [place] - [address] AT [facility] [address]   What on the order needs clarification? Pt stated his medication needs a PA, please call medicare to initiate, thanks          Action Taken: Message routed to:  [facility] ([facility] uro    Travel Screening: Not Applicable

## 2021-01-06 NOTE — TELEPHONE ENCOUNTER
Please confirm if the covered formulary alternative, Alfuzosin HCI ER is a treatment option for patient. If not, please explain why and I can add that to the P/A request (request is on hold).

## 2021-01-06 NOTE — TELEPHONE ENCOUNTER
Orders placed for consultation with Dr. Love. Appointment scehduled for 1/13/21.     Closing encounter.     Bailey Junior, ATC

## 2021-01-07 ENCOUNTER — TRANSFERRED RECORDS (OUTPATIENT)
Dept: HEALTH INFORMATION MANAGEMENT | Facility: CLINIC | Age: 66
End: 2021-01-07

## 2021-01-07 ENCOUNTER — THERAPY VISIT (OUTPATIENT)
Dept: OCCUPATIONAL THERAPY | Facility: CLINIC | Age: 66
End: 2021-01-07
Payer: MEDICARE

## 2021-01-07 DIAGNOSIS — N52.9 VASCULOGENIC ERECTILE DYSFUNCTION, UNSPECIFIED VASCULOGENIC ERECTILE DYSFUNCTION TYPE: ICD-10-CM

## 2021-01-07 DIAGNOSIS — M79.641 BILATERAL HAND PAIN: ICD-10-CM

## 2021-01-07 DIAGNOSIS — M79.642 BILATERAL HAND PAIN: ICD-10-CM

## 2021-01-07 PROCEDURE — 97763 ORTHC/PROSTC MGMT SBSQ ENC: CPT | Mod: GO | Performed by: OCCUPATIONAL THERAPIST

## 2021-01-07 PROCEDURE — 97140 MANUAL THERAPY 1/> REGIONS: CPT | Mod: GO | Performed by: OCCUPATIONAL THERAPIST

## 2021-01-07 PROCEDURE — 97112 NEUROMUSCULAR REEDUCATION: CPT | Mod: GO | Performed by: OCCUPATIONAL THERAPIST

## 2021-01-07 RX ORDER — TADALAFIL 5 MG/1
5 TABLET ORAL EVERY 24 HOURS
Qty: 90 TABLET | Refills: 1 | Status: SHIPPED | OUTPATIENT
Start: 2021-01-07 | End: 2021-08-17

## 2021-01-07 NOTE — TELEPHONE ENCOUNTER
Rec'd a call from Steve with [facility] P/A dept. Wanted to confirm if patient also has a DX of BPH, I confirmed he does.

## 2021-01-07 NOTE — TELEPHONE ENCOUNTER
Prior Authorization Approval    Authorization Effective Date: 1/1/2021  Authorization Expiration Date: 1/7/2022  Medication: tadalafil (CIALIS) 5 MG tablet  Approved Dose/Quantity:  Reference #:     Insurance Company: [employer] Part D - Phone [phone] Fax [phone]  Which Pharmacy is filling the prescription (Not needed for infusion/clinic administered[id number] [place], [place] - [address]  AT [facility] [address]  Pharmacy Notified: Yes - Emily  Patient Notified: No

## 2021-01-07 NOTE — TELEPHONE ENCOUNTER
PA Initiation    Medication: tadalafil (CIALIS) 5 MG tablet  Insurance Company: [employer] Part D - Phone [phone] Fax [phone]  Pharmacy Filling the Rx: [facility] #[id number] [place], [place] - [address] AT [address]  Filling Pharmacy Phone: [phone]  Filling Pharmacy Fax:    Start Date: 1/7/2021

## 2021-01-12 ENCOUNTER — THERAPY VISIT (OUTPATIENT)
Dept: OCCUPATIONAL THERAPY | Facility: CLINIC | Age: 66
End: 2021-01-12
Payer: MEDICARE

## 2021-01-12 DIAGNOSIS — M79.642 BILATERAL HAND PAIN: ICD-10-CM

## 2021-01-12 DIAGNOSIS — M79.641 BILATERAL HAND PAIN: ICD-10-CM

## 2021-01-12 PROCEDURE — 97140 MANUAL THERAPY 1/> REGIONS: CPT | Mod: GO | Performed by: OCCUPATIONAL THERAPIST

## 2021-01-12 PROCEDURE — 97112 NEUROMUSCULAR REEDUCATION: CPT | Mod: GO | Performed by: OCCUPATIONAL THERAPIST

## 2021-01-12 PROCEDURE — 97035 APP MDLTY 1+ULTRASOUND EA 15: CPT | Mod: GO | Performed by: OCCUPATIONAL THERAPIST

## 2021-01-13 ENCOUNTER — OFFICE VISIT (OUTPATIENT)
Dept: ORTHOPEDICS | Facility: CLINIC | Age: 66
End: 2021-01-13
Payer: MEDICARE

## 2021-01-13 ENCOUNTER — ANCILLARY PROCEDURE (OUTPATIENT)
Dept: GENERAL RADIOLOGY | Facility: CLINIC | Age: 66
End: 2021-01-13
Attending: STUDENT IN AN ORGANIZED HEALTH CARE EDUCATION/TRAINING PROGRAM
Payer: MEDICARE

## 2021-01-13 VITALS
BODY MASS INDEX: 35.4 KG/M2 | DIASTOLIC BLOOD PRESSURE: 82 MMHG | SYSTOLIC BLOOD PRESSURE: 136 MMHG | WEIGHT: 239 LBS | HEIGHT: 69 IN

## 2021-01-13 DIAGNOSIS — G56.01 CARPAL TUNNEL SYNDROME OF RIGHT WRIST: ICD-10-CM

## 2021-01-13 DIAGNOSIS — M79.641 BILATERAL HAND PAIN: ICD-10-CM

## 2021-01-13 DIAGNOSIS — R20.0 NUMBNESS OR TINGLING: Primary | ICD-10-CM

## 2021-01-13 DIAGNOSIS — R20.2 NUMBNESS OR TINGLING: Primary | ICD-10-CM

## 2021-01-13 DIAGNOSIS — M79.642 BILATERAL HAND PAIN: ICD-10-CM

## 2021-01-13 DIAGNOSIS — M79.644 THUMB PAIN, RIGHT: ICD-10-CM

## 2021-01-13 PROCEDURE — 73130 X-RAY EXAM OF HAND: CPT | Mod: RT | Performed by: RADIOLOGY

## 2021-01-13 PROCEDURE — 99213 OFFICE O/P EST LOW 20 MIN: CPT | Mod: 24 | Performed by: STUDENT IN AN ORGANIZED HEALTH CARE EDUCATION/TRAINING PROGRAM

## 2021-01-13 ASSESSMENT — MIFFLIN-ST. JEOR: SCORE: 1854.48

## 2021-01-13 NOTE — PATIENT INSTRUCTIONS
1. Numbness or tingling    2. Carpal tunnel syndrome of right wrist    3. Bilateral hand pain    4. Thumb pain, right      Occupational Therapy orders have been placed with [facility].  You can call [phone] to schedule at your convenience.     Referral to see Dr. Yeo for right thumb US guided injection    EMG ordered through [facility]    Follow up with Dr. Love after EMG    Call my office with any questions or concerns, [phone].

## 2021-01-13 NOTE — PROGRESS NOTES
Hand Surgery History & Physical    REFERRING PHYSICIAN: Rick Frias   PRIMARY CARE PHYSICIAN: Chad Noel           Chief Complaint:   Pain of the Right Hand and Pain of the Left Hand      History of Present Illness:  Symptom Profile Including: location of symptoms, onset, severity, exacerbating/alleviating factors, previous treatments:        Kashmir Yao is a 65 year old male who presents for evaluation of bilateral hand pain, right hand is worse than the left. Would like a second opinion.     Pertinent past surgical history includes  10/19/2020: Left ring finger A1 pulley release  9/5/2020: Right thumb A1 pulley release  3/11/2020: Right carpal tunnel release     Pain: This is localized to the right wrist and hand - thumb is the worst and is 10/10 in severity. It began about 4 years ago.  It occurs constantly. Also note that he gets numbness and tingling  It is worsened by using his right hand.  He notes that he had significant pain in the right thumb with activities such as opening a jar.  He gets 10/10 pain even with light activities with the right hand and wrist.  It is improved by hand therapy. It does not wake the patient from sleep.  Prior treatments included braces and Tylenol.    Left hand ring finger pain, states that it is better but it is still locking. Is wearing a splint and is going to hand therapy.             Past Medical History:     Past Medical History:   Diagnosis Date     Anemia 7/31/2020     Arthritis      Bell's palsy 9/8/2020     Benign prostatic hyperplasia with incomplete bladder emptying 7/28/2020     Bilateral carpal tunnel syndrome 2/13/2020     CAD (coronary artery disease) 2010    a subtotally occluded obtuse marginal vessel which was stented with a drug-coated stent.  He had 50% to 60% LAD disease and 25% to 30% right coronary disease     Cognitive change 1/2/2020     Colonic polyps 2009    tubular adenomae with mildly dysplastic features     Common wart 12/2/2020      Disturbance of skin sensation      Encephalocele (H) 2009    left frontal     Epilepsy, partial (H) 2009    with secondary generalization     GERD (gastroesophageal reflux disease)      Heart attack (H)     5 years ago. Cardiology 3 months age     Herpes zoster with complication 10/7/2020     History of spinal cord injury      Hypertension      Iron deficiency 9/9/2020     IRC (obstructive sleep apnea)      Paraneoplastic Antibody  positive[799.89BS] 2009    mildly elevated alpha 3 ganglionic acetylcholine receptor and JEOVANNY 65 receptor  antibody     Pedal edema 2/14/2020     Post herpetic neuralgia 12/2/2020     Primary osteoarthritis of both hands 1/2/2020     Seizures (H)     Last seizure 2012     Stented coronary artery      Tobacco use disorder      Ulnar neuropathy of both upper extremities 2/13/2020     Vasculogenic erectile dysfunction 7/28/2020     Ventricular Assymetry 2009    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     CARPAL TUNNEL RELEASE RT/LT Right 03/11/2020    Right carpal tunnel release, Dr. Rick Frias, [facility]     COLONOSCOPY N/A 10/08/2020    Procedure: COLONOSCOPY WITH POLYPECTOMIES using cold jumbo forceps;  Surgeon: Kevin Song MD;  Location:  GI     CYSTOSCOPY       ESOPHAGOSCOPY, GASTROSCOPY, DUODENOSCOPY (EGD), COMBINED  05/24/2012    Procedure:COMBINED ESOPHAGOSCOPY, GASTROSCOPY, DUODENOSCOPY (EGD); ESOPHAGOSCOPY, GASTROSCOPY, DUODENOSCOPY (EGD) ; Surgeon:GILLIAN JOHNSON; Location: GI     ESOPHAGOSCOPY, GASTROSCOPY, DUODENOSCOPY (EGD), COMBINED N/A 10/08/2020    Procedure: ESOPHAGOGASTRODUODENOSCOPY (EGD) (fv);  Surgeon: Kevin Song MD;  Location: [facility]      HC COLONOSCOPY THRU STOMA, DIAGNOSTIC  2009    tubular adenomae, recommended annual colonoscopy     KNEE SURGERY Left     repair of cartilage     RELEASE TRIGGER FINGER Left 10/19/2020    Left ring finger trigger finger release at A1 marcusey, Dr. Rick Frias, [facility]     SPINE SURGERY      repair of herniated disc lumbar     STENT       VENTRICULOSTOMY      [facility] NONSPECIFIC PROCEDURE      stint in heart            Social History:     Social History     Tobacco Use     Smoking status: Former Smoker     Packs/day: 0.50     Years: 20.00     Pack years: 10.00     Quit date: 2003     Years since quittin.4     Smokeless tobacco: Never Used   Substance Use Topics     Alcohol use: No     Alcohol/week: 0.0 standard drinks            Family History:     Family History   Problem Relation Age of Onset     Cardiovascular Mother         CHF, HTN, VASC. INSUFF., DM     No Known Problems Father      Thyroid Disease Sister         HYPOTHYROID     Genitourinary Problems Brother         RENAL FAILURE AND H/O MVA WITH LE PARAPLEGIA     No Known Problems Sister      No Known Problems Brother      No Known Problems Brother      Cancer - colorectal No family hx of      Colon Cancer No family hx of             Allergies:     Allergies   Allergen Reactions     Codeine      inability to sleep after taking codeine     Gabapentin      Fever       Vicodin [Hydrocodone-Acetaminophen]      Perspiring, hyper, itchy            Medications:     Current Outpatient Medications   Medication     acetaminophen (TYLENOL) 500 MG tablet     aspirin 81 MG tablet     ferrous sulfate (FE TABS) 325 (65 Fe) MG EC tablet     finasteride (PROSCAR) 5 MG tablet     fluocinonide (LIDEX) 0.05 % external ointment     hydrOXYzine (ATARAX) 25 MG tablet     ipratropium (ATROVENT) 0.06 % nasal spray     lamoTRIgine (LAMICTAL) 100 MG tablet     lamoTRIgine (LAMICTAL) 200 MG tablet     LYRICA 100 MG capsule     LYRICA 25 MG capsule      "metoprolol tartrate (LOPRESSOR) 50 MG tablet     multivitamin w/minerals (MULTI-VITAMIN) tablet     naproxen (NAPROSYN) 500 MG tablet     omeprazole (PRILOSEC) 40 MG DR capsule     polyvinyl alcohol (LIQUIFILM TEARS) 1.4 % ophthalmic solution     senna (SENOKOT) 8.6 MG tablet     simvastatin (ZOCOR) 40 MG tablet     tadalafil (CIALIS) 5 MG tablet     triamcinolone (KENALOG) 0.1 % external cream     zolpidem (AMBIEN) 10 MG tablet     No current facility-administered medications for this visit.              Review of Systems:     A 10 point ROS was performed and reviewed. Specific responses to these questions are noted at the end of the document.         Physical Exam:   Vitals: Ht 1.753 m (5' 9\")   BMI 35.29 kg/m      Physical Exam Adult:  General: Well-nourished, alert, cooperative with exam and in no acute distress  HEENT: Atraumatic, normocephalic, extraocular movements intact, moist mucous membranes, trachea midline  Pulmonary: Unlabored work of breathing, on room air  Cardiovascular: Warm and well-perfused extremities. 2+ radial pulses  Skin: Warm, intact without rashes to the upper extremities, head or neck   Gait: Normal  Neuro/psych: Oriented to time, place and person. Affect is appropriate    Musculoskeletal: A focused physical examination of the right hand and left hand reveals:   Inspection- no evidence of deformity, malalignment ecchymosis or edema  Palpation-  tender to palpation Over the right thumb CMC joint at the volar aspect.  Neurovascular- intact light touch sensation and motor to distribution of the median, ulnar and radial nerves. Brisk capillary refill to the distal fingers. 2+ radial pulse.   Range of Motion: Full and symmetric. Able to make a full fist without visible catching or locking of the digits.  Specifically, there is no visible only palpable catching or locking of the left ring finger.  Stability: no dislocation, subluxation or laxity  Muscle strength and tone: No atrophy, " spasticity or flaccidness. 5/5 strength EPL, FPL, APB, first dorsal interosseous.    Special Tests:    Median nerve  Thenar atrophy: none  5/5 APB  Phalen's: Positive on right  Tinel's: Positive on right  Carpal compression test: Positive on right    Ulnar nerve  First dorsal interosseous atrophy:  none      Tendon: FDS and FDP intact to all fingers. Normal tenodesis effect.     CMC grind: positive   Finkelstein's test: negative.  Schaefer's test: negative       strength: 8 kg on the right, 6 kg on left  Pinch strength: 14 kg on right, 15 kg on the left.                      Imaging:   3 view X-ray of the right hand was independently interpreted by me. The results were discussed with the patient.  Findings include:    Widening of the scapholunate interval and VISI deformity consistent with chronic scapholunate ligament injury and possible lunotriquetral ligament injury.  Mild first CMC arthritis.             Assessment and Plan:   Assessment:  65 year old male with right hand and wrist pain which seems to be very generalized in nature, however the right first CMC joint seems to be the most symptomatic.     Plan:  1. EMG/nerve conduction study to determine if he has had interval improvement since his carpal tunnel release.  2. Referral to hand therapy for CMC protocol and treatment of wrist pain.  I did  the patient about the possibility of a right first CMC corticosteroid injection for relief of symptoms but he would like to wait on this.  3. At this time, I do not see any visible or palpable catching or locking of the left ring finger.  If he continues to have pain that is refractory to nonoperative treatment we could consider revision left ring finger A1 pulley release.  4. Follow-up after EMG/NCS has been obtained.      Rosa Love MD  Department of Orthopedic Surgery  Hand Surgery

## 2021-01-13 NOTE — LETTER
1/13/2021         RE: Kashmir Yao  [id number] [place] [address]        Dear Colleague,    Thank you for referring your patient, Kashmir Yao, to the [facility] [place]. Please see a copy of my visit note below.    Hand Surgery History & Physical    REFERRING PHYSICIAN: Rick Frias   PRIMARY CARE PHYSICIAN: Chad Noel           Chief Complaint:   Pain of the Right Hand and Pain of the Left Hand      History of Present Illness:  Symptom Profile Including: location of symptoms, onset, severity, exacerbating/alleviating factors, previous treatments:        Kashmir Yao is a 65 year old male who presents for evaluation of bilateral hand pain, right hand is worse than the left. Would like a second opinion.     Pertinent past surgical history includes  10/19/2020: Left ring finger A1 pulley release  9/5/2020: Right thumb A1 pulley release  3/11/2020: Right carpal tunnel release     Pain: This is localized to the right wrist and hand - thumb is the worst and is 10/10 in severity. It began about 4 years ago.  It occurs constantly. Also note that he gets numbness and tingling  It is worsened by using his right hand.  He notes that he had significant pain in the right thumb with activities such as opening a jar.  He gets 10/10 pain even with light activities with the right hand and wrist.  It is improved by hand therapy. It does not wake the patient from sleep.  Prior treatments included braces and Tylenol.    Left hand ring finger pain, states that it is better but it is still locking. Is wearing a splint and is going to hand therapy.             Past Medical History:     Past Medical History:   Diagnosis Date     Anemia 7/31/2020     Arthritis      Bell's palsy 9/8/2020     Benign prostatic hyperplasia with incomplete bladder emptying 7/28/2020     Bilateral carpal tunnel syndrome 2/13/2020     CAD (coronary artery disease) 2010    a subtotally occluded obtuse  marginal vessel which was stented with a drug-coated stent.  He had 50% to 60% LAD disease and 25% to 30% right coronary disease     Cognitive change 1/2/2020     Colonic polyps 2009    tubular adenomae with mildly dysplastic features     Common wart 12/2/2020     Disturbance of skin sensation      Encephalocele (H) 2009    left frontal     Epilepsy, partial (H) 2009    with secondary generalization     GERD (gastroesophageal reflux disease)      Heart attack (H)     5 years ago. Cardiology 3 months age     Herpes zoster with complication 10/7/2020     History of spinal cord injury      Hypertension      Iron deficiency 9/9/2020     RIC (obstructive sleep apnea)      Paraneoplastic Antibody  positive[799.89BS] 2009    mildly elevated alpha 3 ganglionic acetylcholine receptor and JEOVANNY 65 receptor  antibody     Pedal edema 2/14/2020     Post herpetic neuralgia 12/2/2020     Primary osteoarthritis of both hands 1/2/2020     Seizures (H)     Last seizure 2012     Stented coronary artery      Tobacco use disorder      Ulnar neuropathy of both upper extremities 2/13/2020     Vasculogenic erectile dysfunction 7/28/2020     Ventricular Assymetry 2009    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     CARPAL TUNNEL RELEASE RT/LT Right 03/11/2020    Right carpal tunnel release, Dr. Rick Frias, [facility]     COLONOSCOPY N/A 10/08/2020    Procedure: COLONOSCOPY WITH POLYPECTOMIES using cold jumbo forceps;  Surgeon: Kevin Song MD;  Location:  GI     CYSTOSCOPY       ESOPHAGOSCOPY, GASTROSCOPY, DUODENOSCOPY (EGD), COMBINED  05/24/2012    Procedure:COMBINED ESOPHAGOSCOPY, GASTROSCOPY, DUODENOSCOPY (EGD);  ESOPHAGOSCOPY, GASTROSCOPY, DUODENOSCOPY (EGD) ; Surgeon:GILLIAN JOHNSON; Location: GI     ESOPHAGOSCOPY, GASTROSCOPY, DUODENOSCOPY (EGD), COMBINED N/A 10/08/2020    Procedure: ESOPHAGOGASTRODUODENOSCOPY (EGD) (fv);  Surgeon: Kevin Snog MD;  Location:  GI     HC COLONOSCOPY THRU STOMA, DIAGNOSTIC  2009    tubular adenomae, recommended annual colonoscopy     KNEE SURGERY Left     repair of cartilage     RELEASE TRIGGER FINGER Left 10/19/2020    Left ring finger trigger finger release at A1 pulley, Dr. Rick Frisa, [facility]     SPINE SURGERY      repair of herniated disc lumbar     STENT       VENTRICULOSTOMY      [facility] NONSPECIFIC PROCEDURE      stint in heart            Social History:     Social History     Tobacco Use     Smoking status: Former Smoker     Packs/day: 0.50     Years: 20.00     Pack years: 10.00     Quit date: 2003     Years since quittin.4     Smokeless tobacco: Never Used   Substance Use Topics     Alcohol use: No     Alcohol/week: 0.0 standard drinks            Family History:     Family History   Problem Relation Age of Onset     Cardiovascular Mother         CHF, HTN, VASC. INSUFF., DM     No Known Problems Father      Thyroid Disease Sister         HYPOTHYROID     Genitourinary Problems Brother         RENAL FAILURE AND H/O MVA WITH LE PARAPLEGIA     No Known Problems Sister      No Known Problems Brother      No Known Problems Brother      Cancer - colorectal No family hx of      Colon Cancer No family hx of             Allergies:     Allergies   Allergen Reactions     Codeine      inability to sleep after taking codeine     Gabapentin      Fever       Vicodin [Hydrocodone-Acetaminophen]      Perspiring, hyper, itchy            Medications:     Current Outpatient Medications   Medication     acetaminophen (TYLENOL) 500 MG tablet     aspirin 81 MG tablet     ferrous sulfate (FE TABS) 325 (65 Fe) MG EC tablet     finasteride (PROSCAR) 5 MG tablet      "fluocinonide (LIDEX) 0.05 % external ointment     hydrOXYzine (ATARAX) 25 MG tablet     ipratropium (ATROVENT) 0.06 % nasal spray     lamoTRIgine (LAMICTAL) 100 MG tablet     lamoTRIgine (LAMICTAL) 200 MG tablet     LYRICA 100 MG capsule     LYRICA 25 MG capsule     metoprolol tartrate (LOPRESSOR) 50 MG tablet     multivitamin w/minerals (MULTI-VITAMIN) tablet     naproxen (NAPROSYN) 500 MG tablet     omeprazole (PRILOSEC) 40 MG DR capsule     polyvinyl alcohol (LIQUIFILM TEARS) 1.4 % ophthalmic solution     senna (SENOKOT) 8.6 MG tablet     simvastatin (ZOCOR) 40 MG tablet     tadalafil (CIALIS) 5 MG tablet     triamcinolone (KENALOG) 0.1 % external cream     zolpidem (AMBIEN) 10 MG tablet     No current facility-administered medications for this visit.              Review of Systems:     A 10 point ROS was performed and reviewed. Specific responses to these questions are noted at the end of the document.         Physical Exam:   Vitals: Ht 1.753 m (5' 9\")   BMI 35.29 kg/m      Physical Exam Adult:  General: Well-nourished, alert, cooperative with exam and in no acute distress  HEENT: Atraumatic, normocephalic, extraocular movements intact, moist mucous membranes, trachea midline  Pulmonary: Unlabored work of breathing, on room air  Cardiovascular: Warm and well-perfused extremities. 2+ radial pulses  Skin: Warm, intact without rashes to the upper extremities, head or neck   Gait: Normal  Neuro/psych: Oriented to time, place and person. Affect is appropriate    Musculoskeletal: A focused physical examination of the right hand and left hand reveals:   Inspection- no evidence of deformity, malalignment ecchymosis or edema  Palpation-  tender to palpation Over the right thumb CMC joint at the volar aspect.  Neurovascular- intact light touch sensation and motor to distribution of the median, ulnar and radial nerves. Brisk capillary refill to the distal fingers. 2+ radial pulse.   Range of Motion: Full and symmetric. " Able to make a full fist without visible catching or locking of the digits.  Specifically, there is no visible only palpable catching or locking of the left ring finger.  Stability: no dislocation, subluxation or laxity  Muscle strength and tone: No atrophy, spasticity or flaccidness. 5/5 strength EPL, FPL, APB, first dorsal interosseous.    Special Tests:    Median nerve  Thenar atrophy: none  5/5 APB  Phalen's: Positive on right  Tinel's: Positive on right  Carpal compression test: Positive on right    Ulnar nerve  First dorsal interosseous atrophy:  none      Tendon: FDS and FDP intact to all fingers. Normal tenodesis effect.     CMC grind: positive   Finkelstein's test: negative.  Schaefer's test: negative       strength: 8 kg on the right, 6 kg on left  Pinch strength: 14 kg on right, 15 kg on the left.                      Imaging:   3 view X-ray of the right hand was independently interpreted by me. The results were discussed with the patient.  Findings include:    Widening of the scapholunate interval and VISI deformity consistent with chronic scapholunate ligament injury and possible lunotriquetral ligament injury.  Mild first CMC arthritis.             Assessment and Plan:   Assessment:  65 year old male with right hand and wrist pain which seems to be very generalized in nature, however the right first CMC joint seems to be the most symptomatic.     Plan:  1. EMG/nerve conduction study to determine if he has had interval improvement since his carpal tunnel release.  2. Referral to hand therapy for CMC protocol and treatment of wrist pain.  I did  the patient about the possibility of a right first CMC corticosteroid injection for relief of symptoms but he would like to wait on this.  3. At this time, I do not see any visible or palpable catching or locking of the left ring finger.  If he continues to have pain that is refractory to nonoperative treatment we could consider revision left ring finger  A1 pulley release.  4. Follow-up after EMG/NCS has been obtained.      Rosa Love MD  Department of Orthopedic Surgery  Hand Surgery              Again, thank you for allowing me to participate in the care of your patient.        Sincerely,        Rosa Love MD

## 2021-01-18 ENCOUNTER — TELEPHONE (OUTPATIENT)
Dept: UROLOGY | Facility: CLINIC | Age: 66
End: 2021-01-18

## 2021-01-18 DIAGNOSIS — G40.802 OTHER EPILEPSY WITHOUT STATUS EPILEPTICUS, NOT INTRACTABLE (H): ICD-10-CM

## 2021-01-18 RX ORDER — PREGABALIN 25 MG/1
75 CAPSULE ORAL 2 TIMES DAILY
Qty: 540 CAPSULE | Refills: 1 | Status: SHIPPED | OUTPATIENT
Start: 2021-01-18 | End: 2022-06-23

## 2021-01-18 NOTE — TELEPHONE ENCOUNTER
"Pt calls stating he needs clinic to call his pharmacy;  [id number] - [place], [place] - [address] AT [facility] [address]); [phone] and re-order another rx for pt's   LYRICA 25 MG capsule.  Pt takes three (3) capsules by mouths two (2) times daily.  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  Does that mean I'll be one of the first to get it??\"  Pt reassured that vaccination is not yet available to clinic pts.  Pt given RN's number to call to check on availability.      Pt also reported he saw a urologist, Dr. Jose Angel Lawson, and was started on Cialis, but his insurance is not covering this medication either and was wondering about how to get coverage.  RN looked up med and saw it is currently being considered via prior authorization.  RN gave pt telephone number for Urology to call and check on status of coverage.      Will send to Dr. Noel for his review and advise.    Kendal Guzman, Registered Nurse, Patient Advocate Liaison [facility]  [phone]    "

## 2021-01-18 NOTE — TELEPHONE ENCOUNTER
Returned call to Kashmir and left message that the Rx was written for the generic form of the medication. Also, PA was requested and approved on 1/7/2021. Explained that we are not able to request price reductions. Pt may request a paper copy of the Rx and shop for better pricing, if he would like.  RIYA Barber RN      [facility] Center    Phone Message    May a detailed message be left on voicemail: yes     Reason for Call: Other: Pt called and stated he takes tadalafil (CIALIS) 5 MG tablet and his insurance and pharmacy are refusing to honor the price he was paying ($4). Pt stated if Dr. Lawson can fax perscription back over and stating that the brand is the only medication that works for pt and to honor that price or if there is a cheaper medication that is similar to that one. Please send medication too [id number] - [place], [place] - [address] AT [facility] [address] If you have any questions please contact pt at [phone]. Thank you.        Action Taken: Message routed to:  [facility] ([facility]  clinical pool    Travel Screening: Not Applicable

## 2021-01-19 ENCOUNTER — THERAPY VISIT (OUTPATIENT)
Dept: OCCUPATIONAL THERAPY | Facility: CLINIC | Age: 66
End: 2021-01-19
Payer: MEDICARE

## 2021-01-19 DIAGNOSIS — M79.641 BILATERAL HAND PAIN: ICD-10-CM

## 2021-01-19 DIAGNOSIS — M79.642 BILATERAL HAND PAIN: ICD-10-CM

## 2021-01-19 PROCEDURE — 97140 MANUAL THERAPY 1/> REGIONS: CPT | Mod: GO | Performed by: OCCUPATIONAL THERAPIST

## 2021-01-19 PROCEDURE — 97763 ORTHC/PROSTC MGMT SBSQ ENC: CPT | Mod: GO | Performed by: OCCUPATIONAL THERAPIST

## 2021-01-19 PROCEDURE — 97110 THERAPEUTIC EXERCISES: CPT | Mod: GO | Performed by: OCCUPATIONAL THERAPIST

## 2021-01-19 PROCEDURE — 97035 APP MDLTY 1+ULTRASOUND EA 15: CPT | Mod: GO | Performed by: OCCUPATIONAL THERAPIST

## 2021-01-21 ENCOUNTER — THERAPY VISIT (OUTPATIENT)
Dept: OCCUPATIONAL THERAPY | Facility: CLINIC | Age: 66
End: 2021-01-21
Payer: MEDICARE

## 2021-01-21 DIAGNOSIS — M79.641 BILATERAL HAND PAIN: ICD-10-CM

## 2021-01-21 DIAGNOSIS — M79.642 BILATERAL HAND PAIN: ICD-10-CM

## 2021-01-21 PROCEDURE — 97110 THERAPEUTIC EXERCISES: CPT | Mod: GO | Performed by: OCCUPATIONAL THERAPIST

## 2021-01-21 PROCEDURE — 97035 APP MDLTY 1+ULTRASOUND EA 15: CPT | Mod: GO | Performed by: OCCUPATIONAL THERAPIST

## 2021-01-21 PROCEDURE — 97112 NEUROMUSCULAR REEDUCATION: CPT | Mod: GO | Performed by: OCCUPATIONAL THERAPIST

## 2021-01-21 PROCEDURE — 97140 MANUAL THERAPY 1/> REGIONS: CPT | Mod: GO | Performed by: OCCUPATIONAL THERAPIST

## 2021-01-21 NOTE — PROGRESS NOTES
Progress Note - Hand Therapy  Reporting period is 12/22/20 - 1/21/21.    Subjectve:   Subjective changes as noted by patient:  The R hand has been still painful. The splint  Helps. I feel a lot of pain in the back of the hand. L has been good. With out the splint the finger locks occasionally.  Functional changes noted by patient:  Improvement in Self Care Tasks (dressing, eating) and Household Chores  No Change to Pain on the R hand  Patient has noted adverse reaction to:  None    Objective:  Changes noted in objective findings: Yes, see below . Overall improvement on L hand, minimal improvement on Right hand.    Current Date:  1/21/21    Diagnosis: R CTR, R Trigger thumb release, L Trigger release  MD order: 10/29/20  DOS:   R thumb: 3 months ago    R wrist: March 2020  L RF: 2 weeks ago  Procedure:  CTR, Trigger release      Pain Level (Scale 0-10)   11/3/2020 11/3/2020 12/22/20 12/22/20 1/21/21 1/21/21   Side Right Left R L R L   At Rest 3/10 3-4/10 1-2/10 1-2/10 1-2/10 0/10   With Use 7-8/10 8-9/10 9-10/10 5-6/10 10/10 7-8/10     Pain Description  Date 11/3/2020 12/22/20 1/21/21   Location Right: Wrist, MP of thumb, palm, fingers  Left: RF, near A1 Right: Wrist, MP of thumb, palm, fingers    Left: A1, volar wrist R: Wrist, thenars, dorsal hand, Volar CT    L: RF Volar A1, volar wrist   Pain Quality Aching, Sharp and Shooting Aching, shooting, tender, sharp Aching, shooting, tender, sharp, tingling   Frequency constant   R: Constant  L: intermittant R: constant  L: intermittant   Pain is worst  daytime Daytime Daytime, sometimes at night   Exacerbated by  Using TV remote Using remote, lifting, gripping Using remote, lifting, gripping   Relieved by rest rest Rest, splints, massage   Progression Unchanged L: Graduallly improving  R: Improvement to strength, not pain L: gradually improving  R: Improvement to strength, but pain still persists     Edema (Circumference measured in cm)  Mild   11/3/2020 11/3/2020    R  L   DWC 18.7 19.3      Edema (Circumference measured in cm)   11/3/2020 11/3/2020 12/22/20 1/21/21    Ring R L L L    P1 6.6 6.8 6.5 6.5    PIP        P2 5.4 5.9 5.8 5.8      Scar  Wrist:  11/3/20:   R wrist: Sensitivity: Mild over scar, mild pain at proximal aspect of scar Quality:  Well healed, mild adherence  R thumb: Mild sensitivity over scar, well healed and slightly raised  L RF: Mild sensitivity, slightly open with light red surrounding incision sight. Healing, no sign of infection. Moderate constant pain over scar.    12/22/20:   R wrist: Mild sensitivity, improving  R thumb:Mild sensitivity, improving  L RF: Very mild sensitivity, greatly improved, closed with minimal adherence.    Sensation   11/3/20: Constant paresthesias through R hand and IF, MF, and RF. Intermittent shooting through hands.  12/22/20:  Paresthesias are intermittent, present with use. L hand intermittent tingling through IF, MF, RF with use.  1/21/21: Paresthesias are intermittent through R thumb, IF, MF, and RF. Intermittent tingling through L MF, RF, SF.    ROM  Pain Report: - none  + mild    ++ moderate    +++ severe   Wrist 11/3/2020 11/3/2020   AROM (PROM) R L   Extension 60 66   Flexion 55 45   RD     UD         ROM  Hand 11/3/2020 11/3/2020 12/22/20    AROM(PROM) R L L    Ring MP 0/70 0/45 /60    PIP 0/80 0/55 /80    DIP 0/60 0/63 /75    MCGRATH 210 163 215          Thumb 11/3/2020 11/3/2020   AROM  (PROM) R L   MP -27/60 0/60   IP HE/67 HE/67   RABD     PABD       Palpation  Pain Report:  - none  + mild    ++ moderate    +++ severe    Date 11/3/2020 11/3/2020 12/22/20 12/22/20 1/21/21 1/21/21   Side R L R L R L   Ring finger A1 Pulley  4/10  3-4/10  3-4/10   Ring finger A3 Pulley/PIP  3/10  0/10  NT   CMC 3-4/10  3-4/10  3-4/10    Thumb A1 3-4/10  3-4/10  6-7/10      Strength  11/3/20 Not tested d/t pain     (Measured in pounds)  Pain Report: - none  + mild    ++ moderate    +++ severe    12/22/2020 12/22/2020 1/21/21 1/21/21    Trials R L R L   1  2  3 9 5 7 20   Average         Lat Pinch 12/22/2020 12/22/2020 1/21/21 1/21/21   Trials R L R L   1  2  3 6 6 5 10   Average         3 Pt Pinch 12/22/2020 12/22/2020 1/21/21 1/21/21   Trials R L R L   1  2  3 8 8 5.5 5.5   Average         Assessment:  Response to therapy has been improvement to:  ROM of Fingers: All Planes  Strength:   and pinch  Pain:  frequency is less, intensity of pain is decreased on L hand. Intensity is the same on the R hand.  Response to therapy has been lack of progress in:  Pain:  Minimal change in pain on R hand  Paresthesias:  Minimal improvement in pain on R hand. Paresthesias are less constant, but still frequent.    Overall Assessment:  Patient is becoming more independent in home exercise program  Patient would benefit from continued therapy to achieve rehab potential  STG/LTG:  STGoals have been reviewed;  see goal sheet for details and updates.  LTGoals have been reviewed;  see goal sheet for details and updates.    I have re-evaluated this patient and find that the nature, scope, duration and intensity of the therapy is appropriate for the medical condition of the patient.      Plan:  Frequency:  1 X week, once daily  Duration:  for 8 weeks    Treatment Plan:    Modalities:    US   Therapeutic Exercise:    AROM, AAROM, PROM, Tendon Gliding, Blocking, Isotonics and Isometrics  Therapeutic Activities:   Functional activities   Neuromuscular re-ed:   Nerve Gliding and Stabilization  Manual Techniques:   Joint mobilization, Scar mobilization and Myofascial release  Orthotic Fabrication:    Static orthosis  Self Care:    Ergonomic Considerations    Discharge Plan:    Achieve all LTG.  Independent in home treatment program.  Reach maximal therapeutic benefit.    Home Exercise Program:  Finger PROM  Median nerve gliding, Radial n gliding  Warmth  Scar massage, Cica care  Compression sleeves  Pec stretch and Scapular retraction  Elbow sleeves for cubital  tunnel    Next Visit:  STM  Scar massage  US  Nerve gliding

## 2021-01-27 DIAGNOSIS — R39.9 LOWER URINARY TRACT SYMPTOMS: Primary | ICD-10-CM

## 2021-01-28 ENCOUNTER — THERAPY VISIT (OUTPATIENT)
Dept: OCCUPATIONAL THERAPY | Facility: CLINIC | Age: 66
End: 2021-01-28
Payer: MEDICARE

## 2021-01-28 DIAGNOSIS — M79.641 BILATERAL HAND PAIN: ICD-10-CM

## 2021-01-28 DIAGNOSIS — M79.642 BILATERAL HAND PAIN: ICD-10-CM

## 2021-01-28 PROCEDURE — 97035 APP MDLTY 1+ULTRASOUND EA 15: CPT | Mod: GO | Performed by: OCCUPATIONAL THERAPIST

## 2021-01-28 PROCEDURE — 97112 NEUROMUSCULAR REEDUCATION: CPT | Mod: GO | Performed by: OCCUPATIONAL THERAPIST

## 2021-01-28 PROCEDURE — 97140 MANUAL THERAPY 1/> REGIONS: CPT | Mod: GO | Performed by: OCCUPATIONAL THERAPIST

## 2021-01-28 PROCEDURE — 97110 THERAPEUTIC EXERCISES: CPT | Mod: GO | Performed by: OCCUPATIONAL THERAPIST

## 2021-02-02 ENCOUNTER — THERAPY VISIT (OUTPATIENT)
Dept: OCCUPATIONAL THERAPY | Facility: CLINIC | Age: 66
End: 2021-02-02
Payer: MEDICARE

## 2021-02-02 DIAGNOSIS — M79.641 BILATERAL HAND PAIN: ICD-10-CM

## 2021-02-02 DIAGNOSIS — M79.642 BILATERAL HAND PAIN: ICD-10-CM

## 2021-02-02 PROCEDURE — 97140 MANUAL THERAPY 1/> REGIONS: CPT | Mod: GO | Performed by: OCCUPATIONAL THERAPIST

## 2021-02-02 PROCEDURE — 97035 APP MDLTY 1+ULTRASOUND EA 15: CPT | Mod: GO | Performed by: OCCUPATIONAL THERAPIST

## 2021-02-02 PROCEDURE — 97110 THERAPEUTIC EXERCISES: CPT | Mod: GO | Performed by: OCCUPATIONAL THERAPIST

## 2021-02-02 PROCEDURE — 97112 NEUROMUSCULAR REEDUCATION: CPT | Mod: GO | Performed by: OCCUPATIONAL THERAPIST

## 2021-02-04 ENCOUNTER — TELEPHONE (OUTPATIENT)
Dept: FAMILY MEDICINE | Facility: CLINIC | Age: 66
End: 2021-02-04

## 2021-02-04 ENCOUNTER — PATIENT OUTREACH (OUTPATIENT)
Dept: FAMILY MEDICINE | Facility: CLINIC | Age: 66
End: 2021-02-04

## 2021-02-04 NOTE — TELEPHONE ENCOUNTER
Returned call to pt to answer questions for pt r/t the Covid-19 vaccination.  Pt given number to call  [phone]-to schedule vaccination.    Kendal Guzman, Registered Nurse, Patient Advocate Liaison [facility] [phone]

## 2021-02-09 ENCOUNTER — THERAPY VISIT (OUTPATIENT)
Dept: OCCUPATIONAL THERAPY | Facility: CLINIC | Age: 66
End: 2021-02-09
Payer: MEDICARE

## 2021-02-09 DIAGNOSIS — M79.642 BILATERAL HAND PAIN: ICD-10-CM

## 2021-02-09 DIAGNOSIS — M79.641 BILATERAL HAND PAIN: ICD-10-CM

## 2021-02-09 PROCEDURE — 97140 MANUAL THERAPY 1/> REGIONS: CPT | Mod: GO | Performed by: OCCUPATIONAL THERAPIST

## 2021-02-09 PROCEDURE — 97112 NEUROMUSCULAR REEDUCATION: CPT | Mod: GO | Performed by: OCCUPATIONAL THERAPIST

## 2021-02-09 PROCEDURE — 97035 APP MDLTY 1+ULTRASOUND EA 15: CPT | Mod: GO | Performed by: OCCUPATIONAL THERAPIST

## 2021-02-16 ENCOUNTER — THERAPY VISIT (OUTPATIENT)
Dept: OCCUPATIONAL THERAPY | Facility: CLINIC | Age: 66
End: 2021-02-16
Payer: MEDICARE

## 2021-02-16 DIAGNOSIS — M79.642 BILATERAL HAND PAIN: ICD-10-CM

## 2021-02-16 DIAGNOSIS — M79.641 BILATERAL HAND PAIN: ICD-10-CM

## 2021-02-16 PROCEDURE — 97112 NEUROMUSCULAR REEDUCATION: CPT | Mod: GO | Performed by: OCCUPATIONAL THERAPIST

## 2021-02-16 PROCEDURE — 97140 MANUAL THERAPY 1/> REGIONS: CPT | Mod: GO | Performed by: OCCUPATIONAL THERAPIST

## 2021-02-23 ENCOUNTER — THERAPY VISIT (OUTPATIENT)
Dept: OCCUPATIONAL THERAPY | Facility: CLINIC | Age: 66
End: 2021-02-23
Payer: MEDICARE

## 2021-02-23 DIAGNOSIS — M79.642 BILATERAL HAND PAIN: ICD-10-CM

## 2021-02-23 DIAGNOSIS — M79.641 BILATERAL HAND PAIN: ICD-10-CM

## 2021-02-23 PROCEDURE — 97140 MANUAL THERAPY 1/> REGIONS: CPT | Mod: GO | Performed by: OCCUPATIONAL THERAPIST

## 2021-02-23 PROCEDURE — 97112 NEUROMUSCULAR REEDUCATION: CPT | Mod: GO | Performed by: OCCUPATIONAL THERAPIST

## 2021-02-23 PROCEDURE — 97110 THERAPEUTIC EXERCISES: CPT | Mod: GO | Performed by: OCCUPATIONAL THERAPIST

## 2021-03-04 ENCOUNTER — THERAPY VISIT (OUTPATIENT)
Dept: OCCUPATIONAL THERAPY | Facility: CLINIC | Age: 66
End: 2021-03-04
Payer: MEDICARE

## 2021-03-04 DIAGNOSIS — M79.641 BILATERAL HAND PAIN: ICD-10-CM

## 2021-03-04 DIAGNOSIS — M79.642 BILATERAL HAND PAIN: ICD-10-CM

## 2021-03-04 PROCEDURE — 97140 MANUAL THERAPY 1/> REGIONS: CPT | Mod: GO | Performed by: OCCUPATIONAL THERAPIST

## 2021-03-04 PROCEDURE — 97110 THERAPEUTIC EXERCISES: CPT | Mod: GO | Performed by: OCCUPATIONAL THERAPIST

## 2021-03-04 PROCEDURE — 97035 APP MDLTY 1+ULTRASOUND EA 15: CPT | Mod: GO | Performed by: OCCUPATIONAL THERAPIST

## 2021-03-09 ENCOUNTER — THERAPY VISIT (OUTPATIENT)
Dept: OCCUPATIONAL THERAPY | Facility: CLINIC | Age: 66
End: 2021-03-09
Payer: MEDICARE

## 2021-03-09 DIAGNOSIS — M79.641 BILATERAL HAND PAIN: ICD-10-CM

## 2021-03-09 DIAGNOSIS — M79.642 BILATERAL HAND PAIN: ICD-10-CM

## 2021-03-09 PROCEDURE — 97112 NEUROMUSCULAR REEDUCATION: CPT | Mod: GO | Performed by: OCCUPATIONAL THERAPIST

## 2021-03-09 PROCEDURE — 97140 MANUAL THERAPY 1/> REGIONS: CPT | Mod: GO | Performed by: OCCUPATIONAL THERAPIST

## 2021-03-09 PROCEDURE — 97110 THERAPEUTIC EXERCISES: CPT | Mod: GO | Performed by: OCCUPATIONAL THERAPIST

## 2021-03-09 NOTE — PROGRESS NOTES
Progress Note - Hand Therapy  Reporting period is 1/21/21 - 3/9/21.  Current date: 3/9/21    Subjectve:   Subjective changes as noted by patient:  The R hand has been still painful. The splint  Helps. I feel a lot of pain in the back of the hand. L has been good. With out the splint the finger locks occasionally.  Functional changes noted by patient:  Improvement in Self Care Tasks (dressing, eating) and Household Chores  No Change to Pain on the R hand  Patient has noted adverse reaction to:  None    Objective:  Changes noted in objective findings: Yes, see below . Overall improvement on L hand, minimal improvement on Right hand.    Diagnosis: R CTR, R Trigger thumb release, L Trigger release  MD order: 10/29/20  R thumb: 3 months ago    R wrist: March 2020  L RF: 2 weeks ago  Procedure:  CTR, Trigger release    Pain Level (Scale 0-10)   11/3/2020 11/3/2020 12/22/20 12/22/20 1/21/21 1/21/21 3/9/21 3/9/21   Side Right Left R L R L R L   At Rest 3/10 3-4/10 1-2/10 1-2/10 1-2/10 0/10 1/10 0/10   With Use 7-8/10 8-9/10 9-10/10 5-6/10 10/10 7-8/10 10/10 2-3/10     Pain Description  Date 11/3/2020 12/22/20 1/21/21 3/9/21   Location Right: Wrist, MP of thumb, palm, fingers  Left: RF, near A1 Right: Wrist, MP of thumb, palm, fingers    Left: A1, volar wrist R: Wrist, thenars, dorsal hand, Volar CT    L: RF Volar A1, volar wrist R: Wrist, thenars, dorsal hand, Volar CT    L: RF Volar A1, volar wrist  But less and less   Pain Quality Aching, Sharp and Shooting Aching, shooting, tender, sharp Aching, shooting, tender, sharp, tingling Aching, shooting, tender, sharp, tingling   Frequency constant   R: Constant  L: intermittant R: constant  L: intermittant R: constant  L: intermittant   Pain is worst  daytime Daytime Daytime, sometimes at night Daytime, sometimes at night   Exacerbated by  Using TV remote Using remote, lifting, gripping Using remote, lifting, gripping Using remote, lifting, gripping   Relieved by rest rest  Rest, splints, massage Splint, massage, exercise   Progression Unchanged L: Graduallly improving  R: Improvement to strength, not pain L: gradually improving  R: Improvement to strength, but pain still persists L: improving  R: not improving     Edema (Circumference measured in cm)  Mild   11/3/2020 11/3/2020    R L   DWC 18.7 19.3      Edema (Circumference measured in cm)   11/3/2020 11/3/2020 12/22/20 1/21/21 3/9/21   Ring R L L L L   P1 6.6 6.8 6.5 6.5 6.0   PIP        P2 5.4 5.9 5.8 5.8 5.7     Scar  Wrist:  11/3/20:   R wrist: Sensitivity: Mild over scar, mild pain at proximal aspect of scar Quality:  Well healed, mild adherence  R thumb: Mild sensitivity over scar, well healed and slightly raised  L RF: Mild sensitivity, slightly open with light red surrounding incision sight. Healing, no sign of infection. Moderate constant pain over scar.      12/22/20:   R wrist: Mild sensitivity, improving  R thumb:Mild sensitivity, improving  L RF: Very mild sensitivity, greatly improved, closed with minimal adherence.    3/9/21:  L RF: Mild sensitivity, mild pain occasionally.  R thumb and wrist: Mild sensitivity to touch, however, severe pain when pressure is on these areas.    Sensation   11/3/20: Constant paresthesias through R hand and IF, MF, and RF. Intermittent shooting through hands.  12/22/20:  Paresthesias are intermittent, present with use. L hand intermittent tingling through IF, MF, RF with use.  1/21/21: Paresthesias are intermittent through R thumb, IF, MF, and RF. Intermittent tingling through L MF, RF, SF.  3/9/21: Intermittant tingling through tips of R IF, MF, RF. Intermittent tingling through L IF.    ROM  Pain Report: - none  + mild    ++ moderate    +++ severe   Wrist 11/3/2020 11/3/2020   AROM (PROM) R L   Extension 60 66   Flexion 55 45       ROM  Hand 11/3/2020 11/3/2020 12/22/20 3/9/21   AROM(PROM) R L L L   Ring MP 0/70 0/45 /60 /70   PIP 0/80 0/55 /80 /85   DIP 0/60 0/63 /75 /73   MCGRATH 210 163  215 228         Thumb 11/3/2020 11/3/2020   AROM  (PROM) R L   MP -27/60 0/60   IP HE/67 HE/67     Palpation  Pain Report:  - none  + mild    ++ moderate    +++ severe    Date 11/3/2020 11/3/2020 12/22/20 12/22/20 1/21/21 1/21/21 3/9/21 3/9/21   Side R L R L R L R L   Ring finger A1 Pulley  4/10  3-4/10  3-4/10     Ring finger A3 Pulley/PIP  3/10  0/10  NT  2-3/10   CMC 3-4/10  3-4/10  3-4/10      Thumb A1 3-4/10  3-4/10  6-7/10  5-6/10      Strength  11/3/20 Not tested d/t pain     (Measured in pounds)  Pain Report: - none  + mild    ++ moderate    +++ severe    12/22/2020 12/22/2020 1/21/21 1/21/21 3/9/21 3/9/21   Trials R L R L R L   1  2  3 9 5 7 20 15 14   Average           Lat Pinch 12/22/2020 12/22/2020 1/21/21 1/21/21 3/9/21 3/9/21   Trials R L R L R L   1  2  3 6 6 5 10 8 11   Average           3 Pt Pinch 12/22/2020 12/22/2020 1/21/21 1/21/21 3/9/21 3/9/21   Trials R L R L R L   1  2  3 8 8 5.5 5.5 4 4   Average           Assessment:  Response to therapy has been improvement to:  ROM of Fingers: All Planes on L RF  Pain:  frequency is less, intensity of pain is decreased, duration of pain is decreased and less tender over affected area on L RF  Response to therapy has been lack of progress in:  ROM of R wrist, hand, and thumb.  Pain:  Minimal change on R hand  Paresthesias:  Median nerve - minimal change on R hand    Overall Assessment:  Patient's symptoms are resolving in L Ring finger, and pt is ready to discontinue therapy for L ring finger and continue with HEP.  Patient would benefit from further evaluation of L thumb pain and R wrist and hand pain that persists.    STG/LTG:  STGoals have been reviewed;  see goal sheet for details and updates.  LTGoals have been reviewed;  see goal sheet for details and updates.    I have re-evaluated this patient and find that the nature, scope, duration and intensity of the therapy is appropriate for the medical condition of the patient.    Plan:  Pt will suspend  therapy at this time, until he has seen MD about symptoms that persist. May resume therapy in the future if deemed appropriate by MD.    Home Exercise Program:  Finger PROM  Median nerve gliding, Radial n gliding  Warmth  Scar massage, Cica care  Compression sleeves  Pec stretch and Scapular retraction  Elbow sleeves for cubital tunnel  CMC stability exercises    Next Visit:  STM  Scar massage  US  Nerve gliding

## 2021-03-11 ENCOUNTER — IMMUNIZATION (OUTPATIENT)
Dept: NURSING | Facility: CLINIC | Age: 66
End: 2021-03-11
Payer: MEDICARE

## 2021-03-11 PROCEDURE — 0001A PR COVID VAC PFIZER DIL RECON 30 MCG/0.3 ML IM: CPT

## 2021-03-11 PROCEDURE — 91300 PR COVID VAC PFIZER DIL RECON 30 MCG/0.3 ML IM: CPT

## 2021-03-22 ENCOUNTER — OFFICE VISIT (OUTPATIENT)
Dept: ORTHOPEDICS | Facility: CLINIC | Age: 66
End: 2021-03-22
Payer: MEDICARE

## 2021-03-22 VITALS
WEIGHT: 235 LBS | HEIGHT: 69 IN | BODY MASS INDEX: 34.8 KG/M2 | DIASTOLIC BLOOD PRESSURE: 66 MMHG | SYSTOLIC BLOOD PRESSURE: 120 MMHG

## 2021-03-22 DIAGNOSIS — M77.8 HAND TENDINITIS: ICD-10-CM

## 2021-03-22 DIAGNOSIS — M19.031 CMC DJD(CARPOMETACARPAL DEGENERATIVE JOINT DISEASE), LOCALIZED PRIMARY, RIGHT: ICD-10-CM

## 2021-03-22 DIAGNOSIS — M79.644 THUMB PAIN, RIGHT: ICD-10-CM

## 2021-03-22 DIAGNOSIS — M25.331 CARPAL INSTABILITY OF RIGHT WRIST: ICD-10-CM

## 2021-03-22 DIAGNOSIS — R20.2 PARESTHESIA OF HAND, BILATERAL: ICD-10-CM

## 2021-03-22 DIAGNOSIS — G56.01 CARPAL TUNNEL SYNDROME OF RIGHT WRIST: Primary | ICD-10-CM

## 2021-03-22 PROCEDURE — 20600 DRAIN/INJ JOINT/BURSA W/O US: CPT | Mod: RT | Performed by: ORTHOPAEDIC SURGERY

## 2021-03-22 PROCEDURE — 99213 OFFICE O/P EST LOW 20 MIN: CPT | Mod: 25 | Performed by: ORTHOPAEDIC SURGERY

## 2021-03-22 RX ORDER — LIDOCAINE HYDROCHLORIDE 10 MG/ML
1 INJECTION, SOLUTION INFILTRATION; PERINEURAL
Status: DISCONTINUED | OUTPATIENT
Start: 2021-03-22 | End: 2021-04-19

## 2021-03-22 RX ORDER — TESTOSTERONE CYPIONATE 200 MG/ML
1 INJECTION INTRAMUSCULAR
Status: DISCONTINUED | OUTPATIENT
Start: 2021-03-22 | End: 2021-04-19

## 2021-03-22 RX ADMIN — TESTOSTERONE CYPIONATE 1 ML: 200 INJECTION INTRAMUSCULAR at 14:36

## 2021-03-22 RX ADMIN — LIDOCAINE HYDROCHLORIDE 1 ML: 10 INJECTION, SOLUTION INFILTRATION; PERINEURAL at 14:36

## 2021-03-22 ASSESSMENT — MIFFLIN-ST. JEOR: SCORE: 1836.33

## 2021-03-22 NOTE — PATIENT INSTRUCTIONS
1. Carpal tunnel syndrome of right wrist    2. Thumb pain, right    3. CMC DJD(carpometacarpal degenerative joint disease), localized primary, right    4. Paresthesia of hand, bilateral      Steroid injection of the right thumb CMC joint was performed today in clinic    - Would not soak in a hot tub, bath or swimming pool for 48 hours  - Ok to shower  - Ice today and only do your normal amounts of activity  - The lidocaine (what is giving you pain relief right now) will likely stop working in 1-2 hours.  You will then have pain again, similar to before you received the injection. The corticosteroid will not start working until approximately 1-2 weeks from now.  In a small percentage of people, cortisone can cause flushing/redness in the face. This usually lasts for 1-3 days and resolves. Cool compress and Ibuprofen/Tylenol can help if this happens.      Bilateral EMG studies have been ordered. Their office will call you to schedule this testing.     Follow up with Dr. Frias after completion of EMG studies.     Call my office with any questions or concerns, [phone].

## 2021-03-22 NOTE — LETTER
3/22/2021         RE: Kashmir Yao  [address] [facility] [address]        Dear Colleague,    Thank you for referring your patient, Kashmir Yao, to the [facility] [place]. Please see a copy of my visit note below.    HISTORY OF PRESENT ILLNESS:    Kashmir Yao is a 65 year old male who is seen in follow up for right thumb pain, and discussion of CMC cortisone injection today.  Patient was last evaluated by my partner Dr. Love on 1/13/21.  Patient is here today to discuss injections in his hands.   Present symptoms: right thumb, and radial sided wrist pain that is persistent. He reports this pain rates 7/10. He reports he continues to have tingling of the left finger tips. He occasionally will have a surge of pain along the left long finger.  He has noticed improved symptoms on the left hand, but little relief on the right.     He is complaining of persisting paresthesia symptoms in the left hand.  He was supposed to have an EMG for that side but he never followed through.    It appears that he did not follow through on recent order of an EMG on the right that was made by Dr. Love.  Patient reports Covid-19 vaccine dose 1, 10 days ago, and second dose due on 4/1/21.  Treatments tried to this point: hand therapy, right thumb trigger finger release, 9/5/20, right carpal tunnel release, 3/11/20  Past Medical History: Unchanged from the visit of 1/13/21. Please refer to that note.    REVIEW OF SYSTEMS:  CONSTITUTIONAL:  NEGATIVE for fever, chills, change in weight  INTEGUMENTARY/SKIN:  NEGATIVE for worrisome rashes, moles or lesions  EYES:  NEGATIVE for vision changes or irritation  ENT/MOUTH:  NEGATIVE for ear, mouth and throat problems  RESP:  NEGATIVE for significant cough or SOB  BREAST:  NEGATIVE for masses, tenderness or discharge  CV:  NEGATIVE for chest pain, palpitations or peripheral edema  GI:  NEGATIVE for nausea, abdominal pain, heartburn, or  "change in bowel habits  :  Negative   MUSCULOSKELETAL:  See HPI above  NEURO:  NEGATIVE for weakness, dizziness or paresthesias  ENDOCRINE:  NEGATIVE for temperature intolerance, skin/hair changes  HEME/ALLERGY/IMMUNE:  NEGATIVE for bleeding problems  PSYCHIATRIC:  NEGATIVE for changes in mood or affect    PHYSICAL EXAM:  /66 (BP Location: Right arm, Patient Position: Chair, Cuff Size: Adult Regular)   Ht 1.753 m (5' 9\")   Wt 106.6 kg (235 lb)   BMI 34.70 kg/m    Body mass index is 34.7 kg/m .   GENERAL APPEARANCE: healthy, alert and no distress   SKIN: no suspicious lesions or rashes  NEURO: Normal strength and tone, mentation intact and speech normal  VASCULAR:  good pulses, and cappillary refill   LYMPH: no lymphadenopathy   PSYCH:  mentation appears normal and affect normal/bright    MSK:  Not in acute distress  Normal gait  No gross deformities of both wrists and hands  No swelling or erythema  Incision sites of the right carpal tunnel release and the left long and ring trigger finger releases have healed well  Minimal scarring at the ring trigger finger release site  Left the ring finger has slight catching with a movement  Persisting mild tenderness at the incision site and A1 pulley region  Decrease sensation of the left thumb, index and long fingers    Right wrist has localized tenderness in the midportion of the wrist joint  Palpable pain at the thumb CMC joint    A1 pulleys are not tender  No catching or locking    IMAGING INTERPRETATION: None taken today     ASSESSMENT:  Scapholunate diastases, 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  Presence of diastases between the scaphoid and lunate were explained.  Degenerative " changes between trapezium/trapezial against capitate was pointed out.  Degenerative changes of the thumb CMC joint and MCP joint were also pointed out.  As outlined by Dr. Love, follow-up EMG for the right side might be helpful to see if there has been interval changes from carpal tunnel release.  In addition EMG study for the left upper extremity would be helpful to assess his paresthesias symptoms of left hand.    He wants to go over the cord injection which was somewhat painful for him.  What to expect from the cord injection was explained.  We may repeat the injection in 2 months.  Bilateral upper extremity EMGs ordered.      Hand / Upper Extremity Injection/Arthrocentesis: R thumb CMC    Date/Time: 3/22/2021 2:36 PM  Performed by: Rick Frias MD  Authorized by: Rick Frias MD     Indications:  Pain  Needle Size:  27 G  Guidance: landmark    Approach:  Radial  Condition: osteoarthritis    Location:  Thumb  Site:  R thumb CMC    Medications:  1 mL dexamethasone 120 MG/30ML; 1 mL lidocaine 1 %  Outcome:  Tolerated well, no immediate complications  Procedure discussed: discussed risks, benefits, and alternatives    Consent Given by:  Patient  Timeout: timeout called immediately prior to procedure            Rick Frias MD  Dept. Orthopedic Surgery  [facility]       Disclaimer: This note consists of symbols derived from keyboarding, dictation and/or voice recognition software. As a result, there may be errors in the script that have gone undetected. Please consider this when interpreting information found in this chart.        Again, thank you for allowing me to participate in the care of your patient.        Sincerely,        Rick Frias MD

## 2021-03-22 NOTE — PROGRESS NOTES
HISTORY OF PRESENT ILLNESS:    Kashmir Yao is a 65 year old male who is seen in follow up for right thumb pain, and discussion of CMC cortisone injection today.  Patient was last evaluated by my partner Dr. Love on 1/13/21.  Patient is here today to discuss injections in his hands.   Present symptoms: right thumb, and radial sided wrist pain that is persistent. He reports this pain rates 7/10. He reports he continues to have tingling of the left finger tips. He occasionally will have a surge of pain along the left long finger.  He has noticed improved symptoms on the left hand, but little relief on the right.     He is complaining of persisting paresthesia symptoms in the left hand.  He was supposed to have an EMG for that side but he never followed through.    It appears that he did not follow through on recent order of an EMG on the right that was made by Dr. Love.  Patient reports Covid-19 vaccine dose 1, 10 days ago, and second dose due on 4/1/21.  Treatments tried to this point: hand therapy, right thumb trigger finger release, 9/5/20, right carpal tunnel release, 3/11/20  Past Medical History: Unchanged from the visit of 1/13/21. Please refer to that note.    REVIEW OF SYSTEMS:  CONSTITUTIONAL:  NEGATIVE for fever, chills, change in weight  INTEGUMENTARY/SKIN:  NEGATIVE for worrisome rashes, moles or lesions  EYES:  NEGATIVE for vision changes or irritation  ENT/MOUTH:  NEGATIVE for ear, mouth and throat problems  RESP:  NEGATIVE for significant cough or SOB  BREAST:  NEGATIVE for masses, tenderness or discharge  CV:  NEGATIVE for chest pain, palpitations or peripheral edema  GI:  NEGATIVE for nausea, abdominal pain, heartburn, or change in bowel habits  :  Negative   MUSCULOSKELETAL:  See HPI above  NEURO:  NEGATIVE for weakness, dizziness or paresthesias  ENDOCRINE:  NEGATIVE for temperature intolerance, skin/hair changes  HEME/ALLERGY/IMMUNE:  NEGATIVE for bleeding problems  PSYCHIATRIC:   "NEGATIVE for changes in mood or affect    PHYSICAL EXAM:  /65 (BP Location: Right arm, Patient Position: Chair, Cuff Size: Adult Regular)   Ht 1.753 m (5' 9\")   Wt 106.6 kg (235 lb)   BMI 34.70 kg/m    Body mass index is 34.7 kg/m .   GENERAL APPEARANCE: healthy, alert and no distress   SKIN: no suspicious lesions or rashes  NEURO: Normal strength and tone, mentation intact and speech normal  VASCULAR:  good pulses, and cappillary refill   LYMPH: no lymphadenopathy   PSYCH:  mentation appears normal and affect normal/bright    MSK:  Not in acute distress  Normal gait  No gross deformities of both wrists and hands  No swelling or erythema  Incision sites of the right carpal tunnel release and the left long and ring trigger finger releases have healed well  Minimal scarring at the ring trigger finger release site  Left the ring finger has slight catching with a movement  Persisting mild tenderness at the incision site and A1 pulley region  Decrease sensation of the left thumb, index and long fingers    Right wrist has localized tenderness in the midportion of the wrist joint  Palpable pain at the thumb CMC joint    A1 pulleys are not tender  No catching or locking    IMAGING INTERPRETATION: None taken today     ASSESSMENT:  Scapholunate diastases, 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  Presence of diastases between the scaphoid and lunate were explained.  Degenerative changes between trapezium/trapezial against capitate was pointed out.  Degenerative changes of the thumb CMC joint and MCP joint were also pointed out.  As outlined by Dr. Love, follow-up EMG for the right side might be helpful to see if there has been interval " changes from carpal tunnel release.  In addition EMG study for the left upper extremity would be helpful to assess his paresthesias symptoms of left hand.    He wants to go over the cord injection which was somewhat painful for him.  What to expect from the cord injection was explained.  We may repeat the injection in 2 months.  Bilateral upper extremity EMGs ordered.      Hand / Upper Extremity Injection/Arthrocentesis: R thumb CMC    Date/Time: 3/22/2021 2:36 PM  Performed by: Rick Frias MD  Authorized by: Rick Frias MD     Indications:  Pain  Needle Size:  27 G  Guidance: landmark    Approach:  Radial  Condition: osteoarthritis    Location:  Thumb  Site:  R thumb CMC    Medications:  1 mL dexamethasone 120 MG/30ML; 1 mL lidocaine 1 %  Outcome:  Tolerated well, no immediate complications  Procedure discussed: discussed risks, benefits, and alternatives    Consent Given by:  Patient  Timeout: timeout called immediately prior to procedure            Rick Frias MD  Dept. Orthopedic Surgery  [facility]       Disclaimer: This note consists of symbols derived from keyboarding, dictation and/or voice recognition software. As a result, there may be errors in the script that have gone undetected. Please consider this when interpreting information found in this chart.

## 2021-04-01 ENCOUNTER — IMMUNIZATION (OUTPATIENT)
Dept: NURSING | Facility: CLINIC | Age: 66
End: 2021-04-01
Attending: INTERNAL MEDICINE
Payer: MEDICARE

## 2021-04-01 PROCEDURE — 0002A PR COVID VAC PFIZER DIL RECON 30 MCG/0.3 ML IM: CPT

## 2021-04-01 PROCEDURE — 91300 PR COVID VAC PFIZER DIL RECON 30 MCG/0.3 ML IM: CPT

## 2021-04-02 ENCOUNTER — TELEPHONE (OUTPATIENT)
Dept: FAMILY MEDICINE | Facility: CLINIC | Age: 66
End: 2021-04-02

## 2021-04-06 ENCOUNTER — OFFICE VISIT (OUTPATIENT)
Dept: NEUROLOGY | Facility: CLINIC | Age: 66
End: 2021-04-06
Attending: ORTHOPAEDIC SURGERY
Payer: MEDICARE

## 2021-04-06 ENCOUNTER — OFFICE VISIT (OUTPATIENT)
Dept: NEUROLOGY | Facility: CLINIC | Age: 66
End: 2021-04-06
Payer: MEDICARE

## 2021-04-06 DIAGNOSIS — G56.01 CARPAL TUNNEL SYNDROME OF RIGHT WRIST: Primary | ICD-10-CM

## 2021-04-06 DIAGNOSIS — G56.23 ULNAR NEUROPATHY OF BOTH UPPER EXTREMITIES: ICD-10-CM

## 2021-04-06 DIAGNOSIS — R29.898 OTHER SYMPTOMS AND SIGNS INVOLVING THE MUSCULOSKELETAL SYSTEM: ICD-10-CM

## 2021-04-06 DIAGNOSIS — G56.03 BILATERAL CARPAL TUNNEL SYNDROME: Primary | ICD-10-CM

## 2021-04-06 PROCEDURE — 95912 NRV CNDJ TEST 11-12 STUDIES: CPT | Mod: GC | Performed by: PSYCHIATRY & NEUROLOGY

## 2021-04-06 PROCEDURE — 99207 US EXTREMITY NON VASCULAR RIGHT: CPT | Performed by: PSYCHIATRY & NEUROLOGY

## 2021-04-06 PROCEDURE — 76882 US LMTD JT/FCL EVL NVASC XTR: CPT | Mod: RT | Performed by: PSYCHIATRY & NEUROLOGY

## 2021-04-06 NOTE — PROGRESS NOTES
[facility]  Electrodiagnostic Laboratory    Nerve Conduction & EMG Report    Patient:       Kashmir Yao  Patient ID:    [id number]  Gender:        Male  YOB: 1955  Age:           65 Years 10 Months      Referring provider: Dr. Rick Frias    History & Examination:    Mr. Yao is a 65 year old man who is presenting with bilateral hand pain and paresthesias, right greater than left.  He underwent carpal tunnel release on the right in March 2020.  Overall he feels his symptoms are unchanged since the procedure.  Nerve conduction studies were performed to assess for compressive neuropathies. A previous EMG study done pre-operatively in February 2020 in another institution is available for review and comparison.     Techniques: Sensory and motor conduction studies were done with surface recording electrodes. Needle EMG was not performed.     Results:    Bilateral median antidromic sensory responses to digit II showed reduced amplitudes and markedly reduced conduction velocities.  Bilateral ulnar antidromic sensory responses to digit V were normal. Right radial antidromic sensory response was normal. Right median palmar mixed response was NR. Left median and ulnar palmar mixed comparison study showed a peak latency difference of 0.68 ms (normal is <0.4 ms). Right median motor response recorded from APB showed a markedly prolonged distal latency, normal amplitudes, and mildly reduced conduction velocity.  Left median motor response recorded from APB showed a moderately prolonged distal latency, normal amplitudes, and a mildly reduced conduction velocity.  Bilateral ulnar motor responses recorded from ADM showed normal distal latencies and amplitudes, normal conduction velocities at the forearm and upper arm, and reduced conduction velocity across the elbow.  Right median ulnar lumbrical palmar interossei comparison study showed a distal latency difference of 3.29 ms (normal is <0.5 ms).      Interpretation:    This is an abnormal study. 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  In comparison to the previous study performed in February 2020, the right median neuropathy is overall unchanged (or at most minimally improved), and the left is slightly worse. The ulnar motor studies were overall unchanged from the prior study. An ultrasound was performed today to evaluate the anatomical reason for persistent slowing of the right median nerve at the wrist despite carpal tunnel release surgery. Please see separate report for further details.    EMG Physician:     Arthur Carrillo MD  Neuromuscular Fellow    ATTENDING ADDENDUM: I was present during the entire study and directly supervised Fellow Dr Carrillo, who performed it. I agree with the analysis of results and interpretation as above, which I personally reviewed and edited. Alex Varghese MD      Sensory NCS      Nerve / Sites Rec. Site Onset Peak Ref. NP Amp Ref. PP Amp Dist Troy Ref. Temp     ms ms ms  V  V  V cm m/s m/s  C   R MEDIAN - Dig II Anti      Wrist Dig II 4.95 6.41  8.3 10.0 9.0 14 28.3 48.0 32   L MEDIAN - Dig II Anti      Wrist Dig II 4.74 6.09  7.3 10.0 18.8 14 29.5 48.0 32   R ULNAR - Dig V Anti      Wrist Dig V 2.55 3.23  9.8 8.0 8.3 12.5 49.0 48.0 32   L ULNAR - Dig V Anti      Wrist Dig V 2.50 3.28  18.7 8.0 25.6 12.5 50.0 48.0 32   R RADIAL - Snuff      Forearm Snuff 1.88 2.34  18.3 15.0 19.1 10 53.3 48.0 32   R MEDIAN - Ulnar - Palmar      Median Wrist NR NR 2.40 NR  NR 8 NR  32   L MEDIAN - Ulnar - Palmar      Median Wrist 1.67 2.50 2.40 7.3  19.4 8 48.0  25.3      Ulnar Wrist 1.30 1.82 2.40 13.8  26.0 8 61.4  25.3       Motor NCS      Nerve / Sites Rec. Site Lat Ref. Amp Ref. Rel Amp Dist Troy Ref. Dur. Area Temp.     ms ms mV mV % cm m/s m/s ms %  C   R MEDIAN -  APB      Wrist APB 7.81 4.40 5.3 5.0 100 8   9.95 100 32      Elbow APB 13.02  4.6  87 24.5 47.0 48.0 10.26 91.8 32   L MEDIAN - APB      Wrist APB 5.68 4.40 9.0 5.0 100 8   7.97 100 32      Elbow APB 10.10  8.9  99.3 21 47.4 48.0 8.39 94.5 32   R ULNAR - ADM      Wrist ADM 2.66 3.50 9.5 5.0 100 8   6.77 100 32      B.Elbow ADM 6.41  9.0  95.1 20 53.3 48.0 6.98 95.5 32      A.Elbow ADM 9.64  8.5  89.4 12.5 38.7 48.0 7.24 89.9 32      Axilla ADM 11.09  8.7  92.2 9 61.7 48.0 7.60 93.7 32   L ULNAR - ADM      Wrist ADM 2.50 3.50 10.5 5.0 100 8   6.93 100 32      B.Elbow ADM 5.99  10.4  99 18.5 53.0 48.0 7.24 102 32      A.Elbow ADM 8.65  10.3  98.2 11 41.4 48.0 7.60 99.5 32      Axilla ADM 10.36  10.3  98.7 9.5 55.3 48.0 7.71 98.8 32   R MEDIAN - II Lumb      Median II Lumb 6.15  1.4  100 10   7.29 100 32      Ulnar Palm Int 2.86  3.5  252 10   6.30 182 32

## 2021-04-06 NOTE — LETTER
"4/6/2021       RE: Kashmir Yao  [address] [facility] [address]     Dear Colleague,    Thank you for referring your patient, Kashmir Yao, to the [facility] EMG CLINIC [place] at [facility]. Please see a copy of my visit note below.    Neuromuscular Ultrasound Report   Patient:  Kashmir Yao  Patient ID: [id number]  YOB: 1955  Age: 65 years  Referring Physician: Rick Frias MD    History & Examination:   65 year old man with bilateral hand paresthesias, right>left. EMG studies done in 2/2020 and today (4/6/2021) showed bilateral carpal tunnel syndrome, moderate to severe on the right and moderate on the left. He underwent right carpal tunnel release surgery in 3/2020 but unfortunately his right hand's symptoms persist, and the two EMG studies (pre-op and post-op) do not appear significantly different with regard to the right median nerve. Ultrasonography of the right median nerve was requested to evaluate for ongoing right median nerve compression.   Techniques:   Ultrasound of the right median nerve was performed with a 15 Mhz transducer.   Results:   Right median nerve showed increased cross sectional area (CSA), reduced echogenicity and disturbed fascicular architecture at the carpal tunnel (CSA 0,15 cm2, normal is <0,11 cm2). There was no evidence of bifid median artery, abnormal palmaris muscle, or marked tenosynovitism, intraneural or extraneural scar, causing nerve compression. However, longitudinal images showed a clear \"taper sign\" of the median nerve upon entering the carpal tunnel, indicating likely persistent compression from the transverse carpal ligament. Images were stored and are available for review.   Interpretation:   Abnormal study, showing ultrasonographic evidence of right median neuropathy at the carpal tunnel syndrome, likely due to incomplete transverse carpal ligament release.   Ultrasonographer: "   Alex Varghese MD, FAAN   of Neurology    Again, thank you for allowing me to participate in the care of your patient.      Sincerely,    Alxe Varghese MD

## 2021-04-06 NOTE — LETTER
4/6/2021       RE: Kashmir Yao  [address] [facility] [address]     Dear Colleague,    Thank you for referring your patient, Kashmir Yao, to the [facility] EMG CLINIC [place] at [facility]. Please see a copy of my visit note below.        [facility]  Electrodiagnostic Laboratory    Nerve Conduction & EMG Report    Patient:       Kashmir Yao  Patient ID:    [id number]  Gender:        Male  YOB: 1955  Age:           65 Years 10 Months      Referring provider: Dr. Rick Frias    History & Examination:    Mr. Yao is a 65 year old man who is presenting with bilateral hand pain and paresthesias, right greater than left.  He underwent carpal tunnel release on the right in March 2020.  Overall he feels his symptoms are unchanged since the procedure.  Nerve conduction studies were performed to assess for compressive neuropathies. A previous EMG study done pre-operatively in February 2020 in another institution is available for review and comparison.     Techniques: Sensory and motor conduction studies were done with surface recording electrodes. Needle EMG was not performed.     Results:    Bilateral median antidromic sensory responses to digit II showed reduced amplitudes and markedly reduced conduction velocities.  Bilateral ulnar antidromic sensory responses to digit V were normal. Right radial antidromic sensory response was normal. Right median palmar mixed response was NR. Left median and ulnar palmar mixed comparison study showed a peak latency difference of 0.68 ms (normal is <0.4 ms). Right median motor response recorded from APB showed a markedly prolonged distal latency, normal amplitudes, and mildly reduced conduction velocity.  Left median motor response recorded from APB showed a moderately prolonged distal latency, normal amplitudes, and a mildly reduced conduction velocity.  Bilateral ulnar motor  responses recorded from ADM showed normal distal latencies and amplitudes, normal conduction velocities at the forearm and upper arm, and reduced conduction velocity across the elbow.  Right median ulnar lumbrical palmar interossei comparison study showed a distal latency difference of 3.29 ms (normal is <0.5 ms).     Interpretation:    This is an abnormal study. 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  In comparison to the previous study performed in February 2020, the right median neuropathy is overall unchanged (or at most minimally improved), and the left is slightly worse. The ulnar motor studies were overall unchanged from the prior study. An ultrasound was performed today to evaluate the anatomical reason for persistent slowing of the right median nerve at the wrist despite carpal tunnel release surgery. Please see separate report for further details.    EMG Physician:     Arthur Carrillo MD  Neuromuscular Fellow    ATTENDING ADDENDUM: I was present during the entire study and directly supervised Fellow Dr Carrillo, who performed it. I agree with the analysis of results and interpretation as above, which I personally reviewed and edited. Alex Varghese MD      Sensory NCS      Nerve / Sites Rec. Site Onset Peak Ref. NP Amp Ref. PP Amp Dist Troy Ref. Temp     ms ms ms  V  V  V cm m/s m/s  C   R MEDIAN - Dig II Anti      Wrist Dig II 4.95 6.41  8.3 10.0 9.0 14 28.3 48.0 32   L MEDIAN - Dig II Anti      Wrist Dig II 4.74 6.09  7.3 10.0 18.8 14 29.5 48.0 32   R ULNAR - Dig V Anti      Wrist Dig V 2.55 3.23  9.8 8.0 8.3 12.5 49.0 48.0 32   L ULNAR - Dig V Anti      Wrist Dig V 2.50 3.28  18.7 8.0 25.6 12.5 50.0 48.0 32   R RADIAL - Snuff      Forearm Snuff 1.88 2.34  18.3 15.0 19.1 10 53.3 48.0 32   R MEDIAN - Ulnar - Palmar      Median Wrist NR  NR 2.40 NR  NR 8 NR  32   L MEDIAN - Ulnar - Palmar      Median Wrist 1.67 2.50 2.40 7.3  19.4 8 48.0  25.3      Ulnar Wrist 1.30 1.82 2.40 13.8  26.0 8 61.4  25.3       Motor NCS      Nerve / Sites Rec. Site Lat Ref. Amp Ref. Rel Amp Dist Troy Ref. Dur. Area Temp.     ms ms mV mV % cm m/s m/s ms %  C   R MEDIAN - APB      Wrist APB 7.81 4.40 5.3 5.0 100 8   9.95 100 32      Elbow APB 13.02  4.6  87 24.5 47.0 48.0 10.26 91.8 32   L MEDIAN - APB      Wrist APB 5.68 4.40 9.0 5.0 100 8   7.97 100 32      Elbow APB 10.10  8.9  99.3 21 47.4 48.0 8.39 94.5 32   R ULNAR - ADM      Wrist ADM 2.66 3.50 9.5 5.0 100 8   6.77 100 32      B.Elbow ADM 6.41  9.0  95.1 20 53.3 48.0 6.98 95.5 32      A.Elbow ADM 9.64  8.5  89.4 12.5 38.7 48.0 7.24 89.9 32      Axilla ADM 11.09  8.7  92.2 9 61.7 48.0 7.60 93.7 32   L ULNAR - ADM      Wrist ADM 2.50 3.50 10.5 5.0 100 8   6.93 100 32      B.Elbow ADM 5.99  10.4  99 18.5 53.0 48.0 7.24 102 32      A.Elbow ADM 8.65  10.3  98.2 11 41.4 48.0 7.60 99.5 32      Axilla ADM 10.36  10.3  98.7 9.5 55.3 48.0 7.71 98.8 32   R MEDIAN - II Lumb      Median II Lumb 6.15  1.4  100 10   7.29 100 32      Ulnar Palm Int 2.86  3.5  252 10   6.30 182 32                                Again, thank you for allowing me to participate in the care of your patient.      Sincerely,    Alex Varghese MD

## 2021-04-06 NOTE — PROGRESS NOTES
"Neuromuscular Ultrasound Report   Patient:  Kashmir Yao  Patient ID: [id number]  YOB: 1955  Age: 65 years  Referring Physician: Rick Frias MD    History & Examination:   65 year old man with bilateral hand paresthesias, right>left. EMG studies done in 2/2020 and today (4/6/2021) showed bilateral carpal tunnel syndrome, moderate to severe on the right and moderate on the left. He underwent right carpal tunnel release surgery in 3/2020 but unfortunately his right hand's symptoms persist, and the two EMG studies (pre-op and post-op) do not appear significantly different with regard to the right median nerve. Ultrasonography of the right median nerve was requested to evaluate for ongoing right median nerve compression.   Techniques:   Ultrasound of the right median nerve was performed with a 15 Mhz transducer.   Results:   Right median nerve showed increased cross sectional area (CSA), reduced echogenicity and disturbed fascicular architecture at the carpal tunnel (CSA 0,15 cm2, normal is <0,11 cm2). There was no evidence of bifid median artery, abnormal palmaris muscle, or marked tenosynovitism, intraneural or extraneural scar, causing nerve compression. However, longitudinal images showed a clear \"taper sign\" of the median nerve upon entering the carpal tunnel, indicating likely persistent compression from the transverse carpal ligament. Images were stored and are available for review.   Interpretation:   Abnormal study, showing ultrasonographic evidence of right median neuropathy at the carpal tunnel syndrome, likely due to incomplete transverse carpal ligament release.     Ultrasonographer:   Alex Varghese MD, FAAN   of Neurology        "

## 2021-04-07 ENCOUNTER — PATIENT OUTREACH (OUTPATIENT)
Dept: FAMILY MEDICINE | Facility: CLINIC | Age: 66
End: 2021-04-07

## 2021-04-07 NOTE — TELEPHONE ENCOUNTER
"Pt left message for  RN on 4/5/21 regarding his appointment on Wed 4/7/21 and would like a call back.  RN was out of the office.    Pt called again today, 4/7/21 @ 0911 while RN on another call and stated he has, \"a very bad headache and I think maybe I should cancel my appointment w/Dr. Noel.  I dunno.  I would like to talk to you about this.\"    RN PAL called pt back but call went to  and RN was unable to leave a message for pt as pt's VM was full.    Will await a return call from pt.    Kendal Guzman, Registered Nurse, PAL (Patient Advocate Liaison)  [facility]     [phone]        "

## 2021-04-12 ENCOUNTER — TELEPHONE (OUTPATIENT)
Dept: FAMILY MEDICINE | Facility: CLINIC | Age: 66
End: 2021-04-12

## 2021-04-12 NOTE — TELEPHONE ENCOUNTER
"Pt calling to reschedule hi appointment from last week.  Pt continues to have, \"issues with the Bell's Palsy.\"      Pt states he is having continues issues eating and drinking and food and liquids are, \" running down my face,\" when he eats.    Pt was calling from his car and was uncertain if the date and huma RN offered him for an appointment would would with his schedule and will call back when he gets home.    Will await pt's return call.      Kendal Guzman, Registered Nurse, PAL (Patient Advocate Liaison)  [facility]     [phone]    "

## 2021-04-14 NOTE — TELEPHONE ENCOUNTER
"Called pt and scheduled appointment for pt w/Dr. Noel on Tue 4/20/21 @ 2:00 PM for, \"my Bell's Palsy.  I'm having trouble eating and drinking.  I'd like to be remembered on your cancellation list if I can get in sooner.\"    RN will call pt if an open appointment becomes available before next Tuesday.    Kendal Guzman, Registered Nurse, PAL (Patient Advocate Liaison)  [facility]     [phone]     "

## 2021-04-14 NOTE — TELEPHONE ENCOUNTER
"Pt left message for PAL RN stating he would like an appointment, \"on Thursday afternoon after 4:00 PM w/Dr. Noel,\" and requested RN to call him back.    RN called pt but pt's mailbox is full and cannot accept any messages @ this time.  Will try to call pt again later today.    Kendal Guzman, Registered Nurse, PAL (Patient Advocate Liaison)  [facility]     [phone]        "

## 2021-04-15 ENCOUNTER — TRANSFERRED RECORDS (OUTPATIENT)
Dept: HEALTH INFORMATION MANAGEMENT | Facility: CLINIC | Age: 66
End: 2021-04-15

## 2021-04-19 ENCOUNTER — OFFICE VISIT (OUTPATIENT)
Dept: ORTHOPEDICS | Facility: CLINIC | Age: 66
End: 2021-04-19
Payer: MEDICARE

## 2021-04-19 VITALS
HEIGHT: 69 IN | SYSTOLIC BLOOD PRESSURE: 124 MMHG | DIASTOLIC BLOOD PRESSURE: 70 MMHG | BODY MASS INDEX: 34.96 KG/M2 | WEIGHT: 236 LBS

## 2021-04-19 DIAGNOSIS — R20.2 RIGHT HAND PARESTHESIA: ICD-10-CM

## 2021-04-19 DIAGNOSIS — R20.2 PARESTHESIA OF HAND, BILATERAL: ICD-10-CM

## 2021-04-19 DIAGNOSIS — M16.0 PRIMARY OSTEOARTHRITIS OF BOTH HIPS: ICD-10-CM

## 2021-04-19 DIAGNOSIS — M79.644 THUMB PAIN, RIGHT: ICD-10-CM

## 2021-04-19 DIAGNOSIS — M19.031 CMC DJD(CARPOMETACARPAL DEGENERATIVE JOINT DISEASE), LOCALIZED PRIMARY, RIGHT: ICD-10-CM

## 2021-04-19 DIAGNOSIS — G56.01 CARPAL TUNNEL SYNDROME OF RIGHT WRIST: Primary | ICD-10-CM

## 2021-04-19 PROCEDURE — 99213 OFFICE O/P EST LOW 20 MIN: CPT | Performed by: ORTHOPAEDIC SURGERY

## 2021-04-19 ASSESSMENT — MIFFLIN-ST. JEOR: SCORE: 1840.87

## 2021-04-19 NOTE — PROGRESS NOTES
"HISTORY OF PRESENT ILLNESS:    Kashmir Yao is a 65 year old male who is seen in follow up for continuing right hand pain involving the thumb and the rest of the hand.  He feels as if his hand is can to fall out at times.    Recent EMG suggested continuing carpal tunnel syndrome.  Present symptoms: Pain and paresthesia   treatments tried to this point:   Past Medical History: Unchanged from the visit of . Please refer to that note.    REVIEW OF SYSTEMS:  CONSTITUTIONAL:  NEGATIVE for fever, chills, change in weight  INTEGUMENTARY/SKIN:  NEGATIVE for worrisome rashes, moles or lesions  EYES:  NEGATIVE for vision changes or irritation  ENT/MOUTH:  NEGATIVE for ear, mouth and throat problems  RESP:  NEGATIVE for significant cough or SOB  BREAST:  NEGATIVE for masses, tenderness or discharge  CV:  NEGATIVE for chest pain, palpitations or peripheral edema  GI:  NEGATIVE for nausea, abdominal pain, heartburn, or change in bowel habits  :  Negative   MUSCULOSKELETAL:  See HPI above  NEURO:  NEGATIVE for weakness, dizziness or paresthesias  ENDOCRINE:  NEGATIVE for temperature intolerance, skin/hair changes  HEME/ALLERGY/IMMUNE:  NEGATIVE for bleeding problems  PSYCHIATRIC:  NEGATIVE for changes in mood or affect    PHYSICAL EXAM:  /70 (BP Location: Right arm, Patient Position: Chair, Cuff Size: Adult Large)   Ht 1.753 m (5' 9\")   Wt 107 kg (236 lb)   BMI 34.85 kg/m    Body mass index is 34.85 kg/m .   GENERAL APPEARANCE: healthy, alert and no distress   SKIN: no suspicious lesions or rashes  NEURO: Normal strength and tone, mentation intact and speech normal  VASCULAR:  good pulses, and cappillary refill   LYMPH: no lymphadenopathy   PSYCH:  mentation appears normal and affect normal/bright    MSK:  Slight enlargement and flexion posture at the MCP joint of the thumb  Palpable A1 pulley tenderness  Finger movement is full  No swelling or erythema noted  Grossly  strength is well-maintained    IMAGING " INTERPRETATION: None taken today      ASSESSMENT:  Persisting right carpal tunnel syndrome  History of carpal tunnel release    PLAN:  We talked about the possible explanation for persistent carpal tunnel syndrome either incomplete release versus reconnection of the release site of the transverse carpal ligament.    Because of persisting pain and discomfort/paresthesia, at this point with a positive EMG findings, repeat surgical intervention was felt to be most reasonable.    At this point, he wants to have a second opinion.  I offered one of the hand surgeons at the [facility] however because of inconvenience going there, he wants to stay local in [facility].    Dr. Mari Thorne was recommended for him to see at [facility] orthopedics.  A referral was made.           Rick Frias MD  Dept. Orthopedic Surgery  [facility]       Disclaimer: This note consists of symbols derived from keyboarding, dictation and/or voice recognition software. As a result, there may be errors in the script that have gone undetected. Please consider this when interpreting information found in this chart.

## 2021-04-19 NOTE — PROGRESS NOTES
Pre-Visit Planning :     Future Appointments   Date Time Provider Department Center   4/19/2021  2:00 PM Rick Frias MD [facility]   4/20/2021  2:00 PM Chad Noel MD [facility]      Arrival Time for this Appointment:  1:40 PM      Appointment Notes for this encounter:    Bell's Palsy exacerbation     Questionnaires Reviewed/Assigned:   PHQ-9, JEOVANNY-7    Spoke to patient via phone. Are there any additional questions or concerns you'd like to review with your provider during your visit? No.      Last OV with provider:  12/1/2020; Bell's palsy; Gastroesophageal reflux disease without esophagitis; Common wart; Vasculogenic erectile dysfunction, unspecified vasculogenic erectile dysfunction type; Benign prostatic hyperplasia with urinary frequency; Cognitive change; Post herpetic neuralgia; Benign essential hypertension           Hospital ER Visits:  9/4/2020; [facility]; Dr. Miguelito Bourne MD; Bell's palsy    Is the visit preventive? NO             Specialty Visits:  4/6/2021;[facility] [place];Alex Varghese MD-Neurology; Carpal tunnel syndrome of right wrist;  Other symptoms and signs involving the musculoskeletal system                      Imaging and Lab Review:4/06/2021; Neuromuscular Ultrasound Report; History & Examination: 65 year old man with bilateral hand paresthesias, right>left. EMG studies done in 2/2020 and today (4/6/2021) showed bilateral carpal                            tunnel syndrome, moderate to severe on the right and moderate on the left. He underwent right carpal tunnel release surgery in 3/2020 but unfortunately his right hand's symptoms persist;  Interpretation: Abnormal study, showing                                ultrasonographicevidence  of right median neuropathy at the carpal tunnel, likely due to incomplete transverse carpal ligament release.      Recent Procedures:  10/08/2020; [facility]; Dr. Kevin Song MD;  "ESOPHAGOGASTRODUODENOSCOPY (EGD) (fv); COLONOSCOPY WITH POLYPECTOMIES using cold jumbo forceps; DIAGNOSIS: Iron deficiency;                     FINAL DIAGNOSIS: Colon, transverse, polypectomies (2): - Tubular adenomas (2). - Negative for high-grade dysplasia and malignancy.     MEDS ;    Current Outpatient Medications   Medication     acetaminophen (TYLENOL) 500 MG tablet     aspirin 81 MG tablet     ferrous sulfate (FE TABS) 325 (65 Fe) MG EC tablet     finasteride (PROSCAR) 5 MG tablet     fluocinonide (LIDEX) 0.05 % external ointment     hydrOXYzine (ATARAX) 25 MG tablet     ipratropium (ATROVENT) 0.06 % nasal spray     lamoTRIgine (LAMICTAL) 100 MG tablet     lamoTRIgine (LAMICTAL) 200 MG tablet     LYRICA 100 MG capsule     LYRICA 25 MG capsule     metoprolol tartrate (LOPRESSOR) 50 MG tablet     multivitamin w/minerals (MULTI-VITAMIN) tablet     naproxen (NAPROSYN) 500 MG tablet     omeprazole (PRILOSEC) 40 MG DR capsule     polyvinyl alcohol (LIQUIFILM TEARS) 1.4 % ophthalmic solution     senna (SENOKOT) 8.6 MG tablet     simvastatin (ZOCOR) 40 MG tablet     tadalafil (CIALIS) 5 MG tablet     triamcinolone (KENALOG) 0.1 % external cream     zolpidem (AMBIEN) 10 MG tablet     Current Facility-Administered Medications   Medication     dexamethasone (DECADRON) injection 1 mL     lidocaine 1 % injection 1 mL      Is there anything on your medication list that needs to be updated?  NO     Health Maintenance Due   Topic Date Due     Pneumococcal Vaccine: Pediatrics (0 to 5 Years) and At-Risk Patients (6 to 64 Years) (1 of 2 - PPSV23) 02/27/2020     Pneumococcal Vaccine: 65+ Years (1 of 2 - PPSV23) 02/27/2020     Preferred pharmacy: [id number] - [place], [place] - [address] AT [facility] [address]     MyChart: PT DECLINED!!    Questionnaire Review :  If MyChart INACTIVE \"Please plan on arriving early so that you have time to complete your questionnaires prior to seeing your " provider.      Patient preferred phone number: [phone]    Kendal Guzman Registered Nurse, Patient Advocate [facility]   [phone]

## 2021-04-19 NOTE — LETTER
"    4/19/2021         RE: Kashmir Yao  [address] [id number]        Dear Colleague,    Thank you for referring your patient, Kashmir Yao, to the [facility] [place]. Please see a copy of my visit note below.    HISTORY OF PRESENT ILLNESS:    Kashmir Yao is a 65 year old male who is seen in follow up for continuing right hand pain involving the thumb and the rest of the hand.  He feels as if his hand is can to fall out at times.    Recent EMG suggested continuing carpal tunnel syndrome.  Present symptoms: Pain and paresthesia   treatments tried to this point:   Past Medical History: Unchanged from the visit of . Please refer to that note.    REVIEW OF SYSTEMS:  CONSTITUTIONAL:  NEGATIVE for fever, chills, change in weight  INTEGUMENTARY/SKIN:  NEGATIVE for worrisome rashes, moles or lesions  EYES:  NEGATIVE for vision changes or irritation  ENT/MOUTH:  NEGATIVE for ear, mouth and throat problems  RESP:  NEGATIVE for significant cough or SOB  BREAST:  NEGATIVE for masses, tenderness or discharge  CV:  NEGATIVE for chest pain, palpitations or peripheral edema  GI:  NEGATIVE for nausea, abdominal pain, heartburn, or change in bowel habits  :  Negative   MUSCULOSKELETAL:  See HPI above  NEURO:  NEGATIVE for weakness, dizziness or paresthesias  ENDOCRINE:  NEGATIVE for temperature intolerance, skin/hair changes  HEME/ALLERGY/IMMUNE:  NEGATIVE for bleeding problems  PSYCHIATRIC:  NEGATIVE for changes in mood or affect    PHYSICAL EXAM:  /70 (BP Location: Right arm, Patient Position: Chair, Cuff Size: Adult Large)   Ht 1.753 m (5' 9\")   Wt 107 kg (236 lb)   BMI 34.85 kg/m    Body mass index is 34.85 kg/m .   GENERAL APPEARANCE: healthy, alert and no distress   SKIN: no suspicious lesions or rashes  NEURO: Normal strength and tone, mentation intact and speech normal  VASCULAR:  good pulses, and cappillary refill   LYMPH: no lymphadenopathy   PSYCH:  " mentation appears normal and affect normal/bright    MSK:  Slight enlargement and flexion posture at the MCP joint of the thumb  Palpable A1 pulley tenderness  Finger movement is full  No swelling or erythema noted  Grossly  strength is well-maintained    IMAGING INTERPRETATION: None taken today      ASSESSMENT:  Persisting right carpal tunnel syndrome  History of carpal tunnel release    PLAN:  We talked about the possible explanation for persistent carpal tunnel syndrome either incomplete release versus reconnection of the release site of the transverse carpal ligament.    Because of persisting pain and discomfort/paresthesia, at this point with a positive EMG findings, repeat surgical intervention was felt to be most reasonable.    At this point, he wants to have a second opinion.  I offered one of the hand surgeons at the [facility] however because of inconvenience going there, he wants to stay local in [facility].    Dr. Mari Thorne was recommended for him to see at [facility] orthopedics.  A referral was made.           Rick Frias MD  Dept. Orthopedic Surgery  [facility]       Disclaimer: This note consists of symbols derived from keyboarding, dictation and/or voice recognition software. As a result, there may be errors in the script that have gone undetected. Please consider this when interpreting information found in this chart.        Again, thank you for allowing me to participate in the care of your patient.        Sincerely,        Rick Frias MD

## 2021-04-19 NOTE — PATIENT INSTRUCTIONS
Referral for consultation with Dr. Mari Thorne, [facility] ordered today.    To schedule this consultation please call: [phone]

## 2021-04-20 ENCOUNTER — PATIENT OUTREACH (OUTPATIENT)
Dept: FAMILY MEDICINE | Facility: CLINIC | Age: 66
End: 2021-04-20

## 2021-04-20 ENCOUNTER — OFFICE VISIT (OUTPATIENT)
Dept: FAMILY MEDICINE | Facility: CLINIC | Age: 66
End: 2021-04-20
Payer: MEDICARE

## 2021-04-20 VITALS
DIASTOLIC BLOOD PRESSURE: 70 MMHG | BODY MASS INDEX: 34.11 KG/M2 | TEMPERATURE: 97.7 F | OXYGEN SATURATION: 100 % | SYSTOLIC BLOOD PRESSURE: 134 MMHG | RESPIRATION RATE: 16 BRPM | WEIGHT: 231 LBS | HEART RATE: 51 BPM

## 2021-04-20 DIAGNOSIS — R41.89 COGNITIVE CHANGE: ICD-10-CM

## 2021-04-20 DIAGNOSIS — R12 HEARTBURN: ICD-10-CM

## 2021-04-20 DIAGNOSIS — E66.01 MORBID OBESITY (H): ICD-10-CM

## 2021-04-20 DIAGNOSIS — R13.11 ORAL PHASE DYSPHAGIA: ICD-10-CM

## 2021-04-20 DIAGNOSIS — G51.0 BELL'S PALSY: Primary | ICD-10-CM

## 2021-04-20 DIAGNOSIS — G40.802 OTHER EPILEPSY WITHOUT STATUS EPILEPTICUS, NOT INTRACTABLE (H): ICD-10-CM

## 2021-04-20 DIAGNOSIS — R07.89 OTHER CHEST PAIN: ICD-10-CM

## 2021-04-20 DIAGNOSIS — J43.1 PANLOBULAR EMPHYSEMA (H): ICD-10-CM

## 2021-04-20 DIAGNOSIS — M16.0 PRIMARY OSTEOARTHRITIS OF BOTH HIPS: ICD-10-CM

## 2021-04-20 PROBLEM — I50.32 CHRONIC HEART FAILURE WITH PRESERVED EJECTION FRACTION (H): Status: RESOLVED | Noted: 2020-08-06 | Resolved: 2021-04-20

## 2021-04-20 PROCEDURE — 99214 OFFICE O/P EST MOD 30 MIN: CPT | Performed by: FAMILY MEDICINE

## 2021-04-20 PROCEDURE — 93000 ELECTROCARDIOGRAM COMPLETE: CPT | Performed by: FAMILY MEDICINE

## 2021-04-20 NOTE — TELEPHONE ENCOUNTER
Routing refill request to provider for review/approval because:  Patient is age 6-64 years    Sukumar GALVAN RN

## 2021-04-20 NOTE — TELEPHONE ENCOUNTER
Dr. Noel,     Pt called RN PAL after his appointment today requesting a refill for his naproxen (NAPROSYN) 500 MG tablet to be sent to   [id number] - [place] [address] AT [facility] [address]    RX marissa'd up for refill.    Kindly advise.    Kendal Guzman, Registered Nurse, PAL (Patient Advocate Liaison)  [facility]     [phone]

## 2021-04-20 NOTE — PROGRESS NOTES
Assessment & Plan   Problem List Items Addressed This Visit     Bell's palsy - Primary     He continues to episodically bite his inner cheek on the affected left side.  Discussed options.  We shall refer to speech therapy to see if they can help him with oral manipulation to reduce his self-induced trauma         Relevant Orders    SPEECH THERAPY REFERRAL    Cognitive change     His neurologist at [facility] retired.  Notes reflect his opinion that patient should see a behavioral neurologist to continue with his epileptic care.  No seizures for years         Relevant Orders    NEUROLOGY ADULT REFERRAL    Epilepsy (H)     Is retired [facility] neurologist wanted him to be followed by behavioral neurology and he is due now for a visit         Relevant Orders    NEUROLOGY ADULT REFERRAL    Heartburn     Patient is developing heartburn and waterbrash.  [place] foods have improved this somewhat.  Previously on medical therapies, he takes them rarely now.  Resume.  Upper GI endoscopy relatively recently was reassuring         Relevant Medications    pantoprazole (PROTONIX) 20 MG EC tablet    Other Relevant Orders    EKG 12-lead complete w/read - Clinics (Completed)    NM Lexiscan stress test    Obesity (BMI 35.0-39.9) with comorbidity (H)     Weight is decreased slightly during the pandemic         Oral phase dysphagia     Speech therapy         Relevant Orders    SPEECH THERAPY REFERRAL    Other chest pain      He is having episodes of substernal chest pain.  They respond to rest but are not induced by exertion nor eating.  No associated symptoms.  EKG is reassuring.  I doubt a cardiac source.  Regardless broaden database with redundant data         Relevant Orders    NM Lexiscan stress test    Panlobular emphysema (H)     No symptoms at rest                             Return in 2 months (on 6/20/2021) for ekg, stay, Physical Exam.    Chad Noel MD  [facility]    Vijay Marlow is a 65 year  old who presents for the following health issues     HPI     GERD/Heartburn  Onset/Duration: 2 weeks  Description: sx getting worse again, burning in the chest, food will come back up or liquid  Intensity: moderate  Progression of Symptoms: worsening  Accompanying Signs & Symptoms:  Does it feel like food gets stuck or trouble swallowing: no  Nausea: no  Vomiting (bloody?): no  Abdominal Pain: no  Black-Tarry stools: no  Bloody stools: no  History:  Previous similar episodes: YES  Previous ulcers: not sure if years ago had anything  Precipitating factors:   Caffeine use: YES- mostly tea  Alcohol use: no  NSAID/Aspirin use: YES- aspirin 81mg  Tobacco use: no  Worse with spicy foods.  Alleviating factors: None  Therapies tried and outcome:             Lifestyle changes: have not been able to work out            Medications: Pepcid (famotidine) used only as needed        Review of Systems   No dyspnea palpitations diaphoresis      Objective    /70 (BP Location: Right arm, Patient Position: Chair, Cuff Size: Adult Regular)   Pulse 51   Temp 97.7  F (36.5  C) (Oral)   Resp 16   Wt 104.8 kg (231 lb)   SpO2 100%   BMI 34.11 kg/m    Body mass index is 34.11 kg/m .  Physical Exam   Facial asymmetry is grossly absent at rest.  No leukoplakia at oral inspection.  Chest is clear  HEART: S1S2 RRR no murmur nor apical displacement      EKG shows left axis nonspecific ST segment changes    Chad Noel MD

## 2021-04-21 DIAGNOSIS — M16.0 PRIMARY OSTEOARTHRITIS OF BOTH HIPS: ICD-10-CM

## 2021-04-21 DIAGNOSIS — R12 HEARTBURN: ICD-10-CM

## 2021-04-21 PROBLEM — R07.89 OTHER CHEST PAIN: Status: ACTIVE | Noted: 2021-04-21

## 2021-04-21 PROBLEM — R13.11 ORAL PHASE DYSPHAGIA: Status: ACTIVE | Noted: 2021-04-21

## 2021-04-21 PROBLEM — G91.8 OTHER HYDROCEPHALUS (H): Status: RESOLVED | Noted: 2020-08-06 | Resolved: 2021-04-21

## 2021-04-21 RX ORDER — NAPROXEN 500 MG/1
TABLET ORAL
Qty: 60 TABLET | Refills: 5 | Status: SHIPPED | OUTPATIENT
Start: 2021-04-21 | End: 2021-04-23

## 2021-04-21 RX ORDER — PANTOPRAZOLE SODIUM 20 MG/1
40 TABLET, DELAYED RELEASE ORAL DAILY
Qty: 90 TABLET | Refills: 3 | Status: SHIPPED | OUTPATIENT
Start: 2021-04-21 | End: 2021-04-23

## 2021-04-21 RX ORDER — NAPROXEN 500 MG/1
TABLET ORAL
Qty: 60 TABLET | Refills: 3 | Status: CANCELLED
Start: 2021-04-21

## 2021-04-21 NOTE — ASSESSMENT & PLAN NOTE
Is retired [place] neurologist wanted him to be followed by behavioral neurology and he is due now for a visit

## 2021-04-21 NOTE — ASSESSMENT & PLAN NOTE
Patient is developing heartburn and waterbrash.  [place] foods have improved this somewhat.  Previously on medical therapies, he takes them rarely now.  Resume.  Upper GI endoscopy relatively recently was reassuring

## 2021-04-21 NOTE — TELEPHONE ENCOUNTER
RN Mu called pt and gave pt the number for centralized scheduleding @ [facility] [phone] to schedule his NM Lexiscan stress test.    Kendal Guzman, Registered Nurse, PAL (Patient Advocate Liaison)  [facility]     [phone]

## 2021-04-21 NOTE — ASSESSMENT & PLAN NOTE
He is having episodes of substernal chest pain.  They respond to rest but are not induced by exertion nor eating.  No associated symptoms.  EKG is reassuring.  I doubt a cardiac source.  Regardless broaden database with redundant data

## 2021-04-21 NOTE — ASSESSMENT & PLAN NOTE
He continues to episodically bite his inner cheek on the affected left side.  Discussed options.  We shall refer to speech therapy to see if they can help him with oral manipulation to reduce his self-induced trauma

## 2021-04-21 NOTE — TELEPHONE ENCOUNTER
Pt calling office to confirm office called in refill on pt's Naproxen as requested, which was done yesterday, 4/20/21.      Today, pt inquiring about referral to Speech Therapy and referral to [facility] for LEATHA scan.  Pt given number to call for ST if pt doesn't hear from them on 2-3 days, [phone], so he may call and schedule an appointment.    Pt told that it appears he is on the schedule for 5/4/2021 @ [facility] for a Lexiscan, but is waiting to hear from cardiology for appointment and scan details.      Pt also requested a refill for his omeprazole.  Pt told he has 3 (three) remaining refills with this rx and he may contact his pharmacy with the rx number for a refill.  Pt verbalized understanding.      Pt will call RN with any other questions or concerns.    Kendal Guzman, Registered Nurse, PAL (Patient Advocate Liaison)  [facility]     [phone]

## 2021-04-21 NOTE — ASSESSMENT & PLAN NOTE
His neurologist at [place] retired.  Notes reflect his opinion that patient should see a behavioral neurologist to continue with his epileptic care.  No seizures for years

## 2021-04-22 NOTE — TELEPHONE ENCOUNTER
Pantoprazole- 2/d.  OK for 180 instead of 90.    Naproxen- Routing refill request to provider for review/approval because:  Labs not current:  CBC  Failing due to age    Beth Quintero RN

## 2021-04-23 RX ORDER — NAPROXEN 500 MG/1
TABLET ORAL
Qty: 60 TABLET | Refills: 5 | Status: SHIPPED | OUTPATIENT
Start: 2021-04-23 | End: 2021-12-30

## 2021-04-23 RX ORDER — PANTOPRAZOLE SODIUM 20 MG/1
TABLET, DELAYED RELEASE ORAL
Qty: 180 TABLET | Refills: 1 | Status: SHIPPED | OUTPATIENT
Start: 2021-04-23 | End: 2021-08-04

## 2021-04-28 ENCOUNTER — TRANSFERRED RECORDS (OUTPATIENT)
Dept: HEALTH INFORMATION MANAGEMENT | Facility: CLINIC | Age: 66
End: 2021-04-28

## 2021-04-29 ENCOUNTER — TRANSFERRED RECORDS (OUTPATIENT)
Dept: HEALTH INFORMATION MANAGEMENT | Facility: CLINIC | Age: 66
End: 2021-04-29

## 2021-04-30 ENCOUNTER — PATIENT OUTREACH (OUTPATIENT)
Dept: FAMILY MEDICINE | Facility: CLINIC | Age: 66
End: 2021-04-30

## 2021-04-30 NOTE — TELEPHONE ENCOUNTER
"Pt returned call to office and PAL RN reviewed information about pt's upcoming appt w/ST on 5/24/21 and pt verbalized understanding and said he, \"thought he found it [the mailed packet] in the mail today.  My son just brought me my mail.\"    Pt will call PAL RN with future questions or concerns.    Kendal Guzman, Registered Nurse, PAL (Patient Advocate Liaison)  [facility]     [phone]    "

## 2021-04-30 NOTE — TELEPHONE ENCOUNTER
PAL RN called cental therapy scheduling to find out of appt had been made for pt for speech therapy referral from 4/20/21.  PAL spoke w/agent who stated pt has an appointment [facility] on Mon 5/24/21  @ [address], [address] [place], [place]  [phone] ( #).    Message left for pt to return call to clinic at [phone].      Kendal Guzman Registered Nurse, PAL (Patient Advocate Liaison)  [facility]     [phone]

## 2021-05-03 ENCOUNTER — TRANSFERRED RECORDS (OUTPATIENT)
Dept: HEALTH INFORMATION MANAGEMENT | Facility: CLINIC | Age: 66
End: 2021-05-03

## 2021-05-04 ENCOUNTER — HOSPITAL ENCOUNTER (OUTPATIENT)
Dept: NUCLEAR MEDICINE | Facility: CLINIC | Age: 66
Setting detail: NUCLEAR MEDICINE
End: 2021-05-04
Attending: FAMILY MEDICINE
Payer: MEDICARE

## 2021-05-04 ENCOUNTER — HOSPITAL ENCOUNTER (OUTPATIENT)
Dept: CARDIOLOGY | Facility: CLINIC | Age: 66
End: 2021-05-04
Attending: FAMILY MEDICINE
Payer: MEDICARE

## 2021-05-04 DIAGNOSIS — R12 HEARTBURN: ICD-10-CM

## 2021-05-04 DIAGNOSIS — R07.89 OTHER CHEST PAIN: ICD-10-CM

## 2021-05-04 LAB
CV STRESS MAX HR HE: 72
RATE PRESSURE PRODUCT: 9864
STRESS ECHO BASELINE DIASTOLIC HE: 87
STRESS ECHO BASELINE HR: 55
STRESS ECHO BASELINE SYSTOLIC BP: 163
STRESS ECHO CALCULATED PERCENT HR: 47 %
STRESS ECHO LAST STRESS DIASTOLIC BP: 74
STRESS ECHO LAST STRESS SYSTOLIC BP: 137
STRESS ECHO TARGET HR: 154
STRESS/REST PERFUSION RATIO: 0.96

## 2021-05-04 PROCEDURE — 93016 CV STRESS TEST SUPVJ ONLY: CPT | Mod: MG | Performed by: INTERNAL MEDICINE

## 2021-05-04 PROCEDURE — 93018 CV STRESS TEST I&R ONLY: CPT | Mod: MG | Performed by: INTERNAL MEDICINE

## 2021-05-04 PROCEDURE — 78452 HT MUSCLE IMAGE SPECT MULT: CPT | Mod: 26 | Performed by: INTERNAL MEDICINE

## 2021-05-04 PROCEDURE — 343N000001 HC RX 343: Performed by: FAMILY MEDICINE

## 2021-05-04 PROCEDURE — G1004 CDSM NDSC: HCPCS | Performed by: INTERNAL MEDICINE

## 2021-05-04 PROCEDURE — 250N000011 HC RX IP 250 OP 636

## 2021-05-04 PROCEDURE — G1004 CDSM NDSC: HCPCS

## 2021-05-04 PROCEDURE — A9502 TC99M TETROFOSMIN: HCPCS | Performed by: FAMILY MEDICINE

## 2021-05-04 PROCEDURE — 93017 CV STRESS TEST TRACING ONLY: CPT

## 2021-05-04 RX ORDER — AMINOPHYLLINE 25 MG/ML
50-100 INJECTION, SOLUTION INTRAVENOUS
Status: DISCONTINUED | OUTPATIENT
Start: 2021-05-04 | End: 2021-05-05 | Stop reason: HOSPADM

## 2021-05-04 RX ORDER — REGADENOSON 0.08 MG/ML
INJECTION, SOLUTION INTRAVENOUS
Status: COMPLETED
Start: 2021-05-04 | End: 2021-05-04

## 2021-05-04 RX ORDER — ALBUTEROL SULFATE 90 UG/1
2 AEROSOL, METERED RESPIRATORY (INHALATION) EVERY 5 MIN PRN
Status: DISCONTINUED | OUTPATIENT
Start: 2021-05-04 | End: 2021-05-05 | Stop reason: HOSPADM

## 2021-05-04 RX ORDER — ACYCLOVIR 200 MG/1
0-1 CAPSULE ORAL
Status: DISCONTINUED | OUTPATIENT
Start: 2021-05-04 | End: 2021-05-05 | Stop reason: HOSPADM

## 2021-05-04 RX ORDER — REGADENOSON 0.08 MG/ML
0.4 INJECTION, SOLUTION INTRAVENOUS ONCE
Status: COMPLETED | OUTPATIENT
Start: 2021-05-04 | End: 2021-05-04

## 2021-05-04 RX ORDER — CAFFEINE CITRATE 20 MG/ML
60 SOLUTION INTRAVENOUS
Status: DISCONTINUED | OUTPATIENT
Start: 2021-05-04 | End: 2021-05-05 | Stop reason: HOSPADM

## 2021-05-04 RX ADMIN — TETROFOSMIN 10 MCI.: 1.38 INJECTION, POWDER, LYOPHILIZED, FOR SOLUTION INTRAVENOUS at 12:13

## 2021-05-04 RX ADMIN — REGADENOSON 0.4 MG: 0.08 INJECTION, SOLUTION INTRAVENOUS at 13:32

## 2021-05-04 RX ADMIN — TETROFOSMIN 31 MCI.: 1.38 INJECTION, POWDER, LYOPHILIZED, FOR SOLUTION INTRAVENOUS at 13:31

## 2021-05-20 ENCOUNTER — TELEPHONE (OUTPATIENT)
Dept: ORTHOPEDICS | Facility: CLINIC | Age: 66
End: 2021-05-20

## 2021-05-20 NOTE — TELEPHONE ENCOUNTER
Phone call to patient and let him know that he was referred to Dr. Mari Thorne of [facility]. He states he had some imaging done ordered by her (MRI or ultrasound) and had it done almost 3 weeks ago and has not heard back. Provided phone number for [facility]. He asks if Dr. Thorne is going to send information to Dr. Frias. Discussed that she was providing a 2nd opinion and that patient is to decide what he would like to do. He may ask Dr. Thorne to send information to our office if he so chooses.   He was appreciative of information and verbalized understanding.     JACE Ramirez RN

## 2021-05-20 NOTE — TELEPHONE ENCOUNTER
[facility] Call Center    Phone Message    May a detailed message be left on voicemail: yes     Reason for Call: Other: Patient was referred to see another provider for his hands by Dr. Frias.      That provider (whose name patient can't remember) ordered some imaging for him and he has yet to hear back from that doctor.    Patient cannot remember the name of the doctor Dr. Frias referred him to.    Please call patient back to discuss.    Patient is frustrated he has not heard back.    Action Taken: Message routed to:  Other: [facility]    Travel Screening: Not Applicable

## 2021-05-24 DIAGNOSIS — E78.5 HYPERLIPIDEMIA LDL GOAL <70: ICD-10-CM

## 2021-05-24 RX ORDER — SIMVASTATIN 40 MG
40 TABLET ORAL AT BEDTIME
Qty: 90 TABLET | Refills: 3 | Status: SHIPPED | OUTPATIENT
Start: 2021-05-24 | End: 2022-05-17

## 2021-05-24 NOTE — TELEPHONE ENCOUNTER
Refill request received via fax.    simvastatin (ZOCOR) 40 MG tablet  Last Written Prescription Date:  5/27/20  Last Fill Quantity: 90,  # refills: 3     Last office visit: 2/19/2020     Future Office Visit:   Next 5 appointments (look out 90 days)    Jun 22, 2021  1:30 PM  (Arrive by 1:10 PM)  Office Visit with Chad Noel MD  [facility] [address] [place] [id number] [phone]         Patient has transferred care to Dr. Noel.    Routing request.    Kavita Ochoa RN BSN  [facility]  [phone]

## 2021-06-01 ENCOUNTER — TRANSFERRED RECORDS (OUTPATIENT)
Dept: HEALTH INFORMATION MANAGEMENT | Facility: CLINIC | Age: 66
End: 2021-06-01

## 2021-06-13 DIAGNOSIS — I10 BENIGN ESSENTIAL HYPERTENSION: Primary | ICD-10-CM

## 2021-06-15 RX ORDER — METOPROLOL TARTRATE 50 MG
TABLET ORAL
Qty: 180 TABLET | Refills: 1 | Status: SHIPPED | OUTPATIENT
Start: 2021-06-15 | End: 2022-01-11

## 2021-06-29 ENCOUNTER — PATIENT OUTREACH (OUTPATIENT)
Dept: FAMILY MEDICINE | Facility: CLINIC | Age: 66
End: 2021-06-29

## 2021-06-29 NOTE — TELEPHONE ENCOUNTER
"Pt calling RULA RN today stating Dr.Brian Bert MD instructed pt to call for an appointment when pt, \"having any problems with my Bell's Palsy.\"    Pt had an appointment for a Bell's Palsy assessment with Dr. Gamboa on Wed 6/23/21 and was a no-show and pt also had a scheduled appointment on Mon 5/24/21 with speech therapy that was a referral for facial paralysis for Bell's Palsy, oral phase dysphagia that pt was also a no-show for.    Appointment scheduled for Tue 7/13/21 @ 4:10 PM w/a 3:50 PM arrival time.  Pt would like to notified in the case of any cancellations next week.  Pt encourage to call and check for cancellations.  Pt will be called for a reminder of his appointment.    Kendal Guzman, Registered Nurse, PAL (Patient Advocate Liaison) [facility]     [phone]    "

## 2021-08-03 NOTE — PROGRESS NOTES
"Pre-Visit Planning   Next 5 appointments (look out 90 days)    Aug 04, 2021  2:20 PM  (Arrive by 2:00 PM)  Office Visit with Chad Noel MD  [facility] ([facility] ) [address] [place] [id number] [phone]          Appointment Notes for this encounter:   F/U-Bell's Palsy  (HAS NO-SHOWED LAST TWO APPTS)(assess swallowing/chewing difficulty, discuss trouble sleeping and male issues.)    Questionnaires Reviewed/Assigned: PHQ-9, JEOVANNY-9    {SCRIPTING IF PT ANSWERS \"Hi, my name is RULA Edmonds RN and I am calling on behalf of your provider's office at [facility].  I am calling to confirm and prep your upcoming appointment on Wed 8/4/21 @ 1420 (1400) w/Dr.Brian Bert MD for a F/U-Bell's Palsy(assess swallowing/chewing difficulty, discuss trouble sleeping and male issues.). Are there any additional questions or concerns you'd like to review with your provider during your visit?  N/A    Patient preferred phone number: [phone]    Spoke to patient via phone. Are there any additional questions or concerns you'd like to review with your provider during your visit? No. Mess. Left on pt's VM w/a reminder of pt's arrival time for pt's appt./LUH RN     Visit is not preventive.    Meds  Is there anything on your medication list that needs to be updated? No    Current Outpatient Medications   Medication     acetaminophen (TYLENOL) 500 MG tablet     aspirin 81 MG tablet     ferrous sulfate (FE TABS) 325 (65 Fe) MG EC tablet     finasteride (PROSCAR) 5 MG tablet     fluocinonide (LIDEX) 0.05 % external ointment     hydrOXYzine (ATARAX) 25 MG tablet     ipratropium (ATROVENT) 0.06 % nasal spray     lamoTRIgine (LAMICTAL) 100 MG tablet     lamoTRIgine (LAMICTAL) 200 MG tablet     LYRICA 100 MG capsule     LYRICA 25 MG capsule     metoprolol tartrate (LOPRESSOR) 50 MG tablet     multivitamin w/minerals (MULTI-VITAMIN) tablet     naproxen " "(NAPROSYN) 500 MG tablet     omeprazole (PRILOSEC) 40 MG DR capsule     pantoprazole (PROTONIX) 20 MG EC tablet     polyvinyl alcohol (LIQUIFILM TEARS) 1.4 % ophthalmic solution     senna (SENOKOT) 8.6 MG tablet     simvastatin (ZOCOR) 40 MG tablet     tadalafil (CIALIS) 5 MG tablet     triamcinolone (KENALOG) 0.1 % external cream     zolpidem (AMBIEN) 10 MG tablet     No current facility-administered medications for this visit.     Which pharmacy do you prefer to use for medications during this visit if needed? [id number] - [place], [place] - [address] AT [facility] [address]     Do you need refills on any of your medications? No    Health Maintenance Due   Topic Date Due     Pneumococcal Vaccine: 65+ Years (1 of 2 - PPSV23) 02/27/2020     MEDICARE ANNUAL WELLNESS VISIT  05/27/2021     FALL RISK ASSESSMENT  05/27/2021     CMP  07/29/2021     Stereotypes  Patient is not active on Stereotypes./PT DECLINED!!!!    Questionnaire Review   Advised patient to arrive early in order to complete questionnaires.    Call Summary  \"Thank you for your time today.  If anything comes up before your appointment, please feel free to contact us at [phone].\"    Kendal Guzman, Registered Nurse, PAL (Patient Advocate Liaison) [facility]     [phone]      "

## 2021-08-04 ENCOUNTER — OFFICE VISIT (OUTPATIENT)
Dept: FAMILY MEDICINE | Facility: CLINIC | Age: 66
End: 2021-08-04
Payer: MEDICARE

## 2021-08-04 VITALS
WEIGHT: 222.44 LBS | SYSTOLIC BLOOD PRESSURE: 118 MMHG | BODY MASS INDEX: 32.85 KG/M2 | TEMPERATURE: 98.6 F | DIASTOLIC BLOOD PRESSURE: 78 MMHG | HEART RATE: 65 BPM | OXYGEN SATURATION: 98 %

## 2021-08-04 DIAGNOSIS — N52.01 ERECTILE DYSFUNCTION DUE TO ARTERIAL INSUFFICIENCY: ICD-10-CM

## 2021-08-04 DIAGNOSIS — G40.802 OTHER EPILEPSY WITHOUT STATUS EPILEPTICUS, NOT INTRACTABLE (H): ICD-10-CM

## 2021-08-04 DIAGNOSIS — Z23 ENCOUNTER FOR IMMUNIZATION: ICD-10-CM

## 2021-08-04 DIAGNOSIS — Z71.84 TRAVEL ADVICE ENCOUNTER: ICD-10-CM

## 2021-08-04 DIAGNOSIS — G51.0 BELL'S PALSY: Primary | ICD-10-CM

## 2021-08-04 PROCEDURE — G0009 ADMIN PNEUMOCOCCAL VACCINE: HCPCS | Performed by: FAMILY MEDICINE

## 2021-08-04 PROCEDURE — 90732 PPSV23 VACC 2 YRS+ SUBQ/IM: CPT | Performed by: FAMILY MEDICINE

## 2021-08-04 PROCEDURE — 99214 OFFICE O/P EST MOD 30 MIN: CPT | Mod: 25 | Performed by: FAMILY MEDICINE

## 2021-08-04 NOTE — PROGRESS NOTES
"    Assessment & Plan   Problem List Items Addressed This Visit     Bell's palsy - Primary     He again wonders if there is any treatment for his Bell's palsy.  I discussed with him that there is none.  However, my impression is that his facial resting tone has improved compared to previously, and I point this out to him         Encounter for immunization     Offered, accepted         Relevant Orders    PNEUMOCOCCAL VACCINE,ADULT,SQ OR IM ([id number]) (Completed)    Epilepsy (H)     Focal, well controlled, pregabalin.   His [place] neurologist has retired         Relevant Orders    Comprehensive metabolic panel (BMP + Alb, Alk Phos, ALT, AST, Total. Bili, TP)    Travel advice encounter     He is planning a 1 month trip to visit relatives in [place] a few weeks hence.  He wonders if he can get a third Covid vaccination.  Discussed that that is not recommended, but may change.  Should it do so, we would be happy to assist him.  Otherwise I recommend standard precautions in indoor spaces and crowded outdoor spaces         Vasculogenic erectile dysfunction     Viagra was ineffective.  He wonders if he should visit urology, but is also afraid of additional interventions.  Discussed various options.  He thinks he may find penile injection acceptable.  He will consider         Relevant Orders    Adult Urology Referral                    BMI:   Estimated body mass index is 32.85 kg/m  as calculated from the following:    Height as of 4/19/21: 1.753 m (5' 9\").    Weight as of this encounter: 100.9 kg (222 lb 7 oz).   Weight management plan: Deferr to health maintenance        Return in about 3 months (around 11/4/2021) for Physical Exam.    Chad Noel MD  [facility] ANGELINA Hodge is a 66 year old who presents for the following health issues     HPI     Pt is here to follow up on his [place] Palsy.       Review of Systems   No systemic symptoms no visual symptoms.  He is embarrassed by his " passport photo      Objective    /78 (BP Location: Right arm, Patient Position: Chair, Cuff Size: Adult Large)   Pulse 65   Temp 98.6  F (37  C) (Oral)   Wt 100.9 kg (222 lb 7 oz)   SpO2 98%   BMI 32.85 kg/m    Body mass index is 32.85 kg/m .  Physical Exam   His facial asymmetry as described        Chad Noel MD

## 2021-08-04 NOTE — ASSESSMENT & PLAN NOTE
He again wonders if there is any treatment for his Bell's palsy.  I discussed with him that there is none.  However, my impression is that his facial resting tone has improved compared to previously, and I point this out to him

## 2021-08-04 NOTE — ASSESSMENT & PLAN NOTE
Viagra was ineffective.  He wonders if he should visit urology, but is also afraid of additional interventions.  Discussed various options.  He thinks he may find penile injection acceptable.  He will consider

## 2021-08-04 NOTE — ASSESSMENT & PLAN NOTE
He is planning a 1 month trip to visit relatives in [place] a few weeks hence.  He wonders if he can get a third Covid vaccination.  Discussed that that is not recommended, but may change.  Should it do so, we would be happy to assist him.  Otherwise I recommend standard precautions in indoor spaces and crowded outdoor spaces

## 2021-08-05 ENCOUNTER — PATIENT OUTREACH (OUTPATIENT)
Dept: FAMILY MEDICINE | Facility: CLINIC | Age: 66
End: 2021-08-05

## 2021-08-05 DIAGNOSIS — F51.01 PRIMARY INSOMNIA: Primary | ICD-10-CM

## 2021-08-05 RX ORDER — TRAZODONE HYDROCHLORIDE 50 MG/1
50 TABLET, FILM COATED ORAL AT BEDTIME
Qty: 90 TABLET | Refills: 3 | Status: SHIPPED | OUTPATIENT
Start: 2021-08-05 | End: 2021-08-06

## 2021-08-05 NOTE — TELEPHONE ENCOUNTER
Message left on pt's secure voicemail letting pt know a new rx was sent to pt's pharmacy for Trazodone 50 mg, #90, take 1 tablet by mouth at bedtime as needed.    Kendal Guzman, Registered Nurse, PAL (Patient Advocate Liaison) [facility]     [phone]

## 2021-08-05 NOTE — TELEPHONE ENCOUNTER
Pt called PAL RN with name of the medication his spouse take to help her sleep- Trazadone 50 mg. Pt uncertain how frequently or how much to take and would like Dr.Brian Bert MD to advise pt what would be best for pt.    Pt uses:  [id number] - [place], [place] - [address]  AT [facility] [address] [phone].     Kindly advise.    Kendal Guzman, Registered Nurse, PAL (Patient Advocate Liaison) [facility]     [phone]

## 2021-08-06 ENCOUNTER — PATIENT OUTREACH (OUTPATIENT)
Dept: FAMILY MEDICINE | Facility: CLINIC | Age: 66
End: 2021-08-06

## 2021-08-06 ENCOUNTER — TELEPHONE (OUTPATIENT)
Dept: UROLOGY | Facility: CLINIC | Age: 66
End: 2021-08-06

## 2021-08-06 DIAGNOSIS — F51.01 PRIMARY INSOMNIA: ICD-10-CM

## 2021-08-06 RX ORDER — TRAZODONE HYDROCHLORIDE 50 MG/1
100 TABLET, FILM COATED ORAL AT BEDTIME
Qty: 180 TABLET | Refills: 3 | Status: SHIPPED | OUTPATIENT
Start: 2021-08-06 | End: 2022-08-18

## 2021-08-06 NOTE — TELEPHONE ENCOUNTER
[facility] Call Center    Phone Message    May a detailed message be left on voicemail: yes     Reason for Call: Other: Pt calling because he has a referral for erectile dysfunction and he reports that he would like to make an appt some time in August, since he is traveling starting in september and will be out of town. No times on template, but pt was wondering if he could possibly be fit in at some point in august. Please call back to discuss if so.     Action Taken: Message routed to:  [facility] uro    Travel Screening: Not Applicable

## 2021-08-06 NOTE — TELEPHONE ENCOUNTER
"Pt left a message for PAL RN requesting a return call regarding his rx for traZODone (DESYREL) 50 MG tablet.    Pt took Trazodone as ordered last night @ [facility] ans states, \"I had a really hard time falling asleep and I took another one a few hours later and I finally fell asleep.\"  Pt requesting SIG for rx be adjusted so that pt may take an additional 50 mg dose if first dose is not effective.     Pt states he, \"does not want a higher dose of medication because I take a lot of medication.  I would just like the option to take another tablet if I need to once in a while.\"    Please advise.    Kendal Guzman, Registered Nurse, PAL (Patient Advocate Liaison) [facility]     [phone]    "

## 2021-08-17 ENCOUNTER — TELEPHONE (OUTPATIENT)
Dept: UROLOGY | Facility: CLINIC | Age: 66
End: 2021-08-17

## 2021-08-17 ENCOUNTER — VIRTUAL VISIT (OUTPATIENT)
Dept: UROLOGY | Facility: CLINIC | Age: 66
End: 2021-08-17
Payer: MEDICARE

## 2021-08-17 ENCOUNTER — TELEPHONE (OUTPATIENT)
Facility: CLINIC | Age: 66
End: 2021-08-17

## 2021-08-17 VITALS — WEIGHT: 215 LBS | HEIGHT: 70 IN | BODY MASS INDEX: 30.78 KG/M2

## 2021-08-17 DIAGNOSIS — N52.01 ERECTILE DYSFUNCTION DUE TO ARTERIAL INSUFFICIENCY: ICD-10-CM

## 2021-08-17 PROCEDURE — 99214 OFFICE O/P EST MOD 30 MIN: CPT | Mod: 95 | Performed by: STUDENT IN AN ORGANIZED HEALTH CARE EDUCATION/TRAINING PROGRAM

## 2021-08-17 RX ORDER — SILDENAFIL 100 MG/1
100 TABLET, FILM COATED ORAL DAILY PRN
Qty: 6 TABLET | Refills: 11 | Status: SHIPPED | OUTPATIENT
Start: 2021-08-17 | End: 2022-06-24

## 2021-08-17 RX ORDER — SILDENAFIL CITRATE 20 MG/1
40-100 TABLET ORAL DAILY PRN
Qty: 30 TABLET | Refills: 0 | Status: SHIPPED | OUTPATIENT
Start: 2021-08-17 | End: 2023-04-10

## 2021-08-17 ASSESSMENT — MIFFLIN-ST. JEOR: SCORE: 1756.48

## 2021-08-17 ASSESSMENT — PAIN SCALES - GENERAL: PAINLEVEL: NO PAIN (0)

## 2021-08-17 NOTE — LETTER
"8/17/2021       RE: Kashmir Yao  [address] [id number]     Dear Colleague,    Thank you for referring your patient, Kashmir Yao, to the [facility] [place] [facility]. Please see a copy of my visit note below.    *SEND LINK TO CELL PHONE*    Naveen is a 66 year old who is being evaluated via a billable video visit.      How would you like to obtain your AVS? MyChart  If the video visit is dropped, the invitation should be resent by: Text to cell phone: [phone]  Will anyone else be joining your video visit? No    Video Start Time: 11:34 AM    CHIEF COMPLAINT   Kashmir Yao who is a 66 year old male returns today for follow-up of ED.      HPI   Kashmir Yao is a 66 year old male who presents with a history of ED.  Last seen in Jan 2021.    Still having significant issues with erectile dysfunction. He can have pleasurable intercourse with orgasm but cannot get erect still.    I had prescribed him daily tadalafil but it was too expensive so he chose to get sildenafil from a [place] pharmacy.    Is wondering about alternative, particularly intracavernosal injection    Is on ASA 81 mg daily but not on any other blood thinners.    PHYSICAL EXAM  Patient is a 66 year old  male   Vitals: Height 1.778 m (5' 10\"), weight 97.5 kg (215 lb).  Body mass index is 30.85 kg/m .  General Appearance Adult:   Alert, no acute distress, oriented  HENT: throat/mouth:normal, good dentition  Lungs: no respiratory distress, or pursed lip breathing  Heart: No obvious jugular venous distension present  Musculoskeltal: extremities normal, no peripheral edema  Skin: no suspicious lesions or rashes  Neuro: Alert, oriented, speech and mentation normal  Psych: affect and mood normal  Gait: Normal      ASSESSMENT and PLAN  65 yo M with ED with poor response to PDEVI. Discussed options including continuing PDEVI vs. MARCUS vs. ICI. Vs. " IPP    Patient wants to trial intracavernosal injection. Discussed potential side effects including priapism, scarring, bleeding.    He is traveling to [place] for about 6 weeks. I do not recommend ICI while he is in [place] due to the logistics in keeping the medication cold, traveling with needles etc.    He requests refill for sildenafil which I have written for him    He wants to know how much Trimix costs, which I don't know. Will ask my office staff to reach out to him    He is not comfortable with the idea of penile prosthesis    Once he returns from [place] in October, he will consider his options. I have asked my nursing team to look into Trimix. If it is affordable to him, he will order it and set up an appointment to return for ICI teaching    Over 30 minutes spent on this encounter including time spent talking to patient and documentation    Jose Angel Lawson MD   [facility]  [facility] Phone: [phone]      Video-Visit Details    Type of service:  Video Visit    Video End Time:12:06 PM    Originating Location (pt. Location): Home    Distant Location (provider location):  [facility] UROLOGY [facility]     Platform used for Video Visit: [employer]

## 2021-08-17 NOTE — Clinical Note
Patient wants to follow up in October in person for trimix teaching after he gets back from [place]. He wants to know the cost of trimix before ordering. Obviously needs to arrive at his house after he gets back from [place], around the  second week of October.

## 2021-08-17 NOTE — PATIENT INSTRUCTIONS
Patient Education     Penile Self-Injection: Notes and Precautions     Man and healthcare provider sitting across from one another, talking.   Penile self-injection is a simple technique that may improve your sex life. Some men even find that self-injection leads to an increase in natural erections. If you have questions or concerns about self-injection or erectile dysfunction (ED), talk with your healthcare provider. The information on this sheet will help you get the best results.   Notes about penile self-injection    Know that you may feel a mild burning during injection. This is OK. But if you feel pressure or severe pain, stop the injection. There may be a problem with the injection site.    Only inject the medicine on the side of your penis. It may not work if it is injected elsewhere.    To prevent scarring, inject in a different spot each time.    Don t use this treatment if you have a bleeding disorder or any risk of infection.    Go to the closest emergency room if your erection lasts longer than 3 to 4 hours. An erection lasting longer than 4 hours can cause serious damage to the penis and result in permanent ED.    Work with your healthcare provider  Ask how often you can safely repeat injections, as well as any other questions you have. You and your healthcare provider will talk about follow-up exams and how to get supplies. If the medicine doesn t work or stops working over time, tell your healthcare provider.   When to call your healthcare provider  Go to the closest emergency room if your erection lasts longer than 3 to 4 hours. An erection lasting longer than 4 hours can cause damage to the penis and result in permanent ED.   Call your healthcare provider right away if any of these occur:     Bleeding or bruising    Severe pain    Scarring or curving of the penis  StayWell last reviewed this educational content on 6/1/2019 2000-2021 [employer]. All rights reserved. This  information is not intended as a substitute for professional medical care. Always follow your healthcare professional's instructions.           Patient Education     Penile Self-Injection Procedure  Self-injection is a good option if you have erectile dysfunction (ED). You put a tiny needle into the side of your penis and inject a medicine. This helps your penis get hard and stay that way long enough for sex. And sex and orgasm will feel as good as always. You may be nervous about doing self-injection at first. But with practice, it will get easier. Your healthcare provider will show you how to do self-injection the first time. If you have anxiety about giving the shot, if you have manual dexterity problems, or if you can't see your penis due to your weight, your partner can be taught how to inject the medicine.   Tell your healthcare provider about any medicines you take and any recent illnesses or health problems.   Preparing for injection    Wash your hands well with soap and water.    Prepare the medicine (if needed).    Sit or  a comfortable position in a warm, well-lit room. If you need to, sit or  front of a mirror.    Find an injection site on one side of your penis, in a place with no visible veins. (Don t inject into the top, bottom, or head of the penis.)    Clean the injection site with an alcohol swab. Grasp the head of your penis firmly with your thumb and forefinger (don t just pinch the skin). Stretch the penis so the skin on the shaft is taut.    Injecting the medicine    Rest your penis against your inner thigh and pull it gently toward your knee. Don t twist or rotate it. This way you ll be sure to inject the medicine into the spot you chose and cleaned before.    Hold the syringe between your thumb and fingers, like you re holding a pen. Rest your forearm on your thigh for support.    Insert the needle at a 90  angle (perpendicular) to the shaft. Do this quickly to reduce  discomfort. (The needle should go in easily. If it doesn t, stop right away.)    Move your thumb to the plunger. Press down to inject the medicine, counting to 5.    Remove the needle and dispose of it safely.    Gaining an erection    Apply pressure to the injection site for a few minutes. This prevents swelling and bruising. It also helps spread the medicine.     Stand up. This may help your erection develop. Foreplay often helps, too.    Your penis should become firm within  10 to 20 minutes. The erection will last long enough for sex, and maybe longer.    When to seek medical care  Go to the closest emergency department if you have an erection that lasts longer than  3 to 4 hours. Erections that last longer than 4 hours can damage the penis and lead to permanent ED.   Call your healthcare provider if you have:    Bleeding or bruising    Severe pain    Scarring or curvature of the penis  [employer] last reviewed this educational content on 6/1/2019 2000-2021 [employer]. All rights reserved. This information is not intended as a substitute for professional medical care. Always follow your healthcare professional's instructions.

## 2021-08-17 NOTE — TELEPHONE ENCOUNTER
He is wondering the price of TriMix #9  . Gave him [employer] phone number to call to get the price. He will call me in Oct when back from vacation to order the medication and set up follow up with MD  If he decides he wants to proceed with medication

## 2021-08-17 NOTE — TELEPHONE ENCOUNTER
Central Prior Authorization Team   Phone: [phone]    PA Initiation    Medication: sildenafil (REVATIO) 20 MG tablet   Insurance Company: [employer] - Phone [phone] Fax [phone]  Pharmacy Filling the Rx: [id number] [place], [place] - [address] [place] [facility] [address]  Filling Pharmacy Phone: [phone]  Filling Pharmacy Fax: [phone]  Start Date: 8/17/2021

## 2021-08-17 NOTE — PROGRESS NOTES
"*SEND LINK TO CELL PHONE*    Naveen is a 66 year old who is being evaluated via a billable video visit.      How would you like to obtain your AVS? MyChart  If the video visit is dropped, the invitation should be resent by: Text to cell phone: [phone]  Will anyone else be joining your video visit? No    Video Start Time: 11:34 AM    CHIEF COMPLAINT   Kashmir Yao who is a 66 year old male returns today for follow-up of ED.      HPI   Kashmir Yao is a 66 year old male who presents with a history of ED.  Last seen in Jan 2021.    Still having significant issues with erectile dysfunction. He can have pleasurable intercourse with orgasm but cannot get erect still.    I had prescribed him daily tadalafil but it was too expensive so he chose to get sildenafil from a [place] pharmacy.    Is wondering about alternative, particularly intracavernosal injection    Is on ASA 81 mg daily but not on any other blood thinners.    PHYSICAL EXAM  Patient is a 66 year old  male   Vitals: Height 1.778 m (5' 10\"), weight 97.5 kg (215 lb).  Body mass index is 30.85 kg/m .  General Appearance Adult:   Alert, no acute distress, oriented  HENT: throat/mouth:normal, good dentition  Lungs: no respiratory distress, or pursed lip breathing  Heart: No obvious jugular venous distension present  Musculoskeltal: extremities normal, no peripheral edema  Skin: no suspicious lesions or rashes  Neuro: Alert, oriented, speech and mentation normal  Psych: affect and mood normal  Gait: Normal      ASSESSMENT and PLAN  65 yo M with ED with poor response to PDEVI. Discussed options including continuing PDEVI vs. MARCUS vs. ICI. Vs. IPP    Patient wants to trial intracavernosal injection. Discussed potential side effects including priapism, scarring, bleeding.    He is traveling to [place] for about 6 weeks. I do not recommend ICI while he is in [place] due to the logistics in keeping the medication cold, traveling with needles etc.    He requests refill " for sildenafil which I have written for him    He wants to know how much Trimix costs, which I don't know. Will ask my office staff to reach out to him    He is not comfortable with the idea of penile prosthesis    Once he returns from [place] in October, he will consider his options. I have asked my nursing team to look into Trimix. If it is affordable to him, he will order it and set up an appointment to return for ICI teaching    Over 30 minutes spent on this encounter including time spent talking to patient and documentation    Jose Angel Lawson MD   [facility] Urology  [facility] Phone: [phone]      Video-Visit Details    Type of service:  Video Visit    Video End Time:12:06 PM    Originating Location (pt. Location): Home    Distant Location (provider location):  [facility]OLOGY [facility]     Platform used for Video Visit: [employer]apatap

## 2021-08-18 NOTE — TELEPHONE ENCOUNTER
PRIOR AUTHORIZATION DENIED    Medication: sildenafil (REVATIO) 20 MG tablet- PA DENIED     Denial Date: 8/17/2021    Denial Rational:           Appeal Information:

## 2021-08-19 PROBLEM — M79.641 BILATERAL HAND PAIN: Status: RESOLVED | Noted: 2020-11-05 | Resolved: 2021-08-19

## 2021-08-19 PROBLEM — M79.642 BILATERAL HAND PAIN: Status: RESOLVED | Noted: 2020-11-05 | Resolved: 2021-08-19

## 2021-08-23 ENCOUNTER — PATIENT OUTREACH (OUTPATIENT)
Dept: FAMILY MEDICINE | Facility: CLINIC | Age: 66
End: 2021-08-23

## 2021-08-23 NOTE — TELEPHONE ENCOUNTER
Pt left a voice message for PAL RN on 8/21/21 @ 2:35 PM asking to be called back.  PAL RN called pt back and discussed pt's travels to [place] on Saturday 8/28/21 and pt given number to call [phone] to schedule appt for travel vaccine and pt verbalized understanding.    Pt will call PAL RN with any other concerns.    Kendal Guzman, Registered Nurse, PAL (Patient Advocate Liaison) [facility]     [phone]

## 2021-09-05 NOTE — PATIENT INSTRUCTIONS
Hold finasteride 1 month and assess sexual function   Infectious Disease   Progress Note     Chief complaint: bacteremia, \"I feel good\"    SUBJECTIVE  Seen at bedside  Awake and alert   S/p  EGD  - no varices, no fresh blood   Afebrile   Tolerating medications, no side effects     REVIEW OF SYSTEMS:    Negative except as above     Current Medications:     Scheduled Meds:   spironolactone  25 mg Oral BID    vancomycin  1,000 mg IntraVENous Q12H    pantoprazole  40 mg Oral QAM AC    lactulose  30 g Oral TID    [Held by provider] enoxaparin  40 mg SubCUTAneous Daily    sodium chloride flush  5-40 mL IntraVENous 2 times per day    thiamine  100 mg Oral Daily    multivitamin  1 tablet Oral Daily    folic acid  1 mg Oral Daily    rifaximin  550 mg Oral BID    levothyroxine  100 mcg Oral Daily    mirtazapine  15 mg Oral Nightly    cyanocobalamin  2,000 mcg Oral Daily    pyridoxine  25 mg Oral Daily    vitamin D  50,000 Units Oral Weekly     Continuous Infusions:   sodium chloride      sodium chloride       PRN Meds:sodium chloride, LORazepam, sodium chloride, ondansetron **OR** ondansetron, sodium chloride flush      PHYSICAL EXAM:  Vitals:    09/03/21 1715 09/04/21 0501 09/04/21 1723 09/05/21 0517   BP: (!) 144/71 114/66 133/62 120/82   Pulse: 75 79 77 75   Resp: 16 16 16 16   Temp: 98.5 °F (36.9 °C) 99 °F (37.2 °C) 98.5 °F (36.9 °C) 98.6 °F (37 °C)   TempSrc: Oral Oral Oral Oral   SpO2: 96% 98% 96% 98%   Weight:       Height:           General Appearance:       Alert, cooperative, no distress, appears stated age ++ JAUNDICED         Heent:    Normocephalic, atraumatic,     PERRL, conjunctiva/corneas JAUNDICED     no drainage or sinus tenderness      Neck:   Supple, symmetrical, trachea midline   Back:     Symmetric, no CVA tenderness   Lungs:     Clear to auscultation bilaterally, respirations unlabored   Chest Wall:    No tenderness or deformity    Heart:    Regular rate and rhythm, S1 and S2 normal, no murmur, rub or   gallop   Abdomen:     Soft, non-tender, bowel sounds active all four quadrants         Extremities:   Extremities normal, atraumatic, no cyanosis or edema   Pulses:   LE extremities   Skin:   Skin color, texture, turgor normal, no rashes or lesions   Lymph nodes:   Cervical, supraclavicular, and axillary nodes normal   Neurologic:   CNII-XII intact, no focal deficits    LINES : [facility]        LABS AND DATA REVIEW:     Recent Labs     09/03/21 0442 09/03/21 1718 09/04/21 0548 09/04/21 1713 09/05/21 0457   WBC 6.3  --  6.0  --  4.9   HGB 8.0*   < > 7.5* 7.9* 7.1*   HCT 24.9*   < > 22.9* 24.5* 22.4*   .1*  --  104.1*  --  106.7*   PLT 80*  --  62*  --  58*    < > = values in this interval not displayed. Recent Labs     09/03/21 0442 09/03/21 0442 09/04/21 0548 09/04/21 0548 09/05/21 0457     --  135  --  138   K 3.8   < > 3.7   < > 3.5      < > 112*   < > 116*   CO2 19*   < > 16*   < > 16*   BUN 9  --  8  --  8   CREATININE 1.0  --  1.0  --  0.9   GFRAA >60  --  >60  --  >60   LABGLOM 59  --  59  --  >60   GLUCOSE 123*  --  101*  --  100*   PROT 7.0  --  6.6  --  6.3*   LABALBU 2.4*  --  2.3*  --  2.2*   CALCIUM 8.4*  --  8.1*  --  8.2*   BILITOT 14.6*  --  12.4*  --  11.3*   ALKPHOS 173*  --  187*  --  151*   *  --  168*  --  144*   ALT 65*  --  62*  --  54*    < > = values in this interval not displayed. BLOOD CX #1  No results for input(s): BC in the last 72 hours. BLOOD CX #2  No results for input(s): [place] in the last 72 hours. WOUND culture  No results for input(s): WNDABS in the last 72 hours. URINE CULTURE  No results for input(s): LABURIN in the last 72 hours.   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

## 2021-09-23 NOTE — PROGRESS NOTES
Discharge Summary - Hand Therapy    Patient did not return to therapy.  Assume all goals were met to patient's satisfaction. D/C from hand therapy.   SPINE EVALUATION:   Referring Physician: Dr. Reyes Mention  Diagnosis:     Acute right-sided low back pain without sciatica (M54.5) Date of Service: 9/16/2021     PATIENT SUMMARY   Barron Wakefield is a 57year old female who presents to therapy today wi lumbar pain in multiple planes with signs consistent with sacroiliac joint dysfunction. Pt and PT discussed evaluation findings, pathology, POC and HEP. Pt voiced understanding and performs HEP correctly without reported pain.  Skilled Physical Therapy is Treatment Time: 45 min     Based on clinical rationale and outcome measures, this evaluation involved Low Complexity decision making. PLAN OF CARE:    Goals: (to be met in 8 visits)   1.  Pt will have R SBing ROM WFL to ease her ability to bend forward an

## 2021-10-04 ENCOUNTER — PATIENT OUTREACH (OUTPATIENT)
Dept: FAMILY MEDICINE | Facility: CLINIC | Age: 66
End: 2021-10-04

## 2021-10-04 NOTE — TELEPHONE ENCOUNTER
PAL RN called pt per reminder as pt wants to schedule a physical w/Dr.Brian Bert MD around 11/5 and pt also has futured labs.    Message left for pt to return call to clinic at [phone].      Will await pt's call.    Kendal Guzman, Registered Nurse, PAL (Patient Advocate Liaison)   [facility]     [phone]

## 2021-10-05 NOTE — TELEPHONE ENCOUNTER
Message left for pt to return call to clinic at [phone] r/t scheduling an appt w/.Chad Noel MD.    Kendal Guzman, Registered Nurse, PAL (Patient Advocate Liaison)   [facility]     [phone]

## 2021-10-06 DIAGNOSIS — R12 HEARTBURN: ICD-10-CM

## 2021-10-08 RX ORDER — PANTOPRAZOLE SODIUM 20 MG/1
TABLET, DELAYED RELEASE ORAL
Qty: 90 TABLET | Refills: 3 | OUTPATIENT
Start: 2021-10-08

## 2021-10-14 ENCOUNTER — TELEPHONE (OUTPATIENT)
Dept: FAMILY MEDICINE | Facility: CLINIC | Age: 66
End: 2021-10-14

## 2021-10-14 NOTE — TELEPHONE ENCOUNTER
Reason for Call:  Other call back    Detailed comments: Patient would like to know if Dr Noel would advise him to get the covid booster given his age and medical history. Please advise.     Phone Number Patient can be reached at: Home number on file [phone] (home)    Best Time: any    Can we leave a detailed message on this number? YES    Call taken on 10/14/2021 at 10:38 AM by Gabrielle Silveira

## 2021-10-14 NOTE — TELEPHONE ENCOUNTER
Dr Noel, Pt would like the covid booster.  With his dx do you recommend?  If so can you update HM.  Tentatively sched for Monday if approved.  Noemí Booker. RN, BSN, PAL (Patient Advocate Liaison)  [facility]   [phone]

## 2021-10-18 ENCOUNTER — ALLIED HEALTH/NURSE VISIT (OUTPATIENT)
Dept: FAMILY MEDICINE | Facility: CLINIC | Age: 66
End: 2021-10-18
Payer: MEDICARE

## 2021-10-18 DIAGNOSIS — Z23 NEED FOR COVID-19 VACCINE: Primary | ICD-10-CM

## 2021-10-18 PROCEDURE — 99207 PR NO CHARGE NURSE ONLY: CPT

## 2021-10-18 PROCEDURE — 0004A PR COVID VAC PFIZER DIL RECON 30 MCG/0.3 ML IM: CPT

## 2021-10-18 PROCEDURE — 91300 PR COVID VAC PFIZER DIL RECON 30 MCG/0.3 ML IM: CPT

## 2021-10-21 DIAGNOSIS — E61.1 IRON DEFICIENCY: ICD-10-CM

## 2021-10-21 RX ORDER — ASPIRIN 81 MG
TABLET, DELAYED RELEASE (ENTERIC COATED) ORAL
Qty: 90 TABLET | Refills: 2 | Status: SHIPPED | OUTPATIENT
Start: 2021-10-21 | End: 2022-12-22

## 2021-10-21 NOTE — TELEPHONE ENCOUNTER
Prescription approved per [facility] Refill Protocol.    Signed Prescriptions:                        Disp   Refills    MULTIVITAMIN, THERAPEUTIC WITH MINERALS ta*90 tab*2        Sig: TAKE 1 TABLET BY MOUTH DAILY  Authorizing Provider: JAGDISH GILBERT  Ordering User: ARTI CARRILLO, Registered Nurse  [facility]

## 2021-10-28 ENCOUNTER — TRANSFERRED RECORDS (OUTPATIENT)
Dept: HEALTH INFORMATION MANAGEMENT | Facility: CLINIC | Age: 66
End: 2021-10-28
Payer: MEDICARE

## 2021-10-29 ENCOUNTER — TELEPHONE (OUTPATIENT)
Dept: FAMILY MEDICINE | Facility: CLINIC | Age: 66
End: 2021-10-29

## 2021-10-29 DIAGNOSIS — Z76.89 HEALTH CARE HOME: Primary | ICD-10-CM

## 2021-10-29 RX ORDER — MULTIVITAMIN
1 TABLET ORAL DAILY
Qty: 90 TABLET | Refills: 1 | Status: SHIPPED | OUTPATIENT
Start: 2021-10-29 | End: 2022-12-22

## 2021-10-29 NOTE — TELEPHONE ENCOUNTER
Fax received from pharmacy, Thera-M Enhanced tablet no longer available through supplier, new alternative needed.     Narda Chavira, MARICELN, RN  [facility]

## 2021-11-04 ENCOUNTER — TRANSFERRED RECORDS (OUTPATIENT)
Dept: HEALTH INFORMATION MANAGEMENT | Facility: CLINIC | Age: 66
End: 2021-11-04
Payer: MEDICARE

## 2021-11-15 DIAGNOSIS — K21.9 GASTROESOPHAGEAL REFLUX DISEASE WITHOUT ESOPHAGITIS: ICD-10-CM

## 2021-11-15 RX ORDER — OMEPRAZOLE 40 MG/1
40 CAPSULE, DELAYED RELEASE ORAL DAILY
Qty: 90 CAPSULE | Refills: 0 | Status: SHIPPED | OUTPATIENT
Start: 2021-11-15 | End: 2022-02-23

## 2021-11-15 NOTE — TELEPHONE ENCOUNTER
Patient out of medication, med check appointment scheduled for 12/2/2021.    MARICEL StreetN, RN  [facility]

## 2021-11-15 NOTE — TELEPHONE ENCOUNTER
Prescription approved per [facility] Refill Protocol.  MARICEL StreetN, RN  [facility]

## 2021-11-23 ENCOUNTER — TRANSFERRED RECORDS (OUTPATIENT)
Dept: HEALTH INFORMATION MANAGEMENT | Facility: CLINIC | Age: 66
End: 2021-11-23
Payer: MEDICARE

## 2021-11-30 NOTE — TELEPHONE ENCOUNTER
Routing refill request to provider for review/approval because:  Drug not active on patient's medication list  Labs out of range:  CBC    Ana Paula Shepherd RN on 11/30/2021 at 10:32 AM

## 2021-12-01 RX ORDER — LAMOTRIGINE 100 MG/1
TABLET ORAL
Qty: 45 TABLET | OUTPATIENT
Start: 2021-12-01

## 2021-12-01 NOTE — TELEPHONE ENCOUNTER
Called to kevin pt has been getting this medication from provider in [place], [place].    Noemí Booker. RN, BSN, PAL (Patient Advocate Liaison)  [facility]   [phone]

## 2021-12-08 ENCOUNTER — TELEPHONE (OUTPATIENT)
Dept: ORTHOPEDICS | Facility: CLINIC | Age: 66
End: 2021-12-08
Payer: MEDICARE

## 2021-12-08 NOTE — TELEPHONE ENCOUNTER
Patient arrived at clinic requesting copies of his operative notes performed by Dr. Frias.   He reports he has requested records through Medical Records, and discs and records were sent to [facility] for Dr. Thorne to review, however his operative reports were not sent.   He reports she has referred him to [facility] for further work-up, but is not able to schedule until these documents are provided.     Copies of his operative reports were provided.   Operative notes also faxed to [facility], Attn: Dr. Harjeet Nuno  Fax: [phone],   Phone: [phone].      Fax was not successful.   Writer called number provided and alternative fax number was provided:   [phone].     Fax was confirmed.     Bailey Junior, ATC

## 2021-12-14 ENCOUNTER — TELEPHONE (OUTPATIENT)
Dept: FAMILY MEDICINE | Facility: CLINIC | Age: 66
End: 2021-12-14

## 2021-12-14 ENCOUNTER — VIRTUAL VISIT (OUTPATIENT)
Dept: FAMILY MEDICINE | Facility: CLINIC | Age: 66
End: 2021-12-14
Payer: MEDICARE

## 2021-12-14 DIAGNOSIS — Z20.822 EXPOSURE TO 2019 NOVEL CORONAVIRUS: ICD-10-CM

## 2021-12-14 DIAGNOSIS — R05.9 COUGH: Primary | ICD-10-CM

## 2021-12-14 PROCEDURE — 99213 OFFICE O/P EST LOW 20 MIN: CPT | Mod: 95 | Performed by: FAMILY MEDICINE

## 2021-12-14 NOTE — PROGRESS NOTES
Naveen is a 66 year old who is being evaluated via a billable video visit.      How would you like to obtain your AVS? MyChart  If the video visit is dropped, the invitation should be resent by: Text to cell phone: [phone]  Will anyone else be joining your video visit? No    Video Start Time: 2:48 PM    Assessment & Plan     Cough: check COVID-19 PCR. If results positive, would refer for monoclonal antibody therapy given comorbidities. In the meantime, continue symptomatic management. If any shortness of breath, dyspnea, recommend evaluation in UC or ED.  - Symptomatic; Yes; 12/13/2021 COVID-19 Virus (Coronavirus) by PCR; Future    Exposure to 2019 novel coronavirus  - Symptomatic; Yes; 12/13/2021 COVID-19 Virus (Coronavirus) by PCR; Future      No follow-ups on file.    Eleazar Maldonado DO  [facility]   Naveen is a 66 year old who presents for the following health issues     HPI       Concern for COVID-19  About how many days ago did these symptoms start? Started last night   Is this your first visit for this illness? Yes  In the 14 days before your symptoms started, have you had close contact with someone with COVID-19 (Coronavirus)? Yes, I have been in contact with someone who has COVID-19/Coronavirus (confirmed by lab test).  Do you have a fever or chills? No  Are you having new or worsening difficulty breathing? No  Do you have new or worsening cough? Yes, it's a dry cough.   Have you had any new or unexplained body aches? YES    Have you experienced any of the following NEW symptoms?    Headache: YES    Sore throat: No    Loss of taste or smell: YES    Chest pain: No    Diarrhea: No    Rash: No  What treatments have you tried? tylenol  Who do you live with? Wife and two kids - one child tested positive  Are you, or a household member, a healthcare worker or a ? YES  Do you live in a nursing home, group home, or shelter? No  Do you have a way to get  food/medications if quarantined? Yes, I have a friend or family member who can help me.      Review of Systems   Constitutional, HEENT, cardiovascular, pulmonary, gi and gu systems are negative, except as otherwise noted.      Objective           Vitals:  No vitals were obtained today due to virtual visit.    Physical Exam   GENERAL: Healthy, alert and no distress  EYES: Eyes grossly normal to inspection.  No discharge or erythema, or obvious scleral/conjunctival abnormalities.  RESP: No audible wheeze, cough, or visible cyanosis.  No visible retractions or increased work of breathing.    SKIN: Visible skin clear. No significant rash, abnormal pigmentation or lesions.  NEURO: Cranial nerves grossly intact.  Mentation and speech appropriate for age.  PSYCH: Mentation appears normal, affect normal/bright, judgement and insight intact, normal speech and appearance well-groomed.          Video-Visit Details    Type of service:  Video Visit    Video End Time:2:55 PM    Originating Location (pt. Location): Home    Distant Location (provider location):  [facility]     Platform used for Video Visit: [employer]

## 2022-01-08 DIAGNOSIS — I10 BENIGN ESSENTIAL HYPERTENSION: ICD-10-CM

## 2022-01-11 RX ORDER — METOPROLOL TARTRATE 50 MG
TABLET ORAL
Qty: 180 TABLET | Refills: 1 | Status: SHIPPED | OUTPATIENT
Start: 2022-01-11 | End: 2022-05-17

## 2022-01-11 NOTE — TELEPHONE ENCOUNTER
Routing refill request to provider for review/approval because:  Pt no showed for appointment  Grace Malone RN, BSN  [facility]

## 2022-02-23 DIAGNOSIS — K21.9 GASTROESOPHAGEAL REFLUX DISEASE WITHOUT ESOPHAGITIS: ICD-10-CM

## 2022-02-23 RX ORDER — OMEPRAZOLE 40 MG/1
CAPSULE, DELAYED RELEASE ORAL
Qty: 90 CAPSULE | Refills: 0 | Status: SHIPPED | OUTPATIENT
Start: 2022-02-23 | End: 2022-06-01

## 2022-02-23 NOTE — TELEPHONE ENCOUNTER
Prescription approved per [facility] Refill Protocol.  Pam Jin, RN, BSN, PHN  [facility]

## 2022-03-01 ENCOUNTER — PATIENT OUTREACH (OUTPATIENT)
Dept: FAMILY MEDICINE | Facility: CLINIC | Age: 67
End: 2022-03-01
Payer: COMMERCIAL

## 2022-03-01 DIAGNOSIS — N52.01 ERECTILE DYSFUNCTION DUE TO ARTERIAL INSUFFICIENCY: Primary | ICD-10-CM

## 2022-03-01 NOTE — TELEPHONE ENCOUNTER
"Pt calls PAL RN asking for telephone number for [facility] [phone] where pt was referred by Dr.Brian Bert MD for ED. Pt given number.    Appointment made for pt w/Dr.Brian Bert MD on Tue 3/22/2022 @ 3:40 PM for Bell's Palsy F/U. Pt states, \"I don't know if Dr. Noel has learned anything new, but ever once in a while I like to check in with him to see if he can do anything different.\"      Kendal Guzman, Registered Nurse, PAL (Patient Advocate Liaison)   [facility]     [phone]    "

## 2022-03-03 ENCOUNTER — TELEPHONE (OUTPATIENT)
Dept: UROLOGY | Facility: CLINIC | Age: 67
End: 2022-03-03
Payer: COMMERCIAL

## 2022-03-03 NOTE — TELEPHONE ENCOUNTER
Health Call Center    Phone Message    May a detailed message be left on voicemail: yes     Reason for Call: Medication Question or concern regarding medication   Prescription Clarification  Name of Medication: Papaverine 29.4mg/ml, Phentolamine 1mg/ml , Alprostadil 10mcg/ml Inject 0.1cc intracavernosally as directed by MD  COMPOUNDED NON-CONTROLLED SUBSTANCE (CMPD RX) - PHARMACY TO MIX COMPOUNDED MEDICATION [[id number]] (Order [id number])  Prescribing Provider: Dr. Lawson   What on the order needs clarification? The amount of the medication that is prescribed cannot be used within the time frame.  The patient would like to speak with someone in regards to the portion.  Are there other options for smaller portions.  Per pharmacy, current portion is 25 injections in one month for $88.00 that expires in one month.  Please reach out out to patient.      Action Taken: Message routed to:  [facility] Urology    Travel Screening: Not Applicable

## 2022-03-07 DIAGNOSIS — M16.0 PRIMARY OSTEOARTHRITIS OF BOTH HIPS: ICD-10-CM

## 2022-03-07 RX ORDER — NAPROXEN 500 MG/1
TABLET ORAL
Qty: 60 TABLET | Refills: 1 | Status: SHIPPED | OUTPATIENT
Start: 2022-03-07 | End: 2022-05-17

## 2022-03-07 NOTE — TELEPHONE ENCOUNTER
Prescription approved per [facility] Refill Protocol.  Pam Jni, RN, BSN, PHN  [facility]

## 2022-05-09 ENCOUNTER — TELEPHONE (OUTPATIENT)
Dept: FAMILY MEDICINE | Facility: CLINIC | Age: 67
End: 2022-05-09
Payer: COMMERCIAL

## 2022-05-09 NOTE — TELEPHONE ENCOUNTER
Patient calling and states he is having cold symptoms x 2 days and wants Dr. Noel to order a COVID test.  Wants test today.  Discussed visit needed for order.  Also discussed since having symptoms testing would need to be done at [place].  Patient does have home test.  He states he does not know how to do test.  Son will help him.  Discussed evisit for order otherwise.  Beth Quintero RN

## 2022-05-13 DIAGNOSIS — I10 BENIGN ESSENTIAL HYPERTENSION: ICD-10-CM

## 2022-05-16 ENCOUNTER — PATIENT OUTREACH (OUTPATIENT)
Dept: FAMILY MEDICINE | Facility: CLINIC | Age: 67
End: 2022-05-16
Payer: COMMERCIAL

## 2022-05-16 DIAGNOSIS — E78.5 HYPERLIPIDEMIA LDL GOAL <70: ICD-10-CM

## 2022-05-16 DIAGNOSIS — M16.0 PRIMARY OSTEOARTHRITIS OF BOTH HIPS: ICD-10-CM

## 2022-05-16 DIAGNOSIS — G47.00 INSOMNIA, UNSPECIFIED TYPE: Primary | ICD-10-CM

## 2022-05-17 RX ORDER — SIMVASTATIN 40 MG
40 TABLET ORAL AT BEDTIME
Qty: 90 TABLET | Refills: 0 | Status: SHIPPED | OUTPATIENT
Start: 2022-05-17 | End: 2022-05-17

## 2022-05-17 RX ORDER — ZOLPIDEM TARTRATE 5 MG/1
5 TABLET ORAL
Qty: 90 TABLET | Refills: 0 | Status: SHIPPED | OUTPATIENT
Start: 2022-05-17 | End: 2022-05-17

## 2022-05-17 RX ORDER — ZOLPIDEM TARTRATE 5 MG/1
5 TABLET ORAL
Qty: 90 TABLET | Refills: 0 | Status: SHIPPED | OUTPATIENT
Start: 2022-05-17 | End: 2023-10-09

## 2022-05-17 RX ORDER — NAPROXEN 500 MG/1
TABLET ORAL
Qty: 60 TABLET | Refills: 0 | Status: SHIPPED | OUTPATIENT
Start: 2022-05-17 | End: 2022-05-17

## 2022-05-17 RX ORDER — METOPROLOL TARTRATE 50 MG
TABLET ORAL
Qty: 180 TABLET | Refills: 2 | Status: SHIPPED | OUTPATIENT
Start: 2022-05-17 | End: 2023-05-11

## 2022-05-17 RX ORDER — NAPROXEN 500 MG/1
TABLET ORAL
Qty: 60 TABLET | Refills: 0 | Status: SHIPPED | OUTPATIENT
Start: 2022-05-17 | End: 2022-06-01

## 2022-05-17 RX ORDER — SIMVASTATIN 40 MG
40 TABLET ORAL AT BEDTIME
Qty: 90 TABLET | Refills: 0 | Status: SHIPPED | OUTPATIENT
Start: 2022-05-17 | End: 2022-08-13

## 2022-05-17 NOTE — TELEPHONE ENCOUNTER
Prescription approved per [facility] Refill Protocol.    Ana Paula Shepherd RN on 5/17/2022 at 2:29 PM

## 2022-05-17 NOTE — TELEPHONE ENCOUNTER
Pt left a message for RULA RN requesting an rx refill for Zolpidem 10 mg.  Pt states, This prescription wasn't originally written for me by Dr.Brian Bert MD but I was wondering if he'd fill it for me.  Do you mind asking him?    PAL RN called pt.  Rx for Zolpidem last filled by Dr. Nando Cano.  Pt also requested refills for simvastatin 40 mg and naproxen 500 mg.    Pt uses: [id number] [place], [place] - [address] AT [address] [phone].     Pt has been a no show or has cancelled appointments  as of late and has not been seen by Dr.Brian Bert MD since 8/4/2021.    Appointment scheduled w/Dr.Brian Bert MD for an annual wellness visit on Wed 6/22/2022 @ 3:15 PM.  RULA RN signed pt up for MyChart and test message sent to pt to initiate.    Kendal Guzman, Registered Nurse, PAL (Patient Advocate Liaison) [facility]     [phone]

## 2022-05-17 NOTE — TELEPHONE ENCOUNTER
Routing refill request to provider for review/approval because:  Labs out of range:  Last three BP's: 04/19/21:124/70, 04/20/21:134/70, 08/04/21: 118/78  Labs not current:  Last B/P's-4-8/2021  Patient needs to be seen because:  Last seen by Dr.Brian Bert MD 8/4/2021 (has been a no-show or has cancelled appointments since then)  Pt scheduled to see Dr.Brian Bert MD on 6/28/20222 for an annual wellness visit    Kendal Guzman, Registered Nurse, PAL (Patient Advocate Liaison)   [facility]     [phone]

## 2022-05-28 DIAGNOSIS — M16.0 PRIMARY OSTEOARTHRITIS OF BOTH HIPS: ICD-10-CM

## 2022-05-28 DIAGNOSIS — K21.9 GASTROESOPHAGEAL REFLUX DISEASE WITHOUT ESOPHAGITIS: ICD-10-CM

## 2022-06-01 RX ORDER — NAPROXEN 500 MG/1
TABLET ORAL
Qty: 60 TABLET | Refills: 0 | Status: SHIPPED | OUTPATIENT
Start: 2022-06-01 | End: 2022-07-20

## 2022-06-01 RX ORDER — OMEPRAZOLE 40 MG/1
CAPSULE, DELAYED RELEASE ORAL
Qty: 90 CAPSULE | Refills: 0 | Status: SHIPPED | OUTPATIENT
Start: 2022-06-01 | End: 2022-09-09

## 2022-06-01 NOTE — TELEPHONE ENCOUNTER
Routing refill request to provider for review/approval because:    NSAID Medications Failed        Normal ALT on file in past 12 months        Recent Labs   Lab Test 07/29/20  1313   ALT 45         Normal AST on file in past 12 months        Recent Labs   Lab Test 07/29/20  1313   AST 23         Patient is age 6-64 years          Normal CBC on file in past 12 months        Recent Labs   Lab Test 09/08/20  1140   WBC 10.3   RBC 4.84   HGB 13.4   HCT 39.5*                Normal serum creatinine on file in past 12 months          Recent Labs   Lab Test 09/04/20  1049 10/28/18  2044 10/28/18  2041   CR 1.12   < >  --    CREAT  --   --  1.3*    < > = values in this interval not displayed.      Next 5 appointments (look out 90 days)      Jun 22, 2022  3:30 PM  (Arrive by 3:10 PM)  Annual Wellness Visit with Chad Noel MD  [facility] ([facility] ) [address] [place] [id number] [phone]          Usha Parry RN

## 2022-06-13 DIAGNOSIS — M16.0 PRIMARY OSTEOARTHRITIS OF BOTH HIPS: ICD-10-CM

## 2022-06-14 RX ORDER — NAPROXEN 500 MG/1
TABLET ORAL
Qty: 60 TABLET | Refills: 0 | OUTPATIENT
Start: 2022-06-14

## 2022-06-14 NOTE — TELEPHONE ENCOUNTER
Pt was given 60 tablets which should last him a month if taking as prescribed. Pt due for visit with PCP and provider can order at that time.     Pam Jin, RN, BSN, PHN  [facility]

## 2022-06-23 ENCOUNTER — OFFICE VISIT (OUTPATIENT)
Dept: FAMILY MEDICINE | Facility: CLINIC | Age: 67
End: 2022-06-23
Payer: COMMERCIAL

## 2022-06-23 VITALS
WEIGHT: 231.4 LBS | BODY MASS INDEX: 33.2 KG/M2 | DIASTOLIC BLOOD PRESSURE: 80 MMHG | HEART RATE: 53 BPM | TEMPERATURE: 98 F | RESPIRATION RATE: 12 BRPM | SYSTOLIC BLOOD PRESSURE: 134 MMHG | OXYGEN SATURATION: 97 %

## 2022-06-23 DIAGNOSIS — N52.01 ERECTILE DYSFUNCTION DUE TO ARTERIAL INSUFFICIENCY: ICD-10-CM

## 2022-06-23 DIAGNOSIS — K59.09 OTHER CONSTIPATION: ICD-10-CM

## 2022-06-23 DIAGNOSIS — G43.709 CHRONIC MIGRAINE WITHOUT AURA WITHOUT STATUS MIGRAINOSUS, NOT INTRACTABLE: ICD-10-CM

## 2022-06-23 DIAGNOSIS — J43.1 PANLOBULAR EMPHYSEMA (H): ICD-10-CM

## 2022-06-23 DIAGNOSIS — E61.1 IRON DEFICIENCY: ICD-10-CM

## 2022-06-23 DIAGNOSIS — Q03.0: Primary | ICD-10-CM

## 2022-06-23 DIAGNOSIS — E78.5 HYPERLIPIDEMIA LDL GOAL <70: ICD-10-CM

## 2022-06-23 DIAGNOSIS — G47.33 OSA (OBSTRUCTIVE SLEEP APNEA): ICD-10-CM

## 2022-06-23 DIAGNOSIS — E66.01 MORBID OBESITY (H): ICD-10-CM

## 2022-06-23 DIAGNOSIS — Z23 ENCOUNTER FOR IMMUNIZATION: ICD-10-CM

## 2022-06-23 DIAGNOSIS — G40.309 GENERALIZED TONIC CLONIC EPILEPSY (H): ICD-10-CM

## 2022-06-23 LAB
BASOPHILS # BLD AUTO: 0 10E3/UL (ref 0–0.2)
BASOPHILS NFR BLD AUTO: 0 %
EOSINOPHIL # BLD AUTO: 0.1 10E3/UL (ref 0–0.7)
EOSINOPHIL NFR BLD AUTO: 2 %
ERYTHROCYTE [DISTWIDTH] IN BLOOD BY AUTOMATED COUNT: 14.5 % (ref 10–15)
HCT VFR BLD AUTO: 39.5 % (ref 40–53)
HGB BLD-MCNC: 13 G/DL (ref 13.3–17.7)
IMM GRANULOCYTES # BLD: 0.1 10E3/UL
IMM GRANULOCYTES NFR BLD: 2 %
LYMPHOCYTES # BLD AUTO: 1.3 10E3/UL (ref 0.8–5.3)
LYMPHOCYTES NFR BLD AUTO: 27 %
MCH RBC QN AUTO: 28.4 PG (ref 26.5–33)
MCHC RBC AUTO-ENTMCNC: 32.9 G/DL (ref 31.5–36.5)
MCV RBC AUTO: 86 FL (ref 78–100)
MONOCYTES # BLD AUTO: 0.4 10E3/UL (ref 0–1.3)
MONOCYTES NFR BLD AUTO: 9 %
NEUTROPHILS # BLD AUTO: 2.8 10E3/UL (ref 1.6–8.3)
NEUTROPHILS NFR BLD AUTO: 60 %
NRBC # BLD AUTO: 0 10E3/UL
NRBC BLD AUTO-RTO: 0 /100
PLATELET # BLD AUTO: 181 10E3/UL (ref 150–450)
RBC # BLD AUTO: 4.57 10E6/UL (ref 4.4–5.9)
WBC # BLD AUTO: 4.8 10E3/UL (ref 4–11)

## 2022-06-23 PROCEDURE — 36415 COLL VENOUS BLD VENIPUNCTURE: CPT | Performed by: FAMILY MEDICINE

## 2022-06-23 PROCEDURE — 91305 COVID-19,PF,PFIZER (12+ YRS): CPT | Performed by: FAMILY MEDICINE

## 2022-06-23 PROCEDURE — G0439 PPPS, SUBSEQ VISIT: HCPCS | Performed by: FAMILY MEDICINE

## 2022-06-23 PROCEDURE — 80061 LIPID PANEL: CPT | Performed by: FAMILY MEDICINE

## 2022-06-23 PROCEDURE — 85025 COMPLETE CBC W/AUTO DIFF WBC: CPT | Performed by: FAMILY MEDICINE

## 2022-06-23 PROCEDURE — 0054A COVID-19,PF,PFIZER (12+ YRS): CPT | Performed by: FAMILY MEDICINE

## 2022-06-23 PROCEDURE — 82728 ASSAY OF FERRITIN: CPT | Performed by: FAMILY MEDICINE

## 2022-06-23 PROCEDURE — 84443 ASSAY THYROID STIM HORMONE: CPT | Performed by: FAMILY MEDICINE

## 2022-06-23 PROCEDURE — 99214 OFFICE O/P EST MOD 30 MIN: CPT | Mod: 25 | Performed by: FAMILY MEDICINE

## 2022-06-23 RX ORDER — RIZATRIPTAN BENZOATE 10 MG/1
10 TABLET, ORALLY DISINTEGRATING ORAL
Qty: 10 TABLET | Refills: 1 | Status: SHIPPED | OUTPATIENT
Start: 2022-06-23 | End: 2022-12-22

## 2022-06-23 SDOH — ECONOMIC STABILITY: TRANSPORTATION INSECURITY
IN THE PAST 12 MONTHS, HAS LACK OF TRANSPORTATION KEPT YOU FROM MEETINGS, WORK, OR FROM GETTING THINGS NEEDED FOR DAILY LIVING?: PATIENT DECLINED

## 2022-06-23 SDOH — HEALTH STABILITY: PHYSICAL HEALTH: ON AVERAGE, HOW MANY DAYS PER WEEK DO YOU ENGAGE IN MODERATE TO STRENUOUS EXERCISE (LIKE A BRISK WALK)?: 2 DAYS

## 2022-06-23 SDOH — ECONOMIC STABILITY: INCOME INSECURITY: IN THE LAST 12 MONTHS, WAS THERE A TIME WHEN YOU WERE NOT ABLE TO PAY THE MORTGAGE OR RENT ON TIME?: PATIENT REFUSED

## 2022-06-23 SDOH — ECONOMIC STABILITY: TRANSPORTATION INSECURITY
IN THE PAST 12 MONTHS, HAS THE LACK OF TRANSPORTATION KEPT YOU FROM MEDICAL APPOINTMENTS OR FROM GETTING MEDICATIONS?: PATIENT DECLINED

## 2022-06-23 SDOH — HEALTH STABILITY: PHYSICAL HEALTH: ON AVERAGE, HOW MANY MINUTES DO YOU ENGAGE IN EXERCISE AT THIS LEVEL?: 60 MIN

## 2022-06-23 SDOH — ECONOMIC STABILITY: FOOD INSECURITY: WITHIN THE PAST 12 MONTHS, THE FOOD YOU BOUGHT JUST DIDN'T LAST AND YOU DIDN'T HAVE MONEY TO GET MORE.: PATIENT DECLINED

## 2022-06-23 SDOH — ECONOMIC STABILITY: INCOME INSECURITY: HOW HARD IS IT FOR YOU TO PAY FOR THE VERY BASICS LIKE FOOD, HOUSING, MEDICAL CARE, AND HEATING?: PATIENT DECLINED

## 2022-06-23 SDOH — ECONOMIC STABILITY: FOOD INSECURITY: WITHIN THE PAST 12 MONTHS, YOU WORRIED THAT YOUR FOOD WOULD RUN OUT BEFORE YOU GOT MONEY TO BUY MORE.: PATIENT DECLINED

## 2022-06-23 ASSESSMENT — ENCOUNTER SYMPTOMS
HEADACHES: 1
CONSTIPATION: 1

## 2022-06-23 ASSESSMENT — LIFESTYLE VARIABLES
AUDIT-C TOTAL SCORE: -1
HOW MANY STANDARD DRINKS CONTAINING ALCOHOL DO YOU HAVE ON A TYPICAL DAY: PATIENT DOES NOT DRINK
HOW OFTEN DO YOU HAVE SIX OR MORE DRINKS ON ONE OCCASION: PATIENT DECLINED
SKIP TO QUESTIONS 9-10: 0
HOW OFTEN DO YOU HAVE A DRINK CONTAINING ALCOHOL: PATIENT DECLINED

## 2022-06-23 ASSESSMENT — SOCIAL DETERMINANTS OF HEALTH (SDOH)
HOW OFTEN DO YOU ATTEND CHURCH OR RELIGIOUS SERVICES?: 1 TO 4 TIMES PER YEAR
DO YOU BELONG TO ANY CLUBS OR ORGANIZATIONS SUCH AS CHURCH GROUPS UNIONS, FRATERNAL OR ATHLETIC GROUPS, OR SCHOOL GROUPS?: NO
IN A TYPICAL WEEK, HOW MANY TIMES DO YOU TALK ON THE PHONE WITH FAMILY, FRIENDS, OR NEIGHBORS?: PATIENT DECLINED
HOW OFTEN DO YOU GET TOGETHER WITH FRIENDS OR RELATIVES?: ONCE A WEEK

## 2022-06-23 ASSESSMENT — ACTIVITIES OF DAILY LIVING (ADL): CURRENT_FUNCTION: NO ASSISTANCE NEEDED

## 2022-06-23 NOTE — PROGRESS NOTES
"SUBJECTIVE:   Kashmir Yao is a 67 year old male who presents for Preventive Visit.      Patient has been advised of split billing requirements and indicates understanding: Yes  Are you in the first 12 months of your Medicare coverage?      Healthy Habits:     In general, how would you rate your overall health?  Good    Frequency of exercise:  1 day/week    Duration of exercise:  30-45 minutes    Do you usually eat at least 4 servings of fruit and vegetables a day, include whole grains    & fiber and avoid regularly eating high fat or \"junk\" foods?  No    Taking medications regularly:  Yes    Medication side effects:  Not applicable    Ability to successfully perform activities of daily living:  No assistance needed    Home Safety:  No safety concerns identified    Hearing Impairment:  No hearing concerns    In the past 6 months, have you been bothered by leaking of urine?  No    In general, how would you rate your overall mental or emotional health?  Good      PHQ-2 Total Score: 0    Additional concerns today:  No    Do you feel safe in your environment? Yes    Have you ever done Advance Care Planning? (For example, a Health Directive, POLST, or a discussion with a medical provider or your loved ones about your wishes): No, advance care planning information given to patient to review.  Patient declined advance care planning discussion at this time.       Fall risk  Fallen 2 or more times in the past year?: No  Any fall with injury in the past year?: No    Cognitive Screening   1) Repeat 3 items (Leader, Season, Table)    2) Clock draw: NORMAL  3) 3 item recall: Recalls 2 objects   Results: NORMAL clock, 1-2 items recalled: COGNITIVE IMPAIRMENT LESS LIKELY    Mini-CogTM Copyright LUIS Dickey. Licensed by the author for use in [facility]; reprinted with permission (jesse@.[facility]). All rights reserved.      Do you have sleep apnea, excessive snoring or daytime drowsiness?: yes. I am on a CPAP and wake up " with headaches and dizziness.     Reviewed and updated as needed this visit by clinical staff   Tobacco  Allergies  Meds   Med Hx  Surg Hx  Fam Hx  Soc Hx          Reviewed and updated as needed this visit by Provider     Meds               Social History     Tobacco Use     Smoking status: Former Smoker     Packs/day: 0.50     Years: 20.00     Pack years: 10.00     Quit date: 2003     Years since quittin.9     Smokeless tobacco: Never Used   Substance Use Topics     Alcohol use: No     Alcohol/week: 0.0 standard drinks     If you drink alcohol do you typically have >3 drinks per day or >7 drinks per week? No    Alcohol Use 2022   Prescreen: >3 drinks/day or >7 drinks/week? Not Applicable   Prescreen: >3 drinks/day or >7 drinks/week? -               Current providers sharing in care for this patient include:   Patient Care Team:  Chad Noel MD as PCP - General (Family Practice)  Chad Noel MD as Assigned PCP  Jose Angel Lawson MD as MD  Rick Frias MD as Assigned Musculoskeletal Provider  Jose Angel Lawson MD as Assigned Surgical Provider  Alex Varghese MD as Assigned Neuroscience Provider    The following health maintenance items are reviewed in Epic and correct as of today:  Health Maintenance Due   Topic Date Due     ZOSTER IMMUNIZATION (1 of 2) Never done     LIPID  2021     CMP  2021               Review of Systems   Constitutional:        Unwelcome weight gain   HENT: Negative.    Eyes:        Presbyopia   Respiratory: Negative.    Gastrointestinal: Positive for constipation.   Genitourinary: Negative.  Negative for dysuria.   Musculoskeletal: Negative for gait problem and myalgias.   Neurological: Positive for headaches.        Residua of Bell's palsy   Hematological: Does not bruise/bleed easily.         OBJECTIVE:   /80 (BP Location: Right arm, Patient Position: Sitting, Cuff Size: Adult Large)   Pulse 53   Temp 98  F (36.7  C) (Oral)  "  Resp 12   Wt 105 kg (231 lb 6.4 oz)   SpO2 97%   BMI 33.20 kg/m   Estimated body mass index is 33.2 kg/m  as calculated from the following:    Height as of 8/17/21: 1.778 m (5' 10\").    Weight as of this encounter: 105 kg (231 lb 6.4 oz).  Physical Exam  Constitutional:       General: He is not in acute distress.     Appearance: He is well-developed.   HENT:      Right Ear: Tympanic membrane and external ear normal.      Left Ear: Tympanic membrane and external ear normal.      Nose: Nose normal.      Mouth/Throat:      Mouth: No oral lesions.      Pharynx: No oropharyngeal exudate.   Eyes:      General:         Right eye: No discharge.         Left eye: No discharge.      Conjunctiva/sclera: Conjunctivae normal.      Pupils: Pupils are equal, round, and reactive to light.   Neck:      Thyroid: No thyromegaly.      Trachea: No tracheal deviation.   Cardiovascular:      Rate and Rhythm: Normal rate and regular rhythm.      Pulses: Normal pulses.      Heart sounds: Normal heart sounds, S1 normal and S2 normal. No murmur heard.    No S3 or S4 sounds.   Pulmonary:      Effort: Pulmonary effort is normal. No respiratory distress.      Breath sounds: Normal breath sounds. No wheezing or rales.   Abdominal:      General: Bowel sounds are normal.      Palpations: Abdomen is soft. There is no mass.      Tenderness: There is no abdominal tenderness.   Musculoskeletal:         General: No deformity.      Cervical back: Neck supple.   Lymphadenopathy:      Cervical: No cervical adenopathy.   Skin:     General: Skin is warm and dry.      Findings: No lesion or rash.   Neurological:      Mental Status: He is alert.      Motor: No abnormal muscle tone.      Deep Tendon Reflexes: Reflexes are normal and symmetric.      Comments: Left facial softening   Psychiatric:         Speech: Speech normal.         Thought Content: Thought content normal.         Judgment: Judgment normal.               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  A relative had Maxalt which she found effective repeatedly.  This suggests common migraine.  Utilize triptan, assess frequency           Relevant Medications    rizatriptan (MAXALT-MLT) 10 MG ODT    lamoTRIgine (LAMICTAL) 200 MG tablet    Encounter for immunization     Offered           Relevant Orders    COVID-Montrell,MELITA,PFIZER (12+ Yrs GRAY LABEL) (Completed)    Generalized tonic clonic epilepsy (H)     Now on lamotrigine.  No seizures.  [facility]           Relevant Medications    rizatriptan (MAXALT-MLT) 10 MG ODT    lamoTRIgine (LAMICTAL) 200 MG tablet    Other Relevant Orders    CBC with Platelets & Differential (Completed)    Hyperlipidemia LDL goal <70     Statin therapy.  Continue           Relevant Orders    Lipid panel reflex to direct LDL Non-fasting    Obesity (BMI 35.0-39.9) with comorbidity (H)     His weight is increasing he would like to exercise more           RIC (obstructive sleep apnea)     He is compliant with his CPAP.  It has not been inspected in a few years           Relevant Orders    Adult Sleep Eval & Management  Referral    Other constipation     He reports this is been present lifelong.  Colonoscopy and upper GI endoscopy are up-to-date.  Abdomen is benign.  Trial of therapy           Relevant Medications    linaclotide (LINZESS) 290 MCG capsule    Other Relevant Orders    TSH with free T4 reflex    Panlobular emphysema (H)     Mild on testing, no symptoms at rest.  No specific therapies.  [place] lung in the past.  He desires no changes           Vasculogenic erectile dysfunction     Sildenafil barely effective.  He will consider refills when needed           Relevant Orders    Adult Urology Referral          Patient has " "been advised of split billing requirements and indicates understanding: Yes    COUNSELING:  Reviewed preventive health counseling, as reflected in patient instructions    Estimated body mass index is 33.2 kg/m  as calculated from the following:    Height as of 8/17/21: 1.778 m (5' 10\").    Weight as of this encounter: 105 kg (231 lb 6.4 oz).    Weight management plan: Discussed    He reports that he quit smoking about 18 years ago. He has a 10.00 pack-year smoking history. He has never used smokeless tobacco.      Appropriate preventive services were discussed with this patient, including applicable screening as appropriate for cardiovascular disease, diabetes, osteopenia/osteoporosis, and glaucoma.  As appropriate for age/gender, discussed screening for colorectal cancer, prostate cancer, breast cancer, and cervical cancer. Checklist reviewing preventive services available has been given to the patient.    Reviewed patients plan of care and provided an AVS. The Basic Care Plan (routine screening as documented in Health Maintenance) for [place] meets the Care Plan requirement. This Care Plan has been established and reviewed with the Patient.    Counseling Resources:  ATP IV Guidelines  Pooled Cohorts Equation Calculator  Breast Cancer Risk Calculator  Breast Cancer: Medication to Reduce Risk  FRAX Risk Assessment  ICSI Preventive Guidelines  Dietary Guidelines for Americans, 2010  World Reviewer's MyPlate  ASA Prophylaxis  Lung CA Screening    Chad Noel MD  [facility]    Identified Health Risks:  "

## 2022-06-23 NOTE — LETTER
June 28, 2022      Naveen Yao  [address] [address] [address]        Dear ,    We are writing to inform you of your test results.    Iron is OK. Let's just check lab again in the fall. We evaluated you previously       Resulted Orders   TSH with free T4 reflex   Result Value Ref Range    TSH 1.58 0.40 - 4.00 mU/L   Lipid panel reflex to direct LDL Non-fasting   Result Value Ref Range    Cholesterol 137 <200 mg/dL    Triglycerides 169 (H) <150 mg/dL    Direct Measure HDL 34 (L) >=40 mg/dL    LDL Cholesterol Calculated 69 <=100 mg/dL    Non HDL Cholesterol 103 <130 mg/dL    Patient Fasting > 8hrs? Unknown     Narrative    Cholesterol  Desirable:  <200 mg/dL    Triglycerides  Normal:  Less than 150 mg/dL  Borderline High:  150-199 mg/dL  High:  200-499 mg/dL  Very High:  Greater than or equal to 500 mg/dL    Direct Measure HDL  Female:  Greater than or equal to 50 mg/dL   Male:  Greater than or equal to 40 mg/dL    LDL Cholesterol  Desirable:  <100mg/dL  Above Desirable:  100-129 mg/dL   Borderline High:  130-159 mg/dL   High:  160-189 mg/dL   Very High:  >= 190 mg/dL    Non HDL Cholesterol  Desirable:  130 mg/dL  Above Desirable:  130-159 mg/dL  Borderline High:  160-189 mg/dL  High:  190-219 mg/dL  Very High:  Greater than or equal to 220 mg/dL   CBC with platelets and differential   Result Value Ref Range    WBC Count 4.8 4.0 - 11.0 10e3/uL      Comment:      This is a corrected result. Previous result was 4.9 10e3/uL on 6/23/2022 at  1:48 PM CDT    RBC Count 4.57 4.40 - 5.90 10e6/uL      Comment:      This is a corrected result. Previous result was 4.42 10e6/uL on 6/23/2022 at  1:48 PM CDT    Hemoglobin 13.0 (L) 13.3 - 17.7 g/dL      Comment:      This is a corrected result. Previous result was 12.8 g/dL on 6/23/2022 at  1:48 PM CDT    Hematocrit 39.5 (L) 40.0 - 53.0 %      Comment:      This is a corrected result. Previous result was 36.8 % on 6/23/2022 at  1:48 PM CDT    MCV 86 78 - 100 fL       Comment:      This is a corrected result. Previous result was 83 fL on 6/23/2022 at  1:48 PM CDT    MCH 28.4 26.5 - 33.0 pg      Comment:      This is a corrected result. Previous result was 29.0 pg on 6/23/2022 at  1:48 PM CDT    MCHC 32.9 31.5 - 36.5 g/dL      Comment:      This is a corrected result. Previous result was 34.8 g/dL on 6/23/2022 at  1:48 PM CDT    RDW 14.5 10.0 - 15.0 %      Comment:      This is a corrected result. Previous result was 14.7 % on 6/23/2022 at  1:48 PM CDT    Platelet Count 181 150 - 450 10e3/uL      Comment:      This is a corrected result. Previous result was 168 10e3/uL on 6/23/2022 at  1:48 PM CDT    % Neutrophils 60 %      Comment:      This is a corrected result. Previous result was 63 % on 6/23/2022 at  1:48 PM CDT    % Lymphocytes 27 %    % Monocytes 9 %      Comment:      This is a corrected result. Previous result was 8 % on 6/23/2022 at  1:48 PM CDT    % Eosinophils 2 %      Comment:      This is a corrected result. Previous result was 3 % on 6/23/2022 at  1:48 PM CDT    % Basophils 0 %    % Immature Granulocytes 2 %    NRBCs per 100 WBC 0 <1 /100    Absolute Neutrophils 2.8 1.6 - 8.3 10e3/uL      Comment:      This is a corrected result. Previous result was 3.1 10e3/uL on 6/23/2022 at  1:48 PM CDT    Absolute Lymphocytes 1.3 0.8 - 5.3 10e3/uL    Absolute Monocytes 0.4 0.0 - 1.3 10e3/uL    Absolute Eosinophils 0.1 0.0 - 0.7 10e3/uL    Absolute Basophils 0.0 0.0 - 0.2 10e3/uL    Absolute Immature Granulocytes 0.1 <=0.4 10e3/uL    Absolute NRBCs 0.0 10e3/uL    Narrative    Tech Comments  Name of Michel Coto  Laboratory Phone [phone]  What is Abnormal Immature Gran?  Provider Follow Up Needed no  If Yes, Provider Contact Name no  If Yes, Provider Phone/Pager no     Ferritin   Result Value Ref Range    Ferritin 88 26 - 388 ng/mL       If you have any questions or concerns, please call the clinic at the number listed above.       Sincerely,      Chad Noel,  MD

## 2022-06-24 ENCOUNTER — TELEPHONE (OUTPATIENT)
Dept: UROLOGY | Facility: CLINIC | Age: 67
End: 2022-06-24

## 2022-06-24 PROBLEM — G43.709 CHRONIC MIGRAINE WITHOUT AURA WITHOUT STATUS MIGRAINOSUS, NOT INTRACTABLE: Status: ACTIVE | Noted: 2022-06-24

## 2022-06-24 PROBLEM — G40.309 GENERALIZED TONIC CLONIC EPILEPSY (H): Status: ACTIVE | Noted: 2022-06-24

## 2022-06-24 RX ORDER — PANTOPRAZOLE SODIUM 20 MG/1
TABLET, DELAYED RELEASE ORAL
COMMUNITY
Start: 2021-08-24 | End: 2022-12-22 | Stop reason: ALTCHOICE

## 2022-06-24 RX ORDER — LAMOTRIGINE 100 MG/1
TABLET ORAL
COMMUNITY
Start: 2022-03-25 | End: 2022-06-24

## 2022-06-24 RX ORDER — LAMOTRIGINE 200 MG/1
200 TABLET ORAL 2 TIMES DAILY
COMMUNITY
Start: 2022-03-25 | End: 2023-03-25

## 2022-06-24 ASSESSMENT — ENCOUNTER SYMPTOMS
RESPIRATORY NEGATIVE: 1
DYSURIA: 0
BRUISES/BLEEDS EASILY: 0
MYALGIAS: 0

## 2022-06-24 NOTE — ASSESSMENT & PLAN NOTE
"He has been having headaches without associated symptoms \"long time\".  A relative had Maxalt which she found effective repeatedly.  This suggests common migraine.  Utilize triptan, assess frequency  "

## 2022-06-24 NOTE — ASSESSMENT & PLAN NOTE
He reports this is been present lifelong.  Colonoscopy and upper GI endoscopy are up-to-date.  Abdomen is benign.  Trial of therapy

## 2022-06-24 NOTE — ASSESSMENT & PLAN NOTE
Mild on testing, no symptoms at rest.  No specific therapies.  [place] lung in the past.  He desires no changes

## 2022-06-24 NOTE — TELEPHONE ENCOUNTER
[facility] Call Center    Phone Message    May a detailed message be left on voicemail: yes     Reason for Call: Other: . pt is calling wondering what medication  ordered for pt to come into the clinic to get taught how to use, please call pt, thank you     Action Taken: Message routed to:  Other: uro    Travel Screening: Not Applicable

## 2022-06-26 LAB
CHOLEST SERPL-MCNC: 137 MG/DL
FASTING STATUS PATIENT QL REPORTED: ABNORMAL
HDLC SERPL-MCNC: 34 MG/DL
LDLC SERPL CALC-MCNC: 69 MG/DL
NONHDLC SERPL-MCNC: 103 MG/DL
TRIGL SERPL-MCNC: 169 MG/DL
TSH SERPL DL<=0.005 MIU/L-ACNC: 1.58 MU/L (ref 0.4–4)

## 2022-06-27 DIAGNOSIS — N52.9 ED (ERECTILE DYSFUNCTION): Primary | ICD-10-CM

## 2022-06-27 DIAGNOSIS — E61.1 IRON DEFICIENCY: Primary | ICD-10-CM

## 2022-06-27 LAB — FERRITIN SERPL-MCNC: 88 NG/ML (ref 26–388)

## 2022-06-27 NOTE — TELEPHONE ENCOUNTER
"Pt returning call.     In August 8/17/21, pt had virtual visit with Dr. Lawson where Trimix was discussed.    Pt states \"I'm ready to start the process. The first time using Trimix, the clinic needs to order. Then I'm supposed to do a clinic appt so they can show me how to use the medication.\"    Okay for clinic to order Trimix for pt or does pt need office visit to discuss since last visit was 8/17/21?    Pt requesting call back if clinic orders Trimix so pt can schedule appt in clinic because pt travels a lot and medication has short shelf life.     Routing to pt's urologist to review and advise.    Tavia Oconnor RN        "

## 2022-06-27 NOTE — RESULT ENCOUNTER NOTE
You are anemic again.  I suspect you will need to take that medicine that she thought smelled bad.  We will have to give you a different form so that the smell does not bother you.  I have 1 more test that I am waiting for  JAGDISH GILBERT

## 2022-06-27 NOTE — TELEPHONE ENCOUNTER
Trimix ordered,patient will call once he knows the medication is being sent,for trimix teach appt.  Mercedez Hunter LPN

## 2022-06-28 PROBLEM — D64.89 OTHER SPECIFIED ANEMIAS: Status: ACTIVE | Noted: 2020-07-31

## 2022-06-28 PROBLEM — D64.9 ANEMIA: Status: ACTIVE | Noted: 2020-07-31

## 2022-06-28 PROBLEM — E61.1 IRON DEFICIENCY: Status: RESOLVED | Noted: 2020-09-09 | Resolved: 2022-06-28

## 2022-07-16 DIAGNOSIS — M16.0 PRIMARY OSTEOARTHRITIS OF BOTH HIPS: ICD-10-CM

## 2022-07-20 RX ORDER — NAPROXEN 500 MG/1
TABLET ORAL
Qty: 60 TABLET | Refills: 0 | Status: SHIPPED | OUTPATIENT
Start: 2022-07-20 | End: 2022-08-19

## 2022-07-20 NOTE — TELEPHONE ENCOUNTER
Ongoing rx? Routing refill request to provider for review/approval because:  Labs not current:  AST, ALT, CBC, creatinine  Fail: Patient is age 6-64 years    Sukumar GALVAN RN

## 2022-08-11 DIAGNOSIS — E78.5 HYPERLIPIDEMIA LDL GOAL <70: ICD-10-CM

## 2022-08-13 RX ORDER — SIMVASTATIN 40 MG
TABLET ORAL
Qty: 90 TABLET | Refills: 0 | Status: SHIPPED | OUTPATIENT
Start: 2022-08-13 | End: 2022-12-13

## 2022-08-13 NOTE — TELEPHONE ENCOUNTER
Prescription approved per [facility] Refill Protocol.    Signed Prescriptions:                        Disp   Refills    simvastatin (ZOCOR) 40 MG tablet           90 tab*0        Sig: TAKE 1 TABLET(40 MG) BY MOUTH AT BEDTIME  Authorizing Provider: JAGDISH GILBERT  Ordering User: ARTI CARRILLO, Registered Nurse  [facility]

## 2022-08-18 DIAGNOSIS — G40.802 OTHER EPILEPSY WITHOUT STATUS EPILEPTICUS, NOT INTRACTABLE (H): ICD-10-CM

## 2022-08-18 DIAGNOSIS — I10 BENIGN ESSENTIAL HYPERTENSION: Primary | ICD-10-CM

## 2022-08-18 DIAGNOSIS — M16.0 PRIMARY OSTEOARTHRITIS OF BOTH HIPS: ICD-10-CM

## 2022-08-18 DIAGNOSIS — F51.01 PRIMARY INSOMNIA: ICD-10-CM

## 2022-08-18 RX ORDER — TRAZODONE HYDROCHLORIDE 50 MG/1
TABLET, FILM COATED ORAL
Qty: 180 TABLET | Refills: 2 | Status: SHIPPED | OUTPATIENT
Start: 2022-08-18 | End: 2023-04-10

## 2022-08-19 RX ORDER — NAPROXEN 500 MG/1
TABLET ORAL
Qty: 60 TABLET | Refills: 0 | Status: SHIPPED | OUTPATIENT
Start: 2022-08-19 | End: 2022-09-22

## 2022-08-19 NOTE — TELEPHONE ENCOUNTER
Routing refill request to provider for review/approval because:  - Labs out of range: CBC    - Labs not current: ALT, AST, and Creatinine     Lab Results   Component Value Date    ALT 45 07/29/2020     AST   Date Value Ref Range Status   07/29/2020 23 0 - 45 U/L Final     Lab Results   Component Value Date    WBC 4.8 06/23/2022    WBC 10.3 09/08/2020     Lab Results   Component Value Date    RBC 4.57 06/23/2022    RBC 4.84 09/08/2020     Lab Results   Component Value Date    HGB 13.0 06/23/2022    HGB 13.4 09/08/2020     Lab Results   Component Value Date    HCT 39.5 06/23/2022    HCT 39.5 09/08/2020     Lab Results   Component Value Date    MCV 86 06/23/2022    MCV 82 09/08/2020     Lab Results   Component Value Date    MCH 28.4 06/23/2022    MCH 27.7 09/08/2020     Lab Results   Component Value Date    MCHC 32.9 06/23/2022    MCHC 33.9 09/08/2020     Lab Results   Component Value Date    RDW 14.5 06/23/2022    RDW 15.3 09/08/2020     Lab Results   Component Value Date     06/23/2022     09/08/2020     Creatinine   Date Value Ref Range Status   09/04/2020 1.12 0.66 - 1.25 mg/dL Final     - Patient is not 6-64 years of age (patient is 67 years of age)     Kathi CRAWFORD RN   Patient Advocate Liaison (PAL)  [facility]

## 2022-09-07 DIAGNOSIS — K21.9 GASTROESOPHAGEAL REFLUX DISEASE WITHOUT ESOPHAGITIS: ICD-10-CM

## 2022-09-08 ENCOUNTER — TELEPHONE (OUTPATIENT)
Dept: FAMILY MEDICINE | Facility: CLINIC | Age: 67
End: 2022-09-08

## 2022-09-08 DIAGNOSIS — G43.709 CHRONIC MIGRAINE WITHOUT AURA WITHOUT STATUS MIGRAINOSUS, NOT INTRACTABLE: Primary | ICD-10-CM

## 2022-09-08 DIAGNOSIS — K21.9 GASTROESOPHAGEAL REFLUX DISEASE WITHOUT ESOPHAGITIS: ICD-10-CM

## 2022-09-08 NOTE — TELEPHONE ENCOUNTER
Reason for Call:  Medication or medication refill:    Do you use a [facility]?  Name of the pharmacy and phone number for the current request:  [facility] - [address] [place] [address] [phone]    Name of the medication requested: Rizatriptan (10mg tab)    Omeprazole (40mg capsule)    Other request: Pt says he needs the regular pill, not the dissolvable one for the rizatriptan - pt also requests to increase the dosage of the medication. Pt notes if he needs to be seen, that's not a problem.    Can we leave a detailed message on this number? YES    Phone number patient can be reached at: Cell number on file:    Telephone Information:   Mobile [phone]       Best Time: Anytime    Call taken on 9/8/2022 at 5:15 PM by Mary Miranda

## 2022-09-09 RX ORDER — RIZATRIPTAN BENZOATE 10 MG/1
10 TABLET ORAL
Qty: 15 TABLET | Refills: 1 | Status: SHIPPED | OUTPATIENT
Start: 2022-09-09 | End: 2022-11-25

## 2022-09-09 RX ORDER — OMEPRAZOLE 40 MG/1
CAPSULE, DELAYED RELEASE ORAL
Qty: 90 CAPSULE | Refills: 0 | Status: SHIPPED | OUTPATIENT
Start: 2022-09-09 | End: 2022-12-06

## 2022-09-09 RX ORDER — OMEPRAZOLE 40 MG/1
CAPSULE, DELAYED RELEASE ORAL
Qty: 90 CAPSULE | Refills: 0 | OUTPATIENT
Start: 2022-09-09

## 2022-09-09 NOTE — TELEPHONE ENCOUNTER
Called patient and left voicemail to call back and ask to speak to any triage nurse.    Tavia Oconnor RN

## 2022-09-09 NOTE — TELEPHONE ENCOUNTER
Already approved. omeprazole (PRILOSEC) 40 MG DR capsule 90 capsule 0 refills 9/9/2022  Mari Martinez RN

## 2022-09-09 NOTE — TELEPHONE ENCOUNTER
Pt returning call.    FNA relayed provider's message below. Advised yo take rizatriptan orally and not sublingually per order.    Pt verbalized understanding.    Tammy Grewal RN/[facility] Nurse Advisor

## 2022-09-09 NOTE — TELEPHONE ENCOUNTER
I sent in the tablet version. 10 mg is the highest strength it comes in. If not working well, he should schedule an appointment with Dr. Noel.    Shady Treviño PA-C on 9/9/2022 at 9:39 AM (covering for Dr. Noel)

## 2022-09-13 NOTE — PROGRESS NOTES
[facility]      OUTPATIENT PHYSICAL THERAPY  PLAN OF TREATMENT FOR OUTPATIENT REHABILITATION    Patient's Last Name, First Name, M.I.                YOB: 1955  Kashmir Yao                        Provider's Name  [facility] Medical Record No.  [id number]                               Onset Date: 5/1/19   Start of Care Date: 5/15/19   Type:     _X_PT   ___OT   ___SLP Medical Diagnosis: hydrocephalus                       PT Diagnosis: impaired gait and balance      _________________________________________________________________________________  Plan of Treatment:    Frequency/Duration: 2 x a week x 9 weeks     Goals:  Goal Identifier 1 DGI   Goal Description Patience will score as 20/24 or greater on the DGI without use of an AD in order to demonstrate improved gait abilities and decreased falls risk for home and community ambulation.    Target Date 11/30/19   Date Met      Progress:     Goal Identifier 2 Gait speed    Goal Description Patience will be able to ambulate 25 feet in 15 seconds or less and 15 steps or less in order to demonstrate more efficient and safe gait for community mobility.    Target Date 06/26/19   Date Met  07/11/19   Progress:     Goal Identifier 3 household mobility   Goal Description Patience will be able to ambulate in the home without an AD with appropriate and safe gait pattern for progression toward baseline independence.    Target Date 09/24/19   Date Met  08/01/19   Progress:     Goal Identifier 4 Stairs   Goal Description Patience will be able to negotiate a flight of stairs with light contact on one railing in a reciprocal pattern both up and down for safe and efficient access to all levels of his home.   Target Date 11/30/19   Date Met      Progress:             Certification date from 9/26/19 to 11/28/19.    Nelly Ramey, PT          I  CERTIFY THE NEED FOR THESE SERVICES FURNISHED UNDER        THIS PLAN OF TREATMENT AND WHILE UNDER MY CARE .             Physician Signature               Date    X_____________________________________________________                      Referring Provider: Dr. Sancho Mcrae (phone [phone] [facility])     No

## 2022-09-21 DIAGNOSIS — M16.0 PRIMARY OSTEOARTHRITIS OF BOTH HIPS: ICD-10-CM

## 2022-09-21 DIAGNOSIS — K59.09 OTHER CONSTIPATION: ICD-10-CM

## 2022-09-21 RX ORDER — LINACLOTIDE 290 UG/1
CAPSULE, GELATIN COATED ORAL
Qty: 30 CAPSULE | Refills: 0 | Status: SHIPPED | OUTPATIENT
Start: 2022-09-21 | End: 2022-10-24

## 2022-09-21 NOTE — TELEPHONE ENCOUNTER
Please call patient. 30 days sent but he is overdue for a visit with Dr. Noel. Please help schedule.     Shady Treviño PA-C on 9/21/2022 at 2:19 PM (covering for Dr. Noel)

## 2022-09-22 RX ORDER — NAPROXEN 500 MG/1
TABLET ORAL
Qty: 60 TABLET | Refills: 0 | Status: SHIPPED | OUTPATIENT
Start: 2022-09-22 | End: 2022-10-24

## 2022-09-22 NOTE — TELEPHONE ENCOUNTER
Routing refill request to provider for review/approval because:  Labs not current:  ALT, AST, CBC, Creat  Age   Normal ALT on file in past 12 months    Normal AST on file in past 12 months    Patient is age 6-64 years    Normal CBC on file in past 12 months    Normal serum creatinine on file in past 12 months     Lab Results   Component Value Date    ALT 45 07/29/2020     AST   Date Value Ref Range Status   07/29/2020 23 0 - 45 U/L Final     Creatinine   Date Value Ref Range Status   09/04/2020 1.12 0.66 - 1.25 mg/dL Final     Heraclio Krause RN on 9/22/2022 at 1:42 PM

## 2022-10-17 ENCOUNTER — VIRTUAL VISIT (OUTPATIENT)
Dept: UROLOGY | Facility: CLINIC | Age: 67
End: 2022-10-17
Payer: COMMERCIAL

## 2022-10-17 VITALS — WEIGHT: 225 LBS | HEIGHT: 69 IN | BODY MASS INDEX: 33.33 KG/M2

## 2022-10-17 DIAGNOSIS — N52.01 ERECTILE DYSFUNCTION DUE TO ARTERIAL INSUFFICIENCY: Primary | ICD-10-CM

## 2022-10-17 PROCEDURE — 99213 OFFICE O/P EST LOW 20 MIN: CPT | Mod: 95 | Performed by: STUDENT IN AN ORGANIZED HEALTH CARE EDUCATION/TRAINING PROGRAM

## 2022-10-17 ASSESSMENT — PAIN SCALES - GENERAL: PAINLEVEL: NO PAIN (0)

## 2022-10-17 NOTE — NURSING NOTE
Chief Complaint   Patient presents with     Follow Up     Talking about Trimix     Patient is ready for a video visit    Veda Russell

## 2022-10-17 NOTE — PROGRESS NOTES
"    Naveen is a 67 year old who is being evaluated via a billable video visit.      How would you like to obtain your AVS? Mail a copy  If the video visit is dropped, the invitation should be resent by: Text to cell phone: [phone]  Will anyone else be joining your video visit? No      CHIEF COMPLAINT   Kashmir Yao who is a 67 year old male returns today for follow-up of erectile dysfunction.      HPI   Kashmir Yao is a 67 year old male returns today for follow-up of erectile dysfunction    Last seen 8/17/2021. At that time I refilled his sildenafil. He is no longer using sildenafil. He has normal ejaculation and orgasm but not having any erections    He was going to look into trimix but then has not been seen since. He is here to discuss trimix again    He is worried about his comorbidities including having seizure disorder, also CAD s/p stent    He is worried about the pharmacist warning him about \"build up of bacterial growth\" in the medication    PHYSICAL EXAM  Patient is a 67 year old  male   Vitals: Height 1.753 m (5' 9\"), weight 102.1 kg (225 lb).  Body mass index is 33.23 kg/m .  General Appearance Adult:   Alert, no acute distress, oriented  HENT: throat/mouth:normal, good dentition  Lungs: no respiratory distress, or pursed lip breathing  Heart: No obvious jugular venous distension present  Musculoskeltal: extremities normal, no peripheral edema  Skin: no suspicious lesions or rashes  Neuro: Alert, oriented, speech and mentation normal  Psych: affect and mood normal  Gait: Normal    A/P  67 year old male returns today for follow-up of erectile dysfunction. Refractory to PDEVI. After further discussion he does wish to proceed with trimix    Currently his wife is about to travel for a few months overseas, patient wants to wait until she comes back in a few months. He knows to contact the office as soon ahead of time as possible to coordinate compounding pharmacy delivery of trimix and return for " teaching      Video-Visit Details    Video Start Time: 3:04pm    Type of service:  Video Visit    Video End Time:3:16 PM    Originating Location (pt. Location): Home      Distant Location (provider location):  On-site    Platform used for Video Visit: Carmita

## 2022-10-17 NOTE — LETTER
"10/17/2022       RE: Kashmir Yao  [id number] [address]     Dear Colleague,    Thank you for referring your patient, Kashmir Yao, to the [facility] at [facility]. Please see a copy of my visit note below.        Naveen is a 67 year old who is being evaluated via a billable video visit.      How would you like to obtain your AVS? Mail a copy  If the video visit is dropped, the invitation should be resent by: Text to cell phone: [phone]  Will anyone else be joining your video visit? No      CHIEF COMPLAINT   Kashmir Yao who is a 67 year old male returns today for follow-up of erectile dysfunction.      HPI   Kashmir Yao is a 67 year old male returns today for follow-up of erectile dysfunction    Last seen 8/17/2021. At that time I refilled his sildenafil. He is no longer using sildenafil. He has normal ejaculation and orgasm but not having any erections    He was going to look into trimix but then has not been seen since. He is here to discuss trimix again    He is worried about his comorbidities including having seizure disorder, also CAD s/p stent    He is worried about the pharmacist warning him about \"build up of bacterial growth\" in the medication    PHYSICAL EXAM  Patient is a 67 year old  male   Vitals: Height 1.753 m (5' 9\"), weight 102.1 kg (225 lb).  Body mass index is 33.23 kg/m .  General Appearance Adult:   Alert, no acute distress, oriented  HENT: throat/mouth:normal, good dentition  Lungs: no respiratory distress, or pursed lip breathing  Heart: No obvious jugular venous distension present  Musculoskeltal: extremities normal, no peripheral edema  Skin: no suspicious lesions or rashes  Neuro: Alert, oriented, speech and mentation normal  Psych: affect and mood normal  Gait: Normal    A/P  67 year old male returns today for follow-up of erectile dysfunction. Refractory to PDEVI. After " further discussion he does wish to proceed with trimix    Currently his wife is about to travel for a few months overseas, patient wants to wait until she comes back in a few months. He knows to contact the office as soon ahead of time as possible to coordinate compounding pharmacy delivery of trimix and return for teaching      Video-Visit Details    Video Start Time: 3:04pm    Type of service:  Video Visit    Video End Time:3:16 PM    Originating Location (pt. Location): Home      Distant Location (provider location):  On-site    Platform used for Video Visit: Carmita    Sincerely,    Jose Angel Lawson MD

## 2022-10-23 DIAGNOSIS — M16.0 PRIMARY OSTEOARTHRITIS OF BOTH HIPS: ICD-10-CM

## 2022-10-23 DIAGNOSIS — K59.09 OTHER CONSTIPATION: ICD-10-CM

## 2022-10-24 RX ORDER — NAPROXEN 500 MG/1
TABLET ORAL
Qty: 180 TABLET | Refills: 0 | Status: SHIPPED | OUTPATIENT
Start: 2022-10-24 | End: 2023-01-18

## 2022-10-24 RX ORDER — LINACLOTIDE 290 UG/1
CAPSULE, GELATIN COATED ORAL
Qty: 90 CAPSULE | Refills: 0 | Status: SHIPPED | OUTPATIENT
Start: 2022-10-24 | End: 2022-12-22

## 2022-10-24 NOTE — TELEPHONE ENCOUNTER
Routing refill request to provider for review/approval because:  Drug not on the [facility] refill protocol   Labs out of range:  CBC  Labs not current:  ALT, AST, CR  Outside of age range    Ana Paula Shepherd RN on 10/24/2022 at 10:22 AM

## 2022-10-27 ENCOUNTER — TRANSFERRED RECORDS (OUTPATIENT)
Dept: HEALTH INFORMATION MANAGEMENT | Facility: CLINIC | Age: 67
End: 2022-10-27

## 2022-11-25 DIAGNOSIS — G43.709 CHRONIC MIGRAINE WITHOUT AURA WITHOUT STATUS MIGRAINOSUS, NOT INTRACTABLE: ICD-10-CM

## 2022-11-25 RX ORDER — RIZATRIPTAN BENZOATE 10 MG/1
TABLET ORAL
Qty: 15 TABLET | Refills: 1 | Status: SHIPPED | OUTPATIENT
Start: 2022-11-25 | End: 2023-06-26

## 2022-11-25 NOTE — TELEPHONE ENCOUNTER
Prescription approved per [facility] Refill Protocol.  Pam Jin, RN, BSN, PHN  [facility]

## 2022-12-04 DIAGNOSIS — K21.9 GASTROESOPHAGEAL REFLUX DISEASE WITHOUT ESOPHAGITIS: ICD-10-CM

## 2022-12-06 RX ORDER — OMEPRAZOLE 40 MG/1
CAPSULE, DELAYED RELEASE ORAL
Qty: 90 CAPSULE | Refills: 0 | Status: SHIPPED | OUTPATIENT
Start: 2022-12-06 | End: 2022-12-22

## 2022-12-06 NOTE — TELEPHONE ENCOUNTER
Prescription approved per [facility] Refill Protocol.    Tavia Oconnor RN

## 2022-12-11 DIAGNOSIS — E78.5 HYPERLIPIDEMIA LDL GOAL <70: ICD-10-CM

## 2022-12-12 DIAGNOSIS — K21.9 GASTROESOPHAGEAL REFLUX DISEASE WITHOUT ESOPHAGITIS: ICD-10-CM

## 2022-12-13 RX ORDER — SIMVASTATIN 40 MG
TABLET ORAL
Qty: 90 TABLET | Refills: 1 | Status: SHIPPED | OUTPATIENT
Start: 2022-12-13 | End: 2023-06-26

## 2022-12-13 NOTE — TELEPHONE ENCOUNTER
Prescription approved per [facility] Refill Protocol.  Sukumar GALVAN RN 12/13/2022 at 2:52 PM

## 2022-12-14 DIAGNOSIS — K21.9 GASTROESOPHAGEAL REFLUX DISEASE WITHOUT ESOPHAGITIS: ICD-10-CM

## 2022-12-14 RX ORDER — OMEPRAZOLE 40 MG/1
CAPSULE, DELAYED RELEASE ORAL
Qty: 90 CAPSULE | Refills: 0
Start: 2022-12-14

## 2022-12-15 RX ORDER — OMEPRAZOLE 40 MG/1
CAPSULE, DELAYED RELEASE ORAL
Qty: 90 CAPSULE | Refills: 0 | OUTPATIENT
Start: 2022-12-15

## 2022-12-15 NOTE — TELEPHONE ENCOUNTER
Refill omeprazole denied, filled on 12/6/2022 #[id number]     Roxanna Moody, Registered Nurse  [facility]

## 2022-12-20 ENCOUNTER — NURSE TRIAGE (OUTPATIENT)
Dept: FAMILY MEDICINE | Facility: CLINIC | Age: 67
End: 2022-12-20

## 2022-12-20 ENCOUNTER — PATIENT OUTREACH (OUTPATIENT)
Dept: FAMILY MEDICINE | Facility: CLINIC | Age: 67
End: 2022-12-20

## 2022-12-20 NOTE — TELEPHONE ENCOUNTER
Called pt and relayed provider message below (see below).   I recommend he see one of my excellent extenders. Feli and Shady work closely with me, and I trust them completely. They will review his concerns with me after the visit. I do not want him to wait and suffer just to see me.  He could go to Urgent care, but the wait is long and they will not consult with me     Chad Noel MD    Advised pt to continue with omeprazole.     Patient was given an opportunity to ask questions, verbalized understanding of plan, and is agreeable.     Pt informs he had headaches daily (wakes up with them) so he is not taking the maxalt every day.     Pt requests COVID booster, flu vaccine and shingrix during visit.     Scheduled pt with Feli Ross PA-C for 12/22/22    Routing to provider scheduled with as an ADOLFO Oconnor RN

## 2022-12-20 NOTE — TELEPHONE ENCOUNTER
Pt with cognitive changes anxiety    History migraine: has he taken his maxalt?  Recommend ESR    Dysphagia: UGI endoscopy nl 2 yrs ago: continue Omeprazole    Urinary Sx: he should have a urine culture    I recommend he see one of my excellent extenders. Feli and Shady work closely with me, and I trust them completely. They will review his concerns with me after the visit. I do not want him to wait and suffer just to see me.  He could go to Urgent care, but the wait is long and they will not consult with me    Chad Noel MD

## 2022-12-20 NOTE — TELEPHONE ENCOUNTER
SB 3 PAL Welcome Letter Sent    Jazmin RUDD RN   Patient Advocate Nedra HORTA RN  [facility]  [phone]

## 2022-12-20 NOTE — TELEPHONE ENCOUNTER
"S-(situation): Patient calling requesting appointment with PCP for multiple concerns. Patient very reluctant to tell RN the \"multiple number of things\" patient needs to see PCP to discuss. Patient then notes heartburn, severe headaches, and urinary symptoms.     B-(background): Patient taking omeprazole daily, states he is almost out, needs refill. Per chart review, kevin should have refill on file from 12/6/22, patient informed.     Patient taking metoprolol tartrate (LOPRESSOR) 50 MG tablet twice daily. No missed doses.     A-(assessment): Patient experiencing heartburn, has discussed previously with provider. Patient taking omeprazole daily, per patient \"Every time I drink (tea/coffee), having hard time swallowing, feels like burning\". Avoiding everything hot and spicy.  157/90    Patient states he has been experiencing \"severe headaches\" for the past 3 weeks, 70% of the time. Pain is located in \"back of head to the middle\". Patient took tylenol, no current headache. Patient has been taking tylenol e. 4 hours. Headache returns after medication wears off. No fever. Unsure about sore throat due to heartburn. Patient states the last time he checked his BP was a week ago and it was 178/111.     Patient checked BP while on phone with RN = 157/90.     Patient also experiencing urinary symptoms: Burning sensation when urinating. Started 10 days ago. Seems darker than normal in color. No known blood in urine. No urinary frequency. Feeling like he has to go to the bathroom all the time. No flank pain. No penile discharge.     R-(recommendations): RN offered to look for available appointment, advised Dr. Noel may not have availability. Patient declines seeing any other provider. Per patient, \"he's my doctor, I trust him, I don't want to see anybody else\", \"I'd like to see him ASAP cause I have multiple reasons to see him\".     Routing to PCP. Please review and advise on recommendation for when patient should be seen. "       Reason for Disposition    All other males with painful urination, or patient wants to be seen    Additional Information    Negative: Taking treatment > 3 days for STI (e.g., penile discharge from gonorrhea, chlamydia) and painful urination not improved    Negative: Taking antibiotic > 3 days for UTI and painful urination not improved    Negative: Taking antibiotic > 24 hours for UTI and fever persists    Negative: SEVERE pain with urination    Negative: Side (flank) or lower back pain present    Negative: Unable to urinate (or only a few drops) and bladder feels very full    Negative: Swollen scrotum    Negative: Pain in scrotum or testicle that persists > 1 hour    Negative: Fever > 100.4 F (38.0 C)    Negative: Vomiting    Negative: Patient sounds very sick or weak to the triager    Negative: Shock suspected (e.g., cold/pale/clammy skin, too weak to stand, low BP, rapid pulse)    Negative: Sounds like a life-threatening emergency to the triager    Protocols used: URINATION PAIN - MALE-A-OH    Sukumar GALVAN RN 12/20/2022 at 2:58 PM

## 2022-12-20 NOTE — LETTER
Kashmir Yao  [address] [address]    Marina Hodge,    Thank you for choosing [facility] today for your health care needs.     [facility] is transforming primary care  At [facility], we re dedicated to constantly improve how we serve the health care needs of our patients and communities. We re currently making changes to the way we deliver care.     Changes you ll notice include:    An emphasis on building a relationship with a primary care provider    Access to a PAL (patient advocate and liaison) to help guide you with your care needs    Appointment lengths tailored to your specific needs and greater access to a care team to help you and your provider improve and maintain your health and well-being    Improved online access to your care team    Benefits of a primary care provider  If you don t have a designated primary care provider, we encourage you to get to know our care team online and find a provider you d like to see. Most of our providers have a short video on their online provider page. Visit [email or web address] to explore our providers and locations.    Benefits of having a primary care provider include:      They get to know you - your health history, family history and goals, making it easier to make a health plan together.     You get to know them - making health-related conversations and decisions easier      Primary care doctors help you when you re sick or hurt - but also focus on keeping you healthy with preventive care and screenings.      A doctor who sees you regularly is more likely to notice changes in your health.     You ll be connected to a broad care team who partners with your provider to support you.    Patient Advocate Liaison (PAL)   To help make sure you get the right care, at the right time, we include PALs, or Patient Advocate Liaisons, as part of your care team. Your PAL will be your first line of contact. They ll advocate for your needs and  help you navigate our services, connecting you with care team members and community resources to ensure your care is well coordinated. You ll be introduced to a PAL in an upcoming visit.     Expanded care team access with tailored appointment lengths  Depending on your health care needs, you may have longer or shorter appointments and see additional care team providers - including Medication Therapy Management (MTM) pharmacists, diabetes educators, behavioral health clinicians, or social workers. At times, they may be included in your visit with your provider, or you may see them individually.     Online access to your health care records and care team  combionic is our online tool that makes it easy to see your health care information and communicate with your care team.     combionic allows you to:     View your health maintenance plan so you know when you re due for a preventive screening    Send secure messages to your care team    View your health history and visit summaries     Schedule appointments     Complete questionnaires and eCheck-in before appointments      Get care from your provider with an e-visit      View and pay your bill     Sign up at [email or web address]. Once you have an account, you also can download the mobile martine.     Connecting to fast and convenient care  When you need fast, convenient care - consider one of the following options:       Video Visit: A convenient care option for visiting with your provider out of the comfort of your own home. Most of the things you come to the clinic to address with your provider can now be done virtually through a video. This includes your chronic medication follow up, questions or concerns you may have, and even your annual Medicare Wellness Visit.       Phone Visit: Another convenient option for follow up of common problems that may require a more in-depth discussion with your provider.       E-visit: When you need acute care quickly, or have a quick  question about your medication, an E-visit is completed through [employer] and your provider will respond within one business day.                  Jazmin RUDD RN  Patient Advocate Nedra HORTA RN  [facility]  [phone]

## 2022-12-22 ENCOUNTER — TELEPHONE (OUTPATIENT)
Dept: FAMILY MEDICINE | Facility: CLINIC | Age: 67
End: 2022-12-22

## 2022-12-22 ENCOUNTER — OFFICE VISIT (OUTPATIENT)
Dept: FAMILY MEDICINE | Facility: CLINIC | Age: 67
End: 2022-12-22
Payer: COMMERCIAL

## 2022-12-22 VITALS
OXYGEN SATURATION: 98 % | DIASTOLIC BLOOD PRESSURE: 84 MMHG | WEIGHT: 232 LBS | RESPIRATION RATE: 16 BRPM | SYSTOLIC BLOOD PRESSURE: 134 MMHG | TEMPERATURE: 98.4 F | BODY MASS INDEX: 34.36 KG/M2 | HEART RATE: 56 BPM | HEIGHT: 69 IN

## 2022-12-22 DIAGNOSIS — R51.9 CHRONIC NONINTRACTABLE HEADACHE, UNSPECIFIED HEADACHE TYPE: ICD-10-CM

## 2022-12-22 DIAGNOSIS — I10 BENIGN ESSENTIAL HYPERTENSION: Primary | ICD-10-CM

## 2022-12-22 DIAGNOSIS — R30.0 DYSURIA: ICD-10-CM

## 2022-12-22 DIAGNOSIS — K59.09 OTHER CONSTIPATION: ICD-10-CM

## 2022-12-22 DIAGNOSIS — R35.0 URINARY FREQUENCY: Primary | ICD-10-CM

## 2022-12-22 DIAGNOSIS — G89.29 CHRONIC NONINTRACTABLE HEADACHE, UNSPECIFIED HEADACHE TYPE: ICD-10-CM

## 2022-12-22 DIAGNOSIS — R35.0 URINARY FREQUENCY: ICD-10-CM

## 2022-12-22 DIAGNOSIS — K21.9 GASTROESOPHAGEAL REFLUX DISEASE WITHOUT ESOPHAGITIS: ICD-10-CM

## 2022-12-22 LAB
ALBUMIN UR-MCNC: NEGATIVE MG/DL
APPEARANCE UR: CLEAR
BILIRUB UR QL STRIP: NEGATIVE
COLOR UR AUTO: YELLOW
ERYTHROCYTE [SEDIMENTATION RATE] IN BLOOD BY WESTERGREN METHOD: 11 MM/HR (ref 0–20)
GLUCOSE UR STRIP-MCNC: NEGATIVE MG/DL
HGB UR QL STRIP: ABNORMAL
KETONES UR STRIP-MCNC: NEGATIVE MG/DL
LEUKOCYTE ESTERASE UR QL STRIP: NEGATIVE
NITRATE UR QL: NEGATIVE
PH UR STRIP: 5.5 [PH] (ref 5–7)
RBC #/AREA URNS AUTO: ABNORMAL /HPF
SP GR UR STRIP: 1.02 (ref 1–1.03)
SQUAMOUS #/AREA URNS AUTO: ABNORMAL /LPF
UROBILINOGEN UR STRIP-ACNC: 0.2 E.U./DL
WBC #/AREA URNS AUTO: ABNORMAL /HPF

## 2022-12-22 PROCEDURE — 99215 OFFICE O/P EST HI 40 MIN: CPT | Mod: 25 | Performed by: PHYSICIAN ASSISTANT

## 2022-12-22 PROCEDURE — 85652 RBC SED RATE AUTOMATED: CPT | Performed by: PHYSICIAN ASSISTANT

## 2022-12-22 PROCEDURE — 90662 IIV NO PRSV INCREASED AG IM: CPT | Performed by: PHYSICIAN ASSISTANT

## 2022-12-22 PROCEDURE — 0124A COVID-19 VACCINE BIVALENT BOOSTER 12+ (PFIZER): CPT | Performed by: PHYSICIAN ASSISTANT

## 2022-12-22 PROCEDURE — G0008 ADMIN INFLUENZA VIRUS VAC: HCPCS | Performed by: PHYSICIAN ASSISTANT

## 2022-12-22 PROCEDURE — 36415 COLL VENOUS BLD VENIPUNCTURE: CPT | Performed by: PHYSICIAN ASSISTANT

## 2022-12-22 PROCEDURE — 87086 URINE CULTURE/COLONY COUNT: CPT | Performed by: PHYSICIAN ASSISTANT

## 2022-12-22 PROCEDURE — 81001 URINALYSIS AUTO W/SCOPE: CPT | Performed by: PHYSICIAN ASSISTANT

## 2022-12-22 PROCEDURE — 91312 COVID-19 VACCINE BIVALENT BOOSTER 12+ (PFIZER): CPT | Performed by: PHYSICIAN ASSISTANT

## 2022-12-22 RX ORDER — OMEPRAZOLE 40 MG/1
40 CAPSULE, DELAYED RELEASE ORAL DAILY
Qty: 90 CAPSULE | Refills: 0 | Status: CANCELLED | OUTPATIENT
Start: 2022-12-22

## 2022-12-22 RX ORDER — SULFAMETHOXAZOLE/TRIMETHOPRIM 800-160 MG
1 TABLET ORAL 2 TIMES DAILY
Qty: 28 TABLET | Refills: 0 | Status: SHIPPED | OUTPATIENT
Start: 2022-12-22 | End: 2022-12-23 | Stop reason: ALTCHOICE

## 2022-12-22 RX ORDER — OMEPRAZOLE 40 MG/1
40 CAPSULE, DELAYED RELEASE ORAL DAILY
Qty: 90 CAPSULE | Refills: 1 | Status: SHIPPED | OUTPATIENT
Start: 2022-12-22 | End: 2023-04-26

## 2022-12-22 NOTE — TELEPHONE ENCOUNTER
----- Message from Feli Ross PA-C sent at 12/22/2022  1:35 PM CST -----  Please call Naveen and let him know that his ESR (inflammatory marker) is normal.

## 2022-12-22 NOTE — TELEPHONE ENCOUNTER
"PAL called and spoke to pt regarding lab result  -Informed pt of result note from Feli Ross PA-C, \"Please call Naveen and let him know that his ESR (inflammatory marker) is normal\"   Component Ref Range & Units 12:50 PM   (12/22/22) 5 yr ago   (7/24/17) 5 yr ago   (2/14/17) 14 yr ago   (9/29/08) 19 yr ago   (12/17/03)    Erythrocyte Sedimentation Rate 0 - 20 mm/hr 11  11 R  13 R  17       -PT inquiring about UC result, RULA informed pt that urine culture is still in process, a staff member will reach out to pt if lab result comes back abnormal     Patient was given an opportunity to ask questions, verbalized understanding of plan, and is agreeable.    Jazmin RUDD RN  Patient Advocate Liason - PAL RN  [facility]  [phone]    "

## 2022-12-22 NOTE — PROGRESS NOTES
Assessment & Plan       Gastroesophageal reflux disease without esophagitis  Refilled omeprazole. He has been out of it. Further evaluate if does not improve with restarting.  - omeprazole (PRILOSEC) 40 MG DR capsule; Take 1 capsule (40 mg) by mouth daily    Chronic nonintractable headache, unspecified headache type  Follow up with sleep study. He notes that his symptoms could be related to poorly controlled sleep apnea (symptoms are usually worse in the morning)  He does need to follow up with his epilepsy physician in [place].  CT head ordered but patient would like to wait until sleep study has been completed. I also feel a neurologist who specializes in headaches would be helpful. Patient declines this for now. He would like to see how the sleep study goes and see if he has improvement with adjustments to CPAP.  ESR obtained today (requested by PCP in triage note). ESR is normal.  - ESR: Erythrocyte sedimentation rate; Future  - CT Head w/o Contrast; Future  - ESR: Erythrocyte sedimentation rate    Other constipation  Refilled for patient. Works well.  - linaclotide (LINZESS) 290 MCG capsule; Take 1 capsule (290 mcg) by mouth every morning (before breakfast)    Urinary frequency  Treated with Bactrim. He has had symptoms previously and had improvement with antibiotic treatment.  - Urine Culture Aerobic Bacterial - lab collect; Future  - sulfamethoxazole-trimethoprim (BACTRIM DS) 800-160 MG tablet; Take 1 tablet by mouth 2 times daily for 14 days  - Urine Culture Aerobic Bacterial - lab collect    Dysuria  As noted above  - Urine Culture Aerobic Bacterial - lab collect; Future  - sulfamethoxazole-trimethoprim (BACTRIM DS) 800-160 MG tablet; Take 1 tablet by mouth 2 times daily for 14 days  - Urine Culture Aerobic Bacterial - lab collect    Review of external notes as documented elsewhere in note  Review of the result(s) of each unique test - UA, ESR  Ordering of each unique test  Prescription drug management  45  "minutes spent on the date of the encounter doing chart review, history and exam, documentation and further activities per the note         No follow-ups on file.   Follow-up Visit   Expected date:  Jan 22, 2023 (Approximate)      Follow Up Appointment Details:     Follow-up with whom?: PCP    Follow-Up for what?: Acute Issue Recheck    Additional Details: Follow up headaches, urinary troubles, dysphagia    How?: In Person                    MEGHAN Causey [facility] TRAE Hodge is a 67 year old, presenting for the following health issues:  Urinary Problem and Headache      History of Present Illness       Reason for visit:  Urinary problem  Symptom onset:  1-2 weeks ago  Symptoms include:  Burning, frequency  Symptom intensity:  Mild  Symptom progression:  Staying the same  Had these symptoms before:  Yes  Has tried/received treatment for these symptoms:  Yes  Previous treatment was successful:  Yes  Prior treatment description:  Antibiotics    He eats 0-1 servings of fruits and vegetables daily.He consumes 1 sweetened beverage(s) daily.He exercises with enough effort to increase his heart rate 60 or more minutes per day.  He exercises with enough effort to increase his heart rate 3 or less days per week.   He is taking medications regularly.     He has had urinary symptoms as noted above. Improvement with antibiotics in the past.     He is waking with headaches and dizziness almost daily (3-4 times a week, headache located at the crown of the head). He notes he takes Tylenol when he wakes and symptoms improve. He describes this as a lightheadedness and \"icky\" feeling. He has some nausea, no vomiting. He believes this has been on going for years (unable to give a more recent timeline of when symptoms worsened etc).  He is using Tylenol (1000 mg 2-3 times a day) and using the Maxalt (1-2 times weekly).    He notes he has history of sleep apnea and uses a CPAP. He is " "wondering if his symptoms are related to poorly controlled sleep apnea. He notes he is scheduled for a sleep study (in a couple weeks) to make sure the CPAP is working how it should. He sees Dr. Solis for this.    The patient has a history of encephalocele and seizures (sees neurologist at [facility])  The patient notes he has been having some tingling sensations in the face and arm. He is wondering if he needs to adjust his Lamictal dose. He has not had any seizures.    The patient notes that his reflux is much worse recently. He has not been taking the omeprazole due to pharmacy issues. Endoscopy performed about 2 years ago.        Review of Systems   GENERAL:  No fevers  NEURO:  As noted in HPI        Objective    /84 (BP Location: Right arm, Patient Position: Sitting, Cuff Size: Adult Large)   Pulse 56   Temp 98.4  F (36.9  C) (Oral)   Resp 16   Ht 1.753 m (5' 9\")   Wt 105.2 kg (232 lb)   SpO2 98%   BMI 34.26 kg/m    Body mass index is 34.26 kg/m .  Physical Exam   GENERAL: No acute distress  HEENT: Normocephalic, PERRL, EOMI, some horizontal nystagmus present. Canals patent, bilateral TM's non-erythematous and non-bulging. Turbinates normal in appearance bilaterally. Posterior oropharynx non-erythematous and without exudate.  CARDIAC: Regular rate and rhythm. No murmurs.  PULMONARY: Lungs are clear to auscultation bilaterally. No wheezes, rhonchi or crackles.  NEURO: Alert and oriented x 3. Cranial nerves II-XII grossly intact (some residual Bell's Palsy weakness on the left). Gait appropriate.      Results for orders placed or performed in visit on 12/22/22 (from the past 24 hour(s))   UA Macro with Reflex to Micro and Culture - lab collect    Specimen: Urine, Midstream   Result Value Ref Range    Color Urine Yellow Colorless, Straw, Light Yellow, Yellow    Appearance Urine Clear Clear    Glucose Urine Negative Negative mg/dL    Bilirubin Urine Negative Negative    Ketones Urine Negative " Negative mg/dL    Specific Gravity Urine 1.025 1.003 - 1.035    Blood Urine Trace (A) Negative    pH Urine 5.5 5.0 - 7.0    Protein Albumin Urine Negative Negative mg/dL    Urobilinogen Urine 0.2 0.2, 1.0 E.U./dL    Nitrite Urine Negative Negative    Leukocyte Esterase Urine Negative Negative   Urine Microscopic   Result Value Ref Range    RBC Urine 0-2 0-2 /HPF /HPF    WBC Urine 0-5 0-5 /HPF /HPF    Squamous Epithelials Urine Few (A) None Seen /LPF    Narrative    Urine Culture not indicated   ESR: Erythrocyte sedimentation rate   Result Value Ref Range    Erythrocyte Sedimentation Rate 11 0 - 20 mm/hr

## 2022-12-22 NOTE — TELEPHONE ENCOUNTER
Patient is at the clinic at this time for an office visit with Feli Ross PA-C   - Patient states that he has not been able to  his omeprazole medication from the pharmacy   - Omeprazole prescription was sent to the [facility] on 12/6/2022 ([id number] - [place], [place] - [address] AT [facility] [address]     omeprazole (PRILOSEC) 40 MG DR capsule 90 capsule 0 12/6/2022  No   Sig: TAKE 1 CAPSULE(40 MG) BY MOUTH DAILY     Called the [facility] at [phone] and spoke with Elton Pharmacy Tech   - Elton, Pharmacy Tech states that the prescription was not received by them   - [facility] Pharmacy Tech states that the prescription can be resent to the pharmacy     Huddled with Feli Ross PA-C  - Feli Ross PA-C states that she will resend the Omeprazole prescription to the [facility] at this time   - Informed the patient as the patient is in the room with Feli Ross PA-C is in the room with the patient at this time     omeprazole (PRILOSEC) 40 MG DR capsule 90 capsule 1 12/22/2022  No   Sig - Route: Take 1 capsule (40 mg) by mouth daily - Oral     Kathi CRAWFORD RN   Patient Advocate Liaison (PAL)  [facility]

## 2022-12-23 RX ORDER — NITROFURANTOIN 25; 75 MG/1; MG/1
100 CAPSULE ORAL 2 TIMES DAILY
Qty: 28 CAPSULE | Refills: 0 | Status: SHIPPED | OUTPATIENT
Start: 2022-12-23 | End: 2023-01-06

## 2022-12-23 NOTE — TELEPHONE ENCOUNTER
PAL called and spoke to pt   -Informed pt of new prescription sent for tx of his urinary frequency and dysuria:  nitroFURantoin macrocrystal-monohydrate (MACROBID) 100 MG capsule 28 capsule 0 12/23/2022 1/6/2023 --   Sig - Route: Take 1 capsule (100 mg) by mouth 2 times daily for 14 days - Oral     -PAL confirmed correct pharmacy with pt, [facility] in [address] and [place]  -Advised pt discontinue Bactrim and only take Macrobid , do not take in conjunction, pt agreed  -Advised pt to take full course of Macrobid for tx    Patient was given an opportunity to ask questions, verbalized understanding of plan, and is agreeable.    Jazmin RUDD RN  Patient Advocate Nedra - PAL RN  [facility]  [phone]

## 2022-12-23 NOTE — TELEPHONE ENCOUNTER
"Feli,    Please review and advise. Pt believes he is experiencing \"side effects\" and is requesting different medication for tx. Pt refusing to take Bactrim     PAL received incoming call from pt  -PAL notes pt had OV with Feli Ross PA-C and was treated for urinary frequency and dysuria with Bactrim   sulfamethoxazole-trimethoprim (BACTRIM DS) 800-160 MG tablet 28 tablet 0 12/22/2022 1/5/2023 --   Sig - Route: Take 1 tablet by mouth 2 times daily for 14 days - Oral     -Pt states that he had rough night with urinary frequency last night, reports that within a 6-7 hour window - he got up to urinate about 12 times, states he was up all night  -Pt reports he was also experiencing \"hot flashes\" last night  -Pt denies: urinary burning or pain, pressure feeling, visualizing blood in urine  -Pt states, \"I wasn't urinating this much before starting Bactrim. I was going about every 3-4 hours. I can't handle another night like that again. I want a different medication for treatment. I'm not going to continue to take Bactrim\"  -Pt believes he is experiencing \"side effects\" of Bactrim  -PAL educated pt that urinary frequency is very common for UTIs  -PAL advised pt to continue to take full course of antibiotic, may take a few days of treatment before noticing sx improvement  -Pt is adamant that medication is causing his sx, continues to request different medication and provider recommendation, pt wanting reply before weekend  -Encouraged pt to set up [employer] account, sent pt text message for activation link    Routing to Feli Ross PA-C to review and advise, Cc'ing Dr. Noel as this is pt's PCP    Jazmin RUDD RN  Patient Advocate Nedra - PAL RN  [facility]  [phone]      "

## 2022-12-24 LAB — BACTERIA UR CULT: NORMAL

## 2022-12-26 NOTE — TELEPHONE ENCOUNTER
"Result note from Feli Ross PA-C 12/26/22:  \"Urine culture shows mixed bacteria. I would like patient to complete the Macrobid as prescribed. Let us know if things are not improving\"    PAL pt called and left detailed VM   -Relayed result note message from Feli Ross PA-C  -Advised pt to take full course of Macrobid as prescribed   nitroFURantoin macrocrystal-monohydrate (MACROBID) 100 MG capsule 28 capsule 0 12/23/2022 1/6/2023 --   Sig - Route: Take 1 capsule (100 mg) by mouth 2 times daily for 14 days - Oral     -Advised pt to reach out if sx fail to improve    No follow-up needed by PAL at this time    Jazmin RUDD RN  Patient Advocate Nedra HORTA RN  [facility]  [phone]    "

## 2023-01-15 DIAGNOSIS — M16.0 PRIMARY OSTEOARTHRITIS OF BOTH HIPS: ICD-10-CM

## 2023-01-17 NOTE — TELEPHONE ENCOUNTER
Routing refill request to provider for review/approval because:  Labs not current:  CMP and CBC  Failing due to age    Beth Quintero RN

## 2023-01-18 RX ORDER — NAPROXEN 500 MG/1
TABLET ORAL
Qty: 180 TABLET | Refills: 1 | Status: SHIPPED | OUTPATIENT
Start: 2023-01-18 | End: 2023-06-26

## 2023-01-20 DIAGNOSIS — K59.09 OTHER CONSTIPATION: ICD-10-CM

## 2023-01-23 RX ORDER — LINACLOTIDE 290 UG/1
CAPSULE, GELATIN COATED ORAL
Qty: 90 CAPSULE | Refills: 1 | OUTPATIENT
Start: 2023-01-23

## 2023-02-03 ENCOUNTER — TELEPHONE (OUTPATIENT)
Dept: UROLOGY | Facility: CLINIC | Age: 68
End: 2023-02-03
Payer: COMMERCIAL

## 2023-02-07 DIAGNOSIS — N52.9 ED (ERECTILE DYSFUNCTION): Primary | ICD-10-CM

## 2023-02-09 ENCOUNTER — NURSE TRIAGE (OUTPATIENT)
Dept: FAMILY MEDICINE | Facility: CLINIC | Age: 68
End: 2023-02-09
Payer: COMMERCIAL

## 2023-02-09 NOTE — TELEPHONE ENCOUNTER
"S-(situation):  -Pt calling with reported urinary symptoms  -Pt requesting to schedule appointment, wants to be seen for possible UTI    B-(background):  -Pt was seen by Feli Ross PA-C on 12/22/22 for dysuria   -Pt given sulfamethoxazole-trimethoprim (BACTRIM DS) for treatment   sulfamethoxazole-trimethoprim (BACTRIM DS) 800-160 MG tablet (Discontinued) 28 tablet 0 12/22/2022 12/23/2022 --     -Pt reports that dysuria never full resolved after taking antibiotics, \"I still had a small amount of discomfort when urinating, but I didn't want to call. I wanted to wait it out and see if it would resolve on its own.\"    A-(assessment):   -Pt reports that dysuria worsened over the past week  -Pt reports burning with urination and urinary frequency, \"It feels like I still have to go even when I just finished using the bathroom\"  -Pt states that he noticed a \"small streak\" of blood in his urine 3-4 days ago, but states that he has not had blood in urine for the past day or so  -Pt denies blood dripping from tip of penis or consistent flow of blood  -Pt states, \"I only notice the small amount of blood when it hurts really bad to urinate. If for some reason it doesn't hurt, I don't see the blood\"  -Pt denies: nausea/vomitting, abdominal/flank or back pain, fever, chills, difficulty urinating, swollen scrotum  -Pt states that he has had blood in his urine in the past, pt believes he has UTI    R-(recommendations):   -Pt requesting to schedule appointment with a provider  -PAL huddled with POD Shady Treviño PA-C for second level triage per disposition  -Shady Treviño PA-C agrees with plan for pt to be seen tomorrow  -PAL scheduled pt for virtual visit with Shady Treviño PA-C tomorrow 2/10/23 at 10:00am  -PAL advised pt to be seen in UC if abdominal/back pain, nausea/vomiting, fever, or blood in urine present this evening, pt agreed    Patient was given an opportunity to ask questions, verbalized understanding of plan, and " is agreeable.    Jazmin RUDD RN  Patient Advocate Liaison - PAL RN  [facility]  [phone]    Reason for Disposition    Pain or burning with passing urine (urination) and male    All other males with painful urination, or patient wants to be seen    Additional Information    Negative: Shock suspected (e.g., cold/pale/clammy skin, too weak to stand, low BP, rapid pulse)    Negative: Sounds like a life-threatening emergency to the triager    Negative: Followed a male genital area injury (penis, scrotum)    Negative: Followed a female genital area injury (e.g., vagina, vulva)    Negative: Vaginal discharge    Negative: Pain or burning with passing urine (urination) and pregnant    Negative: Shock suspected (e.g., cold/pale/clammy skin, too weak to stand, low BP, rapid pulse)    Negative: Sounds like a life-threatening emergency to the triager    Negative: Unable to urinate (or only a few drops) and bladder feels very full    Negative: Swollen scrotum    Negative: Pain in scrotum or testicle that persists > 1 hour    Negative: Fever > 100.4 F (38.0 C)    Negative: Vomiting    Negative: Patient sounds very sick or weak to the triager    Negative: SEVERE pain with urination    Negative: Side (flank) or lower back pain present    Negative: Taking antibiotic > 24 hours for UTI and fever persists    Negative: Taking antibiotic > 3 days for UTI and painful urination not improved    Negative: Taking treatment > 3 days for STI (e.g., penile discharge from gonorrhea, chlamydia) and painful urination not improved    Protocols used: URINARY SYMPTOMS-A-OH, URINATION PAIN - MALE-A-OH

## 2023-02-10 ENCOUNTER — VIRTUAL VISIT (OUTPATIENT)
Dept: FAMILY MEDICINE | Facility: CLINIC | Age: 68
End: 2023-02-10
Payer: COMMERCIAL

## 2023-02-10 ENCOUNTER — TELEPHONE (OUTPATIENT)
Dept: FAMILY MEDICINE | Facility: CLINIC | Age: 68
End: 2023-02-10

## 2023-02-10 ENCOUNTER — LAB (OUTPATIENT)
Dept: LAB | Facility: CLINIC | Age: 68
End: 2023-02-10
Payer: COMMERCIAL

## 2023-02-10 DIAGNOSIS — R30.0 DYSURIA: Primary | ICD-10-CM

## 2023-02-10 DIAGNOSIS — R30.0 DYSURIA: ICD-10-CM

## 2023-02-10 LAB
ALBUMIN UR-MCNC: NEGATIVE MG/DL
APPEARANCE UR: CLEAR
BILIRUB UR QL STRIP: NEGATIVE
COLOR UR AUTO: YELLOW
GLUCOSE UR STRIP-MCNC: NEGATIVE MG/DL
HGB UR QL STRIP: NEGATIVE
KETONES UR STRIP-MCNC: NEGATIVE MG/DL
LEUKOCYTE ESTERASE UR QL STRIP: NEGATIVE
NITRATE UR QL: NEGATIVE
PH UR STRIP: 5.5 [PH] (ref 5–7)
SP GR UR STRIP: >=1.03 (ref 1–1.03)
UROBILINOGEN UR STRIP-ACNC: 0.2 E.U./DL

## 2023-02-10 PROCEDURE — 81003 URINALYSIS AUTO W/O SCOPE: CPT

## 2023-02-10 PROCEDURE — 99213 OFFICE O/P EST LOW 20 MIN: CPT | Mod: 95 | Performed by: PHYSICIAN ASSISTANT

## 2023-02-10 NOTE — PROGRESS NOTES
"Naveen is a 67 year old who is being evaluated via a billable telephone visit.      What phone number would you like to be contacted at? [phone]  How would you like to obtain your AVS? Judith    Distant Location (provider location):  Off-site    Assessment & Plan     Dysuria    Recheck urine. Will send in antibiotics if infection present. Discussed that he could also have a urethral stricture which causes similar symptoms. If UA is negative, recommend visit with urology.    - UA Macro with Reflex to Micro and Culture - lab collect; Future                   No follow-ups on file.    MEGHAN Victor [facility]    Subjective   Naveen is a 67 year old presenting for the following health issues:  UTI      HPI     Genitourinary - Male  Onset/Duration: 5 days ago  Description:   Dysuria (painful urination): YES  Hematuria (blood in urine): YES  Frequency: YES  Waking at night to urinate: No  Hesitancy (delay in urine): No  Retention (unable to empty): YES  Decrease in urinary flow: No  Incontinence: No  Progression of Symptoms:  same  Accompanying Signs & Symptoms:  Fever: No  Back/Flank pain: No  Urethral discharge: No  Testicle lumps/masses/pain: No  Nausea and/or vomiting: No  Abdominal pain: No  History:   History of frequent UTI s: YES- once a year  History of kidney stones: No  History of hernias: No  Personal or Family history of Prostate problems: No  Sexually active: YES  Precipitating or alleviating factors: None  Therapies tried and outcome: course of antibiotics - had a visit in December and was prescribed rx. Pt states he still has symptoms.      Review of Systems   Constitutional, HEENT, cardiovascular, pulmonary, gi and gu systems are negative, except as otherwise noted.      Objective    Vitals - Patient Reported  Weight (Patient Reported): 99.8 kg (220 lb)  Height (Patient Reported): 176.5 cm (5' 9.5\")  BMI (Based on Pt Reported Ht/Wt): 32.02      Vitals:  No vitals were " obtained today due to virtual visit.    Physical Exam   healthy, alert and no distress  PSYCH: Alert and oriented times 3; coherent speech, normal   rate and volume, able to articulate logical thoughts, able   to abstract reason, no tangential thoughts, no hallucinations   or delusions  His affect is normal and pleasant  RESP: No cough, no audible wheezing, able to talk in full sentences  Remainder of exam unable to be completed due to telephone visits                Phone call duration: 5 minutes

## 2023-02-10 NOTE — TELEPHONE ENCOUNTER
Pt returns call. Voicemail left for pt to return call for check in for pt's virtual appointment today. Transferred pt to [facility].    Tavia Oconnor RN

## 2023-02-13 NOTE — TELEPHONE ENCOUNTER
"PAL called and spoke to pt  -PAL relayed result note from Shady Treviño PA-C, \"Please call patient. His urine is normal so no infection. I recommend that he follow up with his urologist\"  -Pt confused and wondering why he is still feeling the symptoms of a UTI- burning with urination, frequency, hematuria   -PAL notes visit note 2/10/23 from Shady Treviño PA-C, \"Discussed that he could also have a urethral stricture which causes similar symptoms. If UA is negative, recommend visit with urology\"  -PAL educated pt on urethral stricture  -PAL advised pt to schedule appointment with Urology for further evaluation   -Pt states he scheduled appt with Dr. Noel on 2/16/23, PAL educated pt that Dr. Noel will not prescribed antibiotic if there is no sign of infection   -Pt states that he may cancel appt once he's spoken with the Urology department  -Pt states he will call and get scheduled with Urology     Patient was given an opportunity to ask questions, verbalized understanding of plan, and is agreeable.    Jazmin RUDD RN  Patient Advocate Liaison - PAL RN  [facility]  [phone]      "

## 2023-02-13 NOTE — TELEPHONE ENCOUNTER
"RULA received VM from pt on Friday 2/10/23 at 4:50pm, pt requesting callback from [facility]    PAL attempted (attempt #1) to contact pt regarding urinary symptoms  -Left VM requesting call back [phone] to further discuss  -PAL notes pt had UA done 2/10/23  Component      Latest Ref Rng & Units 2/10/2023   Color Urine      Colorless, Straw, Light Yellow, Yellow Yellow   Appearance Urine      Clear Clear   Glucose Urine      Negative mg/dL Negative   Bilirubin Urine      Negative Negative   Ketones Urine      Negative mg/dL Negative   Specific Gravity Urine      1.003 - 1.035 >=1.030   Blood Urine      Negative Negative   pH Urine      5.0 - 7.0 5.5   Protein Albumin Urine      Negative mg/dL Negative   Urobilinogen Urine      0.2, 1.0 E.U./dL 0.2   Nitrite Urine      Negative Negative   Leukocyte Esterase Urine      Negative Negative     -PAL notes result note from Shady Treviño PA-C, \"Please call patient. His urine is normal so no infection. I recommend that he follow up with his urologist\"    [facility] will attempt to contact pt again tomorrow 2/14/23 if no response today     Jazmin RUDD RN  Patient Advocate Liaison - RULA RUDD [facility]  [phone]    "

## 2023-02-15 NOTE — TELEPHONE ENCOUNTER
Patient called and informed that he has a appointment scheduled with a urologist on the 24 th of February. He would like to cancel his appointment with Dr. Noel for tomorrow. Appointment cancelled

## 2023-02-23 ENCOUNTER — TELEPHONE (OUTPATIENT)
Dept: UROLOGY | Facility: CLINIC | Age: 68
End: 2023-02-23
Payer: COMMERCIAL

## 2023-02-23 NOTE — TELEPHONE ENCOUNTER
Spoke with patient, informed him to bring his medication in the cooler he received it in from the compounding pharmacy, that way it will stay cold.  Patient states understanding.    Melonie Burch RN, BSN  [facility]

## 2023-02-23 NOTE — TELEPHONE ENCOUNTER
[facility] Call Center    Phone Message    May a detailed message be left on voicemail: yes     Reason for Call: Other: .   Pt calling regarding appt tomorrow with Lulu. Pt states Lawson is supposed to be showing him how to administer his new medication. Pt is wanting to know does he need to bring the medication to the appt because instructions say to keep medication refrigerated. Please call pt      Action Taken: Message routed to:  Other: Uro    Travel Screening: Not Applicable

## 2023-02-24 ENCOUNTER — OFFICE VISIT (OUTPATIENT)
Dept: UROLOGY | Facility: CLINIC | Age: 68
End: 2023-02-24
Payer: COMMERCIAL

## 2023-02-24 VITALS — WEIGHT: 220 LBS | HEIGHT: 69 IN | BODY MASS INDEX: 32.58 KG/M2

## 2023-02-24 DIAGNOSIS — R31.0 GROSS HEMATURIA: Primary | ICD-10-CM

## 2023-02-24 DIAGNOSIS — R39.14 FEELING OF INCOMPLETE BLADDER EMPTYING: ICD-10-CM

## 2023-02-24 LAB
ALBUMIN UR-MCNC: 30 MG/DL
APPEARANCE UR: CLEAR
BILIRUB UR QL STRIP: ABNORMAL
COLOR UR AUTO: YELLOW
GLUCOSE UR STRIP-MCNC: NEGATIVE MG/DL
HGB UR QL STRIP: NEGATIVE
KETONES UR STRIP-MCNC: ABNORMAL MG/DL
LEUKOCYTE ESTERASE UR QL STRIP: NEGATIVE
NITRATE UR QL: NEGATIVE
PH UR STRIP: 5.5 [PH] (ref 5–7)
SP GR UR STRIP: >=1.03 (ref 1–1.03)
UROBILINOGEN UR STRIP-ACNC: 0.2 E.U./DL

## 2023-02-24 PROCEDURE — 81003 URINALYSIS AUTO W/O SCOPE: CPT | Mod: QW | Performed by: STUDENT IN AN ORGANIZED HEALTH CARE EDUCATION/TRAINING PROGRAM

## 2023-02-24 PROCEDURE — 99214 OFFICE O/P EST MOD 30 MIN: CPT | Performed by: STUDENT IN AN ORGANIZED HEALTH CARE EDUCATION/TRAINING PROGRAM

## 2023-02-24 RX ORDER — TAMSULOSIN HYDROCHLORIDE 0.4 MG/1
0.4 CAPSULE ORAL EVERY EVENING
Qty: 90 CAPSULE | Refills: 3 | Status: SHIPPED | OUTPATIENT
Start: 2023-02-24 | End: 2023-06-01

## 2023-02-24 RX ORDER — ALFUZOSIN HYDROCHLORIDE 10 MG/1
10 TABLET, EXTENDED RELEASE ORAL DAILY
Qty: 90 TABLET | Refills: 3 | Status: SHIPPED | OUTPATIENT
Start: 2023-02-24 | End: 2023-02-24 | Stop reason: ALTCHOICE

## 2023-02-24 ASSESSMENT — PAIN SCALES - GENERAL: PAINLEVEL: NO PAIN (0)

## 2023-02-24 NOTE — LETTER
"2/24/2023       RE: Kashmir Yao  [address] [place] [place] [address]     Dear Colleague,    Thank you for referring your patient, Kashmir Yao, to the [facility] [place] [facility]. Please see a copy of my visit note below.    CHIEF COMPLAINT   Kashmir Yao who is a 67 year old male returns today for follow-up of erectile dysfunction, now with complaints of lower urinary tract symptoms including dysuria and feeling of incomplete bladder emptying, gross hematuria.      HPI   Kashmir Yao is a 67 year old male returns today for follow-up of erectile dysfunction, now with complaints of lower urinary tract symptoms including dysuria and feeling of incomplete bladder emptying, gross hematuria.      For the past 6 months he has noticed burning with urination as well as feeling of incomplete bladder emptying. A few weeks ago he noticed some red blood in the urine and is now dark.    In December his urinalysis was notable for trace blood but micro was negative and culture was negative. UA 2/10/2023 was pristine    He is not on any medications for his prostate. AUA symptom score 9-5-5-1-0-0-1 = 12 QOL mostly satisfied    Quit smoking around 2003     He notes he is considering spending the winter months in [place] and is concerned about the 30 day expiration of Trimix    PHYSICAL EXAM  Patient is a 67 year old  male   Vitals: Height 1.753 m (5' 9\"), weight 99.8 kg (220 lb).  Body mass index is 32.49 kg/m .  General Appearance Adult:   Alert, no acute distress, oriented  HENT: throat/mouth:normal, good dentition  Lungs: no respiratory distress, or pursed lip breathing  Heart: No obvious jugular venous distension present  Abdomen: nondistended  Musculoskeltal: extremities normal, no peripheral edema  Skin: no suspicious lesions or rashes  Neuro: Alert, oriented, speech and mentation normal  Psych: affect and mood normal  Gait: " Normal  : circ phallus, orthotopic and patent meatus      PVR 7 ml    ASSESSMENT and PLAN  67 year old male returns today for follow-up of erectile dysfunction, now with complaints of lower urinary tract symptoms including dysuria and feeling of incomplete bladder emptying, gross hematuria.      LUTS including dysuria, feeling of incomplete bladder emptying: His PVR is low. He has been negative for infection previously and UA today not concerning for infection. Will trial empiric alpha blocker, tamsulosin 0.4 mg daily. S/e discussed    Erectile dysfunction: demonstrated test dose of 0.1 ml of Trimix #9, patient was able to successfully do this today. He was cautioned to uptritate slowly, not use more than every day or every other day    Gross hematuria: CT urogram and return for cystoscopy to complete evaluation      Jose Angel Lawson MD   [facility]  [facility] Phone: [phone]    Over 30 minutes spent on this encounter including time spent talking to the patient and documentation

## 2023-02-24 NOTE — PROGRESS NOTES
"CHIEF COMPLAINT   Kashmir Yao who is a 67 year old male returns today for follow-up of erectile dysfunction, now with complaints of lower urinary tract symptoms including dysuria and feeling of incomplete bladder emptying, gross hematuria.      HPI   Kashmir Yao is a 67 year old male returns today for follow-up of erectile dysfunction, now with complaints of lower urinary tract symptoms including dysuria and feeling of incomplete bladder emptying, gross hematuria.      For the past 6 months he has noticed burning with urination as well as feeling of incomplete bladder emptying. A few weeks ago he noticed some red blood in the urine and is now dark.    In December his urinalysis was notable for trace blood but micro was negative and culture was negative. UA 2/10/2023 was pristine    He is not on any medications for his prostate. AUA symptom score 0-3-6-1-0-0-1 = 12 QOL mostly satisfied    Quit smoking around 2003     He notes he is considering spending the winter months in [place] and is concerned about the 30 day expiration of Trimix    PHYSICAL EXAM  Patient is a 67 year old  male   Vitals: Height 1.753 m (5' 9\"), weight 99.8 kg (220 lb).  Body mass index is 32.49 kg/m .  General Appearance Adult:   Alert, no acute distress, oriented  HENT: throat/mouth:normal, good dentition  Lungs: no respiratory distress, or pursed lip breathing  Heart: No obvious jugular venous distension present  Abdomen: nondistended  Musculoskeltal: extremities normal, no peripheral edema  Skin: no suspicious lesions or rashes  Neuro: Alert, oriented, speech and mentation normal  Psych: affect and mood normal  Gait: Normal  : circ phallus, orthotopic and patent meatus      PVR 7 ml    ASSESSMENT and PLAN  67 year old male returns today for follow-up of erectile dysfunction, now with complaints of lower urinary tract symptoms including dysuria and feeling of incomplete bladder emptying, gross hematuria.      LUTS including dysuria, " feeling of incomplete bladder emptying: His PVR is low. He has been negative for infection previously and UA today not concerning for infection. Will trial empiric alpha blocker, tamsulosin 0.4 mg daily. S/e discussed    Erectile dysfunction: demonstrated test dose of 0.1 ml of Trimix #9, patient was able to successfully do this today. He was cautioned to uptritate slowly, not use more than every day or every other day    Gross hematuria: CT urogram and return for cystoscopy to complete evaluation      Jose Angel Lawson MD   [facility]  [facility] Phone: [phone]    Over 30 minutes spent on this encounter including time spent talking to the patient and documentation

## 2023-02-24 NOTE — NURSING NOTE
Chief Complaint   Patient presents with     Hematuria     Erectile Dysfunction     Trimix teach     Pt has had pain with urination for about 6 months. Pt had gross hematuria about 3 weeks ago, for several days.    PVR: 7 mL    Connie Edwards CMA

## 2023-04-06 ENCOUNTER — TELEPHONE (OUTPATIENT)
Dept: FAMILY MEDICINE | Facility: CLINIC | Age: 68
End: 2023-04-06
Payer: COMMERCIAL

## 2023-04-06 NOTE — TELEPHONE ENCOUNTER
PAL received transfer from triage nurse - spoke with pt  -Pt requesting to schedule f/u appt with Dr. Noel to discuss his persistent urinary problems and weight loss medication  -Pt currently being followed by Urology   -Scheduled pt for OV 4/10/23 at 3:00pm      Patient Quality Outreach Health Maintenance - PAL RN    Summary:    PAL RN contacted pt regarding overdue health maintenance     Patient is due/failing the following:   Physical Annual Wellness Visit due June 25th, 2023        Topic Date Due     Zoster (Shingles) Vaccine (1 of 2) Never done       Health Maintenance Due   Topic Date Due     ZOSTER IMMUNIZATION (1 of 2) Never done     CMP  07/29/2021     PHQ-2 (once per calendar year)  01/01/2023       Type of outreach:    Phone, spoke to patient/parent.        - Scheduled annual wellness visit with Dr. Noel 6/26/23 at 2:30pm  - Pt states that [place] at the end of [place], may need to reschedule appt   - Advised patient if any questions or concerns comes up before scheduled appointment to call the PAL RN.   - Patient given opportunity to ask questions and at this time there is nothing further needed.     Next 5 appointments (look out 90 days)    Apr 10, 2023  3:00 PM  (Arrive by 2:40 PM)  Provider Visit with Chad Noel MD  [facility] ([facility] ) [id number] [place] [address] [phone]   Jun 26, 2023  2:30 PM  (Arrive by 2:10 PM)  Annual Wellness Visit with Chad Noel MD  [facility] ([facility] ) [address] [facility] [address] [phone]          Questions for provider review:    None                                                                                       Jazmin RUDD RN  Patient Advocate Liaison - RULA RN  [facility]  [phone]

## 2023-04-10 ENCOUNTER — OFFICE VISIT (OUTPATIENT)
Dept: FAMILY MEDICINE | Facility: CLINIC | Age: 68
End: 2023-04-10
Payer: COMMERCIAL

## 2023-04-10 VITALS
HEART RATE: 52 BPM | RESPIRATION RATE: 18 BRPM | HEIGHT: 69 IN | BODY MASS INDEX: 32.73 KG/M2 | OXYGEN SATURATION: 97 % | DIASTOLIC BLOOD PRESSURE: 70 MMHG | WEIGHT: 221 LBS | SYSTOLIC BLOOD PRESSURE: 128 MMHG | TEMPERATURE: 98.5 F

## 2023-04-10 DIAGNOSIS — I10 BENIGN ESSENTIAL HYPERTENSION: Primary | ICD-10-CM

## 2023-04-10 DIAGNOSIS — R31.0 GROSS HEMATURIA: ICD-10-CM

## 2023-04-10 DIAGNOSIS — G40.309 GENERALIZED TONIC CLONIC EPILEPSY (H): ICD-10-CM

## 2023-04-10 DIAGNOSIS — M25.331: ICD-10-CM

## 2023-04-10 DIAGNOSIS — E66.811 CLASS 1 OBESITY DUE TO EXCESS CALORIES WITHOUT SERIOUS COMORBIDITY IN ADULT, UNSPECIFIED BMI: ICD-10-CM

## 2023-04-10 DIAGNOSIS — D32.9 MENINGIOMA (H): ICD-10-CM

## 2023-04-10 DIAGNOSIS — Q03.0: ICD-10-CM

## 2023-04-10 DIAGNOSIS — E66.09 CLASS 1 OBESITY DUE TO EXCESS CALORIES WITHOUT SERIOUS COMORBIDITY IN ADULT, UNSPECIFIED BMI: ICD-10-CM

## 2023-04-10 DIAGNOSIS — J43.1 PANLOBULAR EMPHYSEMA (H): ICD-10-CM

## 2023-04-10 PROBLEM — E66.01 MORBID OBESITY (H): Status: RESOLVED | Noted: 2020-01-02 | Resolved: 2023-04-10

## 2023-04-10 PROCEDURE — 80053 COMPREHEN METABOLIC PANEL: CPT | Performed by: FAMILY MEDICINE

## 2023-04-10 PROCEDURE — 36415 COLL VENOUS BLD VENIPUNCTURE: CPT | Performed by: FAMILY MEDICINE

## 2023-04-10 PROCEDURE — 99214 OFFICE O/P EST MOD 30 MIN: CPT | Performed by: FAMILY MEDICINE

## 2023-04-10 RX ORDER — LAMOTRIGINE 200 MG/1
200 TABLET ORAL 2 TIMES DAILY
Start: 2023-04-10

## 2023-04-10 RX ORDER — LAMOTRIGINE 100 MG/1
50 TABLET ORAL 2 TIMES DAILY
Start: 2023-04-10 | End: 2023-06-26 | Stop reason: DRUGHIGH

## 2023-04-10 NOTE — LETTER
April 13, 2023      Naveen Yao  [address] [address] [facility] [address]        Dear ,    We are writing to inform you of your test results.    Liver and kidneys look good    Resulted Orders   COMPREHENSIVE METABOLIC PANEL   Result Value Ref Range    Sodium 144 136 - 145 mmol/L    Potassium 4.4 3.4 - 5.3 mmol/L    Chloride 108 (H) 98 - 107 mmol/L    Carbon Dioxide (CO2) 21 (L) 22 - 29 mmol/L    Anion Gap 15 7 - 15 mmol/L    Urea Nitrogen 32.4 (H) 8.0 - 23.0 mg/dL    Creatinine 1.14 0.67 - 1.17 mg/dL    Calcium 9.7 8.8 - 10.2 mg/dL    Glucose 103 (H) 70 - 99 mg/dL    Alkaline Phosphatase 112 40 - 129 U/L    AST 26 10 - 50 U/L    ALT 29 10 - 50 U/L    Protein Total 7.8 6.4 - 8.3 g/dL    Albumin 4.9 3.5 - 5.2 g/dL    Bilirubin Total 0.4 <=1.2 mg/dL    GFR Estimate 70 >60 mL/min/1.73m2      Comment:      eGFR calculated using 2021 CKD-EPI equation.       If you have any questions or concerns, please call the clinic at the number listed above.       Sincerely,      Chad Noel MD

## 2023-04-10 NOTE — PROGRESS NOTES
"  Assessment & Plan   Problem List Items Addressed This Visit     Aqueduct of Sylvius anomaly (H)     Neurosurgery follows. S/p ventriculostomy         Benign essential hypertension - Primary     Controlled. Check twice yearly         Relevant Orders    COMPREHENSIVE METABOLIC PANEL    Class 1 obesity due to excess calories without serious comorbidity in adult     He is interested in a dietary supplement containing chromium and other \"natural\" \"ingredients, but they will not sell him the medicine without reviewing with his doctor, because of his medical regimen.  I suggest he not purchase this product, discussed the difficulties of weight loss in the geriatric population         Generalized tonic clonic epilepsy (H)     On Lamictal from [place]. Records reviewed, meds reconciled         Relevant Medications    lamoTRIgine (LAMICTAL) 100 MG tablet    lamoTRIgine (LAMICTAL) 200 MG tablet    Gross hematuria     Hematuria has stopped. Urology w/u was not completed. Facilitate         Relevant Orders    CT Urogram wo & w Contrast    Adult Urology  Referral    Meningioma (H)     Small, growing.  [facility] notes reviewed.  They propose surveillance         Panlobular emphysema (H)     No sx. No changes. [place] [facility] Dx         Visi (volar intercalated segment instability), right     Severe, [place]                      BMI:   Estimated body mass index is 32.64 kg/m  as calculated from the following:    Height as of this encounter: 1.753 m (5' 9\").    Weight as of this encounter: 100.2 kg (221 lb).   Weight management plan: As discussed        Chad Noel MD  [facility] TRAE Hodge is a 67 year old, presenting for the following health issues:  Follow Up         View : No data to display.              History of Present Illness       Reason for visit:  Genral questions    He eats 0-1 servings of fruits and vegetables daily.He consumes 0 sweetened beverage(s) daily.He exercises with " "enough effort to increase his heart rate 60 or more minutes per day.  He exercises with enough effort to increase his heart rate 3 or less days per week.   He is taking medications regularly.       Genitourinary - Male  Onset/Duration: Bladder concerns  Description:   Dysuria (painful urination): YES}  Hematuria (blood in urine): No  Frequency: YES  Waking at night to urinate: No  Hesitancy (delay in urine): No  Retention (unable to empty): YES  Decrease in urinary flow: No  Incontinence: No  Progression of Symptoms:  same  Accompanying Signs & Symptoms:  Fever: No  Back/Flank pain: No  Urethral discharge: No  Testicle lumps/masses/pain: No  Nausea and/or vomiting: No  Abdominal pain: No  History:   History of frequent UTI s: No  History of kidney stones: No  History of hernias: No  Personal or Family history of Prostate problems: N/A  Sexually active: YES  Precipitating or alleviating factors: Urination makes the discomfort feel better  Therapies tried and outcome: Seen by Urologist and no change    Concern - Weight Concerns  Onset: unknown  Description: Would like to take medications to help with weight loss  Progression of Symptoms:  constant  Accompanying Signs & Symptoms: N/A  Previous history of similar problem: N/A  Precipitating factors:        Worsened by: unknown  Alleviating factors:        Improved by: unknown  Therapies tried and outcome: Unknown          Review of Systems   He has problems with his wrists right greater than left which sounds like arthritis as well as neuropathy.  [facility] orthopedic note is available.  he has been advised to have fusion and advised to not have fusion.      Objective    /70 (BP Location: Right arm, Patient Position: Sitting, Cuff Size: Adult Large)   Pulse 52   Temp 98.5  F (36.9  C) (Oral)   Resp 18   Ht 1.753 m (5' 9\")   Wt 100.2 kg (221 lb)   SpO2 97%   BMI 32.64 kg/m    Body mass index is 32.64 kg/m .  Physical Exam   He is wearing a cast sock on his " right wrist with no splint        Chad Noel MD

## 2023-04-10 NOTE — ASSESSMENT & PLAN NOTE
Small, growing.  [facility] notes reviewed.  They propose surveillance

## 2023-04-10 NOTE — ASSESSMENT & PLAN NOTE
"He is interested in a dietary supplement containing chromium and other \"natural\" \"ingredients, but they will not sell him the medicine without reviewing with his doctor, because of his medical regimen.  I suggest he not purchase this product, discussed the difficulties of weight loss in the geriatric population  "

## 2023-04-11 LAB
ALBUMIN SERPL BCG-MCNC: 4.9 G/DL (ref 3.5–5.2)
ALP SERPL-CCNC: 112 U/L (ref 40–129)
ALT SERPL W P-5'-P-CCNC: 29 U/L (ref 10–50)
ANION GAP SERPL CALCULATED.3IONS-SCNC: 15 MMOL/L (ref 7–15)
AST SERPL W P-5'-P-CCNC: 26 U/L (ref 10–50)
BILIRUB SERPL-MCNC: 0.4 MG/DL
BUN SERPL-MCNC: 32.4 MG/DL (ref 8–23)
CALCIUM SERPL-MCNC: 9.7 MG/DL (ref 8.8–10.2)
CHLORIDE SERPL-SCNC: 108 MMOL/L (ref 98–107)
CREAT SERPL-MCNC: 1.14 MG/DL (ref 0.67–1.17)
DEPRECATED HCO3 PLAS-SCNC: 21 MMOL/L (ref 22–29)
GFR SERPL CREATININE-BSD FRML MDRD: 70 ML/MIN/1.73M2
GLUCOSE SERPL-MCNC: 103 MG/DL (ref 70–99)
POTASSIUM SERPL-SCNC: 4.4 MMOL/L (ref 3.4–5.3)
PROT SERPL-MCNC: 7.8 G/DL (ref 6.4–8.3)
SODIUM SERPL-SCNC: 144 MMOL/L (ref 136–145)

## 2023-04-17 ENCOUNTER — PATIENT OUTREACH (OUTPATIENT)
Dept: FAMILY MEDICINE | Facility: CLINIC | Age: 68
End: 2023-04-17
Payer: COMMERCIAL

## 2023-04-17 NOTE — TELEPHONE ENCOUNTER
PAL received incoming call from pt   -Pt wanting to know what imaging Dr. Noel ordered at 4/10 visit  -PAL informed pt of CT Urogram order placed, advised to schedule and follow-up with Urology Dr. Lawson, transferred pt to [facility] radiology scheduling   -Pt has upcoming appt with Dr. Noel 6/26/23    Patient was given an opportunity to ask questions, verbalized understanding of plan, and is agreeable.    Jazmin RUDD RN  Patient Advocate Liaison - PAL RN  [facility]  [phone]

## 2023-04-18 ENCOUNTER — HOSPITAL ENCOUNTER (OUTPATIENT)
Dept: CT IMAGING | Facility: CLINIC | Age: 68
Discharge: HOME | End: 2023-04-18
Attending: FAMILY MEDICINE | Admitting: FAMILY MEDICINE
Payer: COMMERCIAL

## 2023-04-18 DIAGNOSIS — R31.0 GROSS HEMATURIA: ICD-10-CM

## 2023-04-18 PROCEDURE — 250N000009 HC RX 250: Performed by: FAMILY MEDICINE

## 2023-04-18 PROCEDURE — 74178 CT ABD&PLV WO CNTR FLWD CNTR: CPT

## 2023-04-18 PROCEDURE — 250N000011 HC RX IP 250 OP 636: Performed by: FAMILY MEDICINE

## 2023-04-18 RX ORDER — IOPAMIDOL 755 MG/ML
108 INJECTION, SOLUTION INTRAVASCULAR ONCE
Status: COMPLETED | OUTPATIENT
Start: 2023-04-18 | End: 2023-04-18

## 2023-04-18 RX ADMIN — IOPAMIDOL 108 ML: 755 INJECTION, SOLUTION INTRAVENOUS at 16:22

## 2023-04-18 RX ADMIN — SODIUM CHLORIDE 76 ML: 9 INJECTION, SOLUTION INTRAVENOUS at 16:22

## 2023-04-19 ENCOUNTER — TELEPHONE (OUTPATIENT)
Dept: FAMILY MEDICINE | Facility: CLINIC | Age: 68
End: 2023-04-19
Payer: COMMERCIAL

## 2023-04-19 NOTE — TELEPHONE ENCOUNTER
It looks like Dr Lawson already discussed with Mr Yao  The CT is OK. Now, he needs the cystoscopy  Chad Noel MD

## 2023-04-19 NOTE — TELEPHONE ENCOUNTER
Test Results    Contacts       Type Contact Phone/Fax    04/19/2023 03:49 PM CDT Phone (Incoming) Naveen Yao CANDELARIA (Self) [phone] (M)          Who ordered the test:  PCP Dr. Noel    Type of test: CT scan    Date of test:  4/18/23    Where was the test performed:  [facility]    What are your questions/concerns?:  Pt would like call back from pcp or pcp nurse to discuss results and questions pt has as soon as possible.    Could we send this information to you in [employer] or would you prefer to receive a phone call?:   Patient would prefer a phone call   Okay to leave a detailed message?: Yes at Cell number on file:    Telephone Information:   Mobile [phone]

## 2023-04-19 NOTE — TELEPHONE ENCOUNTER
Pt calls,    ~wants BW to advise on recent CT urogram, results came in last night, pt saw on Mychart and wants someone to advise ASAP  ~BW out of the office this week  ~pt informed of below  ~pt informs will schedule cystoscopy  ~aware cystoscopy is part of the work up for hematuria   ~pt aware [facility] PAL out now (left at 4:30)    ROUTE TO covering provider for BW, please advise if okay to wait for BW Monday, f/u after cystoscopy?  Route to [facility] PAL to follow up with pt  Telephone Information:   Mobile [phone]       IMPRESSION:  1.  No cause for hematuria is identified. No urinary calculi or urinary tract masses.  2.  Mild splenomegaly, unchanged.  3.  The appendix is mildly thickened at 1 cm, with no significant periappendiceal inflammatory stranding. Acute appendicitis is considered unlikely from this appearance, however, clinical correlation and follow-up are recommended.      Grace Malone, RN, BSN  [facility]

## 2023-04-19 NOTE — TELEPHONE ENCOUNTER
PAL attempted to contact pt regarding CT urogram results- completed on 4/18/23  -PAL notes pt was contacted by urology who gave result report, see urology telephone encounter today 4/19  -Left  instructing pt to call PAL back [phone] with any questions or concerns- results relayed by urology    No further outreach indicated at this time, closing encounter    Jazmin RUDD RN  Patient Advocate Liaison - PAL RN  [facility]  [phone]

## 2023-04-20 NOTE — TELEPHONE ENCOUNTER
"RULA called and spoke with pt   -Relayed and educated on CT results:  \"IMPRESSION:  1.  No cause for hematuria is identified. No urinary calculi or urinary tract masses.  2.  Mild splenomegaly, unchanged.  3.  The appendix is mildly thickened at 1 cm, with no significant periappendiceal inflammatory stranding. Acute appendicitis is considered unlikely from this appearance, however, clinical correlation and follow-up are recommended.\"    -Relayed note from Dr. Noel, \"The CT is OK. Now, he needs the cystoscopy\"  -Pt wanting to know the difference between CT and cystoscopy, PAL educated accordingly  -PAL advised pt to schedule cysto with urology at earliest convenience, pt agreed and states he will call    Patient was given an opportunity to ask questions, verbalized understanding of plan, and is agreeable.    Jazmin RUDD RN  Patient Advocate Liaison - PAL RN  [facility]  [phone]    "

## 2023-04-20 NOTE — TELEPHONE ENCOUNTER
PAL received VM from pt yesterday 4/19 at 1653  -Pt requesting call back to further discuss CT results    PAL attempted to contact pt, no answer  -Left VM requesting call back [phone], awaiting call back at this time    Jazmin RUDD RN  Patient Advocate Liaison - PAL RN  [facility]  [phone]

## 2023-04-23 DIAGNOSIS — K21.9 GASTROESOPHAGEAL REFLUX DISEASE WITHOUT ESOPHAGITIS: ICD-10-CM

## 2023-04-26 RX ORDER — OMEPRAZOLE 40 MG/1
CAPSULE, DELAYED RELEASE ORAL
Qty: 90 CAPSULE | Refills: 0 | Status: SHIPPED | OUTPATIENT
Start: 2023-04-26 | End: 2023-06-26

## 2023-04-26 NOTE — TELEPHONE ENCOUNTER
Prescription approved per [facility] Refill Protocol.      Mari Martinez RN

## 2023-04-27 ENCOUNTER — TRANSFERRED RECORDS (OUTPATIENT)
Dept: HEALTH INFORMATION MANAGEMENT | Facility: CLINIC | Age: 68
End: 2023-04-27
Payer: COMMERCIAL

## 2023-04-27 ENCOUNTER — PATIENT OUTREACH (OUTPATIENT)
Dept: FAMILY MEDICINE | Facility: CLINIC | Age: 68
End: 2023-04-27
Payer: COMMERCIAL

## 2023-04-27 NOTE — TELEPHONE ENCOUNTER
PAL received incoming call from pt   -Pt reports he will be having elbow surgery 5/9/23 at [facility], needing to schedule pre-op appointment   -Scheduled pt for pre-op appt 5/1/23 at 5:00pm, pt confirmed    Patient was given an opportunity to ask questions, verbalized understanding of plan, and is agreeable.    Jazmin RUDD, RN  Patient Advocate Liaison - PAL RN  [facility]  [phone]

## 2023-05-01 ENCOUNTER — OFFICE VISIT (OUTPATIENT)
Dept: FAMILY MEDICINE | Facility: CLINIC | Age: 68
End: 2023-05-01
Payer: COMMERCIAL

## 2023-05-01 VITALS
OXYGEN SATURATION: 99 % | SYSTOLIC BLOOD PRESSURE: 161 MMHG | WEIGHT: 224 LBS | DIASTOLIC BLOOD PRESSURE: 78 MMHG | TEMPERATURE: 98 F | RESPIRATION RATE: 18 BRPM | HEART RATE: 57 BPM | BODY MASS INDEX: 33.18 KG/M2 | HEIGHT: 69 IN

## 2023-05-01 DIAGNOSIS — Z01.818 PREOP GENERAL PHYSICAL EXAM: Primary | ICD-10-CM

## 2023-05-01 DIAGNOSIS — K59.09 OTHER CONSTIPATION: ICD-10-CM

## 2023-05-01 DIAGNOSIS — E78.5 HYPERLIPIDEMIA LDL GOAL <70: ICD-10-CM

## 2023-05-01 DIAGNOSIS — R73.09 ELEVATED GLUCOSE: ICD-10-CM

## 2023-05-01 LAB — HBA1C MFR BLD: 5 % (ref 0–5.6)

## 2023-05-01 PROCEDURE — 80048 BASIC METABOLIC PNL TOTAL CA: CPT | Performed by: FAMILY MEDICINE

## 2023-05-01 PROCEDURE — 83036 HEMOGLOBIN GLYCOSYLATED A1C: CPT | Performed by: FAMILY MEDICINE

## 2023-05-01 PROCEDURE — 36415 COLL VENOUS BLD VENIPUNCTURE: CPT | Performed by: FAMILY MEDICINE

## 2023-05-01 PROCEDURE — 85025 COMPLETE CBC W/AUTO DIFF WBC: CPT | Performed by: FAMILY MEDICINE

## 2023-05-01 PROCEDURE — 93000 ELECTROCARDIOGRAM COMPLETE: CPT | Performed by: FAMILY MEDICINE

## 2023-05-01 PROCEDURE — 80061 LIPID PANEL: CPT | Performed by: FAMILY MEDICINE

## 2023-05-01 PROCEDURE — 99214 OFFICE O/P EST MOD 30 MIN: CPT | Performed by: FAMILY MEDICINE

## 2023-05-01 NOTE — PROGRESS NOTES
[address] [address]  Phone: [phone]  Primary Provider: Jagdish Gilbert  Pre-op Performing Provider: JAGDISH GILBERT      PREOPERATIVE EVALUATION:  Today's date: 5/1/2023    Kashmir Yao is a 67 year old male who presents for a preoperative evaluation.      5/1/2023     4:52 PM   Additional Questions   Roomed by Andra SMITH     Surgical Information:  Surgery/Procedure: Left Elbow Surgery  Surgery Location: [facility]  Surgeon: Dr. Wilson  Surgery Date: 05/09/2023  Time of Surgery: 1:00pm arrival  Where patient plans to recover: At home with family  Fax number for surgical facility: Please Fax to [phone]    Assessment & Plan     The proposed surgical procedure is considered INTERMEDIATE risk.    Problem List Items Addressed This Visit     Elevated glucose     In the past.  Broaden database         Relevant Orders    Hemoglobin A1c (Completed)    Hyperlipidemia LDL goal <70     Statin therapy.  Labs due         Relevant Orders    Lipid panel reflex to direct LDL Non-fasting    Preop general physical exam - Primary     No contraindication to proposed interventions         Relevant Orders    CBC with platelets and differential    Basic metabolic panel  (Ca, Cl, CO2, Creat, Gluc, K, Na, BUN)    EKG 12-lead complete w/read - Clinics (Completed)                     RECOMMENDATION:  APPROVAL GIVEN to proceed with proposed procedure, without further diagnostic evaluation.            Subjective     HPI related to upcoming procedure: Symptomatic left hand dysfunction      Health Care Directive:  Patient does not have a Health Care Directive or Living Will:     Preoperative Review of :   reviewed - no record of controlled substances prescribed.    Patient instructed to hold nonsteroidals immediately, tamsulosin a.m. of surgery.( he does not recognize this medicine)  Pt has RIC. Empiric CPAP use in the recovery room may be warranted    Review of  Systems  CONSTITUTIONAL: NEGATIVE for fever, chills, change in weight  ENT/MOUTH: NEGATIVE for ear, mouth and throat problems  RESP: NEGATIVE for significant cough or SOB  CV: NEGATIVE for chest pain, palpitations or peripheral edema  HEME/ALLERGY/IMMUNE: NEGATIVE for bleeding problems    Patient Active Problem List    Diagnosis Date Noted     Health Care Home 06/14/2011     Priority: High     EMERGENCY CARE PLAN  Presenting Problem Signs and Symptoms Treatment Plan    Questions or conerns during clinic hours    I will call the clinic directly     Questions or conerns outside clinic hours    I will call the 24 hour nurse line at [phone]    Patient needs to schedule an appointment    I will call the 24 hour scheduling team at [phone] or clinic directly    Same day treatment     I will call the clinic first, nurse line if after hours, urgent care and express care if needed                            DX V65.8 REPLACED WITH [id number] HEALTH CARE HOME (04/08/2013)       Gross hematuria 04/10/2023     Priority: Medium     Visi (volar intercalated segment instability), right 04/10/2023     Priority: Medium     Severe [place]       Meningioma (H) 04/10/2023     Priority: Medium     Chronic migraine without aura without status migrainosus, not intractable 06/24/2022     Priority: Medium     Generalized tonic clonic epilepsy (H) 06/24/2022     Priority: Medium     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     Priority: Medium     Encounter for immunization 08/04/2021     Priority: Medium     Travel advice encounter 08/04/2021     Priority: Medium     Heartburn 04/21/2021     Priority: Medium     Other chest pain  04/21/2021     Priority: Medium     Oral phase dysphagia 04/21/2021     Priority: Medium     Right hand paresthesia 04/19/2021     Priority: Medium     Common wart 12/02/2020     Priority: Medium     Post herpetic neuralgia 12/02/2020     Priority: Medium     Bell's palsy 09/08/2020     Priority: Medium      Benign prostatic hyperplasia with urinary frequency 09/08/2020     Priority: Medium     Anemia 07/31/2020     Priority: Medium     No longer Fe def.        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     Priority: Medium     Pruritic disorder 06/19/2020     Priority: Medium     Trigger finger of left hand, unspecified finger 04/13/2020     Priority: Medium     Pedal edema 02/14/2020     Priority: Medium     Longstanding, previously evaluated.  No skin changes.  Support hose at home he does not wear.  Discussed natural history recommend knee-high socks       Left carpal tunnel syndrome 02/13/2020     Priority: Medium     Ulnar neuropathy of both upper extremities 02/13/2020     Priority: Medium     Dermatitis 02/13/2020     Priority: Medium     Cognitive change 01/02/2020     Priority: Medium     Class 1 obesity due to excess calories without serious comorbidity in adult 01/02/2020     Priority: Medium     Primary osteoarthritis of both hands 01/02/2020     Priority: Medium     Headache 01/02/2020     Priority: Medium     Need for prophylactic vaccination and inoculation against influenza 01/02/2020     Priority: Medium     Cough 01/02/2020     Priority: Medium     Panlobular emphysema (H) 01/02/2020     Priority: Medium     Trauma 10/28/2018     Priority: Medium     Other constipation 05/25/2016     Priority: Medium     Benign essential hypertension 05/20/2016     Priority: Medium     Physical deconditioning 05/20/2016     Priority: Medium     S/P TKR (total knee replacement) 05/16/2016     Priority: Medium     RIC (obstructive sleep apnea)      Priority: Medium     Esophageal reflux 04/10/2008     Priority: Medium     Anxiety state 04/10/2008     Priority: Medium     Problem list name updated by automated process. Provider to review       Hyperlipidemia LDL goal <70 02/14/2007     Priority: Medium      Past Medical History:   Diagnosis Date     Anemia 7/31/2020     Arthritis      Bell's palsy 9/8/2020     Benign  prostatic hyperplasia with incomplete bladder emptying 7/28/2020     Bilateral carpal tunnel syndrome 2/13/2020     CAD (coronary artery disease) 2010    a subtotally occluded obtuse marginal vessel which was stented with a drug-coated stent.  He had 50% to 60% LAD disease and 25% to 30% right coronary disease     Cognitive change 1/2/2020     Colonic polyps 2009    tubular adenomae with mildly dysplastic features     Common wart 12/2/2020     Disturbance of skin sensation      Encephalocele (H) 2009    left frontal     Epilepsy, partial (H) 2009    with secondary generalization     GERD (gastroesophageal reflux disease)      Heart attack (H)     5 years ago. Cardiology 3 months age     Herpes zoster with complication 10/7/2020     History of spinal cord injury      Hypertension      Iron deficiency 9/9/2020     Meningioma (H) 4/10/2023     RIC (obstructive sleep apnea)      RIC (obstructive sleep apnea)      Paraneoplastic Antibody  positive[799.89BS] 2009    mildly elevated alpha 3 ganglionic acetylcholine receptor and JEOVANNY 65 receptor  antibody     Pedal edema 2/14/2020     Post herpetic neuralgia 12/2/2020     Primary osteoarthritis of both hands 1/2/2020     Seizures (H)     Last seizure 2012     Stented coronary artery      Tobacco use disorder      Ulnar neuropathy of both upper extremities 2/13/2020     Vasculogenic erectile dysfunction 7/28/2020     Ventricular Assymetry 2009    likely congenital right lateral ventricular enlargement     Past Surgical History:   Procedure Laterality Date     ARTHROPLASTY KNEE Left 05/16/2016    Procedure: ARTHROPLASTY KNEE;  Surgeon: Chet Leonardo MD;  Location:  OR     BACK SURGERY       CARPAL TUNNEL RELEASE RT/LT Right 03/11/2020    Right carpal tunnel release, Dr. Rick Frias, [facility]     COLONOSCOPY N/A 10/08/2020    Procedure: COLONOSCOPY WITH POLYPECTOMIES using cold jumbo forceps;  Surgeon: Kevin Song MD;  Location:  GI     CYSTOSCOPY        ESOPHAGOSCOPY, GASTROSCOPY, DUODENOSCOPY (EGD), COMBINED  05/24/2012    Procedure:COMBINED ESOPHAGOSCOPY, GASTROSCOPY, DUODENOSCOPY (EGD); ESOPHAGOSCOPY, GASTROSCOPY, DUODENOSCOPY (EGD) ; Surgeon:GILLIAN JOHNSON; Location: GI     ESOPHAGOSCOPY, GASTROSCOPY, DUODENOSCOPY (EGD), COMBINED N/A 10/08/2020    Procedure: ESOPHAGOGASTRODUODENOSCOPY (EGD) (fv);  Surgeon: Kevin Song MD;  Location:  GI     KNEE SURGERY Left     repair of cartilage     RELEASE TRIGGER FINGER Left 10/19/2020    Left ring finger trigger finger release at A1 pulley, Dr. Rick Frias, [facility]     SPINE SURGERY      repair of herniated disc lumbar     STENT       trigger finger release Right 09/15/2020    Right thumb trigger finger release, Dr. Frias     VENTRICULOSTOMY      [facility] ANESTH,DX ARTHROSCOPIC PROC KNEE JOINT      CARTILEGE REPAIR.     [facility] NONSPECIFIC PROCEDURE      stint in heart     [facility] COLONOSCOPY THRU STOMA, DIAGNOSTIC  2009    tubular adenomae, recommended annual colonoscopy     Current Outpatient Medications   Medication Sig Dispense Refill     acetaminophen (TYLENOL) 500 MG tablet Take 1,000 mg by mouth every 8 hours as needed for mild pain       aspirin 81 MG tablet Take 81 mg by mouth daily       COMPOUNDED NON-CONTROLLED SUBSTANCE (CMPD RX) - PHARMACY TO MIX COMPOUNDED MEDICATION 1 vial TriMix #9 with syringes.  Inject 0.1 mL intercavernously per MD's instructions. 1 each 11     COMPOUNDED NON-CONTROLLED SUBSTANCE (CMPD RX) - PHARMACY TO MIX COMPOUNDED MEDICATION Inject 0.1 ml into penis PRN,trimix #9,with needles and syringes 1 each 3     lamoTRIgine (LAMICTAL) 100 MG tablet Take 0.5 tablets (50 mg) by mouth 2 times daily       lamoTRIgine (LAMICTAL) 200 MG tablet Take 1 tablet (200 mg) by mouth 2 times daily       linaclotide (LINZESS) 290 MCG capsule Take 1 capsule (290 mcg) by mouth every morning (before breakfast) 90 capsule 1     metoprolol tartrate (LOPRESSOR) 50 MG tablet TAKE 1 TABLET(50 MG)  "BY MOUTH TWICE DAILY 180 tablet 2     naproxen (NAPROSYN) 500 MG tablet TAKE 1 TABLET BY MOUTH EVERY 12 HOURS AS NEEDED, TAKE 2 HOURS APART FROM ANY ASPIRIN 180 tablet 1     omeprazole (PRILOSEC) 40 MG DR capsule TAKE 1 CAPSULE(40 MG) BY MOUTH DAILY 90 capsule 0     rizatriptan (MAXALT) 10 MG tablet TAKE 1 TABLET(10 MG) BY MOUTH AT ONSET OF HEADACHE FOR MIGRAINE. MAY REPEAT IN 2 HOURS. MAX 3 TABLETS/ 24 HOURS 15 tablet 1     simvastatin (ZOCOR) 40 MG tablet TAKE 1 TABLET(40 MG) BY MOUTH AT BEDTIME 90 tablet 1     tamsulosin (FLOMAX) 0.4 MG capsule Take 1 capsule (0.4 mg) by mouth every evening 90 capsule 3     zolpidem (AMBIEN) 5 MG tablet Take 1 tablet (5 mg) by mouth nightly as needed for sleep 90 tablet 0       Allergies   Allergen Reactions     Gabapentin      Fever       Morphine And Related      inability to sleep after taking codeine     Vicodin [Hydrocodone-Acetaminophen]      Perspiring, hyper, itchy        Social History     Tobacco Use     Smoking status: Former     Packs/day: 0.50     Years: 20.00     Pack years: 10.00     Types: Cigarettes     Quit date: 2003     Years since quittin.7     Smokeless tobacco: Never   Vaping Use     Vaping status: Never Used   Substance Use Topics     Alcohol use: No     Alcohol/week: 0.0 standard drinks of alcohol       History   Drug Use No         Objective     BP (!) 161/78 (BP Location: Right arm, Patient Position: Sitting, Cuff Size: Adult Large)   Pulse 57   Temp 98  F (36.7  C) (Oral)   Resp 18   Ht 1.753 m (5' 9\")   Wt 101.6 kg (224 lb)   SpO2 99%   BMI 33.08 kg/m      Physical Exam  Constitutional:       Appearance: Normal appearance.   HENT:      Head: Atraumatic.      Right Ear: Tympanic membrane normal.      Left Ear: Tympanic membrane normal.      Nose: Nose normal.      Mouth/Throat:      Mouth: Mucous membranes are moist.   Eyes:      Pupils: Pupils are equal, round, and reactive to light.   Cardiovascular:      Rate and Rhythm: Normal rate " and regular rhythm.      Heart sounds: Normal heart sounds.   Pulmonary:      Effort: Pulmonary effort is normal.      Breath sounds: Normal breath sounds.   Abdominal:      General: Bowel sounds are normal.      Palpations: Abdomen is soft.   Musculoskeletal:      Cervical back: Neck supple.   Skin:     General: Skin is warm and dry.   Neurological:      General: No focal deficit present.      Mental Status: He is alert.   Psychiatric:         Mood and Affect: Mood normal.           Recent Labs   Lab Test 04/10/23  1538 06/23/22  1156   HGB  --  13.0*   PLT  --  181     --    POTASSIUM 4.4  --    CR 1.14  --         Diagnostics:  Labs pending at this time.  Results will be reviewed when available.       Revised Cardiac Risk Index (RCRI):  The patient has the following serious cardiovascular risks for perioperative complications:   - Coronary Artery Disease (MI, positive stress test, angina, Qs on EKG) = 1 point     RCRI Interpretation: 1 point: Class II (low risk - 0.9% complication rate)           Signed Electronically by: Chad Noel MD  Copy of this evaluation report is provided to requesting physician.

## 2023-05-01 NOTE — PATIENT INSTRUCTIONS
No naproxen, ibuprofen 10 days before procedure    No tamsulosin AM of procedure    Take other medications

## 2023-05-02 LAB
ANION GAP SERPL CALCULATED.3IONS-SCNC: 12 MMOL/L (ref 7–15)
BASOPHILS # BLD AUTO: 0 10E3/UL (ref 0–0.2)
BASOPHILS NFR BLD AUTO: 0 %
BUN SERPL-MCNC: 25.3 MG/DL (ref 8–23)
CALCIUM SERPL-MCNC: 9.8 MG/DL (ref 8.8–10.2)
CHLORIDE SERPL-SCNC: 106 MMOL/L (ref 98–107)
CHOLEST SERPL-MCNC: 141 MG/DL
CREAT SERPL-MCNC: 1.05 MG/DL (ref 0.67–1.17)
DEPRECATED HCO3 PLAS-SCNC: 25 MMOL/L (ref 22–29)
EOSINOPHIL # BLD AUTO: 0.2 10E3/UL (ref 0–0.7)
EOSINOPHIL NFR BLD AUTO: 3 %
ERYTHROCYTE [DISTWIDTH] IN BLOOD BY AUTOMATED COUNT: 13.8 % (ref 10–15)
GFR SERPL CREATININE-BSD FRML MDRD: 77 ML/MIN/1.73M2
GLUCOSE SERPL-MCNC: 92 MG/DL (ref 70–99)
HCT VFR BLD AUTO: 39.6 % (ref 40–53)
HDLC SERPL-MCNC: 40 MG/DL
HGB BLD-MCNC: 13 G/DL (ref 13.3–17.7)
IMM GRANULOCYTES # BLD: 0.2 10E3/UL
IMM GRANULOCYTES NFR BLD: 3 %
LDLC SERPL CALC-MCNC: 71 MG/DL
LYMPHOCYTES # BLD AUTO: 1.4 10E3/UL (ref 0.8–5.3)
LYMPHOCYTES NFR BLD AUTO: 25 %
MCH RBC QN AUTO: 28.1 PG (ref 26.5–33)
MCHC RBC AUTO-ENTMCNC: 32.8 G/DL (ref 31.5–36.5)
MCV RBC AUTO: 86 FL (ref 78–100)
MONOCYTES # BLD AUTO: 0.6 10E3/UL (ref 0–1.3)
MONOCYTES NFR BLD AUTO: 10 %
NEUTROPHILS # BLD AUTO: 3.5 10E3/UL (ref 1.6–8.3)
NEUTROPHILS NFR BLD AUTO: 59 %
NONHDLC SERPL-MCNC: 101 MG/DL
NRBC # BLD AUTO: 0 10E3/UL
NRBC BLD AUTO-RTO: 0 /100
PLATELET # BLD AUTO: 174 10E3/UL (ref 150–450)
POTASSIUM SERPL-SCNC: 4.7 MMOL/L (ref 3.4–5.3)
RBC # BLD AUTO: 4.63 10E6/UL (ref 4.4–5.9)
SODIUM SERPL-SCNC: 143 MMOL/L (ref 136–145)
TRIGL SERPL-MCNC: 148 MG/DL
WBC # BLD AUTO: 5.8 10E3/UL (ref 4–11)

## 2023-05-09 ENCOUNTER — TRANSFERRED RECORDS (OUTPATIENT)
Dept: HEALTH INFORMATION MANAGEMENT | Facility: CLINIC | Age: 68
End: 2023-05-09

## 2023-05-18 ENCOUNTER — TRANSFERRED RECORDS (OUTPATIENT)
Dept: HEALTH INFORMATION MANAGEMENT | Facility: CLINIC | Age: 68
End: 2023-05-18
Payer: COMMERCIAL

## 2023-05-30 ENCOUNTER — TELEPHONE (OUTPATIENT)
Dept: FAMILY MEDICINE | Facility: CLINIC | Age: 68
End: 2023-05-30
Payer: COMMERCIAL

## 2023-05-30 NOTE — TELEPHONE ENCOUNTER
PAL received incoming call from pt  -Pt wanting to discuss weight loss and possibly starting Wegovy or Ozempic with Dr. Noel  -Advised appt needed, scheduled pt for virtual visit tomorrow 5/31 at 10:00am with Dr. Noel    Patient was given an opportunity to ask questions, verbalized understanding of plan, and is agreeable.    Jazmin RUDD RN  Patient Advocate Liaison - PAL RN  [facility]  [phone]

## 2023-05-30 NOTE — TELEPHONE ENCOUNTER
PAL attempted (attempt #1) to contact pt, no answer  -Left  requesting call back [phone], awaiting call back at this time    PAL will attempt to contact again tomorrow 5/31 if no response    Jazmin RUDD RN  Patient Advocate Liaison - PAL RN  [facility]  [phone]

## 2023-05-30 NOTE — TELEPHONE ENCOUNTER
Patient calling asking to speak to Tara. I informed patient she was on the phone and if there is something I can help with. He states it is regarding a new medication and hhe has to explain it to Bow. Please call patient.

## 2023-05-31 ENCOUNTER — VIRTUAL VISIT (OUTPATIENT)
Dept: FAMILY MEDICINE | Facility: CLINIC | Age: 68
End: 2023-05-31
Payer: COMMERCIAL

## 2023-05-31 DIAGNOSIS — E66.811 CLASS 1 OBESITY DUE TO EXCESS CALORIES WITHOUT SERIOUS COMORBIDITY IN ADULT, UNSPECIFIED BMI: Primary | ICD-10-CM

## 2023-05-31 DIAGNOSIS — R31.0 GROSS HEMATURIA: ICD-10-CM

## 2023-05-31 DIAGNOSIS — E66.09 CLASS 1 OBESITY DUE TO EXCESS CALORIES WITHOUT SERIOUS COMORBIDITY IN ADULT, UNSPECIFIED BMI: Primary | ICD-10-CM

## 2023-05-31 PROCEDURE — 99214 OFFICE O/P EST MOD 30 MIN: CPT | Mod: VID | Performed by: FAMILY MEDICINE

## 2023-05-31 NOTE — PROGRESS NOTES
"Naveen is a 68 year old who is being evaluated via a billable video visit.  Video dysfunctional.  Converted to telephone visit    How would you like to obtain your AVS? Judith  If the video visit is dropped, the invitation should be resent by: Judith  Will anyone else be joining your video visit? No            Subjective   Naveen is a 68 year old, presenting for the following health issues:  Weight Loss        5/1/2023     4:52 PM   Additional Questions   Roomed by Andra SMITH     History of Present Illness       Reason for visit:  Diet pills  Symptom progression:  Staying the same    He eats 0-1 servings of fruits and vegetables daily.He consumes 0 sweetened beverage(s) daily.He exercises with enough effort to increase his heart rate 20 to 29 minutes per day.    He is taking medications regularly.     He finds that he is having trouble tying his shoes over his panniculus  Patient would like to discuss weight loss      How many servings of fruits and vegetables do you eat daily?  0-1    On average, how many sweetened beverages do you drink each day (Examples: soda, juice, sweet tea, etc.  Do NOT count diet or artificially sweetened beverages)?   1    How many days per week do you exercise enough to make your heart beat faster? 4    How many minutes a day do you exercise enough to make your heart beat faster? 30 - 60    How many days per week do you miss taking your medication? 0  Review of Systems   No systemic symptoms.  Some friends have mentioned semaglutide therapy      Objective    Vitals - Patient Reported  Systolic (Patient Reported):  (Patient not able to take today)  Diastolic (Patient Reported):  (Patient not able to take today)  Weight (Patient Reported): 100.2 kg (221 lb)  Height (Patient Reported): 175.3 cm (5' 9\")  BMI (Based on Pt Reported Ht/Wt): 32.64  SpO2 (Patient Reported):  (Patient not able to take today)  Temperature (Patient Reported):  (Patient not able to take today)  Pulse (Patient Reported):  " (Patient not able to take today)      Vitals:  No vitals were obtained today due to virtual visit.    Physical Exam   Patient in no distress        Problem List Items Addressed This Visit     Class 1 obesity due to excess calories without serious comorbidity in adult - Primary     Patient has nonmorbid obesity.  He would like to try medical weight loss therapy.  We have previously discussed the difficulties of weight loss in his age group         Relevant Medications    semaglutide (OZEMPIC) 2 MG/3ML pen    Gross hematuria     He has an appointment for cystoscopy, but has not had gross hematuria for a while.  He wonders if this should be pursued further.  I recommend that he follow through                  Video-Visit Details    Type of service:  Telephone visit  Video Start Time: 10:13 AM      Originating Location (pt. Location): Home  Distant Location (provider location):  On-site  Platform used for Video Visit: Carmita Noel MD

## 2023-05-31 NOTE — ASSESSMENT & PLAN NOTE
He has an appointment for cystoscopy, but has not had gross hematuria for a while.  He wonders if this should be pursued further.  I recommend that he follow through   None known

## 2023-05-31 NOTE — ASSESSMENT & PLAN NOTE
Patient has nonmorbid obesity.  He would like to try medical weight loss therapy.  We have previously discussed the difficulties of weight loss in his age group

## 2023-06-01 ENCOUNTER — OFFICE VISIT (OUTPATIENT)
Dept: UROLOGY | Facility: CLINIC | Age: 68
End: 2023-06-01
Payer: COMMERCIAL

## 2023-06-01 ENCOUNTER — TELEPHONE (OUTPATIENT)
Dept: UROLOGY | Facility: CLINIC | Age: 68
End: 2023-06-01

## 2023-06-01 VITALS
BODY MASS INDEX: 32.58 KG/M2 | WEIGHT: 220 LBS | SYSTOLIC BLOOD PRESSURE: 160 MMHG | DIASTOLIC BLOOD PRESSURE: 76 MMHG | HEIGHT: 69 IN

## 2023-06-01 DIAGNOSIS — N35.912 BULBOUS URETHRAL STRICTURE: ICD-10-CM

## 2023-06-01 DIAGNOSIS — N52.01 ERECTILE DYSFUNCTION DUE TO ARTERIAL INSUFFICIENCY: ICD-10-CM

## 2023-06-01 DIAGNOSIS — N40.1 BENIGN PROSTATIC HYPERPLASIA WITH LOWER URINARY TRACT SYMPTOMS, SYMPTOM DETAILS UNSPECIFIED: ICD-10-CM

## 2023-06-01 DIAGNOSIS — R31.0 GROSS HEMATURIA: Primary | ICD-10-CM

## 2023-06-01 DIAGNOSIS — R39.14 FEELING OF INCOMPLETE BLADDER EMPTYING: ICD-10-CM

## 2023-06-01 PROCEDURE — 52000 CYSTOURETHROSCOPY: CPT | Performed by: STUDENT IN AN ORGANIZED HEALTH CARE EDUCATION/TRAINING PROGRAM

## 2023-06-01 PROCEDURE — 99214 OFFICE O/P EST MOD 30 MIN: CPT | Mod: 25 | Performed by: STUDENT IN AN ORGANIZED HEALTH CARE EDUCATION/TRAINING PROGRAM

## 2023-06-01 RX ORDER — TAMSULOSIN HYDROCHLORIDE 0.4 MG/1
0.4 CAPSULE ORAL EVERY EVENING
Qty: 90 CAPSULE | Refills: 3 | Status: SHIPPED | OUTPATIENT
Start: 2023-06-01 | End: 2024-02-05

## 2023-06-01 RX ORDER — LIDOCAINE HYDROCHLORIDE 20 MG/ML
JELLY TOPICAL ONCE
Status: COMPLETED | OUTPATIENT
Start: 2023-06-01 | End: 2023-06-01

## 2023-06-01 RX ADMIN — LIDOCAINE HYDROCHLORIDE: 20 JELLY TOPICAL at 11:04

## 2023-06-01 ASSESSMENT — PAIN SCALES - GENERAL: PAINLEVEL: NO PAIN (0)

## 2023-06-01 NOTE — LETTER
"6/1/2023       RE: Kashmir Yao  [address] [facility] [address]     Dear Colleague,    Thank you for referring your patient, Kashmir Yao, to the [facility] [place] at [facility]. Please see a copy of my visit note below.    CHIEF COMPLAINT   Kashmir Yao who is a 68 year old male returns today for follow-up of ED, LUTS, gross hematuria.      HPI   Kashmir Yao is a 68 year old male returns today for follow-up of ED, LUTS, gross hematuria.      Has not had further gross hematuria recently. LUTS persist including dysuria. Also dark urine in the AM then lighter throughout the day    Re: ED, trimix is getting tumescence but not as rigid as he had hoped for. He is not interested in vacuum erection device    PHYSICAL EXAM  Patient is a 68 year old  male   Vitals: Blood pressure (!) 160/76, height 1.753 m (5' 9\"), weight 99.8 kg (220 lb).  Body mass index is 32.49 kg/m .  General Appearance Adult:   Alert, no acute distress, oriented  HENT: throat/mouth:normal, good dentition  Lungs: no respiratory distress, or pursed lip breathing  Heart: No obvious jugular venous distension present  Abdomen: nondistended  Musculoskeltal: extremities normal, no peripheral edema  Skin: no suspicious lesions or rashes  Neuro: Alert, oriented, speech and mentation normal  Psych: affect and mood normal  Gait: Normal    All pertinent imaging reviewed:    Ct urogram 4/18/2023  PRESSION:  1.  No cause for hematuria is identified. No urinary calculi or urinary tract masses.  2.  Mild splenomegaly, unchanged.  3.  The appendix is mildly thickened at 1 cm, with no significant periappendiceal inflammatory stranding. Acute appendicitis is considered unlikely from this appearance, however, clinical correlation and follow-up are recommended.    PRE-PROCEDURE DIAGNOSIS: LUTS    POST-PROCEDURE DIAGNOSIS: LUTS, bulbar urethral stricture    PROCEDURE: " Cystoscopy     DESCRIPTION OF PROCEDURE: After informed consent was obtained, the patient was brought to the procedure room where he was placed in the supine position with all pressure points well padded.  The penis was prepped and draped in sterile fashion. A flexible cystoscope was introduced through a well-lubricated urethra.     The penile urethra was normal. The bulbar urethra had a stricture which was a thin membrane with an opening estimated at about 14 Fr. I gently passed the flexible cystoscope through the opening. The prostatic urethra was about 3 cm with moderate bilobar obstructive hypertrophy. There is moderate circumferential intravesical protrusion. Bladder mildly trabeculated without cellules or diverticulae    The flexible cystoscope was removed and the findings were described to the patient. He had significant discomfort with passage of the scope through the prostatic urethra      ASSESSMENT and PLAN  68 year old male returns today for follow-up of ED, LUTS, gross hematuria, found to have bulbar urethral stricture on office cystoscopy    CT urogram w/o concerning source of hematuria. Both the hematuria and worsening urinary symptoms probably attributable to a bulbar urethral stricture (very short, like a web of tissue). I passively dilated it with passage of the scope. He has a moderately obstructive prostate on cystoscopy and reasonable to continue tamsulosin. Discussed high potential for stricture recurrence. Will return 6 months for reevaluation    Re: ED, trimix is getting tumescence but not as rigid as he had hoped for. Only tried it a few times. He is not interested in vacuum erection device. We also discussed implantable penile prothesis and he is not interested. He has failed oral PDEVI in the past. I do not have further options for erectile dysfunction beyond these at this time.    - tamsulosin 0.4 mg daily refilled  - return 6 months with UA, PVR, AUA symptom score    Jose Angel Lawson MD    [facility] Urology  [facility] Phone: [phone]

## 2023-06-01 NOTE — PROGRESS NOTES
"CHIEF COMPLAINT   Kashmir Yao who is a 68 year old male returns today for follow-up of ED, LUTS, gross hematuria.      HPI   Kashmir Yao is a 68 year old male returns today for follow-up of ED, LUTS, gross hematuria.      Has not had further gross hematuria recently. LUTS persist including dysuria. Also dark urine in the AM then lighter throughout the day    Re: ED, trimix is getting tumescence but not as rigid as he had hoped for. He is not interested in vacuum erection device    PHYSICAL EXAM  Patient is a 68 year old  male   Vitals: Blood pressure (!) 160/76, height 1.753 m (5' 9\"), weight 99.8 kg (220 lb).  Body mass index is 32.49 kg/m .  General Appearance Adult:   Alert, no acute distress, oriented  HENT: throat/mouth:normal, good dentition  Lungs: no respiratory distress, or pursed lip breathing  Heart: No obvious jugular venous distension present  Abdomen: nondistended  Musculoskeltal: extremities normal, no peripheral edema  Skin: no suspicious lesions or rashes  Neuro: Alert, oriented, speech and mentation normal  Psych: affect and mood normal  Gait: Normal    All pertinent imaging reviewed:    Ct urogram 4/18/2023  PRESSION:  1.  No cause for hematuria is identified. No urinary calculi or urinary tract masses.  2.  Mild splenomegaly, unchanged.  3.  The appendix is mildly thickened at 1 cm, with no significant periappendiceal inflammatory stranding. Acute appendicitis is considered unlikely from this appearance, however, clinical correlation and follow-up are recommended.    PRE-PROCEDURE DIAGNOSIS: LUTS    POST-PROCEDURE DIAGNOSIS: LUTS, bulbar urethral stricture    PROCEDURE: Cystoscopy     DESCRIPTION OF PROCEDURE: After informed consent was obtained, the patient was brought to the procedure room where he was placed in the supine position with all pressure points well padded.  The penis was prepped and draped in sterile fashion. A flexible cystoscope was introduced through a well-lubricated " urethra.     The penile urethra was normal. The bulbar urethra had a stricture which was a thin membrane with an opening estimated at about 14 Fr. I gently passed the flexible cystoscope through the opening. The prostatic urethra was about 3 cm with moderate bilobar obstructive hypertrophy. There is moderate circumferential intravesical protrusion. Bladder mildly trabeculated without cellules or diverticulae    The flexible cystoscope was removed and the findings were described to the patient. He had significant discomfort with passage of the scope through the prostatic urethra      ASSESSMENT and PLAN  68 year old male returns today for follow-up of ED, LUTS, gross hematuria, found to have bulbar urethral stricture on office cystoscopy    CT urogram w/o concerning source of hematuria. Both the hematuria and worsening urinary symptoms probably attributable to a bulbar urethral stricture (very short, like a web of tissue). I passively dilated it with passage of the scope. He has a moderately obstructive prostate on cystoscopy and reasonable to continue tamsulosin. Discussed high potential for stricture recurrence. Will return 6 months for reevaluation    Re: ED, trimix is getting tumescence but not as rigid as he had hoped for. Only tried it a few times. He is not interested in vacuum erection device. We also discussed implantable penile prothesis and he is not interested. He has failed oral PDEVI in the past. I do not have further options for erectile dysfunction beyond these at this time.    - tamsulosin 0.4 mg daily refilled  - return 6 months with UA, PVR, AUA symptom score      Jose Angel Lawson MD   [facility] Urology  [facility] Phone: [phone]

## 2023-06-01 NOTE — PATIENT INSTRUCTIONS

## 2023-06-01 NOTE — NURSING NOTE
Chief Complaint   Patient presents with     Hematuria     Prior to the start of the procedure and with procedural staff participation, I verbally confirmed the patient s identity using two indicators, relevant allergies, that the procedure was appropriate and matched the consent or emergent situation, and that the correct equipment/implants were available. Immediately prior to starting the procedure I conducted the Time Out with the procedural staff and re-confirmed the patient s name, procedure, and site/side. I have wiped the patient off with the povidone-Iodine solution, draped them, and used Lidocaine hydrochloride jelly. (The Joint Commission universal protocol was followed.)  Yes    Sedation (Moderate or Deep): None    5mL 2% lidocaine hydrochloride Urojet instilled into urethra.    NDC# [address]-4  Lot #: [id number]  Expiration Date:  11/24    Bina Bella EMT

## 2023-06-01 NOTE — TELEPHONE ENCOUNTER
[facility] Call Center    Phone Message    May a detailed message be left on voicemail: yes     Reason for Call: Symptoms or Concerns     If patient has red-flag symptoms, warm transfer to triage line    Current symptom or concern: Patient is calling with post cysto blood in urine and difficulty urinating. Would like to know if this is normal. Please reach out. Thank you    Symptoms have been present for: 4 hour(s)    Has patient previously been seen for this? No          Action Taken: Message routed to:  [facility] URO    Travel Screening: Not Applicable

## 2023-06-01 NOTE — TELEPHONE ENCOUNTER
Reassured pt that burning and slight bleeding after cysto/dilation is normal. Push fluids.  Mercedez Hunter, MADHUN

## 2023-06-19 DIAGNOSIS — K59.09 OTHER CONSTIPATION: ICD-10-CM

## 2023-06-19 NOTE — TELEPHONE ENCOUNTER
Medication Question or Refill    Contacts       Type Contact Phone/Fax    06/19/2023 09:22 AM CDT Phone (Incoming) Naveen Yao CANDELARIA (Self) [phone] (M)          What medication are you calling about (include dose and sig)?: VICKI     Preferred Pharmacy:  [id number] [place], [place] - [address] AT [facility] [address]  [address] [facility] [address]  Phone: [phone] Fax: [phone]    Controlled Substance Agreement on file:   CSA -- Patient Level:    CSA: None found at the patient level.       Who prescribed the medication?: JAGDISH GILBERT MD    Do you need a refill? Yes    When did you use the medication last? 6/14/23    Patient offered an appointment? No    Do you have any questions or concerns?  No      Could we send this information to you in [facility] or would you prefer to receive a phone call?:   Patient would prefer a phone call   Okay to leave a detailed message?: Yes at Cell number on file:    Telephone Information:   Mobile [phone]

## 2023-06-22 ENCOUNTER — TRANSFERRED RECORDS (OUTPATIENT)
Dept: HEALTH INFORMATION MANAGEMENT | Facility: CLINIC | Age: 68
End: 2023-06-22
Payer: COMMERCIAL

## 2023-06-23 DIAGNOSIS — E78.5 HYPERLIPIDEMIA LDL GOAL <70: ICD-10-CM

## 2023-06-26 ENCOUNTER — OFFICE VISIT (OUTPATIENT)
Dept: FAMILY MEDICINE | Facility: CLINIC | Age: 68
End: 2023-06-26
Payer: COMMERCIAL

## 2023-06-26 VITALS
OXYGEN SATURATION: 96 % | HEART RATE: 66 BPM | WEIGHT: 211 LBS | TEMPERATURE: 97.7 F | DIASTOLIC BLOOD PRESSURE: 78 MMHG | RESPIRATION RATE: 14 BRPM | SYSTOLIC BLOOD PRESSURE: 124 MMHG | BODY MASS INDEX: 31.25 KG/M2 | HEIGHT: 69 IN

## 2023-06-26 DIAGNOSIS — S90.212A CONTUSION OF LEFT GREAT TOE WITH DAMAGE TO NAIL, INITIAL ENCOUNTER: ICD-10-CM

## 2023-06-26 DIAGNOSIS — E66.811 CLASS 1 OBESITY DUE TO EXCESS CALORIES WITHOUT SERIOUS COMORBIDITY IN ADULT, UNSPECIFIED BMI: ICD-10-CM

## 2023-06-26 DIAGNOSIS — M25.331: ICD-10-CM

## 2023-06-26 DIAGNOSIS — Z00.00 ENCOUNTER FOR MEDICARE ANNUAL WELLNESS EXAM: Primary | ICD-10-CM

## 2023-06-26 DIAGNOSIS — G40.309 GENERALIZED TONIC CLONIC EPILEPSY (H): ICD-10-CM

## 2023-06-26 DIAGNOSIS — L03.032 PARONYCHIA OF TOE, LEFT: ICD-10-CM

## 2023-06-26 DIAGNOSIS — E66.09 CLASS 1 OBESITY DUE TO EXCESS CALORIES WITHOUT SERIOUS COMORBIDITY IN ADULT, UNSPECIFIED BMI: ICD-10-CM

## 2023-06-26 DIAGNOSIS — K21.9 GASTROESOPHAGEAL REFLUX DISEASE WITHOUT ESOPHAGITIS: ICD-10-CM

## 2023-06-26 DIAGNOSIS — G43.709 CHRONIC MIGRAINE WITHOUT AURA WITHOUT STATUS MIGRAINOSUS, NOT INTRACTABLE: ICD-10-CM

## 2023-06-26 DIAGNOSIS — E78.5 HYPERLIPIDEMIA LDL GOAL <70: ICD-10-CM

## 2023-06-26 DIAGNOSIS — Z23 NEED FOR SECOND BOOSTER DOSE OF COVID-19 VACCINE: ICD-10-CM

## 2023-06-26 DIAGNOSIS — M16.0 PRIMARY OSTEOARTHRITIS OF BOTH HIPS: ICD-10-CM

## 2023-06-26 DIAGNOSIS — Z23 ENCOUNTER FOR IMMUNIZATION: ICD-10-CM

## 2023-06-26 DIAGNOSIS — H61.22 IMPACTED CERUMEN OF LEFT EAR: ICD-10-CM

## 2023-06-26 DIAGNOSIS — G47.33 OSA (OBSTRUCTIVE SLEEP APNEA): ICD-10-CM

## 2023-06-26 PROCEDURE — 91312 COVID-19 BIVALENT 12+ (PFIZER): CPT | Performed by: FAMILY MEDICINE

## 2023-06-26 PROCEDURE — 69209 REMOVE IMPACTED EAR WAX UNI: CPT | Mod: LT | Performed by: FAMILY MEDICINE

## 2023-06-26 PROCEDURE — 10060 I&D ABSCESS SIMPLE/SINGLE: CPT | Performed by: FAMILY MEDICINE

## 2023-06-26 PROCEDURE — 0121A COVID-19 BIVALENT 12+ (PFIZER): CPT | Performed by: FAMILY MEDICINE

## 2023-06-26 PROCEDURE — G0439 PPPS, SUBSEQ VISIT: HCPCS | Performed by: FAMILY MEDICINE

## 2023-06-26 RX ORDER — CEPHALEXIN 500 MG/1
500 CAPSULE ORAL 2 TIMES DAILY
Qty: 20 CAPSULE | Refills: 0 | Status: SHIPPED | OUTPATIENT
Start: 2023-06-26 | End: 2023-09-28

## 2023-06-26 RX ORDER — RIZATRIPTAN BENZOATE 10 MG/1
TABLET ORAL
Qty: 15 TABLET | Refills: 1 | Status: SHIPPED | OUTPATIENT
Start: 2023-06-26 | End: 2023-10-09

## 2023-06-26 RX ORDER — SIMVASTATIN 40 MG
40 TABLET ORAL AT BEDTIME
Qty: 90 TABLET | Refills: 3 | Status: ON HOLD | OUTPATIENT
Start: 2023-06-26 | End: 2024-06-11

## 2023-06-26 RX ORDER — NAPROXEN 500 MG/1
TABLET ORAL
Qty: 180 TABLET | Refills: 1 | Status: SHIPPED | OUTPATIENT
Start: 2023-06-26 | End: 2023-09-07 | Stop reason: ALTCHOICE

## 2023-06-26 RX ORDER — OMEPRAZOLE 40 MG/1
40 CAPSULE, DELAYED RELEASE ORAL DAILY
Qty: 90 CAPSULE | Refills: 1 | Status: SHIPPED | OUTPATIENT
Start: 2023-06-26 | End: 2023-12-19

## 2023-06-26 ASSESSMENT — ACTIVITIES OF DAILY LIVING (ADL): CURRENT_FUNCTION: NO ASSISTANCE NEEDED

## 2023-06-26 NOTE — PROGRESS NOTES
Patient identified using two patient identifiers.  Ear exam showing wax occlusion completed by provider.  Solution: warm water was placed in the left ear(s) via irrigation tool: elephant ear.      Andra Villarreal [facility]

## 2023-06-26 NOTE — PATIENT INSTRUCTIONS
Patient Education   Personalized Prevention Plan  You are due for the preventive services outlined below.  Your care team is available to assist you in scheduling these services.  If you have already completed any of these items, please share that information with your care team to update in your medical record.  Health Maintenance Due   Topic Date Due    Zoster (Shingles) Vaccine (1 of 2) Never done    COVID-19 Vaccine (6 - Pfizer series) 04/22/2023    FALL RISK ASSESSMENT  06/23/2023    Annual Wellness Visit  06/23/2023          Shingles shot at pharmacy    Contact airline about refrigerated medicines

## 2023-06-26 NOTE — PROGRESS NOTES
Prior to immunization administration, verified patients identity using patient s name and date of birth. Please see Immunization Activity for additional information.     Screening Questionnaire for Adult Immunization    Are you sick today?   No   Do you have allergies to medications, food, a vaccine component or latex?   YES   Have you ever had a serious reaction after receiving a vaccination?   No   Do you have a long-term health problem with heart, lung, kidney, or metabolic disease (e.g., diabetes), asthma, a blood disorder, no spleen, complement component deficiency, a cochlear implant, or a spinal fluid leak?  Are you on long-term aspirin therapy?   No   Do you have cancer, leukemia, HIV/AIDS, or any other immune system problem?   No   Do you have a parent, brother, or sister with an immune system problem?   No   In the past 3 months, have you taken medications that affect  your immune system, such as prednisone, other steroids, or anticancer drugs; drugs for the treatment of rheumatoid arthritis, Crohn s disease, or psoriasis; or have you had radiation treatments?   No   Have you had a seizure, or a brain or other nervous system problem?   No   During the past year, have you received a transfusion of blood or blood    products, or been given immune (gamma) globulin or antiviral drug?   No   For women: Are you pregnant or is there a chance you could become       pregnant during the next month?   No   Have you received any vaccinations in the past 4 weeks?   No     Immunization questionnaire answers were all negative, but one, Provider notified.       Patient instructed to remain in clinic for 15 minutes afterwards, and to report any adverse reactions.     Screening performed by Andra Villarreal CMA on 6/26/2023 at 3:35 PM.

## 2023-06-27 ENCOUNTER — ANCILLARY PROCEDURE (OUTPATIENT)
Dept: GENERAL RADIOLOGY | Facility: CLINIC | Age: 68
End: 2023-06-27
Attending: FAMILY MEDICINE
Payer: COMMERCIAL

## 2023-06-27 DIAGNOSIS — L03.032 PARONYCHIA OF TOE, LEFT: ICD-10-CM

## 2023-06-27 DIAGNOSIS — S90.212A CONTUSION OF LEFT GREAT TOE WITH DAMAGE TO NAIL, INITIAL ENCOUNTER: ICD-10-CM

## 2023-06-27 PROCEDURE — 73630 X-RAY EXAM OF FOOT: CPT | Mod: TC | Performed by: RADIOLOGY

## 2023-06-27 NOTE — ASSESSMENT & PLAN NOTE
2 weeks ago, left foot with bunion deformity and hallux major discomfort over the distal phalanx.  Also a paronychia.  After informed consent is obtained, with Betadine prep, this is opened removing granulomatous tissue no blood loss.  Tolerated well

## 2023-06-27 NOTE — ASSESSMENT & PLAN NOTE
This has been surgically addressed.  He is wearing a light none supportive sleeve.  He describes some residual

## 2023-06-27 NOTE — ASSESSMENT & PLAN NOTE
His sleep provider [facility], suggested an in-house sleep study, his previous study at home.  His device is old, needs replacement.  I will refer to his sleep provider.  Discussed

## 2023-06-27 NOTE — ASSESSMENT & PLAN NOTE
Semaglutide therapy.  It is time to increase his dose.  However, he is traveling overseas.  This needs to be refrigerated, and his ability to manage that in transport is unclear.  Discussed at length.  Reassess upon his return

## 2023-06-28 RX ORDER — SIMVASTATIN 40 MG
TABLET ORAL
Qty: 90 TABLET | Refills: 1 | OUTPATIENT
Start: 2023-06-28

## 2023-06-30 ENCOUNTER — PATIENT OUTREACH (OUTPATIENT)
Dept: FAMILY MEDICINE | Facility: CLINIC | Age: 68
End: 2023-06-30
Payer: COMMERCIAL

## 2023-06-30 DIAGNOSIS — L03.032 PARONYCHIA OF TOE, LEFT: Primary | ICD-10-CM

## 2023-06-30 DIAGNOSIS — F51.01 PRIMARY INSOMNIA: ICD-10-CM

## 2023-06-30 NOTE — TELEPHONE ENCOUNTER
"RULA received incoming call from pt  -Pt wondering foot x-ray results from 6/27  -PAL relayed x-ray impression to pt,  \"IMPRESSION: Hallux valgus and bunion deformity with degenerative  change MTP joint. No evidence for fracture.\"   -Pt states that left great toe seems to be improving, but is still experiencing on/off discomfort  -Reports he will be leaving on Monday 7/3 night to go on vacation for 2 months, wondering if Dr. Noel has any further recommendations for care of toe, states \"I don't want the problem to linger.\"  -Pt states he will also be contacting his pharmacy to request early refills of some medications, wants to ensure he will not run out while on vacation    Routing to Dr. Noel to review and advise    Jazmin RUDD RN  Patient Advocate Liaison - PAL RN  [facility]  [phone]    "

## 2023-06-30 NOTE — TELEPHONE ENCOUNTER
PAL received incoming call from pt   -Pt contacted his pharmacy to get early refills of some medications - leaving on Monday 7/3 night for 2 month vacation, states the only med he needs reordered is Trazodone  -PAL notes Trazodone discontinued due to pt reported not taking 2/10/23    traZODone (DESYREL) 50 MG tablet (Discontinued) 180 tablet 2 8/18/2022 4/10/2023 No   Sig: TAKE 2 TABLETS(100 MG) BY MOUTH AT BEDTIME   Patient not taking: Reported on 2/10/2023          -Pt states that he never stopped taking medication, requesting Dr. Noel reorder    Rx pended, routing to Dr. Noel to review and advise    Jazmin RUDD RN  Patient Advocate Liaison - PAL RN  [facility]  [phone]

## 2023-07-02 RX ORDER — TRAZODONE HYDROCHLORIDE 50 MG/1
100 TABLET, FILM COATED ORAL AT BEDTIME
Qty: 180 TABLET | Refills: 3 | Status: SHIPPED | OUTPATIENT
Start: 2023-07-02 | End: 2024-06-25

## 2023-07-03 RX ORDER — ACETAMINOPHEN AND CODEINE PHOSPHATE 300; 30 MG/1; MG/1
1 TABLET ORAL EVERY 6 HOURS PRN
Qty: 15 TABLET | Refills: 0 | Status: SHIPPED | OUTPATIENT
Start: 2023-07-03 | End: 2023-07-03

## 2023-07-03 NOTE — TELEPHONE ENCOUNTER
"RULA called and spoke with pt   -Informed pt of trazodone rx sent to [facility]   traZODone (DESYREL) 50 MG tablet 180 tablet 3 7/2/2023  No   Sig - Route: Take 2 tablets (100 mg) by mouth At Bedtime - Oral     -Pt wondering if Dr. Noel would be willing to prescribe pain medication for toe, states that he is still experiencing on/off pain throughout the day, \"It either doesn't bother me at all or is 10/10 sharp pain. Like right now it isn't hurting, but earlier this morning I woke up because of the pain. It's sensitive to the touch too.\"  -When pain is present, pt is unable to walk   -Denies sign/sx of infection: redness, swelling, pus like drainage, fever  -PAL advised pt soak foot daily until better per Dr. Noel [employer] message sent 7/2/23  -PAL notes pt takes Naproxen daily, pt states that this is for his arthritis and does not help with toe     naproxen (NAPROSYN) 500 MG tablet 180 tablet 1 6/26/2023  No   Sig: TAKE 1 TABLET BY MOUTH EVERY 12 HOURS AS NEEDED, TAKE 2 HOURS APART FROM ANY ASPIRIN     Routing to Dr. Noel to review and advise    Jazmin RUDD RN  Patient Advocate Liaison - PAL RN  [facility]  [phone]    "

## 2023-07-03 NOTE — TELEPHONE ENCOUNTER
"RULA called and spoke with pt  -Notified of rx sent:  acetaminophen-codeine (TYLENOL #3) 300-30 MG per tablet 15 tablet 0 7/3/2023 7/6/2023 No   Sig - Route: Take 1 tablet by mouth every 6 hours as needed for severe pain - Oral     -Pt refuses to take med, states it gave him hives and rash all over body, extreme itchiness and insomnia, \"I cannot take something like that again. It was the worst 24 hours of my life.\"  -Pt requesting any other pain medication that does not include codeine or vicodin     Routing to Dr. Noel to review and advise    Jazmin RUDD, RN  Patient Advocate Liaison - PAL RN  [facility]  [phone]    "

## 2023-07-03 NOTE — TELEPHONE ENCOUNTER
PAL called and spoke with pt  -Relayed message from Dr. Noel, see previous note  -Pt would like to try voltaren gel for pain relief    Rx pended, routing to Dr. Noel to review and sign    Jazmin RUDD RN  Patient Advocate Liaison - PAL RN  [facility]  [phone]

## 2023-09-05 ENCOUNTER — PATIENT OUTREACH (OUTPATIENT)
Dept: FAMILY MEDICINE | Facility: CLINIC | Age: 68
End: 2023-09-05
Payer: COMMERCIAL

## 2023-09-05 NOTE — TELEPHONE ENCOUNTER
PAL received VM from pt today 9/5 at 1138  -Requesting call back to schedule appt with Dr. Noel  -[place]ed from [place] trip and would like to follow-up     PAL called and spoke with pt  -Pt reports he broke his left shoulder while on trip, slipped on wet stairs while coming out of swimming pool, was treated [place]- currently in sling   -Pt requesting to schedule appt with Dr. Noel asa to discuss broken shoulder, refills of Ozempic, and erectile dysfunction   -PAL scheduled pt for OV with Dr. Noel tomorrow 9/6 at 3:40pm    Patient was given an opportunity to ask questions, verbalized understanding of plan, and is agreeable.     Patient Quality Outreach Health Maintenance - PAL RN    Summary:    PAL RN contacted pt regarding overdue health maintenance     Patient is due/failing the following:       Topic Date Due    Zoster (Shingles) Vaccine (1 of 2) Never done    Flu Vaccine (1) 09/01/2023     Type of outreach:    Phone, spoke to patient/parent.      Questions for provider review:    None                                                                                     Jazmin RUDD RN  Patient Advocate Liaison - PAL RN  [facility]  [phone]

## 2023-09-05 NOTE — TELEPHONE ENCOUNTER
PAL received incoming call from pt  -Pt needing to reschedule 9/6/23 appt with Dr. Noel, states he now has podiatrist appt at 3:20pm  -PAL rescheduled pt for OV with Dr. Noel 9/7/23 at 3:40pm, 9/6 appt cancelled     Patient was given an opportunity to ask questions, verbalized understanding of plan, and is agreeable.    Jzamin RUDD RN  Patient Advocate Liaison - PAL RN  [facility]  [phone]

## 2023-09-06 ENCOUNTER — TRANSFERRED RECORDS (OUTPATIENT)
Dept: HEALTH INFORMATION MANAGEMENT | Facility: CLINIC | Age: 68
End: 2023-09-06

## 2023-09-07 ENCOUNTER — ANCILLARY PROCEDURE (OUTPATIENT)
Dept: GENERAL RADIOLOGY | Facility: CLINIC | Age: 68
End: 2023-09-07
Attending: FAMILY MEDICINE
Payer: COMMERCIAL

## 2023-09-07 ENCOUNTER — OFFICE VISIT (OUTPATIENT)
Dept: FAMILY MEDICINE | Facility: CLINIC | Age: 68
End: 2023-09-07
Payer: COMMERCIAL

## 2023-09-07 VITALS
TEMPERATURE: 97.8 F | HEART RATE: 72 BPM | SYSTOLIC BLOOD PRESSURE: 93 MMHG | DIASTOLIC BLOOD PRESSURE: 62 MMHG | RESPIRATION RATE: 18 BRPM | HEIGHT: 69 IN | OXYGEN SATURATION: 99 % | WEIGHT: 202 LBS | BODY MASS INDEX: 29.92 KG/M2

## 2023-09-07 DIAGNOSIS — S42.92XG CLOSED FRACTURE OF LEFT SHOULDER WITH DELAYED HEALING, SUBSEQUENT ENCOUNTER: ICD-10-CM

## 2023-09-07 DIAGNOSIS — E87.6 HYPOKALEMIA: ICD-10-CM

## 2023-09-07 DIAGNOSIS — I10 BENIGN ESSENTIAL HYPERTENSION: ICD-10-CM

## 2023-09-07 DIAGNOSIS — S90.212A CONTUSION OF LEFT GREAT TOE WITH DAMAGE TO NAIL, INITIAL ENCOUNTER: ICD-10-CM

## 2023-09-07 DIAGNOSIS — E66.811 CLASS 1 OBESITY DUE TO EXCESS CALORIES WITHOUT SERIOUS COMORBIDITY IN ADULT, UNSPECIFIED BMI: ICD-10-CM

## 2023-09-07 DIAGNOSIS — E66.09 CLASS 1 OBESITY DUE TO EXCESS CALORIES WITHOUT SERIOUS COMORBIDITY IN ADULT, UNSPECIFIED BMI: ICD-10-CM

## 2023-09-07 DIAGNOSIS — E61.1 LOW IRON: ICD-10-CM

## 2023-09-07 DIAGNOSIS — G47.33 OSA (OBSTRUCTIVE SLEEP APNEA): Primary | ICD-10-CM

## 2023-09-07 DIAGNOSIS — R60.0 PEDAL EDEMA: ICD-10-CM

## 2023-09-07 PROBLEM — Z00.00 ENCOUNTER FOR MEDICARE ANNUAL WELLNESS EXAM: Status: RESOLVED | Noted: 2023-06-26 | Resolved: 2023-09-07

## 2023-09-07 LAB
BASOPHILS # BLD AUTO: 0 10E3/UL (ref 0–0.2)
BASOPHILS NFR BLD AUTO: 1 %
EOSINOPHIL # BLD AUTO: 0.3 10E3/UL (ref 0–0.7)
EOSINOPHIL NFR BLD AUTO: 4 %
ERYTHROCYTE [DISTWIDTH] IN BLOOD BY AUTOMATED COUNT: 13.5 % (ref 10–15)
HCT VFR BLD AUTO: 39.6 % (ref 40–53)
HGB BLD-MCNC: 14.2 G/DL (ref 13.3–17.7)
HOLD SPECIMEN: NORMAL
IMM GRANULOCYTES # BLD: 0.2 10E3/UL
IMM GRANULOCYTES NFR BLD: 2 %
LYMPHOCYTES # BLD AUTO: 1.6 10E3/UL (ref 0.8–5.3)
LYMPHOCYTES NFR BLD AUTO: 20 %
MCH RBC QN AUTO: 29.4 PG (ref 26.5–33)
MCHC RBC AUTO-ENTMCNC: 35.9 G/DL (ref 31.5–36.5)
MCV RBC AUTO: 82 FL (ref 78–100)
MONOCYTES # BLD AUTO: 0.8 10E3/UL (ref 0–1.3)
MONOCYTES NFR BLD AUTO: 9 %
NEUTROPHILS # BLD AUTO: 5.3 10E3/UL (ref 1.6–8.3)
NEUTROPHILS NFR BLD AUTO: 65 %
PLATELET # BLD AUTO: 287 10E3/UL (ref 150–450)
RBC # BLD AUTO: 4.83 10E6/UL (ref 4.4–5.9)
WBC # BLD AUTO: 8.1 10E3/UL (ref 4–11)

## 2023-09-07 PROCEDURE — 73030 X-RAY EXAM OF SHOULDER: CPT | Mod: TC | Performed by: RADIOLOGY

## 2023-09-07 PROCEDURE — 80048 BASIC METABOLIC PNL TOTAL CA: CPT | Performed by: FAMILY MEDICINE

## 2023-09-07 PROCEDURE — 85025 COMPLETE CBC W/AUTO DIFF WBC: CPT | Performed by: FAMILY MEDICINE

## 2023-09-07 PROCEDURE — 36415 COLL VENOUS BLD VENIPUNCTURE: CPT | Performed by: FAMILY MEDICINE

## 2023-09-07 PROCEDURE — 99214 OFFICE O/P EST MOD 30 MIN: CPT | Performed by: FAMILY MEDICINE

## 2023-09-07 PROCEDURE — 73060 X-RAY EXAM OF HUMERUS: CPT | Mod: TC | Performed by: RADIOLOGY

## 2023-09-07 RX ORDER — DICLOFENAC POTASSIUM 50 MG/1
50 TABLET, FILM COATED ORAL 3 TIMES DAILY
Qty: 90 TABLET | Refills: 1 | Status: SHIPPED | OUTPATIENT
Start: 2023-09-07 | End: 2023-11-13 | Stop reason: ALTCHOICE

## 2023-09-07 RX ORDER — AMILORIDE HYDROCHLORIDE AND HYDROCHLOROTHIAZIDE 5; 50 MG/1; MG/1
1 TABLET ORAL DAILY
Qty: 90 TABLET | Refills: 1 | Status: SHIPPED | OUTPATIENT
Start: 2023-09-07 | End: 2023-10-09

## 2023-09-07 NOTE — PROGRESS NOTES
Assessment & Plan   Problem List Items Addressed This Visit       Benign essential hypertension     [place] treated. asymptomatic, blood pressure now overly controlled.  Reduce dose of combination product, eliminate beta-blocker, reassess   Amiilorode/hydrochlorothiazide added in [place] for his pedal edema,  responding well.         Closed fracture of left shoulder with delayed healing, subsequent encounter     6 weeks ago he fell in a pool in [place] injuring his left shoulder.  He reports this was x-rayed and MRI at, he was placed in a sling.  X-ray shows a nondisplaced avulsion fracture of the humeral ball which appears only partially healed.  He has an appointment next week with orthopedics.  He was placed on diclofenac which he finds more effective than previous naproxen.  Fill continue sling         Relevant Medications    diclofenac (CATAFLAM) 50 MG tablet    Other Relevant Orders    XR Shoulder Left G/E 3 Views    XR Humerus Left G/E 2 Views    Contusion of left great toe with damage to nail, initial encounter     He reports that he has scheduled surgery for some c podiatric operation.         Relevant Medications    diclofenac (CATAFLAM) 50 MG tablet    Hypokalemia     He reports that his potassium was low in [place], treated with iron.  Seek objective data         Relevant Orders    Basic metabolic panel  (Ca, Cl, CO2, Creat, Gluc, K, Na, BUN)    Low iron     He was treated with iron in [place], per his report.  Should he prove to be iron deficient, evaluation should be undertaken.  Broaden database         Relevant Orders    CBC with platelets and differential (Completed)    RIC (obstructive sleep apnea) - Primary     Patient reports awakening in his sleep with dizziness and headache.  Prior to his recent travels, sleep study was requested by pulmonology.  Symptoms are consistent with untreated RIC.  He will contact his pulmonologist.  Scripting for that conversation provided.  He needs a sleep lab study per  "previous reports         Pedal edema     He is delighted with the response of his Serbian provided dual diuretic         Relevant Medications    diclofenac (CATAFLAM) 50 MG tablet    aMILoride-hydrochlorothiazide (MODURETIC) 5-50 MG TABS per tablet                     Chad Noel MD  [facility] TRAE Hodge is a 68 year old, presenting for the following health issues:  Musculoskeletal Problem        9/7/2023     3:45 PM   Additional Questions   Roomed by Andra SMITH       Musculoskeletal Problem  The current episode started more than 1 month ago. The problem occurs constantly. The problem has been unchanged. The symptoms are aggravated by twisting.        Pain History:  When did you first notice your pain? 6 weeks ago from fall   Have you seen anyone else for your pain? No  How has your pain affected your ability to work? Not applicable  Where in your body do you have pain? Musculoskeletal problem/pain  Onset/Duration: 6 weeks ago   Description  Location: shoulder - left  Joint Swelling: No  Redness: No  Pain: YES  Warmth: No  Intensity:  moderate  Progression of Symptoms:  same  Accompanying signs and symptoms:   Fevers: No  Numbness/tingling/weakness: yes  History  Trauma to the area: YES  Recent illness:  No  Previous similar problem: No  Previous evaluation:  No  Precipitating or alleviating factors:  Aggravating factors include: lifting  Therapies tried and outcome: takes antiflamation medication that is over [place] for pain, which helps.           Review of Systems   Shoulder pain is disturbing his sleep      Objective    BP 93/62 (BP Location: Right arm, Patient Position: Sitting, Cuff Size: Adult Large)   Pulse 72   Temp 97.8  F (36.6  C) (Oral)   Resp 18   Ht 1.753 m (5' 9\")   Wt 91.6 kg (202 lb)   SpO2 99%   BMI 29.83 kg/m    Body mass index is 29.83 kg/m .  Physical Exam     No distress  Stable facial asymmetry     Results for orders placed or performed in visit " on 09/07/23   Extra Tube     Status: None    Narrative    The following orders were created for panel order Extra Tube.  Procedure                               Abnormality         Status                     ---------                               -----------         ------                     Extra Blue Top Tube[[id number]]                              Final result               Extra Red Top Tube[[id number]]                               Final result               Extra Green Top (Lithium...[[id number]]                      Final result               Extra Purple Top Tube[[id number]]                            Final result                 Please view results for these tests on the individual orders.   CBC with platelets and differential     Status: Abnormal   Result Value Ref Range    WBC Count 8.1 4.0 - 11.0 10e3/uL    RBC Count 4.83 4.40 - 5.90 10e6/uL    Hemoglobin 14.2 13.3 - 17.7 g/dL    Hematocrit 39.6 (L) 40.0 - 53.0 %    MCV 82 78 - 100 fL    MCH 29.4 26.5 - 33.0 pg    MCHC 35.9 31.5 - 36.5 g/dL    RDW 13.5 10.0 - 15.0 %    Platelet Count 287 150 - 450 10e3/uL    % Neutrophils 65 %    % Lymphocytes 20 %    % Monocytes 9 %    % Eosinophils 4 %    % Basophils 1 %    % Immature Granulocytes 2 %    Absolute Neutrophils 5.3 1.6 - 8.3 10e3/uL    Absolute Lymphocytes 1.6 0.8 - 5.3 10e3/uL    Absolute Monocytes 0.8 0.0 - 1.3 10e3/uL    Absolute Eosinophils 0.3 0.0 - 0.7 10e3/uL    Absolute Basophils 0.0 0.0 - 0.2 10e3/uL    Absolute Immature Granulocytes 0.2 <=0.4 10e3/uL   Extra Blue Top Tube     Status: None   Result Value Ref Range    Hold Specimen JIC    Extra Red Top Tube     Status: None   Result Value Ref Range    Hold Specimen JIC    Extra Green Top (Lithium Heparin) Tube     Status: None   Result Value Ref Range    Hold Specimen JIC    Extra Purple Top Tube     Status: None   Result Value Ref Range    Hold Specimen JIC    CBC with platelets and differential     Status: Abnormal    Narrative    The following  orders were created for panel order CBC with platelets and differential.  Procedure                               Abnormality         Status                     ---------                               -----------         ------                     CBC with platelets and d...[[id number]]  Abnormal            Final result                 Please view results for these tests on the individual orders.     Xr as described  Chad Noel MD

## 2023-09-08 LAB
ANION GAP SERPL CALCULATED.3IONS-SCNC: 11 MMOL/L (ref 7–15)
BUN SERPL-MCNC: 41.5 MG/DL (ref 8–23)
CALCIUM SERPL-MCNC: 9.8 MG/DL (ref 8.8–10.2)
CHLORIDE SERPL-SCNC: 100 MMOL/L (ref 98–107)
CREAT SERPL-MCNC: 1.78 MG/DL (ref 0.67–1.17)
DEPRECATED HCO3 PLAS-SCNC: 23 MMOL/L (ref 22–29)
EGFRCR SERPLBLD CKD-EPI 2021: 41 ML/MIN/1.73M2
GLUCOSE SERPL-MCNC: 78 MG/DL (ref 70–99)
POTASSIUM SERPL-SCNC: 4.8 MMOL/L (ref 3.4–5.3)
SODIUM SERPL-SCNC: 134 MMOL/L (ref 136–145)

## 2023-09-08 NOTE — ASSESSMENT & PLAN NOTE
He was treated with iron in [place], per his report.  Should he prove to be iron deficient, evaluation should be undertaken.  Broaden database

## 2023-09-08 NOTE — ASSESSMENT & PLAN NOTE
Patient reports awakening in his sleep with dizziness and headache.  Prior to his recent travels, sleep study was requested by pulmonology.  Symptoms are consistent with untreated RIC.  He will contact his pulmonologist.  Scripting for that conversation provided.  He needs a sleep lab study per previous reports

## 2023-09-08 NOTE — ASSESSMENT & PLAN NOTE
[place] treated. asymptomatic, blood pressure now overly controlled.  Reduce dose of combination product, eliminate beta-blocker, reassess   Amiilorode/hydrochlorothiazide added in [place] for his pedal edema,  responding well.

## 2023-09-08 NOTE — ASSESSMENT & PLAN NOTE
6 weeks ago he fell in a pool in [place] injuring his left shoulder.  He reports this was x-rayed and MRI at, he was placed in a sling.  X-ray shows a nondisplaced avulsion fracture of the humeral ball which appears only partially healed.  He has an appointment next week with orthopedics.  He was placed on diclofenac which he finds more effective than previous naproxen.  Fill continue sling

## 2023-09-11 NOTE — RESULT ENCOUNTER NOTE
Looks like you need more fluids. Your kidneys are not happy with you. Drink more fluids: let's check blood tests in 3 months  Chad Noel MD

## 2023-09-12 ENCOUNTER — TRANSFERRED RECORDS (OUTPATIENT)
Dept: HEALTH INFORMATION MANAGEMENT | Facility: CLINIC | Age: 68
End: 2023-09-12
Payer: COMMERCIAL

## 2023-09-12 DIAGNOSIS — K21.9 GASTROESOPHAGEAL REFLUX DISEASE WITHOUT ESOPHAGITIS: ICD-10-CM

## 2023-09-13 RX ORDER — OMEPRAZOLE 40 MG/1
40 CAPSULE, DELAYED RELEASE ORAL DAILY
Qty: 90 CAPSULE | Refills: 1 | OUTPATIENT
Start: 2023-09-13

## 2023-09-13 NOTE — TELEPHONE ENCOUNTER
Patient should have refill available at requesting pharmacy.     Malgorzata GARCIAS RN, BSN, PHN  [facility]  [phone]

## 2023-09-19 ENCOUNTER — PATIENT OUTREACH (OUTPATIENT)
Dept: FAMILY MEDICINE | Facility: CLINIC | Age: 68
End: 2023-09-19
Payer: COMMERCIAL

## 2023-09-19 NOTE — TELEPHONE ENCOUNTER
PAL received VM from pt today 9/19 at 1617    PAL called and spoke with pt  -Reviewed 9/7/23 OV notes and AVS with pt     diclofenac (CATAFLAM) 50 MG tablet 90 tablet 1 9/7/2023  No   Sig - Route: Take 1 tablet (50 mg) by mouth 3 times daily - Oral     aMILoride-hydrochlorothiazide (MODURETIC) 5-50 MG TABS per tablet 90 tablet 1 9/7/2023  No   Sig - Route: Take 1 tablet by mouth daily - Oral     -Scheduled pt for HTN follow-up appt with Dr. Noel 10/5/23, pt requesting to receive flu and covid immunization at that time   -Pt states he is scheduled for foot surgery 10/27/23, general anesthesia, no pre-op appt scheduled  -PAL advised pt contact podiatry team and ask if pre-op appt is required prior to surgery, pt will call back and schedule if need be    Patient was given an opportunity to ask questions, verbalized understanding of plan, and is agreeable.    Jazmin RUDD RN  Patient Advocate Liaison - PAL RN  [facility]  [phone]

## 2023-09-21 NOTE — TELEPHONE ENCOUNTER
PAL received VM from pt yesterday 9/20 at 1749  -Pt contacted podiatry surgical team, is required to complete pre-op appt with PCP prior to DOS 10/27/23  -Requesting call back for assistance with scheduling    PAL attempted (attempt #1) to contact pt  -Left VM requesting call back [phone], awaiting call back at this time    PAL will attempt to contact again tomorrow 9/22 if no response    Jazmin RUDD RN  Patient Advocate Liaison - PAL RN  [facility]  [phone]

## 2023-09-28 ENCOUNTER — OFFICE VISIT (OUTPATIENT)
Dept: FAMILY MEDICINE | Facility: CLINIC | Age: 68
End: 2023-09-28
Payer: COMMERCIAL

## 2023-09-28 VITALS
WEIGHT: 198 LBS | OXYGEN SATURATION: 99 % | BODY MASS INDEX: 29.33 KG/M2 | SYSTOLIC BLOOD PRESSURE: 96 MMHG | TEMPERATURE: 97.8 F | HEART RATE: 84 BPM | DIASTOLIC BLOOD PRESSURE: 66 MMHG | HEIGHT: 69 IN | RESPIRATION RATE: 18 BRPM

## 2023-09-28 DIAGNOSIS — Z01.818 PREOP GENERAL PHYSICAL EXAM: Primary | ICD-10-CM

## 2023-09-28 DIAGNOSIS — G47.09 OTHER INSOMNIA: ICD-10-CM

## 2023-09-28 DIAGNOSIS — Z23 ENCOUNTER FOR IMMUNIZATION: ICD-10-CM

## 2023-09-28 DIAGNOSIS — Z23 NEED FOR COVID-19 VACCINE: ICD-10-CM

## 2023-09-28 PROBLEM — I25.10 CORONARY ARTERY DISEASE DUE TO LIPID RICH PLAQUE: Status: ACTIVE | Noted: 2021-04-21

## 2023-09-28 PROBLEM — I25.83 CORONARY ARTERY DISEASE DUE TO LIPID RICH PLAQUE: Status: ACTIVE | Noted: 2021-04-21

## 2023-09-28 LAB
BASOPHILS # BLD AUTO: 0 10E3/UL (ref 0–0.2)
BASOPHILS NFR BLD AUTO: 1 %
EOSINOPHIL # BLD AUTO: 0.3 10E3/UL (ref 0–0.7)
EOSINOPHIL NFR BLD AUTO: 5 %
ERYTHROCYTE [DISTWIDTH] IN BLOOD BY AUTOMATED COUNT: 13.3 % (ref 10–15)
HCT VFR BLD AUTO: 38.5 % (ref 40–53)
HGB BLD-MCNC: 13.6 G/DL (ref 13.3–17.7)
IMM GRANULOCYTES # BLD: 0.3 10E3/UL
IMM GRANULOCYTES NFR BLD: 4 %
LYMPHOCYTES # BLD AUTO: 1.5 10E3/UL (ref 0.8–5.3)
LYMPHOCYTES NFR BLD AUTO: 23 %
MCH RBC QN AUTO: 29 PG (ref 26.5–33)
MCHC RBC AUTO-ENTMCNC: 35.3 G/DL (ref 31.5–36.5)
MCV RBC AUTO: 82 FL (ref 78–100)
MONOCYTES # BLD AUTO: 0.6 10E3/UL (ref 0–1.3)
MONOCYTES NFR BLD AUTO: 9 %
NEUTROPHILS # BLD AUTO: 3.7 10E3/UL (ref 1.6–8.3)
NEUTROPHILS NFR BLD AUTO: 58 %
PLATELET # BLD AUTO: 226 10E3/UL (ref 150–450)
RBC # BLD AUTO: 4.69 10E6/UL (ref 4.4–5.9)
WBC # BLD AUTO: 6.4 10E3/UL (ref 4–11)

## 2023-09-28 PROCEDURE — 91320 SARSCV2 VAC 30MCG TRS-SUC IM: CPT | Performed by: FAMILY MEDICINE

## 2023-09-28 PROCEDURE — 99214 OFFICE O/P EST MOD 30 MIN: CPT | Mod: 25 | Performed by: FAMILY MEDICINE

## 2023-09-28 PROCEDURE — 90662 IIV NO PRSV INCREASED AG IM: CPT | Performed by: FAMILY MEDICINE

## 2023-09-28 PROCEDURE — 90480 ADMN SARSCOV2 VAC 1/ONLY CMP: CPT | Performed by: FAMILY MEDICINE

## 2023-09-28 PROCEDURE — 80048 BASIC METABOLIC PNL TOTAL CA: CPT | Performed by: FAMILY MEDICINE

## 2023-09-28 PROCEDURE — G0008 ADMIN INFLUENZA VIRUS VAC: HCPCS | Performed by: FAMILY MEDICINE

## 2023-09-28 PROCEDURE — 85025 COMPLETE CBC W/AUTO DIFF WBC: CPT | Performed by: FAMILY MEDICINE

## 2023-09-28 PROCEDURE — 36415 COLL VENOUS BLD VENIPUNCTURE: CPT | Performed by: FAMILY MEDICINE

## 2023-09-28 PROCEDURE — 93000 ELECTROCARDIOGRAM COMPLETE: CPT | Performed by: FAMILY MEDICINE

## 2023-09-28 NOTE — PROGRESS NOTES
[facility]  [address] [place] [id number]  Phone: [phone]  Primary Provider: Jagdish Gilbert  Pre-op Performing Provider: JAGDISH GILBERT      PREOPERATIVE EVALUATION:  Today's date: 9/28/2023    Naveen is a 68 year old male who presents for a preoperative evaluation.      9/28/2023     2:14 PM   Additional Questions   Roomed by nAdra SMITH       Surgical Information:  Surgery/Procedure: Left foot surgery  Surgery Location: [facility] in [place]  Surgeon: Dr. Alexis Lucero  Surgery Date: 10/11/2023  Time of Surgery: TBA  Where patient plans to recover: At home with family  Fax number for surgical facility: Please fax to: [phone]    Assessment & Plan     The proposed surgical procedure is considered INTERMEDIATE risk.    Empiric use of CPAP in the postoperative period may prove fruitful    Problem List Items Addressed This Visit       Encounter for immunization     Offered         Relevant Orders    INFLUENZA VACCINE 65+ (FLUZONE HD) (Completed)    Need for COVID-19 vaccine     Offered         Relevant Orders    COVID-19 12+ (2023-24) (PFIZER) (Completed)    Other insomnia     He continues to awaken frequently in spite of current therapies.  Refer         Relevant Orders    Adult Sleep Eval & Management  Referral    Preop general physical exam - Primary    Relevant Orders    Basic metabolic panel  (Ca, Cl, CO2, Creat, Gluc, K, Na, BUN)    CBC with platelets and differential (Completed)    EKG 12-lead complete w/read - Clinics (Completed)          Implanted Device:       - No identified additional risk factors other than previously addressed    Antiplatelet or Anticoagulation Medication Instructions:      Additional Medication Instructions:  Patient will hold semaglutide week of procedure.  Patient will hold current NSAID 7 days before procedure.  Patient will hold diuretic a.m. of procedure.  He will take other medications    RECOMMENDATION:  APPROVAL GIVEN to  proceed with proposed procedure, without further diagnostic evaluation.            Subjective       HPI related to upcoming procedure: Symptomatic bunion      Health Care Directive:  Patient does not have a Health Care Directive or Living Will:     Preoperative Review of :   reviewed - no record of controlled substances prescribed.          Review of Systems  CONSTITUTIONAL: NEGATIVE for fever, chills, change in weight  ENT/MOUTH: NEGATIVE for ear, mouth and throat problems  RESP: NEGATIVE for significant cough or SOB  CV: NEGATIVE for chest pain, palpitations or peripheral edema  HEME/ALLERGY/IMMUNE: NEGATIVE for bleeding problems    Patient Active Problem List    Diagnosis Date Noted    Health Care Home 06/14/2011     Priority: High     EMERGENCY CARE PLAN  Presenting Problem Signs and Symptoms Treatment Plan    Questions or conerns during clinic hours    I will call the clinic directly     Questions or conerns outside clinic hours    I will call the 24 hour nurse line at [phone]    Patient needs to schedule an appointment    I will call the 24 hour scheduling team at [phone] or clinic directly    Same day treatment     I will call the clinic first, nurse line if after hours, urgent care and express care if needed                                DX V65.8 REPLACED WITH [id number] CARE HOME (04/08/2013)      Other insomnia 09/28/2023     Priority: Medium    Closed fracture of left shoulder with delayed healing, subsequent encounter 09/07/2023     Priority: Medium    Hypokalemia 09/07/2023     Priority: Medium    Low iron 09/07/2023     Priority: Medium    Paronychia of toe, left 06/26/2023     Priority: Medium    Impacted cerumen of left ear 06/26/2023     Priority: Medium    Primary osteoarthritis of both hips 06/26/2023     Priority: Medium    Contusion of left great toe with damage to nail, initial encounter 06/26/2023     Priority: Medium    Need for COVID-19 vaccine 06/26/2023     Priority:  Medium    Preop general physical exam 05/01/2023     Priority: Medium    Elevated glucose 05/01/2023     Priority: Medium    Gross hematuria 04/10/2023     Priority: Medium    Visi (volar intercalated segment instability), right 04/10/2023     Priority: Medium     Severe Wyman      Meningioma (H) 04/10/2023     Priority: Medium    Chronic migraine without aura without status migrainosus, not intractable 06/24/2022     Priority: Medium    Generalized tonic clonic epilepsy (H) 06/24/2022     Priority: Medium    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     Priority: Medium    Encounter for immunization 08/04/2021     Priority: Medium    Travel advice encounter 08/04/2021     Priority: Medium    Heartburn 04/21/2021     Priority: Medium    Other chest pain  04/21/2021     Priority: Medium    Oral phase dysphagia 04/21/2021     Priority: Medium    Right hand paresthesia 04/19/2021     Priority: Medium    Common wart 12/02/2020     Priority: Medium    Post herpetic neuralgia 12/02/2020     Priority: Medium    Bell's palsy 09/08/2020     Priority: Medium    Benign prostatic hyperplasia with urinary frequency 09/08/2020     Priority: Medium    Anemia 07/31/2020     Priority: Medium     No longer Fe def.       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     Priority: Medium    Pruritic disorder 06/19/2020     Priority: Medium    Trigger finger of left hand, unspecified finger 04/13/2020     Priority: Medium    Pedal edema 02/14/2020     Priority: Medium     Longstanding, previously evaluated.  No skin changes.  Support hose at home he does not wear.  Discussed natural history recommend knee-high socks      Left carpal tunnel syndrome 02/13/2020     Priority: Medium    Ulnar neuropathy of both upper extremities 02/13/2020     Priority: Medium    Dermatitis 02/13/2020     Priority: Medium    Cognitive change 01/02/2020     Priority: Medium    Class 1 obesity due to excess calories without serious comorbidity in adult  01/02/2020     Priority: Medium    Primary osteoarthritis of both hands 01/02/2020     Priority: Medium    Headache 01/02/2020     Priority: Medium    Need for prophylactic vaccination and inoculation against influenza 01/02/2020     Priority: Medium    Cough 01/02/2020     Priority: Medium    Panlobular emphysema (H) 01/02/2020     Priority: Medium    Trauma 10/28/2018     Priority: Medium    Other constipation 05/25/2016     Priority: Medium    Benign essential hypertension 05/20/2016     Priority: Medium    Physical deconditioning 05/20/2016     Priority: Medium    S/P TKR (total knee replacement) 05/16/2016     Priority: Medium    RIC (obstructive sleep apnea)      Priority: Medium    Esophageal reflux 04/10/2008     Priority: Medium    Anxiety state 04/10/2008     Priority: Medium     Problem list name updated by automated process. Provider to review      Hyperlipidemia LDL goal <70 02/14/2007     Priority: Medium      Past Medical History:   Diagnosis Date    Anemia 7/31/2020    Arthritis     Bell's palsy 9/8/2020    Benign prostatic hyperplasia with incomplete bladder emptying 7/28/2020    Bilateral carpal tunnel syndrome 2/13/2020    CAD (coronary artery disease) 2010    a subtotally occluded obtuse marginal vessel which was stented with a drug-coated stent.  He had 50% to 60% LAD disease and 25% to 30% right coronary disease    Cognitive change 1/2/2020    Colonic polyps 2009    tubular adenomae with mildly dysplastic features    Common wart 12/2/2020    Disturbance of skin sensation     Encephalocele (H) 2009    left frontal    Epilepsy, partial (H) 2009    with secondary generalization    GERD (gastroesophageal reflux disease)     Heart attack (H)     5 years ago. Cardiology 3 months age    Herpes zoster with complication 10/7/2020    History of spinal cord injury     Hypertension     Iron deficiency 9/9/2020    Meningioma (H) 4/10/2023    RIC (obstructive sleep apnea)     RIC (obstructive sleep apnea)      Paraneoplastic Antibody  positive[799.89BS] 2009    mildly elevated alpha 3 ganglionic acetylcholine receptor and JEOVANNY 65 receptor  antibody    Paronychia of toe, left 6/26/2023    Pedal edema 2/14/2020    Post herpetic neuralgia 12/2/2020    Primary osteoarthritis of both hands 1/2/2020    Seizures (H)     Last seizure 2012    Stented coronary artery     Tobacco use disorder     Ulnar neuropathy of both upper extremities 2/13/2020    Vasculogenic erectile dysfunction 7/28/2020    Ventricular Assymetry 2009    likely congenital right lateral ventricular enlargement     Past Surgical History:   Procedure Laterality Date    ARTHROPLASTY KNEE Left 05/16/2016    Procedure: ARTHROPLASTY KNEE;  Surgeon: Chet Leonardo MD;  Location: [facility] OR    BACK SURGERY      CARPAL TUNNEL RELEASE RT/LT Right 03/11/2020    Right carpal tunnel release, Dr. Rick Frias, [facility]    CARPAL TUNNEL RELEASE RT/LT Left     COLONOSCOPY N/A 10/08/2020    Procedure: COLONOSCOPY WITH POLYPECTOMIES using cold jumbo forceps;  Surgeon: Kevin Song MD;  Location:  GI    CYSTOSCOPY      ESOPHAGOSCOPY, GASTROSCOPY, DUODENOSCOPY (EGD), COMBINED  05/24/2012    Procedure:COMBINED ESOPHAGOSCOPY, GASTROSCOPY, DUODENOSCOPY (EGD); ESOPHAGOSCOPY, GASTROSCOPY, DUODENOSCOPY (EGD) ; Surgeon:GILLIAN JOHNSON; Location: GI    ESOPHAGOSCOPY, GASTROSCOPY, DUODENOSCOPY (EGD), COMBINED N/A 10/08/2020    Procedure: ESOPHAGOGASTRODUODENOSCOPY (EGD) (fv);  Surgeon: Kevin Song MD;  Location:  GI    KNEE SURGERY Left     repair of cartilage    RELEASE TRIGGER FINGER Left 10/19/2020    Left ring finger trigger finger release at A1 pulley, Dr. Rick Frias, [facility]    SPINE SURGERY      repair of herniated disc lumbar    STENT      trigger finger release Right 09/15/2020    Right thumb trigger finger release, Dr. Frias    ULNAR NERVE TRANSPOSITION Left     VENTRICULOSTOMY      [place]    ZZC ANESTH,DX ARTHROSCOPIC PROC KNEE JOINT       CARTILEGE REPAIR.    [facility] NONSPECIFIC PROCEDURE      stint in heart    [facility] COLONOSCOPY THRU STOMA, DIAGNOSTIC  2009    tubular adenomae, recommended annual colonoscopy     Current Outpatient Medications   Medication Sig Dispense Refill    acetaminophen (TYLENOL) 500 MG tablet Take 1,000 mg by mouth every 8 hours as needed for mild pain      aMILoride-hydrochlorothiazide (MODURETIC) 5-50 MG TABS per tablet Take 1 tablet by mouth daily 90 tablet 1    aspirin 81 MG tablet Take 81 mg by mouth daily      COMPOUNDED NON-CONTROLLED SUBSTANCE (CMPD RX) - PHARMACY TO MIX COMPOUNDED MEDICATION 1 vial TriMix #9 with syringes.  Inject 0.1 mL intercavernously per MD's instructions. 1 each 11    COMPOUNDED NON-CONTROLLED SUBSTANCE (CMPD RX) - PHARMACY TO MIX COMPOUNDED MEDICATION Inject 0.1 ml into penis PRN,trimix #9,with needles and syringes 1 each 3    diclofenac (CATAFLAM) 50 MG tablet Take 1 tablet (50 mg) by mouth 3 times daily 90 tablet 1    diclofenac (VOLTAREN) 1 % topical gel Apply 2 g topically 3 times daily as needed for moderate pain 350 g 0    lamoTRIgine (LAMICTAL) 200 MG tablet Take 1 tablet (200 mg) by mouth 2 times daily      linaclotide (LINZESS) 290 MCG capsule Take 1 capsule (290 mcg) by mouth every morning (before breakfast) Prn constipatiion 90 capsule 1    omeprazole (PRILOSEC) 40 MG DR capsule Take 1 capsule (40 mg) by mouth daily 90 capsule 1    rizatriptan (MAXALT) 10 MG tablet TAKE 1 TABLET(10 MG) BY MOUTH AT ONSET OF HEADACHE FOR MIGRAINE. MAY REPEAT IN 2 HOURS. MAX 3 TABLETS/ 24 HOURS 15 tablet 1    semaglutide (OZEMPIC) 2 MG/3ML pen Inject 0.5 mg Subcutaneous every 7 days 9 mL 1    simvastatin (ZOCOR) 40 MG tablet Take 1 tablet (40 mg) by mouth At Bedtime 90 tablet 3    tamsulosin (FLOMAX) 0.4 MG capsule Take 1 capsule (0.4 mg) by mouth every evening 90 capsule 3    traZODone (DESYREL) 50 MG tablet Take 2 tablets (100 mg) by mouth At Bedtime 180 tablet 3    zolpidem (AMBIEN) 5 MG tablet Take 1  "tablet (5 mg) by mouth nightly as needed for sleep 90 tablet 0       Allergies   Allergen Reactions    Gabapentin      Fever      Morphine And Related      inability to sleep after taking codeine    Vicodin [Hydrocodone-Acetaminophen]      Perspiring, hyper, itchy        Social History     Tobacco Use    Smoking status: Former     Packs/day: 0.50     Years: 20.00     Pack years: 10.00     Types: Cigarettes     Quit date: 2003     Years since quittin.1    Smokeless tobacco: Never   Substance Use Topics    Alcohol use: No     Alcohol/week: 0.0 standard drinks of alcohol       History   Drug Use No         Objective     BP 96/66 (BP Location: Right arm, Patient Position: Sitting, Cuff Size: Adult Regular)   Pulse 84   Temp 97.8  F (36.6  C) (Oral)   Resp 18   Ht 1.753 m (5' 9\")   Wt 89.8 kg (198 lb)   SpO2 99%   BMI 29.24 kg/m      Physical Exam  Constitutional:       Appearance: Normal appearance.   HENT:      Head: Atraumatic.      Right Ear: Tympanic membrane normal.      Left Ear: Tympanic membrane normal.      Nose: Nose normal.      Mouth/Throat:      Mouth: Mucous membranes are moist.   Eyes:      Pupils: Pupils are equal, round, and reactive to light.   Cardiovascular:      Rate and Rhythm: Normal rate and regular rhythm.      Heart sounds: Normal heart sounds.   Pulmonary:      Effort: Pulmonary effort is normal.      Breath sounds: Normal breath sounds.   Abdominal:      General: Abdomen is flat. Bowel sounds are normal.   Musculoskeletal:      Cervical back: Neck supple.   Skin:     General: Skin is warm and dry.   Neurological:      Mental Status: He is alert.      Comments: Stable facial palsy   Psychiatric:         Mood and Affect: Mood normal.           Recent Labs   Lab Test 23  1640 23  1732   HGB 14.2 13.0*    174   * 143   POTASSIUM 4.8 4.7   CR 1.78* 1.05   A1C  --  5.0        Diagnostics:  Labs pending at this time.  Results will be reviewed when available. "   EKG: appears normal, NSR, normal axis, normal intervals, no acute ST/T changes c/w ischemia, no LVH by voltage criteria, unchanged from previous tracings    Revised Cardiac Risk Index (RCRI):  The patient has the following serious cardiovascular risks for perioperative complications:   - Coronary Artery Disease (MI, positive stress test, angina, Qs on EKG) = 1 point     RCRI Interpretation: 1 point: Class II (low risk - 0.9% complication rate)         Signed Electronically by: Chad Noel MD  Copy of this evaluation report is provided to requesting physician.

## 2023-09-28 NOTE — PROGRESS NOTES
Prior to immunization administration, verified patients identity using patient s name and date of birth. Please see Immunization Activity for additional information.     Screening Questionnaire for Adult Immunization    Are you sick today?   No   Do you have allergies to medications, food, a vaccine component or latex?   Yes   Have you ever had a serious reaction after receiving a vaccination?   No   Do you have a long-term health problem with heart, lung, kidney, or metabolic disease (e.g., diabetes), asthma, a blood disorder, no spleen, complement component deficiency, a cochlear implant, or a spinal fluid leak?  Are you on long-term aspirin therapy?   Yes   Do you have cancer, leukemia, HIV/AIDS, or any other immune system problem?   No   Do you have a parent, brother, or sister with an immune system problem?   No   In the past 3 months, have you taken medications that affect  your immune system, such as prednisone, other steroids, or anticancer drugs; drugs for the treatment of rheumatoid arthritis, Crohn s disease, or psoriasis; or have you had radiation treatments?   No   Have you had a seizure, or a brain or other nervous system problem?   No   During the past year, have you received a transfusion of blood or blood    products, or been given immune (gamma) globulin or antiviral drug?   No   For women: Are you pregnant or is there a chance you could become       pregnant during the next month?   No   Have you received any vaccinations in the past 4 weeks?   No     Immunization questionnaire two answers were Yes. Provider Notified.       Patient instructed to remain in clinic for 15 minutes afterwards, and to report any adverse reactions.     Screening performed by Andra Villarreal CMA on 9/28/2023 at 2:56 PM.

## 2023-09-29 LAB
ANION GAP SERPL CALCULATED.3IONS-SCNC: 12 MMOL/L (ref 7–15)
BUN SERPL-MCNC: 36.5 MG/DL (ref 8–23)
CALCIUM SERPL-MCNC: 9.9 MG/DL (ref 8.8–10.2)
CHLORIDE SERPL-SCNC: 98 MMOL/L (ref 98–107)
CREAT SERPL-MCNC: 1.63 MG/DL (ref 0.67–1.17)
DEPRECATED HCO3 PLAS-SCNC: 25 MMOL/L (ref 22–29)
EGFRCR SERPLBLD CKD-EPI 2021: 45 ML/MIN/1.73M2
GLUCOSE SERPL-MCNC: 91 MG/DL (ref 70–99)
POTASSIUM SERPL-SCNC: 4.2 MMOL/L (ref 3.4–5.3)
SODIUM SERPL-SCNC: 135 MMOL/L (ref 135–145)

## 2023-10-04 ENCOUNTER — TRANSFERRED RECORDS (OUTPATIENT)
Dept: HEALTH INFORMATION MANAGEMENT | Facility: CLINIC | Age: 68
End: 2023-10-04
Payer: COMMERCIAL

## 2023-10-06 ENCOUNTER — PATIENT OUTREACH (OUTPATIENT)
Dept: FAMILY MEDICINE | Facility: CLINIC | Age: 68
End: 2023-10-06
Payer: COMMERCIAL

## 2023-10-06 NOTE — TELEPHONE ENCOUNTER
PAL received incoming call from pt   -Pt reports he has been experiencing dizziness:  *Onset: 2 weeks ago  *Daily, on/off, approx 3-5x per day  *Occurs when pt gets up and begins walking   *Dizziness lasts around 10-15 seconds, pt feels unsteady and like he is going to fall when this occurs  *Baseline headaches still occurring, pt attributes these to poor sleep due to RIC  *Denies any other symptoms   -Pt does not check BP at home, HTN determined over treated at 9/7/23 OV with Dr. Noel     BP Readings from Last 3 Encounters:   09/28/23 96/66   09/07/23 93/62   06/26/23 124/78     -PAL advised visit with PCP to further discuss, scheduled pt for OV with Dr. Noel 10/9/23 at 3:40pm   -Instructed pt to call back if new or worsening symptoms    Patient was given an opportunity to ask questions, verbalized understanding of plan, and is agreeable.    Jazmin RUDD RN  Patient Advocate Liaison - PAL RN  [facility]  [phone]

## 2023-10-09 ENCOUNTER — OFFICE VISIT (OUTPATIENT)
Dept: FAMILY MEDICINE | Facility: CLINIC | Age: 68
End: 2023-10-09
Payer: COMMERCIAL

## 2023-10-09 VITALS
OXYGEN SATURATION: 100 % | DIASTOLIC BLOOD PRESSURE: 60 MMHG | SYSTOLIC BLOOD PRESSURE: 94 MMHG | HEIGHT: 69 IN | WEIGHT: 200 LBS | BODY MASS INDEX: 29.62 KG/M2 | HEART RATE: 82 BPM | RESPIRATION RATE: 18 BRPM | TEMPERATURE: 98.2 F

## 2023-10-09 DIAGNOSIS — G47.00 INSOMNIA, UNSPECIFIED TYPE: Primary | ICD-10-CM

## 2023-10-09 DIAGNOSIS — E66.09 CLASS 1 OBESITY DUE TO EXCESS CALORIES WITHOUT SERIOUS COMORBIDITY IN ADULT, UNSPECIFIED BMI: ICD-10-CM

## 2023-10-09 DIAGNOSIS — G40.909 SEIZURE DISORDER (H): ICD-10-CM

## 2023-10-09 DIAGNOSIS — G43.709 CHRONIC MIGRAINE WITHOUT AURA WITHOUT STATUS MIGRAINOSUS, NOT INTRACTABLE: ICD-10-CM

## 2023-10-09 DIAGNOSIS — E66.811 CLASS 1 OBESITY DUE TO EXCESS CALORIES WITHOUT SERIOUS COMORBIDITY IN ADULT, UNSPECIFIED BMI: ICD-10-CM

## 2023-10-09 DIAGNOSIS — N52.01 ERECTILE DYSFUNCTION DUE TO ARTERIAL INSUFFICIENCY: ICD-10-CM

## 2023-10-09 DIAGNOSIS — D32.9 MENINGIOMA (H): ICD-10-CM

## 2023-10-09 DIAGNOSIS — I95.9 HYPOTENSION, UNSPECIFIED HYPOTENSION TYPE: ICD-10-CM

## 2023-10-09 LAB
HOLD SPECIMEN: NORMAL

## 2023-10-09 PROCEDURE — 99214 OFFICE O/P EST MOD 30 MIN: CPT | Performed by: FAMILY MEDICINE

## 2023-10-09 PROCEDURE — 83930 ASSAY OF BLOOD OSMOLALITY: CPT | Performed by: FAMILY MEDICINE

## 2023-10-09 PROCEDURE — 36415 COLL VENOUS BLD VENIPUNCTURE: CPT | Performed by: FAMILY MEDICINE

## 2023-10-09 RX ORDER — LAMOTRIGINE 100 MG/1
50 TABLET ORAL 2 TIMES DAILY
Start: 2023-10-09

## 2023-10-09 RX ORDER — RIZATRIPTAN BENZOATE 10 MG/1
TABLET ORAL
Qty: 15 TABLET | Refills: 1 | Status: SHIPPED | OUTPATIENT
Start: 2023-10-09 | End: 2024-02-05

## 2023-10-09 RX ORDER — SILDENAFIL 100 MG/1
100 TABLET, FILM COATED ORAL DAILY PRN
Qty: 10 TABLET | Refills: 3 | Status: ON HOLD | OUTPATIENT
Start: 2023-10-09 | End: 2024-06-11

## 2023-10-09 RX ORDER — ZOLPIDEM TARTRATE 5 MG/1
5 TABLET ORAL
Qty: 90 TABLET | Refills: 1 | Status: SHIPPED | OUTPATIENT
Start: 2023-10-09 | End: 2024-06-25

## 2023-10-09 NOTE — COMMUNITY RESOURCES LIST (ENGLISH)
10/09/2023   [facility]  N/A  For questions about this resource list or additional care needs, please contact your primary care clinic or care manager.  Phone: [phone]   Email: N/A   Address: [address] [facility] [place] [address]   Hours: N/A        Hotlines and Helplines       Hotline - Housing crisis  1  [facility] (Main Office) - Emergency Services Distance: 10.22 miles      Phone/Virtual   [address] [place] [address]  Language: English  Hours: Mon - Sun Open 24 Hours   Phone: (666) 550-3662 Email: info@[facility].org Website: http://[employer].org     2  [facility] Distance: 17.2 miles      Phone/Virtual   [address] [place] [address]  Language: English  Hours: Mon - Sun Open 24 Hours   Phone: (278) 252-6527 Email: communications@[facility]-[place].org Website: https://[facility]-mn.org/oursaviourshousing/          Housing       Coordinated Entry access point  3  [facility] ([facility] - Housing Services Distance: 17.11 miles      In-Person   [address] [place] [address]  Language: English  Hours: Mon - Fri 9:00 AM - 5:00 PM  Fees: Free   Phone: (198) 808-3452 Email: housing@[facility].org Website: http://www.[facility].org/housing     4  [facility] Distance: 17.29 miles      In-Person, Phone/Virtual   [address] [place] [address]  Language: English, Thai  Hours: Mon - Fri 8:30 AM - 4:30 PM  Fees: Free   Phone: (916) 425-4808 Email: info@[facility].org Website: https://www.[facility].org/locations/[facility]-clinic/     Drop-in center or day shelter  5  [facility] Distance: 17.53 miles      In-Person   [address] [place] [address]  Language: English  Hours: Mon - Fri 12:00 PM - 3:00 PM  Fees: Free   Phone: (123) 932-9855 Email: littleRisparmioSupercomdeyanira@[employer].com Website: http://[employer].org/     6  [facility]  Opp[facility] Distance: 17.63 miles      In-Person   [address] [place] [address]  Language: English, Bermudian, [place]er  Hours: Mon - Sat 7:00 AM - 3:00 PM  Fees: Free, Self Pay   Phone: (775) 115-9896 Email: info@[employer] Website: https://www.[employer]/locations/opportunity-center/     Housing search assistance  7  [facility] [employer] [facility] [employer] [facility] - Rental Homes for Future Homebuyers Program Distance: 8.66 miles      Phone/Virtual   [address] [place] [address]  Language: English  Hours: Mon - Fri 8:00 AM - 4:30 PM  Fees: Free   Phone: (204) 174-5943 Email: hra@[facility] Website: https://www.[facility]/hra/[place]-housing-and-qxcawkmuorgah-emdxriitk-pul     8  [facility] Aging and Disability Services Distance: 13.67 miles      In-Person   [address] [place] [address]  Language: English  Hours: Mon - Fri 8:00 AM - 4:00 PM  Fees: Free, Insurance, Sliding Fee   Phone: (219) 278-2718 Email: angelo@[facility]. Website: https://www.co.[facility]./HealthFamily/Disabilities     Shelter for families  9  [facility] Distance: 9.34 miles      In-Person   [address] [place] [address]  Language: English  Hours: Mon - Sun Open 24 Hours  Fees: Free, Sliding Fee   Phone: (278) 391-5054 Ext.1 Email: info@[facility] [place] [place] Website: http://www.[facility].org     Shelter for individuals  10  [facility] ([facility] Distance: 5.69 miles      In-Person   [address] [place] [address]  Language: English, [place]er  Hours: Mon - Fri 8:00 AM - 4:30 PM  Fees: Free   Phone: (377) 849-4288 Email: info@[facility].org Website: http://www.capagency.org     11  [facility] Distance: 17.2 miles      In-Person   [address] [place] [address]  Language: English  Hours: Mon - Sun Open 24 Hours  Fees: Free   Phone: (192)  254-5750 Email: communications@[facility].org Website: https://[facility]-[place].org/oursaviourshousing/          Important Numbers & Websites       Emergency Services   911  [facility] Services   311  Poison Control   (442) 545-6174  Suicide Prevention Lifeline   (353) 951-5940 (TALK)  Child Abuse Hotline   (116) 626-3824 (4-A-Child)  Sexual Assault Hotline   (694) 228-4048 (HOPE)  National Runaway Safeline   (963) 870-3797 (RUNAWAY)  All-Options Talkline   (585) 744-1767  Substance Abuse Referral   (934) 389-7822 (HELP)

## 2023-10-09 NOTE — PROGRESS NOTES
Assessment & Plan   Problem List Items Addressed This Visit       Chronic migraine without aura without status migrainosus, not intractable     Occasionally, frequency unclear.  Refill triptan, effective.         Relevant Medications    lamoTRIgine (LAMICTAL) 100 MG tablet    rizatriptan (MAXALT) 10 MG tablet    RESOLVED: Class 1 obesity due to excess calories without serious comorbidity in adult    Relevant Medications    semaglutide (OZEMPIC) 2 MG/3ML pen    Hypotension, unspecified hypotension type     Blood pressure is low.  He is occasionally dizzy, no falls.  He is concerned for the past this translated to cerebrovascular fluid obstruction resulted in cerebrovascular surgery.  He was imaged at [facility] about 1/2-year ago.  His meningiomata are slightly larger, otherwise imaging was reassuring.  He has stopped all his antihypertensives, remains hypotensive.  Broaden database.  May need to consider DDAVP         Relevant Orders    Echocardiogram Complete with Lumason    Osmolality urine    Osmolality urine    Insomnia, unspecified type - Primary     He awakens frequently he requests and we refill so.  Ambien         Relevant Medications    zolpidem (AMBIEN) 5 MG tablet    Meningioma (H)     Slightly larger last spring per [facility] imaging         Seizure disorder (H)     His lamotrigine was increased by neurology/[facility].  Medical records updated.  He is unaware of any seizures, and neurology describes him as not tonic-clonic rather focal         Relevant Medications    zolpidem (AMBIEN) 5 MG tablet    lamoTRIgine (LAMICTAL) 100 MG tablet    rizatriptan (MAXALT) 10 MG tablet    Vasculogenic erectile dysfunction     He tried injections from urology, ineffective.  Onerous.  He would like a refill of Viagra, incompletely effective, but with some benefit.  Discussed avoiding tamsulosin when taking sildenafil         Relevant Medications    sildenafil (VIAGRA) 100 MG tablet                 BMI:   Estimated body mass index is  "29.53 kg/m  as calculated from the following:    Height as of this encounter: 1.753 m (5' 9\").    Weight as of this encounter: 90.7 kg (200 lb).           Chad Noel MD  [facility] TRAE Hodge is a 68 year old, presenting for the following health issues:  Hypertension        10/9/2023     3:42 PM   Additional Questions   Roomed by Kelsey       History of Present Illness       Hypertension: He presents for follow up of hypertension.  He does not check blood pressure  regularly outside of the clinic. Outside blood pressures have been over 140/90. He does not follow a low salt diet.     He eats 0-1 servings of fruits and vegetables daily.He consumes 1 sweetened beverage(s) daily.He exercises with enough effort to increase his heart rate 20 to 29 minutes per day.  He exercises with enough effort to increase his heart rate 3 or less days per week.   He is taking medications regularly.                 Review of Systems   As described      Objective    BP 94/60 (BP Location: Right arm, Patient Position: Chair, Cuff Size: Adult Regular)   Pulse 82   Temp 98.2  F (36.8  C) (Oral)   Resp 18   Ht 1.753 m (5' 9\")   Wt 90.7 kg (200 lb)   SpO2 100%   BMI 29.53 kg/m    Body mass index is 29.53 kg/m .  Physical Exam     Ears free of cerumen  No edema  Chad Noel MD                      "

## 2023-10-10 ENCOUNTER — HOSPITAL ENCOUNTER (OUTPATIENT)
Dept: CARDIOLOGY | Facility: CLINIC | Age: 68
Discharge: HOME | End: 2023-10-10
Attending: FAMILY MEDICINE | Admitting: FAMILY MEDICINE
Payer: COMMERCIAL

## 2023-10-10 DIAGNOSIS — I95.9 HYPOTENSION, UNSPECIFIED HYPOTENSION TYPE: ICD-10-CM

## 2023-10-10 LAB
LVEF ECHO: NORMAL
OSMOLALITY UR: 715 MMOL/KG (ref 100–1200)

## 2023-10-10 PROCEDURE — 93306 TTE W/DOPPLER COMPLETE: CPT

## 2023-10-10 PROCEDURE — 83935 ASSAY OF URINE OSMOLALITY: CPT | Performed by: FAMILY MEDICINE

## 2023-10-10 PROCEDURE — 93306 TTE W/DOPPLER COMPLETE: CPT | Mod: 26 | Performed by: INTERNAL MEDICINE

## 2023-10-10 NOTE — ASSESSMENT & PLAN NOTE
His lamotrigine was increased by neurology/[place].  Medical records updated.  He is unaware of any seizures, and neurology describes him as not tonic-clonic rather focal

## 2023-10-10 NOTE — ASSESSMENT & PLAN NOTE
Blood pressure is low.  He is occasionally dizzy, no falls.  He is concerned for the past this translated to cerebrovascular fluid obstruction resulted in cerebrovascular surgery.  He was imaged at [facility] about 1/2-year ago.  His meningiomata are slightly larger, otherwise imaging was reassuring.  He has stopped all his antihypertensives, remains hypotensive.  Broaden database.  May need to consider DDAVP

## 2023-10-10 NOTE — ASSESSMENT & PLAN NOTE
He tried injections from urology, ineffective.  Onerous.  He would like a refill of Viagra, incompletely effective, but with some benefit.  Discussed avoiding tamsulosin when taking sildenafil

## 2023-10-11 DIAGNOSIS — I95.9 HYPOTENSION, UNSPECIFIED HYPOTENSION TYPE: Primary | ICD-10-CM

## 2023-10-11 LAB — OSMOLALITY SERPL: 303 MMOL/KG (ref 280–301)

## 2023-10-26 ENCOUNTER — TRANSFERRED RECORDS (OUTPATIENT)
Dept: HEALTH INFORMATION MANAGEMENT | Facility: CLINIC | Age: 68
End: 2023-10-26
Payer: COMMERCIAL

## 2023-11-03 ENCOUNTER — TRANSFERRED RECORDS (OUTPATIENT)
Dept: HEALTH INFORMATION MANAGEMENT | Facility: CLINIC | Age: 68
End: 2023-11-03
Payer: COMMERCIAL

## 2023-11-06 DIAGNOSIS — M16.0 PRIMARY OSTEOARTHRITIS OF BOTH HIPS: ICD-10-CM

## 2023-11-06 NOTE — TELEPHONE ENCOUNTER
PAL received incoming call from pt   -Requesting refill of Naproxen  -Pt informed to allow up to 3 business days to process request     Routing to [facility] refill pool    Jazmin RUDD, RN  Patient Advocate Liaison - PAL RN  [facility]  [phone]

## 2023-11-07 ENCOUNTER — TRANSFERRED RECORDS (OUTPATIENT)
Dept: HEALTH INFORMATION MANAGEMENT | Facility: CLINIC | Age: 68
End: 2023-11-07
Payer: COMMERCIAL

## 2023-11-07 RX ORDER — NAPROXEN 500 MG/1
TABLET ORAL
Qty: 180 TABLET | Refills: 1 | OUTPATIENT
Start: 2023-11-07

## 2023-11-07 NOTE — TELEPHONE ENCOUNTER
Declined  He was placed on cataflam (diclofenac) in [place], which he found more effective  He should take one or the other, not both  Chad Noel MD

## 2023-11-07 NOTE — TELEPHONE ENCOUNTER
PAL received incoming call from pt   -Relayed message from Dr. Noel, see previous note  -Informed pt of active rx for diclofenac, instructed pt to contact [facility] pharmacy for refill     diclofenac (CATAFLAM) 50 MG tablet 90 tablet 1 9/7/2023  No   Sig - Route: Take 1 tablet (50 mg) by mouth 3 times daily - Oral     -Pt will call back if finds diclofenac ineffective     Patient was given an opportunity to ask questions, verbalized understanding of plan, and is agreeable.     Jazmin RUDD RN  Patient Advocate Liaison - PAL RN  [facility]  [phone]

## 2023-11-13 ENCOUNTER — OFFICE VISIT (OUTPATIENT)
Dept: FAMILY MEDICINE | Facility: CLINIC | Age: 68
End: 2023-11-13
Payer: COMMERCIAL

## 2023-11-13 VITALS
WEIGHT: 196 LBS | BODY MASS INDEX: 29.03 KG/M2 | HEART RATE: 81 BPM | TEMPERATURE: 98.1 F | SYSTOLIC BLOOD PRESSURE: 110 MMHG | DIASTOLIC BLOOD PRESSURE: 72 MMHG | OXYGEN SATURATION: 99 % | RESPIRATION RATE: 18 BRPM | HEIGHT: 69 IN

## 2023-11-13 DIAGNOSIS — Z29.11 NEED FOR VACCINATION AGAINST RESPIRATORY SYNCYTIAL VIRUS: ICD-10-CM

## 2023-11-13 DIAGNOSIS — E66.09 CLASS 1 OBESITY DUE TO EXCESS CALORIES WITHOUT SERIOUS COMORBIDITY IN ADULT, UNSPECIFIED BMI: ICD-10-CM

## 2023-11-13 DIAGNOSIS — E87.6 HYPOKALEMIA: ICD-10-CM

## 2023-11-13 DIAGNOSIS — M15.0 PRIMARY OSTEOARTHRITIS INVOLVING MULTIPLE JOINTS: Primary | ICD-10-CM

## 2023-11-13 DIAGNOSIS — G40.309 GENERALIZED TONIC CLONIC EPILEPSY (H): ICD-10-CM

## 2023-11-13 DIAGNOSIS — Z23 NEED FOR SHINGLES VACCINE: ICD-10-CM

## 2023-11-13 DIAGNOSIS — G47.33 OSA (OBSTRUCTIVE SLEEP APNEA): ICD-10-CM

## 2023-11-13 DIAGNOSIS — S42.92XG CLOSED FRACTURE OF LEFT SHOULDER WITH DELAYED HEALING, SUBSEQUENT ENCOUNTER: ICD-10-CM

## 2023-11-13 DIAGNOSIS — I95.9 HYPOTENSION, UNSPECIFIED HYPOTENSION TYPE: ICD-10-CM

## 2023-11-13 DIAGNOSIS — E66.811 CLASS 1 OBESITY DUE TO EXCESS CALORIES WITHOUT SERIOUS COMORBIDITY IN ADULT, UNSPECIFIED BMI: ICD-10-CM

## 2023-11-13 PROBLEM — Z01.818 PREOP GENERAL PHYSICAL EXAM: Status: RESOLVED | Noted: 2023-05-01 | Resolved: 2023-11-13

## 2023-11-13 PROCEDURE — 99214 OFFICE O/P EST MOD 30 MIN: CPT | Performed by: FAMILY MEDICINE

## 2023-11-13 PROCEDURE — 80048 BASIC METABOLIC PNL TOTAL CA: CPT | Performed by: FAMILY MEDICINE

## 2023-11-13 PROCEDURE — 36415 COLL VENOUS BLD VENIPUNCTURE: CPT | Performed by: FAMILY MEDICINE

## 2023-11-13 RX ORDER — RESPIRATORY SYNCYTIAL VIRUS VACCINE 120MCG/0.5
0.5 KIT INTRAMUSCULAR ONCE
Qty: 1 EACH | Refills: 0 | Status: CANCELLED | OUTPATIENT
Start: 2023-11-13 | End: 2023-11-13

## 2023-11-13 NOTE — PROGRESS NOTES
Assessment & Plan   Problem List Items Addressed This Visit       Class 1 obesity due to excess calories without serious comorbidity in adult, unspecified BMI    Relevant Medications    semaglutide (OZEMPIC) 2 MG/3ML pen    Closed fracture of left shoulder with delayed healing, subsequent encounter     Orthopedics is managing.  This disturbs his sleep         Relevant Medications    naproxen (NAPROSYN) 375 MG tablet    Generalized tonic clonic epilepsy (H)     Well-controlled with current lamotrigine.  We are following.  Twice yearly         Hypokalemia     In the past.  Reassess         Relevant Orders    Basic metabolic panel  (Ca, Cl, CO2, Creat, Gluc, K, Na, BUN)    Hypotension, unspecified hypotension type     Echocardiogram was unrevealing.  Blood pressure is higher today.  He has no dizziness.  Monitor         Need for shingles vaccine     Recommended at pharmacy         Need for vaccination against respiratory syncytial virus     Recommended at pharmacy         RIC (obstructive sleep apnea)     He has been told he should have a sleep study, but has been delaying this because of his heart orthopedic problems.  I recommend he call his sleep provider and arrange for that study, as I anticipate his orthopedic problems will be resolved by the time an appointment becomes available         Primary osteoarthritis involving multiple joints - Primary     Finds that naproxen was more effective than current diclofenac.  He would like to change back.  We shall do so with a maintenance dose         Relevant Medications    naproxen (NAPROSYN) 375 MG tablet                     Chad Noel MD  [facility]    Vijay Hodge is a 68 year old, presenting for the following health issues:  Results      History of Present Illness       Reason for visit:  Genral    He eats 0-1 servings of fruits and vegetables daily.He consumes 2 sweetened beverage(s) daily.He exercises with enough effort to  "increase his heart rate 10 to 19 minutes per day.  He exercises with enough effort to increase his heart rate 3 or less days per week.   He is taking medications regularly.         Blood pressure  Follow-up    Do you check your blood pressure regularly outside of the clinic? Yes   Are you following a low salt diet? Yes  Are your blood pressures ever more than 140 on the top number (systolic) OR more   than 90 on the bottom number (diastolic), for example 140/90? No      Patient would like to discuss a weight loss medication that is not what he currently takes and go over the Echo results.             Review of Systems   As described      Objective    /72 (BP Location: Right arm, Patient Position: Sitting, Cuff Size: Adult Large)   Pulse 81   Temp 98.1  F (36.7  C) (Oral)   Resp 18   Ht 1.753 m (5' 9\")   Wt 88.9 kg (196 lb)   SpO2 99%   BMI 28.94 kg/m    Body mass index is 28.94 kg/m .  Physical Exam   Stable 7th nerve palsy  Chad Noel MD                        "

## 2023-11-13 NOTE — ASSESSMENT & PLAN NOTE
Finds that naproxen was more effective than current diclofenac.  He would like to change back.  We shall do so with a maintenance dose

## 2023-11-13 NOTE — ASSESSMENT & PLAN NOTE
He has been told he should have a sleep study, but has been delaying this because of his heart orthopedic problems.  I recommend he call his sleep provider and arrange for that study, as I anticipate his orthopedic problems will be resolved by the time an appointment becomes available

## 2023-11-13 NOTE — PROGRESS NOTES
Answers submitted by the patient for this visit:  General Questionnaire (Submitted on 11/13/2023)  Chief Complaint: Chronic problems general questions HPI Form  What is the reason for your visit today? : genral  How many servings of fruits and vegetables do you eat daily?: 0-1  On average, how many sweetened beverages do you drink each day (Examples: soda, juice, sweet tea, etc.  Do NOT count diet or artificially sweetened beverages)?: 2  How many minutes a day do you exercise enough to make your heart beat faster?: 10 to 19  How many days a week do you exercise enough to make your heart beat faster?: 3 or less  How many days per week do you miss taking your medication?: 0

## 2023-11-14 LAB
ANION GAP SERPL CALCULATED.3IONS-SCNC: 13 MMOL/L (ref 7–15)
BUN SERPL-MCNC: 38.9 MG/DL (ref 8–23)
CALCIUM SERPL-MCNC: 9.8 MG/DL (ref 8.8–10.2)
CHLORIDE SERPL-SCNC: 102 MMOL/L (ref 98–107)
CREAT SERPL-MCNC: 1.77 MG/DL (ref 0.67–1.17)
DEPRECATED HCO3 PLAS-SCNC: 22 MMOL/L (ref 22–29)
EGFRCR SERPLBLD CKD-EPI 2021: 41 ML/MIN/1.73M2
GLUCOSE SERPL-MCNC: 98 MG/DL (ref 70–99)
POTASSIUM SERPL-SCNC: 4.6 MMOL/L (ref 3.4–5.3)
SODIUM SERPL-SCNC: 137 MMOL/L (ref 135–145)

## 2023-11-21 ENCOUNTER — TRANSFERRED RECORDS (OUTPATIENT)
Dept: HEALTH INFORMATION MANAGEMENT | Facility: CLINIC | Age: 68
End: 2023-11-21
Payer: COMMERCIAL

## 2023-12-07 ENCOUNTER — TRANSFERRED RECORDS (OUTPATIENT)
Dept: HEALTH INFORMATION MANAGEMENT | Facility: CLINIC | Age: 68
End: 2023-12-07
Payer: COMMERCIAL

## 2023-12-12 ENCOUNTER — TRANSFERRED RECORDS (OUTPATIENT)
Dept: HEALTH INFORMATION MANAGEMENT | Facility: CLINIC | Age: 68
End: 2023-12-12
Payer: COMMERCIAL

## 2023-12-13 ENCOUNTER — TRANSFERRED RECORDS (OUTPATIENT)
Dept: HEALTH INFORMATION MANAGEMENT | Facility: CLINIC | Age: 68
End: 2023-12-13
Payer: COMMERCIAL

## 2023-12-19 DIAGNOSIS — K21.9 GASTROESOPHAGEAL REFLUX DISEASE WITHOUT ESOPHAGITIS: ICD-10-CM

## 2023-12-19 RX ORDER — OMEPRAZOLE 40 MG/1
40 CAPSULE, DELAYED RELEASE ORAL DAILY
Qty: 90 CAPSULE | Refills: 1 | Status: SHIPPED | OUTPATIENT
Start: 2023-12-19 | End: 2024-05-22

## 2023-12-27 ENCOUNTER — NURSE TRIAGE (OUTPATIENT)
Dept: FAMILY MEDICINE | Facility: CLINIC | Age: 68
End: 2023-12-27
Payer: COMMERCIAL

## 2023-12-27 DIAGNOSIS — M48.07 SPINAL STENOSIS OF LUMBOSACRAL REGION: Primary | ICD-10-CM

## 2023-12-27 PROBLEM — M48.00 SPINAL STENOSIS: Status: ACTIVE | Noted: 2023-12-27

## 2023-12-27 NOTE — TELEPHONE ENCOUNTER
PAL called and spoke with pt  -Relayed message from Dr. Noel (see previous note)  -Pt states he will reach out to [facility] regarding symptoms and call back if Friday appt with Al Johnson, NP is no longer needed    Patient was given an opportunity to ask questions, verbalized understanding of plan, and is agreeable.    Jazmin RUDD RN  Patient Advocate Liaison - PAL RN  [facility]  [phone]

## 2023-12-27 NOTE — TELEPHONE ENCOUNTER
S-(situation):   -Pt calls complaining of left leg pain     B-(background):   -Onset: Thursday 12/21/23  -Pain is worse upon awakening, has to do stretching/exercises prior to getting out of bed, wife has been helping him ambulate as needed   -Difficulty walking and sitting for extended period of time  -Taking OTC tylenol, but provides minimal relief   -Was seen at [facility] 12/12/23 and received Kenalog/Lidocaine injection in left knee, provided short term minimal relief    A-(assessment):   -Left leg pain: rates 10/10 w/ activity, 4-5/10 at rest, describes as sharp   -Pain starts in pt's left buttocks and travel down leg through the foot  -Denies: back pain, swelling, fever, redness, numbness/tingling     R-(recommendations):   -Scheduled for next available OV- 12/29/23 at 1:10pm with Al Johnson NP, pt wanting to be seen ASAP   -Pt declines going to UC/ED- states that he can't sit in waiting room that long due to pain, unable to go to [facility] or [facility] clinic due to transportation   -Instructed pt to call back if new or worsening symptoms    Routing to Dr. Noel to review and advise    Patient was given an opportunity to ask questions, verbalized understanding of plan, and is agreeable.    Reason for Disposition   SEVERE pain (e.g., excruciating, unable to do any normal activities)    Additional Information   Negative: Looks like a broken bone or dislocated joint (e.g., crooked or deformed)   Negative: Sounds like a life-threatening emergency to the triager   Negative: Followed a hip injury   Negative: Followed a knee injury   Negative: Followed an ankle or foot injury   Negative: Back pain radiating (shooting) into leg(s)   Negative: Foot pain is main symptom   Negative: Ankle pain is main symptom   Negative: Knee pain is main symptom   Negative: Leg swelling is main symptom   Negative: Chest pain   Negative: Difficulty breathing   Negative: Entire foot is cool or blue in comparison to other side   Negative: Unable to walk    Negative: Fever and red area (or area very tender to touch)   Negative: Swollen joint and fever   Negative: Thigh or calf pain in only one leg and present > 1 hour   Negative: Thigh, calf, or ankle swelling in only one leg   Negative: Thigh, calf, or ankle swelling in both legs, but one side is definitely more swollen   Negative: History of prior 'blood clot' in leg or lungs (i.e., deep vein thrombosis, pulmonary embolism)   Negative: History of inherited increased risk of blood clots (e.g., factor 5 Leiden, antithrombin 3, protein C or protein S deficiency, prothrombin mutation)   Negative: Major surgery in past month   Negative: Hip or leg fracture (broken bone) in past month (or had cast on leg or ankle in past month)   Negative: Illness requiring prolonged bedrest in past month (e.g., immobilization, long hospital stay)   Negative: Long-distance travel in past month (e.g., car, bus, train, plane; with trip lasting 6 or more hours)   Negative: Cancer treatment in past six months (or has cancer now)   Negative: Patient sounds very sick or weak to the triager    Protocols used: Leg Pain-A-OH    Jazmin RUDD RN  Patient Advocate Liaison - PAL RN  [facility]  [phone]

## 2023-12-27 NOTE — TELEPHONE ENCOUNTER
Provider Response to 2nd Level Triage Request    I have reviewed the RN documentation. My recommendation is:  Friday appt is satisfactory for radicular symptoms suggesting spinal stenosis  [facility] diagnosed iliotibial band syndrome recently  Spinal stenosis diagnosed prior to Sept Ortho visit  [facility] has orthopedic urgent care if he wishes  Management of spinal stenosis  is through orthopedics  Emergent spinal stenosis is vanishingly rare  Chad Noel MD

## 2023-12-29 ENCOUNTER — OFFICE VISIT (OUTPATIENT)
Dept: FAMILY MEDICINE | Facility: CLINIC | Age: 68
End: 2023-12-29
Payer: COMMERCIAL

## 2023-12-29 VITALS
BODY MASS INDEX: 29.4 KG/M2 | WEIGHT: 198.5 LBS | HEART RATE: 76 BPM | SYSTOLIC BLOOD PRESSURE: 112 MMHG | HEIGHT: 69 IN | OXYGEN SATURATION: 99 % | RESPIRATION RATE: 16 BRPM | TEMPERATURE: 97.5 F | DIASTOLIC BLOOD PRESSURE: 75 MMHG

## 2023-12-29 DIAGNOSIS — G89.29 CHRONIC LEFT-SIDED LOW BACK PAIN WITH LEFT-SIDED SCIATICA: ICD-10-CM

## 2023-12-29 DIAGNOSIS — I10 BENIGN ESSENTIAL HYPERTENSION: ICD-10-CM

## 2023-12-29 DIAGNOSIS — R60.0 PEDAL EDEMA: ICD-10-CM

## 2023-12-29 DIAGNOSIS — M48.07 SPINAL STENOSIS OF LUMBOSACRAL REGION: Primary | ICD-10-CM

## 2023-12-29 DIAGNOSIS — M54.42 CHRONIC LEFT-SIDED LOW BACK PAIN WITH LEFT-SIDED SCIATICA: ICD-10-CM

## 2023-12-29 LAB
ANION GAP SERPL CALCULATED.3IONS-SCNC: 11 MMOL/L (ref 7–15)
BUN SERPL-MCNC: 30.2 MG/DL (ref 8–23)
CALCIUM SERPL-MCNC: 9 MG/DL (ref 8.8–10.2)
CHLORIDE SERPL-SCNC: 105 MMOL/L (ref 98–107)
CREAT SERPL-MCNC: 1.29 MG/DL (ref 0.67–1.17)
DEPRECATED HCO3 PLAS-SCNC: 23 MMOL/L (ref 22–29)
EGFRCR SERPLBLD CKD-EPI 2021: 60 ML/MIN/1.73M2
GLUCOSE SERPL-MCNC: 94 MG/DL (ref 70–99)
POTASSIUM SERPL-SCNC: 4 MMOL/L (ref 3.4–5.3)
SODIUM SERPL-SCNC: 139 MMOL/L (ref 135–145)

## 2023-12-29 PROCEDURE — 36415 COLL VENOUS BLD VENIPUNCTURE: CPT

## 2023-12-29 PROCEDURE — 99213 OFFICE O/P EST LOW 20 MIN: CPT

## 2023-12-29 PROCEDURE — 80048 BASIC METABOLIC PNL TOTAL CA: CPT

## 2023-12-29 RX ORDER — CYCLOBENZAPRINE HCL 5 MG
5 TABLET ORAL 3 TIMES DAILY PRN
Qty: 45 TABLET | Refills: 0 | Status: SHIPPED | OUTPATIENT
Start: 2023-12-29 | End: 2024-05-29

## 2023-12-29 ASSESSMENT — PAIN SCALES - GENERAL: PAINLEVEL: MODERATE PAIN (4)

## 2023-12-29 NOTE — PROGRESS NOTES
Assessment & Plan     (M48.07) Spinal stenosis of lumbosacral region  (primary encounter diagnosis)  (M54.42,  G89.29) Chronic left-sided low back pain with left-sided sciatica  Comment: Patient does have history of degenerative disc disease and spinal stenosis, surgery 12 years ago.  Patient noting left-sided sciatica and lower back pain.  On exam no spinal discomfort, however, patient does note to have gluteal*1 discomfort would like to trial Flexeril.  We did discuss in depth interaction between Lamictal and both being CNS depressants.  Shared decision to start low with half pill.  Ultimately, would like patient to be seen by spine for further management. Referral placed. Discussed medication risks and benefits of Flexeril with patient in detail with patient verbal understanding. Patient fully understands and is agreeable with plan of care, at this point patient will follow up as needed unless acute concerns arise in the meantime.  Plan: Spine  Referral, cyclobenzaprine         (FLEXERIL) 5 MG tablet    (I10) Benign essential hypertension  (R60.0) Pedal edema  Comment: stable today, would like to check BMP today. Suggest compression stockings for pedal edema at this point. Will follow up w/ results and whether further direction is necessary. May  need to caution NSAID use if continuing to have low GFR. Patient fully understands and is agreeable with plan of care, at this point patient will follow up as needed unless acute concerns arise in the meantime.  Plan: Basic metabolic panel  (Ca, Cl, CO2, Creat,         Gluc, K, Na, BUN)    35 minutes spent by me on the date of the encounter doing chart review, history and exam, documentation and further activities per the note        WANDA [facility] ANGELINA Hodge is a 68 year old, presenting for the following health issues:  Musculoskeletal Problem (Leg pain)        12/29/2023     1:28 PM   Additional  "Questions   Roomed by Jackie Romo   Accompanied by self       History of Present Illness       Reason for visit:  Pain from hip to ankle  Symptom onset:  3-4 weeks ago  Symptoms include:  Pain from hip to ankle  Symptom intensity:  Severe  Symptom progression:  Staying the same  Had these symptoms before:  No  What makes it worse:  Standing and walking  What makes it better:  Sitting  and painkiller    He eats 2-3 servings of fruits and vegetables daily.He consumes 1 sweetened beverage(s) daily.He exercises with enough effort to increase his heart rate 9 or less minutes per day.  He exercises with enough effort to increase his heart rate 3 or less days per week.   He is taking medications regularly.     Pain History:  When did you first notice your pain? 3 weeks ago   Have you seen anyone else for your pain? Yes   How has your pain affected your ability to work? Not applicable  Where in your body do you have pain? Musculoskeletal problem/pain  Onset/Duration: hip to ankle   Description  Location: leg - left  Joint Swelling: YES  Redness: No  Pain: YES  Warmth: No  Intensity:  severe  Progression of Symptoms:  worsening  Accompanying signs and symptoms:   Fevers: No  Numbness/tingling/weakness: YES  History  Trauma to the area: No  Recent illness:  No  Previous similar problem: No  Previous evaluation:  YES  Precipitating or alleviating factors:  Aggravating factors include: sitting, standing, walking, climbing stairs, lifting, and exercise  Therapies tried and outcome: rest/inactivity, ice, massage, and stretching, naproxen       Review of Systems   Constitutional, HEENT, cardiovascular, pulmonary, GI, , musculoskeletal, neuro, skin, endocrine and psych systems are negative, except as otherwise noted.      Objective    /75 (BP Location: Right arm, Patient Position: Chair, Cuff Size: Adult Large)   Pulse 76   Temp 97.5  F (36.4  C) (Oral)   Resp 16   Ht 1.753 m (5' 9\")   Wt 90 kg (198 lb 8 oz)   SpO2 99%   " BMI 29.31 kg/m    Body mass index is 29.31 kg/m .  Physical Exam  Vitals and nursing note reviewed.   Constitutional:       General: He is not in acute distress.     Appearance: Normal appearance. He is not ill-appearing.   Cardiovascular:      Rate and Rhythm: Normal rate.   Pulmonary:      Effort: Pulmonary effort is normal.   Musculoskeletal:      Cervical back: Normal.      Thoracic back: Normal.      Lumbar back: Tenderness present. No spasms or bony tenderness. Decreased range of motion. Positive right straight leg raise test. Negative left straight leg raise test.        Back:       Comments: Discomfort noted w/ palpation along highlighted area. Also notes some improvement in discomfort/sciatica w/ palpation.    Skin:     General: Skin is warm and dry.   Neurological:      Mental Status: He is alert.   Psychiatric:         Mood and Affect: Mood normal.         Behavior: Behavior normal.         Thought Content: Thought content normal.         Judgment: Judgment normal.

## 2023-12-29 NOTE — PATIENT INSTRUCTIONS
Flexeril, take this as needed up to 3 times per day  -do not drink alcohol w/ this, caution driving w/ using as well    Spine referral sent    Kidney function panel to be checked today.

## 2024-01-02 ENCOUNTER — TELEPHONE (OUTPATIENT)
Dept: FAMILY MEDICINE | Facility: CLINIC | Age: 69
End: 2024-01-02
Payer: COMMERCIAL

## 2024-01-02 ENCOUNTER — PATIENT OUTREACH (OUTPATIENT)
Dept: FAMILY MEDICINE | Facility: CLINIC | Age: 69
End: 2024-01-02
Payer: COMMERCIAL

## 2024-01-02 NOTE — TELEPHONE ENCOUNTER
"Left message to return call to any triage nurse regarding result note from Al Johnson CNP .  \"Please notify patient that is kidney function is improving. Continue to stay well hydrated. May continue to utilize Naproxen, follow up w/ Spine for further evaluation. \"    Mari Martinez RN    "

## 2024-01-02 NOTE — TELEPHONE ENCOUNTER
PAL received incoming call from pt  -Reports that [facility] has not received referral placed by Al Johnson NP on 12/29/23, pt unable to schedule appt   -PAL faxed spine referral to [facility] at [phone], pt will call tomorrow for scheduling   -Pt states he has not picked-up and began taking cyclobenzaprine (FLEXERIL), PAL advised to start med to aid in pain management   -Provided PAL direct line [phone], instructed to call with further questions/concerns     Patient was given an opportunity to ask questions, verbalized understanding of plan, and is agreeable.     Jazmin RUDD, RN  Patient Advocate Liaison - PAL RN  [facility]  [phone]

## 2024-01-02 NOTE — TELEPHONE ENCOUNTER
Notified Patient of provider's message.   Person was given an opportunity to ask questions, verbalized understanding of plan, and is agreeable.     Mari Martinez RN

## 2024-01-04 ENCOUNTER — TRANSFERRED RECORDS (OUTPATIENT)
Dept: HEALTH INFORMATION MANAGEMENT | Facility: CLINIC | Age: 69
End: 2024-01-04
Payer: COMMERCIAL

## 2024-01-04 NOTE — TELEPHONE ENCOUNTER
PAL received incoming call from pt   -Pt wanting phone number for [facility] sent via [email or web address] message  -PAL sent [employer] message for pt to schedule with [phone]     Patient was given an opportunity to ask questions, verbalized understanding of plan, and is agreeable.     Jazmin RUDD RN  Patient Advocate Liaison - PAL RN  [facility]  [phone]

## 2024-01-09 ENCOUNTER — TRANSFERRED RECORDS (OUTPATIENT)
Dept: HEALTH INFORMATION MANAGEMENT | Facility: CLINIC | Age: 69
End: 2024-01-09
Payer: COMMERCIAL

## 2024-01-09 ENCOUNTER — NURSE TRIAGE (OUTPATIENT)
Dept: FAMILY MEDICINE | Facility: CLINIC | Age: 69
End: 2024-01-09
Payer: COMMERCIAL

## 2024-01-09 DIAGNOSIS — R60.0 PEDAL EDEMA: Primary | ICD-10-CM

## 2024-01-10 ENCOUNTER — HOSPITAL ENCOUNTER (OUTPATIENT)
Dept: ULTRASOUND IMAGING | Facility: CLINIC | Age: 69
Discharge: HOME | End: 2024-01-10
Attending: FAMILY MEDICINE | Admitting: FAMILY MEDICINE
Payer: COMMERCIAL

## 2024-01-10 DIAGNOSIS — R60.0 PEDAL EDEMA: ICD-10-CM

## 2024-01-10 PROCEDURE — 93971 EXTREMITY STUDY: CPT | Mod: LT

## 2024-01-11 ENCOUNTER — OFFICE VISIT (OUTPATIENT)
Dept: FAMILY MEDICINE | Facility: CLINIC | Age: 69
End: 2024-01-11
Payer: COMMERCIAL

## 2024-01-11 VITALS
RESPIRATION RATE: 14 BRPM | WEIGHT: 199 LBS | SYSTOLIC BLOOD PRESSURE: 107 MMHG | HEART RATE: 82 BPM | DIASTOLIC BLOOD PRESSURE: 69 MMHG | TEMPERATURE: 98 F | OXYGEN SATURATION: 99 % | HEIGHT: 69 IN | BODY MASS INDEX: 29.47 KG/M2

## 2024-01-11 DIAGNOSIS — E16.1 OTHER HYPOGLYCEMIA: ICD-10-CM

## 2024-01-11 DIAGNOSIS — M15.0 PRIMARY OSTEOARTHRITIS INVOLVING MULTIPLE JOINTS: ICD-10-CM

## 2024-01-11 DIAGNOSIS — E66.811 CLASS 1 OBESITY DUE TO EXCESS CALORIES WITHOUT SERIOUS COMORBIDITY IN ADULT, UNSPECIFIED BMI: ICD-10-CM

## 2024-01-11 DIAGNOSIS — R60.0 PEDAL EDEMA: Primary | ICD-10-CM

## 2024-01-11 DIAGNOSIS — Q03.0: ICD-10-CM

## 2024-01-11 DIAGNOSIS — G40.309 GENERALIZED TONIC CLONIC EPILEPSY (H): ICD-10-CM

## 2024-01-11 DIAGNOSIS — D32.9 MENINGIOMA (H): ICD-10-CM

## 2024-01-11 DIAGNOSIS — J43.1 PANLOBULAR EMPHYSEMA (H): ICD-10-CM

## 2024-01-11 DIAGNOSIS — E66.09 CLASS 1 OBESITY DUE TO EXCESS CALORIES WITHOUT SERIOUS COMORBIDITY IN ADULT, UNSPECIFIED BMI: ICD-10-CM

## 2024-01-11 LAB — HBA1C MFR BLD: 4.8 % (ref 0–5.6)

## 2024-01-11 PROCEDURE — 36415 COLL VENOUS BLD VENIPUNCTURE: CPT | Performed by: FAMILY MEDICINE

## 2024-01-11 PROCEDURE — 99214 OFFICE O/P EST MOD 30 MIN: CPT | Performed by: FAMILY MEDICINE

## 2024-01-11 PROCEDURE — 83036 HEMOGLOBIN GLYCOSYLATED A1C: CPT | Performed by: FAMILY MEDICINE

## 2024-01-11 NOTE — RESULT ENCOUNTER NOTE
This is low enough. We cannot increase the medicine further, but I have refilled it  Chad Noel MD

## 2024-01-11 NOTE — PROGRESS NOTES
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  No records.  We shall facilitate through PAL         Class 1 obesity due to excess calories without serious comorbidity in adult, unspecified BMI     Discussed cardiac benefit discussed welcome weight loss.  Discussed increased death rate in diabetics with A1c lowered too far.  Check A1c.  We may leave his semaglutide at current dose         Relevant Orders    Hemoglobin A1c (Completed)    Generalized tonic clonic epilepsy (H)     Male neurology no longer follows.  Check twice yearly         Meningioma (H)     Slightly enlarged asymptomatic at last [place] neurosurgery visit.  They wanted to see him in 1 year.  We shall facilitate appointment         Other hypoglycemia     ACCORD study may apply         Relevant Orders    Hemoglobin A1c (Completed)    Panlobular emphysema (H)     No sx. No changes. [place] [facility]         Pedal edema - Primary     He was concerned about acute change and medicine side effect.  No evidence of DVT yesterday.  Discussed his mild varicosities longstanding symptoms.  He has compression hose he cannot put on because of his arthritic hands.  Discussed pathophysiology our database. recommend knee-high hose that he can put on. they need not be compression.         Primary osteoarthritis involving multiple joints     In [place] on [place].  He wonders if stopping that is cause of his edema.  Diffuse osteoarthritis, [facility] is intervening in a variety of areas.  Recent left foot osteotomy.  Edema slightly more pronounced on right than left.  His orthopedist evaluated his swelling and reassured him                          Chad Noel MD  [facility]    Vijay Hodge is a 68 year old, presenting for the following health issues:  Musculoskeletal Problem        1/11/2024     2:04 PM   Additional Questions  "  Roomed by Kelsey MICHELLE      Musculoskeletal problem/pain  Onset/Duration: 1 Week  Description  Location: leg - left  Joint Swelling: YES  Redness: YES  Pain: No  Warmth: YES  Intensity:  moderate  Progression of Symptoms:  worsening  Accompanying signs and symptoms:   Fevers: No  Numbness/tingling/weakness: No  History  Trauma to the area: No  Recent illness:  No  Previous similar problem: YES  Previous evaluation:  YES  Precipitating or alleviating factors:  Aggravating factors include: none  Therapies tried and outcome: nothing          Review of Systems   As described  Left MTP dorsal eschar without evidence of infection      Objective    /69 (BP Location: Right arm, Patient Position: Chair, Cuff Size: Adult Large)   Pulse 82   Temp 98  F (36.7  C) (Oral)   Resp 14   Ht 1.753 m (5' 9\")   Wt 90.3 kg (199 lb)   SpO2 99%   BMI 29.39 kg/m    Body mass index is 29.39 kg/m .  Physical Exam                         "

## 2024-01-12 ENCOUNTER — TELEPHONE (OUTPATIENT)
Dept: FAMILY MEDICINE | Facility: CLINIC | Age: 69
End: 2024-01-12
Payer: COMMERCIAL

## 2024-01-12 ENCOUNTER — PATIENT OUTREACH (OUTPATIENT)
Dept: FAMILY MEDICINE | Facility: CLINIC | Age: 69
End: 2024-01-12
Payer: COMMERCIAL

## 2024-01-12 PROBLEM — E16.1 OTHER HYPOGLYCEMIA: Status: ACTIVE | Noted: 2024-01-12

## 2024-01-12 NOTE — TELEPHONE ENCOUNTER
Prior Authorization Retail Medication Request    Medication/Dose: semaglutide (OZEMPIC) 2 MG/3ML pen  Diagnosis and ICD code (if different than what is on RX):    New/renewal/insurance change PA/secondary ins. PA:  Previously Tried and Failed:    Rationale:      Smith: [id number]    Mitzi Chavez TC

## 2024-01-12 NOTE — TELEPHONE ENCOUNTER
Pt requests new ozempic script. Informed pt this script was renewed 1/11/24, and a PA has been started. Informed pt he can call back next week to check on progress. He verbalized understanding and agrees with plan.  Roger Baxter RN on 1/12/2024 at 4:18 PM

## 2024-01-12 NOTE — ASSESSMENT & PLAN NOTE
In [place] on [place].  He wonders if stopping that is cause of his edema.  Diffuse osteoarthritis, [facility] is intervening in a variety of areas.  Recent left foot osteotomy.  Edema slightly more pronounced on right than left.  His orthopedist evaluated his swelling and reassured him

## 2024-01-12 NOTE — ASSESSMENT & PLAN NOTE
He was concerned about acute change and medicine side effect.  No evidence of DVT yesterday.  Discussed his mild varicosities longstanding symptoms.  He has compression hose he cannot put on because of his arthritic hands.  Discussed pathophysiology our database. recommend knee-high hose that he can put on. they need not be compression.

## 2024-01-12 NOTE — ASSESSMENT & PLAN NOTE
Slightly enlarged asymptomatic at last [place] neurosurgery visit.  They wanted to see him in 1 year.  We shall facilitate appointment

## 2024-01-12 NOTE — ASSESSMENT & PLAN NOTE
Discussed cardiac benefit discussed welcome weight loss.  Discussed increased death rate in diabetics with A1c lowered too far.  Check A1c.  We may leave his semaglutide at current dose

## 2024-01-12 NOTE — TELEPHONE ENCOUNTER
"Pt left  for PAL yesterday 1/11 at 1611  -Wondering about lab results     PAL attempted (attempt #1) to contact pt  -Left  requesting call back [phone], awaiting call back at this time    PAL notes routing comment from Dr. Noel  \"Patient was to have seen [place] neurosurgery last year.  He did not.  He may have an appointment.  He may wish to transfer care to local neurosurgery , who may (or may not ) wish to take on surveillance.  He may wish to see his [place] neurosurgeon.  please facilitate visit of choice\"  MD RULA Greer will attempt to contact again on Mon 1/15 if no response     Jazmin RUDD RN  Patient Advocate Liaison - PAL RN  [facility]  [phone]   "

## 2024-01-12 NOTE — TELEPHONE ENCOUNTER
"PAL received incoming call from pt  -Relayed 1/12/24 lab result note from Dr. Noel:  \"This is low enough. We cannot increase the medicine further, but I have refilled it\"    Component      Latest Ref Rng 1/11/2024  3:08 PM   Hemoglobin A1C      0.0 - 5.6 % 4.8      -Pt is unsure which [facility] provider he was seeing but is willing to schedule appointment, PAL will look into further when I return to office on Monday 1/15/24- pt agreeable    Patient was given an opportunity to ask questions, verbalized understanding of plan, and is agreeable.     Jazmin RUDD, RN  Patient Advocate Liaison - PAL RN  [facility]  [phone]   "

## 2024-01-15 NOTE — TELEPHONE ENCOUNTER
PAL called and spoke with pt   -Advised to schedule appt with [facility] neurosurgeon Prieto Vizcarra M.D., sent [employer] message with contact info    Department of Neurologic Surgery- Prieto Vizcarra M.D.   [address] [place] [id number] [phone]    Patient was given an opportunity to ask questions, verbalized understanding of plan, and is agreeable.     Jazmin RUDD RN  Patient Advocate Liaison - PAL RN  [facility]  [phone]

## 2024-01-17 NOTE — TELEPHONE ENCOUNTER
PA Initiation    Medication: OZEMPIC (0.25 OR 0.5 MG/DOSE) 2 MG/3ML SC SOPN  Insurance Company: [employer] Part D - Phone [phone] Fax [phone]  Pharmacy Filling the Rx: [id number] [place], [place] - [address] AT [facility] [address]  Filling Pharmacy Phone: [phone]  Filling Pharmacy Fax:    Start Date: 1/17/2024

## 2024-01-17 NOTE — TELEPHONE ENCOUNTER
Pt calls wondering status of PA for Ozempic  -PAL notes PA has not been initiated yet  -Pt requesting PA be resent has high priority as he does not want to get behind on med     Routing to PA team    Jazmin RUDD RN  Patient Advocate Liaison - PAL RN  [facility]  [phone]

## 2024-01-18 ENCOUNTER — VIRTUAL VISIT (OUTPATIENT)
Dept: FAMILY MEDICINE | Facility: CLINIC | Age: 69
End: 2024-01-18
Payer: COMMERCIAL

## 2024-01-18 DIAGNOSIS — E66.811 CLASS 1 OBESITY DUE TO EXCESS CALORIES WITHOUT SERIOUS COMORBIDITY IN ADULT, UNSPECIFIED BMI: Primary | ICD-10-CM

## 2024-01-18 DIAGNOSIS — E66.09 CLASS 1 OBESITY DUE TO EXCESS CALORIES WITHOUT SERIOUS COMORBIDITY IN ADULT, UNSPECIFIED BMI: Primary | ICD-10-CM

## 2024-01-18 DIAGNOSIS — G40.309 GENERALIZED TONIC CLONIC EPILEPSY (H): ICD-10-CM

## 2024-01-18 PROCEDURE — 99214 OFFICE O/P EST MOD 30 MIN: CPT | Mod: 95 | Performed by: FAMILY MEDICINE

## 2024-01-18 RX ORDER — TOPIRAMATE 100 MG/1
100 TABLET, FILM COATED ORAL DAILY
Qty: 90 TABLET | Refills: 1 | Status: SHIPPED | OUTPATIENT
Start: 2024-01-18 | End: 2024-09-23

## 2024-01-18 RX ORDER — TOPIRAMATE 50 MG/1
50 TABLET, FILM COATED ORAL DAILY
Qty: 10 TABLET | Refills: 0 | Status: SHIPPED | OUTPATIENT
Start: 2024-01-18 | End: 2024-02-05 | Stop reason: DRUGHIGH

## 2024-01-18 NOTE — TELEPHONE ENCOUNTER
PRIOR AUTHORIZATION DENIED    Medication: OZEMPIC (0.25 OR 0.5 MG/DOSE) 2 MG/3ML SC SOPN  Insurance Company: [employer] Part D - Phone [phone] Fax [phone]  Denial Date: 1/18/2024  Denial Reason(s): MEDICATION IS NOT COVERED FOR DX - only covered for DMII  POSSIBLE ALTERNATIVES: Saxenda or Wegovy        Appeal Information:   Patient Notified: No

## 2024-01-18 NOTE — TELEPHONE ENCOUNTER
PAL received incoming call from pt  -Informed of denied PA for Ozempic  -Pt wondering next steps, would like alternative med if Dr. Noel is not writing appeal letter    Routing to Dr. Noel to review and advise    Jazmin RUDD RN  Patient Advocate Liaison - PAL RN  [facility]  [phone]

## 2024-01-18 NOTE — PROGRESS NOTES
"Naveen is a 68 year old who is being evaluated via a billable video visit.      How would you like to obtain your AVS? MyChart  If the video visit is dropped, the invitation should be resent by: Text to cell phone: [phone]  Will anyone else be joining your video visit? No          Assessment & Plan   Problem List Items Addressed This Visit       Class 1 obesity due to excess calories without serious comorbidity in adult, unspecified BMI - Primary     His insurance formulary no longer includes semaglutide or any such agent.  Discussed he has had successful weight loss.  Prior to this discovery, topiramate was quite fruitful for weight loss.  He is interested.  With his seizure disorder, formulary should not rear its ugly head         Generalized tonic clonic epilepsy (H)     Now followed by this office.  Continue lamictal. Add topirimate         Relevant Medications    topiramate (TOPAMAX) 50 MG tablet    topiramate (TOPAMAX) 100 MG tablet                     Subjective   Naveen is a 68 year old, presenting for the following health issues:  Medication Request (Alternative for Ozempic)        1/18/2024     4:32 PM   Additional Questions   Roomed by Ethel MICHELLE     Medication Followup of Ozempic  Taking Medication as prescribed: NO  Side Effects:  not taking would like to discuss alternative options as insurance does not cover              Objective    Vitals - Patient Reported  Weight (Patient Reported): 91.2 kg (201 lb)  Height (Patient Reported): 175.3 cm (5' 9\")  BMI (Based on Pt Reported Ht/Wt): 29.68      Vitals:  No vitals were obtained today due to virtual visit.    Physical Exam   GENERAL: alert and no distress  EYES: Eyes grossly normal to inspection.  No discharge or erythema, or obvious scleral/conjunctival abnormalities.  RESP: No audible wheeze, cough, or visible cyanosis.    SKIN: Visible skin clear. No significant rash, abnormal pigmentation or lesions.  NEURO:Bell's s Palsy " asymmetry.  PSYCH: Appropriate affect, tone, and pace of words          Video-Visit Details    Type of service:  Video Visit   Video Start Time:  4:40 P  Video End Time: 4:46    Originating Location (pt. Location): Home    Distant Location (provider location):  On-site  Platform used for Video Visit: Carmita  Signed Electronically by: Chad Noel MD

## 2024-01-18 NOTE — TELEPHONE ENCOUNTER
PAL called and spoke with pt  -Scheduled for video visit with Dr. Noel today 1/18 at 5pm    Jazmin RUDD RN  Patient Advocate Liaison - PAL RN  [facility]  [phone]

## 2024-01-19 ENCOUNTER — TELEPHONE (OUTPATIENT)
Dept: FAMILY MEDICINE | Facility: CLINIC | Age: 69
End: 2024-01-19
Payer: COMMERCIAL

## 2024-01-19 DIAGNOSIS — E66.09 CLASS 1 OBESITY DUE TO EXCESS CALORIES WITHOUT SERIOUS COMORBIDITY IN ADULT, UNSPECIFIED BMI: Primary | ICD-10-CM

## 2024-01-19 DIAGNOSIS — E66.811 CLASS 1 OBESITY DUE TO EXCESS CALORIES WITHOUT SERIOUS COMORBIDITY IN ADULT, UNSPECIFIED BMI: Primary | ICD-10-CM

## 2024-01-19 NOTE — TELEPHONE ENCOUNTER
Patient calling.    He informs that he was prescribed Ozempic last year for weight loss but had to discontinue medication due to his insurance stopping coverage.  He is interested in having Wegovy prescribed. Advised patient to contact his insurance provider to see if they will cover Wegovy and then to call clinic back to inform.    Patient was given an opportunity to ask questions, verbalized understanding of plan, and is agreeable.    Tavia GARCIAS RN

## 2024-01-19 NOTE — ASSESSMENT & PLAN NOTE
His insurance formulary no longer includes semaglutide or any such agent.  Discussed he has had successful weight loss.  Prior to this discovery, topiramate was quite fruitful for weight loss.  He is interested.  With his seizure disorder, formulary should not rear its ugly head

## 2024-01-23 NOTE — TELEPHONE ENCOUNTER
"Signed  However, denial stated \" drugs when used for weight loss are excluded from coverage under Medicare rules.  If approved, topiramate will have to be stopped  Chad Noel MD    "

## 2024-01-23 NOTE — TELEPHONE ENCOUNTER
PAL called and spoke with pt   -Informed of rx sent, relayed message from Dr. Noel (see previous note)  -PAL wondering if pt would need alternative med to Topiramate if discontinued? Notes dx code is for his epilepsy     Patient was given an opportunity to ask questions, verbalized understanding of plan, and is agreeable.     Routing to Dr. Noel to review and advise    Jazmin RUDD RN  Patient Advocate Liaison - PAL RN  [facility]  [phone]

## 2024-01-23 NOTE — TELEPHONE ENCOUNTER
- Noted, huddled with PAL RN and advised.  - Closing encounter.    Jurgen Krause RN  Patient Advocate Liaison (PAL)  [facility]    01/23/2024 at 4:14 PM

## 2024-01-23 NOTE — TELEPHONE ENCOUNTER
PAL received incoming call from pt   -Reports he contacted his insurance who states that Wegovy would likely be approved with the diagnosis of obesity, pt requesting Dr. Noel place order for Wegovy   -Pt wanting to inform PCP that he has had no change in appetite with Topiramate     Rx pended, routing to Dr. Bert RUDD, RN  Patient Advocate Liaison - PAL RN  [facility]  [phone]

## 2024-01-25 ENCOUNTER — PATIENT OUTREACH (OUTPATIENT)
Dept: FAMILY MEDICINE | Facility: CLINIC | Age: 69
End: 2024-01-25
Payer: COMMERCIAL

## 2024-01-25 NOTE — TELEPHONE ENCOUNTER
Please initiate PA process for Wegovy     Semaglutide-Weight Management (WEGOVY) 1 MG/0.5ML pen 2 mL 1 1/23/2024 -- No   Sig - Route: Inject 1 mg Subcutaneous once a week - Subcutaneous     Routing to PA team    Jazmin RUDD RN  Patient Advocate Liaison - PAL RN  [facility]  [phone]

## 2024-01-29 NOTE — TELEPHONE ENCOUNTER
PRIOR AUTHORIZATION DENIED    Medication: Wegovy     Denial Date: 1/29/2024    Denial Rational:  Per insurance, medication is excluded from patient's benefit plan and will not be covered. Review and appeal are not available because of this exclusion.              Appeal Information:  N/A

## 2024-01-29 NOTE — TELEPHONE ENCOUNTER
Central Prior Authorization Team   Phone: [phone]    PA Initiation    Medication: Wegovy   Insurance Company: [employer] ([facility]) - Phone [phone] Fax [phone]  Pharmacy Filling the Rx: [id number] [place], [place] - [address] AT [facility] [address]  Filling Pharmacy Phone: [phone]  Filling Pharmacy Fax:    Start Date: 1/29/2024

## 2024-01-30 NOTE — TELEPHONE ENCOUNTER
"Routed to SB3 PAL, please inform pt of PA denial    \"Per insurance, medication is excluded from patient's benefit plan and will not be covered. Review and appeal are not available because of this exclusion\"    Grace Malone RN, BSN  [facility]      "

## 2024-01-31 NOTE — TELEPHONE ENCOUNTER
PAL called and spoke with pt  -Notified of denied PA for Wegovy   -Pt wondering if Dr. Noel has any other recommendations or alternative medications? Reports topiramate (TOPAMAX) is ineffective     Routing to PCP and/or covering provider to review and advise    Jazmin RUDD RN  Patient Advocate Liaison - PAL RN  [facility]  [phone]

## 2024-02-01 NOTE — TELEPHONE ENCOUNTER
I would probably recommend another virtual visit with Dr. Noel to discuss other options. Otherwise, he can wait for Dr. Noel to review this message next week.    Shady Treviño PA-C on 2/1/2024 at 9:02 AM (covering for Dr. Noel)

## 2024-02-01 NOTE — TELEPHONE ENCOUNTER
PAL called and spoke with pt  -Relayed message from Shady Treviño PA-C, see previous note  -Pt would like to see PCP in-person, scheduled for OV 2/5/24 at 4:10pm     Patient was given an opportunity to ask questions, verbalized understanding of plan, and is agreeable.     Jazmin RUDD RN  Patient Advocate Liaison - PAL RN  [facility]  [phone]

## 2024-02-05 ENCOUNTER — TRANSFERRED RECORDS (OUTPATIENT)
Dept: HEALTH INFORMATION MANAGEMENT | Facility: CLINIC | Age: 69
End: 2024-02-05

## 2024-02-05 ENCOUNTER — OFFICE VISIT (OUTPATIENT)
Dept: FAMILY MEDICINE | Facility: CLINIC | Age: 69
End: 2024-02-05
Payer: COMMERCIAL

## 2024-02-05 VITALS
HEIGHT: 68 IN | RESPIRATION RATE: 10 BRPM | BODY MASS INDEX: 30.16 KG/M2 | WEIGHT: 199 LBS | HEART RATE: 68 BPM | DIASTOLIC BLOOD PRESSURE: 80 MMHG | SYSTOLIC BLOOD PRESSURE: 131 MMHG | OXYGEN SATURATION: 100 % | TEMPERATURE: 98 F

## 2024-02-05 DIAGNOSIS — E66.09 CLASS 1 OBESITY DUE TO EXCESS CALORIES WITHOUT SERIOUS COMORBIDITY IN ADULT, UNSPECIFIED BMI: ICD-10-CM

## 2024-02-05 DIAGNOSIS — G47.00 INSOMNIA, UNSPECIFIED TYPE: ICD-10-CM

## 2024-02-05 DIAGNOSIS — K21.9 GASTROESOPHAGEAL REFLUX DISEASE WITHOUT ESOPHAGITIS: ICD-10-CM

## 2024-02-05 DIAGNOSIS — E66.811 CLASS 1 OBESITY DUE TO EXCESS CALORIES WITHOUT SERIOUS COMORBIDITY IN ADULT, UNSPECIFIED BMI: ICD-10-CM

## 2024-02-05 DIAGNOSIS — G40.309 GENERALIZED TONIC CLONIC EPILEPSY (H): ICD-10-CM

## 2024-02-05 DIAGNOSIS — K59.09 OTHER CONSTIPATION: ICD-10-CM

## 2024-02-05 DIAGNOSIS — G43.709 CHRONIC MIGRAINE WITHOUT AURA WITHOUT STATUS MIGRAINOSUS, NOT INTRACTABLE: ICD-10-CM

## 2024-02-05 DIAGNOSIS — M15.0 PRIMARY OSTEOARTHRITIS INVOLVING MULTIPLE JOINTS: ICD-10-CM

## 2024-02-05 PROCEDURE — 99214 OFFICE O/P EST MOD 30 MIN: CPT | Performed by: FAMILY MEDICINE

## 2024-02-05 RX ORDER — ZOLPIDEM TARTRATE 5 MG/1
5 TABLET ORAL
Qty: 90 TABLET | Refills: 1 | Status: CANCELLED | OUTPATIENT
Start: 2024-02-05

## 2024-02-05 RX ORDER — OMEPRAZOLE 40 MG/1
40 CAPSULE, DELAYED RELEASE ORAL DAILY
Qty: 90 CAPSULE | Refills: 1 | Status: CANCELLED | OUTPATIENT
Start: 2024-02-05

## 2024-02-05 RX ORDER — RIZATRIPTAN BENZOATE 10 MG/1
TABLET ORAL
Qty: 15 TABLET | Refills: 2 | Status: SHIPPED | OUTPATIENT
Start: 2024-02-05

## 2024-02-05 RX ORDER — TOPIRAMATE 100 MG/1
100 TABLET, FILM COATED ORAL DAILY
Qty: 90 TABLET | Refills: 1 | Status: CANCELLED | OUTPATIENT
Start: 2024-02-05

## 2024-02-05 NOTE — PROGRESS NOTES
Assessment & Plan   Problem List Items Addressed This Visit       Chronic migraine without aura without status migrainosus, not intractable     Infrequent, Topamax may prove prophylactic         Relevant Medications    rizatriptan (MAXALT) 10 MG tablet    Class 1 obesity due to excess calories without serious comorbidity in adult, unspecified BMI     Insurance notes that Medicare specifically excludes any weight loss agents.  Wegovy will not be paid for.  Topiramate may help with weight.  After 2 weeks of therapy, he is convinced it does not suppress his appetite as well.  Discussed.  Continue         Esophageal reflux     Symptomatic.  Indefinite PPI         Relevant Medications    linaclotide (LINZESS) 290 MCG capsule    Generalized tonic clonic epilepsy (H)     Last seizure remote.  Continue current effective therapy         Relevant Medications    rizatriptan (MAXALT) 10 MG tablet    Insomnia, unspecified type     Continue effective zolpidem         Other constipation     Refill effective Linzess         Relevant Medications    linaclotide (LINZESS) 290 MCG capsule    Primary osteoarthritis involving multiple joints     Orthopedics is now looking in his back.  He has an MRI scheduled for radicular symptoms                          Vijay Hodge is a 68 year old, presenting for the following health issues:  Recheck Medication        2/5/2024     4:18 PM   Additional Questions   Roomed by Rossi WALKER CMA     History of Present Illness       Reason for visit:  Consultng    He eats 2-3 servings of fruits and vegetables daily.He consumes 1 sweetened beverage(s) daily.He exercises with enough effort to increase his heart rate 9 or less minutes per day.    He is taking medications regularly.         Diabetes Follow-up    How often are you checking your blood sugar? Not at all  What concerns do you have today about your diabetes? None   Do you have any of these symptoms? (Select all that apply)  Numbness in  "feet      BP Readings from Last 2 Encounters:   02/05/24 131/80   01/11/24 107/69     Hemoglobin A1C (%)   Date Value   01/11/2024 4.8   05/01/2023 5.0   04/05/2018 4.8   03/27/2017 4.8     LDL Cholesterol Calculated (mg/dL)   Date Value   05/01/2023 71   06/23/2022 69   02/03/2020 64   04/05/2018 68             He believes his appetite is increased      Objective    /80 (BP Location: Right arm, Patient Position: Sitting, Cuff Size: Adult Large)   Pulse 68   Temp 98  F (36.7  C) (Oral)   Resp 10   Ht 1.727 m (5' 8\")   Wt 90.3 kg (199 lb)   SpO2 100%   BMI 30.26 kg/m    Body mass index is 30.26 kg/m .  Physical Exam     Stable mild 7th nerve palsy          Signed Electronically by: Chad Noel MD    "

## 2024-02-06 NOTE — ASSESSMENT & PLAN NOTE
Insurance notes that Medicare specifically excludes any weight loss agents.  Wegovy will not be paid for.  Topiramate may help with weight.  After 2 weeks of therapy, he is convinced it does not suppress his appetite as well.  Discussed.  Continue

## 2024-02-07 ENCOUNTER — TELEPHONE (OUTPATIENT)
Dept: FAMILY MEDICINE | Facility: CLINIC | Age: 69
End: 2024-02-07
Payer: COMMERCIAL

## 2024-02-07 DIAGNOSIS — E66.09 CLASS 1 OBESITY DUE TO EXCESS CALORIES WITHOUT SERIOUS COMORBIDITY IN ADULT, UNSPECIFIED BMI: Primary | ICD-10-CM

## 2024-02-07 DIAGNOSIS — E66.811 CLASS 1 OBESITY DUE TO EXCESS CALORIES WITHOUT SERIOUS COMORBIDITY IN ADULT, UNSPECIFIED BMI: Primary | ICD-10-CM

## 2024-02-07 RX ORDER — TIRZEPATIDE 2.5 MG/.5ML
2.5 INJECTION, SOLUTION SUBCUTANEOUS
Qty: 2 ML | Refills: 3 | Status: ON HOLD | OUTPATIENT
Start: 2024-02-07 | End: 2024-06-11

## 2024-02-07 NOTE — TELEPHONE ENCOUNTER
Pt calls, pt baileys Mounjaro rx prescribed for weight loss, dicussed weight lost exclusions, routed to Chad Noel MD, please advise/confirm, route to inform pt    Grace Malone RN, BSN  [facility]

## 2024-02-07 NOTE — TELEPHONE ENCOUNTER
PRIOR AUTHORIZATION DENIED    Medication: TIRZEPATIDE 2.5 MG/0.5ML SC SOPN  Insurance Company: [employer] ([facility]) - Phone [phone] Fax [phone]  Denial Date: 2/7/2024  Denial Reason(s):     Appeal Information:     Patient Notified: No

## 2024-02-07 NOTE — TELEPHONE ENCOUNTER
Called pt and left a message notifying pt of denial of PA for mounjaro medication. Instructed pt to call back to PAL RN with any questions or concerns.     KVNG Kelly (Patient Advocate Liaison)  [facility]

## 2024-02-07 NOTE — TELEPHONE ENCOUNTER
Called pt and left a message to call back to PAL RN at [phone], if calling back on 2/8 or today by calling [phone] and ask to speak to a triage nurse.     Pam MARSHALL RN   PAL (Patient Advocate Liaison)  [facility]

## 2024-02-21 ENCOUNTER — TRANSFERRED RECORDS (OUTPATIENT)
Dept: HEALTH INFORMATION MANAGEMENT | Facility: CLINIC | Age: 69
End: 2024-02-21
Payer: COMMERCIAL

## 2024-02-22 ENCOUNTER — TRANSFERRED RECORDS (OUTPATIENT)
Dept: HEALTH INFORMATION MANAGEMENT | Facility: CLINIC | Age: 69
End: 2024-02-22
Payer: COMMERCIAL

## 2024-02-29 ENCOUNTER — TRANSFERRED RECORDS (OUTPATIENT)
Dept: HEALTH INFORMATION MANAGEMENT | Facility: CLINIC | Age: 69
End: 2024-02-29
Payer: COMMERCIAL

## 2024-03-07 ENCOUNTER — TRANSFERRED RECORDS (OUTPATIENT)
Dept: HEALTH INFORMATION MANAGEMENT | Facility: CLINIC | Age: 69
End: 2024-03-07
Payer: COMMERCIAL

## 2024-03-11 ENCOUNTER — TRANSFERRED RECORDS (OUTPATIENT)
Dept: HEALTH INFORMATION MANAGEMENT | Facility: CLINIC | Age: 69
End: 2024-03-11
Payer: COMMERCIAL

## 2024-03-14 ENCOUNTER — OFFICE VISIT (OUTPATIENT)
Dept: FAMILY MEDICINE | Facility: CLINIC | Age: 69
End: 2024-03-14
Payer: COMMERCIAL

## 2024-03-14 VITALS
OXYGEN SATURATION: 100 % | WEIGHT: 196.1 LBS | BODY MASS INDEX: 29.72 KG/M2 | SYSTOLIC BLOOD PRESSURE: 124 MMHG | HEIGHT: 68 IN | TEMPERATURE: 98.1 F | DIASTOLIC BLOOD PRESSURE: 77 MMHG | RESPIRATION RATE: 11 BRPM | HEART RATE: 67 BPM

## 2024-03-14 DIAGNOSIS — R63.5 WEIGHT GAIN: ICD-10-CM

## 2024-03-14 DIAGNOSIS — D42.9 MENINGIOMATOSIS (H): ICD-10-CM

## 2024-03-14 DIAGNOSIS — M48.07 SPINAL STENOSIS OF LUMBOSACRAL REGION: ICD-10-CM

## 2024-03-14 DIAGNOSIS — D32.9 MENINGIOMA (H): ICD-10-CM

## 2024-03-14 DIAGNOSIS — Z71.89 ADVANCED DIRECTIVES, COUNSELING/DISCUSSION: ICD-10-CM

## 2024-03-14 DIAGNOSIS — E78.5 HYPERLIPIDEMIA LDL GOAL <70: ICD-10-CM

## 2024-03-14 DIAGNOSIS — Z87.898 HISTORY OF SEIZURE: ICD-10-CM

## 2024-03-14 DIAGNOSIS — F41.1 GENERALIZED ANXIETY DISORDER: ICD-10-CM

## 2024-03-14 DIAGNOSIS — R41.89 COGNITIVE CHANGE: ICD-10-CM

## 2024-03-14 DIAGNOSIS — I10 BENIGN ESSENTIAL HYPERTENSION: Primary | ICD-10-CM

## 2024-03-14 LAB
ERYTHROCYTE [SEDIMENTATION RATE] IN BLOOD BY WESTERGREN METHOD: 12 MM/HR (ref 0–20)
FOLATE SERPL-MCNC: 7.4 NG/ML (ref 4.6–34.8)

## 2024-03-14 PROCEDURE — 80053 COMPREHEN METABOLIC PANEL: CPT | Performed by: FAMILY MEDICINE

## 2024-03-14 PROCEDURE — 80061 LIPID PANEL: CPT | Performed by: FAMILY MEDICINE

## 2024-03-14 PROCEDURE — 80175 DRUG SCREEN QUAN LAMOTRIGINE: CPT | Mod: 90 | Performed by: FAMILY MEDICINE

## 2024-03-14 PROCEDURE — 85652 RBC SED RATE AUTOMATED: CPT | Performed by: FAMILY MEDICINE

## 2024-03-14 PROCEDURE — 84443 ASSAY THYROID STIM HORMONE: CPT | Performed by: FAMILY MEDICINE

## 2024-03-14 PROCEDURE — 82746 ASSAY OF FOLIC ACID SERUM: CPT | Performed by: FAMILY MEDICINE

## 2024-03-14 PROCEDURE — 99214 OFFICE O/P EST MOD 30 MIN: CPT | Performed by: FAMILY MEDICINE

## 2024-03-14 PROCEDURE — 99000 SPECIMEN HANDLING OFFICE-LAB: CPT | Performed by: FAMILY MEDICINE

## 2024-03-14 PROCEDURE — 82607 VITAMIN B-12: CPT | Performed by: FAMILY MEDICINE

## 2024-03-14 PROCEDURE — 36415 COLL VENOUS BLD VENIPUNCTURE: CPT | Performed by: FAMILY MEDICINE

## 2024-03-14 NOTE — PROGRESS NOTES
Assessment & Plan   Problem List Items Addressed This Visit       Advanced directives, counseling/discussion     Discussed his differential including the possibility of dementia.  Recommend that he has affairs in order, introduce advanced directives         Benign essential hypertension - Primary     Controlled.  Continue         Relevant Orders    COMPREHENSIVE METABOLIC PANEL (Completed)    Cognitive change     Patient notes that he is becoming more forgetful.  His neurologist at [facility] who retired a few years ago.  I recommend we have him see neurology..  Serologic evaluation for dementia diagnosed         Relevant Orders    Adult Neurology  Referral    Vitamin B12 (Completed)    Folate (Completed)    TSH with free T4 reflex (Completed)    ESR: Erythrocyte sedimentation rate (Completed)    Generalized anxiety disorder     He is quite worried about his medical condition.  Discussed         Relevant Orders    TSH with free T4 reflex (Completed)    History of seizure     Last known seizure approximately 20 years ago.  Whether this affects his memory we will defer to neurology         Relevant Orders    Adult Neurology  Referral    Lamotrigine Level (Completed)    Hyperlipidemia LDL goal <70     Continue simvastatin         Relevant Orders    COMPREHENSIVE METABOLIC PANEL (Completed)    Lipid panel reflex to direct LDL Non-fasting (Completed)    RESOLVED: Meningioma (H)    Meningiomatosis (H)     Commented upon in previous notes.  Refer to neurology         Relevant Orders    Adult Neurology  Referral    Spinal stenosis     Patient's spinal surgeons are talking about surgical decompression.  He is convinced that weight gain with cessation of semaglutide  Is the cause.  Discussed .  We will defer to his surgeons         Relevant Orders    Adult Neurology  Referral    Weight gain     Patient has normal weight.  His insurance will not plan for semaglutide for further weight loss.   "Discussed.  To do what ever it takes to get semaglutide.  That will not be possible                          Subjective   Naveen is a 68 year old, presenting for the following health issues:  Recheck Medication (Wants to get refills on current meds as well as discuss new medications possibly)        3/14/2024     1:09 PM   Additional Questions   Roomed by Piedad López     HPI       Hyperlipidemia Follow-Up    Are you regularly taking any medication or supplement to lower your cholesterol?   Yes-    Are you having muscle aches or other side effects that you think could be caused by your cholesterol lowering medication?  No  How many servings of fruits and vegetables do you eat daily?  2-3  On average, how many sweetened beverages do you drink each day (Examples: soda, juice, sweet tea, etc.  Do NOT count diet or artificially sweetened beverages)?   1  How many days per week do you exercise enough to make your heart beat faster? 3 or less  How many minutes a day do you exercise enough to make your heart beat faster? 9 or less  How many days per week do you miss taking your medication? 0            Objective    /77 (BP Location: Right arm, Patient Position: Sitting, Cuff Size: Adult Regular)   Pulse 67   Temp 98.1  F (36.7  C) (Oral)   Resp 11   Ht 1.727 m (5' 8\")   Wt 89 kg (196 lb 1.6 oz)   SpO2 100%   BMI 29.82 kg/m    Body mass index is 29.82 kg/m .  Physical Exam     Stable cranial nerve asymmetry  No antalgia        Signed Electronically by: Chad Noel MD    "

## 2024-03-15 LAB
ALBUMIN SERPL BCG-MCNC: 4.6 G/DL (ref 3.5–5.2)
ALP SERPL-CCNC: 117 U/L (ref 40–150)
ALT SERPL W P-5'-P-CCNC: 38 U/L (ref 0–70)
ANION GAP SERPL CALCULATED.3IONS-SCNC: 10 MMOL/L (ref 7–15)
AST SERPL W P-5'-P-CCNC: 23 U/L (ref 0–45)
BILIRUB SERPL-MCNC: 0.3 MG/DL
BUN SERPL-MCNC: 35.9 MG/DL (ref 8–23)
CALCIUM SERPL-MCNC: 9.3 MG/DL (ref 8.8–10.2)
CHLORIDE SERPL-SCNC: 112 MMOL/L (ref 98–107)
CHOLEST SERPL-MCNC: 130 MG/DL
CREAT SERPL-MCNC: 1.31 MG/DL (ref 0.67–1.17)
DEPRECATED HCO3 PLAS-SCNC: 18 MMOL/L (ref 22–29)
EGFRCR SERPLBLD CKD-EPI 2021: 59 ML/MIN/1.73M2
FASTING STATUS PATIENT QL REPORTED: ABNORMAL
GLUCOSE SERPL-MCNC: 106 MG/DL (ref 70–99)
HDLC SERPL-MCNC: 36 MG/DL
LAMOTRIGINE SERPL-MCNC: 7.1 UG/ML
LDLC SERPL CALC-MCNC: 72 MG/DL
NONHDLC SERPL-MCNC: 94 MG/DL
POTASSIUM SERPL-SCNC: 4.4 MMOL/L (ref 3.4–5.3)
PROT SERPL-MCNC: 7.1 G/DL (ref 6.4–8.3)
SODIUM SERPL-SCNC: 140 MMOL/L (ref 135–145)
TRIGL SERPL-MCNC: 110 MG/DL
TSH SERPL DL<=0.005 MIU/L-ACNC: 1.33 UIU/ML (ref 0.3–4.2)
VIT B12 SERPL-MCNC: 542 PG/ML (ref 232–1245)

## 2024-03-16 PROBLEM — Z71.89 ADVANCED DIRECTIVES, COUNSELING/DISCUSSION: Status: ACTIVE | Noted: 2024-03-16

## 2024-03-16 PROBLEM — Z87.898 HISTORY OF SEIZURE: Status: ACTIVE | Noted: 2024-03-16

## 2024-03-16 PROBLEM — D32.9 MENINGIOMA (H): Status: RESOLVED | Noted: 2023-04-10 | Resolved: 2024-03-16

## 2024-03-16 PROBLEM — F41.1 GENERALIZED ANXIETY DISORDER: Status: ACTIVE | Noted: 2024-03-16

## 2024-03-17 NOTE — ASSESSMENT & PLAN NOTE
Last known seizure approximately 20 years ago.  Whether this affects his memory we will defer to neurology

## 2024-03-17 NOTE — ASSESSMENT & PLAN NOTE
Patient's spinal surgeons are talking about surgical decompression.  He is convinced that weight gain with cessation of semaglutide  Is the cause.  Discussed .  We will defer to his surgeons

## 2024-03-17 NOTE — ASSESSMENT & PLAN NOTE
Discussed his differential including the possibility of dementia.  Recommend that he has affairs in order, introduce advanced directives

## 2024-03-17 NOTE — ASSESSMENT & PLAN NOTE
Patient has normal weight.  His insurance will not plan for semaglutide for further weight loss.  Discussed.  To do what ever it takes to get semaglutide.  That will not be possible

## 2024-03-19 ENCOUNTER — TRANSFERRED RECORDS (OUTPATIENT)
Dept: HEALTH INFORMATION MANAGEMENT | Facility: CLINIC | Age: 69
End: 2024-03-19
Payer: COMMERCIAL

## 2024-03-19 ENCOUNTER — PATIENT OUTREACH (OUTPATIENT)
Dept: FAMILY MEDICINE | Facility: CLINIC | Age: 69
End: 2024-03-19
Payer: COMMERCIAL

## 2024-03-19 DIAGNOSIS — R53.81 PHYSICAL DECONDITIONING: Primary | ICD-10-CM

## 2024-03-19 DIAGNOSIS — S42.92XG CLOSED FRACTURE OF LEFT SHOULDER WITH DELAYED HEALING, SUBSEQUENT ENCOUNTER: ICD-10-CM

## 2024-03-19 DIAGNOSIS — Z91.89 AT RISK FOR DECREASED BONE DENSITY: ICD-10-CM

## 2024-03-19 DIAGNOSIS — M15.0 PRIMARY OSTEOARTHRITIS INVOLVING MULTIPLE JOINTS: ICD-10-CM

## 2024-03-19 NOTE — TELEPHONE ENCOUNTER
Patient Quality Outreach Health Maintenance - PAL RN    Summary:    PAL RN contacted pt regarding overdue health maintenance     Patient is due/failing the following:   Physical  - Due after 6/26/24    There are no preventive care reminders to display for this patient.    Type of outreach:    Phone, spoke to patient/parent.      -Scheduled pt for AWV with Dr. Gamboa on 7/2/24 at 1:40pm, establish care as new PCP  -Provided PAL direct line [phone]    Questions for provider review:    None                                                                                     Patient was given an opportunity to ask questions, verbalized understanding of plan, and is agreeable.     Jazmin RUDD RN  Patient Advocate Liaison - PAL RN  [facility]  [phone]

## 2024-03-28 ENCOUNTER — TRANSFERRED RECORDS (OUTPATIENT)
Dept: HEALTH INFORMATION MANAGEMENT | Facility: CLINIC | Age: 69
End: 2024-03-28
Payer: COMMERCIAL

## 2024-04-02 ENCOUNTER — TRANSFERRED RECORDS (OUTPATIENT)
Dept: HEALTH INFORMATION MANAGEMENT | Facility: CLINIC | Age: 69
End: 2024-04-02
Payer: COMMERCIAL

## 2024-04-02 DIAGNOSIS — N40.1 BENIGN PROSTATIC HYPERPLASIA WITH LOWER URINARY TRACT SYMPTOMS, SYMPTOM DETAILS UNSPECIFIED: Primary | ICD-10-CM

## 2024-04-02 RX ORDER — TAMSULOSIN HYDROCHLORIDE 0.4 MG/1
0.4 CAPSULE ORAL DAILY
Qty: 90 CAPSULE | Refills: 0 | Status: SHIPPED | OUTPATIENT
Start: 2024-04-02 | End: 2024-06-25

## 2024-04-04 ENCOUNTER — NURSE TRIAGE (OUTPATIENT)
Dept: FAMILY MEDICINE | Facility: CLINIC | Age: 69
End: 2024-04-04
Payer: COMMERCIAL

## 2024-04-04 NOTE — TELEPHONE ENCOUNTER
S-(situation):   -Pt calls complaining of persistent dry cough and right heel pain, requests to schedule appt     B-(background):   -Dry cough onset approx 10 days ago, right heel pain onset approx 4-5 days ago   -Pt states heel pain is worse when initially getting up from sitting position and starting to walk, after a few minutes pain decreases   -No hx of neuropathy or DVT, did not take home COVID test  -Taking OTC Tylenol for pain relief     A-(assessment):   -Dry cough, worse at night   -Right heel pain, constant, rates 7-8/10 when he begins walking, rates 2-3/10 at rest   -Denies: chest pain, SOB, fever, numbness/tingling, weakness, rash/redness, swelling, heel tender to the touch     R-(recommendations):   -Pt wants to be seen, scheduled for OV with Feli Ross PA-C Mon 4/8/24 at 11:10am   -Continue OTC analgesics as needed, can use cough drops, advised to elevate head of bed at night to help with cough   -Instructed pt to call back if new or worsening symptoms [phone]    Routing to Dr. Noel to review and advise    Reason for Disposition   MODERATE pain (e.g., interferes with normal activities, limping) and present > 3 days   Patient wants to be seen    Additional Information   Negative: Followed an ankle or foot injury   Negative: Toe pain is main symptom   Negative: Ankle pain is main symptom   Negative: Entire foot is cool or blue in comparison to other foot   Negative: Purple or black skin on foot or toe   Negative: Red area or streak and fever   Negative: Swollen foot and fever   Negative: Patient sounds very sick or weak to the triager   Negative: SEVERE pain (e.g., excruciating, unable to do any normal activities)   Negative: Looks like a boil, infected sore, deep ulcer, or other infected rash (spreading redness, pus)   Negative: Swollen foot  (Exceptions: Localized bump from bunions, calluses, insect bite, sting.)   Negative: Weakness (i.e., loss of strength) of new-onset in foot or toes  (Exceptions: Not truly weak, foot feels weak because of pain; weakness present > 2 weeks.)   Negative: Numbness (i.e., loss of sensation) in foot or toes (Exception: Just tingling; numbness present > 2 weeks.)   Negative: Bluish (or gray) lips or face   Negative: SEVERE difficulty breathing (e.g., struggling for each breath, speaks in single words)   Negative: Rapid onset of cough and has hives   Negative: Coughing started suddenly after medicine, an allergic food or bee sting   Negative: Difficulty breathing after exposure to flames, smoke, or fumes   Negative: Sounds like a life-threatening emergency to the triager   Negative: Previous asthma attacks and this feels like asthma attack   Negative: Dry cough (non-productive; no sputum or minimal clear sputum) and within 14 days of COVID-19 Exposure   Negative: MODERATE difficulty breathing (e.g., speaks in phrases, SOB even at rest, pulse 100-120) and still present when not coughing   Negative: Chest pain present when not coughing   Negative: Passed out (i.e., fainted, collapsed and was not responding)   Negative: Patient sounds very sick or weak to the triager   Negative: MILD difficulty breathing (e.g., minimal/no SOB at rest, SOB with walking, pulse <100) and still present when not coughing   Negative: Coughed up > 1 tablespoon (15 ml) blood (Exception: Blood-tinged sputum.)   Negative: Fever > 103 F (39.4 C)   Negative: Fever > 101 F (38.3 C) and over 60 years of age   Negative: Fever > 100.0 F (37.8 C) and has diabetes mellitus or a weak immune system (e.g., HIV positive, cancer chemotherapy, organ transplant, splenectomy, chronic steroids)   Negative: Fever > 100.0 F (37.8 C) and bedridden (e.g., CVA, chronic illness, recovering from surgery)   Negative: Increasing ankle swelling   Negative: Wheezing is present   Negative: SEVERE coughing spells (e.g., whooping sound after coughing, vomiting after coughing)   Negative: Coughing up cassie-colored (reddish-brown)  or blood-tinged sputum   Negative: Fever present > 3 days (72 hours)   Negative: Fever returns after gone for over 24 hours and symptoms worse or not improved   Negative: Using nasal washes and pain medicine > 24 hours and sinus pain persists   Negative: Known COPD or other severe lung disease (i.e., bronchiectasis, cystic fibrosis, lung surgery) and worsening symptoms (i.e., increased sputum purulence or amount, increased breathing difficulty)   Negative: Continuous (nonstop) coughing interferes with work or school and no improvement using cough treatment per Care Advice    Protocols used: Foot Pain-A-OH, Cough-A-OH      Jazmin STEIN RN  Patient Advocate Liaison - PAL RN  [facility]  [phone]

## 2024-04-04 NOTE — TELEPHONE ENCOUNTER
PAL called and spoke with pt   -Relayed message from Dr. Noel, see previous note   -Rescheduled pt for OV tomorrow 4/5 at 10:10am with Shady Treviño PA-C, opening became available     Patient was given an opportunity to ask questions, verbalized understanding of plan, and is agreeable.     Jazmin STEIN RN  Patient Advocate Liaison - PAL RN  [facility]  [phone]

## 2024-04-04 NOTE — TELEPHONE ENCOUNTER
PAL received VM from pt today 4/4, requesting call back to schedule appt with PCP    PAL attempted (attempt #1) to contact pt  -Left VM requesting call back [phone], awaiting call back at this time     Routing to [facility] for further outreach attempts     Jazmin RUDD RN  Patient Advocate Liaison - PAL RN  [facility]  [phone]

## 2024-04-05 ENCOUNTER — ANCILLARY PROCEDURE (OUTPATIENT)
Dept: GENERAL RADIOLOGY | Facility: CLINIC | Age: 69
End: 2024-04-05
Attending: PHYSICIAN ASSISTANT
Payer: COMMERCIAL

## 2024-04-05 ENCOUNTER — OFFICE VISIT (OUTPATIENT)
Dept: FAMILY MEDICINE | Facility: CLINIC | Age: 69
End: 2024-04-05
Payer: COMMERCIAL

## 2024-04-05 VITALS
BODY MASS INDEX: 29.46 KG/M2 | RESPIRATION RATE: 20 BRPM | HEIGHT: 69 IN | OXYGEN SATURATION: 99 % | HEART RATE: 71 BPM | TEMPERATURE: 98 F | WEIGHT: 198.9 LBS | SYSTOLIC BLOOD PRESSURE: 111 MMHG | DIASTOLIC BLOOD PRESSURE: 67 MMHG

## 2024-04-05 DIAGNOSIS — R05.8 DRY COUGH: ICD-10-CM

## 2024-04-05 DIAGNOSIS — M79.671 PAIN OF RIGHT HEEL: Primary | ICD-10-CM

## 2024-04-05 DIAGNOSIS — M79.671 PAIN OF RIGHT HEEL: ICD-10-CM

## 2024-04-05 PROCEDURE — 73650 X-RAY EXAM OF HEEL: CPT | Mod: TC | Performed by: RADIOLOGY

## 2024-04-05 PROCEDURE — 99214 OFFICE O/P EST MOD 30 MIN: CPT | Performed by: PHYSICIAN ASSISTANT

## 2024-04-05 RX ORDER — FLUTICASONE PROPIONATE 50 MCG
1 SPRAY, SUSPENSION (ML) NASAL DAILY
Qty: 16 G | Refills: 0 | Status: SHIPPED | OUTPATIENT
Start: 2024-04-05

## 2024-04-05 RX ORDER — BENZONATATE 200 MG/1
200 CAPSULE ORAL 3 TIMES DAILY PRN
Qty: 60 CAPSULE | Refills: 0 | Status: SHIPPED | OUTPATIENT
Start: 2024-04-05 | End: 2024-06-26

## 2024-04-05 NOTE — PATIENT INSTRUCTIONS
Use Flonase 1-2 times a day.    Take tessalon (benzonatate) up to 3 times a day as needed for cough. For sure take it at bedtime.    Call if not improving in 5-7 days.      Follow-up with podiatry for heel pain.

## 2024-04-05 NOTE — PROGRESS NOTES
"  Assessment & Plan     Pain of right heel    Uncertain cause. X-ray is normal per my read. Could be plantar fasciitis. Gave information about icing and stretching to try. Refer to podiatry.    - XR Calcaneus Right G/E 2 Views; Future  - Orthopedic  Referral; Future      Dry cough    Likely post-nasal drainage or irritation. Use Flonase and tessalon. If not improving by next week, can trial antibiotic.    - benzonatate (TESSALON) 200 MG capsule; Take 1 capsule (200 mg) by mouth 3 times daily as needed for cough  - fluticasone (FLONASE) 50 MCG/ACT nasal spray; Spray 1 spray into both nostrils daily            BMI  Estimated body mass index is 29.37 kg/m  as calculated from the following:    Height as of this encounter: 1.753 m (5' 9\").    Weight as of this encounter: 90.2 kg (198 lb 14.4 oz).         Patient Instructions   Use Flonase 1-2 times a day.    Take tessalon (benzonatate) up to 3 times a day as needed for cough. For sure take it at bedtime.    Call if not improving in 5-7 days.      Follow-up with podiatry for heel pain.       Vijay Hodge is a 68 year old, presenting for the following health issues:  Musculoskeletal Problem and URI        4/5/2024    10:10 AM   Additional Questions   Roomed by Ethel Velez     History of Present Illness       Reason for visit:  Cough and heal pain  Symptom onset:  1-2 weeks ago    He eats 2-3 servings of fruits and vegetables daily.He consumes 1 sweetened beverage(s) daily.He exercises with enough effort to increase his heart rate 9 or less minutes per day.  He exercises with enough effort to increase his heart rate 3 or less days per week.   He is taking medications regularly.         Acute Illness  Onset/Duration: over 10 days  Symptoms:  Fever: No  Chills/Sweats: No  Headache (location?): No  Sinus Pressure: No  Conjunctivitis: No  Ear Pain: no  Rhinorrhea: No  Congestion: No  Sore Throat: No  Cough: YES-non-productive- worse at night  Wheeze: " "No  Decreased Appetite: No  Nausea: No  Vomiting: No  Diarrhea: No  Dysuria/Freq.: No  Dysuria or Hematuria: No  Fatigue/Achiness: No  Sick/Strep Exposure: No  Therapies tried and outcome: Tylenol and cough drops- help minimally for a short time      Pain History:  Where in your body do you have pain? Musculoskeletal problem/pain  Onset/Duration: 4-5 days  Description  Location: foot - right, heel  Joint Swelling: No  Redness: No  Pain: YES  Warmth: No  Intensity:  moderate  Progression of Symptoms:  same  Accompanying signs and symptoms:   Fevers: No  Numbness/tingling/weakness: No  History  Trauma to the area: No  Previous similar problem: No  Previous evaluation:  No  Precipitating or alleviating factors:  Aggravating factors include: standing and walking (when first standing or walking for 2-3 minutes) - goes away after he walks on it  Therapies tried and outcome: acetaminophen- helps a little        Review of Systems  Constitutional, HEENT, cardiovascular, pulmonary, gi and gu systems are negative, except as otherwise noted.        Objective    /67 (BP Location: Right arm, Patient Position: Sitting, Cuff Size: Adult Regular)   Pulse 71   Temp 98  F (36.7  C) (Oral)   Resp 20   Ht 1.753 m (5' 9\")   Wt 90.2 kg (198 lb 14.4 oz)   SpO2 99%   BMI 29.37 kg/m    Body mass index is 29.37 kg/m .      Physical Exam   GENERAL: alert and no distress  EYES: Eyes grossly normal to inspection, PERRL and conjunctivae and sclerae normal  HENT: ear canals and TM's normal, nose and mouth without ulcers or lesions  RESP: lungs clear to auscultation - no rales, rhonchi or wheezes  CV: regular rate and rhythm, normal S1 S2, no S3 or S4, no murmur, click or rub, no peripheral edema  MS: no gross musculoskeletal defects noted, no edema  SKIN: no suspicious lesions or rashes  NEURO: Normal strength and tone, mentation intact and speech normal  PSYCH: mentation appears normal, affect normal/bright  Right heel: No erythema, " ecchymosis, or edema. Tender to palpation of plantar surface of heel and to squeezing heel. Otherwise, non-tender. ROM of ankle intact.    Xray - Reviewed and interpreted by me.  Normal x-ray without bone spurs or fractures per my read.        Signed Electronically by: Shady Treviño PA-C

## 2024-04-10 ENCOUNTER — NURSE TRIAGE (OUTPATIENT)
Dept: FAMILY MEDICINE | Facility: CLINIC | Age: 69
End: 2024-04-10
Payer: COMMERCIAL

## 2024-04-10 NOTE — TELEPHONE ENCOUNTER
S-(situation):   -Pt calls reporting dry, itchy rash on top of hands and right arm, requesting referral to dermatology     B-(background):   -Has been on/off for approx 6 months   -Putting lotion on dry areas, finds helpful but only temporarily   -Son has been diagnosed with eczema, pt wondering if that is what he has?     A-(assessment):   -Reports rash on bilateral tops of hands and right arm (right hand/arm worse)   *Describes as itchy, dry, and scaly     -Denies: redness, pain, fever, bumps    R-(recommendations):   -Scheduled pt for OV with Dr. Noel 4/18/24 at 3:40pm    -Continue to care for at home, will route to Dr. Noel to see if he has further recommendations   -Instructed pt to call back if new or worsening symptoms [phone]    Routing to Dr. Noel to review and advise    Reason for Disposition   Localized rash present > 7 days    Additional Information   Negative: Sounds like a life-threatening emergency to the triager   Negative: Athlete's Foot suspected (i.e., itchy rash between the toes)   Negative: Insect bite(s) suspected   Negative: Jock Itch suspected (i.e., itchy rash on inner thighs near genital area)   Negative: Localized lump (or swelling) without redness or rash   Negative: MPOX SUSPECTED (e.g., direct skin contact such as sex, recent travel to [place] or [place]) and any SYMPTOMS OF MPOX (e.g., rash, fever, muscle aches, or swollen lymph nodes)   Negative: At risk for Mpox (men-who-have-sex-with-men) and possible exposure (e.g., multiple sex partners in past 21 days) and ANY SYMPTOMS OF MPOX (e.g., rash, fever, muscle aches, or swollen lymph nodes)   Negative: Poison ivy, oak, or sumac rash suspected (e.g., itchy rash after contact with poison ivy)   Negative: Rash of female genital area (e.g., labia, vagina, vulva)   Negative: Rash of male genital area (e.g., penis, scrotum)   Negative: Redness of immunization site   Negative: Shingles suspected (i.e., painful rash, multiple small  blisters grouped together in one area of body; dermatomal distribution)   Negative: Small spot, skin growth, or mole   Negative: Wound infection suspected (i.e., pain, spreading redness, or pus; in a cut, puncture, scrape or sutured wound)   Negative: Fever and localized purple or blood-colored spots or dots that are not from injury or friction   Negative: Fever and localized rash is very painful   Negative: Patient sounds very sick or weak to the triager   Negative: Looks like a boil, infected sore, deep ulcer, or other infected rash (spreading redness, pus)   Negative: Painful rash with multiple small blisters grouped together (i.e., dermatomal distribution or 'band' or 'stripe')   Negative: Localized rash is very painful (no fever)   Negative: Localized purple or blood-colored spots or dots that are not from injury or friction (no fever)   Negative: Lyme disease suspected (e.g., bull's-eye rash or tick bite / exposure)   Negative: Patient wants to be seen   Negative: Tender bumps in armpits   Negative: Pimples (localized) and no improvement after using CARE ADVICE   Negative: SEVERE local itching persists after 2 days of steroid cream   Negative: Applying cream or ointment and it causes severe itch, burning, or pain   Negative: Medication patch causing local rash or itching    Protocols used: Rash or Redness - [id number]      Jazmin STEIN RN  Patient Advocate Liaison - PAL RN  [facility]  [phone]

## 2024-04-10 NOTE — TELEPHONE ENCOUNTER
I would recommend trying over the counter hydrocortisone cream on the rash twice a day until his visit. Also make sure to use good lotions in between.    Shady Treviño PA-C on 4/10/2024 at 5:20 PM (covering for Dr. Noel)

## 2024-04-11 NOTE — TELEPHONE ENCOUNTER
PAL called and spoke with pt   -Relayed recommendation from Shady Treviño PA-C (see previous note)  -Instructed pt to call w/ any further questions or concerns [phone]    Patient was given an opportunity to ask questions, verbalized understanding of plan, and is agreeable.     Jazmin STEIN RN  Patient Advocate Liaison - RUL[facility] RUDD [facility]  [phone]

## 2024-04-16 ENCOUNTER — TRANSFERRED RECORDS (OUTPATIENT)
Dept: HEALTH INFORMATION MANAGEMENT | Facility: CLINIC | Age: 69
End: 2024-04-16
Payer: COMMERCIAL

## 2024-04-18 ENCOUNTER — OFFICE VISIT (OUTPATIENT)
Dept: FAMILY MEDICINE | Facility: CLINIC | Age: 69
End: 2024-04-18
Payer: COMMERCIAL

## 2024-04-18 VITALS
RESPIRATION RATE: 20 BRPM | WEIGHT: 196 LBS | SYSTOLIC BLOOD PRESSURE: 130 MMHG | BODY MASS INDEX: 29.03 KG/M2 | OXYGEN SATURATION: 100 % | HEART RATE: 64 BPM | TEMPERATURE: 97.9 F | HEIGHT: 69 IN | DIASTOLIC BLOOD PRESSURE: 82 MMHG

## 2024-04-18 DIAGNOSIS — G47.33 OSA (OBSTRUCTIVE SLEEP APNEA): ICD-10-CM

## 2024-04-18 DIAGNOSIS — R21 RASH: Primary | ICD-10-CM

## 2024-04-18 PROCEDURE — 99214 OFFICE O/P EST MOD 30 MIN: CPT | Performed by: FAMILY MEDICINE

## 2024-04-18 NOTE — PROGRESS NOTES
"  Assessment & Plan   Problem List Items Addressed This Visit       RIC (obstructive sleep apnea)    Rash - Primary     Patient describes a rash that occurs on his hands dorsal aspect.  He is also concerned about \"scarring\" from his CPAP.  He describes facial distortion from his mask.  Exam shows no scarring no rash no eczematous changes.  Discussed that CPAP facial pressure changes are transient, recommend emollients for perceived hand abnormalities without objective findings                        Subjective   Naveen is a 68 year old, presenting for the following health issues:  Derm Problem    HPI       Rash  Onset/Duration: 2-3months ago   Description  Location: both hands and arms  Character: blotchy, flakey  Itching: moderate  Intensity:  moderate  Progression of Symptoms:  same and gets dryer and itchy   Accompanying signs and symptoms:   Fever: No  Body aches or joint pain: No  Sore throat symptoms: No  Recent cold symptoms: No  History:           Previous episodes of similar rash: None  New exposures:  None  Recent travel: No  Exposure to similar rash: No  Precipitating or alleviating factors: unknown   Therapies tried and outcome: otc lotion did not help             Objective    /82 (BP Location: Right arm, Patient Position: Sitting, Cuff Size: Adult Large)   Pulse 64   Temp 97.9  F (36.6  C) (Oral)   Resp 20   Ht 1.753 m (5' 9\")   Wt 88.9 kg (196 lb)   SpO2 100%   BMI 28.94 kg/m    Body mass index is 28.94 kg/m .  Physical Exam   As described            Signed Electronically by: Chad Noel MD    "

## 2024-04-19 PROBLEM — R21 RASH: Status: ACTIVE | Noted: 2024-04-19

## 2024-04-20 NOTE — ASSESSMENT & PLAN NOTE
"Patient describes a rash that occurs on his hands dorsal aspect.  He is also concerned about \"scarring\" from his CPAP.  He describes facial distortion from his mask.  Exam shows no scarring no rash no eczematous changes.  Discussed that CPAP facial pressure changes are transient, recommend emollients for perceived hand abnormalities without objective findings  "

## 2024-04-29 ENCOUNTER — PATIENT OUTREACH (OUTPATIENT)
Dept: FAMILY MEDICINE | Facility: CLINIC | Age: 69
End: 2024-04-29
Payer: COMMERCIAL

## 2024-04-29 DIAGNOSIS — R05.2 SUBACUTE COUGH: Primary | ICD-10-CM

## 2024-04-29 RX ORDER — AZITHROMYCIN 250 MG/1
TABLET, FILM COATED ORAL
Qty: 6 TABLET | Refills: 0 | Status: SHIPPED | OUTPATIENT
Start: 2024-04-29 | End: 2024-05-04

## 2024-04-29 NOTE — TELEPHONE ENCOUNTER
PAL attempted (attempt #1) to contact pt  -Left  requesting call back [phone], awaiting call back at this time     PAL will attempt to contact again 4/30 if no response    Jazmin RUDD RN  Patient Advocate Liaison - PAL RN  [facility]  [phone]

## 2024-04-29 NOTE — TELEPHONE ENCOUNTER
"PAL received incoming call from pt  -Reports persistent dry cough w/ no improvement, was seen by Shady Treviño PA-C 4/5/24 - see notes from OV below:    \"Likely post-nasal drainage or irritation. Use Flonase and tessalon. If not improving by next week, can trial antibiotic.    Use Flonase 1-2 times a day.     Take tessalon (benzonatate) up to 3 times a day as needed for cough. For sure take it at bedtime.\"    -Pt attributes to cough post-nasal drainage, states he is constantly clearing his throat which triggers cough   -Taking Benzonatate, Delsym q 12 hours for the past 3 days, Sudafed on/off but taking consistently everyday for the past 3-4 days, cough drops, just started Flonase yesterday 4/28    Routing to Dr. Noel to review and advise    Jazmin STEIN, RN  Patient Advocate Liaison - PAL RN  [facility]  [phone]   "

## 2024-04-30 ENCOUNTER — TRANSFERRED RECORDS (OUTPATIENT)
Dept: HEALTH INFORMATION MANAGEMENT | Facility: CLINIC | Age: 69
End: 2024-04-30
Payer: COMMERCIAL

## 2024-04-30 NOTE — TELEPHONE ENCOUNTER
PAL called and spoke with pt  -Notified of abx sent, instructed to pick-up and begin taking- complete full course  -Advised pt to call back if symptoms fail to improve or if experiencing any new or worsening symptoms     Patient was given an opportunity to ask questions, verbalized understanding of plan, and is agreeable.     Jazmin STEIN RN  Patient Advocate Liaison - RULA RUDD [facility]  [phone]

## 2024-05-06 ENCOUNTER — TRANSFERRED RECORDS (OUTPATIENT)
Dept: HEALTH INFORMATION MANAGEMENT | Facility: CLINIC | Age: 69
End: 2024-05-06
Payer: COMMERCIAL

## 2024-05-07 DIAGNOSIS — M15.0 PRIMARY OSTEOARTHRITIS INVOLVING MULTIPLE JOINTS: ICD-10-CM

## 2024-05-16 ENCOUNTER — TRANSFERRED RECORDS (OUTPATIENT)
Dept: HEALTH INFORMATION MANAGEMENT | Facility: CLINIC | Age: 69
End: 2024-05-16
Payer: COMMERCIAL

## 2024-05-21 ENCOUNTER — TELEPHONE (OUTPATIENT)
Dept: FAMILY MEDICINE | Facility: CLINIC | Age: 69
End: 2024-05-21
Payer: COMMERCIAL

## 2024-05-21 NOTE — TELEPHONE ENCOUNTER
PAL received incoming call from pt   -Wanting to confirm fax was received  -PAL located forms from [employer] and placed in Dr. Noel in-basket to complete    Routing to PCP and TC    Jazmin STEIN RN  Patient Advocate Liaison - PAL RN  [facility]  [phone]

## 2024-05-21 NOTE — LETTER
June 13, 2024      Kashmir Yao  [address] [address] [address]        To Whom It May Concern,       Please re-consider your denial of Linzess. Patient has significant constipation and has failed several other treatment options including miralax, colace, and senna. He also had recent open heart surgery so it is critical that he not strain while having bowel movements. Thank you for your consideration.     Sincerely,        Shady Treviño PA-C  (covering for Dr. Noel)

## 2024-05-21 NOTE — TELEPHONE ENCOUNTER
Nando calling from [employer] [employer] regarding pending PA for Linzess  290 MCG capsule.   States they sent a fax on 5/20/24 to PCP for additional questions to help with PA review.   Requesting form to be completed and fax by June 3rd.     Routing to the team to assist.

## 2024-05-22 ENCOUNTER — APPOINTMENT (OUTPATIENT)
Dept: GENERAL RADIOLOGY | Facility: CLINIC | Age: 69
End: 2024-05-22
Attending: EMERGENCY MEDICINE
Payer: COMMERCIAL

## 2024-05-22 ENCOUNTER — HOSPITAL ENCOUNTER (EMERGENCY)
Facility: CLINIC | Age: 69
Discharge: HOME | End: 2024-05-22
Attending: EMERGENCY MEDICINE | Admitting: EMERGENCY MEDICINE
Payer: COMMERCIAL

## 2024-05-22 ENCOUNTER — NURSE TRIAGE (OUTPATIENT)
Dept: FAMILY MEDICINE | Facility: CLINIC | Age: 69
End: 2024-05-22
Payer: COMMERCIAL

## 2024-05-22 VITALS
RESPIRATION RATE: 18 BRPM | DIASTOLIC BLOOD PRESSURE: 87 MMHG | OXYGEN SATURATION: 100 % | WEIGHT: 192.79 LBS | HEIGHT: 69 IN | BODY MASS INDEX: 28.56 KG/M2 | SYSTOLIC BLOOD PRESSURE: 152 MMHG | TEMPERATURE: 97.4 F | HEART RATE: 60 BPM

## 2024-05-22 DIAGNOSIS — R07.89 ATYPICAL CHEST PAIN: ICD-10-CM

## 2024-05-22 LAB
ALBUMIN SERPL BCG-MCNC: 4.6 G/DL (ref 3.5–5.2)
ALP SERPL-CCNC: 117 U/L (ref 40–150)
ALT SERPL W P-5'-P-CCNC: 38 U/L (ref 0–70)
ANION GAP SERPL CALCULATED.3IONS-SCNC: 11 MMOL/L (ref 7–15)
AST SERPL W P-5'-P-CCNC: 23 U/L (ref 0–45)
BASOPHILS # BLD AUTO: ABNORMAL 10*3/UL
BASOPHILS # BLD MANUAL: 0.3 10E3/UL (ref 0–0.2)
BASOPHILS NFR BLD AUTO: ABNORMAL %
BASOPHILS NFR BLD MANUAL: 4 %
BILIRUB SERPL-MCNC: 0.4 MG/DL
BUN SERPL-MCNC: 19.8 MG/DL (ref 8–23)
CALCIUM SERPL-MCNC: 9.5 MG/DL (ref 8.8–10.2)
CHLORIDE SERPL-SCNC: 105 MMOL/L (ref 98–107)
CREAT SERPL-MCNC: 1.39 MG/DL (ref 0.67–1.17)
DEPRECATED HCO3 PLAS-SCNC: 24 MMOL/L (ref 22–29)
EGFRCR SERPLBLD CKD-EPI 2021: 55 ML/MIN/1.73M2
EOSINOPHIL # BLD AUTO: ABNORMAL 10*3/UL
EOSINOPHIL # BLD MANUAL: 0.1 10E3/UL (ref 0–0.7)
EOSINOPHIL NFR BLD AUTO: ABNORMAL %
EOSINOPHIL NFR BLD MANUAL: 1 %
ERYTHROCYTE [DISTWIDTH] IN BLOOD BY AUTOMATED COUNT: 14.6 % (ref 10–15)
GLUCOSE SERPL-MCNC: 120 MG/DL (ref 70–99)
HCT VFR BLD AUTO: 38 % (ref 40–53)
HGB BLD-MCNC: 12.6 G/DL (ref 13.3–17.7)
HOLD SPECIMEN: NORMAL
IMM GRANULOCYTES # BLD: ABNORMAL 10*3/UL
IMM GRANULOCYTES NFR BLD: ABNORMAL %
LIPASE SERPL-CCNC: 30 U/L (ref 13–60)
LYMPHOCYTES # BLD AUTO: ABNORMAL 10*3/UL
LYMPHOCYTES # BLD MANUAL: 1.5 10E3/UL (ref 0.8–5.3)
LYMPHOCYTES NFR BLD AUTO: ABNORMAL %
LYMPHOCYTES NFR BLD MANUAL: 19 %
MCH RBC QN AUTO: 28.5 PG (ref 26.5–33)
MCHC RBC AUTO-ENTMCNC: 33.2 G/DL (ref 31.5–36.5)
MCV RBC AUTO: 86 FL (ref 78–100)
MONOCYTES # BLD AUTO: ABNORMAL 10*3/UL
MONOCYTES # BLD MANUAL: 0.2 10E3/UL (ref 0–1.3)
MONOCYTES NFR BLD AUTO: ABNORMAL %
MONOCYTES NFR BLD MANUAL: 3 %
NEUTROPHILS # BLD AUTO: ABNORMAL 10*3/UL
NEUTROPHILS # BLD MANUAL: 5.8 10E3/UL (ref 1.6–8.3)
NEUTROPHILS NFR BLD AUTO: ABNORMAL %
NEUTROPHILS NFR BLD MANUAL: 73 %
NRBC # BLD AUTO: 0 10E3/UL
NRBC BLD AUTO-RTO: 0 /100
PLAT MORPH BLD: ABNORMAL
PLATELET # BLD AUTO: 176 10E3/UL (ref 150–450)
POTASSIUM SERPL-SCNC: 4.4 MMOL/L (ref 3.4–5.3)
PROT SERPL-MCNC: 7.4 G/DL (ref 6.4–8.3)
RBC # BLD AUTO: 4.42 10E6/UL (ref 4.4–5.9)
RBC MORPH BLD: ABNORMAL
SODIUM SERPL-SCNC: 140 MMOL/L (ref 135–145)
TROPONIN T SERPL HS-MCNC: 13 NG/L
TROPONIN T SERPL HS-MCNC: 13 NG/L
WBC # BLD AUTO: 8 10E3/UL (ref 4–11)

## 2024-05-22 PROCEDURE — 83690 ASSAY OF LIPASE: CPT | Performed by: EMERGENCY MEDICINE

## 2024-05-22 PROCEDURE — 82040 ASSAY OF SERUM ALBUMIN: CPT | Performed by: EMERGENCY MEDICINE

## 2024-05-22 PROCEDURE — 36415 COLL VENOUS BLD VENIPUNCTURE: CPT | Performed by: EMERGENCY MEDICINE

## 2024-05-22 PROCEDURE — 85027 COMPLETE CBC AUTOMATED: CPT | Performed by: EMERGENCY MEDICINE

## 2024-05-22 PROCEDURE — 71046 X-RAY EXAM CHEST 2 VIEWS: CPT

## 2024-05-22 PROCEDURE — 85007 BL SMEAR W/DIFF WBC COUNT: CPT | Performed by: EMERGENCY MEDICINE

## 2024-05-22 PROCEDURE — 250N000013 HC RX MED GY IP 250 OP 250 PS 637: Performed by: EMERGENCY MEDICINE

## 2024-05-22 PROCEDURE — 93005 ELECTROCARDIOGRAM TRACING: CPT

## 2024-05-22 PROCEDURE — 99285 EMERGENCY DEPT VISIT HI MDM: CPT | Mod: 25

## 2024-05-22 PROCEDURE — 84484 ASSAY OF TROPONIN QUANT: CPT | Performed by: EMERGENCY MEDICINE

## 2024-05-22 RX ORDER — OMEPRAZOLE 40 MG/1
40 CAPSULE, DELAYED RELEASE ORAL DAILY
Qty: 14 CAPSULE | Refills: 0 | Status: ON HOLD | OUTPATIENT
Start: 2024-05-22 | End: 2024-06-11

## 2024-05-22 RX ORDER — SUCRALFATE 1 G/1
1 TABLET ORAL 4 TIMES DAILY
Qty: 56 TABLET | Refills: 0 | Status: ON HOLD | OUTPATIENT
Start: 2024-05-22 | End: 2024-06-11

## 2024-05-22 RX ORDER — MAGNESIUM HYDROXIDE/ALUMINUM HYDROXICE/SIMETHICONE 120; 1200; 1200 MG/30ML; MG/30ML; MG/30ML
30 SUSPENSION ORAL ONCE
Status: COMPLETED | OUTPATIENT
Start: 2024-05-22 | End: 2024-05-22

## 2024-05-22 RX ORDER — ALUMINA, MAGNESIA, AND SIMETHICONE 2400; 2400; 240 MG/30ML; MG/30ML; MG/30ML
30 SUSPENSION ORAL EVERY 4 HOURS PRN
Qty: 355 ML | Refills: 0 | Status: SHIPPED | OUTPATIENT
Start: 2024-05-22 | End: 2024-06-25

## 2024-05-22 RX ADMIN — ALUMINUM HYDROXIDE, MAGNESIUM HYDROXIDE, AND SIMETHICONE 30 ML: 1200; 120; 1200 SUSPENSION ORAL at 19:47

## 2024-05-22 ASSESSMENT — COLUMBIA-SUICIDE SEVERITY RATING SCALE - C-SSRS
2. HAVE YOU ACTUALLY HAD ANY THOUGHTS OF KILLING YOURSELF IN THE PAST MONTH?: NO
6. HAVE YOU EVER DONE ANYTHING, STARTED TO DO ANYTHING, OR PREPARED TO DO ANYTHING TO END YOUR LIFE?: NO
1. IN THE PAST MONTH, HAVE YOU WISHED YOU WERE DEAD OR WISHED YOU COULD GO TO SLEEP AND NOT WAKE UP?: NO

## 2024-05-22 ASSESSMENT — ACTIVITIES OF DAILY LIVING (ADL)
ADLS_ACUITY_SCORE: 39
ADLS_ACUITY_SCORE: 39

## 2024-05-22 NOTE — TELEPHONE ENCOUNTER
S-(situation):   -Pt calls requesting to schedule appt with PCP to discuss ongoing chest pain     B-(background):   -Onset: couple weeks ago   -Pain comes and goes but occurring everyday, lasts around 15min at a time   -Most commonly occurs upon awakening in the AM or when laying down/reclining in the evening, also experiencing when climbing stairs but goes away shortly once he makes it up and starts walking  -Hx of heart attack in 2010, not anticoagulated, takes 81 mg tab Aspirin daily     A-(assessment):   -Reports on/off chest pain - starts at top of left shoulder and radiates to center of chest near bottom of throat   Rates 5-7/10 when present, describes as sore/achey     -Denies: chest pressure or tightness, SOB, dizziness, nausea/vomiting, sweating, fever, burning sensation similar to indigestion     R-(recommendations):   -Huddled with Stefanie Brown PA-C who recommends evaluation in ED   -PAL contacted pt to relay recommendation, pt hesitant to go but ultimately agrees with plan- he will talk to wife and go to ED today   -Instructed to call 911 if new or worsening symptoms, call back with further questions or concerns [phone]   -PAL will follow-up with pt tomorrow 5/23 to ensure he went     Patient was given an opportunity to ask questions, verbalized understanding of plan, and is agreeable.     Reason for Disposition   Pain also in shoulder(s) or arm(s) or jaw   Patient wants to be seen    Additional Information   Negative: SEVERE difficulty breathing (e.g., struggling for each breath, speaks in single words)   Negative: Difficult to awaken or acting confused (e.g., disoriented, slurred speech)   Negative: Shock suspected (e.g., cold/pale/clammy skin, too weak to stand, low BP, rapid pulse)   Negative: Passed out (i.e., lost consciousness, collapsed and was not responding)   Negative: Chest pain lasting longer than 5 minutes and ANY of the following:         Pain is crushing, pressure-like, or heavy          Over 44 years old          Over 30 years old and one cardiac risk factor (e.g diabetes, high blood pressure, high cholesterol, smoker, or family history of heart disease)         History of heart disease (e.g. angina, heart attack, heart failure, bypass surgery, takes nitroglycerin)   Negative: Heart beating < 50 beats per minute OR > 140 beats per minute   Negative: Visible sweat on face or sweat dripping down face   Negative: Sounds like a life-threatening emergency to the triager   Negative: Followed an injury to chest   Negative: SEVERE chest pain    Protocols used: Chest Pain-A-OH      Jazmin STEIN RN  Patient Advocate Liaison - PAL RN  [facility]  [phone]

## 2024-05-23 LAB
ATRIAL RATE - MUSE: 65 BPM
DIASTOLIC BLOOD PRESSURE - MUSE: NORMAL MMHG
INTERPRETATION ECG - MUSE: NORMAL
P AXIS - MUSE: 31 DEGREES
PR INTERVAL - MUSE: 170 MS
QRS DURATION - MUSE: 90 MS
QT - MUSE: 412 MS
QTC - MUSE: 428 MS
R AXIS - MUSE: -20 DEGREES
SYSTOLIC BLOOD PRESSURE - MUSE: NORMAL MMHG
T AXIS - MUSE: 54 DEGREES
VENTRICULAR RATE- MUSE: 65 BPM

## 2024-05-23 NOTE — TELEPHONE ENCOUNTER
PAL notes pt went to ED yesterday 5/22, follow-up appt scheduled 5/30/24 with Dr. Bert STEIN, RN  Patient Advocate Liaison - PAL RN  [facility]  [phone]

## 2024-05-23 NOTE — ED PROVIDER NOTES
Emergency Department Note      History of Present Illness     Chief Complaint  Chest Pain    HPI  Kashmir Yao is a 68 year old male with a history of coronary disease and a stent reportedly 10 years ago presenting with intermittent chest pain over the last 2 to 3 days.  No exertional component may be related to eating or laying down feels as though it may be indigestion.  No fever, sore throat, cough, no vomiting, no diarrhea, no urinary pattern change.    Independent Historian  None    Review of External Notes  None  Past Medical History   Medical History and Problem List  Past Medical History:   Diagnosis Date    Anemia 07/31/2020    Arthritis     Bell's palsy 09/08/2020    Benign prostatic hyperplasia with incomplete bladder emptying 07/28/2020    Bilateral carpal tunnel syndrome 02/13/2020    CAD (coronary artery disease) 2010    Cognitive change 01/02/2020    Colonic polyps 2009    Common wart 12/02/2020    Disturbance of skin sensation     Encephalocele (H) 2009    Epilepsy, partial (H) 2009    GERD (gastroesophageal reflux disease)     Heart attack (H)     Herpes zoster with complication 10/07/2020    History of spinal cord injury     Hypertension     Iron deficiency 09/09/2020    Meningioma (H) 04/10/2023    Meningiomatosis (H) 03/14/2024    RIC (obstructive sleep apnea)     RIC (obstructive sleep apnea)     Paraneoplastic Antibody  positive[799.89BS] 2009    Paronychia of toe, left 06/26/2023    Pedal edema 02/14/2020    Post herpetic neuralgia 12/02/2020    Primary osteoarthritis of both hands 01/02/2020    Rash 04/19/2024    Seizures (H)     Spinal stenosis     Stented coronary artery     Tobacco use disorder     Ulnar neuropathy of both upper extremities 02/13/2020    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    Ventricular Assymetry 2009    Weight gain 03/14/2024       Medications  alum & mag hydroxide-simethicone (MAALOX MULTI SYMPTOM MAX ST) 400-400-40 MG/5ML SUSP suspension  omeprazole  (PRILOSEC) 40 MG DR capsule  sucralfate (CARAFATE) 1 GM tablet  acetaminophen (TYLENOL) 500 MG tablet  aspirin 81 MG tablet  benzonatate (TESSALON) 200 MG capsule  COMPOUNDED NON-CONTROLLED SUBSTANCE (CMPD RX) - PHARMACY TO MIX COMPOUNDED MEDICATION  cyclobenzaprine (FLEXERIL) 5 MG tablet  fluticasone (FLONASE) 50 MCG/ACT nasal spray  lamoTRIgine (LAMICTAL) 100 MG tablet  lamoTRIgine (LAMICTAL) 200 MG tablet  linaclotide (LINZESS) 290 MCG capsule  naproxen (NAPROSYN) 375 MG tablet  rizatriptan (MAXALT) 10 MG tablet  sildenafil (VIAGRA) 100 MG tablet  simvastatin (ZOCOR) 40 MG tablet  tamsulosin (FLOMAX) 0.4 MG capsule  tirzepatide (MOUNJARO) 2.5 MG/0.5ML pen  topiramate (TOPAMAX) 100 MG tablet  traZODone (DESYREL) 50 MG tablet  zolpidem (AMBIEN) 5 MG tablet        Surgical History   Past Surgical History:   Procedure Laterality Date    ARTHROPLASTY KNEE Left 05/16/2016    Procedure: ARTHROPLASTY KNEE;  Surgeon: Chet Leonardo MD;  Location:  OR    BACK SURGERY      CARPAL TUNNEL RELEASE RT/LT Right 03/11/2020    Right carpal tunnel release, Dr. Rick Frias, [facility]    CARPAL TUNNEL RELEASE RT/LT Left     COLONOSCOPY N/A 10/08/2020    Procedure: COLONOSCOPY WITH POLYPECTOMIES using cold jumbo forceps;  Surgeon: Kevin Song MD;  Location:  GI    CYSTOSCOPY      ESOPHAGOSCOPY, GASTROSCOPY, DUODENOSCOPY (EGD), COMBINED  05/24/2012    Procedure:COMBINED ESOPHAGOSCOPY, GASTROSCOPY, DUODENOSCOPY (EGD); ESOPHAGOSCOPY, GASTROSCOPY, DUODENOSCOPY (EGD) ; Surgeon:GILLIAN JOHNSON; Location: GI    ESOPHAGOSCOPY, GASTROSCOPY, DUODENOSCOPY (EGD), COMBINED N/A 10/08/2020    Procedure: ESOPHAGOGASTRODUODENOSCOPY (EGD) (fv);  Surgeon: Kevin Song MD;  Location:  GI    KNEE SURGERY Left     repair of cartilage    RELEASE TRIGGER FINGER Left 10/19/2020    Left ring finger trigger finger release at A1 pulley, Dr. Rick Frias, [facility]    SPINE SURGERY      repair of herniated disc lumbar     "STENT      trigger finger release Right 09/15/2020    Right thumb trigger finger release, Dr. Frias    ULNAR NERVE TRANSPOSITION Left     VENTRICULOSTOMY      [facility] ANESTH,DX ARTHROSCOPIC PROC KNEE JOINT      CARTILEGE REPAIR.    [facility] NONSPECIFIC PROCEDURE      stint in heart    [facility] COLONOSCOPY THRU STOMA, DIAGNOSTIC  2009    tubular adenomae, recommended annual colonoscopy     Physical Exam   Patient Vitals for the past 24 hrs:   BP Temp Temp src Pulse Resp SpO2 Height Weight   05/22/24 2120 (!) 152/87 -- -- 60 18 100 % -- --   05/22/24 2100 (!) 141/82 -- -- 60 17 100 % -- --   05/22/24 2050 (!) 166/79 -- -- 62 19 100 % -- --   05/22/24 2030 (!) 143/81 -- -- 63 19 99 % -- --   05/22/24 1923 (!) 151/72 97.4  F (36.3  C) Temporal 67 20 100 % 1.753 m (5' 9\") 87.5 kg (192 lb 12.7 oz)     Physical Exam    HENT:  mmm, no rhinorrhea  Eyes: periorbital tissues and sclera normal   Neck: supple, no abnormal swelling  Lungs:  CTAB,  no resp distress  CV: rrr, no m/r/g, ppi  Abd: soft, nontender, nondistended, no rebound/masses/guarding/hsm  Ext: no peripheral edema  Skin: warm, dry, well perfused, no rashes/bruising/lesions on exposed skin  Neuro: alert, MAEE, no gross motor or sensory deficits, gait stable  Psych: Normal mood, normal affect    Diagnostics   Lab Results   Labs Ordered and Resulted from Time of ED Arrival to Time of ED Departure   COMPREHENSIVE METABOLIC PANEL - Abnormal       Result Value    Sodium 140      Potassium 4.4      Carbon Dioxide (CO2) 24      Anion Gap 11      Urea Nitrogen 19.8      Creatinine 1.39 (*)     GFR Estimate 55 (*)     Calcium 9.5      Chloride 105      Glucose 120 (*)     Alkaline Phosphatase 117      AST 23      ALT 38      Protein Total 7.4      Albumin 4.6      Bilirubin Total 0.4     CBC WITH PLATELETS AND DIFFERENTIAL - Abnormal    WBC Count 8.0      RBC Count 4.42      Hemoglobin 12.6 (*)     Hematocrit 38.0 (*)     MCV 86      MCH 28.5      MCHC 33.2      RDW 14.6      " Platelet Count 176      % Neutrophils        % Lymphocytes        % Monocytes        % Eosinophils        % Basophils        % Immature Granulocytes        NRBCs per 100 WBC 0      Absolute Neutrophils        Absolute Lymphocytes        Absolute Monocytes        Absolute Eosinophils        Absolute Basophils        Absolute Immature Granulocytes        Absolute NRBCs 0.0     DIFFERENTIAL - Abnormal    % Neutrophils 73      % Lymphocytes 19      % Monocytes 3      % Eosinophils 1      % Basophils 4      Absolute Neutrophils 5.8      Absolute Lymphocytes 1.5      Absolute Monocytes 0.2      Absolute Eosinophils 0.1      Absolute Basophils 0.3 (*)     RBC Morphology Confirmed RBC Indices      Platelet Assessment        Value: Automated Count Confirmed. Platelet morphology is normal.   LIPASE - Normal    Lipase 30     TROPONIN T, HIGH SENSITIVITY - Normal    Troponin T, High Sensitivity 13     TROPONIN T, HIGH SENSITIVITY - Normal    Troponin T, High Sensitivity 13         Imaging  XR Chest 2 Views   Final Result   IMPRESSION: Chest is negative and unchanged. Lungs clear. Heart size is stable. Pulmonary vascularity is within normal limits. No pleural effusions.          EKG   ECG results from 05/22/24   EKG 12-lead, tracing only     Value    Systolic Blood Pressure     Diastolic Blood Pressure     Ventricular Rate 65    Atrial Rate 65    IL Interval 170    QRS Duration 90        QTc 428    P Axis 31    R AXIS -20    T Axis 54    Interpretation ECG      Sinus rhythm  Cannot rule out Anterior infarct , age undetermined  Abnormal ECG  When compared with ECG of 04-MAY-2021 13:31,  No significant change was found       *Note: Due to a large number of results and/or encounters for the requested time period, some results have not been displayed. A complete set of results can be found in Results Review.         Independent Interpretation  None  ED Course    Medications Administered  Medications   alum & mag  hydroxide-simethicone (MAALOX) suspension 30 mL (30 mLs Oral $Given 5/22/24 1947)       Procedures  Procedures     Discussion of Management  None    Social Determinants of Health adding to complexity of care  None    ED Course     Medical Decision Making / Diagnosis   CMS Diagnoses:   and None    MIPS     None    MDM  Nixonbharat Yao is a 68 year old male presenting with nonexertional intermittent chest discomfort.  Low suspicion for occlusive coronary process, PE, dissection.  Our EKG shows no evidence of occlusive coronary process initial and delta troponins are nonelevated, no clinical suspicion for dissection or PE I do not think he needs a CT scan of the chest.  Chest radiograph is unremarkable.  This may well be gastritis with esophagitis.  Will discharge home trial of antacids refer to PCP and return if new or worsening symptoms.  He is comfortable and agreeable with that plan.  He understands what is known was unknown what to watch out for when to return here to the emergency department.    Disposition  Home     ICD-10 Codes:    ICD-10-CM    1. Atypical chest pain  R07.89            Discharge Medications  New Prescriptions    ALUM & MAG HYDROXIDE-SIMETHICONE (MAALOX MULTI SYMPTOM MAX ST) 400-400-40 MG/5ML SUSP SUSPENSION    Take 30 mLs by mouth every 4 hours as needed for indigestion    OMEPRAZOLE (PRILOSEC) 40 MG DR CAPSULE    Take 1 capsule (40 mg) by mouth daily for 14 days    SUCRALFATE (CARAFATE) 1 GM TABLET    Take 1 tablet (1 g) by mouth 4 times daily for 14 days         MD Wiliam Schmidt, Angel Gupta MD  05/22/24 [id number]

## 2024-05-23 NOTE — ED TRIAGE NOTES
"Pt has had chest tightness for the last couple days. Hx of stent 10 years ago.  \"Seems like heart burn\"        "

## 2024-05-28 RX ADMIN — MIDAZOLAM 1 MG: 1 INJECTION INTRAMUSCULAR; INTRAVENOUS at 15:57

## 2024-05-29 ENCOUNTER — HOSPITAL ENCOUNTER (INPATIENT)
Facility: CLINIC | Age: 69
LOS: 1 days | Discharge: ANOTHER HOSPITAL | DRG: 281 | End: 2024-05-30
Attending: EMERGENCY MEDICINE | Admitting: HOSPITALIST
Payer: COMMERCIAL

## 2024-05-29 ENCOUNTER — APPOINTMENT (OUTPATIENT)
Dept: CT IMAGING | Facility: CLINIC | Age: 69
DRG: 281 | End: 2024-05-29
Attending: EMERGENCY MEDICINE
Payer: COMMERCIAL

## 2024-05-29 ENCOUNTER — PATIENT OUTREACH (OUTPATIENT)
Dept: FAMILY MEDICINE | Facility: CLINIC | Age: 69
End: 2024-05-29
Payer: COMMERCIAL

## 2024-05-29 DIAGNOSIS — R07.9 CHEST PAIN, UNSPECIFIED TYPE: ICD-10-CM

## 2024-05-29 DIAGNOSIS — R79.89 ELEVATED TROPONIN: ICD-10-CM

## 2024-05-29 DIAGNOSIS — I21.4 NSTEMI (NON-ST ELEVATED MYOCARDIAL INFARCTION) (H): Primary | ICD-10-CM

## 2024-05-29 DIAGNOSIS — D42.9 MENINGIOMATOSIS (H): ICD-10-CM

## 2024-05-29 DIAGNOSIS — R41.89 COGNITIVE CHANGE: Primary | ICD-10-CM

## 2024-05-29 DIAGNOSIS — D32.9 MENINGIOMA (H): ICD-10-CM

## 2024-05-29 LAB
ANION GAP SERPL CALCULATED.3IONS-SCNC: 13 MMOL/L (ref 7–15)
BASOPHILS # BLD AUTO: NORMAL 10*3/UL
BASOPHILS # BLD MANUAL: 0.2 10E3/UL (ref 0–0.2)
BASOPHILS NFR BLD AUTO: NORMAL %
BASOPHILS NFR BLD MANUAL: 2 %
BUN SERPL-MCNC: 30.3 MG/DL (ref 8–23)
CALCIUM SERPL-MCNC: 9.4 MG/DL (ref 8.8–10.2)
CHLORIDE SERPL-SCNC: 104 MMOL/L (ref 98–107)
CREAT SERPL-MCNC: 1.34 MG/DL (ref 0.67–1.17)
DEPRECATED HCO3 PLAS-SCNC: 20 MMOL/L (ref 22–29)
EGFRCR SERPLBLD CKD-EPI 2021: 57 ML/MIN/1.73M2
EOSINOPHIL # BLD AUTO: NORMAL 10*3/UL
EOSINOPHIL # BLD MANUAL: 0.1 10E3/UL (ref 0–0.7)
EOSINOPHIL NFR BLD AUTO: NORMAL %
EOSINOPHIL NFR BLD MANUAL: 1 %
ERYTHROCYTE [DISTWIDTH] IN BLOOD BY AUTOMATED COUNT: 14.5 % (ref 10–15)
GLUCOSE SERPL-MCNC: 102 MG/DL (ref 70–99)
HBA1C MFR BLD: 4.8 %
HCT VFR BLD AUTO: 40.8 % (ref 40–53)
HGB BLD-MCNC: 13.4 G/DL (ref 13.3–17.7)
HOLD SPECIMEN: NORMAL
IMM GRANULOCYTES # BLD: NORMAL 10*3/UL
IMM GRANULOCYTES NFR BLD: NORMAL %
LYMPHOCYTES # BLD AUTO: NORMAL 10*3/UL
LYMPHOCYTES # BLD MANUAL: 1.5 10E3/UL (ref 0.8–5.3)
LYMPHOCYTES NFR BLD AUTO: NORMAL %
LYMPHOCYTES NFR BLD MANUAL: 18 %
MCH RBC QN AUTO: 28.2 PG (ref 26.5–33)
MCHC RBC AUTO-ENTMCNC: 32.8 G/DL (ref 31.5–36.5)
MCV RBC AUTO: 86 FL (ref 78–100)
MONOCYTES # BLD AUTO: NORMAL 10*3/UL
MONOCYTES # BLD MANUAL: 0.5 10E3/UL (ref 0–1.3)
MONOCYTES NFR BLD AUTO: NORMAL %
MONOCYTES NFR BLD MANUAL: 6 %
MYELOCYTES # BLD MANUAL: 0.2 10E3/UL
MYELOCYTES NFR BLD MANUAL: 3 %
NEUTROPHILS # BLD AUTO: NORMAL 10*3/UL
NEUTROPHILS # BLD MANUAL: 5.8 10E3/UL (ref 1.6–8.3)
NEUTROPHILS NFR BLD AUTO: NORMAL %
NEUTROPHILS NFR BLD MANUAL: 70 %
NRBC # BLD AUTO: 0 10E3/UL
NRBC BLD AUTO-RTO: 0 /100
PLAT MORPH BLD: ABNORMAL
PLATELET # BLD AUTO: 196 10E3/UL (ref 150–450)
POTASSIUM SERPL-SCNC: 4.3 MMOL/L (ref 3.4–5.3)
RBC # BLD AUTO: 4.75 10E6/UL (ref 4.4–5.9)
RBC MORPH BLD: ABNORMAL
SODIUM SERPL-SCNC: 137 MMOL/L (ref 135–145)
TROPONIN T SERPL HS-MCNC: 25 NG/L
TROPONIN T SERPL HS-MCNC: 28 NG/L
WBC # BLD AUTO: 8.3 10E3/UL (ref 4–11)

## 2024-05-29 PROCEDURE — 36415 COLL VENOUS BLD VENIPUNCTURE: CPT | Performed by: EMERGENCY MEDICINE

## 2024-05-29 PROCEDURE — G0378 HOSPITAL OBSERVATION PER HR: HCPCS

## 2024-05-29 PROCEDURE — 71260 CT THORAX DX C+: CPT

## 2024-05-29 PROCEDURE — 99222 1ST HOSP IP/OBS MODERATE 55: CPT | Performed by: PHYSICIAN ASSISTANT

## 2024-05-29 PROCEDURE — 83036 HEMOGLOBIN GLYCOSYLATED A1C: CPT | Performed by: PHYSICIAN ASSISTANT

## 2024-05-29 PROCEDURE — 250N000009 HC RX 250: Performed by: EMERGENCY MEDICINE

## 2024-05-29 PROCEDURE — 80048 BASIC METABOLIC PNL TOTAL CA: CPT | Performed by: EMERGENCY MEDICINE

## 2024-05-29 PROCEDURE — 85007 BL SMEAR W/DIFF WBC COUNT: CPT | Performed by: EMERGENCY MEDICINE

## 2024-05-29 PROCEDURE — 85027 COMPLETE CBC AUTOMATED: CPT | Performed by: EMERGENCY MEDICINE

## 2024-05-29 PROCEDURE — 250N000013 HC RX MED GY IP 250 OP 250 PS 637: Performed by: EMERGENCY MEDICINE

## 2024-05-29 PROCEDURE — 250N000011 HC RX IP 250 OP 636: Performed by: EMERGENCY MEDICINE

## 2024-05-29 PROCEDURE — 84484 ASSAY OF TROPONIN QUANT: CPT | Performed by: EMERGENCY MEDICINE

## 2024-05-29 PROCEDURE — 99285 EMERGENCY DEPT VISIT HI MDM: CPT | Mod: 25

## 2024-05-29 RX ORDER — TOPIRAMATE 100 MG/1
100 TABLET, FILM COATED ORAL DAILY
Status: DISCONTINUED | OUTPATIENT
Start: 2024-05-30 | End: 2024-05-30 | Stop reason: HOSPADM

## 2024-05-29 RX ORDER — MAGNESIUM HYDROXIDE/ALUMINUM HYDROXICE/SIMETHICONE 120; 1200; 1200 MG/30ML; MG/30ML; MG/30ML
30 SUSPENSION ORAL EVERY 4 HOURS PRN
Status: DISCONTINUED | OUTPATIENT
Start: 2024-05-29 | End: 2024-05-30 | Stop reason: HOSPADM

## 2024-05-29 RX ORDER — LAMOTRIGINE 200 MG/1
200 TABLET ORAL 2 TIMES DAILY
Status: DISCONTINUED | OUTPATIENT
Start: 2024-05-30 | End: 2024-05-30 | Stop reason: HOSPADM

## 2024-05-29 RX ORDER — NITROGLYCERIN 0.4 MG/1
0.4 TABLET SUBLINGUAL EVERY 5 MIN PRN
Status: DISCONTINUED | OUTPATIENT
Start: 2024-05-29 | End: 2024-05-29

## 2024-05-29 RX ORDER — ASPIRIN 81 MG/1
162 TABLET, CHEWABLE ORAL ONCE
Status: COMPLETED | OUTPATIENT
Start: 2024-05-29 | End: 2024-05-29

## 2024-05-29 RX ORDER — IOPAMIDOL 755 MG/ML
500 INJECTION, SOLUTION INTRAVASCULAR ONCE
Status: COMPLETED | OUTPATIENT
Start: 2024-05-29 | End: 2024-05-29

## 2024-05-29 RX ORDER — NITROGLYCERIN 0.4 MG/1
0.4 TABLET SUBLINGUAL EVERY 5 MIN PRN
Status: DISCONTINUED | OUTPATIENT
Start: 2024-05-29 | End: 2024-05-30 | Stop reason: HOSPADM

## 2024-05-29 RX ORDER — LAMOTRIGINE 25 MG/1
50 TABLET ORAL 2 TIMES DAILY
Status: DISCONTINUED | OUTPATIENT
Start: 2024-05-30 | End: 2024-05-30 | Stop reason: HOSPADM

## 2024-05-29 RX ORDER — ZOLPIDEM TARTRATE 5 MG/1
5 TABLET ORAL
Status: DISCONTINUED | OUTPATIENT
Start: 2024-05-29 | End: 2024-05-30 | Stop reason: HOSPADM

## 2024-05-29 RX ORDER — LIDOCAINE 4 G/G
1-2 PATCH TOPICAL
Status: DISCONTINUED | OUTPATIENT
Start: 2024-05-29 | End: 2024-05-30 | Stop reason: HOSPADM

## 2024-05-29 RX ORDER — SUCRALFATE 1 G/1
1 TABLET ORAL 4 TIMES DAILY
Status: DISCONTINUED | OUTPATIENT
Start: 2024-05-30 | End: 2024-05-30 | Stop reason: HOSPADM

## 2024-05-29 RX ORDER — SIMVASTATIN 20 MG
40 TABLET ORAL AT BEDTIME
Status: DISCONTINUED | OUTPATIENT
Start: 2024-05-30 | End: 2024-05-30 | Stop reason: HOSPADM

## 2024-05-29 RX ORDER — ASPIRIN 81 MG/1
81 TABLET ORAL DAILY
Status: DISCONTINUED | OUTPATIENT
Start: 2024-05-30 | End: 2024-05-30 | Stop reason: HOSPADM

## 2024-05-29 RX ORDER — MAGNESIUM HYDROXIDE/ALUMINUM HYDROXICE/SIMETHICONE 120; 1200; 1200 MG/30ML; MG/30ML; MG/30ML
15 SUSPENSION ORAL ONCE
Status: COMPLETED | OUTPATIENT
Start: 2024-05-29 | End: 2024-05-29

## 2024-05-29 RX ORDER — RIZATRIPTAN BENZOATE 5 MG/1
10 TABLET, ORALLY DISINTEGRATING ORAL
Status: DISCONTINUED | OUTPATIENT
Start: 2024-05-29 | End: 2024-05-30 | Stop reason: HOSPADM

## 2024-05-29 RX ORDER — TAMSULOSIN HYDROCHLORIDE 0.4 MG/1
0.4 CAPSULE ORAL DAILY
Status: DISCONTINUED | OUTPATIENT
Start: 2024-05-30 | End: 2024-05-30 | Stop reason: HOSPADM

## 2024-05-29 RX ORDER — PANTOPRAZOLE SODIUM 40 MG/1
40 TABLET, DELAYED RELEASE ORAL 2 TIMES DAILY
Status: DISCONTINUED | OUTPATIENT
Start: 2024-05-30 | End: 2024-05-30 | Stop reason: HOSPADM

## 2024-05-29 RX ORDER — MAGNESIUM HYDROXIDE/ALUMINUM HYDROXICE/SIMETHICONE 120; 1200; 1200 MG/30ML; MG/30ML; MG/30ML
30 SUSPENSION ORAL EVERY 4 HOURS PRN
Status: DISCONTINUED | OUTPATIENT
Start: 2024-05-29 | End: 2024-05-29

## 2024-05-29 RX ORDER — TRAZODONE HYDROCHLORIDE 50 MG/1
50 TABLET, FILM COATED ORAL
Status: DISCONTINUED | OUTPATIENT
Start: 2024-05-29 | End: 2024-05-30 | Stop reason: HOSPADM

## 2024-05-29 RX ADMIN — ASPIRIN 81 MG CHEWABLE TABLET 162 MG: 81 TABLET CHEWABLE at 19:47

## 2024-05-29 RX ADMIN — IOPAMIDOL 72 ML: 755 INJECTION, SOLUTION INTRAVENOUS at 18:17

## 2024-05-29 RX ADMIN — NITROGLYCERIN 0.4 MG: 0.4 TABLET SUBLINGUAL at 19:49

## 2024-05-29 RX ADMIN — ALUMINUM HYDROXIDE, MAGNESIUM HYDROXIDE, AND SIMETHICONE 15 ML: 1200; 120; 1200 SUSPENSION ORAL at 19:46

## 2024-05-29 RX ADMIN — SODIUM CHLORIDE 60 ML: 9 INJECTION, SOLUTION INTRAVENOUS at 18:17

## 2024-05-29 ASSESSMENT — ACTIVITIES OF DAILY LIVING (ADL)
ADLS_ACUITY_SCORE: 39

## 2024-05-29 ASSESSMENT — COLUMBIA-SUICIDE SEVERITY RATING SCALE - C-SSRS
2. HAVE YOU ACTUALLY HAD ANY THOUGHTS OF KILLING YOURSELF IN THE PAST MONTH?: NO
1. IN THE PAST MONTH, HAVE YOU WISHED YOU WERE DEAD OR WISHED YOU COULD GO TO SLEEP AND NOT WAKE UP?: NO
6. HAVE YOU EVER DONE ANYTHING, STARTED TO DO ANYTHING, OR PREPARED TO DO ANYTHING TO END YOUR LIFE?: NO

## 2024-05-29 NOTE — TELEPHONE ENCOUNTER
"RULA received incoming call from pt   -Pt states he had follow-up appt with [facility] yesterday, requesting Dr. Noel place referral per [place]'s recommendation- see below:     \"I think he needs to be evaluated by our behavioral neurologist or at a memory clinic or memory center. Because of their limited ability to travel long distance, they asked me to send the information to Dr. Chad Noel, his primary care provider, who referred him to [place]. Dr. Noel can help refer him to a memory specialist at [facility].  He needs to have an MRI of the brain repeated in a year to follow the meningiomas. He asked to return in a year to see me with the MRI performed.  I advised him against driving for now. I am concerned that his fairly frequent disorientation may distract him from safe driving.\"    Referral pended, routing to Dr. Bert STEIN RN  Patient Advocate Liaison - PAL RN  [facility]  [phone]   "

## 2024-05-29 NOTE — TELEPHONE ENCOUNTER
Called and spoke with pt,     Relayed message below from Dr. Noel that referral was sent as requested. Pt given the phone number to call to schedule [phone].     He verbalizes understanding and is agreeable with plan. He denies any further questions/concerns at this time.     Pam MARSHALL RN   PAL (Patient Advocate Liaison)  [facility]

## 2024-05-30 ENCOUNTER — APPOINTMENT (OUTPATIENT)
Dept: NUCLEAR MEDICINE | Facility: CLINIC | Age: 69
DRG: 281 | End: 2024-05-30
Attending: PHYSICIAN ASSISTANT
Payer: COMMERCIAL

## 2024-05-30 ENCOUNTER — APPOINTMENT (OUTPATIENT)
Dept: CARDIOLOGY | Facility: CLINIC | Age: 69
DRG: 281 | End: 2024-05-30
Attending: PHYSICIAN ASSISTANT
Payer: COMMERCIAL

## 2024-05-30 ENCOUNTER — APPOINTMENT (OUTPATIENT)
Dept: CARDIOLOGY | Facility: CLINIC | Age: 69
DRG: 281 | End: 2024-05-30
Attending: HOSPITALIST
Payer: COMMERCIAL

## 2024-05-30 ENCOUNTER — HOSPITAL ENCOUNTER (INPATIENT)
Facility: CLINIC | Age: 69
LOS: 11 days | Discharge: HOME | DRG: 236 | End: 2024-06-11
Attending: HOSPITALIST | Admitting: HOSPITALIST
Payer: COMMERCIAL

## 2024-05-30 VITALS
RESPIRATION RATE: 16 BRPM | HEART RATE: 57 BPM | DIASTOLIC BLOOD PRESSURE: 68 MMHG | SYSTOLIC BLOOD PRESSURE: 111 MMHG | TEMPERATURE: 97.7 F | HEIGHT: 69 IN | BODY MASS INDEX: 29 KG/M2 | OXYGEN SATURATION: 97 % | WEIGHT: 195.8 LBS

## 2024-05-30 DIAGNOSIS — Z95.1 S/P CABG X 3: Primary | ICD-10-CM

## 2024-05-30 DIAGNOSIS — R07.9 CHEST PAIN, UNSPECIFIED TYPE: ICD-10-CM

## 2024-05-30 DIAGNOSIS — G40.909 SEIZURE DISORDER (H): ICD-10-CM

## 2024-05-30 DIAGNOSIS — I25.110 CORONARY ARTERY DISEASE INVOLVING NATIVE HEART WITH UNSTABLE ANGINA PECTORIS, UNSPECIFIED VESSEL OR LESION TYPE (H): ICD-10-CM

## 2024-05-30 DIAGNOSIS — K59.09 OTHER CONSTIPATION: ICD-10-CM

## 2024-05-30 LAB
ACT BLD: 251 SECONDS (ref 74–150)
ATRIAL RATE - MUSE: 58 BPM
ATRIAL RATE - MUSE: 60 BPM
CHOLEST SERPL-MCNC: 133 MG/DL
CV STRESS MAX HR HE: 75
DIASTOLIC BLOOD PRESSURE - MUSE: NORMAL MMHG
DIASTOLIC BLOOD PRESSURE - MUSE: NORMAL MMHG
ERYTHROCYTE [DISTWIDTH] IN BLOOD BY AUTOMATED COUNT: 14.5 % (ref 10–15)
FASTING STATUS PATIENT QL REPORTED: ABNORMAL
HCT VFR BLD AUTO: 37.3 % (ref 40–53)
HDLC SERPL-MCNC: 33 MG/DL
HGB BLD-MCNC: 12.5 G/DL (ref 13.3–17.7)
INTERPRETATION ECG - MUSE: NORMAL
INTERPRETATION ECG - MUSE: NORMAL
LDLC SERPL CALC-MCNC: 59 MG/DL
LVEF ECHO: NORMAL
MCH RBC QN AUTO: 28.3 PG (ref 26.5–33)
MCHC RBC AUTO-ENTMCNC: 33.5 G/DL (ref 31.5–36.5)
MCV RBC AUTO: 85 FL (ref 78–100)
NONHDLC SERPL-MCNC: 100 MG/DL
P AXIS - MUSE: 19 DEGREES
P AXIS - MUSE: 62 DEGREES
PLATELET # BLD AUTO: 172 10E3/UL (ref 150–450)
PR INTERVAL - MUSE: 164 MS
PR INTERVAL - MUSE: 176 MS
QRS DURATION - MUSE: 90 MS
QRS DURATION - MUSE: 92 MS
QT - MUSE: 416 MS
QT - MUSE: 436 MS
QTC - MUSE: 408 MS
QTC - MUSE: 436 MS
R AXIS - MUSE: -23 DEGREES
R AXIS - MUSE: -29 DEGREES
RATE PRESSURE PRODUCT: 9750
RBC # BLD AUTO: 4.41 10E6/UL (ref 4.4–5.9)
STRESS ECHO BASELINE DIASTOLIC HE: 76
STRESS ECHO BASELINE HR: 56 BPM
STRESS ECHO BASELINE SYSTOLIC BP: 128
STRESS ECHO CALCULATED PERCENT HR: 50 %
STRESS ECHO LAST STRESS DIASTOLIC BP: 72
STRESS ECHO LAST STRESS SYSTOLIC BP: 130
STRESS ECHO TARGET HR: 150
STRESS/REST PERFUSION RATIO: 1.22
SYSTOLIC BLOOD PRESSURE - MUSE: NORMAL MMHG
SYSTOLIC BLOOD PRESSURE - MUSE: NORMAL MMHG
T AXIS - MUSE: 40 DEGREES
T AXIS - MUSE: 41 DEGREES
TRIGL SERPL-MCNC: 203 MG/DL
TROPONIN T SERPL HS-MCNC: 48 NG/L
VENTRICULAR RATE- MUSE: 58 BPM
VENTRICULAR RATE- MUSE: 60 BPM
WBC # BLD AUTO: 8.6 10E3/UL (ref 4–11)

## 2024-05-30 PROCEDURE — 94660 CPAP INITIATION&MGMT: CPT

## 2024-05-30 PROCEDURE — 120N000001 HC R&B MED SURG/OB

## 2024-05-30 PROCEDURE — 93571 IV DOP VEL&/PRESS C FLO 1ST: CPT | Performed by: INTERNAL MEDICINE

## 2024-05-30 PROCEDURE — 250N000009 HC RX 250: Performed by: INTERNAL MEDICINE

## 2024-05-30 PROCEDURE — 93010 ELECTROCARDIOGRAM REPORT: CPT | Mod: 76 | Performed by: INTERNAL MEDICINE

## 2024-05-30 PROCEDURE — 36415 COLL VENOUS BLD VENIPUNCTURE: CPT | Performed by: PHYSICIAN ASSISTANT

## 2024-05-30 PROCEDURE — 99223 1ST HOSP IP/OBS HIGH 75: CPT | Mod: 25 | Performed by: INTERNAL MEDICINE

## 2024-05-30 PROCEDURE — 93306 TTE W/DOPPLER COMPLETE: CPT | Mod: 26 | Performed by: INTERNAL MEDICINE

## 2024-05-30 PROCEDURE — 93010 ELECTROCARDIOGRAM REPORT: CPT | Performed by: INTERNAL MEDICINE

## 2024-05-30 PROCEDURE — C1769 GUIDE WIRE: HCPCS | Performed by: INTERNAL MEDICINE

## 2024-05-30 PROCEDURE — 255N000002 HC RX 255 OP 636: Performed by: HOSPITALIST

## 2024-05-30 PROCEDURE — 343N000001 HC RX 343: Performed by: HOSPITALIST

## 2024-05-30 PROCEDURE — 93454 CORONARY ARTERY ANGIO S&I: CPT | Performed by: INTERNAL MEDICINE

## 2024-05-30 PROCEDURE — 250N000011 HC RX IP 250 OP 636: Performed by: HOSPITALIST

## 2024-05-30 PROCEDURE — 93571 IV DOP VEL&/PRESS C FLO 1ST: CPT | Mod: 26 | Performed by: INTERNAL MEDICINE

## 2024-05-30 PROCEDURE — 93017 CV STRESS TEST TRACING ONLY: CPT

## 2024-05-30 PROCEDURE — C8929 TTE W OR WO FOL WCON,DOPPLER: HCPCS

## 2024-05-30 PROCEDURE — 85027 COMPLETE CBC AUTOMATED: CPT | Performed by: HOSPITALIST

## 2024-05-30 PROCEDURE — 36415 COLL VENOUS BLD VENIPUNCTURE: CPT | Performed by: HOSPITALIST

## 2024-05-30 PROCEDURE — G0378 HOSPITAL OBSERVATION PER HR: HCPCS

## 2024-05-30 PROCEDURE — 250N000011 HC RX IP 250 OP 636: Performed by: INTERNAL MEDICINE

## 2024-05-30 PROCEDURE — 84484 ASSAY OF TROPONIN QUANT: CPT | Performed by: PHYSICIAN ASSISTANT

## 2024-05-30 PROCEDURE — 99232 SBSQ HOSP IP/OBS MODERATE 35: CPT | Performed by: HOSPITALIST

## 2024-05-30 PROCEDURE — 250N000013 HC RX MED GY IP 250 OP 250 PS 637: Performed by: PHYSICIAN ASSISTANT

## 2024-05-30 PROCEDURE — C1894 INTRO/SHEATH, NON-LASER: HCPCS | Performed by: INTERNAL MEDICINE

## 2024-05-30 PROCEDURE — B2111ZZ FLUOROSCOPY OF MULTIPLE CORONARY ARTERIES USING LOW OSMOLAR CONTRAST: ICD-10-PCS | Performed by: INTERNAL MEDICINE

## 2024-05-30 PROCEDURE — 99152 MOD SED SAME PHYS/QHP 5/>YRS: CPT | Performed by: INTERNAL MEDICINE

## 2024-05-30 PROCEDURE — C1887 CATHETER, GUIDING: HCPCS | Performed by: INTERNAL MEDICINE

## 2024-05-30 PROCEDURE — 4A033BC MEASUREMENT OF ARTERIAL PRESSURE, CORONARY, PERCUTANEOUS APPROACH: ICD-10-PCS | Performed by: INTERNAL MEDICINE

## 2024-05-30 PROCEDURE — 250N000013 HC RX MED GY IP 250 OP 250 PS 637: Performed by: INTERNAL MEDICINE

## 2024-05-30 PROCEDURE — 78452 HT MUSCLE IMAGE SPECT MULT: CPT

## 2024-05-30 PROCEDURE — A9500 TC99M SESTAMIBI: HCPCS | Performed by: HOSPITALIST

## 2024-05-30 PROCEDURE — 93005 ELECTROCARDIOGRAM TRACING: CPT

## 2024-05-30 PROCEDURE — 999N000208 ECHOCARDIOGRAM COMPLETE

## 2024-05-30 PROCEDURE — 272N000001 HC OR GENERAL SUPPLY STERILE: Performed by: INTERNAL MEDICINE

## 2024-05-30 PROCEDURE — 258N000003 HC RX IP 258 OP 636: Performed by: HOSPITALIST

## 2024-05-30 PROCEDURE — 250N000013 HC RX MED GY IP 250 OP 250 PS 637: Performed by: HOSPITALIST

## 2024-05-30 PROCEDURE — 93018 CV STRESS TEST I&R ONLY: CPT | Performed by: INTERNAL MEDICINE

## 2024-05-30 PROCEDURE — 99153 MOD SED SAME PHYS/QHP EA: CPT | Performed by: INTERNAL MEDICINE

## 2024-05-30 PROCEDURE — 93572 IV DOP VEL&/PRESS C FLO EA: CPT | Mod: 26 | Performed by: INTERNAL MEDICINE

## 2024-05-30 PROCEDURE — 78452 HT MUSCLE IMAGE SPECT MULT: CPT | Mod: 26 | Performed by: INTERNAL MEDICINE

## 2024-05-30 PROCEDURE — 80061 LIPID PANEL: CPT | Performed by: PHYSICIAN ASSISTANT

## 2024-05-30 PROCEDURE — 85347 COAGULATION TIME ACTIVATED: CPT

## 2024-05-30 PROCEDURE — 93799 UNLISTED CV SVC/PROCEDURE: CPT | Performed by: INTERNAL MEDICINE

## 2024-05-30 PROCEDURE — 93454 CORONARY ARTERY ANGIO S&I: CPT | Mod: 26 | Performed by: INTERNAL MEDICINE

## 2024-05-30 PROCEDURE — 999N000157 HC STATISTIC RCP TIME EA 10 MIN

## 2024-05-30 PROCEDURE — C1725 CATH, TRANSLUMIN NON-LASER: HCPCS | Performed by: INTERNAL MEDICINE

## 2024-05-30 RX ORDER — OXYCODONE HYDROCHLORIDE 5 MG/1
5 TABLET ORAL EVERY 4 HOURS PRN
Status: DISCONTINUED | OUTPATIENT
Start: 2024-05-30 | End: 2024-05-30 | Stop reason: HOSPADM

## 2024-05-30 RX ORDER — FENTANYL CITRATE 50 UG/ML
25 INJECTION, SOLUTION INTRAMUSCULAR; INTRAVENOUS
Status: DISCONTINUED | OUTPATIENT
Start: 2024-05-30 | End: 2024-05-30 | Stop reason: HOSPADM

## 2024-05-30 RX ORDER — ATROPINE SULFATE 0.1 MG/ML
0.5 INJECTION INTRAVENOUS
Status: DISCONTINUED | OUTPATIENT
Start: 2024-05-30 | End: 2024-05-30 | Stop reason: HOSPADM

## 2024-05-30 RX ORDER — HEPARIN SODIUM 10000 [USP'U]/100ML
0-5000 INJECTION, SOLUTION INTRAVENOUS CONTINUOUS
Status: DISCONTINUED | OUTPATIENT
Start: 2024-05-30 | End: 2024-05-30 | Stop reason: HOSPADM

## 2024-05-30 RX ORDER — NITROGLYCERIN 5 MG/ML
VIAL (ML) INTRAVENOUS
Status: DISCONTINUED | OUTPATIENT
Start: 2024-05-30 | End: 2024-05-30 | Stop reason: HOSPADM

## 2024-05-30 RX ORDER — OXYCODONE HYDROCHLORIDE 5 MG/1
10 TABLET ORAL EVERY 4 HOURS PRN
Status: DISCONTINUED | OUTPATIENT
Start: 2024-05-30 | End: 2024-05-30 | Stop reason: HOSPADM

## 2024-05-30 RX ORDER — ASPIRIN 81 MG/1
243 TABLET, CHEWABLE ORAL ONCE
Status: COMPLETED | OUTPATIENT
Start: 2024-05-30 | End: 2024-05-30

## 2024-05-30 RX ORDER — NALOXONE HYDROCHLORIDE 0.4 MG/ML
0.2 INJECTION, SOLUTION INTRAMUSCULAR; INTRAVENOUS; SUBCUTANEOUS
Status: DISCONTINUED | OUTPATIENT
Start: 2024-05-30 | End: 2024-05-30 | Stop reason: HOSPADM

## 2024-05-30 RX ORDER — SODIUM CHLORIDE 9 MG/ML
75 INJECTION, SOLUTION INTRAVENOUS CONTINUOUS
Status: ACTIVE | OUTPATIENT
Start: 2024-05-30 | End: 2024-05-30

## 2024-05-30 RX ORDER — POTASSIUM CHLORIDE 1500 MG/1
20 TABLET, EXTENDED RELEASE ORAL
Status: DISCONTINUED | OUTPATIENT
Start: 2024-05-30 | End: 2024-05-30 | Stop reason: HOSPADM

## 2024-05-30 RX ORDER — LORAZEPAM 0.5 MG/1
0.5 TABLET ORAL
Status: DISCONTINUED | OUTPATIENT
Start: 2024-05-30 | End: 2024-05-30 | Stop reason: HOSPADM

## 2024-05-30 RX ORDER — LORAZEPAM 2 MG/ML
0.5 INJECTION INTRAMUSCULAR
Status: DISCONTINUED | OUTPATIENT
Start: 2024-05-30 | End: 2024-05-30 | Stop reason: HOSPADM

## 2024-05-30 RX ORDER — LIDOCAINE 40 MG/G
CREAM TOPICAL
Status: DISCONTINUED | OUTPATIENT
Start: 2024-05-30 | End: 2024-05-30 | Stop reason: HOSPADM

## 2024-05-30 RX ORDER — ISOSORBIDE DINITRATE 20 MG/1
20 TABLET ORAL
Status: DISCONTINUED | OUTPATIENT
Start: 2024-05-30 | End: 2024-05-30 | Stop reason: HOSPADM

## 2024-05-30 RX ORDER — AMINOPHYLLINE 25 MG/ML
INJECTION, SOLUTION INTRAVENOUS
Status: DISCONTINUED
Start: 2024-05-30 | End: 2024-05-30 | Stop reason: HOSPADM

## 2024-05-30 RX ORDER — NITROGLYCERIN 0.4 MG/1
0.4 TABLET SUBLINGUAL EVERY 5 MIN PRN
Status: DISCONTINUED | OUTPATIENT
Start: 2024-05-30 | End: 2024-06-04

## 2024-05-30 RX ORDER — VERAPAMIL HYDROCHLORIDE 2.5 MG/ML
INJECTION, SOLUTION INTRAVENOUS
Status: DISCONTINUED | OUTPATIENT
Start: 2024-05-30 | End: 2024-05-30 | Stop reason: HOSPADM

## 2024-05-30 RX ORDER — HEPARIN SODIUM 1000 [USP'U]/ML
INJECTION, SOLUTION INTRAVENOUS; SUBCUTANEOUS
Status: DISCONTINUED | OUTPATIENT
Start: 2024-05-30 | End: 2024-05-30 | Stop reason: HOSPADM

## 2024-05-30 RX ORDER — NALOXONE HYDROCHLORIDE 0.4 MG/ML
0.4 INJECTION, SOLUTION INTRAMUSCULAR; INTRAVENOUS; SUBCUTANEOUS
Status: DISCONTINUED | OUTPATIENT
Start: 2024-05-30 | End: 2024-05-30 | Stop reason: HOSPADM

## 2024-05-30 RX ORDER — IOPAMIDOL 755 MG/ML
INJECTION, SOLUTION INTRAVASCULAR
Status: DISCONTINUED | OUTPATIENT
Start: 2024-05-30 | End: 2024-05-30 | Stop reason: HOSPADM

## 2024-05-30 RX ORDER — REGADENOSON 0.08 MG/ML
INJECTION, SOLUTION INTRAVENOUS
Status: COMPLETED
Start: 2024-05-30 | End: 2024-05-30

## 2024-05-30 RX ORDER — FENTANYL CITRATE 50 UG/ML
INJECTION, SOLUTION INTRAMUSCULAR; INTRAVENOUS
Status: DISCONTINUED | OUTPATIENT
Start: 2024-05-30 | End: 2024-05-30 | Stop reason: HOSPADM

## 2024-05-30 RX ORDER — ASPIRIN 325 MG
325 TABLET ORAL ONCE
Status: COMPLETED | OUTPATIENT
Start: 2024-05-30 | End: 2024-05-30

## 2024-05-30 RX ORDER — ACETAMINOPHEN 325 MG/1
975 TABLET ORAL EVERY 8 HOURS PRN
Status: DISCONTINUED | OUTPATIENT
Start: 2024-05-30 | End: 2024-05-30

## 2024-05-30 RX ORDER — SODIUM CHLORIDE 9 MG/ML
INJECTION, SOLUTION INTRAVENOUS CONTINUOUS
Status: DISCONTINUED | OUTPATIENT
Start: 2024-05-30 | End: 2024-05-30 | Stop reason: HOSPADM

## 2024-05-30 RX ORDER — ADENOSINE 3 MG/ML
INJECTION, SOLUTION INTRAVENOUS
Status: DISCONTINUED | OUTPATIENT
Start: 2024-05-30 | End: 2024-05-30 | Stop reason: HOSPADM

## 2024-05-30 RX ORDER — FLUMAZENIL 0.1 MG/ML
0.2 INJECTION, SOLUTION INTRAVENOUS
Status: DISCONTINUED | OUTPATIENT
Start: 2024-05-30 | End: 2024-05-30 | Stop reason: HOSPADM

## 2024-05-30 RX ORDER — ACETAMINOPHEN 325 MG/1
650 TABLET ORAL EVERY 4 HOURS PRN
Status: DISCONTINUED | OUTPATIENT
Start: 2024-05-30 | End: 2024-05-30 | Stop reason: HOSPADM

## 2024-05-30 RX ORDER — METOPROLOL TARTRATE 50 MG
50 TABLET ORAL 2 TIMES DAILY
Status: DISCONTINUED | OUTPATIENT
Start: 2024-05-30 | End: 2024-05-30 | Stop reason: HOSPADM

## 2024-05-30 RX ADMIN — ASPIRIN 81 MG CHEWABLE TABLET 243 MG: 81 TABLET CHEWABLE at 14:40

## 2024-05-30 RX ADMIN — LAMOTRIGINE 50 MG: 25 TABLET ORAL at 10:45

## 2024-05-30 RX ADMIN — LAMOTRIGINE 50 MG: 25 TABLET ORAL at 00:23

## 2024-05-30 RX ADMIN — NITROGLYCERIN 0.4 MG: 0.4 TABLET SUBLINGUAL at 10:43

## 2024-05-30 RX ADMIN — ISOSORBIDE DINITRATE 20 MG: 20 TABLET ORAL at 18:16

## 2024-05-30 RX ADMIN — TOPIRAMATE 100 MG: 100 TABLET, FILM COATED ORAL at 10:44

## 2024-05-30 RX ADMIN — SUCRALFATE 1 G: 1 TABLET ORAL at 23:03

## 2024-05-30 RX ADMIN — SODIUM CHLORIDE: 9 INJECTION, SOLUTION INTRAVENOUS at 14:38

## 2024-05-30 RX ADMIN — PANTOPRAZOLE SODIUM 40 MG: 40 TABLET, DELAYED RELEASE ORAL at 10:44

## 2024-05-30 RX ADMIN — TRAZODONE HYDROCHLORIDE 50 MG: 50 TABLET ORAL at 00:23

## 2024-05-30 RX ADMIN — REGADENOSON 0.4 MG: 0.08 INJECTION, SOLUTION INTRAVENOUS at 10:22

## 2024-05-30 RX ADMIN — SIMVASTATIN 40 MG: 20 TABLET, FILM COATED ORAL at 00:23

## 2024-05-30 RX ADMIN — SUCRALFATE 1 G: 1 TABLET ORAL at 10:45

## 2024-05-30 RX ADMIN — TAMSULOSIN HYDROCHLORIDE 0.4 MG: 0.4 CAPSULE ORAL at 10:45

## 2024-05-30 RX ADMIN — HEPARIN SODIUM 1100 UNITS/HR: 10000 INJECTION, SOLUTION INTRAVENOUS at 21:21

## 2024-05-30 RX ADMIN — LAMOTRIGINE 200 MG: 200 TABLET ORAL at 10:45

## 2024-05-30 RX ADMIN — PANTOPRAZOLE SODIUM 40 MG: 40 TABLET, DELAYED RELEASE ORAL at 23:03

## 2024-05-30 RX ADMIN — Medication 10.2 MILLICURIE: at 08:20

## 2024-05-30 RX ADMIN — LAMOTRIGINE 200 MG: 200 TABLET ORAL at 23:01

## 2024-05-30 RX ADMIN — LAMOTRIGINE 200 MG: 200 TABLET ORAL at 00:23

## 2024-05-30 RX ADMIN — SIMVASTATIN 40 MG: 20 TABLET, FILM COATED ORAL at 23:05

## 2024-05-30 RX ADMIN — ASPIRIN 81 MG: 81 TABLET, COATED ORAL at 10:45

## 2024-05-30 RX ADMIN — HUMAN ALBUMIN MICROSPHERES AND PERFLUTREN 3 ML: 10; .22 INJECTION, SOLUTION INTRAVENOUS at 14:40

## 2024-05-30 RX ADMIN — ACETAMINOPHEN 975 MG: 325 TABLET, FILM COATED ORAL at 12:24

## 2024-05-30 RX ADMIN — Medication 32.2 MILLICURIE: at 11:02

## 2024-05-30 RX ADMIN — MIDAZOLAM 1 MG: 1 INJECTION INTRAMUSCULAR; INTRAVENOUS at 15:57

## 2024-05-30 RX ADMIN — LAMOTRIGINE 50 MG: 25 TABLET ORAL at 23:02

## 2024-05-30 ASSESSMENT — ACTIVITIES OF DAILY LIVING (ADL)
ADLS_ACUITY_SCORE: 33

## 2024-05-30 NOTE — PLAN OF CARE
PRIMARY DIAGNOSIS: CHEST PAIN  OUTPATIENT/OBSERVATION GOALS TO BE MET BEFORE DISCHARGE:  1. Ruled out acute coronary syndrome (negative or stable Troponin):  Yes  2. Pain Status: Pain free.  3. Appropriate provocative testing performed: Lexiscan in the morning   - Stress Test Procedure: Lexiscan  - Interpretation of cardiac rhythm per telemetry tech: SB w/ 1st AVB and prolonged QT    4. Cleared by Consultants (if applicable):N/A  5. Return to near baseline physical activity: Yes  Discharge Planner Nurse   Safe discharge environment identified: Yes  Barriers to discharge: Yes       Entered by: Lexie Cazares RN 05/30/2024 4:15 AM     Please review provider order for any additional goals.   Nurse to notify provider when observation goals have been met and patient is ready for discharge.  Vitals are Temp: 97  F (36.1  C) Temp src: Oral BP: 139/73 Pulse: 55   Resp: 18 SpO2: 98 %.  Patient is Alert and Oriented x4. They are independent with no assistive devices .  Pt is a NPO diet.  They are denying pain.  Patient is Saline locked. Denies nausea/dizziness/SOB. CPAP at NOC. Patient complained CPAP machine was too loud, requested O2 for NOC instead. Placed on 2LPM. Continuous pulse ox on. Plan is for Lexiscan in the morning.

## 2024-05-30 NOTE — PROGRESS NOTES
A hospital CPAP of +8 and 21% was set up at bedside for overnight use. Pt is not ready for placement yet.    Ananya Westbrook RT

## 2024-05-30 NOTE — ED PROVIDER NOTES
Emergency Department Note      History of Present Illness     Chief Complaint  Chest Pain    HPI  Kashmir Yao is a 69 year old male with history of CAD status post MI who presents with sharp severe left-sided chest pain radiating to the neck and shoulder that started about 3 hours ago while sitting in his recliner.  The pain is gradually improved since onset.  He denies having any associated shortness of breath, nausea, diaphoresis.  He did take aspirin before coming in.  He was seen here 1 week ago for chest pain, but notes that this is somewhat different.  That chest pain was more in the middle of his chest.  He denies any associated fever, cough, hemoptysis.  No abdominal pain, diarrhea, leg swelling.    Independent Historian  None    Review of External Notes  I reviewed ER visit from 5/22/2024 when patient was seen for atypical chest pain and had negative workup at that time.    Past Medical History   Medical History and Problem List  Past Medical History:   Diagnosis Date     Anemia 07/31/2020     Arthritis      Bell's palsy 09/08/2020     Benign prostatic hyperplasia with incomplete bladder emptying 07/28/2020     Bilateral carpal tunnel syndrome 02/13/2020     CAD (coronary artery disease) 2010     Cognitive change 01/02/2020     Colonic polyps 2009     Common wart 12/02/2020     Disturbance of skin sensation      Encephalocele (H) 2009     Epilepsy, partial (H) 2009     GERD (gastroesophageal reflux disease)      Heart attack (H)      Herpes zoster with complication 10/07/2020     History of spinal cord injury      Hypertension      Iron deficiency 09/09/2020     Meningioma (H) 04/10/2023     Meningiomatosis (H) 03/14/2024     RIC (obstructive sleep apnea)      RIC (obstructive sleep apnea)      Paraneoplastic Antibody  positive[799.89BS] 2009     Paronychia of toe, left 06/26/2023     Pedal edema 02/14/2020     Post herpetic neuralgia 12/02/2020     Primary osteoarthritis of both hands 01/02/2020      Rash 04/19/2024     Seizures (H)      Spinal stenosis      Stented coronary artery      Tobacco use disorder      Ulnar neuropathy of both upper extremities 02/13/2020     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     Ventricular Assymetry 2009     Weight gain 03/14/2024       Medications  acetaminophen (TYLENOL) 500 MG tablet  alum & mag hydroxide-simethicone (MAALOX MULTI SYMPTOM MAX ST) 400-400-40 MG/5ML SUSP suspension  aspirin 81 MG tablet  benzonatate (TESSALON) 200 MG capsule  COMPOUNDED NON-CONTROLLED SUBSTANCE (CMPD RX) - PHARMACY TO MIX COMPOUNDED MEDICATION  cyclobenzaprine (FLEXERIL) 5 MG tablet  fluticasone (FLONASE) 50 MCG/ACT nasal spray  lamoTRIgine (LAMICTAL) 100 MG tablet  lamoTRIgine (LAMICTAL) 200 MG tablet  linaclotide (LINZESS) 290 MCG capsule  naproxen (NAPROSYN) 375 MG tablet  omeprazole (PRILOSEC) 40 MG DR capsule  rizatriptan (MAXALT) 10 MG tablet  sildenafil (VIAGRA) 100 MG tablet  simvastatin (ZOCOR) 40 MG tablet  sucralfate (CARAFATE) 1 GM tablet  tamsulosin (FLOMAX) 0.4 MG capsule  tirzepatide (MOUNJARO) 2.5 MG/0.5ML pen  topiramate (TOPAMAX) 100 MG tablet  traZODone (DESYREL) 50 MG tablet  zolpidem (AMBIEN) 5 MG tablet        Surgical History   Past Surgical History:   Procedure Laterality Date     ARTHROPLASTY KNEE Left 05/16/2016    Procedure: ARTHROPLASTY KNEE;  Surgeon: Chet Leonardo MD;  Location:  OR     BACK SURGERY       CARPAL TUNNEL RELEASE RT/LT Right 03/11/2020    Right carpal tunnel release, Dr. Rick Frias, [facility]     CARPAL TUNNEL RELEASE RT/LT Left      COLONOSCOPY N/A 10/08/2020    Procedure: COLONOSCOPY WITH POLYPECTOMIES using cold jumbo forceps;  Surgeon: Kevin Song MD;  Location:  GI     CYSTOSCOPY       ESOPHAGOSCOPY, GASTROSCOPY, DUODENOSCOPY (EGD), COMBINED  05/24/2012    Procedure:COMBINED ESOPHAGOSCOPY, GASTROSCOPY, DUODENOSCOPY (EGD); ESOPHAGOSCOPY, GASTROSCOPY, DUODENOSCOPY (EGD) ; Surgeon:GILLIAN JOHNSON; Location: GI      "ESOPHAGOSCOPY, GASTROSCOPY, DUODENOSCOPY (EGD), COMBINED N/A 10/08/2020    Procedure: ESOPHAGOGASTRODUODENOSCOPY (EGD) (fv);  Surgeon: Kevin Song MD;  Location:  GI     KNEE SURGERY Left     repair of cartilage     RELEASE TRIGGER FINGER Left 10/19/2020    Left ring finger trigger finger release at A1 pulley, Dr. Rick Frias, [facility]     SPINE SURGERY      repair of herniated disc lumbar     STENT       trigger finger release Right 09/15/2020    Right thumb trigger finger release, Dr. Frias     ULNAR NERVE TRANSPOSITION Left      VENTRICULOSTOMY      [facility] ANESTH,DX ARTHROSCOPIC PROC KNEE JOINT      CARTILEGE REPAIR.     [facility] NONSPECIFIC PROCEDURE      stint in heart     [facility] COLONOSCOPY THRU STOMA, DIAGNOSTIC  2009    tubular adenomae, recommended annual colonoscopy     Physical Exam   Patient Vitals for the past 24 hrs:   BP Temp Temp src Pulse Resp SpO2 Height Weight   05/29/24 2003 -- -- -- 58 16 97 % -- --   05/29/24 2001 -- -- -- 57 18 96 % -- --   05/29/24 2000 119/75 -- -- 59 12 96 % -- --   05/29/24 1918 -- -- -- 58 19 -- -- --   05/29/24 1914 (!) 153/78 -- -- 56 18 -- -- --   05/29/24 1740 -- -- -- 57 10 -- -- --   05/29/24 1739 -- -- -- -- 14 -- -- --   05/29/24 1738 -- -- -- 56 11 99 % -- --   05/29/24 1713 (!) 169/104 97  F (36.1  C) Temporal 60 20 100 % 1.753 m (5' 9\") 90.8 kg (200 lb 2.8 oz)     Physical Exam  Vitals and nursing note reviewed.   Constitutional:       General: He is not in acute distress.     Appearance: He is not ill-appearing.   HENT:      Head: Normocephalic and atraumatic.      Right Ear: External ear normal.      Left Ear: External ear normal.      Nose: Nose normal.   Eyes:      Conjunctiva/sclera: Conjunctivae normal.   Cardiovascular:      Rate and Rhythm: Normal rate and regular rhythm.      Pulses: Normal pulses.      Heart sounds: No murmur heard.  Pulmonary:      Effort: Pulmonary effort is normal. No respiratory distress.      Breath sounds: No " wheezing, rhonchi or rales.   Abdominal:      General: Abdomen is flat. There is no distension.      Palpations: Abdomen is soft.      Tenderness: There is no abdominal tenderness. There is no guarding or rebound.   Musculoskeletal:         General: No swelling or deformity.      Cervical back: Normal range of motion and neck supple.   Skin:     General: Skin is warm and dry.      Findings: No rash.   Neurological:      Mental Status: He is alert and oriented to person, place, and time.   Psychiatric:         Behavior: Behavior normal.           Diagnostics   Lab Results   Labs Ordered and Resulted from Time of ED Arrival to Time of ED Departure   BASIC METABOLIC PANEL - Abnormal       Result Value    Sodium 137      Potassium 4.3      Chloride 104      Carbon Dioxide (CO2) 20 (*)     Anion Gap 13      Urea Nitrogen 30.3 (*)     Creatinine 1.34 (*)     GFR Estimate 57 (*)     Calcium 9.4      Glucose 102 (*)    TROPONIN T, HIGH SENSITIVITY - Abnormal    Troponin T, High Sensitivity 25 (*)    MANUAL DIFFERENTIAL - Abnormal    % Neutrophils 70      % Lymphocytes 18      % Monocytes 6      % Eosinophils 1      % Basophils 2      % Myelocytes 3      Absolute Neutrophils 5.8      Absolute Lymphocytes 1.5      Absolute Monocytes 0.5      Absolute Eosinophils 0.1      Absolute Basophils 0.2      Absolute Myelocytes 0.2 (*)     RBC Morphology Confirmed RBC Indices      Platelet Assessment        Value: Automated Count Confirmed. Platelet morphology is normal.   CBC WITH PLATELETS AND DIFFERENTIAL    WBC Count 8.3      RBC Count 4.75      Hemoglobin 13.4      Hematocrit 40.8      MCV 86      MCH 28.2      MCHC 32.8      RDW 14.5      Platelet Count 196      % Neutrophils        % Lymphocytes        % Monocytes        % Eosinophils        % Basophils        % Immature Granulocytes        NRBCs per 100 WBC 0      Absolute Neutrophils        Absolute Lymphocytes        Absolute Monocytes        Absolute Eosinophils         Absolute Basophils        Absolute Immature Granulocytes        Absolute NRBCs 0.0     TROPONIN T, HIGH SENSITIVITY       Imaging  CT Aortic Survey w Contrast   Final Result   IMPRESSION:   1.  No aortic dissection. No acute vascular abnormalities are identified.   2.  Mild emphysema.   3.  Colon is almost completely collapsed and empty suggesting diarrheal illness.   4.  Modest splenomegaly.             EKG   ECG results from 05/29/24   EKG 12-lead, tracing only     Value    Systolic Blood Pressure     Diastolic Blood Pressure     Ventricular Rate 58    Atrial Rate 58    ME Interval 164    QRS Duration 92        QTc 408    P Axis 19    R AXIS -23    T Axis 40    Interpretation ECG      Sinus bradycardia  Otherwise normal ECG  When compared with ECG of 22-MAY-2024 19:27,  Non-specific change in ST segment in Lateral leads  Nonspecific T wave abnormality no longer evident in Anterolateral leads     EKG 12-lead, tracing only     Value    Systolic Blood Pressure     Diastolic Blood Pressure     Ventricular Rate 60    Atrial Rate 60    ME Interval 176    QRS Duration 90        QTc 436    P Axis 62    R AXIS -29    T Axis 41    Interpretation ECG      Sinus rhythm  Nonspecific ST abnormality  Abnormal ECG  When compared with ECG of 29-MAY-2024 17:17, (unconfirmed)  No significant change was found       *Note: Due to a large number of results and/or encounters for the requested time period, some results have not been displayed. A complete set of results can be found in Results Review.       Independent Interpretation  None  ED Course    Medications Administered  Medications   nitroGLYcerin (NITROSTAT) sublingual tablet 0.4 mg (0.4 mg Sublingual $Given 5/29/24 1949)   iopamidol (ISOVUE-370) solution 500 mL (72 mLs Intravenous $Given 5/29/24 1817)   CT scan flush (60 mLs Intravenous $Given 5/29/24 1817)   alum & mag hydroxide-simethicone (MAALOX) suspension 15 mL (15 mLs Oral $Given 5/29/24 1946)   aspirin (ASA)  chewable tablet 162 mg (162 mg Oral $Given 5/29/24 1947)       Procedures  Procedures     Discussion of Management  Admitting Hospitalist, HERB Nieves for Dr Aden.    Social Determinants of Health adding to complexity of care  None    ED Course     Medical Decision Making / Diagnosis   CMS Diagnoses: None    MIPS     None    MDM  Kashmir Yao is a 69 year old male who presents with acute severe left-sided chest pain 3 hours prior to arrival.  Symptoms sound fairly atypical for ACS given the sharp nature and lack of associated symptoms.  He is quite hypertensive on arrival so dissection certainly in the differential diagnosis.  Sounds less consistent with a PE.  Low suspicion for pneumothorax based on exam.  Workup was pursued as noted above.  Patient was noted to have an elevated troponin, which is almost twice what it was a week ago.  His EKG did not show any ischemic changes.  CT of the aorta showed no signs of dissection.    2004 patient CT is negative for dissection.  His troponin did come back elevated at 25 which is almost twice what it was last week.  Given that he is high risk with his history of CAD and age, his heart score is 5, so we will plan to admit for further cardiac evaluation.  I discussed with HERB Nieves for Dr. Aden.  She accepted the patient for observation admission, but would like to hold off on putting in bed request until the repeat troponin is completed.  If the troponin is not significantly increasing, patient to be admitted to observation.  However if the troponin significant increases and the patient is having ongoing chest pain, he will likely need heparin, inpatient admission, and cardiology evaluation.  I updated Dr. Flores on this plan as he is taking over for me, and he will follow-up on the repeat troponin and put in the bed request.    Disposition  The patient was admitted to the hospital.     ICD-10 Codes:    ICD-10-CM    1. Chest pain, unspecified type  R07.9        2. Elevated troponin  R79.89            Discharge Medications  New Prescriptions    No medications on file         MD Petra Crocker Shaun M, MD  05/29/24 2006

## 2024-05-30 NOTE — H&P
[facility]    History and Physical - Hospitalist Service       Date of Admission:  5/29/2024    Assessment & Plan      Kashmir Yao is a 69 year old male with history of left shoulder fracture in the summer 2023, CAD with stent placement, obstructive sleep apnea, right frontal meningioma, seizure disorder, hypertension, reflux, BPH, Bell's palsy, anemia, admitted on 5/29/2024 after his second visit to the ED with complaint of chest pain.     In the ED he was afebrile with heart rate of 60, pressure 169/104 and breathing comfortably on room air without hypoxia.  Lab work remarkable for creatinine 1.34, BUN 30.3, CO2 of 20, normal electrolytes, normal anion gap, glucose 102, high-sensitivity troponin of 25 and normal CBC.  Second high-sensitivity troponin was mildly elevated at 28.  CT aortic survey showed no aortic dissection and mild emphysema.  EKG showed a sinus rhythm.  He took a baby aspirin before arrival.  He had recurrent chest pain once he was in the ED and received nitroglycerin with improvement.  He also received aspirin 162 mg and Maalox suspension with observation admission requested.    # Atypical nonexertional chest pain  # History of CAD with stent placement in 2010  -Patient presents with abrupt onset of left-sided stabbing chest pain that radiates to his left shoulder and slightly up the left side of his neck.  He took a baby aspirin and pain lasted probably 30 minutes not resolving until he came to the ED  -Pain was not associated with shortness of breath, diaphoresis, lightheadedness or nausea.  It did recur while he was in the ED for short period of time and he received nitroglycerin with improvement  -Initial troponin was 25 with second troponin of 28, EKG was nonischemic  -Given his history of coronary disease and second trip to the ED within a week it is reasonable to proceed with stress testing soon Lexiscan stress test ordered for a.m.  -If this is negative my  "suspicion is that he is having muscle spasms related to his left shoulder fracture possibly  -Monitor on telemetry   -add on lipid panel and A1c    # Left shoulder fracture  -Fracture occurred in July 2023 with avulsion fracture of the humeral ball  -Continues to have paroxysmal's of pain related to this    # History of right frontal meningioma  # Seizure disorder  -Continue lamotrigine    # Hypertension  # HLP  -I believe blood pressure meds were stopped due to blood pressures  -Continue statin once reconciled      # RIC  -CPAP ordered    #BPH  -continue Flomax                Diet:  Cardiac diet, NPO at midnight  DVT Prophylaxis: Pneumatic Compression Devices  Cano Catheter: Not present  Lines: None     Cardiac Monitoring: None  Code Status:  Full code    Clinically Significant Risk Factors Present on Admission                # Drug Induced Platelet Defect: home medication list includes an antiplatelet medication   # Hypertension: Noted on problem list      # Overweight: Estimated body mass index is 29.56 kg/m  as calculated from the following:    Height as of this encounter: 1.753 m (5' 9\").    Weight as of this encounter: 90.8 kg (200 lb 2.8 oz).              Disposition Plan     Medically Ready for Discharge: Anticipated Tomorrow         The patient's care was discussed with the Patient and ED Consultant(s).    Bernice Martinez PA-C  Hospitalist Service  [facility]  Securely message with [employer] (more info)  Text page via [employer] Paging/Directory     ______________________________________________________________________    Chief Complaint   Chest pain    History is obtained from the patient    History of Present Illness   Kashmir Yao is a 69 year old male who presents for the second time with complaint of chest pain.  He reports he was sitting on the couch when he had sudden development of left-sided stabbing chest pain that radiated to his left shoulder and up his left neck.  " This was not associated with shortness of breath, nausea, vomiting, diaphoresis or lightheadedness.  It lasted at least 30 minutes and did not improve until he was in the emergency room.  He took an aspirin before coming to the ED.  While he was in the ED he had a shorter episode that resolved after nitroglycerin.  He reports that he broke his left shoulder last summer and has had a lot of pain with this.  The pain is similar to this but does not usually go into his chest.  He also was seen in the emergency room with central chest pain about a week ago and was diagnosed with suspected reflux/GERD and discharged on a PPI.  He has been taking this.  He denies recent illness and does not smoke cigarettes or drink alcohol.  He lives independently without any recent falls.      Past Medical History    Past Medical History:   Diagnosis Date    Anemia 07/31/2020    Arthritis     Bell's palsy 09/08/2020    Benign prostatic hyperplasia with incomplete bladder emptying 07/28/2020    Bilateral carpal tunnel syndrome 02/13/2020    CAD (coronary artery disease) 2010    a subtotally occluded obtuse marginal vessel which was stented with a drug-coated stent.  He had 50% to 60% LAD disease and 25% to 30% right coronary disease    Cognitive change 01/02/2020    Colonic polyps 2009    tubular adenomae with mildly dysplastic features    Common wart 12/02/2020    Disturbance of skin sensation     Encephalocele (H) 2009    left frontal    Epilepsy, partial (H) 2009    with secondary generalization    GERD (gastroesophageal reflux disease)     Heart attack (H)     5 years ago. Cardiology 3 months age    Herpes zoster with complication 10/07/2020    History of spinal cord injury     Hypertension     Iron deficiency 09/09/2020    Meningioma (H) 04/10/2023    Meningiomatosis (H) 03/14/2024    RIC (obstructive sleep apnea)     RIC (obstructive sleep apnea)     Paraneoplastic Antibody  positive[799.89BS] 2009    mildly elevated alpha 3  ganglionic acetylcholine receptor and JEOVANNY 65 receptor  antibody    Paronychia of toe, left 06/26/2023    Pedal edema 02/14/2020    Post herpetic neuralgia 12/02/2020    Primary osteoarthritis of both hands 01/02/2020    Rash 04/19/2024    Seizures (H)     Last seizure 2012    Spinal stenosis     Stented coronary artery     Tobacco use disorder     Ulnar neuropathy of both upper extremities 02/13/2020    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    Ventricular Assymetry 2009    likely congenital right lateral ventricular enlargement    Weight gain 03/14/2024       Past Surgical History   Past Surgical History:   Procedure Laterality Date    ARTHROPLASTY KNEE Left 05/16/2016    Procedure: ARTHROPLASTY KNEE;  Surgeon: Chet Leonardo MD;  Location:  OR    BACK SURGERY      CARPAL TUNNEL RELEASE RT/LT Right 03/11/2020    Right carpal tunnel release, Dr. Rick Frias, [facility]    CARPAL TUNNEL RELEASE RT/LT Left     COLONOSCOPY N/A 10/08/2020    Procedure: COLONOSCOPY WITH POLYPECTOMIES using cold jumbo forceps;  Surgeon: Kevin Song MD;  Location:  GI    CYSTOSCOPY      ESOPHAGOSCOPY, GASTROSCOPY, DUODENOSCOPY (EGD), COMBINED  05/24/2012    Procedure:COMBINED ESOPHAGOSCOPY, GASTROSCOPY, DUODENOSCOPY (EGD); ESOPHAGOSCOPY, GASTROSCOPY, DUODENOSCOPY (EGD) ; Surgeon:GILLIAN JOHNSON; Location: GI    ESOPHAGOSCOPY, GASTROSCOPY, DUODENOSCOPY (EGD), COMBINED N/A 10/08/2020    Procedure: ESOPHAGOGASTRODUODENOSCOPY (EGD) (fv);  Surgeon: Kevin Song MD;  Location:  GI    KNEE SURGERY Left     repair of cartilage    RELEASE TRIGGER FINGER Left 10/19/2020    Left ring finger trigger finger release at A1 pulley, Dr. Rick Frias, [facility]    SPINE SURGERY      repair of herniated disc lumbar    STENT      trigger finger release Right 09/15/2020    Right thumb trigger finger release, Dr. Frias    ULNAR NERVE TRANSPOSITION Left     VENTRICULOSTOMY      [place]    ZZC ANESTH,DX ARTHROSCOPIC PROC  KNEE JOINT      CARTILEGE REPAIR.    [facility] NONSPECIFIC PROCEDURE      stint in heart    [facility] COLONOSCOPY THRU STOMA, DIAGNOSTIC  2009    tubular adenomae, recommended annual colonoscopy       Prior to Admission Medications   Prior to Admission Medications   Prescriptions Last Dose Informant Patient Reported? Taking?   COMPOUNDED NON-CONTROLLED SUBSTANCE (CMPD RX) - PHARMACY TO MIX COMPOUNDED MEDICATION   No No   Si vial TriMix #9 with syringes.  Inject 0.1 mL intercavernously per MD's instructions.   acetaminophen (TYLENOL) 500 MG tablet   Yes No   Sig: Take 1,000 mg by mouth every 8 hours as needed for mild pain   alum & mag hydroxide-simethicone (MAALOX MULTI SYMPTOM MAX ST) 400-400-40 MG/5ML SUSP suspension   No No   Sig: Take 30 mLs by mouth every 4 hours as needed for indigestion   aspirin 81 MG tablet   Yes No   Sig: Take 81 mg by mouth daily   benzonatate (TESSALON) 200 MG capsule   No No   Sig: Take 1 capsule (200 mg) by mouth 3 times daily as needed for cough   cyclobenzaprine (FLEXERIL) 5 MG tablet   No No   Sig: Take 1 tablet (5 mg) by mouth 3 times daily as needed for muscle spasms   fluticasone (FLONASE) 50 MCG/ACT nasal spray   No No   Sig: Spray 1 spray into both nostrils daily   lamoTRIgine (LAMICTAL) 100 MG tablet   No No   Sig: Take 0.5 tablets (50 mg) by mouth 2 times daily   lamoTRIgine (LAMICTAL) 200 MG tablet   No No   Sig: Take 1 tablet (200 mg) by mouth 2 times daily   linaclotide (LINZESS) 290 MCG capsule   No No   Sig: Take 1 capsule (290 mcg) by mouth every morning (before breakfast) Prn constipatiion   naproxen (NAPROSYN) 375 MG tablet   No No   Sig: TAKE 1 TABLET(375 MG) BY MOUTH TWICE DAILY WITH MEALS   omeprazole (PRILOSEC) 40 MG DR capsule   No No   Sig: Take 1 capsule (40 mg) by mouth daily for 14 days   rizatriptan (MAXALT) 10 MG tablet   No No   Sig: TAKE 1 TABLET(10 MG) BY MOUTH AT ONSET OF HEADACHE FOR MIGRAINE. MAY REPEAT IN 2 HOURS. MAX 3 TABLETS/ 24 HOURS   sildenafil  (VIAGRA) 100 MG tablet   No No   Sig: Take 1 tablet (100 mg) by mouth daily as needed (interest)   simvastatin (ZOCOR) 40 MG tablet   No No   Sig: Take 1 tablet (40 mg) by mouth At Bedtime   sucralfate (CARAFATE) 1 GM tablet   No No   Sig: Take 1 tablet (1 g) by mouth 4 times daily for 14 days   tamsulosin (FLOMAX) 0.4 MG capsule   No No   Sig: Take 1 capsule (0.4 mg) by mouth daily   tirzepatide (MOUNJARO) 2.5 MG/0.5ML pen   No No   Sig: Inject 2.5 mg Subcutaneous every 7 days   topiramate (TOPAMAX) 100 MG tablet   No No   Sig: Take 1 tablet (100 mg) by mouth daily Use second   traZODone (DESYREL) 50 MG tablet   No No   Sig: Take 2 tablets (100 mg) by mouth At Bedtime   zolpidem (AMBIEN) 5 MG tablet   No No   Sig: Take 1 tablet (5 mg) by mouth nightly as needed for sleep      Facility-Administered Medications: None          Physical Exam   Vital Signs: Temp: 97  F (36.1  C) Temp src: Temporal BP: (!) 143/80 Pulse: 55   Resp: 20 SpO2: 99 %      Weight: 200 lbs 2.84 oz    General Appearance: Alert and oriented x 3   respiratory: Clear to auscultation bilaterally  Cardiovascular: RRR without murmur  GI: Bowel sounds are present without tenderness  Skin: No rashes or open sores are noted  Other: No lower extremity swelling noted    Medical Decision Making       55 MINUTES SPENT BY ME on the date of service doing chart review, history, exam, documentation & further activities per the note.      Data     I have personally reviewed the following data over the past 24 hrs:    8.3  \   13.4   / 196     137 104 30.3 (H) /  102 (H)   4.3 20 (L) 1.34 (H) \     Trop: 28 (H) BNP: N/A       Imaging results reviewed over the past 24 hrs:   Recent Results (from the past 24 hour(s))   CT Aortic Survey w Contrast    Narrative    EXAM: CT AORTIC SURVEY W CONTRAST  LOCATION: [facility]  DATE: 5/29/2024    INDICATION: severe L chest shoulder neck pain, HTN  COMPARISON: 10/28/2018  TECHNIQUE: CT angiogram chest  abdomen pelvis during arterial phase of injection of IV contrast. 2D and 3D MIP reconstructions were performed by the CT technologist. Dose reduction techniques were used.   CONTRAST: 72mL Isovue 370    FINDINGS:   CT ANGIOGRAM CHEST, ABDOMEN, AND PELVIS: Normal caliber aorta, no dissection. Bovine arch. Normal appearance of splanchnic, renal and iliac arteries other than modest mural calcifications.  Central pulmonary arteries normal.    LUNGS AND PLEURA: Mild changes of emphysema. Lungs are clear, no pleural effusion.    MEDIASTINUM/AXILLAE: No lymphadenopathy.    CORONARY ARTERY CALCIFICATION: Severe.    HEPATOBILIARY: Seen in early phase enhancement, otherwise unremarkable.    PANCREAS: Normal.    SPLEEN: Modest splenomegaly similar to previous exam.    ADRENAL GLANDS: Normal.    KIDNEYS/BLADDER: Normal.    BOWEL: Colon is collapsed and empty, especially left hemicolon suggesting diarrheal illness. No acute inflammatory changes are seen.    LYMPH NODES: Normal.    PELVIC ORGANS: Normal.    MUSCULOSKELETAL: Degenerative changes involving lumbar spine.      Impression    IMPRESSION:  1.  No aortic dissection. No acute vascular abnormalities are identified.  2.  Mild emphysema.  3.  Colon is almost completely collapsed and empty suggesting diarrheal illness.  4.  Modest splenomegaly.

## 2024-05-30 NOTE — PHARMACY-ADMISSION MEDICATION HISTORY
Pharmacist Admission Medication History    Admission medication history is complete. The information provided in this note is only as accurate as the sources available at the time of the update.    Information Source(s): Patient and CareEverywhere/SureScripts via in-person    Pertinent Information: -    Changes made to PTA medication list:  Added: None  Deleted: flexeril, mounjaro -not taking   Changed: flonase to prn, linzess sig, trazodone to prn    Allergies reviewed with patient and updates made in EHR: yes    Medication History Completed By: Camille Vick [facility] 5/29/2024 10:59 PM    PTA Med List   Medication Sig Note Last Dose    acetaminophen (TYLENOL) 500 MG tablet Take 1,000 mg by mouth every 8 hours as needed for mild pain  Past Month    alum & mag hydroxide-simethicone (MAALOX MULTI SYMPTOM MAX ST) 400-400-40 MG/5ML SUSP suspension Take 30 mLs by mouth every 4 hours as needed for indigestion  Past Month    aspirin 81 MG tablet Take 81 mg by mouth daily  5/29/2024 at 324 mg    benzonatate (TESSALON) 200 MG capsule Take 1 capsule (200 mg) by mouth 3 times daily as needed for cough  Past Month    fluticasone (FLONASE) 50 MCG/ACT nasal spray Spray 1 spray into both nostrils daily (Patient taking differently: Spray 1 spray into both nostrils daily as needed for rhinitis or allergies)  Past Week    lamoTRIgine (LAMICTAL) 100 MG tablet Take 0.5 tablets (50 mg) by mouth 2 times daily (Patient taking differently: Take 50 mg by mouth 2 times daily 200 + 50 = 250 mg BID)  5/29/2024    lamoTRIgine (LAMICTAL) 200 MG tablet Take 1 tablet (200 mg) by mouth 2 times daily (Patient taking differently: Take 200 mg by mouth 2 times daily 200mg + 50 mg= 250 mg BID)  5/29/2024    linaclotide (LINZESS) 290 MCG capsule Take 1 capsule (290 mcg) by mouth every morning (before breakfast) Prn constipatiion (Patient taking differently: Take 290 mcg by mouth daily as needed (constipation)) 5/29/2024: Takes q 2-3 days usually  5/29/2024    naproxen (NAPROSYN) 375 MG tablet TAKE 1 TABLET(375 MG) BY MOUTH TWICE DAILY WITH MEALS  5/29/2024    omeprazole (PRILOSEC) 40 MG DR capsule Take 1 capsule (40 mg) by mouth daily for 14 days  5/29/2024    rizatriptan (MAXALT) 10 MG tablet TAKE 1 TABLET(10 MG) BY MOUTH AT ONSET OF HEADACHE FOR MIGRAINE. MAY REPEAT IN 2 HOURS. MAX 3 TABLETS/ 24 HOURS  Past Month    simvastatin (ZOCOR) 40 MG tablet Take 1 tablet (40 mg) by mouth At Bedtime  5/28/2024 at pm    sucralfate (CARAFATE) 1 GM tablet Take 1 tablet (1 g) by mouth 4 times daily for 14 days  5/29/2024    tamsulosin (FLOMAX) 0.4 MG capsule Take 1 capsule (0.4 mg) by mouth daily  5/29/2024    topiramate (TOPAMAX) 100 MG tablet Take 1 tablet (100 mg) by mouth daily Use second  5/29/2024    traZODone (DESYREL) 50 MG tablet Take 2 tablets (100 mg) by mouth At Bedtime (Patient taking differently: Take 50 mg by mouth nightly as needed for sleep)  Past Week at prn    zolpidem (AMBIEN) 5 MG tablet Take 1 tablet (5 mg) by mouth nightly as needed for sleep  More than a month at prn

## 2024-05-30 NOTE — PROGRESS NOTES
Cardiology Interventional Service    Please see my cath note. I discussed my findings in detail with the family ( wife and 2 sons) I explained that his coronary anatomy is complex and we will need to decide between surgery ( LIMA to LAD, SVG(s) to Cx marginal branches, versus complex PCI that would involve CX trifurcation. All in all I feel intensification of medical therapy with transfer to [facility] is the best option.     I have a page out to  regarding this plan    Plan  1) resume heparin 2 hours after TR band off ( no bolus)as long as no bleeding ( ordered)   2) add metoprolol 50 bid (ordered)  3) add ISDN 20 tid ( ordered)  4) transfer to [facility] when bed available  5) Heart team discussion regarding PCI ( CX first then LAD) versus CAB    [place]

## 2024-05-30 NOTE — PLAN OF CARE
PRIMARY DIAGNOSIS: CHEST PAIN  OUTPATIENT/OBSERVATION GOALS TO BE MET BEFORE DISCHARGE:  1. Ruled out acute coronary syndrome (negative or stable Troponin):  Yes  2. Pain Status: CP intermittent; more on left shoulder area   3. Appropriate provocative testing performed: Yes  - Stress Test Procedure: Lexiscan  - Interpretation of cardiac rhythm per telemetry tech: SR/SB 56-60    4. Cleared by Consultants (if applicable):N/A  5. Return to near baseline physical activity: Yes  Discharge Planner Nurse   Safe discharge environment identified: Yes  Barriers to discharge: Yes       Entered by: Paty Salazar RN 05/30/2024    A&Ox4, VSS on RA. Reports some intermittent pain to left shoulder, reports happens every 15 mins. Viral pain but when having pain can be 7/10. Offered Nitroglycerin, pt declines. Did report when got last evening helped. Up IND; reports frequent urination since admission.  NPO for lexiscan. PIV is SL. Able to make needs known.   Please review provider order for any additional goals. Nurse to notify provider when observation goals have been met and patient is ready for discharge.  Plan of Care Reviewed With: patient  Overall Patient Progress: improving  Outcome Evaluation: Intermittent pain on left shoulder, radiates to lower neck. A&Ox4. Stress test pending.    Problem: Adult Inpatient Plan of Care  Goal: Plan of Care Review  Outcome: Progressing  Flowsheets (Taken 5/30/2024 1021)  Outcome Evaluation: Intermittent pain on left shoulder, radiates to lower neck. A&Ox4. Stress test pending.  Plan of Care Reviewed With: patient  Overall Patient Progress: improving  Goal: Patient-Specific Goal (Individualized)  Outcome: Progressing  Goal: Absence of Hospital-Acquired Illness or Injury  Outcome: Progressing  Goal: Optimal Comfort and Wellbeing  Outcome: Progressing  Goal: Readiness for Transition of Care  Outcome: Progressing     Problem: Comorbidity Management  Goal: Maintenance of Seizure  Control  Reactivated

## 2024-05-30 NOTE — CONSULTS
[facility]    Cardiology Consultation     Date of Admission:  5/29/2024    Review of the result(s) of each unique test - Last ECG echocardiogram CBC BMP.     Assessment & Plan   Kashmir Yao is a 69 year old male who was admitted on 5/29/2024.    Non-ST elevation myocardial infarction  Coronary artery disease with prior history of OM stent  Hypertension    Plan and recommendations  Patient is having intermittent stuttering chest discomfort.  He also has shoulder pain from that but this pain is different from his positional shoulder discomfort. Stress test shows high risk findings.  Echo showing normal LV function.  Recommend coronary angiography with intent to revascularize.  Troponin is slightly going up.  For now continue heparin infusion.  Notified Cath Lab.  They will proceed with urgent angiogram this afternoon.  Patient denies contraindications to dual antiplatelet therapy.  He denies any active bleeding problems.    Risk and benefits of the angiogram explained.     High complexity urgent coronary angiography    ALE Garcia  Staff Cardiologist  [facility]    History of Present Illness   Kashmir Yao is a 69 year old male who presents with CP.  This is a very pleasant 69-year-old gentleman who has a history of coronary artery disease with second obtuse marginal stenting in 2010 at the [facility].  With a history of hypertension obstructive sleep apnea and meningioma.  The patient has been experiencing ongoing symptoms of chest discomfort and left shoulder pain for the last 1 to 2 weeks.  He presented to the hospital with the symptoms twice now.  On arrival his troponin was elevated and going up.  Stress test was ordered today which showed large area of severe lateral ischemia.  Echocardiogram being done at bedside showing normal biventricular size and function without any hemodynamically significant valve disease.  ECG does not show any active ischemic changes but  patient continues to have intermittent chest discomfort.  Cardiology was consulted for urgent coronary angiography due to high risk stress test findings and chest pain.    Past Medical History   Past Medical History:   Diagnosis Date    Anemia 07/31/2020    Arthritis     Bell's palsy 09/08/2020    Benign prostatic hyperplasia with incomplete bladder emptying 07/28/2020    Bilateral carpal tunnel syndrome 02/13/2020    CAD (coronary artery disease) 2010    a subtotally occluded obtuse marginal vessel which was stented with a drug-coated stent.  He had 50% to 60% LAD disease and 25% to 30% right coronary disease    Cognitive change 01/02/2020    Colonic polyps 2009    tubular adenomae with mildly dysplastic features    Common wart 12/02/2020    Disturbance of skin sensation     Encephalocele (H) 2009    left frontal    Epilepsy, partial (H) 2009    with secondary generalization    GERD (gastroesophageal reflux disease)     Heart attack (H)     5 years ago. Cardiology 3 months age    Herpes zoster with complication 10/07/2020    History of spinal cord injury     Hypertension     Iron deficiency 09/09/2020    Meningioma (H) 04/10/2023    Meningiomatosis (H) 03/14/2024    RIC (obstructive sleep apnea)     RIC (obstructive sleep apnea)     Paraneoplastic Antibody  positive[799.89BS] 2009    mildly elevated alpha 3 ganglionic acetylcholine receptor and JEOVANNY 65 receptor  antibody    Paronychia of toe, left 06/26/2023    Pedal edema 02/14/2020    Post herpetic neuralgia 12/02/2020    Primary osteoarthritis of both hands 01/02/2020    Rash 04/19/2024    Seizures (H)     Last seizure 2012    Spinal stenosis     Stented coronary artery     Tobacco use disorder     Ulnar neuropathy of both upper extremities 02/13/2020    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    Ventricular Assymetry 2009    likely congenital right lateral ventricular enlargement    Weight gain 03/14/2024       Past Surgical History   Past Surgical History:    Procedure Laterality Date    ARTHROPLASTY KNEE Left 2016    Procedure: ARTHROPLASTY KNEE;  Surgeon: Chet Leonardo MD;  Location: [facility] OR    BACK SURGERY      CARPAL TUNNEL RELEASE RT/LT Right 2020    Right carpal tunnel release, Dr. Rick Frias, [facility]    CARPAL TUNNEL RELEASE RT/LT Left     COLONOSCOPY N/A 10/08/2020    Procedure: COLONOSCOPY WITH POLYPECTOMIES using cold jumbo forceps;  Surgeon: Kevin Song MD;  Location:  GI    CYSTOSCOPY      ESOPHAGOSCOPY, GASTROSCOPY, DUODENOSCOPY (EGD), COMBINED  2012    Procedure:COMBINED ESOPHAGOSCOPY, GASTROSCOPY, DUODENOSCOPY (EGD); ESOPHAGOSCOPY, GASTROSCOPY, DUODENOSCOPY (EGD) ; Surgeon:GILLIAN JOHNSON; Location: GI    ESOPHAGOSCOPY, GASTROSCOPY, DUODENOSCOPY (EGD), COMBINED N/A 10/08/2020    Procedure: ESOPHAGOGASTRODUODENOSCOPY (EGD) (fv);  Surgeon: Kevin Song MD;  Location:  GI    KNEE SURGERY Left     repair of cartilage    RELEASE TRIGGER FINGER Left 10/19/2020    Left ring finger trigger finger release at A1 pulley, Dr. Rick Frias, [facility]    SPINE SURGERY      repair of herniated disc lumbar    STENT      trigger finger release Right 09/15/2020    Right thumb trigger finger release, Dr. Frias    ULNAR NERVE TRANSPOSITION Left     VENTRICULOSTOMY      [facility] ANESTH,DX ARTHROSCOPIC PROC KNEE JOINT      CARTILEGE REPAIR.    [facility] NONSPECIFIC PROCEDURE      stint in heart    [facility] COLONOSCOPY THRU STOMA, DIAGNOSTIC  2009    tubular adenomae, recommended annual colonoscopy       Prior to Admission Medications   Prior to Admission Medications   Prescriptions Last Dose Informant Patient Reported? Taking?   COMPOUNDED NON-CONTROLLED SUBSTANCE (CMPD RX) - PHARMACY TO MIX COMPOUNDED MEDICATION Not Taking  No No   Si vial TriMix #9 with syringes.  Inject 0.1 mL intercavernously per MD's instructions.   Patient not taking: Reported on 2024   acetaminophen (TYLENOL) 500 MG tablet Past Month  Yes Yes    Sig: Take 1,000 mg by mouth every 8 hours as needed for mild pain   alum & mag hydroxide-simethicone (MAALOX MULTI SYMPTOM MAX ST) 400-400-40 MG/5ML SUSP suspension Past Month  No Yes   Sig: Take 30 mLs by mouth every 4 hours as needed for indigestion   aspirin 81 MG tablet 5/29/2024 at 324 mg  Yes Yes   Sig: Take 81 mg by mouth daily   benzonatate (TESSALON) 200 MG capsule Past Month  No Yes   Sig: Take 1 capsule (200 mg) by mouth 3 times daily as needed for cough   fluticasone (FLONASE) 50 MCG/ACT nasal spray Past Week  No Yes   Sig: Spray 1 spray into both nostrils daily   Patient taking differently: Spray 1 spray into both nostrils daily as needed for rhinitis or allergies   lamoTRIgine (LAMICTAL) 100 MG tablet 5/29/2024  No Yes   Sig: Take 0.5 tablets (50 mg) by mouth 2 times daily   Patient taking differently: Take 50 mg by mouth 2 times daily 200 + 50 = 250 mg BID   lamoTRIgine (LAMICTAL) 200 MG tablet 5/29/2024  No Yes   Sig: Take 1 tablet (200 mg) by mouth 2 times daily   Patient taking differently: Take 200 mg by mouth 2 times daily 200mg + 50 mg= 250 mg BID   linaclotide (LINZESS) 290 MCG capsule 5/29/2024  No Yes   Sig: Take 1 capsule (290 mcg) by mouth every morning (before breakfast) Prn constipatiion   Patient taking differently: Take 290 mcg by mouth daily as needed (constipation)   naproxen (NAPROSYN) 375 MG tablet 5/29/2024  No Yes   Sig: TAKE 1 TABLET(375 MG) BY MOUTH TWICE DAILY WITH MEALS   omeprazole (PRILOSEC) 40 MG DR capsule 5/29/2024  No Yes   Sig: Take 1 capsule (40 mg) by mouth daily for 14 days   rizatriptan (MAXALT) 10 MG tablet Past Month  No Yes   Sig: TAKE 1 TABLET(10 MG) BY MOUTH AT ONSET OF HEADACHE FOR MIGRAINE. MAY REPEAT IN 2 HOURS. MAX 3 TABLETS/ 24 HOURS   sildenafil (VIAGRA) 100 MG tablet   No No   Sig: Take 1 tablet (100 mg) by mouth daily as needed (interest)   simvastatin (ZOCOR) 40 MG tablet 5/28/2024 at pm  No Yes   Sig: Take 1 tablet (40 mg) by mouth At Bedtime    sucralfate (CARAFATE) 1 GM tablet 5/29/2024  No Yes   Sig: Take 1 tablet (1 g) by mouth 4 times daily for 14 days   tamsulosin (FLOMAX) 0.4 MG capsule 5/29/2024  No Yes   Sig: Take 1 capsule (0.4 mg) by mouth daily   tirzepatide (MOUNJARO) 2.5 MG/0.5ML pen Not Taking  No No   Sig: Inject 2.5 mg Subcutaneous every 7 days   Patient not taking: Reported on 5/29/2024   topiramate (TOPAMAX) 100 MG tablet 5/29/2024  No Yes   Sig: Take 1 tablet (100 mg) by mouth daily Use second   traZODone (DESYREL) 50 MG tablet Past Week at prn  No Yes   Sig: Take 2 tablets (100 mg) by mouth At Bedtime   Patient taking differently: Take 50 mg by mouth nightly as needed for sleep   zolpidem (AMBIEN) 5 MG tablet More than a month at prn  No Yes   Sig: Take 1 tablet (5 mg) by mouth nightly as needed for sleep      Facility-Administered Medications: None     Current Facility-Administered Medications   Medication Dose Route Frequency Provider Last Rate Last Admin    acetaminophen (TYLENOL) tablet 975 mg  975 mg Oral Q8H PRN New Aden MD   975 mg at 05/30/24 1224    alum & mag hydroxide-simethicone (MAALOX) suspension 30 mL  30 mL Oral Q4H PRN Bernice Martinez PA-C        aminophylline 25 MG/ML             aspirin EC tablet 81 mg  81 mg Oral Daily Bernice Martinez PA-C   81 mg at 05/30/24 1045    heparin 25,000 units in 0.45% NaCl 250 mL ANTICOAGULANT infusion  0-5,000 Units/hr Intravenous Continuous New Aden MD        HOLD: Caffeine containing medications 12 hours prior to the procedure   Does not apply HOLD Bernice Martinez PA-C        HOLD: dipyridamole (PERSANTINE) or aspirin/dipyridamole (AGGRENOX) 48 hours prior to the procedure   Does not apply HOLD Bernice Martinez PA-C        HOLD: theophylline or aminophylline 12 hours prior to the procedure   Does not apply HOLD Bernice Martinez PA-C        IF patient diabetic - HOLD: ALL ORAL HYPOGLYCEMICS and include: glipizide, glyburide,  glimepiride, gliclazide, metformin, any metformin containing medication, on day of the procedure   Does not apply HOLD Bernice Martinez PA-C        lamoTRIgine (LaMICtal) tablet 200 mg  200 mg Oral BID Bernice aMrtinez PA-C   200 mg at 05/30/24 1045    lamoTRIgine (LaMICtal) tablet 50 mg  50 mg Oral BID Bernice Martinez PA-C   50 mg at 05/30/24 1045    Lidocaine (LIDOCARE) 4 % Patch 1-2 patch  1-2 patch Transdermal Q24h Bernice Martinez PA-C        linaclotide (LINZESS) capsule 290 mcg  290 mcg Oral Daily PRN Bernice Martinez PA-C        nitroGLYcerin (NITROSTAT) sublingual tablet 0.4 mg  0.4 mg Sublingual Q5 Min PRN Bernice Martinez PA-C   0.4 mg at 05/30/24 1043    pantoprazole (PROTONIX) EC tablet 40 mg  40 mg Oral BID Bernice Martinez PA-C   40 mg at 05/30/24 1044    rizatriptan (MAXALT-MLT) ODT tab 10 mg  10 mg Oral Once PRN Bernice Martinez PA-C        simvastatin (ZOCOR) tablet 40 mg  40 mg Oral At Bedtime Bernice Martinez PA-C   40 mg at 05/30/24 0023    sodium chloride (PF) 0.9% PF flush 1-10 mL  1-10 mL Intravenous Q10 Min PRN Bernice Martinez PA-C        sodium chloride 0.9 % infusion   Intravenous Continuous New Aden MD        sucralfate (CARAFATE) tablet 1 g  1 g Oral 4x Daily Bernice Martinez PA-C   1 g at 05/30/24 1045    tamsulosin (FLOMAX) capsule 0.4 mg  0.4 mg Oral Daily Bernice Martinez PA-C   0.4 mg at 05/30/24 1045    topiramate (TOPAMAX) tablet 100 mg  100 mg Oral Daily Bernice Martinez PA-C   100 mg at 05/30/24 1044    traZODone (DESYREL) tablet 50 mg  50 mg Oral At Bedtime PRN Bernice Martinez PA-C   50 mg at 05/30/24 0023    zolpidem (AMBIEN) tablet 5 mg  5 mg Oral At Bedtime PRN Bernice Martinez PA-C         Current Facility-Administered Medications   Medication Dose Route Frequency Provider Last Rate Last Admin    acetaminophen (TYLENOL) tablet 975 mg  975 mg  Oral Q8H PRN New Aden MD   975 mg at 05/30/24 1224    alum & mag hydroxide-simethicone (MAALOX) suspension 30 mL  30 mL Oral Q4H PRN Bernice Martinez PA-C        aminophylline 25 MG/ML             aspirin EC tablet 81 mg  81 mg Oral Daily Bernice Martinez PA-C   81 mg at 05/30/24 1045    heparin 25,000 units in 0.45% NaCl 250 mL ANTICOAGULANT infusion  0-5,000 Units/hr Intravenous Continuous New Aden MD        HOLD: Caffeine containing medications 12 hours prior to the procedure   Does not apply HOLD Bernice Martinez PA-C        HOLD: dipyridamole (PERSANTINE) or aspirin/dipyridamole (AGGRENOX) 48 hours prior to the procedure   Does not apply HOLD Bernice Martinez PA-C        HOLD: theophylline or aminophylline 12 hours prior to the procedure   Does not apply HOLD Bernice Martinez PA-C        IF patient diabetic - HOLD: ALL ORAL HYPOGLYCEMICS and include: glipizide, glyburide, glimepiride, gliclazide, metformin, any metformin containing medication, on day of the procedure   Does not apply HOLD Bernice Martinez PA-C        lamoTRIgine (LaMICtal) tablet 200 mg  200 mg Oral BID Bernice Martinez PA-C   200 mg at 05/30/24 1045    lamoTRIgine (LaMICtal) tablet 50 mg  50 mg Oral BID Bernice Martinez PA-C   50 mg at 05/30/24 1045    Lidocaine (LIDOCARE) 4 % Patch 1-2 patch  1-2 patch Transdermal Q24h Bernice Martinez PA-C        linaclotide (LINZESS) capsule 290 mcg  290 mcg Oral Daily PRN Bernice Martinez PA-C        nitroGLYcerin (NITROSTAT) sublingual tablet 0.4 mg  0.4 mg Sublingual Q5 Min PRN Bernice Martinez PA-C   0.4 mg at 05/30/24 1043    pantoprazole (PROTONIX) EC tablet 40 mg  40 mg Oral BID Bernice Martinez PA-C   40 mg at 05/30/24 1044    rizatriptan (MAXALT-MLT) ODT tab 10 mg  10 mg Oral Once PRN Bernice Martinez PA-C        simvastatin (ZOCOR) tablet 40 mg  40 mg Oral At Bedtime Juan  Bernice Carranza PA-C   40 mg at 05/30/24 0023    sodium chloride (PF) 0.9% PF flush 1-10 mL  1-10 mL Intravenous Q10 Min PRN Bernice Martinez PA-C        sodium chloride 0.9 % infusion   Intravenous Continuous New Aden MD        sucralfate (CARAFATE) tablet 1 g  1 g Oral 4x Daily Bernice Martinez PA-C   1 g at 05/30/24 1045    tamsulosin (FLOMAX) capsule 0.4 mg  0.4 mg Oral Daily Bernice Martinez PA-C   0.4 mg at 05/30/24 1045    topiramate (TOPAMAX) tablet 100 mg  100 mg Oral Daily Bernice Martinez PA-C   100 mg at 05/30/24 1044    traZODone (DESYREL) tablet 50 mg  50 mg Oral At Bedtime PRN Bernice Martinez PA-C   50 mg at 05/30/24 0023    zolpidem (AMBIEN) tablet 5 mg  5 mg Oral At Bedtime PRN Bernice Martinez PA-C         Allergies   Allergies   Allergen Reactions    Gabapentin      Fever      Morphine And Codeine      inability to sleep after taking codeine    Vicodin [Hydrocodone-Acetaminophen]      Perspiring, hyper, itchy       Social History    reports that he quit smoking about 20 years ago. His smoking use included cigarettes. He started smoking about 40 years ago. He has a 10 pack-year smoking history. He has been exposed to tobacco smoke. He has never used smokeless tobacco. He reports that he does not drink alcohol and does not use drugs.    Family History   I have reviewed this patient's family history and updated it with pertinent information if needed.  Family History   Problem Relation Age of Onset    Cardiovascular Mother         CHF, HTN, VASC. INSUFF., DM    No Known Problems Father     Thyroid Disease Sister         HYPOTHYROID    Genitourinary Problems Brother         RENAL FAILURE AND H/O MVA WITH LE PARAPLEGIA    No Known Problems Sister     No Known Problems Brother     No Known Problems Brother     Cancer - colorectal No family hx of     Colon Cancer No family hx of           Review of Systems   A comprehensive review of system was  "performed and is negative other than that noted in the HPI or here.     Physical Exam   Vital Signs with Ranges  Temp:  [97  F (36.1  C)-97.7  F (36.5  C)] 97.7  F (36.5  C)  Pulse:  [53-60] 59  Resp:  [10-20] 18  BP: (104-169)/() 104/60  SpO2:  [96 %-100 %] 99 %  Wt Readings from Last 4 Encounters:   05/29/24 90.8 kg (200 lb 2.8 oz)   05/22/24 87.5 kg (192 lb 12.7 oz)   04/18/24 88.9 kg (196 lb)   04/05/24 90.2 kg (198 lb 14.4 oz)     No intake/output data recorded.      Vitals: /60 (BP Location: Right arm)   Pulse 59   Temp 97.7  F (36.5  C) (Oral)   Resp 18   Ht 1.753 m (5' 9\")   Wt 90.8 kg (200 lb 2.8 oz)   SpO2 99%   BMI 29.56 kg/m      Physical Exam:   General - Alert and in no acute distress  Respiratory -clear on room air  Cardiovascular -regular no rubs or gallops no edema normal JVP  Gastrointestinal - Non distended  Psych - Appropriate affect     No lab results found in last 7 days.    Invalid input(s): \"TROPONINIES\"    Recent Labs   Lab 05/29/24  1743   WBC 8.3   HGB 13.4   MCV 86         POTASSIUM 4.3   CHLORIDE 104   CO2 20*   BUN 30.3*   CR 1.34*   GFRESTIMATED 57*   ANIONGAP 13   MOE 9.4   *     Recent Labs   Lab Test 03/14/24  1417 05/01/23  1732   CHOL 130 141   HDL 36* 40   LDL 72 71   TRIG 110 148     Recent Labs   Lab 05/29/24  1743   WBC 8.3   HGB 13.4   HCT 40.8   MCV 86        No results for input(s): \"PH\", \"PHV\", \"PO2\", \"PO2V\", \"SAT\", \"PCO2\", \"PCO2V\", \"HCO3\", \"HCO3V\" in the last 168 hours.  No results for input(s): \"NTBNPI\", \"NTBNP\" in the last 168 hours.  No results for input(s): \"DD\" in the last 168 hours.  No results for input(s): \"SED\", \"CRP\" in the last 168 hours.  Recent Labs   Lab 05/29/24  1743        No results for input(s): \"TSH\" in the last 168 hours.  No results for input(s): \"COLOR\", \"APPEARANCE\", \"URINEGLC\", \"URINEBILI\", \"URINEKETONE\", \"SG\", \"UBLD\", \"URINEPH\", \"PROTEIN\", \"UROBILINOGEN\", \"NITRITE\", \"LEUKEST\", \"RBCU\", \"WBCU\" in " the last 168 hours.    Imaging:  Recent Results (from the past 48 hour(s))   CT Aortic Survey w Contrast    Narrative    EXAM: CT AORTIC SURVEY W CONTRAST  LOCATION: [facility]  DATE: 5/29/2024    INDICATION: severe L chest shoulder neck pain, HTN  COMPARISON: 10/28/2018  TECHNIQUE: CT angiogram chest abdomen pelvis during arterial phase of injection of IV contrast. 2D and 3D MIP reconstructions were performed by the CT technologist. Dose reduction techniques were used.   CONTRAST: 72mL Isovue 370    FINDINGS:   CT ANGIOGRAM CHEST, ABDOMEN, AND PELVIS: Normal caliber aorta, no dissection. Bovine arch. Normal appearance of splanchnic, renal and iliac arteries other than modest mural calcifications.  Central pulmonary arteries normal.    LUNGS AND PLEURA: Mild changes of emphysema. Lungs are clear, no pleural effusion.    MEDIASTINUM/AXILLAE: No lymphadenopathy.    CORONARY ARTERY CALCIFICATION: Severe.    HEPATOBILIARY: Seen in early phase enhancement, otherwise unremarkable.    PANCREAS: Normal.    SPLEEN: Modest splenomegaly similar to previous exam.    ADRENAL GLANDS: Normal.    KIDNEYS/BLADDER: Normal.    BOWEL: Colon is collapsed and empty, especially left hemicolon suggesting diarrheal illness. No acute inflammatory changes are seen.    LYMPH NODES: Normal.    PELVIC ORGANS: Normal.    MUSCULOSKELETAL: Degenerative changes involving lumbar spine.      Impression    IMPRESSION:  1.  No aortic dissection. No acute vascular abnormalities are identified.  2.  Mild emphysema.  3.  Colon is almost completely collapsed and empty suggesting diarrheal illness.  4.  Modest splenomegaly.     NM Lexiscan stress test (nuc card)   Result Value    Target     Baseline Systolic     Baseline Diastolic BP 76    Last Stress Systolic     Last Stress Diastolic BP 72    Baseline HR 56    Max HR  75    Max Predicted HR  50    Rate Pressure Product 9,750.0    Stress/rest perfusion ratio 1.22     "Narrative      The nuclear stress test is abnormal.    There is a large area of severe ischemia in the lateral and   inferolateral segment(s) of the left ventricle. This appears to be in the   left circumflex distribution.    Stress to rest cavity ratio is 1.22.  TID is present visually.    The patient is at a high risk of future cardiac ischemic events.    Left ventricular function is normal.    A prior study was conducted on 5/4/2021.  This study has changes noted   when compared with the prior study.         Echo:  No results found for this or any previous visit (from the past 4320 hour(s)).      This note was completed in part using dictation via the Dragon voice recognition software. Some word and grammatical errors may occur and must be interpreted in the appropriate clinical context.  If there are any questions pertaining to this issue, please contact me for further clarification.    Clinically Significant Risk Factors Present on Admission                # Drug Induced Platelet Defect: home medication list includes an antiplatelet medication   # Hypertension: Noted on problem list      # Overweight: Estimated body mass index is 29.56 kg/m  as calculated from the following:    Height as of this encounter: 1.753 m (5' 9\").    Weight as of this encounter: 90.8 kg (200 lb 2.8 oz).             Cardiomyopathy    Not present on admission    Not present on admission    Not present on admission    CKD POA List: Stage 3a (GFR 45-59)        "

## 2024-05-30 NOTE — TELEPHONE ENCOUNTER
PAL received VM from pt yesterday afternoon 5/29  -Requesting phone number to contact insurance company regarding prior authorization for Linzess   -PAL sent [employer] message with the following contact info:    [facility] - [employer]   [phone]      Jazmin STEIN RN  Patient Advocate Liaison - PAL RN  [facility]  [phone]

## 2024-05-30 NOTE — PLAN OF CARE
ROOM # 206-1    Living Situation (if not independent, order SW consult): Home w/ family  Facility name:  : [place], spouse     Activity level at baseline: IND  Activity level on admit: IND    Who will be transporting you at discharge: Family     Patient registered to observation; given Patient Bill of Rights; given the opportunity to ask questions about observation status and their plan of care.  Patient has been oriented to the observation room, bathroom and call light is in place.    Discussed discharge goals and expectations with patient/family.

## 2024-05-30 NOTE — ED NOTES
[facility]  ED Nurse Handoff Report    ED Chief complaint: Chest Pain  . ED Diagnosis:   Final diagnoses:   Chest pain, unspecified type   Elevated troponin       Allergies:   Allergies   Allergen Reactions    Gabapentin      Fever      Morphine And Codeine      inability to sleep after taking codeine    Vicodin [Hydrocodone-Acetaminophen]      Perspiring, hyper, itchy       Code Status: Full Code    Activity level - Baseline/Home:  independent.  Activity Level - Current:   standby.   Lift room needed: No.   Bariatric: No   Needed: No   Isolation: No.   Infection: Not Applicable.     Respiratory status: Room air    Vital Signs (within 30 minutes):   Vitals:    05/29/24 2039 05/29/24 2040 05/29/24 2041 05/29/24 2042   BP:       Pulse: 55 54 55 55   Resp: 15 18 16 20   Temp:       TempSrc:       SpO2: 99% 100% 98% 99%   Weight:       Height:           Cardiac Rhythm:  ,      Pain level:    Patient confused: No.   Patient Falls Risk: nonskid shoes/slippers when out of bed.   Elimination Status:  due to void      Patient Report - Initial Complaint: chest pain.   Focused Assessment: 69 year old male with history of CAD status post MI who presents with sharp severe left-sided chest pain radiating to the neck and shoulder that started about 3 hours ago while sitting in his recliner.  The pain is gradually improved since onset.  He denies having any associated shortness of breath, nausea, diaphoresis.  He did take aspirin before coming in.  He was seen here 1 week ago for chest pain, but notes that this is somewhat different.  That chest pain was more in the middle of his chest.  He denies any associated fever, cough, hemoptysis.  No abdominal pain, diarrhea, leg swelling.      Abnormal Results:   Labs Ordered and Resulted from Time of ED Arrival to Time of ED Departure   BASIC METABOLIC PANEL - Abnormal       Result Value    Sodium 137      Potassium 4.3      Chloride 104      Carbon  Dioxide (CO2) 20 (*)     Anion Gap 13      Urea Nitrogen 30.3 (*)     Creatinine 1.34 (*)     GFR Estimate 57 (*)     Calcium 9.4      Glucose 102 (*)    TROPONIN T, HIGH SENSITIVITY - Abnormal    Troponin T, High Sensitivity 25 (*)    MANUAL DIFFERENTIAL - Abnormal    % Neutrophils 70      % Lymphocytes 18      % Monocytes 6      % Eosinophils 1      % Basophils 2      % Myelocytes 3      Absolute Neutrophils 5.8      Absolute Lymphocytes 1.5      Absolute Monocytes 0.5      Absolute Eosinophils 0.1      Absolute Basophils 0.2      Absolute Myelocytes 0.2 (*)     RBC Morphology Confirmed RBC Indices      Platelet Assessment        Value: Automated Count Confirmed. Platelet morphology is normal.   TROPONIN T, HIGH SENSITIVITY - Abnormal    Troponin T, High Sensitivity 28 (*)    CBC WITH PLATELETS AND DIFFERENTIAL    WBC Count 8.3      RBC Count 4.75      Hemoglobin 13.4      Hematocrit 40.8      MCV 86      MCH 28.2      MCHC 32.8      RDW 14.5      Platelet Count 196      % Neutrophils        % Lymphocytes        % Monocytes        % Eosinophils        % Basophils        % Immature Granulocytes        NRBCs per 100 WBC 0      Absolute Neutrophils        Absolute Lymphocytes        Absolute Monocytes        Absolute Eosinophils        Absolute Basophils        Absolute Immature Granulocytes        Absolute NRBCs 0.0          CT Aortic Survey w Contrast   Final Result   IMPRESSION:   1.  No aortic dissection. No acute vascular abnormalities are identified.   2.  Mild emphysema.   3.  Colon is almost completely collapsed and empty suggesting diarrheal illness.   4.  Modest splenomegaly.             Treatments provided: see MAR  Family Comments: involved and supportive  OBS brochure/video discussed/provided to patient:  Yes  ED Medications:   Medications   nitroGLYcerin (NITROSTAT) sublingual tablet 0.4 mg (0.4 mg Sublingual $Given 5/29/24 1949)   iopamidol (ISOVUE-370) solution 500 mL (72 mLs Intravenous $Given  5/29/24 1817)   CT scan flush (60 mLs Intravenous $Given 5/29/24 1817)   alum & mag hydroxide-simethicone (MAALOX) suspension 15 mL (15 mLs Oral $Given 5/29/24 1946)   aspirin (ASA) chewable tablet 162 mg (162 mg Oral $Given 5/29/24 1947)       Drips infusing:  No  For the majority of the shift this patient was Green.   Interventions performed were NA.    Sepsis treatment initiated: No    Cares/treatment/interventions/medications to be completed following ED care: see inpatient orders    ED Nurse Name: Liliane Chamorro RN  8:43 PM   RECEIVING UNIT ED HANDOFF REVIEW    Above ED Nurse Handoff Report was reviewed: Yes  Reviewed by: Jc Olivares RN on May 29, 2024 at 10:50 PM   I Romana called the ED to inform them the note was read: Yes

## 2024-05-30 NOTE — PLAN OF CARE
PRIMARY DIAGNOSIS: CHEST PAIN  OUTPATIENT/OBSERVATION GOALS TO BE MET BEFORE DISCHARGE:  1. Ruled out acute coronary syndrome (negative or stable Troponin):  Yes  2. Pain Status: chest pain intermittent - nitroglycerin, Tylenol for headache.   3. Appropriate provocative testing performed: Yes  - Stress Test Procedure: Lexiscan  - Interpretation of cardiac rhythm per telemetry tech: SR    4. Cleared by Consultants (if applicable):N/A  5. Return to near baseline physical activity: Yes  Discharge Planner Nurse   Safe discharge environment identified: Yes  Barriers to discharge: Yes       Entered by: Paty Salazar RN 05/30/2024    A&Ox4, VSS on RA. Headache given tylenol. Requesting to see provider. Informed provider - awaiting lexiscan results. Still NPO. Up independently, spouse at bedside. Able to make needs known.   Please review provider order for any additional goals. Nurse to notify provider when observation goals have been met and patient is ready for discharge.  Plan of Care Reviewed With: patient  Overall Patient Progress: improving  Outcome Evaluation: Lexiscan completed, awaiting results. Tylenol for headache, did get x1 PO Nitroglycerin for CP.    Problem: Adult Inpatient Plan of Care  Goal: Plan of Care Review  5/30/2024 1231 by Paty Salazar RN  Outcome: Progressing  Flowsheets (Taken 5/30/2024 1231)  Outcome Evaluation: Lexiscan completed, awaiting results. Tylenol for headache, did get x1 PO Nitroglycerin for CP.  Plan of Care Reviewed With: patient  Overall Patient Progress: improving  5/30/2024 1021 by Paty Salazar RN  Outcome: Progressing  Flowsheets (Taken 5/30/2024 1021)  Outcome Evaluation: Intermittent pain on left shoulder, radiates to lower neck. A&Ox4. Stress test pending.  Plan of Care Reviewed With: patient  Overall Patient Progress: improving  Goal: Patient-Specific Goal (Individualized)    5/30/2024 1231 by Paty Salazar  KVNG SMITH  Outcome: Progressing  5/30/2024 1021 by Paty Salazar RN  Outcome: Progressing  Goal: Absence of Hospital-Acquired Illness or Injury  5/30/2024 1231 by Paty Salazar RN  Outcome: Progressing  5/30/2024 1021 by Paty Salazar RN  Outcome: Progressing  Intervention: Identify and Manage Fall Risk  Recent Flowsheet Documentation  Taken 5/30/2024 0800 by Paty Salazar RN  Safety Promotion/Fall Prevention:   assistive device/personal items within reach   clutter free environment maintained   nonskid shoes/slippers when out of bed   patient and family education   room near nurse's station   room organization consistent   safety round/check completed  Intervention: Prevent Infection  Recent Flowsheet Documentation  Taken 5/30/2024 0800 by Paty Salazar RN  Infection Prevention:   personal protective equipment utilized   hand hygiene promoted   equipment surfaces disinfected  Goal: Optimal Comfort and Wellbeing  5/30/2024 1231 by Paty Salazar RN  Outcome: Progressing  5/30/2024 1021 by Paty Salazar RN  Outcome: Progressing  Goal: Readiness for Transition of Care  5/30/2024 1231 by Paty Salazar RN  Outcome: Progressing  5/30/2024 1021 by Paty Salazar RN  Outcome: Progressing     Problem: Comorbidity Management  Goal: Maintenance of Seizure Control  5/30/2024 1231 by Paty Salazar RN  Outcome: Progressing  5/30/2024 1021 by Paty Salazar RN  Reactivated  Intervention: Maintain Seizure Symptom Control  Recent Flowsheet Documentation  Taken 5/30/2024 0800 by Paty Salazar RN  Medication Review/Management: medications reviewed

## 2024-05-30 NOTE — PROGRESS NOTES
[facility]    Medicine Progress Note - Hospitalist Service    Date of Admission:  5/29/2024    Assessment & Plan   Kashmir Yao is a 69 year old male with history of left shoulder fracture in the summer 2023, CAD with stent placement, obstructive sleep apnea, right frontal meningioma, seizure disorder, hypertension, reflux, BPH, Bell's palsy, anemia, admitted on 5/29/2024 after his second visit to the ED with complaint of chest pain.   Lexiscan shows a large area of ischemia of the left ventricle  Angio tomorrow    Chest pain    - no EKG changes    - trops peaked at 48    - has had intermittent chest pain that responds to nitroglycerin    - no current chest pain: if pain recurs, will need nitroglycerin drip and will need to move to IMC/ICU    - spoke with cards: angio in am, ECHO today    - will start heparin drip    - IVF overnight    - NPO at midnight    - cont home ASA    Addendum: now cards stating they may be able to do angio today. NPO. Getting the ECHO    CAD    - has a history of stent in 2010 ([employer] system: OM-2 with a culprit lesion of 95% treated with a 2.5 x 28mm Xience drug-eluting stent.     - his Cardiologist is with [facility]    - cont asa    HTN    - no longer on meds    - BPs stable    HLP    - cont simvastatin    History of right frontal meningioma  Seizure disorder    - cont lamotrigine    RIC    - on CPAP    BPH    - cont flomax    Left shoulder fracture  -Fracture occurred in July 2023 with avulsion fracture of the humeral ball  -Continues to have paroxysmal's of pain related to this    Wife in room and updated     Observation Goals: List all goals to be met before discharge home: , - Serial troponins and stress test complete., - Seen and cleared by consultant if applicable, - Adequate pain control on oral analgesia, - Vital signs normal or at patient baseline, - Safe disposition plan has been identified, - Nurse to notify provider when observation goals have been met  "and patient is ready for discharge.  Diet: Low Saturated Fat Na <2400 mg  NPO per Anesthesia Guidelines for Procedure/Surgery Except for: Meds    DVT Prophylaxis: will be on heparin drip  Cano Catheter: Not present  Lines: None     Cardiac Monitoring: ACTIVE order. Indication: Chest pain/ ACS rule out (24 hours)  Code Status: Full Code      Clinically Significant Risk Factors Present on Admission                # Drug Induced Platelet Defect: home medication list includes an antiplatelet medication   # Hypertension: Noted on problem list      # Overweight: Estimated body mass index is 29.56 kg/m  as calculated from the following:    Height as of this encounter: 1.753 m (5' 9\").    Weight as of this encounter: 90.8 kg (200 lb 2.8 oz).              Disposition Plan     Medically Ready for Discharge: Anticipated Tomorrow  [place] MD  Hospitalist Service  [facility]  Securely message with [email or web address] (more info)  Text page via [employer] Paging/Directory   ______________________________________________________________________    Interval History   Patient in bed. Wife in room. No current chest pain. Had some chest pain earlier today that responded to nitroglycerin. No other complaints    Physical Exam   Vital Signs: Temp: 97.7  F (36.5  C) Temp src: Oral BP: 104/60 Pulse: 59   Resp: 18 SpO2: 99 % O2 Device: None (Room air) Oxygen Delivery: 2 LPM  Weight: 200 lbs 2.84 oz    Constitutional: awake, alert, cooperative, no apparent distress, and appears stated age  Eyes: Lids and lashes normal, pupils equal, round and reactive to light, extra ocular muscles intact, sclera clear, conjunctiva normal  ENT: Normocephalic, without obvious abnormality, atraumatic, sinuses nontender on palpation, external ears without lesions, oral pharynx with moist mucous membranes, tonsils without erythema or exudates, gums normal and good dentition.  Respiratory: No increased work of breathing, good air exchange, clear to " auscultation bilaterally, no crackles or wheezing  Cardiovascular: Normal apical impulse, regular rate and rhythm, normal S1 and S2, no S3 or S4, and no murmur noted  GI: No scars, normal bowel sounds, soft, non-distended, non-tender, no masses palpated, no hepatosplenomegally    Medical Decision Making       40 MINUTES SPENT BY ME on the date of service doing chart review, history, exam, documentation & further activities per the note.      Data     I have personally reviewed the following data over the past 24 hrs:    8.3  \   13.4   / 196     137 104 30.3 (H) /  102 (H)   4.3 20 (L) 1.34 (H) \     Trop: 48 (H) BNP: N/A     TSH: N/A T4: N/A A1C: 4.8       Imaging results reviewed over the past 24 hrs:   Recent Results (from the past 24 hour(s))   CT Aortic Survey w Contrast    Narrative    EXAM: CT AORTIC SURVEY W CONTRAST  LOCATION: [facility]  DATE: 5/29/2024    INDICATION: severe L chest shoulder neck pain, HTN  COMPARISON: 10/28/2018  TECHNIQUE: CT angiogram chest abdomen pelvis during arterial phase of injection of IV contrast. 2D and 3D MIP reconstructions were performed by the CT technologist. Dose reduction techniques were used.   CONTRAST: 72mL Isovue 370    FINDINGS:   CT ANGIOGRAM CHEST, ABDOMEN, AND PELVIS: Normal caliber aorta, no dissection. Bovine arch. Normal appearance of splanchnic, renal and iliac arteries other than modest mural calcifications.  Central pulmonary arteries normal.    LUNGS AND PLEURA: Mild changes of emphysema. Lungs are clear, no pleural effusion.    MEDIASTINUM/AXILLAE: No lymphadenopathy.    CORONARY ARTERY CALCIFICATION: Severe.    HEPATOBILIARY: Seen in early phase enhancement, otherwise unremarkable.    PANCREAS: Normal.    SPLEEN: Modest splenomegaly similar to previous exam.    ADRENAL GLANDS: Normal.    KIDNEYS/BLADDER: Normal.    BOWEL: Colon is collapsed and empty, especially left hemicolon suggesting diarrheal illness. No acute inflammatory  changes are seen.    LYMPH NODES: Normal.    PELVIC ORGANS: Normal.    MUSCULOSKELETAL: Degenerative changes involving lumbar spine.      Impression    IMPRESSION:  1.  No aortic dissection. No acute vascular abnormalities are identified.  2.  Mild emphysema.  3.  Colon is almost completely collapsed and empty suggesting diarrheal illness.  4.  Modest splenomegaly.     NM Lexiscan stress test (nuc card)   Result Value    Target     Baseline Systolic     Baseline Diastolic BP 76    Last Stress Systolic     Last Stress Diastolic BP 72    Baseline HR 56    Max HR  75    Max Predicted HR  50    Rate Pressure Product 9,750.0    Stress/rest perfusion ratio 1.22    Narrative      The nuclear stress test is abnormal.    There is a large area of severe ischemia in the lateral and   inferolateral segment(s) of the left ventricle. This appears to be in the   left circumflex distribution.    Stress to rest cavity ratio is 1.22.  TID is present visually.    The patient is at a high risk of future cardiac ischemic events.    Left ventricular function is normal.    A prior study was conducted on 5/4/2021.  This study has changes noted   when compared with the prior study.

## 2024-05-30 NOTE — PROCEDURES
[facility]    Procedure: Coronary angiogram, IFR LAD    Date/Time: 5/30/2024 4:14 PM    Performed by: Crow Mukherjee MD  Authorized by: Crow Mukherjee MD      UNIVERSAL PROTOCOL   Site Marked: Yes  Prior Images Obtained and Reviewed:  Yes  Required items: Required blood products, implants, devices and special equipment available    Patient identity confirmed:  Verbally with patient  Patient was reevaluated immediately before administering moderate or deep sedation or anesthesia  Confirmation Checklist:  Patient's identity using two indicators  Time out: Immediately prior to the procedure a time out was called    Universal Protocol: the Joint Commission Universal Protocol was followed    Preparation: Patient was prepped and draped in usual sterile fashion       ANESTHESIA    Local Anesthetic:  Lidocaine 1% without epinephrine      SEDATION  Patient Sedated: Yes    Sedation:  Midazolam and fentanyl  Vital signs: Vital signs monitored during sedation      PROCEDURE  Describe Procedure: Procedure  1) CAG  2) IFR  ostial LAD 0.97  /mid LAD 0.85   FFR 0.72  Approach RTR  Complications none noted   Indication Unstable angina abnormal nuclear stress test, old PCI M2    Findings  RCA dominant 60% mid  IFR 0.99  LMCA distal 20% irregular  LAD ostial 60%  IFR 0.97  mid vessel calcified 60 to 70% irregular  IFR 0.85 to 0.87   FFR 0.72  CX proximal no focal stenosis. Ostial M1 80%  bifurcates into smaller superior ramus and large inferior ramus with subtotal thrombotic in stent thrombotic lesion . Just after take off of M1 60% lesion in mid CX before terminal marginal     I discussed findings with my colleague . We felt CX intervention would be complex and intervention in M2 would require a stent that extends from mid CX main trunk, over mid CX before take off of terminal marginal and covers takeoff of superior ramus. Given abnormal mid LAD IFR/FFR surgical opinion as a first step  reasonable.     Plan  1) resume heparin in 2 hours  2) add beta blocker and nitrates  3) transfer to [facility] to discuss surgery versus complex PCI    [place]  Patient Tolerance:  Patient tolerated the procedure well with no immediate complications  Length of time physician/provider present for 1:1 monitoring during sedation: 60

## 2024-05-30 NOTE — PLAN OF CARE
INPATIENT NOTE: CARES PROVIDED FROM [id number]    Temp: 97.7  F (36.5  C) Temp src: Oral BP: (!) 143/78 Pulse: 54   Resp: 16 SpO2: 99 % O2 Device: None (Room air)      A&Ox4, on continuous pulse ox - RA. Denies pain. TR-band to right radial. CMS intact, no hematoma. Plan to remove air around 1815. Once TR-band removed, start heparin 2 hrs later. Tele: SR/BR 52-60    Plan: Transfer to [facility] for ongoing intervention. Family updated. Transfer getting worked on.         Plan of Care Reviewed With: patient    Overall Patient Progress: improvingOverall Patient Progress: improving    Outcome Evaluation: Lexiscan completed, awaiting results. Tylenol for headache, did get x1 PO Nitroglycerin for CP.

## 2024-05-31 ENCOUNTER — APPOINTMENT (OUTPATIENT)
Dept: ULTRASOUND IMAGING | Facility: CLINIC | Age: 69
DRG: 236 | End: 2024-05-31
Attending: PHYSICIAN ASSISTANT
Payer: COMMERCIAL

## 2024-05-31 PROBLEM — I21.4 NSTEMI (NON-ST ELEVATED MYOCARDIAL INFARCTION) (H): Status: ACTIVE | Noted: 2024-05-31

## 2024-05-31 LAB
ALBUMIN UR-MCNC: NEGATIVE MG/DL
ANION GAP SERPL CALCULATED.3IONS-SCNC: 11 MMOL/L (ref 7–15)
APPEARANCE UR: CLEAR
ATRIAL RATE - MUSE: 56 BPM
ATRIAL RATE - MUSE: 67 BPM
BILIRUB UR QL STRIP: NEGATIVE
BUN SERPL-MCNC: 31.1 MG/DL (ref 8–23)
CALCIUM SERPL-MCNC: 8.7 MG/DL (ref 8.8–10.2)
CHLORIDE SERPL-SCNC: 108 MMOL/L (ref 98–107)
COLOR UR AUTO: NORMAL
CREAT SERPL-MCNC: 1.28 MG/DL (ref 0.67–1.17)
DEPRECATED HCO3 PLAS-SCNC: 17 MMOL/L (ref 22–29)
DIASTOLIC BLOOD PRESSURE - MUSE: NORMAL MMHG
DIASTOLIC BLOOD PRESSURE - MUSE: NORMAL MMHG
EGFRCR SERPLBLD CKD-EPI 2021: 60 ML/MIN/1.73M2
ERYTHROCYTE [DISTWIDTH] IN BLOOD BY AUTOMATED COUNT: 14.4 % (ref 10–15)
ERYTHROCYTE [DISTWIDTH] IN BLOOD BY AUTOMATED COUNT: 14.5 % (ref 10–15)
GLUCOSE SERPL-MCNC: 98 MG/DL (ref 70–99)
GLUCOSE UR STRIP-MCNC: NEGATIVE MG/DL
HCT VFR BLD AUTO: 34.1 % (ref 40–53)
HCT VFR BLD AUTO: 35.6 % (ref 40–53)
HGB BLD-MCNC: 11.3 G/DL (ref 13.3–17.7)
HGB BLD-MCNC: 11.8 G/DL (ref 13.3–17.7)
HGB UR QL STRIP: NEGATIVE
INTERPRETATION ECG - MUSE: NORMAL
INTERPRETATION ECG - MUSE: NORMAL
KETONES UR STRIP-MCNC: NEGATIVE MG/DL
LEUKOCYTE ESTERASE UR QL STRIP: NEGATIVE
MCH RBC QN AUTO: 28.3 PG (ref 26.5–33)
MCH RBC QN AUTO: 28.4 PG (ref 26.5–33)
MCHC RBC AUTO-ENTMCNC: 33.1 G/DL (ref 31.5–36.5)
MCHC RBC AUTO-ENTMCNC: 33.1 G/DL (ref 31.5–36.5)
MCV RBC AUTO: 86 FL (ref 78–100)
MCV RBC AUTO: 86 FL (ref 78–100)
NITRATE UR QL: NEGATIVE
P AXIS - MUSE: 26 DEGREES
P AXIS - MUSE: 27 DEGREES
PH UR STRIP: 5.5 [PH] (ref 5–7)
PLATELET # BLD AUTO: 137 10E3/UL (ref 150–450)
PLATELET # BLD AUTO: 177 10E3/UL (ref 150–450)
POTASSIUM SERPL-SCNC: 3.9 MMOL/L (ref 3.4–5.3)
PR INTERVAL - MUSE: 136 MS
PR INTERVAL - MUSE: 174 MS
QRS DURATION - MUSE: 94 MS
QRS DURATION - MUSE: 96 MS
QT - MUSE: 386 MS
QT - MUSE: 428 MS
QTC - MUSE: 407 MS
QTC - MUSE: 413 MS
R AXIS - MUSE: -21 DEGREES
R AXIS - MUSE: -33 DEGREES
RBC # BLD AUTO: 3.99 10E6/UL (ref 4.4–5.9)
RBC # BLD AUTO: 4.16 10E6/UL (ref 4.4–5.9)
SODIUM SERPL-SCNC: 136 MMOL/L (ref 135–145)
SP GR UR STRIP: 1.02 (ref 1–1.03)
SYSTOLIC BLOOD PRESSURE - MUSE: NORMAL MMHG
SYSTOLIC BLOOD PRESSURE - MUSE: NORMAL MMHG
T AXIS - MUSE: 151 DEGREES
T AXIS - MUSE: 50 DEGREES
TROPONIN T SERPL HS-MCNC: 70 NG/L
UFH PPP CHRO-ACNC: 0.24 IU/ML
UFH PPP CHRO-ACNC: 0.29 IU/ML
UFH PPP CHRO-ACNC: 0.53 IU/ML
UROBILINOGEN UR STRIP-MCNC: NORMAL MG/DL
VENTRICULAR RATE- MUSE: 56 BPM
VENTRICULAR RATE- MUSE: 67 BPM
WBC # BLD AUTO: 10.2 10E3/UL (ref 4–11)
WBC # BLD AUTO: 9.9 10E3/UL (ref 4–11)

## 2024-05-31 PROCEDURE — 250N000013 HC RX MED GY IP 250 OP 250 PS 637: Performed by: PHYSICIAN ASSISTANT

## 2024-05-31 PROCEDURE — 93005 ELECTROCARDIOGRAM TRACING: CPT

## 2024-05-31 PROCEDURE — 36415 COLL VENOUS BLD VENIPUNCTURE: CPT | Performed by: HOSPITALIST

## 2024-05-31 PROCEDURE — 210N000002 HC R&B HEART CARE

## 2024-05-31 PROCEDURE — 250N000011 HC RX IP 250 OP 636: Performed by: HOSPITALIST

## 2024-05-31 PROCEDURE — 99207 PR NO BILLABLE SERVICE THIS VISIT: CPT | Performed by: HOSPITALIST

## 2024-05-31 PROCEDURE — 99223 1ST HOSP IP/OBS HIGH 75: CPT | Performed by: HOSPITALIST

## 2024-05-31 PROCEDURE — 250N000013 HC RX MED GY IP 250 OP 250 PS 637: Performed by: HOSPITALIST

## 2024-05-31 PROCEDURE — 93010 ELECTROCARDIOGRAM REPORT: CPT | Performed by: INTERNAL MEDICINE

## 2024-05-31 PROCEDURE — 80048 BASIC METABOLIC PNL TOTAL CA: CPT | Performed by: HOSPITALIST

## 2024-05-31 PROCEDURE — 81003 URINALYSIS AUTO W/O SCOPE: CPT | Performed by: PHYSICIAN ASSISTANT

## 2024-05-31 PROCEDURE — 93970 EXTREMITY STUDY: CPT

## 2024-05-31 PROCEDURE — 99222 1ST HOSP IP/OBS MODERATE 55: CPT | Mod: FS | Performed by: PHYSICIAN ASSISTANT

## 2024-05-31 PROCEDURE — 85027 COMPLETE CBC AUTOMATED: CPT | Performed by: HOSPITALIST

## 2024-05-31 PROCEDURE — 85520 HEPARIN ASSAY: CPT | Performed by: HOSPITALIST

## 2024-05-31 PROCEDURE — 99232 SBSQ HOSP IP/OBS MODERATE 35: CPT | Mod: FS | Performed by: PHYSICIAN ASSISTANT

## 2024-05-31 PROCEDURE — 84484 ASSAY OF TROPONIN QUANT: CPT | Performed by: PHYSICIAN ASSISTANT

## 2024-05-31 PROCEDURE — 93880 EXTRACRANIAL BILAT STUDY: CPT

## 2024-05-31 RX ORDER — CEFAZOLIN SODIUM 2 G/100ML
2 INJECTION, SOLUTION INTRAVENOUS SEE ADMIN INSTRUCTIONS
Status: CANCELLED | OUTPATIENT
Start: 2024-05-31

## 2024-05-31 RX ORDER — METOPROLOL TARTRATE 50 MG
50 TABLET ORAL 2 TIMES DAILY
Status: DISCONTINUED | OUTPATIENT
Start: 2024-05-31 | End: 2024-05-31

## 2024-05-31 RX ORDER — SIMVASTATIN 40 MG
40 TABLET ORAL AT BEDTIME
Status: DISCONTINUED | OUTPATIENT
Start: 2024-05-31 | End: 2024-06-03

## 2024-05-31 RX ORDER — ASPIRIN 81 MG/1
81 TABLET, CHEWABLE ORAL DAILY
Status: DISCONTINUED | OUTPATIENT
Start: 2024-05-31 | End: 2024-06-04

## 2024-05-31 RX ORDER — ACETAMINOPHEN 325 MG/1
650 TABLET ORAL EVERY 4 HOURS PRN
Status: DISCONTINUED | OUTPATIENT
Start: 2024-05-31 | End: 2024-06-04

## 2024-05-31 RX ORDER — ISOSORBIDE MONONITRATE 30 MG/1
30 TABLET, EXTENDED RELEASE ORAL DAILY
Status: DISCONTINUED | OUTPATIENT
Start: 2024-05-31 | End: 2024-06-01

## 2024-05-31 RX ORDER — CEFAZOLIN SODIUM 2 G/100ML
2 INJECTION, SOLUTION INTRAVENOUS
Status: CANCELLED | OUTPATIENT
Start: 2024-05-31

## 2024-05-31 RX ORDER — TRAZODONE HYDROCHLORIDE 50 MG/1
50 TABLET, FILM COATED ORAL
Status: DISCONTINUED | OUTPATIENT
Start: 2024-05-31 | End: 2024-06-04

## 2024-05-31 RX ORDER — ACETAMINOPHEN 650 MG/1
650 SUPPOSITORY RECTAL EVERY 4 HOURS PRN
Status: DISCONTINUED | OUTPATIENT
Start: 2024-05-31 | End: 2024-06-04

## 2024-05-31 RX ORDER — LIDOCAINE 40 MG/G
CREAM TOPICAL
Status: DISCONTINUED | OUTPATIENT
Start: 2024-05-31 | End: 2024-06-04

## 2024-05-31 RX ORDER — ISOSORBIDE DINITRATE 10 MG/1
20 TABLET ORAL
Status: DISCONTINUED | OUTPATIENT
Start: 2024-05-31 | End: 2024-05-31

## 2024-05-31 RX ORDER — ONDANSETRON 2 MG/ML
4 INJECTION INTRAMUSCULAR; INTRAVENOUS EVERY 6 HOURS PRN
Status: DISCONTINUED | OUTPATIENT
Start: 2024-05-31 | End: 2024-06-04

## 2024-05-31 RX ORDER — CALCIUM CARBONATE 500 MG/1
1000 TABLET, CHEWABLE ORAL 4 TIMES DAILY PRN
Status: DISCONTINUED | OUTPATIENT
Start: 2024-05-31 | End: 2024-06-04

## 2024-05-31 RX ORDER — ONDANSETRON 4 MG/1
4 TABLET, ORALLY DISINTEGRATING ORAL EVERY 6 HOURS PRN
Status: DISCONTINUED | OUTPATIENT
Start: 2024-05-31 | End: 2024-06-04

## 2024-05-31 RX ORDER — AMOXICILLIN 250 MG
2 CAPSULE ORAL 2 TIMES DAILY PRN
Status: DISCONTINUED | OUTPATIENT
Start: 2024-05-31 | End: 2024-06-04

## 2024-05-31 RX ORDER — LIDOCAINE 40 MG/G
CREAM TOPICAL
Status: CANCELLED | OUTPATIENT
Start: 2024-05-31

## 2024-05-31 RX ORDER — HEPARIN SODIUM 10000 [USP'U]/100ML
0-5000 INJECTION, SOLUTION INTRAVENOUS CONTINUOUS
Status: DISCONTINUED | OUTPATIENT
Start: 2024-05-31 | End: 2024-06-04

## 2024-05-31 RX ORDER — PANTOPRAZOLE SODIUM 40 MG/1
40 TABLET, DELAYED RELEASE ORAL
Status: DISCONTINUED | OUTPATIENT
Start: 2024-05-31 | End: 2024-06-04

## 2024-05-31 RX ORDER — AMOXICILLIN 250 MG
1 CAPSULE ORAL 2 TIMES DAILY PRN
Status: DISCONTINUED | OUTPATIENT
Start: 2024-05-31 | End: 2024-06-04

## 2024-05-31 RX ORDER — TAMSULOSIN HYDROCHLORIDE 0.4 MG/1
0.4 CAPSULE ORAL DAILY
Status: DISCONTINUED | OUTPATIENT
Start: 2024-05-31 | End: 2024-06-11 | Stop reason: HOSPADM

## 2024-05-31 RX ORDER — METOPROLOL SUCCINATE 50 MG/1
50 TABLET, EXTENDED RELEASE ORAL 2 TIMES DAILY
Status: DISCONTINUED | OUTPATIENT
Start: 2024-05-31 | End: 2024-06-03

## 2024-05-31 RX ADMIN — ASPIRIN 81 MG CHEWABLE TABLET 81 MG: 81 TABLET CHEWABLE at 09:42

## 2024-05-31 RX ADMIN — SIMVASTATIN 40 MG: 40 TABLET, FILM COATED ORAL at 22:43

## 2024-05-31 RX ADMIN — LAMOTRIGINE 250 MG: 100 TABLET ORAL at 20:32

## 2024-05-31 RX ADMIN — METOPROLOL SUCCINATE 50 MG: 50 TABLET, EXTENDED RELEASE ORAL at 20:32

## 2024-05-31 RX ADMIN — ACETAMINOPHEN 650 MG: 325 TABLET, FILM COATED ORAL at 18:06

## 2024-05-31 RX ADMIN — PANTOPRAZOLE SODIUM 40 MG: 40 TABLET, DELAYED RELEASE ORAL at 09:42

## 2024-05-31 RX ADMIN — TAMSULOSIN HYDROCHLORIDE 0.4 MG: 0.4 CAPSULE ORAL at 09:42

## 2024-05-31 RX ADMIN — NITROGLYCERIN 0.4 MG: 0.4 TABLET SUBLINGUAL at 17:36

## 2024-05-31 RX ADMIN — ISOSORBIDE DINITRATE 20 MG: 10 TABLET ORAL at 09:42

## 2024-05-31 RX ADMIN — LAMOTRIGINE 250 MG: 100 TABLET ORAL at 09:42

## 2024-05-31 RX ADMIN — NITROGLYCERIN 0.4 MG: 0.4 TABLET SUBLINGUAL at 17:18

## 2024-05-31 RX ADMIN — HEPARIN SODIUM 1050 UNITS/HR: 10000 INJECTION, SOLUTION INTRAVENOUS at 20:32

## 2024-05-31 RX ADMIN — HEPARIN SODIUM 1100 UNITS/HR: 10000 INJECTION, SOLUTION INTRAVENOUS at 01:00

## 2024-05-31 RX ADMIN — TRAZODONE HYDROCHLORIDE 50 MG: 50 TABLET ORAL at 01:18

## 2024-05-31 RX ADMIN — ISOSORBIDE MONONITRATE 30 MG: 30 TABLET, EXTENDED RELEASE ORAL at 14:12

## 2024-05-31 ASSESSMENT — ACTIVITIES OF DAILY LIVING (ADL)
ADLS_ACUITY_SCORE: 21
ADLS_ACUITY_SCORE: 37
ADLS_ACUITY_SCORE: 21
DEPENDENT_IADLS:: INDEPENDENT;TRANSPORTATION
ADLS_ACUITY_SCORE: 21
ADLS_ACUITY_SCORE: 21
ADLS_ACUITY_SCORE: 37

## 2024-05-31 NOTE — PLAN OF CARE
Goal Outcome Evaluation:       [id number]    Pt transferred to [facility] @ 2350     A&O x 4. Had self-limiting episode of 4/10 L sided CP when ambulating to bed from cart. EKG done- resulted. MD notified, has PRN nitroglycerin available. Has not reported CP since. Did report headache, but declined to take tylenol. Stating headache was d/t lack of sleep. PRN trazadone administered. R Radial site CDI, soft. CMS intact. VSS, on RA. Heparin infusing @ 1100, recheck 0953 Tele SB @ 47. Alarms on. Up SBA. Plan for cards consult/CV surgery consult AM. Continue with plan of care.

## 2024-05-31 NOTE — CONSULTS
Patient has Medicare Advantage through [employer].    Jardiance/Farxiga  --$47/mo  --lf/when total drug costs exceed $5,030, price will increase to a 25% coinsurance, equivalent to $154/mo.    Entresto  --$47/mo  --lf/when total drug costs exceed $5,030, price will increase to a 25% coinsurance, equivalent to $173/mo.    Nori Kapadia  Pharmacy Technician/Liaison, Discharge Pharmacy   [phone] (voice or text)  [email or web address]  Available on Vocera and Teams

## 2024-05-31 NOTE — PLAN OF CARE
"[id number]    Inpatient Progress Note:  A & O X 4. Family at bedside . Lung sounds clear bilaterally to auscultation. No wheezes, crackles, or rales auscultated. Denies chest pain, chills, shortness of breath, lightheadedness, nausea, vomit, or diarrhea. Bowel sounds present all quads and active. Voiding spontaneously without difficulty. Standby assist when ambulating . Admitting DX: Chest pain . Coronary Angiogram completed today. Arm board in place . CMS intact. Heparin gtt started at 21:21 this evening. Xa lab draw scheduled at 04:21 this morning . Patient on cardiac monitor, cardiac rhythm SB 50s per telemetry tech. Patient transferring to [facility] at 23:00. Afebrile. Cardiology following.       /68 (BP Location: Left arm)   Pulse 57   Temp 97.7  F (36.5  C) (Oral)   Resp 16   Ht 1.753 m (5' 9\")   Wt 88.8 kg (195 lb 12.8 oz)   SpO2 97%   BMI 28.91 kg/m       Will continue to provide supportive cares.     Hetal Villanueva RN     Problem: Adult Inpatient Plan of Care  Goal: Plan of Care Review  Description: The Plan of Care Review/Shift note should be completed every shift.  The Outcome Evaluation is a brief statement about your assessment that the patient is improving, declining, or no change.  This information will be displayed automatically on your shift  note.  Outcome: Progressing  Flowsheets (Taken 5/30/2024 2235)  Plan of Care Reviewed With:   patient   spouse   family  Goal: Patient-Specific Goal (Individualized)  Description: You can add care plan individualizations to a care plan. Examples of Individualization might be:  \"Parent requests to be called daily at 9am for status\", \"I have a hard time hearing out of my right ear\", or \"Do not touch me to wake me up as it startles  me\".  Outcome: Progressing  Goal: Absence of Hospital-Acquired Illness or Injury  Outcome: Progressing  Intervention: Identify and Manage Fall Risk  Recent Flowsheet Documentation  Taken 5/30/2024 2007 by Hetal Villanueva " ROSIBEL, RN  Safety Promotion/Fall Prevention:   assistive device/personal items within reach   clutter free environment maintained   nonskid shoes/slippers when out of bed   patient and family education   room near nurse's station   room organization consistent   safety round/check completed  Intervention: Prevent Skin Injury  Recent Flowsheet Documentation  Taken 5/30/2024 2007 by Hetal Villanueva RN  Body Position: position changed independently  Skin Protection: adhesive use limited  Intervention: Prevent and Manage VTE (Venous Thromboembolism) Risk  Recent Flowsheet Documentation  Taken 5/30/2024 2007 by Hetal Villanueva, RN  VTE Prevention/Management: SCDs (sequential compression devices) off  Intervention: Prevent Infection  Recent Flowsheet Documentation  Taken 5/30/2024 2007 by Hetal Villanueva RN  Infection Prevention:   personal protective equipment utilized   hand hygiene promoted   equipment surfaces disinfected  Goal: Optimal Comfort and Wellbeing  Outcome: Progressing  Goal: Readiness for Transition of Care  Outcome: Progressing     Problem: Comorbidity Management  Goal: Maintenance of Seizure Control  Outcome: Progressing  Intervention: Maintain Seizure Symptom Control  Recent Flowsheet Documentation  Taken 5/30/2024 2007 by Hetal Villanueva RN  Medication Review/Management: medications reviewed         Plan of Care Reviewed With: patient, spouse, family

## 2024-05-31 NOTE — PROGRESS NOTES
Called and gave handoff report to receiving RN at [facility]. Patient en route. Will be in Room 258.

## 2024-05-31 NOTE — CONSULTS
CARDIOTHORACIC SURGERY CONSULT NOTE  5/31/2024      Reason for Consult: CABG eval      ASSESSMENT/PLAN:   Kashmir Yao is a 69 year old male with a past medical history of CAD s/p PCI to OM in 2010, HTN, HLD, RIC on CPAP, BPH, Rt frontal meningioma, seizure disorder presented to [facility] with 1-2 weeks of intermittent chest pain and left shoulder pain. Troponin was elevated, ECG did not show any ischemic changes. Stress test on 5/29 revealed severe lateral wall ischemia. Echo revealed severe hypokinesis in anterolateral and inferolateral wall and EF 45-50%. Patient underwent coronary angiogram on which showed 60% ostial LAD stenosis with IFR 0.97 and mid LAD with IFR  0.85, 80% narrowing ostial OM1, OM1 bifurcates with subtotal thrombotic lesion in stented segment, 80% mid circumflex after OM1, 60% mid RCA lesion with IFR 0.99. Cardiac surgery consulted to evaluate patient fo CABG candidacy.     - Recommend CABG, tentatively planning either Tuesday afternoon or Wednesday am depending on availability  - Will obtain vein mapping, bilateral carotid duplex US, UA  - Other cares per primary team  - Thank you for the opportunity to participate in the care of this patient.    STS Risk Score for isolated CABG    Procedure Type: Isolated CABG  PERIOPERATIVE OUTCOME ESTIMATE %  Operative Mortality  1.82%  Morbidity & Mortality  8.65%  Stroke    1.06%  Renal Failure   2.06%  Reoperation   2.33%  Prolonged Ventilation  4.8%  Deep Sternal Wound Infection 0.147%  Long Hospital Stay (>14 days) 5.66%  Short Hospital Stay (<6 days) 39.7%      Patient and plan discussed with attending, Dr. Morejon.      Kati Westbrook PA-C  Cardiothoracic Surgery  Pager [phone]        ________________________________________________________________________________________________    HPI:   Kashmir Yao is a 69 year old male with a past medical history of CAD s/p PCI to OM in 2010, HTN, HLD, RIC on CPAP, BPH, Rt frontal meningioma, seizure  disorder presented to [facility] with 1-2 weeks of intermittent chest pain and left shoulder pain. Troponin was elevated, ECG did not show any ischemic changes. Stress test on 5/29 revealed severe lateral wall ischemia. Echo revealed severe hypokinesis in anterolateral and inferolateral wall and EF 45-50%. Patient underwent coronary angiogram on which showed 60% ostial LAD stenosis with IFR 0.97 and mid LAD with IFR  0.85, 80% narrowing ostial OM1, OM1 bifurcates with subtotal thrombotic lesion in stented segment, 80% mid circumflex after OM1, 60% mid RCA lesion with IFR 0.99. Cardiac surgery consulted to evaluate patient fo CABG candidacy.     Of note for relevance in perioperative planning: he was recently seen with neurology at [place] on 5/28/24 to follow up meningioma and memory dysfunction. His neuropsychological evaluation done in April showed multi-domain neurocognitive dysfunction of mild degree. Recent MRI of the brain showed stable meningiomas but with slight increase of right mesial frontal meningioma with has been slowly increasing over th years but is not thought to be the cause of neurocognitive dysfunction. He has been advised against driving for now and to be evaluated by behavioral neurologist or at a memory center.    Patient currently denies any chest pain, SOB, lightheadedness, dizziness, palpitations, LE edema.     PMH:  Past Medical History:   Diagnosis Date    Anemia 07/31/2020    Arthritis     Bell's palsy 09/08/2020    Benign prostatic hyperplasia with incomplete bladder emptying 07/28/2020    Bilateral carpal tunnel syndrome 02/13/2020    CAD (coronary artery disease) 2010    a subtotally occluded obtuse marginal vessel which was stented with a drug-coated stent.  He had 50% to 60% LAD disease and 25% to 30% right coronary disease    Cognitive change 01/02/2020    Colonic polyps 2009    tubular adenomae with mildly dysplastic features    Common wart 12/02/2020    Disturbance of skin sensation      Encephalocele (H) 2009    left frontal    Epilepsy, partial (H) 2009    with secondary generalization    GERD (gastroesophageal reflux disease)     Heart attack (H)     5 years ago. Cardiology 3 months age    Herpes zoster with complication 10/07/2020    History of spinal cord injury     Hypertension     Iron deficiency 09/09/2020    Meningioma (H) 04/10/2023    Meningiomatosis (H) 03/14/2024    RIC (obstructive sleep apnea)     RIC (obstructive sleep apnea)     Paraneoplastic Antibody  positive[799.89BS] 2009    mildly elevated alpha 3 ganglionic acetylcholine receptor and JEOVANNY 65 receptor  antibody    Paronychia of toe, left 06/26/2023    Pedal edema 02/14/2020    Post herpetic neuralgia 12/02/2020    Primary osteoarthritis of both hands 01/02/2020    Rash 04/19/2024    Seizures (H)     Last seizure 2012    Spinal stenosis     Stented coronary artery     Tobacco use disorder     Ulnar neuropathy of both upper extremities 02/13/2020    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 Rick Frias, [facility]    CARPAL TUNNEL RELEASE RT/LT Left     COLONOSCOPY N/A 10/08/2020    Procedure: COLONOSCOPY WITH POLYPECTOMIES using cold jumbo forceps;  Surgeon: Kevin Song MD;  Location:  GI    CYSTOSCOPY      ESOPHAGOSCOPY, GASTROSCOPY, DUODENOSCOPY (EGD), COMBINED  05/24/2012    Procedure:COMBINED ESOPHAGOSCOPY, GASTROSCOPY, DUODENOSCOPY (EGD); ESOPHAGOSCOPY, GASTROSCOPY, DUODENOSCOPY (EGD) ; Surgeon:GILLIAN JOHNSON; Location: GI    ESOPHAGOSCOPY, GASTROSCOPY, DUODENOSCOPY (EGD), COMBINED N/A 10/08/2020     Procedure: ESOPHAGOGASTRODUODENOSCOPY (EGD) (fv);  Surgeon: Kevin Song MD;  Location:  GI    KNEE SURGERY Left     repair of cartilage    RELEASE TRIGGER FINGER Left 10/19/2020    Left ring finger trigger finger release at A1 pulley, Dr. Rick Frias, [facility]    SPINE SURGERY      repair of herniated disc lumbar    STENT      trigger finger release Right 09/15/2020    Right thumb trigger finger release, Dr. Frias    ULNAR NERVE TRANSPOSITION Left     VENTRICULOSTOMY      [facility] ANESTH,DX ARTHROSCOPIC PROC KNEE JOINT      CARTILEGE REPAIR.    [facility] NONSPECIFIC PROCEDURE      stint in heart    [facility] COLONOSCOPY THRU STOMA, DIAGNOSTIC      tubular adenomae, recommended annual colonoscopy       FH:  Family History   Problem Relation Age of Onset    Cardiovascular Mother         CHF, HTN, VASC. INSUFF., DM    No Known Problems Father     Thyroid Disease Sister         HYPOTHYROID    Genitourinary Problems Brother         RENAL FAILURE AND H/O MVA WITH LE PARAPLEGIA    No Known Problems Sister     No Known Problems Brother     No Known Problems Brother     Cancer - colorectal No family hx of     Colon Cancer No family hx of        SH:  Social History     Socioeconomic History    Marital status:     Number of children: 3   Occupational History    Occupation:  - [employer]   Tobacco Use    Smoking status: Former     Current packs/day: 0.00     Average packs/day: 0.5 packs/day for 20.0 years (10.0 ttl pk-yrs)     Types: Cigarettes     Start date: 1983     Quit date: 2003     Years since quittin.8     Passive exposure: Past    Smokeless tobacco: Never   Vaping Use    Vaping status: Never Used   Substance and Sexual Activity    Alcohol use: No     Alcohol/week: 0.0 standard drinks of alcohol    Drug use: No    Sexual activity: Yes     Partners: Female   Other Topics Concern    Seat Belt Yes   Social History Narrative    SD - CHECKED AT LEAST TWICE YEARLY.    GUNS: NONE.      Social Determinants of Health     Financial Resource Strain: Low Risk  (12/29/2023)    Financial Resource Strain     Within the past 12 months, have you or your family members you live with been unable to get utilities (heat, electricity) when it was really needed?: No   Food Insecurity: No Food Insecurity (5/28/2024)    Received from [facility]    Hunger Vital Sign     Worried About Running Out of Food in the Last Year: Never true     Ran Out of Food in the Last Year: Never true   Transportation Needs: No Transportation Needs (5/28/2024)    Received from [facility]    PRAPARE - Transportation     Lack of Transportation (Medical): No     Lack of Transportation (Non-Medical): No   Physical Activity: Insufficiently Active (5/28/2024)    Received from [facility]    Exercise Vital Sign     Days of Exercise per Week: 1 day     Minutes of Exercise per Session: 30 min   Stress: Stress Concern Present (6/23/2022)    [employer] [place] [employer] - Occupational Stress Questionnaire     Feeling of Stress : To some extent   Social Connections: Unknown (6/23/2022)    Social Connection and Isolation Panel [NHANES]     Frequency of Communication with Friends and Family: Patient declined     Frequency of Social Gatherings with Friends and Family: Once a week     Attends [employer] Services: 1 to 4 times per year     Active Member of Clubs or Organizations: No     Marital Status:    Interpersonal Safety: Low Risk  (4/5/2024)    Interpersonal Safety     Do you feel physically and emotionally safe where you currently live?: Yes     Within the past 12 months, have you been hit, slapped, kicked or otherwise physically hurt by someone?: No     Within the past 12 months, have you been humiliated or emotionally abused in other ways by your partner or ex-partner?: No   Housing Stability: Low Risk  (5/28/2024)    Received from [facility]    Housing Stability     What is your living situation today?: I have a steady  place to live       Home Meds:  Medications Prior to Admission   Medication Sig Dispense Refill Last Dose    acetaminophen (TYLENOL) 500 MG tablet Take 1,000 mg by mouth every 8 hours as needed for mild pain       alum & mag hydroxide-simethicone (MAALOX MULTI SYMPTOM MAX ST) 400-400-40 MG/5ML SUSP suspension Take 30 mLs by mouth every 4 hours as needed for indigestion 355 mL 0     aspirin 81 MG tablet Take 81 mg by mouth daily       benzonatate (TESSALON) 200 MG capsule Take 1 capsule (200 mg) by mouth 3 times daily as needed for cough 60 capsule 0     COMPOUNDED NON-CONTROLLED SUBSTANCE (CMPD RX) - PHARMACY TO MIX COMPOUNDED MEDICATION 1 vial TriMix #9 with syringes.  Inject 0.1 mL intercavernously per MD's instructions. (Patient not taking: Reported on 5/29/2024) 1 each 11     fluticasone (FLONASE) 50 MCG/ACT nasal spray Spray 1 spray into both nostrils daily (Patient taking differently: Spray 1 spray into both nostrils daily as needed for rhinitis or allergies) 16 g 0     lamoTRIgine (LAMICTAL) 100 MG tablet Take 0.5 tablets (50 mg) by mouth 2 times daily (Patient taking differently: Take 50 mg by mouth 2 times daily 200 + 50 = 250 mg BID)       lamoTRIgine (LAMICTAL) 200 MG tablet Take 1 tablet (200 mg) by mouth 2 times daily (Patient taking differently: Take 200 mg by mouth 2 times daily 200mg + 50 mg= 250 mg BID)       linaclotide (LINZESS) 290 MCG capsule Take 1 capsule (290 mcg) by mouth every morning (before breakfast) Prn constipatiion (Patient taking differently: Take 290 mcg by mouth daily as needed (constipation)) 90 capsule 1     naproxen (NAPROSYN) 375 MG tablet TAKE 1 TABLET(375 MG) BY MOUTH TWICE DAILY WITH MEALS 180 tablet 1     omeprazole (PRILOSEC) 40 MG DR capsule Take 1 capsule (40 mg) by mouth daily for 14 days 14 capsule 0     rizatriptan (MAXALT) 10 MG tablet TAKE 1 TABLET(10 MG) BY MOUTH AT ONSET OF HEADACHE FOR MIGRAINE. MAY REPEAT IN 2 HOURS. MAX 3 TABLETS/ 24 HOURS 15 tablet 2      sildenafil (VIAGRA) 100 MG tablet Take 1 tablet (100 mg) by mouth daily as needed (interest) 10 tablet 3     simvastatin (ZOCOR) 40 MG tablet Take 1 tablet (40 mg) by mouth At Bedtime 90 tablet 3     sucralfate (CARAFATE) 1 GM tablet Take 1 tablet (1 g) by mouth 4 times daily for 14 days 56 tablet 0     tamsulosin (FLOMAX) 0.4 MG capsule Take 1 capsule (0.4 mg) by mouth daily 90 capsule 0     tirzepatide (MOUNJARO) 2.5 MG/0.5ML pen Inject 2.5 mg Subcutaneous every 7 days (Patient not taking: Reported on 5/29/2024) 2 mL 3     topiramate (TOPAMAX) 100 MG tablet Take 1 tablet (100 mg) by mouth daily Use second 90 tablet 1     traZODone (DESYREL) 50 MG tablet Take 2 tablets (100 mg) by mouth At Bedtime (Patient taking differently: Take 50 mg by mouth nightly as needed for sleep) 180 tablet 3     zolpidem (AMBIEN) 5 MG tablet Take 1 tablet (5 mg) by mouth nightly as needed for sleep 90 tablet 1      Allergies:  Allergies   Allergen Reactions    Gabapentin      Fever      Morphine And Codeine      inability to sleep after taking codeine    Vicodin [Hydrocodone-Acetaminophen]      Perspiring, hyper, itchy     ROS: 10 point ROS neg other than the symptoms noted above in the HPI.      Physical Exam:  Temp:  [97.7  F (36.5  C)-98  F (36.7  C)] 98  F (36.7  C)  Pulse:  [48-86] 58  Resp:  [15-33] 19  BP: (104-147)/(60-89) 147/89  SpO2:  [97 %-100 %] 98 %  Gen: NAD, resting in bed  CV: RRR, S1S2 normal, no murmurs, rubs, or gallops. JVP not elevated  Pulm: clear to auscultation bilaterally, no rhonchi or wheezes  Abd: soft, non-tender, no guarding, +BS  Ext: no lower extremity edema  Neuro: grossly normal  Psych: calm, cooperative       Labs:  ABG No lab results found in last 7 days.  CBC  Recent Labs   Lab 05/31/24  0252 05/31/24  0049 05/30/24  1657 05/29/24  1743   WBC 9.9 10.2 8.6 8.3   HGB 11.3* 11.8* 12.5* 13.4    137* 172 196     BMP  Recent Labs   Lab 05/31/24  0252 05/29/24  1743    137   POTASSIUM 3.9 4.3    CHLORIDE 108* 104   CO2 17* 20*   BUN 31.1* 30.3*   CR 1.28* 1.34*   GLC 98 102*     LFTNo lab results found in last 7 days.  PancreasNo lab results found in last 7 days.    Imaging:  Recent Results (from the past 24 hour(s))   NM Lexiscan stress test (nuc card)   Result Value    Target     Baseline Systolic     Baseline Diastolic BP 76    Last Stress Systolic     Last Stress Diastolic BP 72    Baseline HR 56    Max HR  75    Max Predicted HR  50    Rate Pressure Product 9,750.0    Stress/rest perfusion ratio 1.22    Narrative      The nuclear stress test is abnormal.    There is a large area of severe ischemia in the lateral and   inferolateral segment(s) of the left ventricle. This appears to be in the   left circumflex distribution.    Stress to rest cavity ratio is 1.22.  TID is present visually.    The patient is at a high risk of future cardiac ischemic events.    Left ventricular function is normal.    A prior study was conducted on 2021.  This study has changes noted   when compared with the prior study.     Echocardiogram Complete   Result Value    LVEF  45-50%    Narrative    [id number]  [id number]  [id number]  [id number]^IRINA^LORENZA^MELI     [facility]  Echocardiography Laboratory  [address] [place] [address]     Name: FREDDIE BACON  MRN: [id number]  : 1955  Study Date: 2024 02:07 PM  Age: 69 yrs  Gender: Male  Patient Location: [facility]  Reason For Study: Ischemic Heart Disease  Ordering Physician: LORENZA AREVALO  Performed By: Yodit Hardwick     BSA: 2.1 m2  Height: 69 in  Weight: 200 lb  BP: 104/60 mmHg  ______________________________________________________________________________  Procedure  Complete Portable Echo Adult. Optison (NDC #0345-2205) given intravenously.  ______________________________________________________________________________  Interpretation Summary     The visual ejection fraction is 45-50%.  Left ventricular systolic  function is mildly reduced.  There are regional wall motion abnormalities as specified.  ______________________________________________________________________________  Left Ventricle  The left ventricle is normal in size. There is normal left ventricular wall  thickness. Diastolic Doppler findings (E/E' ratio and/or other parameters)  suggest left ventricular filling pressures are indeterminate. The visual  ejection fraction is 45-50%. Left ventricular systolic function is mildly  reduced. The distal anterolateral wall appears to be severely hypokinetic. The  mid to distal inferolateral wall appears to be severely hypokinetic. There are  regional wall motion abnormalities as specified.     Right Ventricle  The right ventricle is normal in size and function.     Atria  Normal left atrial size. Right atrial size is normal. There is no color  Doppler evidence of an atrial shunt.     Mitral Valve  There is trace mitral regurgitation.     Tricuspid Valve  There is trace tricuspid regurgitation. Right ventricular systolic pressure  could not be approximated due to inadequate tricuspid regurgitation.     Aortic Valve  The aortic valve is not well visualized. No aortic regurgitation is present.  No hemodynamically significant valvular aortic stenosis.     Pulmonic Valve  The pulmonic valve is not well visualized. There is no pulmonic valvular  regurgitation. Normal pulmonic valve velocity.     Vessels  The aortic root is normal size. Normal size ascending aorta. Inferior vena  cava not well visualized for estimation of right atrial pressure.     Pericardium  There is no pericardial effusion.     Rhythm  Sinus rhythm was noted.  ______________________________________________________________________________  MMode/2D Measurements & Calculations  IVSd: 0.90 cm  LVIDd: 4.8 cm  LVIDs: 2.8 cm  LVPWd: 0.76 cm  FS: 41.4 %  LV mass(C)d: 131.9 grams  LV mass(C)dI: 63.9 grams/m2  Ao root diam: 3.1 cm  asc Aorta Diam: 3.0 cm  LVOT  diam: 2.3 cm  LVOT area: 4.2 cm2  Ao root diam index Ht(cm/m): 1.8  Ao root diam index BSA (cm/m2): 1.5  Asc Ao diam index BSA (cm/m2): 1.5  Asc Ao diam index Ht(cm/m): 1.7  EF Biplane: 54.7 %  LA Volume (BP): 47.0 ml  RWT: 0.32  TAPSE: 2.9 cm     Doppler Measurements & Calculations  MV E max jag: 94.9 cm/sec  MV A max jag: 45.1 cm/sec  MV E/A: 2.1  MV dec slope: 481.0 cm/sec2  MV dec time: 0.20 sec  E/E' av.3  Lateral E/e': 11.8  Medial E/e': 10.8     ______________________________________________________________________________  Report approved by: Rosa Rosas 2024 03:53 PM         Cardiac Catheterization    Narrative    Coronary disease: Presentation with unstable  angina and small non-ST   segment elevation MI.  In the setting of hypertension, dyslipidemia, old   stent  implantation in inferior ramus of first marginal branch.  Echo   shows ejection fraction of 45% with anterior apical and posterolateral   hypokinesis.    Findings  Left main: Smooth 20% narrowing  LAD: Ostial 60% narrowing ( IFR 0.97)  mid LAD IFR 0.85 to 0.87 ( FFR   0.72)  Circumflex: 80% narrowing at origin of first marginal branch.  First   marginal bifurcates into smaller sized superior ramus and large inferior   ramus.  Subtotal thrombotic lesion in stented segment and inferior ramus.    80% mid vessel circumflex narrowing after takeoff of first marginal   branch.  RCA dominant.  Moderate size.  60% mid vessel narrowing with IFR equals   0.99      With the patient's mild left ventricular dysfunction, the significant   disease in the mid LAD and the complex nature of the patient's circumflex   disease surgical consultation should be considered as a first step.  If   the patient declines was not deemed to be a good surgical candidate,   complex PCI in the circumflex to the inferior ramus of the first marginal   branch and covering the orifice of the superior ramus of the first   marginal branch the mid circumflex could  be offered.  Staged intervention   the LAD would be considered.

## 2024-05-31 NOTE — TELEPHONE ENCOUNTER
Pt left VM on PAL RN line today 5/31  -Requesting call back to discuss PA for Linzess      PAL called and spoke with pt   -Pt requesting PAL contact his insurance company to see what additional info is needed for PA   -Pt is currently admitted in hospital- will be having cardiac bypass surgery on Tuesday 6/4/24    PAL called [facility] at [phone]  -Confirmed they received forms on 5/21/24  -Linzess is covered at a tier 3 copay cost, PA is for tier cost reduction   -PA was denied, [employer] faxing over denial letter with appeal process forms attached if PCP wants to complete    Routing to Dr. Noel to review and advise    Jazmin STEIN RN  Patient Advocate Liaison - PAL RN  [facility]  [phone]

## 2024-05-31 NOTE — PLAN OF CARE
"CARE FROM 1800 - 1900  Patient is A & O x 4, Lung Sounds CTA, BS active. Patient is assist x 1 Walker. Denies pain/NV/SOB. TR band in place, last air removed at 1910. Heparin to start 2 hours after the T band comes off. Area is intact, no bleeding. Patient is a possible transfer to [facility]. Current Tele reading SR/SB 59. Will continue to monitor and provide supportive cares.    BP (!) 143/78 (BP Location: Right arm)   Pulse 54   Temp 97.7  F (36.5  C) (Oral)   Resp 16   Ht 1.753 m (5' 9\")   Wt 88.8 kg (195 lb 12.8 oz)   SpO2 99%   BMI 28.91 kg/m         Problem: Adult Inpatient Plan of Care  Goal: Plan of Care Review  Description: The Plan of Care Review/Shift note should be completed every shift.  The Outcome Evaluation is a brief statement about your assessment that the patient is improving, declining, or no change.  This information will be displayed automatically on your shift  note.  Outcome: Progressing  Goal: Patient-Specific Goal (Individualized)  Description: You can add care plan individualizations to a care plan. Examples of Individualization might be:  \"Parent requests to be called daily at 9am for status\", \"I have a hard time hearing out of my right ear\", or \"Do not touch me to wake me up as it startles  me\".  Outcome: Progressing  Goal: Absence of Hospital-Acquired Illness or Injury  Outcome: Progressing  Goal: Optimal Comfort and Wellbeing  Outcome: Progressing  Goal: Readiness for Transition of Care  Outcome: Progressing     Problem: Comorbidity Management  Goal: Maintenance of Seizure Control  Outcome: Progressing      "

## 2024-05-31 NOTE — PROGRESS NOTES
[facility]    Medicine Progress Note - Hospitalist Service    Date of Admission:  5/30/2024    Assessment & Plan   Kashmir Yao is a 69 year old male with a past medical history of coronary artery disease status post PCI, hypertension, dyslipidemia, RIC on CPAP, BPH, GERD, right frontal meningioma, seizure disorder presents to hospital with an NSTEMI.    NSTEMI  Coronary artery disease  Hypertension  Dyslipidemia  Patient with a history of coronary artery disease and PCI presented to [facility] with chest pain was found to have an NSTEMI and underwent coronary angio which revealed multivessel disease with complex anatomy for which the patient was transferred to [facility] for surgery versus complex PCI.  Continue heparin, metoprolol 50 twice daily (hold parameters in place due to bradycardia), Imdur 20 3 times daily, aspirin daily, simvastatin  -- Cardiology consulted: Await input  -- Cardiothoracic surgery consult: Await input.  -- Monitor on telemetry    Headache  The patient reports a mild headache which he attributes to a lack of sleep.  He reports it similar to his regular headaches.  He is unable to quantify the severity of pain.  He has no focal neurologic deficits.  He has requested Tylenol and tea which will be provided to him.  He has been instructed to notify us if the headache gets any worse.  Would consider neuroimaging if the headache persists or gets worse.    Anemia  He denies any bleeding.  He has had a 1 g drop in his hemoglobin from the 29th-30th.  This is possibly dilutional.  -Monitor hemoglobin    Right frontal meningioma  Seizure disorder  He undergoes monitoring at the [facility] yearly.  Continue Lamictal    RIC CPAP  BPH Flomax  GERD PPI    DVT ppx: Heparin drip  Expected length of stay greater than than 2 days  Code Status: Full code    Medical Decision Making       75 MINUTES SPENT BY ME on the date of service doing chart review, history, exam,  "documentation & further activities per the note.            Diet: Combination Diet Regular Diet Adult    DVT Prophylaxis: heparin gtt  Cano Catheter: Not present  Lines: None     Cardiac Monitoring: ACTIVE order. Indication: AMI (NSTEMI/ STEMI) (48 hours)  Code Status: Full Code      Clinically Significant Risk Factors Present on Admission                # Drug Induced Platelet Defect: home medication list includes an antiplatelet medication   # Hypertension: Noted on problem list      # Overweight: Estimated body mass index is 29.03 kg/m  as calculated from the following:    Height as of 5/29/24: 1.753 m (5' 9\").    Weight as of this encounter: 89.2 kg (196 lb 9.6 oz).              Disposition Plan     Medically Ready for Discharge:              Celeste Ceja MD  Hospitalist Service  [facility]  Securely message with [employer] (more info)  Text page via [employer] Paging/Directory   ______________________________________________________________________    Interval History   Patient anxious about his length of stay in the hospital and plan for today.  Denies any chest pain right now while at rest.  Denies any shortness of breath.    Physical Exam   Vital Signs: Temp: 98  F (36.7  C) Temp src: Oral BP: (!) 147/89 Pulse: 58   Resp: 19 SpO2: 98 % O2 Device: None (Room air)    Weight: 196 lbs 9.6 oz    General Appearance: Well appearing for stated age.  Respiratory: CTAB, no rales or ronchi  Cardiovascular: S1, S2 normal, no murmurs  GI: non-tender on palpation, BS present      Medical Decision Making       45 MINUTES SPENT BY ME on the date of service doing chart review, history, exam, documentation & further activities per the note.      Data     I have personally reviewed the following data over the past 24 hrs:    9.9  \   11.3 (L)   / 177     136 108 (H) 31.1 (H) /  98   3.9 17 (L) 1.28 (H) \       Imaging results reviewed over the past 24 hrs:   Recent Results (from the past 24 hour(s)) "   Echocardiogram Complete   Result Value    LVEF  45-50%    [facility]    [id number]  [id number]  [id number]  [id number]^IRINA^LORENZA^MELI     [facility]  Echocardiography Laboratory  [address] [place] [address]     Name: FREDDIE BACON  MRN: [id number]  : 1955  Study Date: 2024 02:07 PM  Age: 69 yrs  Gender: Male  Patient Location: [facility]  Reason For Study: Ischemic Heart Disease  Ordering Physician: LORENZA AREVALO  Performed By: Yodit Hardwick     BSA: 2.1 m2  Height: 69 in  Weight: 200 lb  BP: 104/60 mmHg  ______________________________________________________________________________  Procedure  Complete Portable Echo Adult. Optison (NDC #[id number]) given intravenously.  ______________________________________________________________________________  Interpretation Summary     The visual ejection fraction is 45-50%.  Left ventricular systolic function is mildly reduced.  There are regional wall motion abnormalities as specified.  ______________________________________________________________________________  Left Ventricle  The left ventricle is normal in size. There is normal left ventricular wall  thickness. Diastolic Doppler findings (E/E' ratio and/or other parameters)  suggest left ventricular filling pressures are indeterminate. The visual  ejection fraction is 45-50%. Left ventricular systolic function is mildly  reduced. The distal anterolateral wall appears to be severely hypokinetic. The  mid to distal inferolateral wall appears to be severely hypokinetic. There are  regional wall motion abnormalities as specified.     Right Ventricle  The right ventricle is normal in size and function.     Atria  Normal left atrial size. Right atrial size is normal. There is no color  Doppler evidence of an atrial shunt.     Mitral Valve  There is trace mitral regurgitation.     Tricuspid Valve  There is trace tricuspid regurgitation. Right ventricular systolic pressure  could not be  approximated due to inadequate tricuspid regurgitation.     Aortic Valve  The aortic valve is not well visualized. No aortic regurgitation is present.  No hemodynamically significant valvular aortic stenosis.     Pulmonic Valve  The pulmonic valve is not well visualized. There is no pulmonic valvular  regurgitation. Normal pulmonic valve velocity.     Vessels  The aortic root is normal size. Normal size ascending aorta. Inferior vena  cava not well visualized for estimation of right atrial pressure.     Pericardium  There is no pericardial effusion.     Rhythm  Sinus rhythm was noted.  ______________________________________________________________________________  MMode/2D Measurements & Calculations  IVSd: 0.90 cm  LVIDd: 4.8 cm  LVIDs: 2.8 cm  LVPWd: 0.76 cm  FS: 41.4 %  LV mass(C)d: 131.9 grams  LV mass(C)dI: 63.9 grams/m2  Ao root diam: 3.1 cm  asc Aorta Diam: 3.0 cm  LVOT diam: 2.3 cm  LVOT area: 4.2 cm2  Ao root diam index Ht(cm/m): 1.8  Ao root diam index BSA (cm/m2): 1.5  Asc Ao diam index BSA (cm/m2): 1.5  Asc Ao diam index Ht(cm/m): 1.7  EF Biplane: 54.7 %  LA Volume (BP): 47.0 ml  RWT: 0.32  TAPSE: 2.9 cm     Doppler Measurements & Calculations  MV E max jag: 94.9 cm/sec  MV A max jag: 45.1 cm/sec  MV E/A: 2.1  MV dec slope: 481.0 cm/sec2  MV dec time: 0.20 sec  E/E' av.3  Lateral E/e': 11.8  Medial E/e': 10.8     ______________________________________________________________________________  Report approved by: Rosa Rosas 2024 03:53 PM         Cardiac Catheterization    Narrative    Coronary disease: Presentation with unstable  angina and small non-ST   segment elevation MI.  In the setting of hypertension, dyslipidemia, old   stent  implantation in inferior ramus of first marginal branch.  Echo   shows ejection fraction of 45% with anterior apical and posterolateral   hypokinesis.    Findings  Left main: Smooth 20% narrowing  LAD: Ostial 60% narrowing ( IFR 0.97)  mid LAD IFR  0.85 to 0.87 ( FFR   0.72)  Circumflex: 80% narrowing at origin of first marginal branch.  First   marginal bifurcates into smaller sized superior ramus and large inferior   ramus.  Subtotal thrombotic lesion in stented segment and inferior ramus.    80% mid vessel circumflex narrowing after takeoff of first marginal   branch.  RCA dominant.  Moderate size.  60% mid vessel narrowing with IFR equals   0.99      With the patient's mild left ventricular dysfunction, the significant   disease in the mid LAD and the complex nature of the patient's circumflex   disease surgical consultation should be considered as a first step.  If   the patient declines was not deemed to be a good surgical candidate,   complex PCI in the circumflex to the inferior ramus of the first marginal   branch and covering the orifice of the superior ramus of the first   marginal branch the mid circumflex could be offered.  Staged intervention   the LAD would be considered.

## 2024-05-31 NOTE — H&P
[facility]    Hospitalist History and Physical  Date of Admission:  5/30/2024    Primary Care Physician   Chad Noel    Chief Complaint  No chief complaint on file.    History obtained from the patient    History of Present Illness   Kashmir Yao is a 69 year old male with a past medical history of coronary artery disease status post PCI, hypertension, dyslipidemia, RIC on CPAP, BPH, GERD, right frontal meningioma, seizure disorder presents to hospital with an NSTEMI.  The patient presented to [facility] emergency room on May 29 for chest pain.  The patient's EKG was reassuring but his repeat troponin was mildly elevated.  He had a recurrence of his chest pain in the emergency room which improved after nitroglycerin.  He was then loaded with aspirin and admitted to the hospital for observation.  He underwent evaluation by cardiology and underwent a cardiac angiogram which revealed multivessel disease and complex coronary artery anatomy.  The patient was transferred to [facility] for PCI versus CABG.  On arrival at [facility] the patient had a brief episode of chest pain after walking to the bathroom which resolved with rest.  EKG was obtained and was reassuring.  During my evaluation the patient complains of a headache.  He reports it is generalized and mild and attributes it to a lack of sleep.  He was unable to quantify it.  He requested a Tylenol and some tea.  He provides no other complaints.    Past Medical History    I have reviewed this patient's medical history and updated it with pertinent information if needed.   Past Medical History:   Diagnosis Date    Anemia 07/31/2020    Arthritis     Bell's palsy 09/08/2020    Benign prostatic hyperplasia with incomplete bladder emptying 07/28/2020    Bilateral carpal tunnel syndrome 02/13/2020    CAD (coronary artery disease) 2010    a subtotally occluded obtuse marginal vessel which was stented with a drug-coated  stent.  He had 50% to 60% LAD disease and 25% to 30% right coronary disease    Cognitive change 01/02/2020    Colonic polyps 2009    tubular adenomae with mildly dysplastic features    Common wart 12/02/2020    Disturbance of skin sensation     Encephalocele (H) 2009    left frontal    Epilepsy, partial (H) 2009    with secondary generalization    GERD (gastroesophageal reflux disease)     Heart attack (H)     5 years ago. Cardiology 3 months age    Herpes zoster with complication 10/07/2020    History of spinal cord injury     Hypertension     Iron deficiency 09/09/2020    Meningioma (H) 04/10/2023    Meningiomatosis (H) 03/14/2024    RIC (obstructive sleep apnea)     RIC (obstructive sleep apnea)     Paraneoplastic Antibody  positive[799.89BS] 2009    mildly elevated alpha 3 ganglionic acetylcholine receptor and JEOVANNY 65 receptor  antibody    Paronychia of toe, left 06/26/2023    Pedal edema 02/14/2020    Post herpetic neuralgia 12/02/2020    Primary osteoarthritis of both hands 01/02/2020    Rash 04/19/2024    Seizures (H)     Last seizure 2012    Spinal stenosis     Stented coronary artery     Tobacco use disorder     Ulnar neuropathy of both upper extremities 02/13/2020    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  Past Surgical History:   Procedure Laterality Date    ARTHROPLASTY KNEE Left 05/16/2016    Procedure: ARTHROPLASTY KNEE;  Surgeon: Chet Leonardo MD;  Location:  OR    BACK SURGERY      CARPAL TUNNEL RELEASE RT/LT Right 03/11/2020    Right carpal tunnel release, Dr. Rick Frias, [facility]    CARPAL TUNNEL RELEASE RT/LT Left     COLONOSCOPY N/A 10/08/2020    Procedure: COLONOSCOPY WITH POLYPECTOMIES using cold jumbo forceps;  Surgeon:  Kevin Song MD;  Location:  GI    CYSTOSCOPY      ESOPHAGOSCOPY, GASTROSCOPY, DUODENOSCOPY (EGD), COMBINED  05/24/2012    Procedure:COMBINED ESOPHAGOSCOPY, GASTROSCOPY, DUODENOSCOPY (EGD); ESOPHAGOSCOPY, GASTROSCOPY, DUODENOSCOPY (EGD) ; Surgeon:GILLIAN JOHNSON; Location: GI    ESOPHAGOSCOPY, GASTROSCOPY, DUODENOSCOPY (EGD), COMBINED N/A 10/08/2020    Procedure: ESOPHAGOGASTRODUODENOSCOPY (EGD) (fv);  Surgeon: Kevin Song MD;  Location:  GI    KNEE SURGERY Left     repair of cartilage    RELEASE TRIGGER FINGER Left 10/19/2020    Left ring finger trigger finger release at A1 pulley, Dr. Rick Frias, [facility]    SPINE SURGERY      repair of herniated disc lumbar    STENT      trigger finger release Right 09/15/2020    Right thumb trigger finger release, Dr. Frias    ULNAR NERVE TRANSPOSITION Left     VENTRICULOSTOMY      [facility] ANESTH,DX ARTHROSCOPIC PROC KNEE JOINT      CARTILEGE REPAIR.    RUST NONSPECIFIC PROCEDURE      stint in heart    ZZ COLONOSCOPY THRU STOMA, DIAGNOSTIC  2009    tubular adenomae, recommended annual colonoscopy       Allergies   Allergies   Allergen Reactions    Gabapentin      Fever      Morphine And Codeine      inability to sleep after taking codeine    Vicodin [Hydrocodone-Acetaminophen]      Perspiring, hyper, itchy       Social History   I have reviewed this patient's social history and updated it with pertinent information if needed. Kashmir Yao  reports that he quit smoking about 20 years ago. His smoking use included cigarettes. He started smoking about 40 years ago. He has a 10 pack-year smoking history. He has been exposed to tobacco smoke. He has never used smokeless tobacco. He reports that he does not drink alcohol and does not use drugs.    Family History   I have reviewed this patient's family history and updated it with pertinent information if needed.   Family History   Problem Relation Age of Onset    Cardiovascular Mother         CHF, HTN,  VASC. INSUFF., DM    No Known Problems Father     Thyroid Disease Sister         HYPOTHYROID    Genitourinary Problems Brother         RENAL FAILURE AND H/O MVA WITH LE PARAPLEGIA    No Known Problems Sister     No Known Problems Brother     No Known Problems Brother     Cancer - colorectal No family hx of     Colon Cancer No family hx of        Physical Exam   Temp: 97.8  F (36.6  C) Temp src: Oral BP: 129/74       SpO2: 99 % O2 Device: None (Room air)    Vital Signs with Ranges  Temp:  [97.6  F (36.4  C)-97.8  F (36.6  C)] 97.8  F (36.6  C)  Pulse:  [54-86] 57  Resp:  [15-33] 16  BP: (104-143)/(60-78) 129/74  SpO2:  [96 %-100 %] 99 %  0 lbs 0 oz  Physical Exam  Vitals reviewed.   Constitutional:       Appearance: Normal appearance.      Comments: Very pleasant well-developed well-nourished man seen resting bed comfortably no apparent distress in the medical unit.   HENT:      Head: Normocephalic and atraumatic.   Eyes:      Extraocular Movements: Extraocular movements intact.      Conjunctiva/sclera: Conjunctivae normal.      Pupils: Pupils are equal, round, and reactive to light.   Cardiovascular:      Rate and Rhythm: Regular rhythm. Bradycardia present.   Pulmonary:      Effort: Pulmonary effort is normal.      Breath sounds: Normal breath sounds.   Musculoskeletal:         General: No swelling.   Neurological:      General: No focal deficit present.      Mental Status: He is alert and oriented to person, place, and time. Mental status is at baseline.      Cranial Nerves: No cranial nerve deficit.         Assessment & Plan   Kashmir Yao is a 69 year old male with a past medical history of coronary artery disease status post PCI, hypertension, dyslipidemia, RIC on CPAP, BPH, GERD, right frontal meningioma, seizure disorder presents to hospital with an NSTEMI.    NSTEMI  Coronary artery disease  Hypertension  Dyslipidemia  Patient with a history of coronary artery disease and PCI presented to [facility]  with chest pain was found to have an NSTEMI and underwent coronary angio which revealed multivessel disease with complex anatomy for which the patient was transferred to [facility] for surgery versus complex PCI.  Continue heparin, metoprolol 50 twice daily (hold parameters in place due to bradycardia), Imdur 20 3 times daily, aspirin daily, simvastatin  Follow cardiology consult and cardiothoracic consult  Monitor on telemetry    Headache  The patient reports a mild headache which he attributes to a lack of sleep.  He reports it similar to his regular headaches.  He is unable to quantify the severity of pain.  He has no focal neurologic deficits.  He has requested Tylenol and tea which will be provided to him.  He has been instructed to notify us if the headache gets any worse.  Would consider neuroimaging if the headache persists or gets worse.    Anemia  He denies any bleeding.  He has had a 1 g drop in his hemoglobin from the 29th-30th.  This is possibly dilutional.  -Monitor hemoglobin    Right frontal meningioma  Seizure disorder  He undergoes monitoring at the [facility] yearly.  Continue Lamictal    RIC CPAP  BPH Flomax  GERD PPI    DVT ppx: Heparin drip  Expected length of stay greater than than 2 days  Code Status: Full code    Medical Decision Making       75 MINUTES SPENT BY ME on the date of service doing chart review, history, exam, documentation & further activities per the note.

## 2024-05-31 NOTE — PROGRESS NOTES
Status: Patient transferred from [facility] with multivessel disease with complex anatomy for which the patient was transferred to [facility] for surgery versus complex PCI.   Vitals: VSS  Tele: Sinus benny  Neuros: Intact  IV: PIV infusing hep gtt at 900units/hr  Labs/Electrolytes: Last hep 10a 0.53, next re-draw scheduled for 1800  Resp/trach: Lung sounds clear throughout  Diet: Regular  Bowel status: Last BM yesterday, BS+  : Voiding spontaneously  Skin: Intact  Pain: Patient c/o chest pain this evening, relieved with sub lingual Nitro x2  Activity: Independent  Social: Cooperative with cares, wife at bedside, supportive  Plan: Continue work-up for CABG procedure (possibly Tuesday) and follow POC

## 2024-05-31 NOTE — PROGRESS NOTES
"RN called [facility] for patient transfer, Per [facility] charge nurse \"Nobody will take him until after 23:00). Obs charge RN updated.  "

## 2024-05-31 NOTE — PROGRESS NOTES
[facility]  Cardiology Progress Note  Date of Service: 05/31/2024  Primary Cardiologist: will be Dr. Gross    Assessment & Plan    Kashmir Yao is a 69 year old male with past medical history significant for CAD, HTN, RIC, meningioma, szr disorder, shoulder fxr, HLD admitted on 5/29/24 to [facility] with cp, found to have NSTEMI with complex and transferred to [facility] 5/10/24 for discussion of cabg vs complex pci.      Assessment:  1.  NSTEMI, patient known coronary disease with history of OM stenting.  -Angiogram 5/30/2024 showed 60% ostial LAD and mid LAD, 80% circumflex at the trifurcation with OM superior and inferior ramus, occluded inferior ramus, 60% mid RCA  -EF dropped to 45 to 50% previously normal.  - appreciate CV surgery, per pt probable CABG Tuesday    2.  Ischemic cardiomyopathy, EF 45 to 50%, euvolemic    3.  KELI, baseline creatinine 1-1.2, mildly elevated on admission at 1.3, stable    4.  Left shoulder injury, patient clearly identifies different types of pain that 1 is his his shoulder, the change was he now had pain in his chest.  His exertional pain is now chest pain and he is able to differentiate between shoulder pain and cardiac pain after discussion.    Plan:   Switch Lopressor to Toprol 50 mg bid  Switch isordil to imdur 30 mg daily, uptitrate as needed for cp.  Can switch to nitroglycerin gtt if needed.  Will get pricing on entresto, slgt2i for cm  Inpt until CABG, likely early next week  We'll continue to follow with you    Alannah Wakefield PA-C  [facility]  Pager: [phone]     Interval History   Patient is to consult pain and shoulder pain from her shoulder fracture and chest pain.  The shoulder pain has been consistent since the fracture his chest pain was new.  He continues to have intermittent chest pain when getting up and going to the bathroom.  It resolves with rest    Physical Exam   Temp: 98  F (36.7  C) Temp src: Oral BP: (!) 147/89 Pulse: 58   Resp: 19  "SpO2: 98 % O2 Device: None (Room air)    Vitals:    05/31/24 0500   Weight: 89.2 kg (196 lb 9.6 oz)   Well-developed well-nourished gentleman in no acute distress.  Normocephalic atraumatic.  Heart is regular rate rhythm without murmur rub or gallop.  Lungs are clear without wheezes rales or rhonchi.  Remedies without peripheral edema.  Skin is warm and dry.  Right radial access clean dry intact 2+ radial pulse.  Clinically Significant Risk Factors Present on Admission                # Drug Induced Platelet Defect: home medication list includes an antiplatelet medication   # Hypertension: Noted on problem list      # Overweight: Estimated body mass index is 29.03 kg/m  as calculated from the following:    Height as of 5/29/24: 1.753 m (5' 9\").    Weight as of this encounter: 89.2 kg (196 lb 9.6 oz).           Medications   Current Facility-Administered Medications   Medication Dose Route Frequency Provider Last Rate Last Admin    heparin 25,000 units in 0.45% NaCl 250 mL ANTICOAGULANT infusion  0-5,000 Units/hr Intravenous Continuous Amari Marquez MD   Stopped at 05/31/24 1054    Patient is already receiving anticoagulation with heparin, enoxaparin (LOVENOX), warfarin (COUMADIN)  or other anticoagulant medication   Does not apply Continuous PRN Amari Marquez MD         Current Facility-Administered Medications   Medication Dose Route Frequency Provider Last Rate Last Admin    aspirin (ASA) chewable tablet 81 mg  81 mg Oral Daily Amari Marquez MD   81 mg at 05/31/24 0942    isosorbide dinitrate (ISORDIL) tablet 20 mg  20 mg Oral TID Amari Marquez MD   20 mg at 05/31/24 0942    lamoTRIgine (LaMICtal) tablet 250 mg  250 mg Oral BID Amari Marquez MD   250 mg at 05/31/24 0942    metoprolol tartrate (LOPRESSOR) tablet 50 mg  50 mg Oral BID Amari Marquez MD        pantoprazole (PROTONIX) EC tablet 40 mg  40 mg Oral QAM AC Amari Marquez MD   40 mg " at 05/31/24 0942    simvastatin (ZOCOR) tablet 40 mg  40 mg Oral At Bedtime Amari Marquez MD        sodium chloride (PF) 0.9% PF flush 3 mL  3 mL Intracatheter Q8H Amari Marquez MD   3 mL at 05/31/24 0943    tamsulosin (FLOMAX) capsule 0.4 mg  0.4 mg Oral Daily Amari Marquez MD   0.4 mg at 05/31/24 0942       Data   Last 24 hours labs reviewed   EKG: (reviewed personally) sinus bradycardia without ST changes.    Tele: Sinus bradycardia overnight in the 40s, brief runs of SVT.

## 2024-05-31 NOTE — DISCHARGE SUMMARY
"[facility]  Hospitalist Discharge Summary      Date of Admission:  5/29/2024  Date of Discharge:  5/31/2024  Discharging Provider: New Aden MD  Discharge Service: Hospitalist Service    Discharge Diagnoses   Acute coronary syndrome    Clinically Significant Risk Factors     # Overweight: Estimated body mass index is 28.91 kg/m  as calculated from the following:    Height as of this encounter: 1.753 m (5' 9\").    Weight as of this encounter: 88.8 kg (195 lb 12.8 oz).       Follow-ups Needed After Discharge   Transfer to [facility]    Unresulted Labs Ordered in the Past 30 Days of this Admission       No orders found from 4/29/2024 to 5/30/2024.        These results will be followed up by NA    Discharge Disposition   Transferred to [facility]  Condition at discharge: Stable    Hospital Course   Kashmir Yao is a 69 year old male with a past medical history of coronary artery disease status post PCI, hypertension, dyslipidemia, RIC on CPAP, BPH, GERD, right frontal meningioma, seizure disorder presents to hospital with an NSTEMI.  The patient presented to [facility] emergency room on May 29 for chest pain.  The patient's EKG was reassuring but his repeat troponin was mildly elevated.  He had a recurrence of his chest pain in the emergency room which improved after nitroglycerin.  He was then loaded with aspirin and admitted to the hospital for observation.  He underwent evaluation by cardiology and underwent a cardiac angiogram which revealed multivessel disease and complex coronary artery anatomy.  The patient was transferred to [facility] for PCI versus CABG.  On arrival at [facility] the patient had a brief episode of chest pain after walking to the bathroom which resolved with rest.  EKG was obtained and was reassuring.  During my evaluation the patient complains of a headache.  He reports it is generalized and mild and attributes it to a lack of sleep. "  He was unable to quantify it.  He requested a Tylenol and some tea.  He provides no other complaints.     NSTEMI  Coronary artery disease  Hypertension  Dyslipidemia  Patient with a history of coronary artery disease and PCI presented to [facility] with chest pain was found to have an NSTEMI and underwent coronary angio which revealed multivessel disease with complex anatomy for which the patient was transferred to [facility] for surgery versus complex PCI.  Continue heparin, metoprolol 50 twice daily (hold parameters in place due to bradycardia), Imdur 20 3 times daily, aspirin daily, simvastatin  -- Cardiology consulted: Await input  -- Cardiothoracic surgery consult: Await input.  -- Monitor on telemetry     Headache  The patient reports a mild headache which he attributes to a lack of sleep.  He reports it similar to his regular headaches.  He is unable to quantify the severity of pain.  He has no focal neurologic deficits.  He has requested Tylenol and tea which will be provided to him.  He has been instructed to notify us if the headache gets any worse.  Would consider neuroimaging if the headache persists or gets worse.     Anemia  He denies any bleeding.  He has had a 1 g drop in his hemoglobin from the 29th-30th.  This is possibly dilutional.  -Monitor hemoglobin     Right frontal meningioma  Seizure disorder  He undergoes monitoring at the [facility] yearly.  Continue Lamictal     RIC CPAP  BPH Flomax  GERD PPI     Patient went to the Cath Lab.  Please see the cath report.  Patient needed to be transferred to [facility] for surgical consult versus a complex PCI.  He transferred later in the evening.  Consultations This Hospital Stay   CARDIOLOGY IP CONSULT  PHARMACY IP CONSULT  PHARMACY IP CONSULT  SMOKING CESSATION PROGRAM IP CONSULT    Code Status   Full Code    Time Spent on this Encounter   I, New Aden MD, personally saw the patient today and spent greater than 30 minutes discharging  this patient.       New Aden MD  [facility] OBSERVATION DEPT  [address]  [facility] [address]  Phone: [phone]  ______________________________________________________________________    Physical Exam   Vital Signs:   Temp src: Oral BP: 111/68 Pulse: 57   Resp: 16 SpO2: 97 % O2 Device: None (Room air) Oxygen Delivery: 3 LPM  Weight: 195 lbs 12.8 oz  Please see my note from today       Primary Care Physician   Chad Noel    Discharge Orders      Medication Instructions - Anticoagulants    Do NOT stop your aspirin or platelet inhibitor unless directed by your Cardiologist.  These medications help to prevent platelets in your blood from sticking together and forming a clot.  Examples of these medications are:  Ticagrelor (Brilinta), Clopidigrel (Plavix), Prasugrel (Effient)     When to call - Contact the Heart Clinic    You may experience symptoms that require follow-up before your scheduled appointment. Contact the Heart Clinic if you develop: Fever over 100.4o Fahrenheit, that lasts more than one day; Redness, heat, or pus at the puncture site; Change in color or temperature in your hand or arm.     When to call - Reasons to Call 911    If your wrist puncture site starts bleeding after discharge, sit down and apply firm pressure with your thumb against the puncture site and fingers against the back of the wrist for 10 minutes. If the bleeding stops, continue to rest, keeping your wrist still for 2 hours. Notify your doctor as soon as possible.  IF BLEEDING DOES NOT STOP OR THERE IS A LARGER AMOUNT OF BLEEDING OR SPURTING CALL 9-1-1 immediately.DO NOT drive yourself to the hospital.     Precautions - Lifting    DO NOT lift more than 5 pounds with affected arm for 48 hours     Precautions - Household Activities    Avoid excessive bending or movement of your wrist for 72 hours.  Do not subject hand/arm to any forceful movements for 24 hours, such as supporting weight when  rising from a chair or bed.     Remove the band-aid on the puncture site after 24 hours and leave open to air. If minor oozing, you may apply a band-aid and remove after 12 hours.     Precautions - Active Sports Activities    DO NOT engage in vigorous exercise using your affected arm for 3 days after discharge.  This includes golf, tennis or swimming.     Precautions - Operating yard equipment or vehicles    Do not operate a chainsaw, lawnmower, motorcycle, or all-terrain vehicle for 48 hours after the procedure.     Precautions - Elective Dental Work    NO elective dental work for 6 weeks after receiving a stent.     Comfort and Pain Management - Bruising after Surgery    Expect mild tingling of hand and tenderness at the wrist puncture site for up to 3 days. You may take Tylenol or a pain medicine recommended by your doctor.     Activity - Cardiac Rehab    You are encouraged to enroll in an Outpatient Cardiac Rehab program after discharge from the hospital.  Our Cardiac Rehab staff may visit briefly with you while you're in the hospital.  If they miss you, someone will contact you after you are home.     Return to Driving    Driving is NOT permitted for 24 hours after surgery     Return to work    You may return to work after 72 hours if you are feeling well and your job does not involve heavy lifting.     Shower / Bathing    You may shower on the day after your procedure.  DO NOT soak of wrist with the puncture site in water for 3 days to prevent infection. DO NOT take a tub bath or wash dishes for 3 days after the procedure     Dressing Removal    Remove the band-aid on the puncture site after 24 hours and leave open to air. If minor oozing, you may apply a band-aid and remove after 12 hours       Significant Results and Procedures   Most Recent 3 CBC's:  Recent Labs   Lab Test 05/31/24  0252 05/31/24  0049 05/30/24  1657   WBC 9.9 10.2 8.6   HGB 11.3* 11.8* 12.5*   MCV 86 86 85    137* 172     Most  Recent 3 BMP's:  Recent Labs   Lab Test 05/31/24  0252 05/29/24  1743 05/22/24 1944    137 140   POTASSIUM 3.9 4.3 4.4   CHLORIDE 108* 104 105   CO2 17* 20* 24   BUN 31.1* 30.3* 19.8   CR 1.28* 1.34* 1.39*   ANIONGAP 11 13 11   MOE 8.7* 9.4 9.5   GLC 98 102* 120*     Most Recent 2 LFT's:  Recent Labs   Lab Test 05/22/24 1944 03/14/24  1417   AST 23 23   ALT 38 38   ALKPHOS 117 117   BILITOTAL 0.4 0.3   ,   Results for orders placed or performed during the hospital encounter of 05/29/24   CT Aortic Survey w Contrast    Narrative    EXAM: CT AORTIC SURVEY W CONTRAST  LOCATION: [facility]  DATE: 5/29/2024    INDICATION: severe L chest shoulder neck pain, HTN  COMPARISON: 10/28/2018  TECHNIQUE: CT angiogram chest abdomen pelvis during arterial phase of injection of IV contrast. 2D and 3D MIP reconstructions were performed by the CT technologist. Dose reduction techniques were used.   CONTRAST: 72mL Isovue 370    FINDINGS:   CT ANGIOGRAM CHEST, ABDOMEN, AND PELVIS: Normal caliber aorta, no dissection. Bovine arch. Normal appearance of splanchnic, renal and iliac arteries other than modest mural calcifications.  Central pulmonary arteries normal.    LUNGS AND PLEURA: Mild changes of emphysema. Lungs are clear, no pleural effusion.    MEDIASTINUM/AXILLAE: No lymphadenopathy.    CORONARY ARTERY CALCIFICATION: Severe.    HEPATOBILIARY: Seen in early phase enhancement, otherwise unremarkable.    PANCREAS: Normal.    SPLEEN: Modest splenomegaly similar to previous exam.    ADRENAL GLANDS: Normal.    KIDNEYS/BLADDER: Normal.    BOWEL: Colon is collapsed and empty, especially left hemicolon suggesting diarrheal illness. No acute inflammatory changes are seen.    LYMPH NODES: Normal.    PELVIC ORGANS: Normal.    MUSCULOSKELETAL: Degenerative changes involving lumbar spine.      Impression    IMPRESSION:  1.  No aortic dissection. No acute vascular abnormalities are identified.  2.  Mild  emphysema.  3.  Colon is almost completely collapsed and empty suggesting diarrheal illness.  4.  Modest splenomegaly.     Echocardiogram Complete     Value    LVEF  45-50%    Narrative    [id number]  [id number]  [id number]^IRINA^LORENZA^MELI     [facility]  Echocardiography Laboratory  [address] [place] [address]     Name: FREDDIE BACON  MRN: [id number]  : 1955  Study Date: 2024 02:07 PM  Age: 69 yrs  Gender: Male  Patient Location: [facility]  Reason For Study: Ischemic Heart Disease  Ordering Physician: LORENZA AREVALO  Performed By: Yodit Hardwick     BSA: 2.1 m2  Height: 69 in  Weight: 200 lb  BP: 104/60 mmHg  ______________________________________________________________________________  Procedure  Complete Portable Echo Adult. Optison (NDC #[id number]) given intravenously.  ______________________________________________________________________________  Interpretation Summary     The visual ejection fraction is 45-50%.  Left ventricular systolic function is mildly reduced.  There are regional wall motion abnormalities as specified.  ______________________________________________________________________________  Left Ventricle  The left ventricle is normal in size. There is normal left ventricular wall  thickness. Diastolic Doppler findings (E/E' ratio and/or other parameters)  suggest left ventricular filling pressures are indeterminate. The visual  ejection fraction is 45-50%. Left ventricular systolic function is mildly  reduced. The distal anterolateral wall appears to be severely hypokinetic. The  mid to distal inferolateral wall appears to be severely hypokinetic. There are  regional wall motion abnormalities as specified.     Right Ventricle  The right ventricle is normal in size and function.     Atria  Normal left atrial size. Right atrial size is normal. There is no color  Doppler evidence of an atrial shunt.     Mitral Valve  There is trace mitral  regurgitation.     Tricuspid Valve  There is trace tricuspid regurgitation. Right ventricular systolic pressure  could not be approximated due to inadequate tricuspid regurgitation.     Aortic Valve  The aortic valve is not well visualized. No aortic regurgitation is present.  No hemodynamically significant valvular aortic stenosis.     Pulmonic Valve  The pulmonic valve is not well visualized. There is no pulmonic valvular  regurgitation. Normal pulmonic valve velocity.     Vessels  The aortic root is normal size. Normal size ascending aorta. Inferior vena  cava not well visualized for estimation of right atrial pressure.     Pericardium  There is no pericardial effusion.     Rhythm  Sinus rhythm was noted.  ______________________________________________________________________________  MMode/2D Measurements & Calculations  IVSd: 0.90 cm  LVIDd: 4.8 cm  LVIDs: 2.8 cm  LVPWd: 0.76 cm  FS: 41.4 %  LV mass(C)d: 131.9 grams  LV mass(C)dI: 63.9 grams/m2  Ao root diam: 3.1 cm  asc Aorta Diam: 3.0 cm  LVOT diam: 2.3 cm  LVOT area: 4.2 cm2  Ao root diam index Ht(cm/m): 1.8  Ao root diam index BSA (cm/m2): 1.5  Asc Ao diam index BSA (cm/m2): 1.5  Asc Ao diam index Ht(cm/m): 1.7  EF Biplane: 54.7 %  LA Volume (BP): 47.0 ml  RWT: 0.32  TAPSE: 2.9 cm     Doppler Measurements & Calculations  MV E max jag: 94.9 cm/sec  MV A max jag: 45.1 cm/sec  MV E/A: 2.1  MV dec slope: 481.0 cm/sec2  MV dec time: 0.20 sec  E/E' av.3  Lateral E/e': 11.8  Medial E/e': 10.8     ______________________________________________________________________________  Report approved by: Rosa Rosas 2024 03:53 PM         Cardiac Catheterization    Narrative    Coronary disease: Presentation with unstable  angina and small non-ST   segment elevation MI.  In the setting of hypertension, dyslipidemia, old   stent  implantation in inferior ramus of first marginal branch.  Echo   shows ejection fraction of 45% with anterior apical and  posterolateral   hypokinesis.    Findings  Left main: Smooth 20% narrowing  LAD: Ostial 60% narrowing ( IFR 0.97)  mid LAD IFR 0.85 to 0.87 ( FFR   0.72)  Circumflex: 80% narrowing at origin of first marginal branch.  First   marginal bifurcates into smaller sized superior ramus and large inferior   ramus.  Subtotal thrombotic lesion in stented segment and inferior ramus.    80% mid vessel circumflex narrowing after takeoff of first marginal   branch.  RCA dominant.  Moderate size.  60% mid vessel narrowing with IFR equals   0.99      With the patient's mild left ventricular dysfunction, the significant   disease in the mid LAD and the complex nature of the patient's circumflex   disease surgical consultation should be considered as a first step.  If   the patient declines was not deemed to be a good surgical candidate,   complex PCI in the circumflex to the inferior ramus of the first marginal   branch and covering the orifice of the superior ramus of the first   marginal branch the mid circumflex could be offered.  Staged intervention   the LAD would be considered.     NM Lexiscan stress test (nuc card)     Value    Target     Baseline Systolic     Baseline Diastolic BP 76    Last Stress Systolic     Last Stress Diastolic BP 72    Baseline HR 56    Max HR  75    Max Predicted HR  50    Rate Pressure Product 9,750.0    Stress/rest perfusion ratio 1.22    Narrative      The nuclear stress test is abnormal.    There is a large area of severe ischemia in the lateral and   inferolateral segment(s) of the left ventricle. This appears to be in the   left circumflex distribution.    Stress to rest cavity ratio is 1.22.  TID is present visually.    The patient is at a high risk of future cardiac ischemic events.    Left ventricular function is normal.    A prior study was conducted on 5/4/2021.  This study has changes noted   when compared with the prior study.       *Note: Due to a large number of  results and/or encounters for the requested time period, some results have not been displayed. A complete set of results can be found in Results Review.       Discharge Medications   Discharge Medication List as of 5/30/2024  7:59 PM        CONTINUE these medications which have NOT CHANGED    Details   acetaminophen (TYLENOL) 500 MG tablet Take 1,000 mg by mouth every 8 hours as needed for mild pain, Historical      alum & mag hydroxide-simethicone (MAALOX MULTI SYMPTOM MAX ST) 400-400-40 MG/5ML SUSP suspension Take 30 mLs by mouth every 4 hours as needed for indigestion, Disp-355 mL, R-0, E-Prescribe      aspirin 81 MG tablet Take 81 mg by mouth daily, Historical      benzonatate (TESSALON) 200 MG capsule Take 1 capsule (200 mg) by mouth 3 times daily as needed for cough, Disp-60 capsule, R-0, E-Prescribe      fluticasone (FLONASE) 50 MCG/ACT nasal spray Spray 1 spray into both nostrils daily, Disp-16 g, R-0, E-Prescribe      !! lamoTRIgine (LAMICTAL) 100 MG tablet Take 0.5 tablets (50 mg) by mouth 2 times daily, No Print OutWith 200 to equal 250 bid      !! lamoTRIgine (LAMICTAL) 200 MG tablet Take 1 tablet (200 mg) by mouth 2 times daily, No Print Out      linaclotide (LINZESS) 290 MCG capsule Take 1 capsule (290 mcg) by mouth every morning (before breakfast) Prn constipatiion, Disp-90 capsule, R-1, E-Prescribe      naproxen (NAPROSYN) 375 MG tablet TAKE 1 TABLET(375 MG) BY MOUTH TWICE DAILY WITH MEALS, Disp-180 tablet, R-1, E-Prescribe      omeprazole (PRILOSEC) 40 MG DR capsule Take 1 capsule (40 mg) by mouth daily for 14 days, Disp-14 capsule, R-0, E-Prescribe      rizatriptan (MAXALT) 10 MG tablet TAKE 1 TABLET(10 MG) BY MOUTH AT ONSET OF HEADACHE FOR MIGRAINE. MAY REPEAT IN 2 HOURS. MAX 3 TABLETS/ 24 HOURS, Disp-15 tablet, R-2, E-Prescribeprofile      simvastatin (ZOCOR) 40 MG tablet Take 1 tablet (40 mg) by mouth At Bedtime, Disp-90 tablet, R-3, E-Prescribe      sucralfate (CARAFATE) 1 GM tablet Take 1  tablet (1 g) by mouth 4 times daily for 14 days, Disp-56 tablet, R-0, E-Prescribe      tamsulosin (FLOMAX) 0.4 MG capsule Take 1 capsule (0.4 mg) by mouth daily, Disp-90 capsule, R-0, E-Prescribe      topiramate (TOPAMAX) 100 MG tablet Take 1 tablet (100 mg) by mouth daily Use second, Disp-90 tablet, R-1, E-Prescribe      traZODone (DESYREL) 50 MG tablet Take 2 tablets (100 mg) by mouth At Bedtime, Disp-180 tablet, R-3, E-Prescribe      zolpidem (AMBIEN) 5 MG tablet Take 1 tablet (5 mg) by mouth nightly as needed for sleep, Disp-90 tablet, R-1, E-Prescribe      COMPOUNDED NON-CONTROLLED SUBSTANCE (CMPD RX) - PHARMACY TO MIX COMPOUNDED MEDICATION 1 vial TriMix #9 with syringes.  Inject 0.1 mL intercavernously per MD's instructions., Disp-1 each, R-11, E-Prescribe      sildenafil (VIAGRA) 100 MG tablet Take 1 tablet (100 mg) by mouth daily as needed (interest), Disp-10 tablet, R-3, E-Prescribe      tirzepatide (MOUNJARO) 2.5 MG/0.5ML pen Inject 2.5 mg Subcutaneous every 7 days, Disp-2 mL, R-3, E-Prescribe       !! - Potential duplicate medications found. Please discuss with provider.        Allergies   Allergies   Allergen Reactions    Gabapentin      Fever      Morphine And Codeine      inability to sleep after taking codeine    Vicodin [Hydrocodone-Acetaminophen]      Perspiring, hyper, itchy

## 2024-06-01 LAB
ABO/RH(D): NORMAL
ANION GAP SERPL CALCULATED.3IONS-SCNC: 10 MMOL/L (ref 7–15)
ANTIBODY SCREEN: NEGATIVE
ATRIAL RATE - MUSE: 52 BPM
ATRIAL RATE - MUSE: 53 BPM
ATRIAL RATE - MUSE: 54 BPM
ATRIAL RATE - MUSE: 56 BPM
BASOPHILS # BLD AUTO: ABNORMAL 10*3/UL
BASOPHILS # BLD MANUAL: 0.2 10E3/UL (ref 0–0.2)
BASOPHILS NFR BLD AUTO: ABNORMAL %
BASOPHILS NFR BLD MANUAL: 2 %
BUN SERPL-MCNC: 31.5 MG/DL (ref 8–23)
CALCIUM SERPL-MCNC: 9.3 MG/DL (ref 8.8–10.2)
CHLORIDE SERPL-SCNC: 108 MMOL/L (ref 98–107)
CREAT SERPL-MCNC: 1.4 MG/DL (ref 0.67–1.17)
DEPRECATED HCO3 PLAS-SCNC: 21 MMOL/L (ref 22–29)
DIASTOLIC BLOOD PRESSURE - MUSE: NORMAL MMHG
EGFRCR SERPLBLD CKD-EPI 2021: 54 ML/MIN/1.73M2
EOSINOPHIL # BLD AUTO: ABNORMAL 10*3/UL
EOSINOPHIL # BLD MANUAL: 0.3 10E3/UL (ref 0–0.7)
EOSINOPHIL NFR BLD AUTO: ABNORMAL %
EOSINOPHIL NFR BLD MANUAL: 4 %
ERYTHROCYTE [DISTWIDTH] IN BLOOD BY AUTOMATED COUNT: 14.6 % (ref 10–15)
GLUCOSE SERPL-MCNC: 101 MG/DL (ref 70–99)
HCT VFR BLD AUTO: 34.4 % (ref 40–53)
HGB BLD-MCNC: 11.5 G/DL (ref 13.3–17.7)
IMM GRANULOCYTES # BLD: ABNORMAL 10*3/UL
IMM GRANULOCYTES NFR BLD: ABNORMAL %
INTERPRETATION ECG - MUSE: NORMAL
LYMPHOCYTES # BLD AUTO: ABNORMAL 10*3/UL
LYMPHOCYTES # BLD MANUAL: 1.2 10E3/UL (ref 0.8–5.3)
LYMPHOCYTES NFR BLD AUTO: ABNORMAL %
LYMPHOCYTES NFR BLD MANUAL: 15 %
MCH RBC QN AUTO: 28.7 PG (ref 26.5–33)
MCHC RBC AUTO-ENTMCNC: 33.4 G/DL (ref 31.5–36.5)
MCV RBC AUTO: 86 FL (ref 78–100)
METAMYELOCYTES # BLD MANUAL: 0.1 10E3/UL
METAMYELOCYTES NFR BLD MANUAL: 1 %
MONOCYTES # BLD AUTO: ABNORMAL 10*3/UL
MONOCYTES # BLD MANUAL: 0.7 10E3/UL (ref 0–1.3)
MONOCYTES NFR BLD AUTO: ABNORMAL %
MONOCYTES NFR BLD MANUAL: 9 %
MYELOCYTES # BLD MANUAL: 1 10E3/UL
MYELOCYTES NFR BLD MANUAL: 12 %
NEUTROPHILS # BLD AUTO: ABNORMAL 10*3/UL
NEUTROPHILS # BLD MANUAL: 4.6 10E3/UL (ref 1.6–8.3)
NEUTROPHILS NFR BLD AUTO: ABNORMAL %
NEUTROPHILS NFR BLD MANUAL: 57 %
NRBC # BLD AUTO: 0 10E3/UL
NRBC BLD AUTO-RTO: 0 /100
P AXIS - MUSE: 18 DEGREES
P AXIS - MUSE: 50 DEGREES
P AXIS - MUSE: 60 DEGREES
P AXIS - MUSE: 61 DEGREES
PLAT MORPH BLD: ABNORMAL
PLATELET # BLD AUTO: 175 10E3/UL (ref 150–450)
POTASSIUM SERPL-SCNC: 4.2 MMOL/L (ref 3.4–5.3)
PR INTERVAL - MUSE: 172 MS
PR INTERVAL - MUSE: 178 MS
PR INTERVAL - MUSE: 180 MS
PR INTERVAL - MUSE: 188 MS
QRS DURATION - MUSE: 90 MS
QRS DURATION - MUSE: 90 MS
QRS DURATION - MUSE: 98 MS
QRS DURATION - MUSE: 98 MS
QT - MUSE: 408 MS
QT - MUSE: 418 MS
QT - MUSE: 424 MS
QT - MUSE: 428 MS
QTC - MUSE: 386 MS
QTC - MUSE: 392 MS
QTC - MUSE: 394 MS
QTC - MUSE: 413 MS
R AXIS - MUSE: -2 DEGREES
R AXIS - MUSE: -24 DEGREES
R AXIS - MUSE: -31 DEGREES
R AXIS - MUSE: -36 DEGREES
RBC # BLD AUTO: 4.01 10E6/UL (ref 4.4–5.9)
RBC MORPH BLD: ABNORMAL
SODIUM SERPL-SCNC: 139 MMOL/L (ref 135–145)
SPECIMEN EXPIRATION DATE: NORMAL
SYSTOLIC BLOOD PRESSURE - MUSE: NORMAL MMHG
T AXIS - MUSE: 212 DEGREES
T AXIS - MUSE: 246 DEGREES
T AXIS - MUSE: 254 DEGREES
T AXIS - MUSE: 265 DEGREES
UFH PPP CHRO-ACNC: 0.41 IU/ML
UFH PPP CHRO-ACNC: 0.46 IU/ML
VENTRICULAR RATE- MUSE: 52 BPM
VENTRICULAR RATE- MUSE: 53 BPM
VENTRICULAR RATE- MUSE: 54 BPM
VENTRICULAR RATE- MUSE: 56 BPM
WBC # BLD AUTO: 8 10E3/UL (ref 4–11)

## 2024-06-01 PROCEDURE — 86900 BLOOD TYPING SEROLOGIC ABO: CPT | Performed by: SURGERY

## 2024-06-01 PROCEDURE — 85007 BL SMEAR W/DIFF WBC COUNT: CPT | Performed by: HOSPITALIST

## 2024-06-01 PROCEDURE — 99233 SBSQ HOSP IP/OBS HIGH 50: CPT | Performed by: HOSPITALIST

## 2024-06-01 PROCEDURE — 250N000013 HC RX MED GY IP 250 OP 250 PS 637: Performed by: HOSPITALIST

## 2024-06-01 PROCEDURE — 85027 COMPLETE CBC AUTOMATED: CPT | Performed by: HOSPITALIST

## 2024-06-01 PROCEDURE — 36415 COLL VENOUS BLD VENIPUNCTURE: CPT | Performed by: SURGERY

## 2024-06-01 PROCEDURE — 85520 HEPARIN ASSAY: CPT | Performed by: HOSPITALIST

## 2024-06-01 PROCEDURE — 36415 COLL VENOUS BLD VENIPUNCTURE: CPT | Performed by: HOSPITALIST

## 2024-06-01 PROCEDURE — 93005 ELECTROCARDIOGRAM TRACING: CPT

## 2024-06-01 PROCEDURE — 250N000013 HC RX MED GY IP 250 OP 250 PS 637: Performed by: PHYSICIAN ASSISTANT

## 2024-06-01 PROCEDURE — 250N000013 HC RX MED GY IP 250 OP 250 PS 637: Performed by: INTERNAL MEDICINE

## 2024-06-01 PROCEDURE — 250N000011 HC RX IP 250 OP 636: Performed by: HOSPITALIST

## 2024-06-01 PROCEDURE — 36415 COLL VENOUS BLD VENIPUNCTURE: CPT | Performed by: INTERNAL MEDICINE

## 2024-06-01 PROCEDURE — 210N000002 HC R&B HEART CARE

## 2024-06-01 PROCEDURE — 250N000013 HC RX MED GY IP 250 OP 250 PS 637

## 2024-06-01 PROCEDURE — 99232 SBSQ HOSP IP/OBS MODERATE 35: CPT | Performed by: INTERNAL MEDICINE

## 2024-06-01 PROCEDURE — 99418 PROLNG IP/OBS E/M EA 15 MIN: CPT | Performed by: HOSPITALIST

## 2024-06-01 PROCEDURE — 80048 BASIC METABOLIC PNL TOTAL CA: CPT | Performed by: HOSPITALIST

## 2024-06-01 PROCEDURE — 93010 ELECTROCARDIOGRAM REPORT: CPT | Mod: 76 | Performed by: INTERNAL MEDICINE

## 2024-06-01 PROCEDURE — 85520 HEPARIN ASSAY: CPT | Performed by: INTERNAL MEDICINE

## 2024-06-01 RX ORDER — ISOSORBIDE MONONITRATE 30 MG/1
30 TABLET, EXTENDED RELEASE ORAL ONCE
Status: COMPLETED | OUTPATIENT
Start: 2024-06-01 | End: 2024-06-01

## 2024-06-01 RX ORDER — NALOXONE HYDROCHLORIDE 0.4 MG/ML
0.2 INJECTION, SOLUTION INTRAMUSCULAR; INTRAVENOUS; SUBCUTANEOUS
Status: DISCONTINUED | OUTPATIENT
Start: 2024-06-01 | End: 2024-06-04

## 2024-06-01 RX ORDER — NALOXONE HYDROCHLORIDE 0.4 MG/ML
0.4 INJECTION, SOLUTION INTRAMUSCULAR; INTRAVENOUS; SUBCUTANEOUS
Status: DISCONTINUED | OUTPATIENT
Start: 2024-06-01 | End: 2024-06-04

## 2024-06-01 RX ORDER — MAGNESIUM HYDROXIDE/ALUMINUM HYDROXICE/SIMETHICONE 120; 1200; 1200 MG/30ML; MG/30ML; MG/30ML
15 SUSPENSION ORAL 3 TIMES DAILY PRN
Status: DISCONTINUED | OUTPATIENT
Start: 2024-06-01 | End: 2024-06-04

## 2024-06-01 RX ORDER — ISOSORBIDE MONONITRATE 60 MG/1
60 TABLET, EXTENDED RELEASE ORAL DAILY
Status: DISCONTINUED | OUTPATIENT
Start: 2024-06-02 | End: 2024-06-02

## 2024-06-01 RX ORDER — NITROGLYCERIN 20 MG/100ML
10-200 INJECTION INTRAVENOUS CONTINUOUS
Status: DISCONTINUED | OUTPATIENT
Start: 2024-06-01 | End: 2024-06-01

## 2024-06-01 RX ORDER — HYDROMORPHONE HYDROCHLORIDE 1 MG/ML
0.5 INJECTION, SOLUTION INTRAMUSCULAR; INTRAVENOUS; SUBCUTANEOUS
Status: DISCONTINUED | OUTPATIENT
Start: 2024-06-01 | End: 2024-06-04

## 2024-06-01 RX ADMIN — NITROGLYCERIN 0.4 MG: 0.4 TABLET SUBLINGUAL at 19:25

## 2024-06-01 RX ADMIN — SIMVASTATIN 40 MG: 40 TABLET, FILM COATED ORAL at 23:21

## 2024-06-01 RX ADMIN — NITROGLYCERIN 0.4 MG: 0.4 TABLET SUBLINGUAL at 18:43

## 2024-06-01 RX ADMIN — NITROGLYCERIN 0.4 MG: 0.4 TABLET SUBLINGUAL at 21:59

## 2024-06-01 RX ADMIN — ALUMINUM HYDROXIDE, MAGNESIUM HYDROXIDE, AND SIMETHICONE 15 ML: 200; 200; 20 SUSPENSION ORAL at 23:22

## 2024-06-01 RX ADMIN — LAMOTRIGINE 250 MG: 100 TABLET ORAL at 09:03

## 2024-06-01 RX ADMIN — ACETAMINOPHEN 650 MG: 325 TABLET, FILM COATED ORAL at 19:24

## 2024-06-01 RX ADMIN — ACETAMINOPHEN 650 MG: 325 TABLET, FILM COATED ORAL at 04:25

## 2024-06-01 RX ADMIN — NITROGLYCERIN 0.4 MG: 0.4 TABLET SUBLINGUAL at 04:12

## 2024-06-01 RX ADMIN — NITROGLYCERIN 0.4 MG: 0.4 TABLET SUBLINGUAL at 07:42

## 2024-06-01 RX ADMIN — TRAZODONE HYDROCHLORIDE 50 MG: 50 TABLET ORAL at 01:09

## 2024-06-01 RX ADMIN — METOPROLOL SUCCINATE 50 MG: 50 TABLET, EXTENDED RELEASE ORAL at 11:38

## 2024-06-01 RX ADMIN — PANTOPRAZOLE SODIUM 40 MG: 40 TABLET, DELAYED RELEASE ORAL at 07:42

## 2024-06-01 RX ADMIN — TAMSULOSIN HYDROCHLORIDE 0.4 MG: 0.4 CAPSULE ORAL at 09:03

## 2024-06-01 RX ADMIN — METOPROLOL SUCCINATE 50 MG: 50 TABLET, EXTENDED RELEASE ORAL at 20:18

## 2024-06-01 RX ADMIN — ACETAMINOPHEN 650 MG: 325 TABLET, FILM COATED ORAL at 11:13

## 2024-06-01 RX ADMIN — ASPIRIN 81 MG CHEWABLE TABLET 81 MG: 81 TABLET CHEWABLE at 09:03

## 2024-06-01 RX ADMIN — HEPARIN SODIUM 1050 UNITS/HR: 10000 INJECTION, SOLUTION INTRAVENOUS at 18:43

## 2024-06-01 RX ADMIN — LAMOTRIGINE 250 MG: 100 TABLET ORAL at 20:18

## 2024-06-01 RX ADMIN — ISOSORBIDE MONONITRATE 30 MG: 30 TABLET, EXTENDED RELEASE ORAL at 07:43

## 2024-06-01 RX ADMIN — ISOSORBIDE MONONITRATE 30 MG: 30 TABLET, EXTENDED RELEASE ORAL at 14:39

## 2024-06-01 RX ADMIN — NITROGLYCERIN 0.4 MG: 0.4 TABLET SUBLINGUAL at 22:06

## 2024-06-01 RX ADMIN — ACETAMINOPHEN 650 MG: 325 TABLET, FILM COATED ORAL at 23:25

## 2024-06-01 RX ADMIN — NITROGLYCERIN 0.4 MG: 0.4 TABLET SUBLINGUAL at 21:53

## 2024-06-01 ASSESSMENT — ACTIVITIES OF DAILY LIVING (ADL)
ADLS_ACUITY_SCORE: 20
ADLS_ACUITY_SCORE: 21
ADLS_ACUITY_SCORE: 20
ADLS_ACUITY_SCORE: 21
ADLS_ACUITY_SCORE: 21
ADLS_ACUITY_SCORE: 20
ADLS_ACUITY_SCORE: 20
ADLS_ACUITY_SCORE: 21
ADLS_ACUITY_SCORE: 20
ADLS_ACUITY_SCORE: 21
ADLS_ACUITY_SCORE: 21
ADLS_ACUITY_SCORE: 20
ADLS_ACUITY_SCORE: 21
ADLS_ACUITY_SCORE: 20

## 2024-06-01 NOTE — PLAN OF CARE
Goal Outcome Evaluation:      Plan of Care Reviewed With: patient, spouse, family    Overall Patient Progress: no changeOverall Patient Progress: no change    Neuro:intact  CV/Rhythm:CP at 0730 this am, EKG done, 1 SL nitroglycerin given with relief. Cards paged and notified, increased imdur with additional dose given  Resp/02:RA  GI/Diet:regular diet  :voiding  Skin/Incisions/Sites:intact  Pulses/CMS:intact  Edema:none  Activity/Falls Risk:independent in room. Minimized activity with CP  Lines/Drains/IVs:PIV  Labs/BGM:Xa in am  Test/Procedures:CABG tuesday  VS/Pain:stable, SB rates 50s, CP x 1 this shift, cards aware  DC Plan:post CABG  Other:family at bedside, CABG checklist started, plan for CV surg PA to meet pt/family at 1030 tomorrow for CABG ed. Family aware

## 2024-06-01 NOTE — PROGRESS NOTES
[facility]    Medicine Progress Note - Hospitalist Service    Date of Admission:  5/30/2024    Assessment & Plan   Kashmir Yao is a 69 year old male with a past medical history of coronary artery disease status post PCI, hypertension, dyslipidemia, RIC on CPAP, BPH, GERD, right frontal meningioma, seizure disorder presents to hospital with an NSTEMI.    NSTEMI  Coronary artery disease  Hypertension  Dyslipidemia  Patient with a history of coronary artery disease and PCI presented to [facility] with chest pain was found to have an NSTEMI and underwent coronary angio which revealed multivessel disease with complex anatomy for which the patient was transferred to [facility] for surgery versus complex PCI.  Continue heparin, metoprolol 50 twice daily (hold parameters in place due to bradycardia), Imdur 20 3 times daily, aspirin daily, simvastatin  -- Cardiology consulted: input appreciated. Cards discussed with patient, complex PCI vs CABG; with complex PCI providing less of a revascularization compared to CABG in his case. Patient and family have opted for by pass surgery planned for early next week. -- continue on heparin gtt  -- Cardiothoracic surgery consult: input appreciated. Plan for CABG next week. Started on pre-op testing.   -- Monitor on telemetry  -- PTA lopressor now switched to Toprol 50 mg daily  -- PTA isordil switched to imdur 30mg daily, dose now increased to 60 mg on 6/1/24.    Headache  *The patient reports a mild headache which he attributes to a lack of sleep.  He reports it similar to his regular headaches.  He is unable to quantify the severity of pain.  He has no focal neurologic deficits.    -- continue to monitor for now  --Would consider neuroimaging if the headache persists or gets worse.    KELI  -- Baseline creatinine 1-1.2  -- Continue 1.34 on admission, now trending back down to 1.28.  -- Labs pending today.    Anemia  -- He denies any bleeding.   "  -- He has had a 1 g drop in his hemoglobin from the 29th-30th.    -- This is possibly dilutional.  -- Monitor hemoglobin    Right frontal meningioma  Seizure disorder  He undergoes monitoring at the [facility] yearly.  Continue Lamictal    RIC CPAP  BPH Flomax  GERD PPI    DVT ppx: Heparin drip  Expected length of stay greater than than 2 days  Code Status: Full code    Medical Decision Making       75 MINUTES SPENT BY ME on the date of service doing chart review, history, exam, documentation & further activities per the note.            Diet: Combination Diet Regular Diet Adult    DVT Prophylaxis: heparin gtt  Cano Catheter: Not present  Lines: None     Cardiac Monitoring: ACTIVE order. Indication: AMI (NSTEMI/ STEMI) (48 hours)  Code Status: Full Code      Clinically Significant Risk Factors                  # Hypertension: Noted on problem list        # Overweight: Estimated body mass index is 29.02 kg/m  as calculated from the following:    Height as of 5/29/24: 1.753 m (5' 9\").    Weight as of this encounter: 89.1 kg (196 lb 8 oz)., PRESENT ON ADMISSION     # Financial/Environmental Concerns: none         Disposition Plan     Medically Ready for Discharge: Patient currently awaiting coronary bypass.             Celeste Ceja MD  Hospitalist Service  [facility]  Securely message with [employer] (more info)  Text page via [employer] Paging/Directory   ______________________________________________________________________    Interval History   Patient having intermittent chest  pain, none at this time.  Denies any current shortness of breath either.  Plan for bypass sometime next week.    Physical Exam   Vital Signs: Temp: 97.7  F (36.5  C) Temp src: Oral BP: 115/67 Pulse: 55   Resp: 16 SpO2: 100 % O2 Device: None (Room air)    Weight: 196 lbs 8 oz    General Appearance: Well appearing for stated age.  Respiratory: CTAB, no rales or ronchi  Cardiovascular: S1, S2 normal, no murmurs  GI: non-tender on " palpation, BS present      Medical Decision Making       45 MINUTES SPENT BY ME on the date of service doing chart review, history, exam, documentation & further activities per the note.      Data     I have personally reviewed the following data over the past 24 hrs:    Trop: N/A BNP: N/A       Imaging results reviewed over the past 24 hrs:   Recent Results (from the past 24 hour(s))   US Lower Extremity Venous Mapping Bilateral    Narrative    US LOWER EXTREMITY VENOUS MAPPING BILATERAL  5/31/2024 3:45 PM     HISTORY:  Coronary artery disease. Preoperative evaluation prior to  coronary artery bypass graft surgery.    COMPARISON: None.    FINDINGS:   Left lower extremity: The left greater saphenous vein is patent. Its  diameters from the saphenofemoral junction to the knee range between  5.1 to 2.9 mm. Its diameters from below the knee to the ankle range  between 4.0 to 1.6 mm.    The left lesser saphenous vein is patent. Its diameters range between  1.9 to 1.1 mm.    Right lower extremity: The right greater saphenous vein is patent. Its  diameters from the saphenofemoral junction to the knee range between  6.5 to 2.3 mm. Its diameters from below the knee to the ankle range  between 2.7 to 1.7 mm.      Impression    IMPRESSION: Bilateral lower extremity vein mapping performed as above.     US Carotid Bilateral    Narrative    US CAROTID BILATERAL 5/31/2024 3:45 PM    HISTORY: Preoperative evaluation prior to coronary artery bypass graft  surgery.    COMPARISON: None    FINDINGS:     Right side:   On the grayscale images, there is mild calcified plaque at the carotid  bifurcation.  Spectral waveform analysis was performed. Peak systolic and diastolic  velocities in the right internal carotid artery are 81 and 28 cm/s.  Per sonographic criteria, degree of stenosis in the right internal  carotid artery is less than 50%.  Flow in the right vertebral artery is antegrade.     Left side:   On the grayscale images, there  is mild calcified plaque at the carotid  bifurcation.  Spectral waveform analysis was performed. Peak systolic and diastolic   velocities in the left internal carotid artery are 75 and 22 cm/s. Per  sonographic criteria, degree of stenosis in the left internal carotid  artery is less than 50%.  Flow in the left vertebral artery is antegrade.       Impression    IMPRESSION: Per sonographic criteria, there are less than 50% stenoses  in the internal carotid arteries bilaterally.    Degree of stenosis measured relative to the diameter of the normal  internal carotid artery per NASCET or NASCET type criteria    IRMA UNDERWOOD MD         SYSTEM ID:  [id number]

## 2024-06-01 NOTE — PROGRESS NOTES
[facility]  Cardiology Progress Note  Date of Service: 06/01/2024  Primary Cardiologist: will be Dr. Gross    Assessment & Plan   Kashmir Yao is a 69 year old male with past medical history significant for CAD, HTN, RIC, meningioma, szr disorder, shoulder fxr, HLD admitted on 5/29/24 to [facility] with , found to have NSTEMI with complex and transferred to [facility] 5/10/24 for discussion of cabg vs complex pci.      Assessment:  1.  NSTEMI, patient known coronary disease with history of OM stenting.  -Angiogram 5/30/2024 showed 60% ostial LAD and mid LAD, 80% circumflex at the trifurcation with OM superior and inferior ramus, occluded inferior ramus, 60% mid RCA  -EF dropped to 45 to 50% previously normal.  - appreciate CV surgery, probable CABG Tuesday    2.  Ischemic cardiomyopathy, EF 45 to 50%, euvolemic    3.  KELI, baseline creatinine 1-1.2, mildly elevated on admission at 1.3, stable    4.  Left shoulder injury, patient clearly identifies different types of pain that 1 is his his shoulder, the change was he now had pain in his chest.  His exertional pain is now chest pain and he is able to differentiate between shoulder pain and cardiac pain after discussion.    Plan:   Continue Lopressor to Toprol 50 mg bid  Increase Imdur to 60 mg today (give additional 30 mg today after he has already had 30 mg)  Will get pricing on entresto, slgt2i for cm  Inpt until CABG, likely early next week  We'll continue to follow with you  Continue ASA,statin and heparin marlene Jaffe [facility]  Pager: [phone]     Interval History   He continues to have intermittent chest pain when getting up and going to the bathroom, and had a further episode this morning that resolved with rest. No other symptoms.    Physical Exam   Temp: 97.7  F (36.5  C) Temp src: Oral BP: 102/57 Pulse: 54   Resp: 16 SpO2: 96 % O2 Device: None (Room air)    Vitals:    05/31/24 0500 06/01/24 0347   Weight: 89.2 kg  "(196 lb 9.6 oz) 89.1 kg (196 lb 8 oz)   Well-developed well-nourished gentleman in no acute distress.  Normocephalic atraumatic.  Heart is regular rate rhythm without murmur rub or gallop.  Lungs are clear without wheezes rales or rhonchi.  Remedies without peripheral edema.  Skin is warm and dry.  Right radial access clean dry intact 2+ radial pulse.  Clinically Significant Risk Factors                  # Hypertension: Noted on problem list        # Overweight: Estimated body mass index is 29.02 kg/m  as calculated from the following:    Height as of 5/29/24: 1.753 m (5' 9\").    Weight as of this encounter: 89.1 kg (196 lb 8 oz)., PRESENT ON ADMISSION     # Financial/Environmental Concerns: none      Medications   Current Facility-Administered Medications   Medication Dose Route Frequency Provider Last Rate Last Admin    heparin 25,000 units in 0.45% NaCl 250 mL ANTICOAGULANT infusion  0-5,000 Units/hr Intravenous Continuous Amari Marquez MD 10.5 mL/hr at 06/01/24 0904 1,050 Units/hr at 06/01/24 0904    Patient is already receiving anticoagulation with heparin, enoxaparin (LOVENOX), warfarin (COUMADIN)  or other anticoagulant medication   Does not apply Continuous PRN Amari Marquez MD         Current Facility-Administered Medications   Medication Dose Route Frequency Provider Last Rate Last Admin    aspirin (ASA) chewable tablet 81 mg  81 mg Oral Daily Amari Marquez MD   81 mg at 06/01/24 0903    isosorbide mononitrate (IMDUR) 24 hr tablet 30 mg  30 mg Oral Daily Trish Jaffe MD        [START ON 6/2/2024] isosorbide mononitrate (IMDUR) 24 hr tablet 60 mg  60 mg Oral Daily Trish Jaffe MD        lamoTRIgine (LaMICtal) tablet 250 mg  250 mg Oral BID Amari Marquez MD   250 mg at 06/01/24 0903    metoprolol succinate ER (TOPROL XL) 24 hr tablet 50 mg  50 mg Oral BID Trish Jaffe MD   50 mg at 06/01/24 1138    pantoprazole (PROTONIX) EC tablet 40 mg  " 40 mg Oral QAM AC Amari Marquez MD   40 mg at 06/01/24 0742    simvastatin (ZOCOR) tablet 40 mg  40 mg Oral At Bedtime Amari Marquez MD   40 mg at 05/31/24 2243    sodium chloride (PF) 0.9% PF flush 3 mL  3 mL Intracatheter Q8H Amari Marquez MD   3 mL at 05/31/24 1709    tamsulosin (FLOMAX) capsule 0.4 mg  0.4 mg Oral Daily Amari Marquez MD   0.4 mg at 06/01/24 0903       Data   Last 24 hours labs reviewed   EKG: (reviewed personally) sinus bradycardia without ST changes.    Tele: Sinus bradycardia overnight in the 40s, brief runs of SVT.

## 2024-06-01 NOTE — PLAN OF CARE
Goal Outcome Evaluation:-~Care plan-end of shift note:    -~Orientation/Mentation:A&O x4  -~VS: VSS on RA sat 90s  -~LS/Pulm:LS clear  -~Tele/Cardiac:SR  -~GI:WDL  -~:WDL  -~Pain: denies  -~Mobility:Up indep/SBA  -~Skin:intact  -~Diet:Regular  -~Lines/PIV: iv heparin gtts  -~Safety/Concern:calls appropriately   -~Aggression color:green  -~Plan/Shift summary/Goals: overnight CP, resolved with SL nitroglycerin x1.Plans for CABG this coming Tuesday.

## 2024-06-01 NOTE — PROGRESS NOTES
"C/o of non radiating CP after ambulating to bathroom to void,  rated \"3-4\"/10. No changes to EKG when compare to prior. Gave nitroglycerin x1 SL resulting to \"0\"/10 after 5 minutes. /71 down to 102/62. Will continue to monitor.   "

## 2024-06-01 NOTE — PROGRESS NOTES
CT surgery Progress Note    Plans for CABG with Dr Morejon scheduled tentatively on 6/4/24.  Pt did have some chest pain this am which was relieved with sublingual nitroglycerin.  Pt is on imdur 30 mg and toprol XL 50 mg bid.  Also continues on heparin gtt.  Will plan to meet with pt and family tomorrow at 1030 to discuss surgery/hospital course and the risks/benefits of surgery.  Pre op orders placed.   Reviewed echo and carotid US.     Continue to monitor.     Guido Abel PA-C

## 2024-06-02 LAB
ATRIAL RATE - MUSE: 52 BPM
ATRIAL RATE - MUSE: 54 BPM
DIASTOLIC BLOOD PRESSURE - MUSE: NORMAL MMHG
DIASTOLIC BLOOD PRESSURE - MUSE: NORMAL MMHG
INTERPRETATION ECG - MUSE: NORMAL
INTERPRETATION ECG - MUSE: NORMAL
P AXIS - MUSE: 20 DEGREES
P AXIS - MUSE: 3 DEGREES
PR INTERVAL - MUSE: 156 MS
PR INTERVAL - MUSE: 162 MS
QRS DURATION - MUSE: 90 MS
QRS DURATION - MUSE: 90 MS
QT - MUSE: 438 MS
QT - MUSE: 442 MS
QTC - MUSE: 411 MS
QTC - MUSE: 415 MS
R AXIS - MUSE: -21 DEGREES
R AXIS - MUSE: -24 DEGREES
SYSTOLIC BLOOD PRESSURE - MUSE: NORMAL MMHG
SYSTOLIC BLOOD PRESSURE - MUSE: NORMAL MMHG
T AXIS - MUSE: 28 DEGREES
T AXIS - MUSE: 29 DEGREES
UFH PPP CHRO-ACNC: 0.34 IU/ML
VENTRICULAR RATE- MUSE: 52 BPM
VENTRICULAR RATE- MUSE: 54 BPM

## 2024-06-02 PROCEDURE — 99232 SBSQ HOSP IP/OBS MODERATE 35: CPT | Performed by: HOSPITALIST

## 2024-06-02 PROCEDURE — 85520 HEPARIN ASSAY: CPT | Performed by: INTERNAL MEDICINE

## 2024-06-02 PROCEDURE — 250N000013 HC RX MED GY IP 250 OP 250 PS 637: Performed by: HOSPITALIST

## 2024-06-02 PROCEDURE — 210N000001 HC R&B IMCU HEART CARE

## 2024-06-02 PROCEDURE — 250N000013 HC RX MED GY IP 250 OP 250 PS 637: Performed by: INTERNAL MEDICINE

## 2024-06-02 PROCEDURE — 250N000011 HC RX IP 250 OP 636: Performed by: HOSPITALIST

## 2024-06-02 PROCEDURE — 36415 COLL VENOUS BLD VENIPUNCTURE: CPT | Performed by: INTERNAL MEDICINE

## 2024-06-02 PROCEDURE — 250N000011 HC RX IP 250 OP 636

## 2024-06-02 PROCEDURE — 99231 SBSQ HOSP IP/OBS SF/LOW 25: CPT | Performed by: INTERNAL MEDICINE

## 2024-06-02 RX ORDER — CAFFEINE 200 MG
200 TABLET ORAL ONCE
Status: COMPLETED | OUTPATIENT
Start: 2024-06-02 | End: 2024-06-02

## 2024-06-02 RX ORDER — LIDOCAINE 40 MG/G
CREAM TOPICAL
Status: DISCONTINUED | OUTPATIENT
Start: 2024-06-02 | End: 2024-06-02

## 2024-06-02 RX ADMIN — PANTOPRAZOLE SODIUM 40 MG: 40 TABLET, DELAYED RELEASE ORAL at 08:34

## 2024-06-02 RX ADMIN — ASPIRIN 81 MG CHEWABLE TABLET 81 MG: 81 TABLET CHEWABLE at 08:34

## 2024-06-02 RX ADMIN — ACETAMINOPHEN 650 MG: 325 TABLET, FILM COATED ORAL at 08:49

## 2024-06-02 RX ADMIN — NITROGLYCERIN 20 MCG/MIN: 20 INJECTION INTRAVENOUS at 13:28

## 2024-06-02 RX ADMIN — METOPROLOL SUCCINATE 50 MG: 50 TABLET, EXTENDED RELEASE ORAL at 20:01

## 2024-06-02 RX ADMIN — NITROGLYCERIN 10 MCG/MIN: 20 INJECTION INTRAVENOUS at 09:45

## 2024-06-02 RX ADMIN — LINACLOTIDE 290 MCG: 290 CAPSULE, GELATIN COATED ORAL at 08:37

## 2024-06-02 RX ADMIN — METOPROLOL SUCCINATE 50 MG: 50 TABLET, EXTENDED RELEASE ORAL at 08:34

## 2024-06-02 RX ADMIN — TRAZODONE HYDROCHLORIDE 50 MG: 50 TABLET ORAL at 00:51

## 2024-06-02 RX ADMIN — LAMOTRIGINE 250 MG: 100 TABLET ORAL at 08:34

## 2024-06-02 RX ADMIN — ACETAMINOPHEN 650 MG: 325 TABLET, FILM COATED ORAL at 20:00

## 2024-06-02 RX ADMIN — ACETAMINOPHEN 650 MG: 325 TABLET, FILM COATED ORAL at 14:14

## 2024-06-02 RX ADMIN — SIMVASTATIN 40 MG: 40 TABLET, FILM COATED ORAL at 20:01

## 2024-06-02 RX ADMIN — NITROGLYCERIN 0.4 MG: 0.4 TABLET SUBLINGUAL at 08:38

## 2024-06-02 RX ADMIN — HEPARIN SODIUM 1050 UNITS/HR: 10000 INJECTION, SOLUTION INTRAVENOUS at 16:00

## 2024-06-02 RX ADMIN — ISOSORBIDE MONONITRATE 60 MG: 60 TABLET, EXTENDED RELEASE ORAL at 08:34

## 2024-06-02 RX ADMIN — LAMOTRIGINE 250 MG: 100 TABLET ORAL at 20:01

## 2024-06-02 RX ADMIN — TAMSULOSIN HYDROCHLORIDE 0.4 MG: 0.4 CAPSULE ORAL at 08:34

## 2024-06-02 ASSESSMENT — ACTIVITIES OF DAILY LIVING (ADL)
ADLS_ACUITY_SCORE: 20
ADLS_ACUITY_SCORE: 21
ADLS_ACUITY_SCORE: 20
ADLS_ACUITY_SCORE: 21
ADLS_ACUITY_SCORE: 20
ADLS_ACUITY_SCORE: 21
ADLS_ACUITY_SCORE: 20
ADLS_ACUITY_SCORE: 21
ADLS_ACUITY_SCORE: 20
ADLS_ACUITY_SCORE: 20
ADLS_ACUITY_SCORE: 21

## 2024-06-02 NOTE — PROGRESS NOTES
[facility]  Cardiology Progress Note  Date of Service: 06/02/2024  Primary Cardiologist: will be Dr. Gross    Assessment & Plan   Kashmir Yao is a 69 year old male with past medical history significant for CAD, HTN, RIC, meningioma, szr disorder, shoulder fxr, HLD admitted on 5/29/24 to [facility] with , found to have NSTEMI with complex and transferred to [facility] 5/10/24 for discussion of cabg vs complex pci.      Assessment:  1.  NSTEMI, patient known coronary disease with history of OM stenting.  -Angiogram 5/30/2024 showed 60% ostial LAD and mid LAD, 80% circumflex at the trifurcation with OM superior and inferior ramus, occluded inferior ramus, 60% mid RCA  -EF dropped to 45 to 50% previously normal.  - appreciate CV surgery, probable CABG Tuesday    2.  Ischemic cardiomyopathy, EF 45 to 50%, euvolemic    3.  KELI, baseline creatinine 1-1.2, mildly elevated on admission at 1.3, stable    4.  Left shoulder injury, patient clearly identifies different types of pain that 1 is his his shoulder, the change was he now had pain in his chest.  His exertional pain is now chest pain and he is able to differentiate between shoulder pain and cardiac pain after discussion.    Plan:   Continue Lopressor to Toprol 50 mg bid  Continue NTG drip and titrate according to pain as long as SBP does not drop below 90 mmHg  Will get pricing on entresto, slgt2i for cm  Inpt until CABG, likely early next week  We'll continue to follow with you  Continue ASA,statin and heparin drip    Edgar Jaffe [facility]    Interval History   He continued to have intermittent chest pain when getting up and going to the bathroom yesterday despite increasing the dose of his Imdur.  We therefore switched him to a nitroglycerin drip which he continues with now, and we will plan to increase the dose cording to his symptoms.  Otherwise no other new issues.    Physical Exam   Temp: 98  F (36.7  C) Temp src: Oral BP: 113/68 Pulse: 60    "Resp: 16 SpO2: 99 % O2 Device: None (Room air)    Vitals:    06/01/24 0347 06/02/24 0345 06/02/24 0543   Weight: 89.1 kg (196 lb 8 oz) 89.1 kg (196 lb 6.4 oz) 88.9 kg (195 lb 14.4 oz)     Well-developed well-nourished gentleman in no acute distress.  Normocephalic atraumatic.  Heart is regular rate rhythm without murmur rub or gallop.  Lungs are clear without wheezes rales or rhonchi.  Remedies without peripheral edema.  Skin is warm and dry.  Right radial access clean dry intact 2+ radial pulse.    Clinically Significant Risk Factors                  # Hypertension: Noted on problem list        # Overweight: Estimated body mass index is 28.93 kg/m  as calculated from the following:    Height as of 5/29/24: 1.753 m (5' 9\").    Weight as of this encounter: 88.9 kg (195 lb 14.4 oz)., PRESENT ON ADMISSION     # Financial/Environmental Concerns: none      Medications   Current Facility-Administered Medications   Medication Dose Route Frequency Provider Last Rate Last Admin    heparin 25,000 units in 0.45% NaCl 250 mL ANTICOAGULANT infusion  0-5,000 Units/hr Intravenous Continuous Amari Marquez MD 10.5 mL/hr at 06/02/24 0838 1,050 Units/hr at 06/02/24 0838    nitroGLYcerin 50 mg in D5W 250 mL PERIPHERAL IV infusion   mcg/min Intravenous Continuous Miguel Ellsworth APRN CNP 15 mL/hr at 06/02/24 1357 50 mcg/min at 06/02/24 1357    Patient is already receiving anticoagulation with heparin, enoxaparin (LOVENOX), warfarin (COUMADIN)  or other anticoagulant medication   Does not apply Continuous PRN Amari Marquez MD         Current Facility-Administered Medications   Medication Dose Route Frequency Provider Last Rate Last Admin    aspirin (ASA) chewable tablet 81 mg  81 mg Oral Daily Amari Marquez MD   81 mg at 06/02/24 0834    lamoTRIgine (LaMICtal) tablet 250 mg  250 mg Oral BID Amari Marquez MD   250 mg at 06/02/24 0834    linaclotide (LINZESS) capsule 290 mcg  290 mcg Oral " Celeste Lee MD   290 mcg at 06/02/24 0837    metoprolol succinate ER (TOPROL XL) 24 hr tablet 50 mg  50 mg Oral BID Trish Jaffe MD   50 mg at 06/02/24 0834    pantoprazole (PROTONIX) EC tablet 40 mg  40 mg Oral Amari Rios MD   40 mg at 06/02/24 0834    simvastatin (ZOCOR) tablet 40 mg  40 mg Oral At Bedtime Amari Marquez MD   40 mg at 06/01/24 2321    sodium chloride (PF) 0.9% PF flush 3 mL  3 mL Intracatheter Q8H Amari Marquez MD   3 mL at 05/31/24 1709    tamsulosin (FLOMAX) capsule 0.4 mg  0.4 mg Oral Daily Amari Marquez MD   0.4 mg at 06/02/24 0834       Data   Last 24 hours labs reviewed   EKG: (reviewed personally) sinus bradycardia without ST changes.

## 2024-06-02 NOTE — PROGRESS NOTES
CT surgery Progress Note    Tentative plans for CABG with Dr Morejon on 6/4/24. Should have plans tomorrow.  Continue nitroglycerin gtt.     Discussed with pt/family surgery/hospital coarse and risks/benefits.  All questions/concerns were addressed. Continue toprol XL.  Pt would take blood if necessary.  Pre op orders placed.  Noted carotid US and EF of 45-50%.  Pt/family agreeable to plan of care.     Continue to monitor.     Guido Abel PA-C

## 2024-06-02 NOTE — PROGRESS NOTES
[facility]    Medicine Progress Note - Hospitalist Service    Date of Admission:  5/30/2024    Assessment & Plan   Kashmir Yao is a 69 year old male with a past medical history of coronary artery disease status post PCI, hypertension, dyslipidemia, RIC on CPAP, BPH, GERD, right frontal meningioma, seizure disorder presents to hospital with an NSTEMI.    NSTEMI  Coronary artery disease  Hypertension  Dyslipidemia  Patient with a history of coronary artery disease and PCI presented to [facility] with chest pain was found to have an NSTEMI and underwent coronary angio which revealed multivessel disease with complex anatomy for which the patient was transferred to [facility] for surgery versus complex PCI.  Continue heparin, metoprolol 50 twice daily (hold parameters in place due to bradycardia), Imdur 20 3 times daily, aspirin daily, simvastatin  -- Cardiology consulted: input appreciated. Cards discussed with patient, complex PCI vs CABG; with complex PCI providing less of a revascularization compared to CABG in his case. Patient and family have opted for by pass surgery planned for early next week.   -- pt with complaint of on-going chest pain and now started on nitro gtt 6/2/24  -- continue on heparin gtt  -- Cardiothoracic surgery consult: input appreciated. Plan for CABG next week. Started on pre-op testing.   -- Monitor on telemetry  -- PTA lopressor now switched to Toprol 50 mg daily  -- PTA isordil switched to imdur 30mg daily, dose now increased to 60 mg on 6/1/24.    Headache  *The patient reports a mild headache which he attributes to a lack of sleep.  He reports it similar to his regular headaches.  He is unable to quantify the severity of pain.  He has no focal neurologic deficits.    -- continue to monitor for now  --Would consider neuroimaging if the headache persists or gets worse.    KELI  -- Baseline creatinine 1-1.2  -- Continue 1.34 on admission, now  "trending back down to 1.28.  -- Labs pending today.    Anemia  -- He denies any bleeding.    -- He has had a 1 g drop in his hemoglobin from the 29th-30th.    -- This is possibly dilutional.  -- Monitor hemoglobin    Right frontal meningioma  Seizure disorder  He undergoes monitoring at the [facility] yearly.  Continue Lamictal    RIC CPAP  BPH Flomax  GERD PPI    DVT ppx: Heparin drip  Expected length of stay greater than than 2 days  Code Status: Full code    Medical Decision Making       75 MINUTES SPENT BY ME on the date of service doing chart review, history, exam, documentation & further activities per the note.            Diet: Combination Diet Regular Diet Adult    DVT Prophylaxis: heparin gtt  Cano Catheter: Not present  Lines: None     Cardiac Monitoring: ACTIVE order. Indication: AMI (NSTEMI/ STEMI) (48 hours)  Code Status: Full Code      Clinically Significant Risk Factors                  # Hypertension: Noted on problem list        # Overweight: Estimated body mass index is 28.93 kg/m  as calculated from the following:    Height as of 5/29/24: 1.753 m (5' 9\").    Weight as of this encounter: 88.9 kg (195 lb 14.4 oz)., PRESENT ON ADMISSION     # Financial/Environmental Concerns: none         Disposition Plan     Medically Ready for Discharge: Patient currently awaiting coronary bypass.             Celeste Ceja MD  Hospitalist Service  [facility]  Securely message with [employer] (more info)  Text page via [employer] Paging/Directory   ______________________________________________________________________    Interval History   Patient having chest pain at this time, cards aware - now started on nitroglycerin gtt; titrating up.    Physical Exam   Vital Signs: Temp: 98  F (36.7  C) Temp src: Oral BP: 113/79 Pulse: 72   Resp: 16 SpO2: 99 % O2 Device: None (Room air)    Weight: 195 lbs 14.4 oz    General Appearance: Well appearing for stated age.  Respiratory: CTAB, no rales or " casi  Cardiovascular: S1, S2 normal, no murmurs  GI: non-tender on palpation, BS present      Medical Decision Making       45 MINUTES SPENT BY ME on the date of service doing chart review, history, exam, documentation & further activities per the note.      Data     I have personally reviewed the following data over the past 24 hrs:    8.0  \   11.5 (L)   / 175     139 108 (H) 31.5 (H) /  101 (H)   4.2 21 (L) 1.40 (H) \       Imaging results reviewed over the past 24 hrs:   No results found for this or any previous visit (from the past 24 hour(s)).

## 2024-06-02 NOTE — PROVIDER NOTIFICATION
MD Notification    Notified Person: MD    Notified Person Name: Citlalli    Notification Date/Time: 6/2/2024 6220    Notification Interaction: [employer] messaging    Purpose of Notification: Pt. Is on both Imdur and NTG drip. Pt. Having reoccurrence of chest pressure. NTG drip increased to 40 mcgs. Pt. Still having chest pressure.     Orders Received: Will discontinue Imdur. Increase NTG to 50 mcgs.    Comments:

## 2024-06-02 NOTE — PLAN OF CARE
Goal Outcome Evaluation:    Here with NSTEMI /  chest pain.   Neuro- A&Ox4, pleasant  Pertinent History: CAD, seizures  Most Recent Vitals- Temp: 97.7  F (36.5  C) Temp src: Oral BP: (!) 140/86 Pulse: (!) 47   Resp: 14 SpO2: 100 % O2 Device: None (Room air).   VSS BP slightly elevated and WDL, HR Danny at baseline. Denies chest pain this AM. Had 2 episodes of Chest pain described as transient Left chest pressure, neck and jaw pain from 2 to 8. Ntiro given with little effect during the second episode. Sukhwinder Ellsworth NP was consulted who provided orders nitroglycerin gtt (on hold), dilaudid, Maalox. Pain decreased to 2 with no intervention. Nitroglycerin held, tylenol and maalox given with positive effect. See NP note.   Tele/Cardiac- SB w T-wave inversion.   Resp- WNL.   Activity- I  Pain- Transient chest and jaw pain. Managed with nitroglycerin, tylenol with positive  effect. Nitroglycerin on hold.   Drips- Heparin  Drains/Tubes- Heparin infusing at 1050. Draw this AM  Skin- Blanchable redness. Mepilex.   GI/- Voiding Spontaneously. Constipation. Medications given.   Aggression Color- green  Plan- CABG VS PCI  Trazadone give for rest with positive effect.   Cortney Gómez RN  Discharge Time:  Plan CABG VS PCI meeting this am 1030 with PA.

## 2024-06-02 NOTE — PROVIDER NOTIFICATION
MD Notification    Notified Person: MD    Notified Person Name:Dr. Alannah Wyman Fellow    Notification Date/Time:6/1/24 2217    Notification Interaction:amcom    Purpose of Notification:258 HE  Pt has CP this evening, 3 SL nitro given without relief. Please advise, thanks   [phone]    Orders Received:    Comments:

## 2024-06-02 NOTE — PROGRESS NOTES
Notified by RN that cardiology fellow would like hospitalist to order nitroglycerin infusion. I reviewed patient's chart and placed order for peripheral nitroglycerin infusion as well as PRN dilaudid for chest pain. I discussed this with the patient who also stated that he has gas pain frequently and wanted to be sure his pain was not related to gas. I ordered GI cocktail as well which should be given now and PRN for indigestion.     Patient was initially concerned with dilaudid as he has had bad reactions to vicodin in the past with hives and feeling jittery. Patient and family endorsed that he has been fine with morphine and dilaudid in the past though.    Plan:  -- Peripheral nitroglycerin infusion per cardiology  -- Dilaudid 0.5mg IV PRN for severe pain  -- GI cocktail PRN for indigestion    WANDA Ashraf, CNP  Hospitalist - [facility]  Text me on the [employer] martine for a textback

## 2024-06-02 NOTE — PROGRESS NOTES
"MD Notification    Notified Person: MD    Notified Person Name: Evan MD    Notification Date/Time:  6/1/2024 1925    Notification Interaction: Thu Avendano    Purpose of Notification: Continued Chest Pain    Orders Received: No new orders     Comments: :    HR 54, /76. EKG stat taken remains SB. Given second dose of Nitro. Left side chest pain at 2/3 now. Pt states \" better\"  "

## 2024-06-02 NOTE — PLAN OF CARE
Chief Complaint / current diagnosis: admit 5/29/24 with NSTEMI, KELI. Plan for CABG Tuesday.    3193-2017:  Neuro/Psych: A&Ox4. PRN trazadone available for sleep in hospital. Patient stated he would take around 12:30AM if unable to sleep.   Vitals/Pain: VSS on RA, continues to have stable chest pain; on nitroglycerin drip (see MAR), SBP tolerating in low 100s/one-teens; cold wash cloth and tylenol for headache associated with nitroglycerin drip initiation earlier  Cardiac: stable HR/BP; stable bradycardia with 1AVB, BBB, on Heparin drip @1050. Anti-Xa recheck in AM.  Activity: activity as tolerated, x1-assist to bathroom  Skin/Wounds: intact  GI/: continent  Diet/nutrition: combo regular diet  Access: PIV C/D/I  Education/Discharge Plan: CABG Tuesday  Patient/Staff Safety: bed in low/locked position, call light in reach, ID band on, all appropriate equipment/monitors on & audible.  _____________________________________________________    Electronically signed by:  Connie Groves RN-BSN  [facility] Nurse      Goal Outcome Evaluation:      Plan of Care Reviewed With: patient    Overall Patient Progress: no changeOverall Patient Progress: no change      Problem: Adult Inpatient Plan of Care  Goal: Optimal Comfort and Wellbeing  Intervention: Monitor Pain and Promote Comfort  Recent Flowsheet Documentation  Taken 6/2/2024 1500 by Connie Groves RN  Pain Management Interventions:   rest   relaxation techniques promoted     Problem: Adult Inpatient Plan of Care  Goal: Optimal Comfort and Wellbeing  Intervention: Provide Person-Centered Care  Recent Flowsheet Documentation  Taken 6/2/2024 1700 by Connie Groves RN  Trust Relationship/Rapport:   care explained   choices provided   questions answered   questions encouraged   reassurance provided     Problem: Acute Coronary Syndrome  Goal: Absence of Cardiac-Related Pain  Intervention: Manage Cardiac Pain Symptoms  Recent Flowsheet  Documentation  Taken 6/2/2024 1700 by Connie Groves, RN  Chest Pain Intervention:   cardiac biomarkers drawn   cardiac monitoring continued   12-lead ECG obtained   activity minimized   nitroglycerin drip   thrombolytics

## 2024-06-02 NOTE — PLAN OF CARE
Goal Outcome Evaluation:    VSS. Monitor remains Sinus bradycardia. Pt. Is alert and oriented. Heparin drip at 1050 unit(s)/hr. Pt. Had CP this am. NTG drip started with relief of pain. Chest pressure returned and NTG drip increased to 50 mcgs with  decrease to 3/10. Pt. Denies need for further increase in NTG drip. Tylenol given for headache. Continue plan of care. Tentative plan for CABG on Tuesday.

## 2024-06-02 NOTE — PROGRESS NOTES
"Assumed care from 4674-2230  Neuro: A&Ox4  Vitals/Pain: VSS on RA, ex bradycardic. C/o CP 6-7/10 on L chest more of a \"tightness\" at 1850, assessed BP and gave one dose nitroglycerin. Pt called again at 1915 with persistent CP 3-4/10, EKG ordered and BP rechecked, house resource bridged pt for NOC RN. Pt also c/o headache from first dose nitroglycerin, Tylenol given  Tele: SR with BBB, ST elevation  Diet: reg  Lungs: dim, Pulmonary toilet encouraged.  Lines/Drains: PIV with hep gtt at 1050u/hr  Skin/Wounds: intact  GI/: No BM, passing gas.Voids ind.  Labs/Abnormal/Trends/Electrolyte Replacement: hep xa recheck in AM  Ambulation/Assist: Ind   Plan: CABG workup and poss CABG 6/4.    "

## 2024-06-03 ENCOUNTER — PREP FOR PROCEDURE (OUTPATIENT)
Dept: CARDIOLOGY | Facility: CLINIC | Age: 69
End: 2024-06-03
Payer: COMMERCIAL

## 2024-06-03 ENCOUNTER — ANESTHESIA EVENT (OUTPATIENT)
Dept: SURGERY | Facility: CLINIC | Age: 69
DRG: 236 | End: 2024-06-03
Payer: COMMERCIAL

## 2024-06-03 LAB
ANION GAP SERPL CALCULATED.3IONS-SCNC: 12 MMOL/L (ref 7–15)
ATRIAL RATE - MUSE: 61 BPM
BUN SERPL-MCNC: 28.1 MG/DL (ref 8–23)
CALCIUM SERPL-MCNC: 9.1 MG/DL (ref 8.8–10.2)
CHLORIDE SERPL-SCNC: 106 MMOL/L (ref 98–107)
CREAT SERPL-MCNC: 1.34 MG/DL (ref 0.67–1.17)
DEPRECATED HCO3 PLAS-SCNC: 20 MMOL/L (ref 22–29)
DIASTOLIC BLOOD PRESSURE - MUSE: NORMAL MMHG
EGFRCR SERPLBLD CKD-EPI 2021: 57 ML/MIN/1.73M2
ERYTHROCYTE [DISTWIDTH] IN BLOOD BY AUTOMATED COUNT: 14.6 % (ref 10–15)
GLUCOSE SERPL-MCNC: 92 MG/DL (ref 70–99)
HCT VFR BLD AUTO: 31.1 % (ref 40–53)
HGB BLD-MCNC: 10.6 G/DL (ref 13.3–17.7)
INTERPRETATION ECG - MUSE: NORMAL
MCH RBC QN AUTO: 28.9 PG (ref 26.5–33)
MCHC RBC AUTO-ENTMCNC: 34.1 G/DL (ref 31.5–36.5)
MCV RBC AUTO: 85 FL (ref 78–100)
P AXIS - MUSE: 41 DEGREES
PLATELET # BLD AUTO: 167 10E3/UL (ref 150–450)
POTASSIUM SERPL-SCNC: 5.1 MMOL/L (ref 3.4–5.3)
PR INTERVAL - MUSE: 160 MS
QRS DURATION - MUSE: 100 MS
QT - MUSE: 418 MS
QTC - MUSE: 420 MS
R AXIS - MUSE: 1 DEGREES
RBC # BLD AUTO: 3.67 10E6/UL (ref 4.4–5.9)
SODIUM SERPL-SCNC: 138 MMOL/L (ref 135–145)
SYSTOLIC BLOOD PRESSURE - MUSE: NORMAL MMHG
T AXIS - MUSE: 76 DEGREES
UFH PPP CHRO-ACNC: 0.22 IU/ML
UFH PPP CHRO-ACNC: 0.25 IU/ML
UFH PPP CHRO-ACNC: 0.48 IU/ML
VENTRICULAR RATE- MUSE: 61 BPM
WBC # BLD AUTO: 9.1 10E3/UL (ref 4–11)

## 2024-06-03 PROCEDURE — 36415 COLL VENOUS BLD VENIPUNCTURE: CPT | Performed by: HOSPITALIST

## 2024-06-03 PROCEDURE — 85520 HEPARIN ASSAY: CPT | Performed by: INTERNAL MEDICINE

## 2024-06-03 PROCEDURE — 250N000013 HC RX MED GY IP 250 OP 250 PS 637: Performed by: INTERNAL MEDICINE

## 2024-06-03 PROCEDURE — 210N000001 HC R&B IMCU HEART CARE

## 2024-06-03 PROCEDURE — 250N000013 HC RX MED GY IP 250 OP 250 PS 637: Performed by: HOSPITALIST

## 2024-06-03 PROCEDURE — 82374 ASSAY BLOOD CARBON DIOXIDE: CPT | Performed by: HOSPITALIST

## 2024-06-03 PROCEDURE — 250N000011 HC RX IP 250 OP 636

## 2024-06-03 PROCEDURE — 85520 HEPARIN ASSAY: CPT | Performed by: HOSPITALIST

## 2024-06-03 PROCEDURE — 99233 SBSQ HOSP IP/OBS HIGH 50: CPT | Performed by: HOSPITALIST

## 2024-06-03 PROCEDURE — 85027 COMPLETE CBC AUTOMATED: CPT | Performed by: HOSPITALIST

## 2024-06-03 PROCEDURE — 99232 SBSQ HOSP IP/OBS MODERATE 35: CPT | Mod: FS | Performed by: NURSE PRACTITIONER

## 2024-06-03 PROCEDURE — 93005 ELECTROCARDIOGRAM TRACING: CPT

## 2024-06-03 PROCEDURE — 93010 ELECTROCARDIOGRAM REPORT: CPT | Performed by: INTERNAL MEDICINE

## 2024-06-03 PROCEDURE — 250N000011 HC RX IP 250 OP 636: Performed by: HOSPITALIST

## 2024-06-03 RX ORDER — ATORVASTATIN CALCIUM 40 MG/1
40 TABLET, FILM COATED ORAL EVERY EVENING
Status: DISCONTINUED | OUTPATIENT
Start: 2024-06-03 | End: 2024-06-11 | Stop reason: HOSPADM

## 2024-06-03 RX ORDER — TRAMADOL HYDROCHLORIDE 50 MG/1
50 TABLET ORAL EVERY 6 HOURS PRN
Status: DISCONTINUED | OUTPATIENT
Start: 2024-06-03 | End: 2024-06-04

## 2024-06-03 RX ORDER — METOPROLOL SUCCINATE 25 MG/1
25 TABLET, EXTENDED RELEASE ORAL 2 TIMES DAILY
Status: DISCONTINUED | OUTPATIENT
Start: 2024-06-03 | End: 2024-06-04

## 2024-06-03 RX ADMIN — LINACLOTIDE 290 MCG: 290 CAPSULE, GELATIN COATED ORAL at 06:52

## 2024-06-03 RX ADMIN — PANTOPRAZOLE SODIUM 40 MG: 40 TABLET, DELAYED RELEASE ORAL at 06:52

## 2024-06-03 RX ADMIN — HEPARIN SODIUM 1200 UNITS/HR: 10000 INJECTION, SOLUTION INTRAVENOUS at 15:25

## 2024-06-03 RX ADMIN — ACETAMINOPHEN 650 MG: 325 TABLET, FILM COATED ORAL at 08:36

## 2024-06-03 RX ADMIN — HYDROMORPHONE HYDROCHLORIDE 0.5 MG: 1 INJECTION, SOLUTION INTRAMUSCULAR; INTRAVENOUS; SUBCUTANEOUS at 15:34

## 2024-06-03 RX ADMIN — LAMOTRIGINE 250 MG: 100 TABLET ORAL at 08:36

## 2024-06-03 RX ADMIN — ASPIRIN 81 MG CHEWABLE TABLET 81 MG: 81 TABLET CHEWABLE at 08:36

## 2024-06-03 RX ADMIN — TAMSULOSIN HYDROCHLORIDE 0.4 MG: 0.4 CAPSULE ORAL at 08:36

## 2024-06-03 RX ADMIN — LAMOTRIGINE 250 MG: 100 TABLET ORAL at 21:06

## 2024-06-03 RX ADMIN — NITROGLYCERIN 80 MCG/MIN: 20 INJECTION INTRAVENOUS at 04:18

## 2024-06-03 RX ADMIN — NITROGLYCERIN 90 MCG/MIN: 20 INJECTION INTRAVENOUS at 14:13

## 2024-06-03 RX ADMIN — ATORVASTATIN CALCIUM 40 MG: 40 TABLET, FILM COATED ORAL at 21:06

## 2024-06-03 RX ADMIN — TRAMADOL HYDROCHLORIDE 50 MG: 50 TABLET, COATED ORAL at 17:59

## 2024-06-03 RX ADMIN — NITROGLYCERIN 90 MCG/MIN: 20 INJECTION INTRAVENOUS at 22:22

## 2024-06-03 RX ADMIN — HEPARIN SODIUM 1200 UNITS/HR: 10000 INJECTION, SOLUTION INTRAVENOUS at 15:28

## 2024-06-03 RX ADMIN — ACETAMINOPHEN 650 MG: 325 TABLET, FILM COATED ORAL at 00:21

## 2024-06-03 RX ADMIN — METOPROLOL SUCCINATE 25 MG: 25 TABLET, EXTENDED RELEASE ORAL at 21:06

## 2024-06-03 RX ADMIN — ACETAMINOPHEN 650 MG: 325 TABLET, FILM COATED ORAL at 14:19

## 2024-06-03 RX ADMIN — ACETAMINOPHEN 650 MG: 325 TABLET, FILM COATED ORAL at 20:12

## 2024-06-03 RX ADMIN — TRAZODONE HYDROCHLORIDE 50 MG: 50 TABLET ORAL at 00:21

## 2024-06-03 ASSESSMENT — ACTIVITIES OF DAILY LIVING (ADL)
ADLS_ACUITY_SCORE: 21

## 2024-06-03 ASSESSMENT — LIFESTYLE VARIABLES: TOBACCO_USE: 1

## 2024-06-03 NOTE — PROVIDER NOTIFICATION
MD notification    Notified person: MD    Notified person name: Evan Darby     Notified date/time: 6/2/24 at 2137    Notification interaction: vocera    Purpose of notification: unrelieved headache r/t nitroglycerin drip (ie tylenol given, cool cloth, distraction)    Orders received: titrate nitroglycerin drip down to 30 or 25. At this time I titrated the drip from 50mcg to 40mcg. Patient has tolerated this change. Hospitalist also ordered caffeine pill for the patient's headache for the inbetween. The caffeine pill is not available to give immediately. Pharmacy is aware working on.

## 2024-06-03 NOTE — PROGRESS NOTES
[facility]  Cardiology Progress Note  Date of Service: 06/03/2024  Date of Admission: 5/30/2024  Primary Cardiologist: will be Dr. Gross    Summary    Kashmir Yao is a 69 year old male with past medical history significant for CAD, HTN, RIC, meningioma, szr disorder, shoulder fxr, HLD admitted on 5/29/24 to [facility] with cp, found to have NSTEMI with complex and transferred to [facility] 5/10/24 for discussion of cabg vs complex pci.     Interval Clara 3, 2024    Remains on NTG gtt for ongoing CP with ambulation to bathroom per nursing notes.    Asssessment:    NSTEMI  Known coronary disease with history of OM stenting.  Angiogram 5/30/2024 showed 60% ostial LAD and mid LAD, 80% circumflex at the trifurcation with OM superior and inferior ramus, occluded inferior ramus, 60% mid RCA  EF dropped to 45 to 50% previously normal.  CVTS consulting  On NTG gtt for ongoing CP.  Ischemic cardiomyopathy  EF 45 to 50%  Appears euvolemic  KELI, baseline creatinine 1-1.2, mildly elevated on admission at 1.3, stable  Left shoulder injury, patient clearly identifies different types of pain that 1 is his his shoulder, the change was he now had pain in his chest.  His exertional pain is now chest pain and he is able to differentiate between shoulder pain and cardiac pain after discussion.  _____________________________________________________________    Plan:   Planning CABG with Dr. Morejon on Tuesday, 6/4/24.  Reduce Toprol XL to 25 mg BID for resting bradycardia.  Limit activity to bedrest for ongoing CP.  Continue NTG gtt and titrate for CP relieve so long that SBP remains above 90.  Continue heparin gtt.  Pre-op supportive cares including nutrition, IS, and sleep hygiene.  _____________________________________________________________    Plan of care and the majority of MDM was formulated under direction and guidance of Dr. Jaeger.    Thank you for the opportunity to participate in the care of Mr.  Kashmir Yao.  Please feel free to reach out with any additional questions.    WANDA Warner, CNP   Nurse Practitioner  [facility]  Pager: [phone]  Phone: [phone]  Text Page (8am - 5pm, M-F)    Physical Exam   Temp: 98  F (36.7  C) Temp src: Oral BP: 108/66 Pulse: (!) 47   Resp: 16 SpO2: 96 % O2 Device: None (Room air)      Vitals:    06/02/24 0345 06/02/24 0543 06/03/24 0638   Weight: 89.1 kg (196 lb 6.4 oz) 88.9 kg (195 lb 14.4 oz) 89.8 kg (198 lb)     I/O last 3 completed shifts:  In: 240 [P.O.:240]  Out: -     Constitutional: Appears stated age, well nourished, NAD.  Respiratory: Non-labored. Lungs CTAB.  Cardiovascular: RRR, normal S1 and S2. No M/G/R.  Vascular: Peripheral pulses intact and symmetric bilaterally  Musculoskeletal/Extremities: Symmetrical movement.   Neurologic: No gross focal deficits. Alert, awake, and oriented to all spheres.  Psychiatric: Affect appropriate. Mentation normal.    Data   Recent Labs   Lab 06/03/24  0653 06/01/24  1504 05/31/24  0252 05/30/24  1657 05/29/24  1743   WBC 9.1 8.0 9.9   < > 8.3   HGB 10.6* 11.5* 11.3*   < > 13.4   MCV 85 86 86   < > 86    175 177   < > 196   NA  --  139 136  --  137   POTASSIUM  --  4.2 3.9  --  4.3   CHLORIDE  --  108* 108*  --  104   CO2  --  21* 17*  --  20*   BUN  --  31.5* 31.1*  --  30.3*   CR  --  1.40* 1.28*  --  1.34*   ANIONGAP  --  10 11  --  13   MOE  --  9.3 8.7*  --  9.4   GLC  --  101* 98  --  102*    < > = values in this interval not displayed.       Recent Labs   Lab 06/03/24  0653 06/01/24  1504 05/31/24  0252   WBC 9.1 8.0 9.9   HGB 10.6* 11.5* 11.3*   HCT 31.1* 34.4* 34.1*   MCV 85 86 86    175 177     Recent Labs   Lab 06/01/24  1504 05/31/24  0252 05/29/24  1743    136 137   POTASSIUM 4.2 3.9 4.3   CHLORIDE 108* 108* 104   CO2 21* 17* 20*   ANIONGAP 10 11 13   * 98 102*   BUN 31.5* 31.1* 30.3*   CR 1.40* 1.28* 1.34*   GFRESTIMATED 54* 60* 57*   MOE 9.3 8.7*  9.4        Patient Active Problem List   Diagnosis    Hyperlipidemia LDL goal <70    Esophageal reflux    Anxiety state    RIC (obstructive sleep apnea)    Health Care Home    S/P TKR (total knee replacement)    Benign essential hypertension    Physical deconditioning    Other constipation    Trauma    Cognitive change    Class 1 obesity due to excess calories without serious comorbidity in adult, unspecified BMI    Primary osteoarthritis of both hands    Headache    Need for prophylactic vaccination and inoculation against influenza    Cough    Panlobular emphysema (H)    Left carpal tunnel syndrome    Ulnar neuropathy of both upper extremities    Dermatitis    Pedal edema    Trigger finger of left hand, unspecified finger    Pruritic disorder    Vasculogenic erectile dysfunction    Anemia    Alas's palsy    Benign prostatic hyperplasia with urinary frequency    Common wart    Post herpetic neuralgia    Right hand paresthesia    Heartburn    Coronary artery disease due to lipid rich plaque    Oral phase dysphagia    Encounter for immunization    Travel advice encounter    [place] anomaly (H)    Chronic migraine without aura without status migrainosus, not intractable    Generalized tonic clonic epilepsy (H)    Gross hematuria    Visi (volar intercalated segment instability), right    Elevated glucose    Paronychia of toe, left    Impacted cerumen of left ear    Primary osteoarthritis of both hips    Contusion of left great toe with damage to nail, initial encounter    Need for COVID-19 vaccine    Closed fracture of left shoulder with delayed healing, subsequent encounter    Hypokalemia    Low iron    Insomnia, unspecified type    Hypotension, unspecified hypotension type    Need for shingles vaccine    Need for vaccination against respiratory syncytial virus    Primary osteoarthritis involving multiple joints    Spinal stenosis    Other hypoglycemia    Weight gain    Meningiomatosis (H)    History of  seizure    Generalized anxiety disorder    Advanced directives, counseling/discussion    Rash    Elevated troponin    Chest pain, unspecified type    NSTEMI (non-ST elevated myocardial infarction) (H)     Medications   Current Facility-Administered Medications   Medication Dose Route Frequency Provider Last Rate Last Admin    heparin 25,000 units in 0.45% NaCl 250 mL ANTICOAGULANT infusion  0-5,000 Units/hr Intravenous Continuous Amari Marquez MD 10.5 mL/hr at 06/03/24 0839 1,050 Units/hr at 06/03/24 0839    nitroGLYcerin 50 mg in D5W 250 mL PERIPHERAL IV infusion   mcg/min Intravenous Continuous Miguel Ellsworth APRN CNP 27 mL/hr at 06/03/24 1413 90 mcg/min at 06/03/24 1413    Patient is already receiving anticoagulation with heparin, enoxaparin (LOVENOX), warfarin (COUMADIN)  or other anticoagulant medication   Does not apply Continuous PRN Amari Marquez MD         Current Facility-Administered Medications   Medication Dose Route Frequency Provider Last Rate Last Admin    aspirin (ASA) chewable tablet 81 mg  81 mg Oral Daily Amari Marquez MD   81 mg at 06/03/24 0836    lamoTRIgine (LaMICtal) tablet 250 mg  250 mg Oral BID Amari Marquez MD   250 mg at 06/03/24 0836    linaclotide (LINZESS) capsule 290 mcg  290 mcg Oral QAM  Celeste Ennis MD   290 mcg at 06/03/24 0652    metoprolol succinate ER (TOPROL XL) 24 hr tablet 25 mg  25 mg Oral BID Sivakumar Jaeger MD        pantoprazole (PROTONIX) EC tablet 40 mg  40 mg Oral QAM AC Amari Marquez MD   40 mg at 06/03/24 0652    simvastatin (ZOCOR) tablet 40 mg  40 mg Oral At Bedtime Amari Marquez MD   40 mg at 06/02/24 2001    sodium chloride (PF) 0.9% PF flush 3 mL  3 mL Intracatheter Q8H Amari Marquez MD   3 mL at 05/31/24 1709    tamsulosin (FLOMAX) capsule 0.4 mg  0.4 mg Oral Daily Amari Marquez MD   0.4 mg at 06/03/24 0836       Data   Last 24 hours labs personally  reviewed.  Echo:   Recent Results (from the past 4320 hour(s))   Echocardiogram Complete   Result Value    LVEF  45-50%    [facility]    [id number]  [facility]  [id number]  [id number]^IRINA^LORENZA^MELI     [facility]  Echocardiography Laboratory  [address] [place] [address]     Name: FREDDIE BACON  MRN: [id number]  : 1955  Study Date: 2024 02:07 PM  Age: 69 yrs  Gender: Male  Patient Location: [facility]  Reason For Study: Ischemic Heart Disease  Ordering Physician: LORNEZA AREVALO  Performed By: Yodit Hardwick     BSA: 2.1 m2  Height: 69 in  Weight: 200 lb  BP: 104/60 mmHg  ______________________________________________________________________________  Procedure  Complete Portable Echo Adult. Optison (NDC #4022-3068) given intravenously.  ______________________________________________________________________________  Interpretation Summary     The visual ejection fraction is 45-50%.  Left ventricular systolic function is mildly reduced.  There are regional wall motion abnormalities as specified.  ______________________________________________________________________________  Left Ventricle  The left ventricle is normal in size. There is normal left ventricular wall  thickness. Diastolic Doppler findings (E/E' ratio and/or other parameters)  suggest left ventricular filling pressures are indeterminate. The visual  ejection fraction is 45-50%. Left ventricular systolic function is mildly  reduced. The distal anterolateral wall appears to be severely hypokinetic. The  mid to distal inferolateral wall appears to be severely hypokinetic. There are  regional wall motion abnormalities as specified.     Right Ventricle  The right ventricle is normal in size and function.     Atria  Normal left atrial size. Right atrial size is normal. There is no color  Doppler evidence of an atrial shunt.     Mitral Valve  There is trace mitral regurgitation.     Tricuspid Valve  There is trace tricuspid  regurgitation. Right ventricular systolic pressure  could not be approximated due to inadequate tricuspid regurgitation.     Aortic Valve  The aortic valve is not well visualized. No aortic regurgitation is present.  No hemodynamically significant valvular aortic stenosis.     Pulmonic Valve  The pulmonic valve is not well visualized. There is no pulmonic valvular  regurgitation. Normal pulmonic valve velocity.     Vessels  The aortic root is normal size. Normal size ascending aorta. Inferior vena  cava not well visualized for estimation of right atrial pressure.     Pericardium  There is no pericardial effusion.     Rhythm  Sinus rhythm was noted.  ______________________________________________________________________________  MMode/2D Measurements & Calculations  IVSd: 0.90 cm  LVIDd: 4.8 cm  LVIDs: 2.8 cm  LVPWd: 0.76 cm  FS: 41.4 %  LV mass(C)d: 131.9 grams  LV mass(C)dI: 63.9 grams/m2  Ao root diam: 3.1 cm  asc Aorta Diam: 3.0 cm  LVOT diam: 2.3 cm  LVOT area: 4.2 cm2  Ao root diam index Ht(cm/m): 1.8  Ao root diam index BSA (cm/m2): 1.5  Asc Ao diam index BSA (cm/m2): 1.5  Asc Ao diam index Ht(cm/m): 1.7  EF Biplane: 54.7 %  LA Volume (BP): 47.0 ml  RWT: 0.32  TAPSE: 2.9 cm     Doppler Measurements & Calculations  MV E max jag: 94.9 cm/sec  MV A max jag: 45.1 cm/sec  MV E/A: 2.1  MV dec slope: 481.0 cm/sec2  MV dec time: 0.20 sec  E/E' av.3  Lateral E/e': 11.8  Medial E/e': 10.8     ______________________________________________________________________________  Report approved by: Rosa Rosas 2024 03:53 PM

## 2024-06-03 NOTE — PROGRESS NOTES
CV Surgery    Patient seen and examined. Surgery explained, picture drawn in great detail. Surgery tomorrow with Dr. Morejon. No further questions at this time.     Kavita Bazan PA-C  [phone]

## 2024-06-03 NOTE — PROGRESS NOTES
[facility]    Medicine Progress Note - Hospitalist Service    Date of Admission:  5/30/2024    Assessment & Plan   Kashmir Yao is a 69 year old male with a past medical history of coronary artery disease status post PCI, hypertension, dyslipidemia, RIC on CPAP, BPH, GERD, right frontal meningioma, seizure disorder presents to hospital with an NSTEMI.  Patient was found to have multivessel disease, transferred to [facility] for consideration of complex PCI versus bypass surgery.    Coronary bypass has been recommended for patient is currently awaiting surgery.  Course so far has been complicated by intermittent chest pain requiring the start of a nitro drip    NSTEMI  Coronary artery disease  Hypertension  Dyslipidemia  Patient with a history of coronary artery disease and PCI presented to [facility] with chest pain was found to have an NSTEMI and underwent coronary angio which revealed multivessel disease with complex anatomy for which the patient was transferred to [facility] for surgery versus complex PCI.  Continue heparin, metoprolol 50 twice daily (hold parameters in place due to bradycardia), Imdur 20 3 times daily, aspirin daily, simvastatin  -- Cardiology consulted: input appreciated. Cards discussed with patient, complex PCI vs CABG; with complex PCI providing less of a revascularization compared to CABG in his case. Patient and family have opted for by pass surgery planned for early next week.   -- pt with complaint of on-going chest pain and now started on nitro gtt 6/2/24  -- continue on heparin gtt  -- Cardiothoracic surgery consult: input appreciated. Plan for CABG next week. Started on pre-op testing.   -- Monitor on telemetry  -- PTA lopressor now switched to Toprol 50 mg daily  -- PTA isordil switched to imdur 30mg daily, dose now increased to 60 mg on 6/1/24.    Headache  *The patient reports a mild headache which he attributes to a lack of sleep.   "He reports it similar to his regular headaches.  He is unable to quantify the severity of pain.  He has no focal neurologic deficits.    -- continue to monitor for now  --Would consider neuroimaging if the headache persists or gets worse.    KELI  -- Baseline creatinine 1-1.2  -- Continue 1.34 on admission, now trending back down to 1.28.  -- Labs pending today.    Anemia  -- He denies any bleeding.    -- He has had a 1 g drop in his hemoglobin from the 29th-30th.    -- This is possibly dilutional.  -- Monitor hemoglobin    Right frontal meningioma  Seizure disorder  He undergoes monitoring at the [facility] yearly.  Continue Lamictal    RIC CPAP  BPH Flomax  GERD PPI    DVT ppx: Heparin drip  Expected length of stay greater than than 2 days  Code Status: Full code    Medical Decision Making       55 MINUTES SPENT BY ME on the date of service doing chart review, history, exam, documentation & further activities per the note.            Diet: Combination Diet Regular Diet Adult    DVT Prophylaxis: heparin gtt  Cano Catheter: Not present  Lines: None     Cardiac Monitoring: ACTIVE order. Indication: AMI (NSTEMI/ STEMI) (48 hours)  Code Status: Full Code      Clinically Significant Risk Factors                  # Hypertension: Noted on problem list        # Overweight: Estimated body mass index is 29.24 kg/m  as calculated from the following:    Height as of 5/29/24: 1.753 m (5' 9\").    Weight as of this encounter: 89.8 kg (198 lb)., PRESENT ON ADMISSION     # Financial/Environmental Concerns: none         Disposition Plan     Medically Ready for Discharge: Patient currently awaiting coronary bypass.             Celeste Ceja MD  Hospitalist Service  [facility]  Securely message with [email or web address] (more info)  Text page via [employer] Paging/Directory   ______________________________________________________________________    Interval History   Patient still having intermittent chest pain, requiring up titration " of nitroglycerin drip.  Also having headache likely side effect from the nitroglycerin.  Managed by Tylenol    Physical Exam   Vital Signs: Temp: 98  F (36.7  C) Temp src: Oral BP: 108/66 Pulse: (!) 47   Resp: 16 SpO2: 96 % O2 Device: None (Room air)    Weight: 198 lbs 0 oz    General Appearance: Well appearing for stated age.  Respiratory: CTAB, no rales or ronchi  Cardiovascular: S1, S2 normal, no murmurs  GI: non-tender on palpation, BS present      Medical Decision Making       45 MINUTES SPENT BY ME on the date of service doing chart review, history, exam, documentation & further activities per the note.      Data     I have personally reviewed the following data over the past 24 hrs:    9.1  \   10.6 (L)   / 167     N/A N/A N/A /  N/A   N/A N/A N/A \       Imaging results reviewed over the past 24 hrs:   No results found for this or any previous visit (from the past 24 hour(s)).

## 2024-06-03 NOTE — PLAN OF CARE
VSS, c/o mild CP with relief from ntg gtt and head ache pain--semi relieved with cold pack and tylenol, hep gtt continues, plan for cabg tomorrow 2nd case, family updated, monitor closely.

## 2024-06-03 NOTE — PLAN OF CARE
A&OX4, RA, VSS, CPAP at night, on regular diet. Heparin gtt infusing at 1050 units/hr, nitro gtt infusing at 80 mcg/hr. Tele- SB, up with assist of 1 with walker. Chest pain post walking to the bathroom, reports as tightness, nitro titrated.

## 2024-06-04 ENCOUNTER — ANESTHESIA (OUTPATIENT)
Dept: SURGERY | Facility: CLINIC | Age: 69
DRG: 236 | End: 2024-06-04
Payer: COMMERCIAL

## 2024-06-04 ENCOUNTER — APPOINTMENT (OUTPATIENT)
Dept: GENERAL RADIOLOGY | Facility: CLINIC | Age: 69
DRG: 236 | End: 2024-06-04
Attending: PHYSICIAN ASSISTANT
Payer: COMMERCIAL

## 2024-06-04 ENCOUNTER — APPOINTMENT (OUTPATIENT)
Dept: GENERAL RADIOLOGY | Facility: CLINIC | Age: 69
DRG: 236 | End: 2024-06-04
Attending: INTERNAL MEDICINE
Payer: COMMERCIAL

## 2024-06-04 LAB
ABO/RH(D): NORMAL
ALBUMIN SERPL BCG-MCNC: 3.8 G/DL (ref 3.5–5.2)
ALLEN'S TEST: ABNORMAL
ALLEN'S TEST: ABNORMAL
ALP SERPL-CCNC: 84 U/L (ref 40–150)
ALT SERPL W P-5'-P-CCNC: 49 U/L (ref 0–70)
ANION GAP SERPL CALCULATED.3IONS-SCNC: 12 MMOL/L (ref 7–15)
ANION GAP SERPL CALCULATED.3IONS-SCNC: 13 MMOL/L (ref 7–15)
ANTIBODY SCREEN: NEGATIVE
APTT PPP: 49 SECONDS (ref 22–38)
APTT PPP: 51 SECONDS (ref 22–38)
AST SERPL W P-5'-P-CCNC: 42 U/L (ref 0–45)
BASE EXCESS BLDA CALC-SCNC: -3.1 MMOL/L (ref -3–3)
BASE EXCESS BLDA CALC-SCNC: -3.7 MMOL/L (ref -3–3)
BILIRUB SERPL-MCNC: 0.5 MG/DL
BLD PROD TYP BPU: NORMAL
BLD PROD TYP BPU: NORMAL
BLOOD COMPONENT TYPE: NORMAL
BLOOD COMPONENT TYPE: NORMAL
BUN SERPL-MCNC: 18.2 MG/DL (ref 8–23)
BUN SERPL-MCNC: 18.9 MG/DL (ref 8–23)
CA-I BLD-MCNC: 4.8 MG/DL (ref 4.4–5.2)
CALCIUM SERPL-MCNC: 8.6 MG/DL (ref 8.8–10.2)
CALCIUM SERPL-MCNC: 9.3 MG/DL (ref 8.8–10.2)
CHLORIDE SERPL-SCNC: 109 MMOL/L (ref 98–107)
CHLORIDE SERPL-SCNC: 110 MMOL/L (ref 98–107)
CLOT INIT KAOL IND TO POST HEP NEUT TRTO: 1 {RATIO}
CLOT INIT KAOL IND TO POST HEP NEUT TRTO: 1.3 {RATIO}
CLOT INIT KAOLIN IND BLD US: 152 SEC (ref 113–166)
CLOT INIT KAOLIN IND BLD US: 211 SEC (ref 113–166)
CLOT INIT KAOLIN IND P HEP NEUT BLD US: 149 SEC (ref 103–153)
CLOT INIT KAOLIN IND P HEP NEUT BLD US: 160 SEC (ref 103–153)
CLOT STIFF PLT CONT BLD CALC: 19.1 HPA (ref 11.9–29.8)
CLOT STIFF PLT CONT BLD CALC: 21.8 HPA (ref 11.9–29.8)
CLOT STIFF TF IND P HEP NEUT BLD US: 22.2 HPA (ref 13–33.2)
CLOT STIFF TF IND P HEP NEUT BLD US: 25.3 HPA (ref 13–33.2)
CLOT STIFF TF IND+IIB-IIIA INH P HEP NEU: 3.1 HPA (ref 1–3.7)
CLOT STIFF TF IND+IIB-IIIA INH P HEP NEU: 3.5 HPA (ref 1–3.7)
CODING SYSTEM: NORMAL
CODING SYSTEM: NORMAL
COHGB MFR BLD: 100 % (ref 95–96)
COHGB MFR BLD: >100 % (ref 95–96)
CREAT SERPL-MCNC: 1.23 MG/DL (ref 0.67–1.17)
CREAT SERPL-MCNC: 1.24 MG/DL (ref 0.67–1.17)
CROSSMATCH: NORMAL
CROSSMATCH: NORMAL
DEPRECATED HCO3 PLAS-SCNC: 20 MMOL/L (ref 22–29)
DEPRECATED HCO3 PLAS-SCNC: 20 MMOL/L (ref 22–29)
EGFRCR SERPLBLD CKD-EPI 2021: 63 ML/MIN/1.73M2
EGFRCR SERPLBLD CKD-EPI 2021: 63 ML/MIN/1.73M2
ERYTHROCYTE [DISTWIDTH] IN BLOOD BY AUTOMATED COUNT: 14.6 % (ref 10–15)
ERYTHROCYTE [DISTWIDTH] IN BLOOD BY AUTOMATED COUNT: 14.7 % (ref 10–15)
FIBRINOGEN PPP-MCNC: 309 MG/DL (ref 170–490)
GLUCOSE BLDC GLUCOMTR-MCNC: 132 MG/DL (ref 70–99)
GLUCOSE BLDC GLUCOMTR-MCNC: 136 MG/DL (ref 70–99)
GLUCOSE BLDC GLUCOMTR-MCNC: 141 MG/DL (ref 70–99)
GLUCOSE BLDC GLUCOMTR-MCNC: 151 MG/DL (ref 70–99)
GLUCOSE SERPL-MCNC: 143 MG/DL (ref 70–99)
GLUCOSE SERPL-MCNC: 188 MG/DL (ref 70–99)
HCO3 BLD-SCNC: 22 MMOL/L (ref 21–28)
HCO3 BLD-SCNC: 22 MMOL/L (ref 21–28)
HCT VFR BLD AUTO: 25.8 % (ref 40–53)
HCT VFR BLD AUTO: 28.1 % (ref 40–53)
HGB BLD-MCNC: 8.8 G/DL (ref 13.3–17.7)
HGB BLD-MCNC: 9.4 G/DL (ref 13.3–17.7)
INR PPP: 1.25 (ref 0.85–1.15)
INR PPP: 1.35 (ref 0.85–1.15)
ISSUE DATE AND TIME: NORMAL
ISSUE DATE AND TIME: NORMAL
LACTATE SERPL-SCNC: 1.1 MMOL/L (ref 0.7–2)
MAGNESIUM SERPL-MCNC: 2.2 MG/DL (ref 1.7–2.3)
MCH RBC QN AUTO: 28.4 PG (ref 26.5–33)
MCH RBC QN AUTO: 29 PG (ref 26.5–33)
MCHC RBC AUTO-ENTMCNC: 33.5 G/DL (ref 31.5–36.5)
MCHC RBC AUTO-ENTMCNC: 34.1 G/DL (ref 31.5–36.5)
MCV RBC AUTO: 85 FL (ref 78–100)
MCV RBC AUTO: 85 FL (ref 78–100)
O2/TOTAL GAS SETTING VFR VENT: 50 %
O2/TOTAL GAS SETTING VFR VENT: 50 %
PCO2 BLD: 39 MM HG (ref 35–45)
PCO2 BLD: 40 MM HG (ref 35–45)
PH BLD: 7.34 [PH] (ref 7.35–7.45)
PH BLD: 7.37 [PH] (ref 7.35–7.45)
PHOSPHATE SERPL-MCNC: 3.8 MG/DL (ref 2.5–4.5)
PLATELET # BLD AUTO: 127 10E3/UL (ref 150–450)
PLATELET # BLD AUTO: 185 10E3/UL (ref 150–450)
PO2 BLD: 154 MM HG (ref 80–105)
PO2 BLD: 191 MM HG (ref 80–105)
POTASSIUM SERPL-SCNC: 3.3 MMOL/L (ref 3.4–5.3)
POTASSIUM SERPL-SCNC: 3.6 MMOL/L (ref 3.4–5.3)
PROT SERPL-MCNC: 5.4 G/DL (ref 6.4–8.3)
RBC # BLD AUTO: 3.03 10E6/UL (ref 4.4–5.9)
RBC # BLD AUTO: 3.31 10E6/UL (ref 4.4–5.9)
SAO2 % BLDA: 98 % (ref 92–100)
SAO2 % BLDA: 99 % (ref 92–100)
SODIUM SERPL-SCNC: 141 MMOL/L (ref 135–145)
SODIUM SERPL-SCNC: 143 MMOL/L (ref 135–145)
SPECIMEN EXPIRATION DATE: NORMAL
UFH PPP CHRO-ACNC: 0.39 IU/ML
UNIT ABO/RH: NORMAL
UNIT ABO/RH: NORMAL
UNIT NUMBER: NORMAL
UNIT NUMBER: NORMAL
UNIT STATUS: NORMAL
UNIT STATUS: NORMAL
UNIT TYPE ISBT: 5100
UNIT TYPE ISBT: 5100
WBC # BLD AUTO: 15.5 10E3/UL (ref 4–11)
WBC # BLD AUTO: 21.2 10E3/UL (ref 4–11)

## 2024-06-04 PROCEDURE — 410N000005 HC PER-PERFUSION, SH ONLY, 1ST 30 MIN: Performed by: SURGERY

## 2024-06-04 PROCEDURE — 80048 BASIC METABOLIC PNL TOTAL CA: CPT | Performed by: SURGERY

## 2024-06-04 PROCEDURE — 94003 VENT MGMT INPAT SUBQ DAY: CPT

## 2024-06-04 PROCEDURE — 200N000001 HC R&B ICU

## 2024-06-04 PROCEDURE — 33518 CABG ARTERY-VEIN TWO: CPT | Mod: GC | Performed by: SURGERY

## 2024-06-04 PROCEDURE — 999N000065 XR ABDOMEN PORT 1 VIEW

## 2024-06-04 PROCEDURE — P9045 ALBUMIN (HUMAN), 5%, 250 ML: HCPCS | Mod: JZ | Performed by: ANESTHESIOLOGY

## 2024-06-04 PROCEDURE — 85610 PROTHROMBIN TIME: CPT | Performed by: PHYSICIAN ASSISTANT

## 2024-06-04 PROCEDURE — 250N000011 HC RX IP 250 OP 636: Performed by: ANESTHESIOLOGY

## 2024-06-04 PROCEDURE — 250N000013 HC RX MED GY IP 250 OP 250 PS 637: Performed by: INTERNAL MEDICINE

## 2024-06-04 PROCEDURE — 250N000013 HC RX MED GY IP 250 OP 250 PS 637: Performed by: SURGERY

## 2024-06-04 PROCEDURE — 250N000011 HC RX IP 250 OP 636

## 2024-06-04 PROCEDURE — 250N000009 HC RX 250: Performed by: SURGERY

## 2024-06-04 PROCEDURE — 82330 ASSAY OF CALCIUM: CPT | Performed by: SURGERY

## 2024-06-04 PROCEDURE — 86900 BLOOD TYPING SEROLOGIC ABO: CPT | Performed by: ANESTHESIOLOGY

## 2024-06-04 PROCEDURE — 250N000013 HC RX MED GY IP 250 OP 250 PS 637: Performed by: PHYSICIAN ASSISTANT

## 2024-06-04 PROCEDURE — 36415 COLL VENOUS BLD VENIPUNCTURE: CPT | Performed by: HOSPITALIST

## 2024-06-04 PROCEDURE — 33533 CABG ARTERIAL SINGLE: CPT | Performed by: ANESTHESIOLOGY

## 2024-06-04 PROCEDURE — 370N000017 HC ANESTHESIA TECHNICAL FEE, PER MIN: Performed by: SURGERY

## 2024-06-04 PROCEDURE — 250N000012 HC RX MED GY IP 250 OP 636 PS 637: Mod: JZ | Performed by: SURGERY

## 2024-06-04 PROCEDURE — 250N000025 HC SEVOFLURANE, PER MIN: Performed by: SURGERY

## 2024-06-04 PROCEDURE — 85384 FIBRINOGEN ACTIVITY: CPT | Performed by: SURGERY

## 2024-06-04 PROCEDURE — 250N000009 HC RX 250

## 2024-06-04 PROCEDURE — 85027 COMPLETE CBC AUTOMATED: CPT | Performed by: SURGERY

## 2024-06-04 PROCEDURE — 258N000003 HC RX IP 258 OP 636: Performed by: SURGERY

## 2024-06-04 PROCEDURE — 85520 HEPARIN ASSAY: CPT | Performed by: HOSPITALIST

## 2024-06-04 PROCEDURE — 250N000009 HC RX 250: Performed by: ANESTHESIOLOGY

## 2024-06-04 PROCEDURE — 5A1221Z PERFORMANCE OF CARDIAC OUTPUT, CONTINUOUS: ICD-10-PCS | Performed by: SURGERY

## 2024-06-04 PROCEDURE — 85027 COMPLETE CBC AUTOMATED: CPT | Performed by: PHYSICIAN ASSISTANT

## 2024-06-04 PROCEDURE — 258N000003 HC RX IP 258 OP 636: Performed by: PHYSICIAN ASSISTANT

## 2024-06-04 PROCEDURE — 250N000011 HC RX IP 250 OP 636: Performed by: PHYSICIAN ASSISTANT

## 2024-06-04 PROCEDURE — 85396 CLOTTING ASSAY WHOLE BLOOD: CPT

## 2024-06-04 PROCEDURE — 3E043XZ INTRODUCTION OF VASOPRESSOR INTO CENTRAL VEIN, PERCUTANEOUS APPROACH: ICD-10-PCS | Performed by: SURGERY

## 2024-06-04 PROCEDURE — 84100 ASSAY OF PHOSPHORUS: CPT | Performed by: PHYSICIAN ASSISTANT

## 2024-06-04 PROCEDURE — C1898 LEAD, PMKR, OTHER THAN TRANS: HCPCS | Performed by: SURGERY

## 2024-06-04 PROCEDURE — P9047 ALBUMIN (HUMAN), 25%, 50ML: HCPCS

## 2024-06-04 PROCEDURE — 85610 PROTHROMBIN TIME: CPT | Performed by: SURGERY

## 2024-06-04 PROCEDURE — 93005 ELECTROCARDIOGRAM TRACING: CPT

## 2024-06-04 PROCEDURE — 33508 ENDOSCOPIC VEIN HARVEST: CPT | Mod: XU | Performed by: SURGERY

## 2024-06-04 PROCEDURE — 82805 BLOOD GASES W/O2 SATURATION: CPT | Performed by: PHYSICIAN ASSISTANT

## 2024-06-04 PROCEDURE — 410N000006: Performed by: SURGERY

## 2024-06-04 PROCEDURE — 82805 BLOOD GASES W/O2 SATURATION: CPT | Performed by: INTERNAL MEDICINE

## 2024-06-04 PROCEDURE — 06BQ4ZZ EXCISION OF LEFT SAPHENOUS VEIN, PERCUTANEOUS ENDOSCOPIC APPROACH: ICD-10-PCS | Performed by: SURGERY

## 2024-06-04 PROCEDURE — 360N000079 HC SURGERY LEVEL 6, PER MIN: Performed by: SURGERY

## 2024-06-04 PROCEDURE — C1760 CLOSURE DEV, VASC: HCPCS | Performed by: SURGERY

## 2024-06-04 PROCEDURE — 999N000157 HC STATISTIC RCP TIME EA 10 MIN

## 2024-06-04 PROCEDURE — 0212099 BYPASS CORONARY ARTERY, THREE ARTERIES FROM LEFT INTERNAL MAMMARY WITH AUTOLOGOUS VENOUS TISSUE, OPEN APPROACH: ICD-10-PCS | Performed by: SURGERY

## 2024-06-04 PROCEDURE — 5A1935Z RESPIRATORY VENTILATION, LESS THAN 24 CONSECUTIVE HOURS: ICD-10-PCS | Performed by: SURGERY

## 2024-06-04 PROCEDURE — P9016 RBC LEUKOCYTES REDUCED: HCPCS

## 2024-06-04 PROCEDURE — P9045 ALBUMIN (HUMAN), 5%, 250 ML: HCPCS

## 2024-06-04 PROCEDURE — 83605 ASSAY OF LACTIC ACID: CPT | Performed by: PHYSICIAN ASSISTANT

## 2024-06-04 PROCEDURE — 33533 CABG ARTERIAL SINGLE: CPT | Performed by: NURSE ANESTHETIST, CERTIFIED REGISTERED

## 2024-06-04 PROCEDURE — 250N000011 HC RX IP 250 OP 636: Performed by: SURGERY

## 2024-06-04 PROCEDURE — 272N000001 HC OR GENERAL SUPPLY STERILE: Performed by: SURGERY

## 2024-06-04 PROCEDURE — 99231 SBSQ HOSP IP/OBS SF/LOW 25: CPT | Performed by: NURSE PRACTITIONER

## 2024-06-04 PROCEDURE — 99291 CRITICAL CARE FIRST HOUR: CPT | Performed by: INTERNAL MEDICINE

## 2024-06-04 PROCEDURE — 258N000003 HC RX IP 258 OP 636: Performed by: ANESTHESIOLOGY

## 2024-06-04 PROCEDURE — 85730 THROMBOPLASTIN TIME PARTIAL: CPT | Performed by: PHYSICIAN ASSISTANT

## 2024-06-04 PROCEDURE — 85730 THROMBOPLASTIN TIME PARTIAL: CPT | Performed by: SURGERY

## 2024-06-04 PROCEDURE — 250N000013 HC RX MED GY IP 250 OP 250 PS 637: Performed by: HOSPITALIST

## 2024-06-04 PROCEDURE — 86923 COMPATIBILITY TEST ELECTRIC: CPT

## 2024-06-04 PROCEDURE — 999N000141 HC STATISTIC PRE-PROCEDURE NURSING ASSESSMENT: Performed by: SURGERY

## 2024-06-04 PROCEDURE — 33533 CABG ARTERIAL SINGLE: CPT | Mod: GC | Performed by: SURGERY

## 2024-06-04 PROCEDURE — 83735 ASSAY OF MAGNESIUM: CPT | Performed by: PHYSICIAN ASSISTANT

## 2024-06-04 PROCEDURE — 82330 ASSAY OF CALCIUM: CPT | Performed by: PHYSICIAN ASSISTANT

## 2024-06-04 PROCEDURE — 258N000003 HC RX IP 258 OP 636

## 2024-06-04 PROCEDURE — 999N000065 XR CHEST PORT 1 VIEW

## 2024-06-04 RX ORDER — NALOXONE HYDROCHLORIDE 0.4 MG/ML
0.4 INJECTION, SOLUTION INTRAMUSCULAR; INTRAVENOUS; SUBCUTANEOUS
Status: DISCONTINUED | OUTPATIENT
Start: 2024-06-04 | End: 2024-06-11 | Stop reason: HOSPADM

## 2024-06-04 RX ORDER — PHENYLEPHRINE HCL IN 0.9% NACL 50MG/250ML
PLASTIC BAG, INJECTION (ML) INTRAVENOUS CONTINUOUS PRN
Status: DISCONTINUED | OUTPATIENT
Start: 2024-06-04 | End: 2024-06-04

## 2024-06-04 RX ORDER — FENTANYL CITRATE 50 UG/ML
INJECTION, SOLUTION INTRAMUSCULAR; INTRAVENOUS PRN
Status: DISCONTINUED | OUTPATIENT
Start: 2024-06-04 | End: 2024-06-04

## 2024-06-04 RX ORDER — HEPARIN SODIUM 1000 [USP'U]/ML
INJECTION, SOLUTION INTRAVENOUS; SUBCUTANEOUS PRN
Status: DISCONTINUED | OUTPATIENT
Start: 2024-06-04 | End: 2024-06-04

## 2024-06-04 RX ORDER — POLYETHYLENE GLYCOL 3350 17 G/17G
17 POWDER, FOR SOLUTION ORAL DAILY
Status: DISCONTINUED | OUTPATIENT
Start: 2024-06-05 | End: 2024-06-11 | Stop reason: HOSPADM

## 2024-06-04 RX ORDER — CALCIUM GLUCONATE 20 MG/ML
1 INJECTION, SOLUTION INTRAVENOUS EVERY 6 HOURS PRN
Status: DISCONTINUED | OUTPATIENT
Start: 2024-06-04 | End: 2024-06-06

## 2024-06-04 RX ORDER — SODIUM CHLORIDE, SODIUM LACTATE, POTASSIUM CHLORIDE, CALCIUM CHLORIDE 600; 310; 30; 20 MG/100ML; MG/100ML; MG/100ML; MG/100ML
INJECTION, SOLUTION INTRAVENOUS CONTINUOUS PRN
Status: DISCONTINUED | OUTPATIENT
Start: 2024-06-04 | End: 2024-06-04

## 2024-06-04 RX ORDER — HYDRALAZINE HYDROCHLORIDE 20 MG/ML
10 INJECTION INTRAMUSCULAR; INTRAVENOUS EVERY 30 MIN PRN
Status: DISCONTINUED | OUTPATIENT
Start: 2024-06-04 | End: 2024-06-11 | Stop reason: HOSPADM

## 2024-06-04 RX ORDER — PHENYLEPHRINE HCL IN 0.9% NACL 50MG/250ML
.1-6 PLASTIC BAG, INJECTION (ML) INTRAVENOUS CONTINUOUS
Status: DISCONTINUED | OUTPATIENT
Start: 2024-06-04 | End: 2024-06-06

## 2024-06-04 RX ORDER — PANTOPRAZOLE SODIUM 40 MG/1
40 TABLET, DELAYED RELEASE ORAL DAILY
Status: DISCONTINUED | OUTPATIENT
Start: 2024-06-04 | End: 2024-06-11 | Stop reason: HOSPADM

## 2024-06-04 RX ORDER — ACETAMINOPHEN 325 MG/1
975 TABLET ORAL EVERY 8 HOURS
Status: COMPLETED | OUTPATIENT
Start: 2024-06-04 | End: 2024-06-07

## 2024-06-04 RX ORDER — ASPIRIN 81 MG/1
81 TABLET, CHEWABLE ORAL
Status: COMPLETED | OUTPATIENT
Start: 2024-06-04 | End: 2024-06-04

## 2024-06-04 RX ORDER — SODIUM CHLORIDE, SODIUM LACTATE, POTASSIUM CHLORIDE, CALCIUM CHLORIDE 600; 310; 30; 20 MG/100ML; MG/100ML; MG/100ML; MG/100ML
INJECTION, SOLUTION INTRAVENOUS CONTINUOUS
Status: DISCONTINUED | OUTPATIENT
Start: 2024-06-04 | End: 2024-06-06

## 2024-06-04 RX ORDER — OXYCODONE HYDROCHLORIDE 5 MG/1
10 TABLET ORAL EVERY 4 HOURS PRN
Status: DISCONTINUED | OUTPATIENT
Start: 2024-06-04 | End: 2024-06-11 | Stop reason: HOSPADM

## 2024-06-04 RX ORDER — VECURONIUM BROMIDE 1 MG/ML
INJECTION, POWDER, LYOPHILIZED, FOR SOLUTION INTRAVENOUS PRN
Status: DISCONTINUED | OUTPATIENT
Start: 2024-06-04 | End: 2024-06-04

## 2024-06-04 RX ORDER — ONDANSETRON 4 MG/1
4 TABLET, ORALLY DISINTEGRATING ORAL EVERY 6 HOURS PRN
Status: DISCONTINUED | OUTPATIENT
Start: 2024-06-04 | End: 2024-06-11 | Stop reason: HOSPADM

## 2024-06-04 RX ORDER — PROCHLORPERAZINE MALEATE 5 MG
5 TABLET ORAL EVERY 6 HOURS PRN
Status: DISCONTINUED | OUTPATIENT
Start: 2024-06-04 | End: 2024-06-11 | Stop reason: HOSPADM

## 2024-06-04 RX ORDER — OXYCODONE HYDROCHLORIDE 5 MG/1
5 TABLET ORAL EVERY 4 HOURS PRN
Status: DISCONTINUED | OUTPATIENT
Start: 2024-06-04 | End: 2024-06-11 | Stop reason: HOSPADM

## 2024-06-04 RX ORDER — METHOCARBAMOL 500 MG/1
500 TABLET, FILM COATED ORAL EVERY 6 HOURS PRN
Status: DISCONTINUED | OUTPATIENT
Start: 2024-06-04 | End: 2024-06-11 | Stop reason: HOSPADM

## 2024-06-04 RX ORDER — ASPIRIN 81 MG/1
162 TABLET, CHEWABLE ORAL
Status: COMPLETED | OUTPATIENT
Start: 2024-06-04 | End: 2024-06-04

## 2024-06-04 RX ORDER — CEFAZOLIN SODIUM/WATER 2 G/20 ML
SYRINGE (ML) INTRAVENOUS PRN
Status: DISCONTINUED | OUTPATIENT
Start: 2024-06-04 | End: 2024-06-04

## 2024-06-04 RX ORDER — HYDROMORPHONE HCL IN WATER/PF 6 MG/30 ML
0.4 PATIENT CONTROLLED ANALGESIA SYRINGE INTRAVENOUS
Status: DISCONTINUED | OUTPATIENT
Start: 2024-06-04 | End: 2024-06-11 | Stop reason: HOSPADM

## 2024-06-04 RX ORDER — PAPAVERINE HYDROCHLORIDE 30 MG/ML
INJECTION INTRAMUSCULAR; INTRAVENOUS PRN
Status: DISCONTINUED | OUTPATIENT
Start: 2024-06-04 | End: 2024-06-04 | Stop reason: HOSPADM

## 2024-06-04 RX ORDER — SODIUM CHLORIDE 9 MG/ML
INJECTION, SOLUTION INTRAVENOUS CONTINUOUS PRN
Status: DISCONTINUED | OUTPATIENT
Start: 2024-06-04 | End: 2024-06-04

## 2024-06-04 RX ORDER — HEPARIN SODIUM 5000 [USP'U]/.5ML
5000 INJECTION, SOLUTION INTRAVENOUS; SUBCUTANEOUS EVERY 8 HOURS
Status: DISCONTINUED | OUTPATIENT
Start: 2024-06-05 | End: 2024-06-11 | Stop reason: HOSPADM

## 2024-06-04 RX ORDER — CALCIUM GLUCONATE 20 MG/ML
2 INJECTION, SOLUTION INTRAVENOUS EVERY 6 HOURS PRN
Status: DISCONTINUED | OUTPATIENT
Start: 2024-06-04 | End: 2024-06-06

## 2024-06-04 RX ORDER — HYDROXYZINE HYDROCHLORIDE 10 MG/1
10 TABLET, FILM COATED ORAL EVERY 6 HOURS PRN
Status: DISCONTINUED | OUTPATIENT
Start: 2024-06-04 | End: 2024-06-07

## 2024-06-04 RX ORDER — POTASSIUM CHLORIDE 29.8 MG/ML
20 INJECTION INTRAVENOUS
Status: DISCONTINUED | OUTPATIENT
Start: 2024-06-04 | End: 2024-06-04

## 2024-06-04 RX ORDER — PROPOFOL 10 MG/ML
INJECTION, EMULSION INTRAVENOUS CONTINUOUS PRN
Status: DISCONTINUED | OUTPATIENT
Start: 2024-06-04 | End: 2024-06-04

## 2024-06-04 RX ORDER — NALOXONE HYDROCHLORIDE 0.4 MG/ML
0.2 INJECTION, SOLUTION INTRAMUSCULAR; INTRAVENOUS; SUBCUTANEOUS
Status: DISCONTINUED | OUTPATIENT
Start: 2024-06-04 | End: 2024-06-11 | Stop reason: HOSPADM

## 2024-06-04 RX ORDER — DEXMEDETOMIDINE HYDROCHLORIDE 4 UG/ML
.2-.7 INJECTION, SOLUTION INTRAVENOUS CONTINUOUS
Status: DISCONTINUED | OUTPATIENT
Start: 2024-06-04 | End: 2024-06-06

## 2024-06-04 RX ORDER — BISACODYL 10 MG
10 SUPPOSITORY, RECTAL RECTAL DAILY PRN
Status: DISCONTINUED | OUTPATIENT
Start: 2024-06-07 | End: 2024-06-11 | Stop reason: HOSPADM

## 2024-06-04 RX ORDER — AMOXICILLIN 250 MG
1 CAPSULE ORAL 2 TIMES DAILY
Status: DISCONTINUED | OUTPATIENT
Start: 2024-06-04 | End: 2024-06-11 | Stop reason: HOSPADM

## 2024-06-04 RX ORDER — CEFAZOLIN SODIUM 2 G/100ML
2 INJECTION, SOLUTION INTRAVENOUS EVERY 8 HOURS
Qty: 300 ML | Refills: 0 | Status: COMPLETED | OUTPATIENT
Start: 2024-06-04 | End: 2024-06-05

## 2024-06-04 RX ORDER — CALCIUM CARBONATE 500 MG/1
500 TABLET, CHEWABLE ORAL 4 TIMES DAILY PRN
Status: DISCONTINUED | OUTPATIENT
Start: 2024-06-04 | End: 2024-06-11 | Stop reason: HOSPADM

## 2024-06-04 RX ORDER — LIDOCAINE HYDROCHLORIDE 20 MG/ML
INJECTION, SOLUTION INFILTRATION; PERINEURAL PRN
Status: DISCONTINUED | OUTPATIENT
Start: 2024-06-04 | End: 2024-06-04

## 2024-06-04 RX ORDER — PROPOFOL 10 MG/ML
INJECTION, EMULSION INTRAVENOUS PRN
Status: DISCONTINUED | OUTPATIENT
Start: 2024-06-04 | End: 2024-06-04

## 2024-06-04 RX ORDER — POTASSIUM CHLORIDE 20MEQ/15ML
20 LIQUID (ML) ORAL ONCE
Status: COMPLETED | OUTPATIENT
Start: 2024-06-04 | End: 2024-06-04

## 2024-06-04 RX ORDER — MAGNESIUM HYDROXIDE/ALUMINUM HYDROXICE/SIMETHICONE 120; 1200; 1200 MG/30ML; MG/30ML; MG/30ML
30 SUSPENSION ORAL EVERY 4 HOURS PRN
Status: DISCONTINUED | OUTPATIENT
Start: 2024-06-04 | End: 2024-06-11 | Stop reason: HOSPADM

## 2024-06-04 RX ORDER — ACETAMINOPHEN 325 MG/1
650 TABLET ORAL EVERY 4 HOURS PRN
Status: DISCONTINUED | OUTPATIENT
Start: 2024-06-07 | End: 2024-06-11 | Stop reason: HOSPADM

## 2024-06-04 RX ORDER — LIDOCAINE 40 MG/G
CREAM TOPICAL
Status: DISCONTINUED | OUTPATIENT
Start: 2024-06-04 | End: 2024-06-11 | Stop reason: HOSPADM

## 2024-06-04 RX ORDER — CHLORHEXIDINE GLUCONATE ORAL RINSE 1.2 MG/ML
10 SOLUTION DENTAL ONCE
Status: COMPLETED | OUTPATIENT
Start: 2024-06-04 | End: 2024-06-04

## 2024-06-04 RX ORDER — NITROGLYCERIN 20 MG/100ML
10-200 INJECTION INTRAVENOUS CONTINUOUS PRN
Status: DISCONTINUED | OUTPATIENT
Start: 2024-06-04 | End: 2024-06-06

## 2024-06-04 RX ORDER — SODIUM CHLORIDE, SODIUM LACTATE, POTASSIUM CHLORIDE, CALCIUM CHLORIDE 600; 310; 30; 20 MG/100ML; MG/100ML; MG/100ML; MG/100ML
INJECTION, SOLUTION INTRAVENOUS CONTINUOUS
Status: CANCELLED | OUTPATIENT
Start: 2024-06-04

## 2024-06-04 RX ORDER — ONDANSETRON 2 MG/ML
4 INJECTION INTRAMUSCULAR; INTRAVENOUS EVERY 6 HOURS PRN
Status: DISCONTINUED | OUTPATIENT
Start: 2024-06-04 | End: 2024-06-11 | Stop reason: HOSPADM

## 2024-06-04 RX ORDER — ASPIRIN 81 MG/1
162 TABLET, CHEWABLE ORAL DAILY
Status: DISCONTINUED | OUTPATIENT
Start: 2024-06-05 | End: 2024-06-11 | Stop reason: HOSPADM

## 2024-06-04 RX ORDER — PROTAMINE SULFATE 10 MG/ML
INJECTION, SOLUTION INTRAVENOUS PRN
Status: DISCONTINUED | OUTPATIENT
Start: 2024-06-04 | End: 2024-06-04

## 2024-06-04 RX ORDER — NICOTINE POLACRILEX 4 MG
15-30 LOZENGE BUCCAL
Status: DISCONTINUED | OUTPATIENT
Start: 2024-06-04 | End: 2024-06-05

## 2024-06-04 RX ORDER — EPINEPHRINE IN 0.9 % SOD CHLOR 5 MG/250ML
.01-.1 PLASTIC BAG, INJECTION (ML) INTRAVENOUS CONTINUOUS PRN
Status: DISCONTINUED | OUTPATIENT
Start: 2024-06-04 | End: 2024-06-06

## 2024-06-04 RX ORDER — DEXTROSE MONOHYDRATE 25 G/50ML
25-50 INJECTION, SOLUTION INTRAVENOUS
Status: DISCONTINUED | OUTPATIENT
Start: 2024-06-04 | End: 2024-06-05

## 2024-06-04 RX ORDER — LIDOCAINE 4 G/G
1-2 PATCH TOPICAL EVERY 24 HOURS
Status: DISCONTINUED | OUTPATIENT
Start: 2024-06-04 | End: 2024-06-11 | Stop reason: HOSPADM

## 2024-06-04 RX ORDER — FAMOTIDINE 20 MG/1
20 TABLET, FILM COATED ORAL
Status: COMPLETED | OUTPATIENT
Start: 2024-06-04 | End: 2024-06-04

## 2024-06-04 RX ORDER — LIDOCAINE HYDROCHLORIDE 10 MG/ML
INJECTION, SOLUTION INFILTRATION; PERINEURAL
Status: COMPLETED | OUTPATIENT
Start: 2024-06-04 | End: 2024-06-04

## 2024-06-04 RX ORDER — NOREPINEPHRINE BITARTRATE 0.06 MG/ML
.01-.4 INJECTION, SOLUTION INTRAVENOUS CONTINUOUS PRN
Status: DISCONTINUED | OUTPATIENT
Start: 2024-06-04 | End: 2024-06-06

## 2024-06-04 RX ORDER — HYDROMORPHONE HCL IN WATER/PF 6 MG/30 ML
0.2 PATIENT CONTROLLED ANALGESIA SYRINGE INTRAVENOUS
Status: DISCONTINUED | OUTPATIENT
Start: 2024-06-04 | End: 2024-06-11 | Stop reason: HOSPADM

## 2024-06-04 RX ADMIN — POTASSIUM CHLORIDE 20 MEQ: 20 SOLUTION ORAL at 18:55

## 2024-06-04 RX ADMIN — Medication 40 MG: at 18:55

## 2024-06-04 RX ADMIN — ACETAMINOPHEN 975 MG: 325 TABLET, FILM COATED ORAL at 18:55

## 2024-06-04 RX ADMIN — LIDOCAINE 1 PATCH: 4 PATCH TOPICAL at 19:30

## 2024-06-04 RX ADMIN — VECURONIUM BROMIDE 5 MG: 1 INJECTION, POWDER, LYOPHILIZED, FOR SOLUTION INTRAVENOUS at 14:16

## 2024-06-04 RX ADMIN — FENTANYL CITRATE 50 MCG: 50 INJECTION INTRAMUSCULAR; INTRAVENOUS at 16:45

## 2024-06-04 RX ADMIN — VECURONIUM BROMIDE 3 MG: 1 INJECTION, POWDER, LYOPHILIZED, FOR SOLUTION INTRAVENOUS at 16:02

## 2024-06-04 RX ADMIN — CHLORHEXIDINE GLUCONATE 10 ML: 1.2 SOLUTION ORAL at 10:58

## 2024-06-04 RX ADMIN — MIDAZOLAM HYDROCHLORIDE 1 MG: 1 INJECTION, SOLUTION INTRAMUSCULAR; INTRAVENOUS at 16:00

## 2024-06-04 RX ADMIN — Medication 2 G: at 13:04

## 2024-06-04 RX ADMIN — SODIUM CHLORIDE: 9 INJECTION, SOLUTION INTRAVENOUS at 12:46

## 2024-06-04 RX ADMIN — FENTANYL CITRATE 50 MCG: 50 INJECTION INTRAMUSCULAR; INTRAVENOUS at 17:09

## 2024-06-04 RX ADMIN — PROTAMINE SULFATE 250 MG: 10 INJECTION, SOLUTION INTRAVENOUS at 16:45

## 2024-06-04 RX ADMIN — ALBUMIN (HUMAN): 12.5 SOLUTION INTRAVENOUS at 17:06

## 2024-06-04 RX ADMIN — SENNOSIDES AND DOCUSATE SODIUM 1 TABLET: 50; 8.6 TABLET ORAL at 20:59

## 2024-06-04 RX ADMIN — FAMOTIDINE 20 MG: 20 TABLET, FILM COATED ORAL at 10:59

## 2024-06-04 RX ADMIN — AMINOCAPROIC ACID 11.1 MG/KG/HR: 250 INJECTION, SOLUTION INTRAVENOUS at 14:30

## 2024-06-04 RX ADMIN — MIDAZOLAM HYDROCHLORIDE 2 MG: 1 INJECTION, SOLUTION INTRAMUSCULAR; INTRAVENOUS at 13:47

## 2024-06-04 RX ADMIN — FENTANYL CITRATE 100 MCG: 50 INJECTION INTRAMUSCULAR; INTRAVENOUS at 13:54

## 2024-06-04 RX ADMIN — PHENYLEPHRINE HYDROCHLORIDE 100 MCG: 10 INJECTION INTRAVENOUS at 15:04

## 2024-06-04 RX ADMIN — PHENYLEPHRINE HYDROCHLORIDE 100 MCG: 10 INJECTION INTRAVENOUS at 13:17

## 2024-06-04 RX ADMIN — HYDROMORPHONE HYDROCHLORIDE 0.2 MG: 0.2 INJECTION, SOLUTION INTRAMUSCULAR; INTRAVENOUS; SUBCUTANEOUS at 20:20

## 2024-06-04 RX ADMIN — HEPARIN SODIUM 32000 UNITS: 1000 INJECTION INTRAVENOUS; SUBCUTANEOUS at 14:37

## 2024-06-04 RX ADMIN — PROPOFOL 20 MG: 10 INJECTION, EMULSION INTRAVENOUS at 14:54

## 2024-06-04 RX ADMIN — SODIUM CHLORIDE, POTASSIUM CHLORIDE, SODIUM LACTATE AND CALCIUM CHLORIDE 250 ML: 600; 310; 30; 20 INJECTION, SOLUTION INTRAVENOUS at 22:18

## 2024-06-04 RX ADMIN — LAMOTRIGINE 250 MG: 100 TABLET ORAL at 10:10

## 2024-06-04 RX ADMIN — FENTANYL CITRATE 50 MCG: 50 INJECTION INTRAMUSCULAR; INTRAVENOUS at 16:46

## 2024-06-04 RX ADMIN — AMINOCAPROIC ACID 55.49 MG/KG/HR: 250 INJECTION, SOLUTION INTRAVENOUS at 13:30

## 2024-06-04 RX ADMIN — PHENYLEPHRINE HYDROCHLORIDE 100 MCG: 10 INJECTION INTRAVENOUS at 13:07

## 2024-06-04 RX ADMIN — NITROGLYCERIN 90 MCG/MIN: 20 INJECTION INTRAVENOUS at 08:43

## 2024-06-04 RX ADMIN — TRAZODONE HYDROCHLORIDE 50 MG: 50 TABLET ORAL at 00:18

## 2024-06-04 RX ADMIN — SODIUM CHLORIDE: 9 INJECTION, SOLUTION INTRAVENOUS at 13:26

## 2024-06-04 RX ADMIN — PHENYLEPHRINE HYDROCHLORIDE 100 MCG: 10 INJECTION INTRAVENOUS at 13:12

## 2024-06-04 RX ADMIN — FENTANYL CITRATE 100 MCG: 50 INJECTION INTRAMUSCULAR; INTRAVENOUS at 13:52

## 2024-06-04 RX ADMIN — ACETAMINOPHEN 650 MG: 325 TABLET, FILM COATED ORAL at 00:18

## 2024-06-04 RX ADMIN — MIDAZOLAM HYDROCHLORIDE 1 MG: 1 INJECTION, SOLUTION INTRAMUSCULAR; INTRAVENOUS at 14:53

## 2024-06-04 RX ADMIN — FENTANYL CITRATE 250 MCG: 50 INJECTION INTRAMUSCULAR; INTRAVENOUS at 12:58

## 2024-06-04 RX ADMIN — ATORVASTATIN CALCIUM 40 MG: 40 TABLET, FILM COATED ORAL at 20:59

## 2024-06-04 RX ADMIN — PHENYLEPHRINE HYDROCHLORIDE 100 MCG: 10 INJECTION INTRAVENOUS at 13:25

## 2024-06-04 RX ADMIN — MIDAZOLAM HYDROCHLORIDE 2 MG: 1 INJECTION, SOLUTION INTRAMUSCULAR; INTRAVENOUS at 12:46

## 2024-06-04 RX ADMIN — LIDOCAINE HYDROCHLORIDE 2 ML: 10 INJECTION, SOLUTION INFILTRATION; PERINEURAL at 12:50

## 2024-06-04 RX ADMIN — EPINEPHRINE 0.02 MCG/KG/MIN: 1 INJECTION INTRAMUSCULAR; INTRAVENOUS; SUBCUTANEOUS at 16:19

## 2024-06-04 RX ADMIN — Medication 0.25 MCG/KG/MIN: at 13:33

## 2024-06-04 RX ADMIN — METHOCARBAMOL 500 MG: 500 TABLET ORAL at 20:57

## 2024-06-04 RX ADMIN — METOPROLOL SUCCINATE 25 MG: 25 TABLET, EXTENDED RELEASE ORAL at 09:28

## 2024-06-04 RX ADMIN — ROCURONIUM BROMIDE 50 MG: 50 INJECTION, SOLUTION INTRAVENOUS at 12:58

## 2024-06-04 RX ADMIN — PROPOFOL 50 MG: 10 INJECTION, EMULSION INTRAVENOUS at 12:58

## 2024-06-04 RX ADMIN — LIDOCAINE HYDROCHLORIDE 100 MG: 20 INJECTION, SOLUTION INFILTRATION; PERINEURAL at 12:58

## 2024-06-04 RX ADMIN — HYDROMORPHONE HYDROCHLORIDE 0.4 MG: 0.2 INJECTION, SOLUTION INTRAMUSCULAR; INTRAVENOUS; SUBCUTANEOUS at 22:07

## 2024-06-04 RX ADMIN — Medication 2 G: at 17:04

## 2024-06-04 RX ADMIN — SODIUM CHLORIDE, POTASSIUM CHLORIDE, SODIUM LACTATE AND CALCIUM CHLORIDE: 600; 310; 30; 20 INJECTION, SOLUTION INTRAVENOUS at 13:25

## 2024-06-04 RX ADMIN — SUGAMMADEX 200 MG: 100 INJECTION, SOLUTION INTRAVENOUS at 18:09

## 2024-06-04 RX ADMIN — OXYCODONE HYDROCHLORIDE 5 MG: 5 TABLET ORAL at 18:56

## 2024-06-04 RX ADMIN — FENTANYL CITRATE 50 MCG: 50 INJECTION INTRAMUSCULAR; INTRAVENOUS at 14:53

## 2024-06-04 RX ADMIN — SODIUM CHLORIDE, POTASSIUM CHLORIDE, SODIUM LACTATE AND CALCIUM CHLORIDE 250 ML: 600; 310; 30; 20 INJECTION, SOLUTION INTRAVENOUS at 20:00

## 2024-06-04 RX ADMIN — LAMOTRIGINE 250 MG: 100 TABLET ORAL at 20:59

## 2024-06-04 RX ADMIN — SODIUM CHLORIDE, POTASSIUM CHLORIDE, SODIUM LACTATE AND CALCIUM CHLORIDE: 600; 310; 30; 20 INJECTION, SOLUTION INTRAVENOUS at 19:25

## 2024-06-04 RX ADMIN — ASPIRIN 81 MG CHEWABLE TABLET 162 MG: 81 TABLET CHEWABLE at 10:58

## 2024-06-04 RX ADMIN — PROPOFOL 30 MCG/KG/MIN: 10 INJECTION, EMULSION INTRAVENOUS at 17:07

## 2024-06-04 RX ADMIN — NOREPINEPHRINE BITARTRATE 0.02 MCG/KG/MIN: 1 INJECTION, SOLUTION, CONCENTRATE INTRAVENOUS at 16:20

## 2024-06-04 ASSESSMENT — ACTIVITIES OF DAILY LIVING (ADL)
ADLS_ACUITY_SCORE: 21
ADLS_ACUITY_SCORE: 24
ADLS_ACUITY_SCORE: 21
ADLS_ACUITY_SCORE: 22
ADLS_ACUITY_SCORE: 21
ADLS_ACUITY_SCORE: 24
ADLS_ACUITY_SCORE: 21
ADLS_ACUITY_SCORE: 24
ADLS_ACUITY_SCORE: 21

## 2024-06-04 NOTE — PLAN OF CARE
A&OX4, VSS, CPAP at night, Heparin gtt infusing at 1200 units/hr, nitro gtt infusing at 90 mcg/hr. Tele- SB, HR  in the 50s, used urinal at bedrest. Tylenol given for headache, trazodone at night.   NPO since midnight for CABG

## 2024-06-04 NOTE — ANESTHESIA POSTPROCEDURE EVALUATION
Patient: Kashmir Yao    Procedure: Procedure(s):  CORONARY ARTERY BYPASS GRAFT x 3 (LEFT INTERNAL MAMMARY ARTERY - LEFT ANTERIOR DESCENDING ARTERY; SAPHENOUS VEIN - OBTUSE MARGINAL ARTERY 1; SAPHENOUS VEIN -OBTUSE MARGINAL ARTERY 2) WITH ENDOSCOPIC SAPHENOUS VEIN HARVEST ON THE RIGHT LOWER EXTREMITY, AND ON CARDIOPULMONARY PUMP OXYGENATOR (INTRAOPERATIVE TRANSESOPHAGEAL ECHOCARDIOGRAM BY ANESTHESIOLOGIST)       Anesthesia Type:  General    Note:  Disposition: ICU            ICU Sign Out: Anesthesiologist/ICU physician sign out WAS performed   Postop Pain Control: Uneventful            Sign Out: Well controlled pain   PONV: No   Neuro/Psych: Uneventful            Sign Out: Acceptable/Baseline neuro status   Airway/Respiratory:             Sign Out: AIRWAY IN SITU/Resp. Support               Airway in situ/Resp. Support: ETT                 Reason: Planned Pre-op   CV/Hemodynamics: Uneventful            Sign Out: Acceptable CV status; No obvious hypovolemia; No obvious fluid overload   Other NRE: NONE   DID A NON-ROUTINE EVENT OCCUR? No           Last vitals:  Vitals:    06/04/24 1830 06/04/24 1845 06/04/24 1850   BP: 109/61     Pulse: 60 51 51   Resp: 20 18 16   Temp: 36.3  C (97.3  F) 36.2  C (97.2  F) 36.2  C (97.2  F)   SpO2: 100% 100% 100%       Electronically Signed By: Jose Angel Wilcox DO  June 4, 2024  6:58 PM

## 2024-06-04 NOTE — PROGRESS NOTES
[facility] Intensive Care Consultation    Date of Admission:  5/30/2024     Assessment & Plan   Kashmir Yao is a 69 year old male who was admitted on 5/30/2024.     Neurology:  Postoperative state: Somewhat agitated. Has seizure disorder. Seen in clinic with complaints of difficulty with cognition after undergoing neurosurgery but no history of delirium  -Administer Lamotrigine prior to extubation.   - Multimodal pain therapy  - Further management pending clinical course    Cardiovascular:  Coronary artery disease and vasoplegic shock: underwent three vessel bypass today. EF normal.  Vasopressor needs are reducing.  - Postoperative care per protocol    Pulmonary:        Postoperative ventilatory requirement: synchronous on minimal ventilator settings.  - Proceed with pressure support per protocol once patient's hemodynamic and mental status is normal.    Vent Mode: CMV/AC  (Continuous Mandatory Ventilation/ Assist Control)  Resp Rate (Set): 18 breaths/min  Tidal Volume (Set, mL): 500 mL  PEEP (cm H2O): 5 cmH2O  Resp: 17      Renal  Non gap acidosis: secondary to IVF. Lactic acid is normal  - IVF until able to take PO    ID:         No issues : received surgical prophylaxis  -      Endocrine:  Stress hyperglycemia: no history of diabetes  - insulin per protocol    Heme/Onc:  Acute blood loss anemia: as expected from such a major surgery. No evidence of bleeding  - Monitor for postoperative bleeding.     Pam De La Rosa MD    Time Spent on this Encounter   Billing:  I spent 40 minutes bedside and on the inpatient unit today managing the critical care of Kashmir Yao in relation to the issues listed in this note.    Reason for Consult   Reason for consult: postoperative management    History of Present Illness   Kashmir Yao is a 69 year old male who presents with need for postoperative care after undergoing three vessel bypass surgery today. Off all  vasopressors. Looks uncomfortable.      Past Medical History:   Diagnosis Date    Anemia 07/31/2020    Arthritis     Bell's palsy 09/08/2020    Benign prostatic hyperplasia with incomplete bladder emptying 07/28/2020    Bilateral carpal tunnel syndrome 02/13/2020    CAD (coronary artery disease) 2010    a subtotally occluded obtuse marginal vessel which was stented with a drug-coated stent.  He had 50% to 60% LAD disease and 25% to 30% right coronary disease    Cognitive change 01/02/2020    Colonic polyps 2009    tubular adenomae with mildly dysplastic features    Common wart 12/02/2020    Disturbance of skin sensation     Encephalocele (H) 2009    left frontal    Epilepsy, partial (H) 2009    with secondary generalization    GERD (gastroesophageal reflux disease)     Heart attack (H)     5 years ago. Cardiology 3 months age    Herpes zoster with complication 10/07/2020    History of spinal cord injury     Hypertension     Iron deficiency 09/09/2020    Meningioma (H) 04/10/2023    Meningiomatosis (H) 03/14/2024    RIC (obstructive sleep apnea)     RIC (obstructive sleep apnea)     Paraneoplastic Antibody  positive[799.89BS] 2009    mildly elevated alpha 3 ganglionic acetylcholine receptor and JEOVANNY 65 receptor  antibody    Paronychia of toe, left 06/26/2023    Pedal edema 02/14/2020    Post herpetic neuralgia 12/02/2020    Primary osteoarthritis of both hands 01/02/2020    Rash 04/19/2024    Seizures (H)     Last seizure 2012    Spinal stenosis     Stented coronary artery     Tobacco use disorder     Ulnar neuropathy of both upper extremities 02/13/2020    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    Ventricular Assymetry 2009    likely congenital right lateral ventricular enlargement    Weight gain 03/14/2024     Social History     Tobacco Use    Smoking status: Former     Current packs/day: 0.00     Average packs/day: 0.5 packs/day for 20.0 years (10.0 ttl pk-yrs)     Types: Cigarettes     Start date: 8/4/1983      Quit date: 2003     Years since quittin.8     Passive exposure: Past    Smokeless tobacco: Never   Vaping Use    Vaping status: Never Used   Substance Use Topics    Alcohol use: No     Alcohol/week: 0.0 standard drinks of alcohol    Drug use: No     Past Surgical History:   Procedure Laterality Date    ARTHROPLASTY KNEE Left 2016    Procedure: ARTHROPLASTY KNEE;  Surgeon: Chet Leonardo MD;  Location: [facility] OR    BACK SURGERY      CARPAL TUNNEL RELEASE RT/LT Right 2020    Right carpal tunnel release, Dr. Rick Frias, [facility]    CARPAL TUNNEL RELEASE RT/LT Left     COLONOSCOPY N/A 10/08/2020    Procedure: COLONOSCOPY WITH POLYPECTOMIES using cold jumbo forceps;  Surgeon: Kevin Song MD;  Location:  GI    CV CORONARY ANGIOGRAM N/A 2024    Procedure: Coronary Angiogram;  Surgeon: Crow Mukherjee MD;  Location: [facility] CARDIAC CATH LAB    CV INSTANTANEOUS WAVE-FREE RATIO N/A 2024    Procedure: Instantaneous Wave-Free Ratio;  Surgeon: Crow Mukherjee MD;  Location: [facility] CARDIAC CATH LAB    CYSTOSCOPY      ESOPHAGOSCOPY, GASTROSCOPY, DUODENOSCOPY (EGD), COMBINED  2012    Procedure:COMBINED ESOPHAGOSCOPY, GASTROSCOPY, DUODENOSCOPY (EGD); ESOPHAGOSCOPY, GASTROSCOPY, DUODENOSCOPY (EGD) ; Surgeon:GILLIAN JOHNSON; Location: GI    ESOPHAGOSCOPY, GASTROSCOPY, DUODENOSCOPY (EGD), COMBINED N/A 10/08/2020    Procedure: ESOPHAGOGASTRODUODENOSCOPY (EGD) (fv);  Surgeon: Kevin Song MD;  Location:  GI    KNEE SURGERY Left     repair of cartilage    RELEASE TRIGGER FINGER Left 10/19/2020    Left ring finger trigger finger release at A1 pulley, Dr. Rick Frias, [facility]    SPINE SURGERY      repair of herniated disc lumbar    STENT      trigger finger release Right 09/15/2020    Right thumb trigger finger release, Dr. Frias    ULNAR NERVE TRANSPOSITION Left     VENTRICULOSTOMY      [place]    Z ANESTH,DX ARTHROSCOPIC PROC KNEE JOINT       CARTILEGE REPAIR.    [facility] NONSPECIFIC PROCEDURE      stint in heart    [facility]  2009    tubular adenomae, recommended annual colonoscopy     Family History   Problem Relation Age of Onset    Cardiovascular Mother         CHF, HTN, VASC. INSUFF., DM    No Known Problems Father     Thyroid Disease Sister         HYPOTHYROID    Genitourinary Problems Brother         RENAL FAILURE AND H/O MVA WITH LE PARAPLEGIA    No Known Problems Sister     No Known Problems Brother     No Known Problems Brother     Cancer - colorectal No family hx of     Colon Cancer No family hx of        Physical Exam   Temp: 98  F (36.7  C) Temp src: Oral Temp  Min: 97.6  F (36.4  C)  Max: 98.3  F (36.8  C) BP: 102/68 Pulse: 55   Resp: 17 SpO2: 95 % O2 Device: None (Room air)    Vitals:    06/02/24 0543 06/03/24 0638 06/04/24 0517   Weight: 88.9 kg (195 lb 14.4 oz) 89.8 kg (198 lb) 90.1 kg (198 lb 9.6 oz)     I/O last 3 completed shifts:  In: -   Out: 1325 [Urine:1325]    Constitutional: stated age, sedated  ENT: ET, OG  Respiratory: synchronous  Cardiovascular: RRR, sternotomy bandage clean  GI: benign  Skin: normal  Neurologic: sedated    Medications   Current Facility-Administered Medications   Medication Dose Route Frequency Provider Last Rate Last Admin    dexmedeTOMIDine (PRECEDEX) 4 mcg/mL in sodium chloride 0.9 % 100 mL infusion  0.2-0.7 mcg/kg/hr Intravenous Continuous Tiara Yoon PA-C        EPINEPHrine (ADRENALIN) 5 mg in  mL infusion  0.01-0.1 mcg/kg/min Intravenous Continuous PRN YoonTiara pennington PA-C        insulin regular (MYXREDLIN) 1 unit/mL infusion  0-24 Units/hr Intravenous Continuous YoonTiara pennington PA-C        lactated ringers infusion   Intravenous Continuous YoonTiara pennington PA-C        nitroGLYcerin 50 mg in D5W 250 mL (adult std) infusion CENTRAL   mcg/min Intravenous Continuous PRN YoonTiara pennington PA-C        norepinephrine (LEVOPHED) 16 mg in  mL infusion MAX CONC  CENTRAL LINE  0.01-0.4 mcg/kg/min Intravenous Continuous PRN Tiara Yoon PA-C        phenylephrine (JANE-SYNEPHRINE) 50 mg in NaCl 0.9 % 250 mL infusion  0.1-6 mcg/kg/min Intravenous Continuous Pam De La Rosa MD        Reason beta blocker order not selected   Does not apply DOES NOT GO TO MAR Tiara Yoon PA-C        vasopressin 0.2 units/mL in NS (PITRESSIN) standard conc infusion  0.5-4 Units/hr Intravenous Continuous PRN Tiara Yoon PA-C         Current Facility-Administered Medications   Medication Dose Route Frequency Provider Last Rate Last Admin    acetaminophen (TYLENOL) tablet 975 mg  975 mg Oral Q8H Tiara Yoon PA-C        [START ON 6/5/2024] aspirin (ASA) chewable tablet 162 mg  162 mg Oral or NG Tube Daily Tiara Yoon PA-C        atorvastatin (LIPITOR) tablet 40 mg  40 mg Oral QPM Tiara Yoon PA-C   40 mg at 06/03/24 2106    ceFAZolin (ANCEF) 2 g in 100 mL D5W intermittent infusion  2 g Intravenous Q8H Tiara Yoon PA-C        [START ON 6/5/2024] heparin ANTICOAGULANT injection 5,000 Units  5,000 Units Subcutaneous Q8H Tiara Yoon PA-C        lactated ringers BOLUS 250 mL  250 mL Intravenous Once Tiara Yoon PA-C        lamoTRIgine (LaMICtal) tablet 250 mg  250 mg Oral BID Tiara Yoon PA-C   250 mg at 06/04/24 1010    Lidocaine (LIDOCARE) 4 % Patch 1-2 patch  1-2 patch Transdermal Q24H Tiara Yoon PA-C        linaclotide (LINZESS) capsule 290 mcg  290 mcg Oral QAM AC Tiara Yoon PA-C   290 mcg at 06/03/24 0652    pantoprazole (PROTONIX) 2 mg/mL suspension 40 mg  40 mg Oral or NG Tube Daily Tiaar Yoon PA-C        Or    pantoprazole (PROTONIX) EC tablet 40 mg  40 mg Oral Daily Tiara Yoon PA-C        [START ON 6/5/2024] polyethylene glycol (MIRALAX) Packet 17 g  17 g Oral Daily Tiara Yoon PA-C        senna-docusate (SENOKOT-S/PERICOLACE) 8.6-50 MG per tablet 1 tablet  1 tablet Oral BID Tiara Yoon PA-C        sodium  chloride (PF) 0.9% PF flush 3 mL  3 mL Intracatheter Q8H Tiara Yoon PA-C        tamsulosin (FLOMAX) capsule 0.4 mg  0.4 mg Oral Daily Tiara Yoon PA-C   0.4 mg at 06/03/24 0836       Data   Recent Labs   Lab 06/04/24  1821 06/04/24  1658 06/03/24  1443 06/03/24  0653 06/01/24  1504   WBC  --  21.2*  --  9.1 8.0   HGB  --  8.8*  --  10.6* 11.5*   MCV  --  85  --  85 86   PLT  --  185  --  167 175   INR  --  1.35*  --   --   --    NA  --  141 138  --  139   POTASSIUM  --  3.6 5.1  --  4.2   CHLORIDE  --  109* 106  --  108*   CO2  --  20* 20*  --  21*   BUN  --  18.2 28.1*  --  31.5*   CR  --  1.23* 1.34*  --  1.40*   ANIONGAP  --  12 12  --  10   MOE  --  9.3 9.1  --  9.3   * 188* 92  --  101*     Recent Results (from the past 24 hour(s))   CARLOS with Report    Narrative    Jose Angel Wilcox DO     6/4/2024  1:38 PM  Perioperative CARLOS Procedure Note    Staff -        Anesthesiologist:  Jose Angel Wilcox DO       Performed By: anesthesiologist  Preanesthesia Checklist:  Patient identified, IV assessed, risks and   benefits discussed, monitors and equipment assessed, procedure being   performed at surgeon's request and anesthesia consent obtained.    CARLOS Probe Insertion    Probe Status PRE Insertion: NO obvious damage  Probe type:  Adult 3D  Bite block used:   Soft  Insertion Technique: Easy, no oropharyngeal manipulation  Insertion complications: None obvious  Billing Report:A CARLOS report is NOT being generated.  Probe Status POST Removal: NO obvious damage

## 2024-06-04 NOTE — ANESTHESIA PROCEDURE NOTES
Perioperative CARLOS Procedure Note    Staff -        Anesthesiologist:  Jose Angel Wilcox DO       Performed By: anesthesiologist  Preanesthesia Checklist:  Patient identified, IV assessed, risks and benefits discussed, monitors and equipment assessed, procedure being performed at surgeon's request and anesthesia consent obtained.    CARLOS Probe Insertion    Probe Status PRE Insertion: NO obvious damage  Probe type:  Adult 3D  Bite block used:   Soft  Insertion Technique: Easy, no oropharyngeal manipulation  Insertion complications: None obvious  Billing Report:A CARLOS report is NOT being generated.  Probe Status POST Removal: NO obvious damage

## 2024-06-04 NOTE — PLAN OF CARE
Goal Outcome Evaluation:  1100-  Transferred to pre op. VSS, denies pain. No shortness of breath.  Tele SR. Up with SBA. Infusing nitroglycerin drip and heparin drip.  Had shower. A&Ox4.

## 2024-06-04 NOTE — PROGRESS NOTES
[facility]  Cardiology Progress Note  Date of Service: 06/04/2024  Date of Admission: 5/30/2024  Primary Cardiologist: will be Dr. Gross    Summary    Kashmir Yao is a 69 year old male with past medical history significant for CAD, HTN, RIC, meningioma, szr disorder, shoulder fxr, HLD admitted on 5/29/24 to [facility] with cp, found to have NSTEMI with complex and transferred to [facility] 5/10/24 for discussion of cabg vs complex pci.     Interval June 4, 2024    No acute events overnight.  Pt remains on NTG gtt.  States that at rest, he is currently chest pain free.  Awaiting surgery later today afternoon.    Asssessment:    NSTEMI  Known coronary disease with history of OM stenting.  Angiogram 5/30/2024 showed 60% ostial LAD and mid LAD, 80% circumflex at the trifurcation with OM superior and inferior ramus, occluded inferior ramus, 60% mid RCA  EF dropped to 45 to 50% previously normal.  CVTS consulting  On NTG gtt for ongoing CP.  Ischemic cardiomyopathy  EF 45 to 50%  Appears euvolemic  KELI, baseline creatinine 1-1.2, mildly elevated on admission at 1.3, stable  Left shoulder injury, patient clearly identifies different types of pain that 1 is his his shoulder, the change was he now had pain in his chest.  His exertional pain is now chest pain and he is able to differentiate between shoulder pain and cardiac pain after discussion.  _____________________________________________________________    Plan:   Planning CABG today Dr. Morejon - 2nd case.  No additional changes at this time from a general cardiology standpoint.  Limit activity to bedrest for ongoing CP.  Continue NTG gtt and titrate for CP relief.  _____________________________________________________________    Plan of care and the majority of MDM was formulated under direction and guidance of Dr. Jaeger.    Thank you for the opportunity to participate in the care of . Kashmir Yao.  Please feel free to reach out with any  additional questions.    WANDA Warner, CNP   Nurse Practitioner  [employer]  Pager: [phone]  Phone: [phone]  Text Page (8am - 5pm, M-F)    Physical Exam   Temp: 98  F (36.7  C) Temp src: Oral BP: 108/67 Pulse: 57   Resp: 17 SpO2: 95 % O2 Device: None (Room air)      Vitals:    06/02/24 0543 06/03/24 0638 06/04/24 0517   Weight: 88.9 kg (195 lb 14.4 oz) 89.8 kg (198 lb) 90.1 kg (198 lb 9.6 oz)     I/O last 3 completed shifts:  In: 1708.7 [P.O.:360; I.V.:1348.7]  Out: 825 [Urine:825]    Constitutional: Appears stated age, well nourished, NAD.  Respiratory: Non-labored. Lungs CTAB.  Cardiovascular: RRR, normal S1 and S2. No M/G/R.  Vascular: Peripheral pulses intact and symmetric bilaterally  Musculoskeletal/Extremities: Symmetrical movement.   Neurologic: No gross focal deficits. Alert, awake, and oriented to all spheres.  Psychiatric: Affect appropriate. Mentation normal.    Data   Recent Labs   Lab 06/03/24  1443 06/03/24  0653 06/01/24  1504 05/31/24  0252   WBC  --  9.1 8.0 9.9   HGB  --  10.6* 11.5* 11.3*   MCV  --  85 86 86   PLT  --  167 175 177     --  139 136   POTASSIUM 5.1  --  4.2 3.9   CHLORIDE 106  --  108* 108*   CO2 20*  --  21* 17*   BUN 28.1*  --  31.5* 31.1*   CR 1.34*  --  1.40* 1.28*   ANIONGAP 12  --  10 11   MOE 9.1  --  9.3 8.7*   GLC 92  --  101* 98       Recent Labs   Lab 06/03/24  0653 06/01/24  1504 05/31/24  0252   WBC 9.1 8.0 9.9   HGB 10.6* 11.5* 11.3*   HCT 31.1* 34.4* 34.1*   MCV 85 86 86    175 177     Recent Labs   Lab 06/03/24  1443 06/01/24  1504 05/31/24  0252    139 136   POTASSIUM 5.1 4.2 3.9   CHLORIDE 106 108* 108*   CO2 20* 21* 17*   ANIONGAP 12 10 11   GLC 92 101* 98   BUN 28.1* 31.5* 31.1*   CR 1.34* 1.40* 1.28*   GFRESTIMATED 57* 54* 60*   MOE 9.1 9.3 8.7*        Patient Active Problem List   Diagnosis    Hyperlipidemia LDL goal <70    Esophageal reflux    Anxiety state    RIC (obstructive sleep apnea)    Health  Care Home    S/P TKR (total knee replacement)    Benign essential hypertension    Physical deconditioning    Other constipation    Trauma    Cognitive change    Class 1 obesity due to excess calories without serious comorbidity in adult, unspecified BMI    Primary osteoarthritis of both hands    Headache    Need for prophylactic vaccination and inoculation against influenza    Cough    Panlobular emphysema (H)    Left carpal tunnel syndrome    Ulnar neuropathy of both upper extremities    Dermatitis    Pedal edema    Trigger finger of left hand, unspecified finger    Pruritic disorder    Vasculogenic erectile dysfunction    Anemia    Alas's palsy    Benign prostatic hyperplasia with urinary frequency    Common wart    Post herpetic neuralgia    Right hand paresthesia    Heartburn    Coronary artery disease due to lipid rich plaque    Oral phase dysphagia    Encounter for immunization    Travel advice encounter    [place] anomaly (H)    Chronic migraine without aura without status migrainosus, not intractable    Generalized tonic clonic epilepsy (H)    Gross hematuria    Visi (volar intercalated segment instability), right    Elevated glucose    Paronychia of toe, left    Impacted cerumen of left ear    Primary osteoarthritis of both hips    Contusion of left great toe with damage to nail, initial encounter    Need for COVID-19 vaccine    Closed fracture of left shoulder with delayed healing, subsequent encounter    Hypokalemia    Low iron    Insomnia, unspecified type    Hypotension, unspecified hypotension type    Need for shingles vaccine    Need for vaccination against respiratory syncytial virus    Primary osteoarthritis involving multiple joints    Spinal stenosis    Other hypoglycemia    Weight gain    Meningiomatosis (H)    History of seizure    Generalized anxiety disorder    Advanced directives, counseling/discussion    Rash    Elevated troponin    Chest pain, unspecified type    NSTEMI (non-ST  elevated myocardial infarction) (H)     Medications   Current Facility-Administered Medications   Medication Dose Route Frequency Provider Last Rate Last Admin    heparin 25,000 units in 0.45% NaCl 250 mL ANTICOAGULANT infusion  0-5,000 Units/hr Intravenous Continuous Amari Marquez MD 12 mL/hr at 06/04/24 0020 1,200 Units/hr at 06/04/24 0020    nitroGLYcerin 50 mg in D5W 250 mL PERIPHERAL IV infusion   mcg/min Intravenous Continuous Miguel Ellsworth APRN CNP 27 mL/hr at 06/04/24 0843 90 mcg/min at 06/04/24 0843    Patient already taking scheduled Beta Blocker   Does not apply DOES NOT GO TO MAR Melonie Morejon MD        Patient is already receiving anticoagulation with heparin, enoxaparin (LOVENOX), warfarin (COUMADIN)  or other anticoagulant medication   Does not apply Continuous PRN Amari Marquez MD         Current Facility-Administered Medications   Medication Dose Route Frequency Provider Last Rate Last Admin    aspirin (ASA) chewable tablet 162 mg  162 mg Oral Pre-Op/Pre-procedure x 1 dose Melonie Morejon MD        Or    aspirin (ASA) chewable tablet 81 mg  81 mg Oral Pre-Op/Pre-procedure x 1 dose Melonie Morejon MD        aspirin (ASA) chewable tablet 81 mg  81 mg Oral Daily Amari Marquez MD   81 mg at 06/03/24 0836    atorvastatin (LIPITOR) tablet 40 mg  40 mg Oral QPM Sivakumar Jaeger MD   40 mg at 06/03/24 2106    chlorhexidine (PERIDEX) 0.12 % solution 10 mL  10 mL Swish & Spit Once Melonie Morejon MD        famotidine (PEPCID) tablet 20 mg  20 mg Oral Pre-Op/Pre-procedure x 1 dose Melonie Morejon MD        lamoTRIgine (LaMICtal) tablet 250 mg  250 mg Oral BID Amari Marquez MD   250 mg at 06/03/24 2106    linaclotide (LINZESS) capsule 290 mcg  290 mcg Oral QAM AC Celeste Ennis MD   290 mcg at 06/03/24 0652    metoprolol succinate ER (TOPROL XL) 24 hr tablet 25 mg  25 mg Oral BID Sivakumar Jaeger MD   25 mg  at 24 210    pantoprazole (PROTONIX) EC tablet 40 mg  40 mg Oral QAM AC Amari Marquez MD   40 mg at 24 0652    sodium chloride (PF) 0.9% PF flush 3 mL  3 mL Intracatheter Q8H Amari Marquez MD   3 mL at 24 2105    tamsulosin (FLOMAX) capsule 0.4 mg  0.4 mg Oral Daily Amari Marquez MD   0.4 mg at 24 0836       Data   Last 24 hours labs personally reviewed.  Echo:   Recent Results (from the past 4320 hour(s))   Echocardiogram Complete   Result Value    LVEF  45-50%    Narrative    [id number]  [id number]  [id number]^IRINA^LORENZA^MELI     [facility]  Echocardiography Laboratory  [address] [place] [address]     Name: FREDDIE BACON  MRN: [id number]  : 1955  Study Date: 2024 02:07 PM  Age: 69 yrs  Gender: Male  Patient Location: [facility]  Reason For Study: Ischemic Heart Disease  Ordering Physician: LORENZA AREVALO  Performed By: Yodit Hardwick     BSA: 2.1 m2  Height: 69 in  Weight: 200 lb  BP: 104/60 mmHg  ______________________________________________________________________________  Procedure  Complete Portable Echo Adult. Optison (NDC #[id number]) given intravenously.  ______________________________________________________________________________  Interpretation Summary     The visual ejection fraction is 45-50%.  Left ventricular systolic function is mildly reduced.  There are regional wall motion abnormalities as specified.  ______________________________________________________________________________  Left Ventricle  The left ventricle is normal in size. There is normal left ventricular wall  thickness. Diastolic Doppler findings (E/E' ratio and/or other parameters)  suggest left ventricular filling pressures are indeterminate. The visual  ejection fraction is 45-50%. Left ventricular systolic function is mildly  reduced. The distal anterolateral wall appears to be severely hypokinetic. The  mid to distal  inferolateral wall appears to be severely hypokinetic. There are  regional wall motion abnormalities as specified.     Right Ventricle  The right ventricle is normal in size and function.     Atria  Normal left atrial size. Right atrial size is normal. There is no color  Doppler evidence of an atrial shunt.     Mitral Valve  There is trace mitral regurgitation.     Tricuspid Valve  There is trace tricuspid regurgitation. Right ventricular systolic pressure  could not be approximated due to inadequate tricuspid regurgitation.     Aortic Valve  The aortic valve is not well visualized. No aortic regurgitation is present.  No hemodynamically significant valvular aortic stenosis.     Pulmonic Valve  The pulmonic valve is not well visualized. There is no pulmonic valvular  regurgitation. Normal pulmonic valve velocity.     Vessels  The aortic root is normal size. Normal size ascending aorta. Inferior vena  cava not well visualized for estimation of right atrial pressure.     Pericardium  There is no pericardial effusion.     Rhythm  Sinus rhythm was noted.  ______________________________________________________________________________  MMode/2D Measurements & Calculations  IVSd: 0.90 cm  LVIDd: 4.8 cm  LVIDs: 2.8 cm  LVPWd: 0.76 cm  FS: 41.4 %  LV mass(C)d: 131.9 grams  LV mass(C)dI: 63.9 grams/m2  Ao root diam: 3.1 cm  asc Aorta Diam: 3.0 cm  LVOT diam: 2.3 cm  LVOT area: 4.2 cm2  Ao root diam index Ht(cm/m): 1.8  Ao root diam index BSA (cm/m2): 1.5  Asc Ao diam index BSA (cm/m2): 1.5  Asc Ao diam index Ht(cm/m): 1.7  EF Biplane: 54.7 %  LA Volume (BP): 47.0 ml  RWT: 0.32  TAPSE: 2.9 cm     Doppler Measurements & Calculations  MV E max jag: 94.9 cm/sec  MV A max jag: 45.1 cm/sec  MV E/A: 2.1  MV dec slope: 481.0 cm/sec2  MV dec time: 0.20 sec  E/E' av.3  Lateral E/e': .8  Medial E/e': 10.8     ______________________________________________________________________________  Report approved by: Lyle Trujillo,  Rosa 05/30/2024 03:53 PM

## 2024-06-04 NOTE — ANESTHESIA CARE TRANSFER NOTE
Patient: Kashmir Yao    Procedure: Procedure(s):  CORONARY ARTERY BYPASS GRAFT x 3 (LEFT INTERNAL MAMMARY ARTERY - LEFT ANTERIOR DESCENDING ARTERY; SAPHENOUS VEIN - OBTUSE MARGINAL ARTERY 1; SAPHENOUS VEIN -OBTUSE MARGINAL ARTERY 2) WITH ENDOSCOPIC SAPHENOUS VEIN HARVEST ON THE RIGHT LOWER EXTREMITY, AND ON CARDIOPULMONARY PUMP OXYGENATOR (INTRAOPERATIVE TRANSESOPHAGEAL ECHOCARDIOGRAM BY ANESTHESIOLOGIST)       Diagnosis: Coronary artery disease involving native heart with unstable angina pectoris, unspecified vessel or lesion type (H) [I25.110]  Diagnosis Additional Information: No value filed.    Anesthesia Type:   General     Note:    Oropharynx: endotracheal tube in place  Level of Consciousness: iatrogenic sedation  Patient oxygen source: ETT.    Independent Airway: airway patency not satisfactory and stable  Dentition: dentition unchanged  Vital Signs Stable: post-procedure vital signs reviewed and stable  Report to RN Given: handoff report given  Patient transferred to: ICU  Comments: Pt to ICU, ett in place, ambu with 10L O2 for transport, placed on vent in ICU  Vt - 500  Rate - 16  PEEP - 5  Patent IV  All monitors and alarms on.  VSS  Report and transfer of care to RN.  ICU Handoff: Call for PAUSE to initiate/utilize ICU HANDOFF, Identified Patient, Identified Responsible Provider, Reviewed the Pertinent Medical History, Discussed Surgical Course, Reviewed Intra-OP Anesthesia Management and Issues during Anesthesia, Set Expectations for Post Procedure Period and Allowed Opportunity for Questions and Acknowledgement of Understanding      Vitals:  Vitals Value Taken Time   BP     Temp     Pulse 60 06/04/24 1815   Resp     SpO2 100 % 06/04/24 1815   Vitals shown include unfiled device data.    Electronically Signed By: WANDA Curran CRNA  June 4, 2024  6:16 PM

## 2024-06-04 NOTE — ANESTHESIA PREPROCEDURE EVALUATION
Anesthesia Pre-Procedure Evaluation    Patient: Kashmir Yao   MRN: [id number] : 1955        Procedure : Procedure(s):  CORONARY ARTERY BYPASS GRAFT          Past Medical History:   Diagnosis Date    Anemia 2020    Arthritis     Bell's palsy 2020    Benign prostatic hyperplasia with incomplete bladder emptying 2020    Bilateral carpal tunnel syndrome 2020    CAD (coronary artery disease) 2010    a subtotally occluded obtuse marginal vessel which was stented with a drug-coated stent.  He had 50% to 60% LAD disease and 25% to 30% right coronary disease    Cognitive change 2020    Colonic polyps 2009    tubular adenomae with mildly dysplastic features    Common wart 2020    Disturbance of skin sensation     Encephalocele (H) 2009    left frontal    Epilepsy, partial (H) 2009    with secondary generalization    GERD (gastroesophageal reflux disease)     Heart attack (H)     5 years ago. Cardiology 3 months age    Herpes zoster with complication 10/07/2020    History of spinal cord injury     Hypertension     Iron deficiency 2020    Meningioma (H) 04/10/2023    Meningiomatosis (H) 2024    RIC (obstructive sleep apnea)     RIC (obstructive sleep apnea)     Paraneoplastic Antibody  positive[799.89BS] 2009    mildly elevated alpha 3 ganglionic acetylcholine receptor and JEOVANNY 65 receptor  antibody    Paronychia of toe, left 2023    Pedal edema 2020    Post herpetic neuralgia 2020    Primary osteoarthritis of both hands 2020    Rash 2024    Seizures (H)     Last seizure     Spinal stenosis     Stented coronary artery     Tobacco use disorder     Ulnar neuropathy of both upper extremities 2020    Vasculogenic erectile dysfunction 2020    Ventricular Assymetry 2009    likely congenital right lateral ventricular enlargement    Weight gain 2024      Past Surgical History:   Procedure Laterality Date    ARTHROPLASTY KNEE  Left 05/16/2016    Procedure: ARTHROPLASTY KNEE;  Surgeon: Chet Leonardo MD;  Location: [facility] OR    BACK SURGERY      CARPAL TUNNEL RELEASE RT/LT Right 03/11/2020    Right carpal tunnel release, Dr. Rick Frias, [facility]    CARPAL TUNNEL RELEASE RT/LT Left     COLONOSCOPY N/A 10/08/2020    Procedure: COLONOSCOPY WITH POLYPECTOMIES using cold jumbo forceps;  Surgeon: Kevin Song MD;  Location:  GI    CV CORONARY ANGIOGRAM N/A 5/30/2024    Procedure: Coronary Angiogram;  Surgeon: Crow Mukherjee MD;  Location: [facility] CARDIAC CATH LAB    CV INSTANTANEOUS WAVE-FREE RATIO N/A 5/30/2024    Procedure: Instantaneous Wave-Free Ratio;  Surgeon: Crow Mukherjee MD;  Location: [facility] CARDIAC CATH LAB    CYSTOSCOPY      ESOPHAGOSCOPY, GASTROSCOPY, DUODENOSCOPY (EGD), COMBINED  05/24/2012    Procedure:COMBINED ESOPHAGOSCOPY, GASTROSCOPY, DUODENOSCOPY (EGD); ESOPHAGOSCOPY, GASTROSCOPY, DUODENOSCOPY (EGD) ; Surgeon:GILLIAN JOHNSON; Location: GI    ESOPHAGOSCOPY, GASTROSCOPY, DUODENOSCOPY (EGD), COMBINED N/A 10/08/2020    Procedure: ESOPHAGOGASTRODUODENOSCOPY (EGD) (fv);  Surgeon: Kevin Song MD;  Location:  GI    KNEE SURGERY Left     repair of cartilage    RELEASE TRIGGER FINGER Left 10/19/2020    Left ring finger trigger finger release at A1 pulley, Dr. Rick Frias, [facility]    SPINE SURGERY      repair of herniated disc lumbar    STENT      trigger finger release Right 09/15/2020    Right thumb trigger finger release, Dr. Frias    ULNAR NERVE TRANSPOSITION Left     VENTRICULOSTOMY      [facility] ANESTH,DX ARTHROSCOPIC PROC KNEE JOINT      CARTILEGE REPAIR.    [facility] NONSPECIFIC PROCEDURE      stint in heart    ZZHC COLONOSCOPY THRU STOMA, DIAGNOSTIC  2009    tubular adenomae, recommended annual colonoscopy      Allergies   Allergen Reactions    Gabapentin      Fever      Morphine And Codeine      inability to sleep after taking codeine    Vicodin [Hydrocodone-Acetaminophen]       Perspiring, hyper, itchy      Social History     Tobacco Use    Smoking status: Former     Current packs/day: 0.00     Average packs/day: 0.5 packs/day for 20.0 years (10.0 ttl pk-yrs)     Types: Cigarettes     Start date: 1983     Quit date: 2003     Years since quittin.8     Passive exposure: Past    Smokeless tobacco: Never   Substance Use Topics    Alcohol use: No     Alcohol/week: 0.0 standard drinks of alcohol      Wt Readings from Last 1 Encounters:   24 89.8 kg (198 lb)        Anesthesia Evaluation            ROS/MED HX  ENT/Pulmonary:     (+) sleep apnea, moderate, uses CPAP,              tobacco use, Past use,                       Neurologic:       Cardiovascular: Comment: History of coronary artery disease and PCI presented to [facility] with chest pain was found to have an NSTEMI and underwent coronary angio which revealed multivessel disease with complex anatomy for which the patient was transferred to [facility] for surgery versus complex PCI    (+) Dyslipidemia hypertension- -  CAD - past MI - stent-      CHF etiology: ischemic Last EF: 40                         Previous cardiac testing   Echo: Date: Results:  Interpretation Summary     The visual ejection fraction is 45-50%.  Left ventricular systolic function is mildly reduced.  There are regional wall motion abnormalities as specified.  ______________________________________________________________________________  Left Ventricle  The left ventricle is normal in size. There is normal left ventricular wall  thickness. Diastolic Doppler findings (E/E' ratio and/or other parameters)  suggest left ventricular filling pressures are indeterminate. The visual  ejection fraction is 45-50%. Left ventricular systolic function is mildly  reduced. The distal anterolateral wall appears to be severely hypokinetic. The  mid to distal inferolateral wall appears to be severely hypokinetic. There are  regional wall motion  abnormalities as specified.     Right Ventricle  The right ventricle is normal in size and function.     Atria  Normal left atrial size. Right atrial size is normal. There is no color  Doppler evidence of an atrial shunt.     Mitral Valve  There is trace mitral regurgitation.     Tricuspid Valve  There is trace tricuspid regurgitation. Right ventricular systolic pressure  could not be approximated due to inadequate tricuspid regurgitation.     Aortic Valve  The aortic valve is not well visualized. No aortic regurgitation is present.  No hemodynamically significant valvular aortic stenosis.     Pulmonic Valve  The pulmonic valve is not well visualized. There is no pulmonic valvular  regurgitation. Normal pulmonic valve velocity.     Vessels  The aortic root is normal size. Normal size ascending aorta. Inferior vena  cava not well visualized for estimation of right atrial pressure.     Pericardium  There is no pericardial effusion.     Rhythm  Sinus rhythm was noted.  Stress Test:  Date: Results:    ECG Reviewed:  Date: Results:    Cath:  Date: Results:  Findings  Left main: Smooth 20% narrowing  LAD: Ostial 60% narrowing ( IFR 0.97)  mid LAD IFR 0.85 to 0.87 ( FFR 0.72)  Circumflex: 80% narrowing at origin of first marginal branch.  First marginal bifurcates into smaller sized superior ramus and large inferior ramus.  Subtotal thrombotic lesion in stented segment and inferior ramus.  80% mid vessel circumflex narrowing after takeoff of first marginal branch.  RCA dominant.  Moderate size.  60% mid vessel narrowing with IFR equals 0.99        With the patient's mild left ventricular dysfunction, the significant disease in the mid LAD and the complex nature of the patient's circumflex disease surgical consultation should be considered as a first step.  If the patient declines was not deemed to be a good surgical candidate, complex PCI in the circumflex to the inferior ramus of the first marginal branch and covering the  orifice of the superior ramus of the first marginal branch the mid circumflex could be offered.  Staged intervention the LAD would be considered.    METS/Exercise Tolerance:     Hematologic:     (+)      anemia,          Musculoskeletal:   (+)  arthritis,             GI/Hepatic:     (+) GERD,                   Renal/Genitourinary:     (+) renal disease,       BPH,      Endo:     (+)               Obesity,       Psychiatric/Substance Use:       Infectious Disease:       Malignancy:       Other:               OUTSIDE LABS:  CBC:   Lab Results   Component Value Date    WBC 9.1 06/03/2024    WBC 8.0 06/01/2024    HGB 10.6 (L) 06/03/2024    HGB 11.5 (L) 06/01/2024    HCT 31.1 (L) 06/03/2024    HCT 34.4 (L) 06/01/2024     06/03/2024     06/01/2024     BMP:   Lab Results   Component Value Date     06/03/2024     06/01/2024    POTASSIUM 5.1 06/03/2024    POTASSIUM 4.2 06/01/2024    CHLORIDE 106 06/03/2024    CHLORIDE 108 (H) 06/01/2024    CO2 20 (L) 06/03/2024    CO2 21 (L) 06/01/2024    BUN 28.1 (H) 06/03/2024    BUN 31.5 (H) 06/01/2024    CR 1.34 (H) 06/03/2024    CR 1.40 (H) 06/01/2024    GLC 92 06/03/2024     (H) 06/01/2024     COAGS:   Lab Results   Component Value Date    PTT 27 04/21/2016    INR 0.97 04/21/2016     POC:   Lab Results   Component Value Date     (H) 09/04/2020     HEPATIC:   Lab Results   Component Value Date    ALBUMIN 4.6 05/22/2024    PROTTOTAL 7.4 05/22/2024    ALT 38 05/22/2024    AST 23 05/22/2024    ALKPHOS 117 05/22/2024    BILITOTAL 0.4 05/22/2024     OTHER:   Lab Results   Component Value Date    A1C 4.8 05/29/2024    MOE 9.1 06/03/2024    LIPASE 30 05/22/2024    TSH 1.33 03/14/2024    T4 1.09 02/03/2020    CRP <2.9 07/24/2017    SED 12 03/14/2024       Anesthesia Plan    ASA Status:  4    NPO Status:  NPO Appropriate    Anesthesia Type: General.     - Airway: ETT   Induction: Intravenous.   Maintenance: Balanced.   Techniques and Equipment:     -  Airway: Video-Laryngoscope     - Lines/Monitors: Arterial Line, Central Line, PAC, CVP, CARLOS            CARLOS Absolute Contra-indication: NONE            CARLOS Relative Contra-indication: NONE     - Blood: Blood in Room     - Drips/Meds: Epinephrine, Norepi     Consents    Anesthesia Plan(s) and associated risks, benefits, and realistic alternatives discussed. Questions answered and patient/representative(s) expressed understanding.     - Discussed:     - Discussed with:  Patient      - Extended Intubation/Ventilatory Support Discussed: No.      - Patient is DNR/DNI Status: No     Use of blood products discussed: Yes.     - Discussed with: Patient.     - Consented: consented to blood products            Reason for refusal: other.     Postoperative Care    Pain management: IV analgesics, Oral pain medications, Multi-modal analgesia.        Comments:    Other Comments: Pre-induction arterial line. Video laryngoscope for intubation in OR. Post induction CARLOS placement followed up with central venous catheter placement with ultrasound/CARLOS guidance.     Epinephrine and Norepinephrine infusions for separation from cardiopulmonary bypass.            Jose Angel Wilcox, DO    I have reviewed the pertinent notes and labs in the chart from the past 30 days and (re)examined the patient.  Any updates or changes from those notes are reflected in this note.

## 2024-06-04 NOTE — ANESTHESIA PROCEDURE NOTES
Central Line/PA Catheter Placement    Pre-Procedure   Staff -        Anesthesiologist:  Jose Angel Wilcox DO       Performed By: anesthesiologist       Location: OR       Pre-Anesthestic Checklist: patient identified, IV checked, site marked, risks and benefits discussed, informed consent, monitors and equipment checked, pre-op evaluation and at physician/surgeon's request  Timeout:       Correct Patient: Yes        Correct Procedure: Yes        Correct Site: Yes        Correct Position: Yes        Correct Laterality: Yes   Line Placement:   This line was placed Post Induction    Procedure   Procedure: central line       Laterality: right       Insertion Site: internal jugular, right.       Patient Position: Trendelenburg  Sterile Prep        All elements of maximal sterile barrier technique followed       Patient Prep/Sterile Barriers: draped, hand hygiene, gloves , hat , mask , draped, gown, sterile gel and probe cover       Skin prep: Chloraprep  Insertion/Injection        Technique: ultrasound guided and Seldinger Technique        1. Ultrasound was used to evaluate the access site.       2. Vein evaluated via ultrasound for patency/adequacy.       3. Using real-time ultrasound the needle/catheter was observed entering the artery/vein.       4. Permanent image was captured and entered into the patient's record.       5. The visualized structures were anatomically normal.       6. There were no apparent abnormal pathologic findings.       Introducer Type: 9 Fr, 2-lumen MAC        Type: Introducer  Narrative         Secured by: suture       Tegaderm and Biopatch dressing used.       blood aspirated from all lumens,        All lumens flushed: Yes       Verification method: CARLOS (J wire visible in right caval-atrial junction in the mid-esophageal bicaval view on CARLOS.  Image Saved.)   Comments:  The patient was induced and put under general anesthesia. Airway was secured as documented. The CARLOS probe was lubricated  and carefully inserted. The right neck was prepped with 2% chlorhexidine and the patient was draped with a full length sterile sheet in the usual fashion. The right internal jugular vein was accessed with ultrasound guidance using an 18 gauge thin wall needle, a 1.75 inch catheter was advanced over the needle and the needle was removed. A wire was then advanced through the catheter. The J wire tip was visible in right caval-atrial junction in the mid-esophageal bicaval view on CARLOS.  Image was acquired. Once this was verified, the catheter was removed. After a small skin nick with scalpel, a 9 Estonian introducer was advanced over the wire via the seldinger technique. Blood was withdrawn from all lumens and flushed with normal saline.  The catheter was sutured in place and a sterile dressing was applied over the site prior to removal of drapes. This was atraumatic and there were no complications.

## 2024-06-04 NOTE — ANESTHESIA PROCEDURE NOTES
Airway       Patient location during procedure: OR       Procedure Start/Stop Times: 6/4/2024 1:01 PM  Staff -        Anesthesiologist:  Jose Angel Wilcox DO       CRNA: Otto Oliva APRN CRNA       Performed By: CRNA  Consent for Airway        Urgency: elective  Indications and Patient Condition       Indications for airway management: shama-procedural       Induction type:intravenous       Mask difficulty assessment: 2 - vent by mask + OA or adjuvant +/- NMBA    Final Airway Details       Final airway type: endotracheal airway       Successful airway: ETT - single  Endotracheal Airway Details        ETT size (mm): 8.0       Cuffed: yes       Successful intubation technique: video laryngoscopy       VL Blade Size: Crow 4       Grade View of Cords: 1       Adjucts: stylet       Position: Right       Measured from: lips       Secured at (cm): 23       Bite block used: None    Post intubation assessment        Placement verified by: capnometry, equal breath sounds and chest rise        Number of attempts at approach: 1       Number of other approaches attempted: 0       Secured with: tape       Ease of procedure: easy       Dentition: Intact and Unchanged    Medication(s) Administered   Medication Administration Time: 6/4/2024 1:01 PM

## 2024-06-04 NOTE — OP NOTE
Date of Service: June 4th, 2024    Referring Cardiologist: Crow Mukherjee MD    Preoperative Diagnosis: NSTEMI, severe multivessel coronary artery disease    Postoperative Diagnosis: NSTEMI, severe multivessel coronary artery disease    Surgeon: Melonie Morejon MD    Assistants: Sumit Steele MD, HERB Navarro    Name of Operation: Coronary artery bypass grafting x 3 with left internal mammary artery to the left anterior descending, reverse saphenous vein graft to the obtuse marginal 1 artery, reverse saphenous vein graft to the obtuse marginal 2 artery, endoscopic vein harvest from the left lower extremity, intraoperative CARLOS    Anesthesia: general orotracheal    Indications for Procedure: Mr. Yao is a very pleasant 69-year-old gentleman with a history of coronary artery disease, status post PCI in the remote past, who was admitted to the hospital with non-ST elevation MI.  He was found to have severe multivessel coronary artery disease.  He was taken to the operating today for coronary artery bypass grafting.    Operative Findings: The patient had an overall normal LV systolic size and function.  His left intramammary artery was good quality conduit with good flow.  It was roughly 2.5 mm in diameter.  The left greater saphenous vein was a good quality conduit as well, measuring 3 to 4 mm in diameter.  His obtuse marginal 2 artery was a  target and it was a large vessel.  The probe size was 1.5 mm.  It had moderate disease.  The obtuse marginal 1 artery was much smaller.  The probe size was 1 mm.  It had mild disease.  The mid to distal LAD was intramyocardial and was severely diseased with heavy calcification.  It was a large vessel.  The probe size was 1.5 mm.    Description of the Procedure: After informed consent was obtained, the patient was brought down to the operating room and was placed on the OR table in the supine position.  Intravenous and intra-arterial lines were begun.  While monitoring his  blood pressure and EKG tracing, he was anesthetized and intubated using a single-lumen endotracheal tube.  His entire chest, abdomen, both groins and legs were prepped down to the toes using multiple layers of DuraPrep.  He was draped in a sterile field.  A median sternotomy was performed, and in the meantime, the left greater saphenous vein was harvested endoscopically.  The left internal mammary artery was taken down.  Prior to clipping the LIMA distally, the patient was fully heparinized.  The sternal edges were retracted laterally, and the pericardium was opened to suspend the heart in a pericardial cradle.  The ascending aorta and the right atrial appendage were cannulated.  A retrograde cardioplegia catheter was placed into the coronary sinus without difficulty.  An antegrade needle/aortic root vent was placed in the ascending aorta as well.  After appropriate ACT level was achieved, cardiopulmonary bypass was established and the patient was kept normothermic during the entire operation.  The aorta was then crossclamped and antegrade cold blood cardioplegia was given to fully arrest the heart.  The patient went into good diastolic arrest without any LV distention.  Following this, intermittent retrograde cardioplegia doses were given, on average every 15 minutes for myocardial protection while the aorta was  cross-clamped.    The first coronary vessel grafted was the obtuse marginal 2 artery.  Conduit used was a saphenous vein.  This anastomosis was performed in an end-to-side fashion using running 7-0 Prolene.  Next, the obtuse marginal 1 artery was grafted.  Another saphenous vein was used as a conduit.  This anastomosis was performed in an end-to-side fashion as well using running 7-0 Prolene.  Finally, the LIMA to LAD anastomosis was performed.  This was done in an end-to-side fashion using running 7-0 Prolene.  This anastomosis was protected protected by tacking the LIMA pedicle down to the epicardium  using interrupted 6-0 Prolene x 2.  Retrograde hotshot was then given and the aortic cross-clamp was removed.  Aortic cross-clamp time was 66 minutes.  A partial clamp was then placed on the mid ascending aorta.  Two 4 mm aortotomies were made, and the 2 proximal vein anastomoses were performed in an end-to-side fashion using running 6-0 Prolene.  The partial clamp was removed.  The patient was then weaned of cardiopulmonary bypass with minimal inotropic and vasopressor support.  Total cardiopulmonary bypass time was 93 minutes.      Temporary ventricular pacing wire was placed in the RV muscle.  32 Serbian angled and straight chest tubes were placed in the mediastinum as well.  These were all brought out percutaneously below the sternotomy incision and secured to the skin using 2-0 Ethibond.  The right pleural space was slightly opened.  The mediastinum was irrigated with antibiotic saline and hemostasis was achieved.  The sternum was reapproximated using multiple interrupted single and double wires.  The incision was closed in layers of running Vicryl suture.  The skin was closed using 3-0 Vicryl and was sealed using Dermabond.    There were no intraoperative complications and the patient tolerated the operation well.  No blood products were given intraoperatively.  All sponge counts, needle counts, and instrument counts were correct x 2 at the end the operation.  EBL: 400 cc.  Specimen removed: none.  The patient was brought to the ICU in hemodynamically stable condition and remained intubated.      Melonie Morejon MD

## 2024-06-04 NOTE — BRIEF OP NOTE
[facility]    Brief Operative Note    Pre-operative diagnosis: Coronary artery disease involving native heart with unstable angina pectoris, unspecified vessel or lesion type (H) [I25.110]  Post-operative diagnosis Same as pre-operative diagnosis    Procedure: CORONARY ARTERY BYPASS GRAFT x 3 (LEFT INTERNAL MAMMARY ARTERY - LEFT ANTERIOR DESCENDING ARTERY; SAPHENOUS VEIN - OBTUSE MARGINAL ARTERY 1; SAPHENOUS VEIN -OBTUSE MARGINAL ARTERY 2) WITH ENDOSCOPIC SAPHENOUS VEIN HARVEST ON THE RIGHT LOWER EXTREMITY, AND ON CARDIOPULMONARY PUMP OXYGENATOR (INTRAOPERATIVE TRANSESOPHAGEAL ECHOCARDIOGRAM BY ANESTHESIOLOGIST), N/A - Heart    Surgeon: Surgeons and Role:     * Melonie Morejon MD - Primary     * Tiara Yoon PA-C - Assisting     * Sumit Steele MD - Assisting  Anesthesia: General   Estimated Blood Loss: 1000 ml    Drains: 1x left peural, 1x mediastinal     Specimens:   ID Type Source Tests Collected by Time Destination   A : STAT LABS POST CABG BYPASS Blood Line, arterial CBC WITH PLATELETS, BASIC METABOLIC PANEL, FIBRINOGEN ACTIVITY, PARTIAL THROMBOPLASTIN TIME, INR Otto Oliva, APRN CRNA 6/4/2024  4:58 PM      Findings:   CABG LIMA to LAD, SVG to OM1, OM2, severely diseased LAD.    Complications: None.  Implants: * No implants in log *

## 2024-06-04 NOTE — PLAN OF CARE
Goal Outcome Evaluation:  Neuro- A&OX4, forgetful   Most Recent Vitals- Temp: 98.3  F (36.8  C) Temp src: Oral BP: 111/70 Pulse: 64   Resp: 18 SpO2: 98 % O2 Device: None (Room air)   Tele/Cardiac- SR/SB HR 58-66  Resp- RA  Activity- UP with assist of 1, bed rest   Pain- chest pain and headache. Prn dilaudid, ultram, Tylenol and ice pack given with good effect.  Drips- nitroglycerin and heparin gtt. Hep 10a due at 0422  Drains/Tubes- P-IVX1  Skin- Intact   GI/- WDL  Aggression Color- Green  COVID status- Negative  Plan- CABG tomorrow at 1:30pm.  Misc- surgical bath wipe completed by TORREY.    Hansa Crawford RN

## 2024-06-04 NOTE — ANESTHESIA PROCEDURE NOTES
Arterial Line Procedure Note    Pre-Procedure   Staff -        Anesthesiologist:  Jose Angel Wilcox DO       Performed By: anesthesiologist       Location: pre-op       Pre-Anesthestic Checklist: patient identified, IV checked, site marked, risks and benefits discussed, informed consent, monitors and equipment checked, pre-op evaluation and at physician/surgeon's request  Timeout:       Correct Patient: Yes        Correct Procedure: Yes        Correct Site: Yes        Correct Position: Yes   Line Placement:   This line was placed Pre Induction starting at 6/4/2024 12:50 PM and ending at 6/4/2024 12:58 PM  Procedure   Procedure: arterial line       Laterality: left       Insertion Site: radial.  Sterile Prep        All elements of maximal sterile barrier technique followed       Patient Prep/Sterile Barriers: hand hygiene, sterile gloves, hat, mask, draped, draped       Skin prep: Chloraprep  Insertion/Injection        Technique: Seldinger Technique and ultrasound guided        1. Ultrasound was used to evaluate the access site.       2. Artery evaluated via ultrasound for patency/adequacy.       3. Using real-time ultrasound the needle/catheter was observed entering the artery/vein.       4. Permanent image was captured and entered into the patient's record.       5. The visualized structures were anatomically normal.       6. There were no apparent abnormal pathologic findings.       Catheter Type/Size: 20 gauge, 1.75 in/4.5 cm quick cath (integral wire)  Narrative        Tegaderm dressing used.       Complications: None apparent,        Arterial waveform: Yes        IBP within 10% of NIBP: Yes

## 2024-06-05 ENCOUNTER — APPOINTMENT (OUTPATIENT)
Dept: GENERAL RADIOLOGY | Facility: CLINIC | Age: 69
DRG: 236 | End: 2024-06-05
Attending: PHYSICIAN ASSISTANT
Payer: COMMERCIAL

## 2024-06-05 ENCOUNTER — APPOINTMENT (OUTPATIENT)
Dept: PHYSICAL THERAPY | Facility: CLINIC | Age: 69
DRG: 236 | End: 2024-06-05
Attending: PHYSICIAN ASSISTANT
Payer: COMMERCIAL

## 2024-06-05 DIAGNOSIS — Z95.1 S/P CABG (CORONARY ARTERY BYPASS GRAFT): Primary | ICD-10-CM

## 2024-06-05 LAB
ALBUMIN SERPL BCG-MCNC: 3.8 G/DL (ref 3.5–5.2)
ALLEN'S TEST: ABNORMAL
ALP SERPL-CCNC: 87 U/L (ref 40–150)
ALT SERPL W P-5'-P-CCNC: 44 U/L (ref 0–70)
ANION GAP SERPL CALCULATED.3IONS-SCNC: 14 MMOL/L (ref 7–15)
AST SERPL W P-5'-P-CCNC: 50 U/L (ref 0–45)
ATRIAL RATE - MUSE: 51 BPM
ATRIAL RATE - MUSE: 64 BPM
BASE EXCESS BLDA CALC-SCNC: -0.7 MMOL/L (ref -3–3)
BASE EXCESS BLDA CALC-SCNC: -1.8 MMOL/L (ref -3–3)
BASE EXCESS BLDA CALC-SCNC: -2.6 MMOL/L (ref -3–3)
BASE EXCESS BLDA CALC-SCNC: -4.7 MMOL/L (ref -3–3)
BASE EXCESS BLDA CALC-SCNC: -5 MMOL/L (ref -3–3)
BASE EXCESS BLDA CALC-SCNC: -5.9 MMOL/L (ref -3–3)
BASE EXCESS BLDA CALC-SCNC: 0.1 MMOL/L (ref -3–3)
BILIRUB SERPL-MCNC: 0.5 MG/DL
BUN SERPL-MCNC: 19.1 MG/DL (ref 8–23)
CA-I BLD-MCNC: 4.2 MG/DL (ref 4.4–5.2)
CA-I BLD-MCNC: 4.3 MG/DL (ref 4.4–5.2)
CA-I BLD-MCNC: 4.4 MG/DL (ref 4.4–5.2)
CA-I BLD-MCNC: 4.6 MG/DL (ref 4.4–5.2)
CA-I BLD-MCNC: 4.9 MG/DL (ref 4.4–5.2)
CA-I BLD-MCNC: 5.1 MG/DL (ref 4.4–5.2)
CA-I BLD-MCNC: 5.2 MG/DL (ref 4.4–5.2)
CALCIUM SERPL-MCNC: 8.6 MG/DL (ref 8.8–10.2)
CHLORIDE SERPL-SCNC: 106 MMOL/L (ref 98–107)
COHGB MFR BLD: 94.6 % (ref 95–96)
CREAT SERPL-MCNC: 1.22 MG/DL (ref 0.67–1.17)
DEPRECATED HCO3 PLAS-SCNC: 19 MMOL/L (ref 22–29)
DIASTOLIC BLOOD PRESSURE - MUSE: NORMAL MMHG
DIASTOLIC BLOOD PRESSURE - MUSE: NORMAL MMHG
EGFRCR SERPLBLD CKD-EPI 2021: 64 ML/MIN/1.73M2
ERYTHROCYTE [DISTWIDTH] IN BLOOD BY AUTOMATED COUNT: 14.9 % (ref 10–15)
GLUCOSE BLD-MCNC: 106 MG/DL (ref 70–99)
GLUCOSE BLD-MCNC: 117 MG/DL (ref 70–99)
GLUCOSE BLD-MCNC: 117 MG/DL (ref 70–99)
GLUCOSE BLD-MCNC: 141 MG/DL (ref 70–99)
GLUCOSE BLD-MCNC: 176 MG/DL (ref 70–99)
GLUCOSE BLD-MCNC: 187 MG/DL (ref 70–99)
GLUCOSE BLDC GLUCOMTR-MCNC: 123 MG/DL (ref 70–99)
GLUCOSE BLDC GLUCOMTR-MCNC: 136 MG/DL (ref 70–99)
GLUCOSE BLDC GLUCOMTR-MCNC: 137 MG/DL (ref 70–99)
GLUCOSE BLDC GLUCOMTR-MCNC: 138 MG/DL (ref 70–99)
GLUCOSE BLDC GLUCOMTR-MCNC: 144 MG/DL (ref 70–99)
GLUCOSE BLDC GLUCOMTR-MCNC: 145 MG/DL (ref 70–99)
GLUCOSE SERPL-MCNC: 142 MG/DL (ref 70–99)
HCO3 BLD-SCNC: 23 MMOL/L (ref 21–28)
HCO3 BLDA-SCNC: 20 MMOL/L (ref 21–28)
HCO3 BLDA-SCNC: 21 MMOL/L (ref 21–28)
HCO3 BLDA-SCNC: 21 MMOL/L (ref 21–28)
HCO3 BLDA-SCNC: 23 MMOL/L (ref 21–28)
HCO3 BLDA-SCNC: 23 MMOL/L (ref 21–28)
HCO3 BLDA-SCNC: 25 MMOL/L (ref 21–28)
HCT VFR BLD AUTO: 28.8 % (ref 40–53)
HGB BLD-MCNC: 10.7 G/DL (ref 13.3–17.7)
HGB BLD-MCNC: 10.8 G/DL (ref 13.3–17.7)
HGB BLD-MCNC: 8.3 G/DL (ref 13.3–17.7)
HGB BLD-MCNC: 8.4 G/DL (ref 13.3–17.7)
HGB BLD-MCNC: 8.8 G/DL (ref 13.3–17.7)
HGB BLD-MCNC: 9.1 G/DL (ref 13.3–17.7)
HGB BLD-MCNC: 9.8 G/DL (ref 13.3–17.7)
INTERPRETATION ECG - MUSE: NORMAL
INTERPRETATION ECG - MUSE: NORMAL
LACTATE BLD-SCNC: 0.5 MMOL/L (ref 0.7–2)
LACTATE BLD-SCNC: 0.7 MMOL/L (ref 0.7–2)
LACTATE BLD-SCNC: 0.7 MMOL/L (ref 0.7–2)
LACTATE BLD-SCNC: 0.8 MMOL/L (ref 0.7–2)
LACTATE BLD-SCNC: 1.1 MMOL/L (ref 0.7–2)
LACTATE BLD-SCNC: 1.2 MMOL/L (ref 0.7–2)
MAGNESIUM SERPL-MCNC: 2.1 MG/DL (ref 1.7–2.3)
MCH RBC QN AUTO: 29.1 PG (ref 26.5–33)
MCHC RBC AUTO-ENTMCNC: 34 G/DL (ref 31.5–36.5)
MCV RBC AUTO: 86 FL (ref 78–100)
O2/TOTAL GAS SETTING VFR VENT: 0 %
O2/TOTAL GAS SETTING VFR VENT: 100 %
O2/TOTAL GAS SETTING VFR VENT: 100 %
O2/TOTAL GAS SETTING VFR VENT: 70 %
O2/TOTAL GAS SETTING VFR VENT: 70 %
O2/TOTAL GAS SETTING VFR VENT: 80 %
OXYHGB MFR BLDA: 98 % (ref 92–100)
OXYHGB MFR BLDA: 99 % (ref 92–100)
P AXIS - MUSE: 62 DEGREES
P AXIS - MUSE: 63 DEGREES
PCO2 BLD: 32 MM HG (ref 35–45)
PCO2 BLDA: 38 MM HG (ref 35–45)
PCO2 BLDA: 41 MM HG (ref 35–45)
PCO2 BLDA: 42 MM HG (ref 35–45)
PCO2 BLDA: 43 MM HG (ref 35–45)
PCO2 BLDV: 46 MM HG (ref 40–50)
PH BLD: 7.46 [PH] (ref 7.35–7.45)
PH BLDA: 7.28 [PH] (ref 7.35–7.45)
PH BLDA: 7.34 [PH] (ref 7.35–7.45)
PH BLDA: 7.39 [PH] (ref 7.35–7.45)
PH BLDA: 7.4 [PH] (ref 7.35–7.45)
PHOSPHATE SERPL-MCNC: 4.6 MG/DL (ref 2.5–4.5)
PLATELET # BLD AUTO: 162 10E3/UL (ref 150–450)
PO2 BLD: 66 MM HG (ref 80–105)
PO2 BLDA: 239 MM HG (ref 80–105)
PO2 BLDA: 289 MM HG (ref 80–105)
PO2 BLDA: 336 MM HG (ref 80–105)
PO2 BLDA: 338 MM HG (ref 80–105)
PO2 BLDA: 434 MM HG (ref 80–105)
PO2 BLDA: 462 MM HG (ref 80–105)
PO2 BLDV: 53 MM HG (ref 25–47)
POTASSIUM BLD-SCNC: 3.3 MMOL/L (ref 3.4–5.3)
POTASSIUM BLD-SCNC: 3.6 MMOL/L (ref 3.4–5.3)
POTASSIUM BLD-SCNC: 3.6 MMOL/L (ref 3.4–5.3)
POTASSIUM BLD-SCNC: 4 MMOL/L (ref 3.4–5.3)
POTASSIUM BLD-SCNC: 4.2 MMOL/L (ref 3.4–5.3)
POTASSIUM BLD-SCNC: 4.4 MMOL/L (ref 3.4–5.3)
POTASSIUM SERPL-SCNC: 3.8 MMOL/L (ref 3.4–5.3)
PR INTERVAL - MUSE: 158 MS
PR INTERVAL - MUSE: 178 MS
PROT SERPL-MCNC: 5.8 G/DL (ref 6.4–8.3)
QRS DURATION - MUSE: 94 MS
QRS DURATION - MUSE: 98 MS
QT - MUSE: 420 MS
QT - MUSE: 476 MS
QTC - MUSE: 433 MS
QTC - MUSE: 438 MS
R AXIS - MUSE: -12 DEGREES
R AXIS - MUSE: -9 DEGREES
RBC # BLD AUTO: 3.37 10E6/UL (ref 4.4–5.9)
SAO2 % BLDA: 92 % (ref 92–100)
SAO2 % BLDA: >100 % (ref 95–96)
SAO2 % BLDV: 89 % (ref 70–75)
SODIUM BLD-SCNC: 142 MMOL/L (ref 135–145)
SODIUM BLD-SCNC: 143 MMOL/L (ref 135–145)
SODIUM BLD-SCNC: 144 MMOL/L (ref 135–145)
SODIUM SERPL-SCNC: 139 MMOL/L (ref 135–145)
SYSTOLIC BLOOD PRESSURE - MUSE: NORMAL MMHG
SYSTOLIC BLOOD PRESSURE - MUSE: NORMAL MMHG
T AXIS - MUSE: 18 DEGREES
T AXIS - MUSE: 35 DEGREES
VENTRICULAR RATE- MUSE: 51 BPM
VENTRICULAR RATE- MUSE: 64 BPM
WBC # BLD AUTO: 16.3 10E3/UL (ref 4–11)

## 2024-06-05 PROCEDURE — 97530 THERAPEUTIC ACTIVITIES: CPT | Mod: GP

## 2024-06-05 PROCEDURE — 80053 COMPREHEN METABOLIC PANEL: CPT | Performed by: PHYSICIAN ASSISTANT

## 2024-06-05 PROCEDURE — 82805 BLOOD GASES W/O2 SATURATION: CPT | Performed by: PHYSICIAN ASSISTANT

## 2024-06-05 PROCEDURE — 250N000013 HC RX MED GY IP 250 OP 250 PS 637: Performed by: PHYSICIAN ASSISTANT

## 2024-06-05 PROCEDURE — 99231 SBSQ HOSP IP/OBS SF/LOW 25: CPT | Mod: 24 | Performed by: INTERNAL MEDICINE

## 2024-06-05 PROCEDURE — 83735 ASSAY OF MAGNESIUM: CPT | Performed by: PHYSICIAN ASSISTANT

## 2024-06-05 PROCEDURE — 93005 ELECTROCARDIOGRAM TRACING: CPT

## 2024-06-05 PROCEDURE — 999N000287 HC ICU ADULT ROUNDING, EACH 10 MINS

## 2024-06-05 PROCEDURE — P9045 ALBUMIN (HUMAN), 5%, 250 ML: HCPCS | Mod: JZ | Performed by: PHYSICIAN ASSISTANT

## 2024-06-05 PROCEDURE — 120N000013 HC R&B IMCU

## 2024-06-05 PROCEDURE — 97110 THERAPEUTIC EXERCISES: CPT | Mod: GP

## 2024-06-05 PROCEDURE — 999N000157 HC STATISTIC RCP TIME EA 10 MIN

## 2024-06-05 PROCEDURE — 250N000011 HC RX IP 250 OP 636: Performed by: PHYSICIAN ASSISTANT

## 2024-06-05 PROCEDURE — 250N000013 HC RX MED GY IP 250 OP 250 PS 637: Performed by: SURGERY

## 2024-06-05 PROCEDURE — 71045 X-RAY EXAM CHEST 1 VIEW: CPT

## 2024-06-05 PROCEDURE — 85027 COMPLETE CBC AUTOMATED: CPT | Performed by: PHYSICIAN ASSISTANT

## 2024-06-05 PROCEDURE — 84100 ASSAY OF PHOSPHORUS: CPT | Performed by: PHYSICIAN ASSISTANT

## 2024-06-05 PROCEDURE — 97161 PT EVAL LOW COMPLEX 20 MIN: CPT | Mod: GP

## 2024-06-05 PROCEDURE — 82330 ASSAY OF CALCIUM: CPT | Performed by: PHYSICIAN ASSISTANT

## 2024-06-05 PROCEDURE — 94799 UNLISTED PULMONARY SVC/PX: CPT

## 2024-06-05 RX ORDER — NICOTINE POLACRILEX 4 MG
15-30 LOZENGE BUCCAL
Status: DISCONTINUED | OUTPATIENT
Start: 2024-06-05 | End: 2024-06-07

## 2024-06-05 RX ORDER — DEXTROSE MONOHYDRATE 25 G/50ML
25-50 INJECTION, SOLUTION INTRAVENOUS
Status: DISCONTINUED | OUTPATIENT
Start: 2024-06-05 | End: 2024-06-07

## 2024-06-05 RX ORDER — FUROSEMIDE 10 MG/ML
20 INJECTION INTRAMUSCULAR; INTRAVENOUS DAILY
Status: DISCONTINUED | OUTPATIENT
Start: 2024-06-05 | End: 2024-06-05

## 2024-06-05 RX ORDER — KETOROLAC TROMETHAMINE 15 MG/ML
15 INJECTION, SOLUTION INTRAMUSCULAR; INTRAVENOUS EVERY 6 HOURS PRN
Status: DISPENSED | OUTPATIENT
Start: 2024-06-05 | End: 2024-06-07

## 2024-06-05 RX ORDER — POTASSIUM CHLORIDE 1500 MG/1
20 TABLET, EXTENDED RELEASE ORAL ONCE
Status: COMPLETED | OUTPATIENT
Start: 2024-06-05 | End: 2024-06-05

## 2024-06-05 RX ADMIN — OXYCODONE HYDROCHLORIDE 10 MG: 5 TABLET ORAL at 17:15

## 2024-06-05 RX ADMIN — HYDRALAZINE HYDROCHLORIDE 10 MG: 20 INJECTION INTRAMUSCULAR; INTRAVENOUS at 09:51

## 2024-06-05 RX ADMIN — HYDROMORPHONE HYDROCHLORIDE 0.2 MG: 0.2 INJECTION, SOLUTION INTRAMUSCULAR; INTRAVENOUS; SUBCUTANEOUS at 02:48

## 2024-06-05 RX ADMIN — OXYCODONE HYDROCHLORIDE 5 MG: 5 TABLET ORAL at 00:02

## 2024-06-05 RX ADMIN — KETOROLAC TROMETHAMINE 15 MG: 15 INJECTION, SOLUTION INTRAMUSCULAR; INTRAVENOUS at 17:08

## 2024-06-05 RX ADMIN — OXYCODONE HYDROCHLORIDE 10 MG: 5 TABLET ORAL at 13:36

## 2024-06-05 RX ADMIN — KETOROLAC TROMETHAMINE 15 MG: 15 INJECTION, SOLUTION INTRAMUSCULAR; INTRAVENOUS at 09:41

## 2024-06-05 RX ADMIN — LIDOCAINE 1 PATCH: 4 PATCH TOPICAL at 19:44

## 2024-06-05 RX ADMIN — SENNOSIDES AND DOCUSATE SODIUM 1 TABLET: 50; 8.6 TABLET ORAL at 08:00

## 2024-06-05 RX ADMIN — HEPARIN SODIUM 5000 UNITS: 5000 INJECTION, SOLUTION INTRAVENOUS; SUBCUTANEOUS at 21:11

## 2024-06-05 RX ADMIN — CEFAZOLIN SODIUM 2 G: 2 INJECTION, SOLUTION INTRAVENOUS at 00:07

## 2024-06-05 RX ADMIN — HYDROMORPHONE HYDROCHLORIDE 0.4 MG: 0.2 INJECTION, SOLUTION INTRAMUSCULAR; INTRAVENOUS; SUBCUTANEOUS at 22:25

## 2024-06-05 RX ADMIN — METHOCARBAMOL 500 MG: 500 TABLET ORAL at 19:45

## 2024-06-05 RX ADMIN — SENNOSIDES AND DOCUSATE SODIUM 1 TABLET: 50; 8.6 TABLET ORAL at 21:11

## 2024-06-05 RX ADMIN — OXYCODONE HYDROCHLORIDE 10 MG: 5 TABLET ORAL at 21:07

## 2024-06-05 RX ADMIN — LAMOTRIGINE 250 MG: 100 TABLET ORAL at 21:07

## 2024-06-05 RX ADMIN — HYDROMORPHONE HYDROCHLORIDE 0.2 MG: 0.2 INJECTION, SOLUTION INTRAMUSCULAR; INTRAVENOUS; SUBCUTANEOUS at 08:29

## 2024-06-05 RX ADMIN — ACETAMINOPHEN 975 MG: 325 TABLET, FILM COATED ORAL at 09:33

## 2024-06-05 RX ADMIN — LAMOTRIGINE 250 MG: 100 TABLET ORAL at 08:00

## 2024-06-05 RX ADMIN — ASPIRIN 81 MG CHEWABLE TABLET 162 MG: 81 TABLET CHEWABLE at 00:05

## 2024-06-05 RX ADMIN — ATORVASTATIN CALCIUM 40 MG: 40 TABLET, FILM COATED ORAL at 19:45

## 2024-06-05 RX ADMIN — ASPIRIN 81 MG CHEWABLE TABLET 162 MG: 81 TABLET CHEWABLE at 08:00

## 2024-06-05 RX ADMIN — HYDROMORPHONE HYDROCHLORIDE 0.4 MG: 0.2 INJECTION, SOLUTION INTRAMUSCULAR; INTRAVENOUS; SUBCUTANEOUS at 19:44

## 2024-06-05 RX ADMIN — HYDROXYZINE HYDROCHLORIDE 10 MG: 10 TABLET ORAL at 22:26

## 2024-06-05 RX ADMIN — OXYCODONE HYDROCHLORIDE 5 MG: 5 TABLET ORAL at 05:37

## 2024-06-05 RX ADMIN — CEFAZOLIN SODIUM 2 G: 2 INJECTION, SOLUTION INTRAVENOUS at 15:08

## 2024-06-05 RX ADMIN — ACETAMINOPHEN 975 MG: 325 TABLET, FILM COATED ORAL at 18:30

## 2024-06-05 RX ADMIN — PANTOPRAZOLE SODIUM 40 MG: 40 TABLET, DELAYED RELEASE ORAL at 08:00

## 2024-06-05 RX ADMIN — OXYCODONE HYDROCHLORIDE 10 MG: 5 TABLET ORAL at 09:34

## 2024-06-05 RX ADMIN — HYDROMORPHONE HYDROCHLORIDE 0.4 MG: 0.2 INJECTION, SOLUTION INTRAMUSCULAR; INTRAVENOUS; SUBCUTANEOUS at 10:37

## 2024-06-05 RX ADMIN — METHOCARBAMOL 500 MG: 500 TABLET ORAL at 08:00

## 2024-06-05 RX ADMIN — METHOCARBAMOL 500 MG: 500 TABLET ORAL at 13:43

## 2024-06-05 RX ADMIN — HEPARIN SODIUM 5000 UNITS: 5000 INJECTION, SOLUTION INTRAVENOUS; SUBCUTANEOUS at 13:36

## 2024-06-05 RX ADMIN — HYDROXYZINE HYDROCHLORIDE 10 MG: 10 TABLET ORAL at 14:35

## 2024-06-05 RX ADMIN — ACETAMINOPHEN 975 MG: 325 TABLET, FILM COATED ORAL at 02:48

## 2024-06-05 RX ADMIN — POTASSIUM CHLORIDE 20 MEQ: 1500 TABLET, EXTENDED RELEASE ORAL at 05:37

## 2024-06-05 RX ADMIN — HYDROMORPHONE HYDROCHLORIDE 0.2 MG: 0.2 INJECTION, SOLUTION INTRAMUSCULAR; INTRAVENOUS; SUBCUTANEOUS at 17:07

## 2024-06-05 RX ADMIN — LINACLOTIDE 290 MCG: 290 CAPSULE, GELATIN COATED ORAL at 08:29

## 2024-06-05 RX ADMIN — ALBUMIN HUMAN 500 ML: 0.05 INJECTION, SOLUTION INTRAVENOUS at 12:51

## 2024-06-05 RX ADMIN — POLYETHYLENE GLYCOL 3350 17 G: 17 POWDER, FOR SOLUTION ORAL at 08:00

## 2024-06-05 RX ADMIN — TAMSULOSIN HYDROCHLORIDE 0.4 MG: 0.4 CAPSULE ORAL at 08:00

## 2024-06-05 RX ADMIN — CEFAZOLIN SODIUM 2 G: 2 INJECTION, SOLUTION INTRAVENOUS at 08:00

## 2024-06-05 RX ADMIN — FUROSEMIDE 20 MG: 10 INJECTION, SOLUTION INTRAMUSCULAR; INTRAVENOUS at 09:34

## 2024-06-05 ASSESSMENT — ACTIVITIES OF DAILY LIVING (ADL)
ADLS_ACUITY_SCORE: 31
ADLS_ACUITY_SCORE: 31
ADLS_ACUITY_SCORE: 30
ADLS_ACUITY_SCORE: 30
ADLS_ACUITY_SCORE: 24
ADLS_ACUITY_SCORE: 24
ADLS_ACUITY_SCORE: 30
ADLS_ACUITY_SCORE: 31
ADLS_ACUITY_SCORE: 30
ADLS_ACUITY_SCORE: 31
ADLS_ACUITY_SCORE: 24
ADLS_ACUITY_SCORE: 24
ADLS_ACUITY_SCORE: 30
ADLS_ACUITY_SCORE: 30
ADLS_ACUITY_SCORE: 31

## 2024-06-05 NOTE — PROGRESS NOTES
[facility] Intensive Care Note    Date of Admission:  5/30/2024     Assessment & Plan   Kashmir Yao is a 69 year old male who was admitted on 5/30/2024.     Neurology:  Postoperative state: Having some difficulty with pain management.  Seen in clinic with complaints of difficulty with cognition after undergoing neurosurgery 4 years ago but no history of delirium and no delirium here  -Administer antiseizure medications  - Multimodal pain therapy.  May benefit from a dose of Toradol.  Will discussed with surgeons.  - Further management pending clinical course    Cardiovascular:  Coronary artery disease and vasoplegic shock: underwent three vessel bypass today. EF normal.  No longer on vasopressors  - Postoperative care per protocol    Pulmonary:        Postoperative oxygen requirement: Resolved.  Extubated evening of 6/4   -Pulmonary hygiene    Vent Mode: CMV/AC  (Continuous Mandatory Ventilation/ Assist Control)  FiO2 (%): 50 %  Resp Rate (Set): 18 breaths/min  Tidal Volume (Set, mL): 500 mL  PEEP (cm H2O): 5 cmH2O  Resp: 16      Renal  Non gap acidosis and mild KELI: [place] acidosis likely secondary to secondary to IVF.  Creatinine stable.  Actic acid is normal  - IVF until able to take PO    ID:         No issues : received surgical prophylaxis  -      Endocrine:  Stress hyperglycemia: no history of diabetes  - insulin per protocol    Heme/Onc:  Acute blood loss anemia: as expected from such a major surgery. No evidence of bleeding.  Hemoglobin stable  - Monitor for postoperative bleeding.     Pam De La Rosa MD        History of Present Illness and recent events  Kashmir Yao is a 69 year old male who presents with need for postoperative care after undergoing three vessel bypass surgery on June 4. extubated for this.  Doing well from a medical standpoint but reticulating to nurse that he did not feel he was getting appropriate attention  Social History      Tobacco Use    Smoking status: Former     Current packs/day: 0.00     Average packs/day: 0.5 packs/day for 20.0 years (10.0 ttl pk-yrs)     Types: Cigarettes     Start date: 1983     Quit date: 2003     Years since quittin.8     Passive exposure: Past    Smokeless tobacco: Never   Vaping Use    Vaping status: Never Used   Substance Use Topics    Alcohol use: No     Alcohol/week: 0.0 standard drinks of alcohol    Drug use: No         Physical Exam   Temp: 100.4  F (38  C) Temp src: Bladder Temp  Min: 97  F (36.1  C)  Max: 100.6  F (38.1  C) BP: 103/60 Pulse: 64   Resp: 16 SpO2: 95 % O2 Device: None (Room air) Oxygen Delivery: 5 LPM  Vitals:    24 0638 24 0517 24 0400   Weight: 89.8 kg (198 lb) 90.1 kg (198 lb 9.6 oz) 91.2 kg (201 lb 1 oz)     I/O last 3 completed shifts:  In: 3492.07 [I.V.:2322.07; Other:420; IV Piggyback:500]  Out: 3135 [Urine:1735; Blood:1030; Chest Tube:370]    Constitutional: stated age,   Respiratory:  shallow respirations  Cardiovascular: RRR, sternotomy bandage clean  GI: benign  Skin: normal  Neurologic: uncomfortable appearing    Medications   Current Facility-Administered Medications   Medication Dose Route Frequency Provider Last Rate Last Admin    dexmedeTOMIDine (PRECEDEX) 4 mcg/mL in sodium chloride 0.9 % 100 mL infusion  0.2-0.7 mcg/kg/hr Intravenous Continuous Tiara Yoon PA-C        EPINEPHrine (ADRENALIN) 5 mg in  mL infusion  0.01-0.1 mcg/kg/min Intravenous Continuous PRN Tiara Yoon PA-C        insulin regular (MYXREDLIN) 1 unit/mL infusion  0-24 Units/hr Intravenous Continuous Tiara Yoon PA-C        lactated ringers infusion   Intravenous Continuous YoonTiara pennington PA-C 10 mL/hr at 24 1925 New Bag at 24    nitroGLYcerin 50 mg in D5W 250 mL (adult std) infusion CENTRAL   mcg/min Intravenous Continuous PRN Tiara Yoon PA-C        norepinephrine (LEVOPHED) 16 mg in  mL infusion MAX CONC  CENTRAL LINE  0.01-0.4 mcg/kg/min Intravenous Continuous PRN Tiara Yoon PA-C        phenylephrine (JANE-SYNEPHRINE) 50 mg in NaCl 0.9 % 250 mL infusion  0.1-6 mcg/kg/min Intravenous Continuous Pam De La Rosa MD   Stopped at 06/05/24 0456    Reason beta blocker order not selected   Does not apply DOES NOT GO TO MAR Tiara Yoon PA-C        vasopressin 0.2 units/mL in NS (PITRESSIN) standard conc infusion  0.5-4 Units/hr Intravenous Continuous PRN Tiara Yoon PA-C         Current Facility-Administered Medications   Medication Dose Route Frequency Provider Last Rate Last Admin    acetaminophen (TYLENOL) tablet 975 mg  975 mg Oral Q8H YoonTiara pennington PA-C   975 mg at 06/05/24 0248    aspirin (ASA) chewable tablet 162 mg  162 mg Oral or NG Tube Daily YoonTiara pennington PA-C   162 mg at 06/05/24 0005    atorvastatin (LIPITOR) tablet 40 mg  40 mg Oral QPM YoonTiara pennington PA-C   40 mg at 06/04/24 2059    ceFAZolin (ANCEF) 2 g in 100 mL D5W intermittent infusion  2 g Intravenous Q8H Tiara Yoon PA-C 200 mL/hr at 06/05/24 0007 2 g at 06/05/24 0007    heparin ANTICOAGULANT injection 5,000 Units  5,000 Units Subcutaneous Q8H Tiara Yoon PA-C        lamoTRIgine (LaMICtal) tablet 250 mg  250 mg Oral BID YoonTiara pennington PA-C   250 mg at 06/04/24 2059    Lidocaine (LIDOCARE) 4 % Patch 1-2 patch  1-2 patch Transdermal Q24H YoonTiara pennington PA-C   1 patch at 06/04/24 1930    linaclotide (LINZESS) capsule 290 mcg  290 mcg Oral QAM AC Tiara Yoon PA-C   290 mcg at 06/03/24 0652    pantoprazole (PROTONIX) 2 mg/mL suspension 40 mg  40 mg Oral or NG Tube Daily YoonTiara PA-C   40 mg at 06/04/24 1855    Or    pantoprazole (PROTONIX) EC tablet 40 mg  40 mg Oral Daily Tiara Yoon PA-C        polyethylene glycol (MIRALAX) Packet 17 g  17 g Oral Daily Tiara Yoon PA-C        senna-docusate (SENOKOT-S/PERICOLACE) 8.6-50 MG per tablet 1 tablet  1 tablet Oral BID Tiara Yoon PA-C   1  tablet at 06/04/24 2059    sodium chloride (PF) 0.9% PF flush 3 mL  3 mL Intracatheter Q8H Tiara Yoon PA-C   3 mL at 06/04/24 1925    tamsulosin (FLOMAX) capsule 0.4 mg  0.4 mg Oral Daily Tiara Yoon PA-C   0.4 mg at 06/03/24 0836       Data   Recent Labs   Lab 06/05/24  0413 06/05/24 0412 06/05/24  0018 06/04/24 2005 06/04/24 1824 06/04/24  1821 06/04/24  1658   WBC 16.3*  --   --   --  15.5*  --  21.2*   HGB 9.8*  --   --   --  9.4*  --  8.8*   MCV 86  --   --   --  85  --  85     --   --   --  127*  --  185   INR  --   --   --   --  1.25*  --  1.35*     --   --   --  143  --  141   POTASSIUM 3.8  --   --   --  3.3*  --  3.6   CHLORIDE 106  --   --   --  110*  --  109*   CO2 19*  --   --   --  20*  --  20*   BUN 19.1  --   --   --  18.9  --  18.2   CR 1.22*  --   --   --  1.24*  --  1.23*   ANIONGAP 14  --   --   --  13  --  12   MOE 8.6*  --   --   --  8.6*  --  9.3   * 137* 144*   < > 143*   < > 188*   ALBUMIN 3.8  --   --   --  3.8  --   --    PROTTOTAL 5.8*  --   --   --  5.4*  --   --    BILITOTAL 0.5  --   --   --  0.5  --   --    ALKPHOS 87  --   --   --  84  --   --    ALT 44  --   --   --  49  --   --    AST 50*  --   --   --  42  --   --     < > = values in this interval not displayed.     Recent Results (from the past 24 hour(s))   CARLOS with Report    Narrative    Jose Angel Wilcox DO     6/4/2024  1:38 PM  Perioperative CARLOS Procedure Note    Staff -        Anesthesiologist:  Jose Angel Wilcox DO       Performed By: anesthesiologist  Preanesthesia Checklist:  Patient identified, IV assessed, risks and   benefits discussed, monitors and equipment assessed, procedure being   performed at surgeon's request and anesthesia consent obtained.    CARLOS Probe Insertion    Probe Status PRE Insertion: NO obvious damage  Probe type:  Adult 3D  Bite block used:   Soft  Insertion Technique: Easy, no oropharyngeal manipulation  Insertion complications: None obvious  Billing  Report:A CARLOS report is NOT being generated.  Probe Status POST Removal: NO obvious damage         XR Chest Port 1 View    Narrative    EXAM: XR CHEST PORT 1 VIEW  LOCATION: [facility]  DATE: 6/4/2024    INDICATION: Post Op CVTS Surgery  COMPARISON: CT 5/29/2024      Impression    IMPRESSION: Postsurgical changes from interval coronary artery bypass grafting. Endotracheal tube terminates 4.3 cm above the galilea. Right neck central venous catheter tip in the mid SVC. Enteric decompression tube descends below the diaphragm, tip   outside the field-of-view. Ascending left chest tube with tip at the left lung apex.    Hypoinflated lungs with probable left basilar atelectasis. No pleural effusion or pneumothorax. Normal cardiomediastinal silhouette.   XR Abdomen Port 1 View    Narrative    EXAM: XR ABDOMEN PORT 1 VIEW  LOCATION: [facility]  DATE: 6/4/2024    INDICATION: feeding tube placement verification  COMPARISON: Chest x-ray at the same time.      Impression    IMPRESSION: There is an NG tube in the stomach in good position. Tip is in the proximal body.    A feeding tube is not seen either on the chest x-ray or the abdomen film.    Changes from a sternotomy noted with chest tubes overlying the mid abdomen.    Bowel gas pattern is unremarkable.       XR Chest Port 1 View    Narrative    EXAM: XR CHEST PORTABLE 1 VIEW  LOCATION: [facility]  DATE: 06/05/2024    INDICATION: Postop CVTS surgery.  COMPARISON: Chest x-ray on 06/04/2024.      Impression    IMPRESSION: Single AP view of the chest was obtained. Right IJ central catheter tip projects over low SVC. Postsurgical changes of cardiac surgery with median sternotomy wires and surgical clips. Stable chest tubes/mediastinal drains. Mildly enlarged   cardiac silhouette. Small bilateral pleural effusions and associated basilar atelectasis/consolidation. No significant pneumothorax.

## 2024-06-05 NOTE — PROVIDER NOTIFICATION
Notified Guido [facility] PA about mild hypotension since returning to bed after working with PT.  MAP 55-60, [id number].  Asymptomatic.  NIBP and arterial line correlate.    Verbal order for 500cc 5% albumin.

## 2024-06-05 NOTE — PLAN OF CARE
"Neuro: intact    CV: tele SR, hydralazine given x1 for SBP > 140. Pacing wires capped.     Resp: LS clear on room air. IS to 750    GI: BS hypoactive, last BM 2 days ago - miralax and senna given, poor appetite - protein drinks ordered    : pearson with adequate urine output, lasix given x1    Skin: sternotomy, right groin and knee incisions STEPHEN, WDL.    Activity: assist of 2 + gait belt    Additional: PRN robaxin, oxycodone, toradol and dilaudid + scheduled tylenol given for pain. afebrile, electrolytes WDL, wife updated by phone.    -----------------------------------------------------------------------  Goal Outcome Evaluation:    Problem: Adult Inpatient Plan of Care  Goal: Plan of Care Review  Description: The Plan of Care Review/Shift note should be completed every shift.  The Outcome Evaluation is a brief statement about your assessment that the patient is improving, declining, or no change.  This information will be displayed automatically on your shift  note.  Outcome: Progressing  Goal: Patient-Specific Goal (Individualized)  Description: You can add care plan individualizations to a care plan. Examples of Individualization might be:  \"Parent requests to be called daily at 9am for status\", \"I have a hard time hearing out of my right ear\", or \"Do not touch me to wake me up as it startles  me\".  Outcome: Progressing  Goal: Absence of Hospital-Acquired Illness or Injury  Outcome: Progressing  Intervention: Identify and Manage Fall Risk  Recent Flowsheet Documentation  Taken 6/5/2024 0800 by Ananya Palomo RN  Safety Promotion/Fall Prevention:   activity supervised   assistive device/personal items within reach   clutter free environment maintained   increased rounding and observation   increase visualization of patient   lighting adjusted   patient and family education   room near nurse's station   room organization consistent   safety round/check completed   supervised activity   nonskid shoes/slippers " when out of bed   mobility aid in reach   treat reversible contributory factors   treat underlying cause   room door open  Intervention: Prevent Skin Injury  Recent Flowsheet Documentation  Taken 6/5/2024 0941 by Ananya Palomo RN  Body Position: weight shifting  Taken 6/5/2024 0800 by Ananya Palomo RN  Body Position: (due to pain) refuses positioning  Skin Protection: silicone foam dressing in place  Device Skin Pressure Protection:   tubing/devices free from skin contact   positioning supports utilized  Intervention: Prevent Infection  Recent Flowsheet Documentation  Taken 6/5/2024 0800 by Ananya Palomo RN  Infection Prevention:   single patient room provided   hand hygiene promoted   environmental surveillance performed   equipment surfaces disinfected   rest/sleep promoted  Goal: Optimal Comfort and Wellbeing  Outcome: Progressing  Intervention: Monitor Pain and Promote Comfort  Recent Flowsheet Documentation  Taken 6/5/2024 0941 by Ananya Palomo RN  Pain Management Interventions:   medication (see MAR)   cold applied  Taken 6/5/2024 0934 by Ananya Palomo RN  Pain Management Interventions:   medication (see MAR)   cold applied  Taken 6/5/2024 0829 by Ananya Palomo RN  Pain Management Interventions:   medication (see MAR)   cold applied  Intervention: Provide Person-Centered Care  Recent Flowsheet Documentation  Taken 6/5/2024 0800 by Ananya Palomo RN  Trust Relationship/Rapport:   care explained   choices provided   questions answered   questions encouraged   reassurance provided   thoughts/feelings acknowledged  Goal: Readiness for Transition of Care  Outcome: Progressing     Problem: Risk for Delirium  Goal: Optimal Coping  Outcome: Progressing  Intervention: Optimize Psychosocial Adjustment to Delirium  Recent Flowsheet Documentation  Taken 6/5/2024 0800 by Ananya Palomo RN  Supportive Measures:   active listening utilized   positive reinforcement provided   relaxation  techniques promoted   self-care encouraged  Family/Support System Care:   involvement promoted   presence promoted   self-care encouraged   support provided  Goal: Improved Behavioral Control  Outcome: Progressing  Intervention: Prevent and Manage Agitation  Recent Flowsheet Documentation  Taken 6/5/2024 0800 by Ananya Palomo RN  Environment Familiarity/Consistency: daily routine followed  Intervention: Minimize Safety Risk  Recent Flowsheet Documentation  Taken 6/5/2024 0800 by Ananya Palomo RN  Communication Enhancement Strategies:   family involved in communication plan   family/caregiver assisted with communication   call light answered in person  Enhanced Safety Measures:   room near unit station   review medications for side effects with activity   pain management   patient/family teach back on injury risk   assistive devices when indicated   monitor patients coagulation values   Pre/Post Op education on fall prevention  Trust Relationship/Rapport:   care explained   choices provided   questions answered   questions encouraged   reassurance provided   thoughts/feelings acknowledged  Goal: Improved Attention and Thought Clarity  Outcome: Progressing  Intervention: Maximize Cognitive Function  Recent Flowsheet Documentation  Taken 6/5/2024 0800 by Ananya Palomo RN  Sensory Stimulation Regulation:   care clustered   lighting decreased   auditory stimulation minimized   quiet environment promoted  Reorientation Measures:   calendar in view   clock in view   familiar social contact encouraged  Goal: Improved Sleep  Outcome: Progressing     Problem: Cardiovascular Surgery  Goal: Improved Activity Tolerance  Outcome: Progressing  Intervention: Optimize Tolerance for Activity  Recent Flowsheet Documentation  Taken 6/5/2024 0800 by Ananya Palomo RN  Environmental Support: calm environment promoted  Goal: Optimal Coping with Heart Surgery  Outcome: Progressing  Intervention: Support Psychosocial  Response to Surgery  Recent Flowsheet Documentation  Taken 6/5/2024 0800 by Ananya Palomo RN  Supportive Measures:   active listening utilized   positive reinforcement provided   relaxation techniques promoted   self-care encouraged  Family/Support System Care:   involvement promoted   presence promoted   self-care encouraged   support provided  Goal: Absence of Bleeding  Outcome: Progressing  Intervention: Monitor and Manage Bleeding  Recent Flowsheet Documentation  Taken 6/5/2024 0800 by Ananya Palomo RN  Bleeding Management:   dressing monitored   movement restricted  Goal: Effective Bowel Elimination  Outcome: Progressing  Intervention: Enhance Bowel Motility and Elimination  Recent Flowsheet Documentation  Taken 6/5/2024 0800 by Ananya Palomo RN  Bowel Motility Enhancement:   ambulation promoted   fluid intake encouraged   oral intake encouraged  Bowel Elimination Management:   hygiene measures promoted   relaxation techniques promoted   toileting offered  Goal: Effective Cardiac Function  Outcome: Progressing  Intervention: Optimize Cardiac Output and Blood Flow  Recent Flowsheet Documentation  Taken 6/5/2024 0800 by Ananya Palomo RN  Dysrhythmia Management: pacing wires maintained  Goal: Optimal Cerebral Tissue Perfusion  Outcome: Progressing  Intervention: Protect and Optimize Cerebral Perfusion  Recent Flowsheet Documentation  Taken 6/5/2024 0941 by Ananya Palomo RN  Head of Bed (HOB) Positioning: HOB at 30-45 degrees  Taken 6/5/2024 0800 by Ananya Palomo RN  Sensory Stimulation Regulation:   care clustered   lighting decreased   auditory stimulation minimized   quiet environment promoted  Cerebral Perfusion Promotion: blood pressure monitored  Glycemic Management: blood glucose monitored  Head of Bed (HOB) Positioning: HOB at 30-45 degrees  Goal: Fluid and Electrolyte Balance  Outcome: Progressing  Intervention: Monitor and Manage Fluid and Electrolyte Balance  Recent  Flowsheet Documentation  Taken 6/5/2024 0800 by Ananya Palomo RN  Fluid/Electrolyte Management: fluids provided  Goal: Blood Glucose Level Within Targeted Range  Outcome: Progressing  Intervention: Optimize Glycemic Control  Recent Flowsheet Documentation  Taken 6/5/2024 0800 by Ananya Palomo RN  Glycemic Management: blood glucose monitored  Goal: Absence of Infection Signs and Symptoms  Outcome: Progressing  Intervention: Prevent or Manage Infection  Recent Flowsheet Documentation  Taken 6/5/2024 0800 by Ananya Palomo RN  Infection Prevention:   single patient room provided   hand hygiene promoted   environmental surveillance performed   equipment surfaces disinfected   rest/sleep promoted  Goal: Anesthesia/Sedation Recovery  Outcome: Progressing  Intervention: Optimize Anesthesia Recovery  Recent Flowsheet Documentation  Taken 6/5/2024 0800 by Ananya Palomo RN  Safety Promotion/Fall Prevention:   activity supervised   assistive device/personal items within reach   clutter free environment maintained   increased rounding and observation   increase visualization of patient   lighting adjusted   patient and family education   room near nurse's station   room organization consistent   safety round/check completed   supervised activity   nonskid shoes/slippers when out of bed   mobility aid in reach   treat reversible contributory factors   treat underlying cause   room door open  Administration (IS): instruction provided, follow-up  Reorientation Measures:   calendar in view   clock in view   familiar social contact encouraged  Level Incentive Spirometer (mL): 750  Number of Repetitions (IS): 10  Patient Tolerance (IS): fair  Goal: Acceptable Pain Control  Outcome: Progressing  Intervention: Prevent or Manage Pain  Recent Flowsheet Documentation  Taken 6/5/2024 0941 by Ananya Palomo RN  Pain Management Interventions:   medication (see MAR)   cold applied  Taken 6/5/2024 0934 by Ananya Palomo  R, RN  Pain Management Interventions:   medication (see MAR)   cold applied  Taken 6/5/2024 0829 by Ananya Palomo RN  Pain Management Interventions:   medication (see MAR)   cold applied  Goal: Effective Urinary Elimination  Outcome: Progressing  Intervention: Monitor and Manage Urinary Retention  Recent Flowsheet Documentation  Taken 6/5/2024 0800 by Ananya Palomo RN  Urinary Elimination Promotion: catheter patency maintained  Goal: Effective Oxygenation and Ventilation  Outcome: Progressing  Intervention: Promote Airway Secretion Clearance  Recent Flowsheet Documentation  Taken 6/5/2024 0800 by Ananya Palomo RN  Administration (IS): instruction provided, follow-up  Level Incentive Spirometer (mL): 750  Cough And Deep Breathing: done with encouragement  Number of Repetitions (IS): 10  Airway/Ventilation Management:   airway patency maintained   pulmonary hygiene promoted   calming measures promoted  Patient Tolerance (IS): fair  Intervention: Optimize Oxygenation and Ventilation  Recent Flowsheet Documentation  Taken 6/5/2024 0800 by Ananya Palomo RN  Chest Tube Safety:   all tubing connections taped   all connections secured     Problem: Comorbidity Management  Goal: Maintenance of Seizure Control  Outcome: Progressing  Intervention: Maintain Seizure Symptom Control  Recent Flowsheet Documentation  Taken 6/5/2024 0800 by Ananya Palomo RN  Sensory Stimulation Regulation:   care clustered   lighting decreased   auditory stimulation minimized   quiet environment promoted  Seizure Precautions:   activity supervised   clutter-free environment maintained  Medication Review/Management:   medications reviewed   infusion titrated   high-risk medications identified  Goal: Blood Pressure in Desired Range  Outcome: Progressing  Intervention: Maintain Blood Pressure Management  Recent Flowsheet Documentation  Taken 6/5/2024 0800 by Ananya Palomo RN  Medication Review/Management:   medications  reviewed   infusion titrated   high-risk medications identified     Problem: Acute Coronary Syndrome  Goal: Optimal Adaptation to Illness  Outcome: Progressing  Intervention: Support Adjustment to Life-Change Event  Recent Flowsheet Documentation  Taken 6/5/2024 0800 by Ananya Palomo RN  Supportive Measures:   active listening utilized   positive reinforcement provided   relaxation techniques promoted   self-care encouraged  Family/Support System Care:   involvement promoted   presence promoted   self-care encouraged   support provided  Goal: Absence of Cardiac-Related Pain  Outcome: Progressing  Goal: Normalized Cardiac Rhythm  Outcome: Progressing  Intervention: Monitor and Manage Cardiac Rhythm Effect  Recent Flowsheet Documentation  Taken 6/5/2024 0800 by Ananya Palomo RN  Dysrhythmia Management: pacing wires maintained  Goal: Effective Cardiac Pump Function  Outcome: Progressing  Intervention: Optimize Cardiac Function and Blood Flow  Recent Flowsheet Documentation  Taken 6/5/2024 0800 by Ananya Palomo RN  Fluid/Electrolyte Management: fluids provided  Goal: Adequate Tissue Perfusion  Outcome: Progressing  Intervention: Optimize Cardiac Tissue Perfusion  Recent Flowsheet Documentation  Taken 6/5/2024 0800 by Ananya Palomo RN  Airway/Ventilation Management:   airway patency maintained   pulmonary hygiene promoted   calming measures promoted  Activity Management:   activity adjusted per tolerance   activity encouraged  Environmental Support: calm environment promoted

## 2024-06-05 NOTE — PROGRESS NOTES
Patient not seen prior to CABG today, he will be transitioned to ICU afterwards with CT Surgery taking over service.    Hospitalist will follow peripherally for now.    Celeste Mann MD MPH

## 2024-06-05 NOTE — PROGRESS NOTES
06/05/24 1100   Appointment Info   Signing Clinician's Name / Credentials (PT) Dena Chance, ANA   Living Environment   People in Home spouse   Current Living Arrangements house   Home Accessibility stairs within home   Number of Stairs, Within Home, Primary greater than 10 stairs   Stair Railings, Within Home, Primary railings safe and in good condition;railing on right side (ascending)   Transportation Anticipated family or friend will provide   Self-Care   Usual Activity Tolerance good   Current Activity Tolerance fair   Regular Exercise No   Equipment Currently Used at Home shower chair   Fall history within last six months no   General Information   Onset of Illness/Injury or Date of Surgery 06/04/24   Referring Physician Dr. Morejon   Patient/Family Therapy Goals Statement (PT) To go home   Pertinent History of Current Problem (include personal factors and/or comorbidities that impact the POC) Pt is a 69 year old male POD #1 CABG xx 3.   Existing Precautions/Restrictions sternal   Cognition   Affect/Mental Status (Cognition) WFL   Orientation Status (Cognition) oriented x 4   Follows Commands (Cognition) WFL   Pain Assessment   Patient Currently in Pain Yes, see Vital Sign flowsheet   Range of Motion (ROM)   Range of Motion ROM is WFL   Strength (Manual Muscle Testing)   Strength (Manual Muscle Testing) strength is WFL   Bed Mobility   Comment, (Bed Mobility) Max A x 2   Transfers   Comment, (Transfers) Max A x 2   Gait/Stairs (Locomotion)   Comment, (Gait/Stairs) NT   Balance   Balance Comments Fair in sitting, poor in standing   Clinical Impression   Criteria for Skilled Therapeutic Intervention Yes, treatment indicated   PT Diagnosis (PT) Impaired ambulation   Influenced by the following impairments Decreased endurance, decreased balance   Functional limitations due to impairments Difficulty with bed mobility, transfers, ambulation, stair management   Clinical Presentation (PT Evaluation Complexity)  evolving   Clinical Presentation Rationale VSS, pain controlled   Clinical Decision Making (Complexity) moderate complexity   Planned Therapy Interventions (PT) balance training;bed mobility training;gait training;patient/family education;transfer training;stair training   Risk & Benefits of therapy have been explained evaluation/treatment results reviewed;care plan/treatment goals reviewed;risks/benefits reviewed;current/potential barriers reviewed;participants voiced agreement with care plan;participants included;spouse/significant other;patient   PT Total Evaluation Time   PT Eval, Low Complexity Minutes (74907) 10   PT Discharge Planning   PT Plan Progress bed mobility, transfers, ambulation as tolerated, heart packet when out of ICU   PT Discharge Recommendation (DC Rec) home with outpatient cardiac rehab   PT Rationale for DC Rec Pt indepndent with mobility prior to admit and has good family support. Anticipate with further therapy and medical management, pt will progress to move well enough to return home with family and outpatient CR.   PT Brief overview of current status Assist of 2, decreased activity tolerance

## 2024-06-05 NOTE — PROGRESS NOTES
Chart reviewed. Intensivist now managing patient poost CABG alongside CT surgery. Hospitalist will sign off. Please re-consult when patient transfers out of the ICU.    Celeste Ennis MD MPH

## 2024-06-05 NOTE — PLAN OF CARE
CV  Pressors (which pressors and any increase/decrease in pressor needs): Patient needed 0.1-0.2 of Jose overnight. Jose is currently off.   HR range: 60-70. Pacer wires capped.   Chest tube output: 322mL of sanguinous output from 3977-8727. No clots     Neuro  Orientation: A&Ox4  Delirium present?(y/n): No  Sleep: Able to rest in between cares  Pain: Difficulty with pain control following extubation. Pain was more manageable after sitting in the chair.     GI/  BM? (y/n): No  Urine output: Cano catheter in place. Averaging 60-100mL per hour.       Lines: Left radial art line, right internal jugular CVC     Extubated at 1945. OOB at 2320.       Goal Outcome Evaluation:      Plan of Care Reviewed With: patient, family    Overall Patient Progress: improvingOverall Patient Progress: improving    Problem: Cardiovascular Surgery  Goal: Effective Bowel Elimination  Outcome: Not Progressing  Goal: Fluid and Electrolyte Balance  Outcome: Not Progressing     Problem: Adult Inpatient Plan of Care  Goal: Plan of Care Review  Description: The Plan of Care Review/Shift note should be completed every shift.  The Outcome Evaluation is a brief statement about your assessment that the patient is improving, declining, or no change.  This information will be displayed automatically on your shift  note.  Outcome: Progressing  Flowsheets (Taken 6/5/2024 0503)  Plan of Care Reviewed With:   patient   family  Overall Patient Progress: improving     Problem: Cardiovascular Surgery  Goal: Improved Activity Tolerance  Outcome: Progressing  Goal: Optimal Coping with Heart Surgery  Outcome: Progressing  Goal: Absence of Bleeding  Outcome: Progressing  Intervention: Monitor and Manage Bleeding  Recent Flowsheet Documentation  Taken 6/4/2024 2000 by Parisa Aguillon, RN  Bleeding Management: dressing monitored  Goal: Effective Cardiac Function  Outcome: Progressing  Intervention: Optimize Cardiac Output and Blood Flow  Recent Flowsheet  Documentation  Taken 6/4/2024 2000 by Parisa Aguillon RN  Dysrhythmia Management: pacing wires maintained  Goal: Optimal Cerebral Tissue Perfusion  Outcome: Progressing  Intervention: Protect and Optimize Cerebral Perfusion  Recent Flowsheet Documentation  Taken 6/5/2024 0400 by Parisa Aguillon RN  Head of Bed (HOB) Positioning: HOB at 30 degrees  Taken 6/5/2024 0000 by Parisa Aguillon RN  Head of Bed (HOB) Positioning: HOB at 30 degrees  Taken 6/4/2024 2000 by Parisa Aguillon RN  Sensory Stimulation Regulation:   care clustered   lighting decreased   auditory stimulation minimized   quiet environment promoted  Head of Bed (HOB) Positioning: HOB at 30-45 degrees  Goal: Blood Glucose Level Within Targeted Range  Outcome: Progressing  Goal: Absence of Infection Signs and Symptoms  Outcome: Progressing  Intervention: Prevent or Manage Infection  Recent Flowsheet Documentation  Taken 6/5/2024 0400 by Parisa Aguillon RN  Infection Prevention:   single patient room provided   hand hygiene promoted  Taken 6/5/2024 0000 by Parisa Aguillon RN  Infection Prevention:   single patient room provided   hand hygiene promoted  Taken 6/4/2024 2000 by Parisa Aguillon RN  Infection Prevention:   single patient room provided   hand hygiene promoted  Goal: Anesthesia/Sedation Recovery  Outcome: Progressing  Intervention: Optimize Anesthesia Recovery  Recent Flowsheet Documentation  Taken 6/5/2024 0400 by Parisa Aguillon RN  Safety Promotion/Fall Prevention:   activity supervised   assistive device/personal items within reach   clutter free environment maintained   increased rounding and observation   increase visualization of patient   lighting adjusted   patient and family education   room near nurse's station   room organization consistent   safety round/check completed   supervised activity  Taken 6/5/2024 0000 by Parisa Aguillon RN  Safety Promotion/Fall Prevention:   activity supervised    assistive device/personal items within reach   clutter free environment maintained   increased rounding and observation   increase visualization of patient   lighting adjusted   patient and family education   room near nurse's station   room organization consistent   safety round/check completed   supervised activity  Taken 6/4/2024 2000 by Parisa Aguillon RN  Safety Promotion/Fall Prevention:   activity supervised   assistive device/personal items within reach   clutter free environment maintained   increased rounding and observation   increase visualization of patient   lighting adjusted   patient and family education   room near nurse's station   room organization consistent   safety round/check completed   supervised activity  Reorientation Measures:   clock in view   calendar in view   familiar social contact encouraged   reorientation provided  Goal: Acceptable Pain Control  Outcome: Progressing  Goal: Effective Urinary Elimination  Outcome: Progressing  Goal: Effective Oxygenation and Ventilation  Outcome: Progressing  Intervention: Promote Airway Secretion Clearance  Recent Flowsheet Documentation  Taken 6/5/2024 0400 by Parisa Aguillon RN  Cough And Deep Breathing: done with encouragement  Taken 6/5/2024 0002 by Parisa Aguillon RN  Cough And Deep Breathing: done with encouragement  Taken 6/4/2024 2000 by Parisa Aguillon RN  Cough And Deep Breathing: done with encouragement  Intervention: Optimize Oxygenation and Ventilation  Recent Flowsheet Documentation  Taken 6/4/2024 2000 by Parisa Aguillon, RN  Chest Tube Safety:   all tubing connections taped   all connections secured

## 2024-06-05 NOTE — PROGRESS NOTES
Extubation Note    Successful completion of SBT (Yes or No):YES   Extubation time:1945    Suctioned above and below cuff - scant     Patient assessment:  Lung sounds:pt was snoring  Stridor Present (Yes or No): n  Patient tolerance: y    Oxygen device:  Liter flow:2L mask  FiO2:  SpO2:97    Plan:follow care plan.

## 2024-06-05 NOTE — PROGRESS NOTES
Addendum: Low BP this afternoon requiring wilbert. Given albumin 500 ml. Will wean wilbert as tolerated. Will stay in the ICU today. Continue to monitor.     [facility]  Cardiovascular and Thoracic Surgery Daily Note      Assessment and Plan  POD #1 s/p coronary artery bypass grafting x 3 (LIMA to LAD, SVG to OM1, SVG to OM2) on 6/4/2024 with Dr. Morejon    - CVS: Pre-op TTE with 45-50%  Trace mitral regurg  Trace tricuspid regurg.   Carotid US (5/31/24) - less than 50% stenosis of bilateral ICA    -120s/40-50s, HR 60s, weaned off of all gtt, will hold on BB with borderline HR, lasix 20 mg IV daily, aspirin 162 mg, atorvastatin 40 mg, continues on flomax, keep Chest tubes/TPW    - Resp: Extubated POD #0 at 1945.working with IS, pulmonary toilet.    - Neuro: Neuro intact, pain controlled on current regimen, will add prn toradol while continuing to mointor renal function, continues on home lamictal 250 mg bid    - Renal: No history of significant renal disease. Trend BMP. Diuretic as above.  Recent Labs   Lab 06/05/24  0413 06/04/24  1824 06/04/24  1658   CR 1.22* 1.24* 1.23*       - GI: No BM, denies any flatus, continue bowel regimen, continues on PTA linzess 290 mcg every morning    - : continue pearson with plans to remove on POD #2    - Endo: Postop stress hyperglycemia, discontinue insulin infusion, start sliding scale insulin    Hemoglobin A1C   Date Value Ref Range Status   05/29/2024 4.8 <5.7 % Final     Comment:     Normal <5.7%   Prediabetes 5.7-6.4%    Diabetes 6.5% or higher     Note: Adopted from ADA consensus guidelines.   04/05/2018 4.8 0 - 6.4 % Final     Comment:     Normal <5.7% Prediabetes 5.7-6.4%  Diabetes 6.5% or higher - adopted from ADA   consensus guidelines.          - FEN: Replace electrolytes as needed. Advance to regular diet as tolerated    - ID: Postop leukocytosis.  Tmax 100.4. finishing periop abx prophylaxis. Trend CBC and fever curve.   Recent Labs   Lab  "06/05/24 0413 06/04/24  1824 06/04/24  1658   WBC 16.3* 15.5* 21.2*       - Heme: Acute blood loss anemia and thrombocytopenia due to surgery. Trend CBC, transfuse PRN.   Recent Labs   Lab 06/05/24 0413 06/04/24  1824 06/04/24  1658   HGB 9.8* 9.4* 8.8*    127* 185       - Proph: SCD, subcutaneous heparin, PPI    - Other:  Clinically Significant Risk Factors        # Hypokalemia: Lowest K = 3.3 mmol/L in last 2 days, will replace as needed   # Hypocalcemia: Lowest iCa = 4.2 mg/dL in last 2 days, will monitor and replace as appropriate      # Coagulation Defect: INR = 1.25 (Ref range: 0.85 - 1.15) and/or PTT = 49 Seconds (Ref range: 22 - 38 Seconds), will monitor for bleeding  # Thrombocytopenia: Lowest platelets = 127 in last 2 days, will monitor for bleeding   # Hypertension: Noted on problem list           #Precipitous drop in Hgb/Hct: Lowest Hgb this hospitalization: 8.3 g/dL. Will continue to monitor and treat/transfuse as appropriate.     # Overweight: Estimated body mass index is 29.69 kg/m  as calculated from the following:    Height as of 5/29/24: 1.753 m (5' 9\").    Weight as of this encounter: 91.2 kg (201 lb 1 oz).      # Financial/Environmental Concerns: none         - Dispo: Encouraged IS/TCDB/amb. Sternal precautions. Will wait to start BB with borderline HR. Start lasix 20 mg IV daily. Discontinue insulin gtt and start sliding scale insulin. Will leave chest tubes and pearson in place. Noted to have air leak from chest tubes. Prn toradol. Plans to transfer to the step down unit. Continue to monitor. Continue to monitor.     Interval History  States that he is having increased pain this am. Denies any hallucinations. Breathing is ok. Has not started working with IS. Has not eaten but states that throat is sore. Denies any flatus. Denies any popping/clicking in his breast bone. Did not sleep well last night.       Medications  Current Facility-Administered Medications   Medication Dose Route " Frequency Provider Last Rate Last Admin    acetaminophen (TYLENOL) tablet 975 mg  975 mg Oral Q8H Tiara Yoon PA-C   975 mg at 06/05/24 0248    aspirin (ASA) chewable tablet 162 mg  162 mg Oral or NG Tube Daily YoonTiara pennington PA-C   162 mg at 06/05/24 0800    atorvastatin (LIPITOR) tablet 40 mg  40 mg Oral QPM YoonTiara pennington PA-C   40 mg at 06/04/24 2059    ceFAZolin (ANCEF) 2 g in 100 mL D5W intermittent infusion  2 g Intravenous Q8H Tiara Yoon PA-C 200 mL/hr at 06/05/24 0800 2 g at 06/05/24 0800    heparin ANTICOAGULANT injection 5,000 Units  5,000 Units Subcutaneous Q8H Tiara Yoon PA-C        lamoTRIgine (LaMICtal) tablet 250 mg  250 mg Oral BID Tiara Yoon PA-C   250 mg at 06/05/24 0800    Lidocaine (LIDOCARE) 4 % Patch 1-2 patch  1-2 patch Transdermal Q24H Tiara Yoon PA-C   1 patch at 06/04/24 1930    linaclotide (LINZESS) capsule 290 mcg  290 mcg Oral QAM AC Tiara Yoon PA-C   290 mcg at 06/05/24 0829    pantoprazole (PROTONIX) 2 mg/mL suspension 40 mg  40 mg Oral or NG Tube Daily YoonTiara pennington PA-C   40 mg at 06/04/24 1855    Or    pantoprazole (PROTONIX) EC tablet 40 mg  40 mg Oral Daily YoonTiara pennington PA-C   40 mg at 06/05/24 0800    polyethylene glycol (MIRALAX) Packet 17 g  17 g Oral Daily YoonTiara pennington PA-C   17 g at 06/05/24 0800    senna-docusate (SENOKOT-S/PERICOLACE) 8.6-50 MG per tablet 1 tablet  1 tablet Oral BID Tiara Yoon PA-C   1 tablet at 06/05/24 0800    sodium chloride (PF) 0.9% PF flush 3 mL  3 mL Intracatheter Q8H Tiara Yoon PA-C   3 mL at 06/04/24 1925    tamsulosin (FLOMAX) capsule 0.4 mg  0.4 mg Oral Daily Tiara Yoon PA-C   0.4 mg at 06/05/24 0800     Current Facility-Administered Medications   Medication Dose Route Frequency Provider Last Rate Last Admin    [START ON 6/7/2024] acetaminophen (TYLENOL) tablet 650 mg  650 mg Oral Q4H PRN Tiara Yoon PA-C        alum & mag hydroxide-simethicone (MAALOX) suspension 30 mL  30  mL Oral Q4H PRN YoonTiara PA-C        [START ON 6/7/2024] bisacodyl (DULCOLAX) suppository 10 mg  10 mg Rectal Daily PRN YoonTiara PA-C        calcium carbonate (TUMS) chewable tablet 500 mg  500 mg Oral 4x Daily PRN YoonTiara PA-C        calcium gluconate 1 g in 50 mL in sodium chloride intermittent infusion  1 g Intravenous Q6H PRN YoonTiara PA-C        calcium gluconate 2 g in  mL intermittent infusion  2 g Intravenous Q6H PRN Yoon, HERB Mercado-C        calcium gluconate 3 g in sodium chloride 0.9 % 100 mL intermittent infusion  3 g Intravenous Q6H PRN YoonTiara PA-C        glucose gel 15-30 g  15-30 g Oral Q15 Min PRN YoonTiara PA-C        Or    dextrose 50 % injection 25-50 mL  25-50 mL Intravenous Q15 Min PRN Tiara Yoon PA-C        Or    glucagon injection 1 mg  1 mg Subcutaneous Q15 Min PRN YoonTiara PA-C        EPINEPHrine (ADRENALIN) 5 mg in  mL infusion  0.01-0.1 mcg/kg/min Intravenous Continuous PRN Tiara Yoon PA-C        hydrALAZINE (APRESOLINE) injection 10 mg  10 mg Intravenous Q30 Min PRN YoonTiara PA-C        HYDROmorphone (DILAUDID) injection 0.2 mg  0.2 mg Intravenous Q2H PRN YoonTiara PA-C   0.2 mg at 06/05/24 0829    Or    HYDROmorphone (DILAUDID) injection 0.4 mg  0.4 mg Intravenous Q2H PRN Yoon, Tiara JASON PA-C   0.4 mg at 06/04/24 2207    hydrOXYzine HCl (ATARAX) tablet 10 mg  10 mg Oral Q6H PRN YoonTiara PA-C        lactated ringers BOLUS 250 mL  250 mL Intravenous Q10 Min PRN YoonTiara PA-C   250 mL at 06/04/24 2218    lidocaine (LMX4) cream   Topical Q1H PRN Yoon, Tiara C, PA-C        lidocaine 1 % 0.1-1 mL  0.1-1 mL Other Q1H PRN Tiara Yoon PA-C        [START ON 6/6/2024] magnesium hydroxide (MILK OF MAGNESIA) suspension 30 mL  30 mL Oral Daily PRN Tiara Yoon PA-C        methocarbamol (ROBAXIN) tablet 500 mg  500 mg Oral Q6H PRN Tiara Yoon PA-C   500 mg at 06/05/24  0800    naloxone (NARCAN) injection 0.2 mg  0.2 mg Intravenous Q2 Min PRN Melonie Morejon MD        Or    naloxone (NARCAN) injection 0.4 mg  0.4 mg Intravenous Q2 Min PRN Melonie Morejon MD        Or    naloxone (NARCAN) injection 0.2 mg  0.2 mg Intramuscular Q2 Min PRN Melonie Morejon MD        Or    naloxone (NARCAN) injection 0.4 mg  0.4 mg Intramuscular Q2 Min PRN Melonie Morejon MD        nitroGLYcerin 50 mg in D5W 250 mL (adult std) infusion CENTRAL   mcg/min Intravenous Continuous PRN Yoon, Tiara JASON, PA-C        norepinephrine (LEVOPHED) 16 mg in  mL infusion MAX CONC CENTRAL LINE  0.01-0.4 mcg/kg/min Intravenous Continuous PRN Yoon, Tiara C, PA-C        ondansetron (ZOFRAN ODT) ODT tab 4 mg  4 mg Oral Q6H PRN Yoon, Tiara C, PA-C        Or    ondansetron (ZOFRAN) injection 4 mg  4 mg Intravenous Q6H PRN Yoon, Tiara C, PA-C        oxyCODONE (ROXICODONE) tablet 5 mg  5 mg Oral Q4H PRN Yoon, Tiara C, PA-C   5 mg at 06/05/24 0537    Or    oxyCODONE (ROXICODONE) tablet 10 mg  10 mg Oral Q4H PRN Yoon, Tiara C, PA-C        prochlorperazine (COMPAZINE) injection 5 mg  5 mg Intravenous Q6H PRN Yoon, Tiara C, PA-C        Or    prochlorperazine (COMPAZINE) tablet 5 mg  5 mg Oral Q6H PRN Yoon, Tiara C, PA-C        Reason beta blocker order not selected   Does not apply DOES NOT GO TO Tiara Walls PA-C        sodium chloride (PF) 0.9% PF flush 3 mL  3 mL Intracatheter q1 min prn Yoon, Tiara C, PA-C        vasopressin 0.2 units/mL in NS (PITRESSIN) standard conc infusion  0.5-4 Units/hr Intravenous Continuous PRN Yoon, Tiara C, PA-C             Physical Exam  Vitals were reviewed  Blood pressure 103/60, pulse 64, temperature 100.4  F (38  C), resp. rate 16, weight 91.2 kg (201 lb 1 oz), SpO2 95%.  Gen: up in bed, comfortable, asleep but easily awakened, +O2    Lungs: diminished bases    Cardiovascular: RRR, telemetry SR 60s, -edema LE, +dorsalis  pedis pulses 1+ bilaterally    Abdomen: BS hypo, NT/ND, soft    Derm: sternal incision D/I   R LE incisions D/I    CT: serosang output, + air leak    CXR (6/5/24) - extubated, chest tubes present, +atelectasis    UO 40-60 ml/hr    Weight:   Vitals:    06/02/24 0345 06/02/24 0543 06/03/24 0638 06/04/24 0517   Weight: 89.1 kg (196 lb 6.4 oz) 88.9 kg (195 lb 14.4 oz) 89.8 kg (198 lb) 90.1 kg (198 lb 9.6 oz)    06/05/24 0400   Weight: 91.2 kg (201 lb 1 oz)         Data  Recent Labs   Lab 06/05/24  0819 06/05/24  0413 06/05/24  0412 06/04/24 2005 06/04/24  1824 06/04/24  1821 06/04/24  1658   WBC  --  16.3*  --   --  15.5*  --  21.2*   HGB  --  9.8*  --   --  9.4*  --  8.8*   MCV  --  86  --   --  85  --  85   PLT  --  162  --   --  127*  --  185   INR  --   --   --   --  1.25*  --  1.35*   NA  --  139  --   --  143  --  141   POTASSIUM  --  3.8  --   --  3.3*  --  3.6   CHLORIDE  --  106  --   --  110*  --  109*   CO2  --  19*  --   --  20*  --  20*   BUN  --  19.1  --   --  18.9  --  18.2   CR  --  1.22*  --   --  1.24*  --  1.23*   ANIONGAP  --  14  --   --  13  --  12   MOE  --  8.6*  --   --  8.6*  --  9.3   * 142* 137*   < > 143*   < > 188*   ALBUMIN  --  3.8  --   --  3.8  --   --    PROTTOTAL  --  5.8*  --   --  5.4*  --   --    BILITOTAL  --  0.5  --   --  0.5  --   --    ALKPHOS  --  87  --   --  84  --   --    ALT  --  44  --   --  49  --   --    AST  --  50*  --   --  42  --   --     < > = values in this interval not displayed.       Imaging:  Recent Results (from the past 24 hour(s))   CARLOS with Report    Narrative    Jose Angel Wilcox DO     6/4/2024  1:38 PM  Perioperative CARLOS Procedure Note    Staff -        Anesthesiologist:  Jose Angel Wilcox DO       Performed By: anesthesiologist  Preanesthesia Checklist:  Patient identified, IV assessed, risks and   benefits discussed, monitors and equipment assessed, procedure being   performed at surgeon's request and anesthesia consent  obtained.    CARLOS Probe Insertion    Probe Status PRE Insertion: NO obvious damage  Probe type:  Adult 3D  Bite block used:   Soft  Insertion Technique: Easy, no oropharyngeal manipulation  Insertion complications: None obvious  Billing Report:A CARLOS report is NOT being generated.  Probe Status POST Removal: NO obvious damage         XR Chest Port 1 View    Narrative    EXAM: XR CHEST PORT 1 VIEW  LOCATION: [facility]  DATE: 6/4/2024    INDICATION: Post Op CVTS Surgery  COMPARISON: CT 5/29/2024      Impression    IMPRESSION: Postsurgical changes from interval coronary artery bypass grafting. Endotracheal tube terminates 4.3 cm above the galilea. Right neck central venous catheter tip in the mid SVC. Enteric decompression tube descends below the diaphragm, tip   outside the field-of-view. Ascending left chest tube with tip at the left lung apex.    Hypoinflated lungs with probable left basilar atelectasis. No pleural effusion or pneumothorax. Normal cardiomediastinal silhouette.   XR Abdomen Port 1 View    Narrative    EXAM: XR ABDOMEN PORT 1 VIEW  LOCATION: [facility]  DATE: 6/4/2024    INDICATION: feeding tube placement verification  COMPARISON: Chest x-ray at the same time.      Impression    IMPRESSION: There is an NG tube in the stomach in good position. Tip is in the proximal body.    A feeding tube is not seen either on the chest x-ray or the abdomen film.    Changes from a sternotomy noted with chest tubes overlying the mid abdomen.    Bowel gas pattern is unremarkable.       XR Chest Port 1 View    Narrative    EXAM: XR CHEST PORTABLE 1 VIEW  LOCATION: [facility]  DATE: 06/05/2024    INDICATION: Postop CVTS surgery.  COMPARISON: Chest x-ray on 06/04/2024.      Impression    IMPRESSION: Single AP view of the chest was obtained. Right IJ central catheter tip projects over low SVC. Postsurgical changes of cardiac surgery with median  sternotomy wires and surgical clips. Stable chest tubes/mediastinal drains. Mildly enlarged   cardiac silhouette. Small bilateral pleural effusions and associated basilar atelectasis/consolidation. No significant pneumothorax.             Patient seen and discussed with Dr Jean-Pierre Abel PA-C  Cardiothoracic Surgery  [phone]

## 2024-06-05 NOTE — PROGRESS NOTES
1230 - 1600 RN Shift Summary    Patient hypotensive at change of shift, Albumin given per CV surgery. Phenylephrine restarted. Patient up to chair without blood pressure changes, however remains on low dose Phenylephrine. Family at bedside, updated.

## 2024-06-05 NOTE — PROGRESS NOTES
[facility] Note    Date:6/5/2024  Admission Diagnosis: CABGx3  Pulmonary History: Former smoker, 1/2 PPD  Home Nebulizer/ MDI Use: None  Home Oxygen Use: None  Acuity Level (from RT Assessment flow sheet): 3    Pulmonary Hygiene Initiated: Aerobika TID      Volume Expansion Therapy Initiated: IS TID      Current Oxygen Requirement: RA  Current SpO2: 94%    Re-evaluation date:6/8/24     See 'RT Assessments' flow sheet for patient assessment scoring and Acuity Level Details.

## 2024-06-05 NOTE — CONSULTS
CARDIAC SURGERY NUTRITION CONSULT    Received standing order to assess and educate patient.    Will follow and complete assessment once patient is extubated and/or is transferred to medical unit.    Patient will receive nutrition education during the Outpatient Cardiac Rehab Program (nutrition classes/dietitian counseling).    Tori Dallas RD, LD, [facility]   Clinical Dietitian - [facility]

## 2024-06-06 ENCOUNTER — APPOINTMENT (OUTPATIENT)
Dept: PHYSICAL THERAPY | Facility: CLINIC | Age: 69
DRG: 236 | End: 2024-06-06
Attending: HOSPITALIST
Payer: COMMERCIAL

## 2024-06-06 LAB
ALLEN'S TEST: ABNORMAL
ANION GAP SERPL CALCULATED.3IONS-SCNC: 13 MMOL/L (ref 7–15)
BASE EXCESS BLDA CALC-SCNC: 0.4 MMOL/L (ref -3–3)
BUN SERPL-MCNC: 21.4 MG/DL (ref 8–23)
CA-I BLD-MCNC: 4.7 MG/DL (ref 4.4–5.2)
CALCIUM SERPL-MCNC: 8.5 MG/DL (ref 8.8–10.2)
CHLORIDE SERPL-SCNC: 105 MMOL/L (ref 98–107)
COHGB MFR BLD: 98.7 % (ref 95–96)
CREAT SERPL-MCNC: 1.27 MG/DL (ref 0.67–1.17)
DEPRECATED HCO3 PLAS-SCNC: 22 MMOL/L (ref 22–29)
EGFRCR SERPLBLD CKD-EPI 2021: 61 ML/MIN/1.73M2
ERYTHROCYTE [DISTWIDTH] IN BLOOD BY AUTOMATED COUNT: 15.3 % (ref 10–15)
GLUCOSE BLDC GLUCOMTR-MCNC: 122 MG/DL (ref 70–99)
GLUCOSE BLDC GLUCOMTR-MCNC: 125 MG/DL (ref 70–99)
GLUCOSE BLDC GLUCOMTR-MCNC: 125 MG/DL (ref 70–99)
GLUCOSE BLDC GLUCOMTR-MCNC: 130 MG/DL (ref 70–99)
GLUCOSE SERPL-MCNC: 126 MG/DL (ref 70–99)
HCO3 BLD-SCNC: 24 MMOL/L (ref 21–28)
HCT VFR BLD AUTO: 23.6 % (ref 40–53)
HGB BLD-MCNC: 8.1 G/DL (ref 13.3–17.7)
MAGNESIUM SERPL-MCNC: 1.9 MG/DL (ref 1.7–2.3)
MCH RBC QN AUTO: 29 PG (ref 26.5–33)
MCHC RBC AUTO-ENTMCNC: 34.3 G/DL (ref 31.5–36.5)
MCV RBC AUTO: 85 FL (ref 78–100)
O2/TOTAL GAS SETTING VFR VENT: 0 %
PCO2 BLD: 34 MM HG (ref 35–45)
PH BLD: 7.46 [PH] (ref 7.35–7.45)
PHOSPHATE SERPL-MCNC: 2.8 MG/DL (ref 2.5–4.5)
PLATELET # BLD AUTO: 109 10E3/UL (ref 150–450)
PO2 BLD: 93 MM HG (ref 80–105)
POTASSIUM SERPL-SCNC: 3.7 MMOL/L (ref 3.4–5.3)
POTASSIUM SERPL-SCNC: 3.7 MMOL/L (ref 3.4–5.3)
RBC # BLD AUTO: 2.79 10E6/UL (ref 4.4–5.9)
SAO2 % BLDA: 96 % (ref 92–100)
SODIUM SERPL-SCNC: 140 MMOL/L (ref 135–145)
WBC # BLD AUTO: 13.1 10E3/UL (ref 4–11)

## 2024-06-06 PROCEDURE — 82330 ASSAY OF CALCIUM: CPT | Performed by: PHYSICIAN ASSISTANT

## 2024-06-06 PROCEDURE — 250N000013 HC RX MED GY IP 250 OP 250 PS 637: Performed by: PHYSICIAN ASSISTANT

## 2024-06-06 PROCEDURE — 85027 COMPLETE CBC AUTOMATED: CPT | Performed by: PHYSICIAN ASSISTANT

## 2024-06-06 PROCEDURE — 83735 ASSAY OF MAGNESIUM: CPT | Performed by: SURGERY

## 2024-06-06 PROCEDURE — 250N000013 HC RX MED GY IP 250 OP 250 PS 637: Performed by: SURGERY

## 2024-06-06 PROCEDURE — 97530 THERAPEUTIC ACTIVITIES: CPT | Mod: GP | Performed by: PHYSICAL THERAPIST

## 2024-06-06 PROCEDURE — 250N000011 HC RX IP 250 OP 636: Performed by: PHYSICIAN ASSISTANT

## 2024-06-06 PROCEDURE — 999N000157 HC STATISTIC RCP TIME EA 10 MIN

## 2024-06-06 PROCEDURE — 258N000003 HC RX IP 258 OP 636: Performed by: PHYSICIAN ASSISTANT

## 2024-06-06 PROCEDURE — 84100 ASSAY OF PHOSPHORUS: CPT | Performed by: SURGERY

## 2024-06-06 PROCEDURE — 94799 UNLISTED PULMONARY SVC/PX: CPT

## 2024-06-06 PROCEDURE — 120N000013 HC R&B IMCU

## 2024-06-06 PROCEDURE — 82805 BLOOD GASES W/O2 SATURATION: CPT | Performed by: PHYSICIAN ASSISTANT

## 2024-06-06 PROCEDURE — 84132 ASSAY OF SERUM POTASSIUM: CPT | Performed by: SURGERY

## 2024-06-06 PROCEDURE — 999N000040 HC STATISTIC CONSULT NO CHARGE VASC ACCESS

## 2024-06-06 PROCEDURE — 80048 BASIC METABOLIC PNL TOTAL CA: CPT | Performed by: PHYSICIAN ASSISTANT

## 2024-06-06 RX ORDER — MAGNESIUM OXIDE 400 MG/1
400 TABLET ORAL EVERY 4 HOURS
Status: COMPLETED | OUTPATIENT
Start: 2024-06-06 | End: 2024-06-06

## 2024-06-06 RX ORDER — TRAZODONE HYDROCHLORIDE 50 MG/1
50 TABLET, FILM COATED ORAL AT BEDTIME
Status: DISCONTINUED | OUTPATIENT
Start: 2024-06-07 | End: 2024-06-11 | Stop reason: HOSPADM

## 2024-06-06 RX ORDER — POTASSIUM CHLORIDE 1500 MG/1
20 TABLET, EXTENDED RELEASE ORAL ONCE
Status: COMPLETED | OUTPATIENT
Start: 2024-06-06 | End: 2024-06-06

## 2024-06-06 RX ORDER — FUROSEMIDE 10 MG/ML
20 INJECTION INTRAMUSCULAR; INTRAVENOUS DAILY
Status: DISCONTINUED | OUTPATIENT
Start: 2024-06-06 | End: 2024-06-09

## 2024-06-06 RX ADMIN — SENNOSIDES AND DOCUSATE SODIUM 1 TABLET: 50; 8.6 TABLET ORAL at 09:19

## 2024-06-06 RX ADMIN — POLYETHYLENE GLYCOL 3350 17 G: 17 POWDER, FOR SOLUTION ORAL at 09:18

## 2024-06-06 RX ADMIN — LAMOTRIGINE 250 MG: 100 TABLET ORAL at 09:18

## 2024-06-06 RX ADMIN — ACETAMINOPHEN 975 MG: 325 TABLET, FILM COATED ORAL at 18:35

## 2024-06-06 RX ADMIN — HYDROMORPHONE HYDROCHLORIDE 0.4 MG: 0.2 INJECTION, SOLUTION INTRAMUSCULAR; INTRAVENOUS; SUBCUTANEOUS at 02:45

## 2024-06-06 RX ADMIN — HYDROXYZINE HYDROCHLORIDE 10 MG: 10 TABLET ORAL at 12:52

## 2024-06-06 RX ADMIN — POTASSIUM CHLORIDE 20 MEQ: 1500 TABLET, EXTENDED RELEASE ORAL at 05:59

## 2024-06-06 RX ADMIN — LIDOCAINE 1 PATCH: 4 PATCH TOPICAL at 19:46

## 2024-06-06 RX ADMIN — SODIUM CHLORIDE, POTASSIUM CHLORIDE, SODIUM LACTATE AND CALCIUM CHLORIDE 250 ML: 600; 310; 30; 20 INJECTION, SOLUTION INTRAVENOUS at 06:07

## 2024-06-06 RX ADMIN — SODIUM CHLORIDE, POTASSIUM CHLORIDE, SODIUM LACTATE AND CALCIUM CHLORIDE 250 ML: 600; 310; 30; 20 INJECTION, SOLUTION INTRAVENOUS at 04:12

## 2024-06-06 RX ADMIN — METOPROLOL TARTRATE 12.5 MG: 25 TABLET, FILM COATED ORAL at 09:34

## 2024-06-06 RX ADMIN — KETOROLAC TROMETHAMINE 15 MG: 15 INJECTION, SOLUTION INTRAMUSCULAR; INTRAVENOUS at 20:08

## 2024-06-06 RX ADMIN — ASPIRIN 81 MG CHEWABLE TABLET 162 MG: 81 TABLET CHEWABLE at 09:19

## 2024-06-06 RX ADMIN — SENNOSIDES AND DOCUSATE SODIUM 1 TABLET: 50; 8.6 TABLET ORAL at 20:08

## 2024-06-06 RX ADMIN — OXYCODONE HYDROCHLORIDE 10 MG: 5 TABLET ORAL at 09:19

## 2024-06-06 RX ADMIN — Medication 400 MG: at 09:19

## 2024-06-06 RX ADMIN — TRAZODONE HYDROCHLORIDE 50 MG: 50 TABLET ORAL at 23:41

## 2024-06-06 RX ADMIN — LINACLOTIDE 290 MCG: 290 CAPSULE, GELATIN COATED ORAL at 09:19

## 2024-06-06 RX ADMIN — FUROSEMIDE 20 MG: 10 INJECTION, SOLUTION INTRAMUSCULAR; INTRAVENOUS at 09:41

## 2024-06-06 RX ADMIN — KETOROLAC TROMETHAMINE 15 MG: 15 INJECTION, SOLUTION INTRAMUSCULAR; INTRAVENOUS at 04:46

## 2024-06-06 RX ADMIN — OXYCODONE HYDROCHLORIDE 10 MG: 5 TABLET ORAL at 23:18

## 2024-06-06 RX ADMIN — HEPARIN SODIUM 5000 UNITS: 5000 INJECTION, SOLUTION INTRAVENOUS; SUBCUTANEOUS at 13:19

## 2024-06-06 RX ADMIN — ATORVASTATIN CALCIUM 40 MG: 40 TABLET, FILM COATED ORAL at 19:46

## 2024-06-06 RX ADMIN — HEPARIN SODIUM 5000 UNITS: 5000 INJECTION, SOLUTION INTRAVENOUS; SUBCUTANEOUS at 21:49

## 2024-06-06 RX ADMIN — Medication 400 MG: at 05:59

## 2024-06-06 RX ADMIN — METOPROLOL TARTRATE 12.5 MG: 25 TABLET, FILM COATED ORAL at 20:08

## 2024-06-06 RX ADMIN — PANTOPRAZOLE SODIUM 40 MG: 40 TABLET, DELAYED RELEASE ORAL at 09:18

## 2024-06-06 RX ADMIN — ACETAMINOPHEN 975 MG: 325 TABLET, FILM COATED ORAL at 09:33

## 2024-06-06 RX ADMIN — SODIUM CHLORIDE, POTASSIUM CHLORIDE, SODIUM LACTATE AND CALCIUM CHLORIDE: 600; 310; 30; 20 INJECTION, SOLUTION INTRAVENOUS at 04:12

## 2024-06-06 RX ADMIN — HEPARIN SODIUM 5000 UNITS: 5000 INJECTION, SOLUTION INTRAVENOUS; SUBCUTANEOUS at 05:59

## 2024-06-06 RX ADMIN — TAMSULOSIN HYDROCHLORIDE 0.4 MG: 0.4 CAPSULE ORAL at 09:18

## 2024-06-06 RX ADMIN — OXYCODONE HYDROCHLORIDE 10 MG: 5 TABLET ORAL at 02:45

## 2024-06-06 RX ADMIN — ACETAMINOPHEN 975 MG: 325 TABLET, FILM COATED ORAL at 02:45

## 2024-06-06 RX ADMIN — LAMOTRIGINE 250 MG: 100 TABLET ORAL at 20:08

## 2024-06-06 RX ADMIN — OXYCODONE HYDROCHLORIDE 10 MG: 5 TABLET ORAL at 12:52

## 2024-06-06 RX ADMIN — SODIUM CHLORIDE, POTASSIUM CHLORIDE, SODIUM LACTATE AND CALCIUM CHLORIDE 250 ML: 600; 310; 30; 20 INJECTION, SOLUTION INTRAVENOUS at 02:21

## 2024-06-06 RX ADMIN — SODIUM CHLORIDE, POTASSIUM CHLORIDE, SODIUM LACTATE AND CALCIUM CHLORIDE 250 ML: 600; 310; 30; 20 INJECTION, SOLUTION INTRAVENOUS at 04:42

## 2024-06-06 ASSESSMENT — ACTIVITIES OF DAILY LIVING (ADL)
ADLS_ACUITY_SCORE: 36
ADLS_ACUITY_SCORE: 35
ADLS_ACUITY_SCORE: 33
ADLS_ACUITY_SCORE: 35
ADLS_ACUITY_SCORE: 35
ADLS_ACUITY_SCORE: 36
ADLS_ACUITY_SCORE: 31
ADLS_ACUITY_SCORE: 35
ADLS_ACUITY_SCORE: 33
ADLS_ACUITY_SCORE: 31
ADLS_ACUITY_SCORE: 33
ADLS_ACUITY_SCORE: 35
ADLS_ACUITY_SCORE: 35
ADLS_ACUITY_SCORE: 33
ADLS_ACUITY_SCORE: 32
ADLS_ACUITY_SCORE: 32
ADLS_ACUITY_SCORE: 33
ADLS_ACUITY_SCORE: 36
ADLS_ACUITY_SCORE: 31
ADLS_ACUITY_SCORE: 33
ADLS_ACUITY_SCORE: 33
ADLS_ACUITY_SCORE: 35
ADLS_ACUITY_SCORE: 35

## 2024-06-06 NOTE — PLAN OF CARE
Problem: Adult Inpatient Plan of Care  Goal: Plan of Care Review  Description: The Plan of Care Review/Shift note should be completed every shift.  The Outcome Evaluation is a brief statement about your assessment that the patient is improving, declining, or no change.  This information will be displayed automatically on your shift  note.  Outcome: Progressing  Flowsheets (Taken 6/6/2024 0637)  Outcome Evaluation: Pt VSS on RA-2L NC overnight. Pt off pressors since 2130. Chest tube output minimal (see I&O). Pain managed with PRN medications and scheduled tylenol. Pt remains A/Oxx4. Febrile overnight with Tmax 102F. Pt using IS to about 500. No BM during shift. Cano in place post-op with marginal output (see I&O).  Plan of Care Reviewed With: patient  Overall Patient Progress: improving     Problem: Adult Inpatient Plan of Care  Goal: Absence of Hospital-Acquired Illness or Injury  Intervention: Identify and Manage Fall Risk  Recent Flowsheet Documentation  Taken 6/6/2024 0400 by Raffy Esparza RN  Safety Promotion/Fall Prevention:   activity supervised   lighting adjusted   nonskid shoes/slippers when out of bed   patient and family education   room door open   room near nurse's station   room organization consistent  Taken 6/6/2024 0000 by Raffy Esparza RN  Safety Promotion/Fall Prevention:   activity supervised   lighting adjusted   nonskid shoes/slippers when out of bed   patient and family education   room door open   room near nurse's station   room organization consistent  Taken 6/5/2024 2000 by Raffy Esparza RN  Safety Promotion/Fall Prevention:   activity supervised   lighting adjusted   nonskid shoes/slippers when out of bed   patient and family education   room door open   room near nurse's station   room organization consistent  Intervention: Prevent Skin Injury  Recent Flowsheet Documentation  Taken 6/6/2024 0600 by Raffy Esparza RN  Body Position:   right   turned   heels  elevated  Taken 6/6/2024 0400 by Raffy Esparza RN  Body Position:   left   turned   heels elevated  Skin Protection:   adhesive use limited   incontinence pads utilized   silicone foam dressing in place   skin to device areas padded  Device Skin Pressure Protection:   skin-to-device areas padded   tubing/devices free from skin contact   adhesive use limited   positioning supports utilized   pressure points protected  Taken 6/6/2024 0200 by Raffy Esparza RN  Body Position:   right   turned   heels elevated  Taken 6/6/2024 0000 by Raffy Esparza RN  Body Position:   left   turned   heels elevated  Skin Protection:   adhesive use limited   incontinence pads utilized   silicone foam dressing in place   skin to device areas padded  Device Skin Pressure Protection:   skin-to-device areas padded   tubing/devices free from skin contact   adhesive use limited   positioning supports utilized   pressure points protected  Taken 6/5/2024 2200 by Raffy Esparza RN  Body Position:   right   turned   heels elevated  Taken 6/5/2024 2000 by Raffy Esparza RN  Body Position: weight shifting  Skin Protection:   adhesive use limited   incontinence pads utilized   silicone foam dressing in place   skin to device areas padded  Device Skin Pressure Protection:   skin-to-device areas padded   tubing/devices free from skin contact   adhesive use limited   positioning supports utilized   pressure points protected  Intervention: Prevent and Manage VTE (Venous Thromboembolism) Risk  Recent Flowsheet Documentation  Taken 6/6/2024 0400 by Raffy Esparza RN  VTE Prevention/Management: SCDs (sequential compression devices) on  Taken 6/6/2024 0000 by aRffy Esparza RN  VTE Prevention/Management: SCDs (sequential compression devices) on  Taken 6/5/2024 2000 by Raffy Esparza RN  VTE Prevention/Management: SCDs (sequential compression devices) on  Intervention: Prevent Infection  Recent Flowsheet Documentation  Taken  6/6/2024 0400 by Raffy Esparza RN  Infection Prevention:   rest/sleep promoted   single patient room provided  Taken 6/6/2024 0000 by Raffy Esparza RN  Infection Prevention:   rest/sleep promoted   single patient room provided  Taken 6/5/2024 2000 by Raffy Esparza RN  Infection Prevention:   rest/sleep promoted   single patient room provided  Goal: Optimal Comfort and Wellbeing  Intervention: Provide Person-Centered Care  Recent Flowsheet Documentation  Taken 6/6/2024 0400 by Raffy Esparza RN  Trust Relationship/Rapport:   care explained   choices provided   emotional support provided   empathic listening provided   questions encouraged   questions answered   reassurance provided   thoughts/feelings acknowledged  Taken 6/6/2024 0000 by Raffy Esparza RN  Trust Relationship/Rapport:   care explained   choices provided   emotional support provided   empathic listening provided   questions encouraged   questions answered   reassurance provided   thoughts/feelings acknowledged  Taken 6/5/2024 2000 by Raffy Esparza RN  Trust Relationship/Rapport:   care explained   choices provided   emotional support provided   empathic listening provided   questions encouraged   questions answered   reassurance provided   thoughts/feelings acknowledged     Problem: Risk for Delirium  Goal: Optimal Coping  Intervention: Optimize Psychosocial Adjustment to Delirium  Recent Flowsheet Documentation  Taken 6/6/2024 0400 by Raffy Esparza RN  Supportive Measures:   active listening utilized   goal-setting facilitated   positive reinforcement provided   relaxation techniques promoted   self-care encouraged   self-responsibility promoted   verbalization of feelings encouraged  Taken 6/6/2024 0000 by Raffy Esparza RN  Supportive Measures:   active listening utilized   goal-setting facilitated   positive reinforcement provided   relaxation techniques promoted   self-care encouraged   self-responsibility  promoted   verbalization of feelings encouraged  Taken 6/5/2024 2000 by Raffy Esparza RN  Supportive Measures:   active listening utilized   goal-setting facilitated   positive reinforcement provided   relaxation techniques promoted   self-care encouraged   self-responsibility promoted   verbalization of feelings encouraged  Goal: Improved Behavioral Control  Intervention: Prevent and Manage Agitation  Recent Flowsheet Documentation  Taken 6/6/2024 0400 by Raffy Esparza RN  Environment Familiarity/Consistency: daily routine followed  Taken 6/6/2024 0000 by Raffy Esparza RN  Environment Familiarity/Consistency: daily routine followed  Taken 6/5/2024 2000 by Raffy Esparza RN  Environment Familiarity/Consistency: daily routine followed  Intervention: Minimize Safety Risk  Recent Flowsheet Documentation  Taken 6/6/2024 0400 by Raffy Esparza RN  Communication Enhancement Strategies:   call light answered in person   one-step directions provided   repetition utilized  Enhanced Safety Measures:   room near unit station   review medications for side effects with activity   pain management   patient/family teach back on injury risk  Trust Relationship/Rapport:   care explained   choices provided   emotional support provided   empathic listening provided   questions encouraged   questions answered   reassurance provided   thoughts/feelings acknowledged  Taken 6/6/2024 0000 by Raffy Esparza RN  Communication Enhancement Strategies:   call light answered in person   one-step directions provided   repetition utilized  Enhanced Safety Measures:   room near unit station   review medications for side effects with activity   pain management   patient/family teach back on injury risk  Trust Relationship/Rapport:   care explained   choices provided   emotional support provided   empathic listening provided   questions encouraged   questions answered   reassurance provided   thoughts/feelings  acknowledged  Taken 6/5/2024 2000 by Raffy Esparza RN  Communication Enhancement Strategies:   call light answered in person   one-step directions provided   repetition utilized  Enhanced Safety Measures:   room near unit station   review medications for side effects with activity   pain management   patient/family teach back on injury risk  Trust Relationship/Rapport:   care explained   choices provided   emotional support provided   empathic listening provided   questions encouraged   questions answered   reassurance provided   thoughts/feelings acknowledged  Goal: Improved Attention and Thought Clarity  Intervention: Maximize Cognitive Function  Recent Flowsheet Documentation  Taken 6/6/2024 0400 by Raffy Esparza RN  Sensory Stimulation Regulation:   care clustered   quiet environment promoted  Reorientation Measures:   calendar in view   clock in view   familiar social contact encouraged   reorientation provided  Taken 6/6/2024 0000 by Raffy Esparza RN  Sensory Stimulation Regulation:   care clustered   quiet environment promoted  Reorientation Measures:   calendar in view   clock in view   familiar social contact encouraged   reorientation provided  Taken 6/5/2024 2000 by Raffy Esparza RN  Sensory Stimulation Regulation:   care clustered   quiet environment promoted  Reorientation Measures:   calendar in view   clock in view   familiar social contact encouraged   reorientation provided     Problem: Cardiovascular Surgery  Goal: Improved Activity Tolerance  Intervention: Optimize Tolerance for Activity  Recent Flowsheet Documentation  Taken 6/6/2024 0400 by Raffy Esparza RN  Self-Care Promotion: independence encouraged  Environmental Support:   calm environment promoted   environmental consistency promoted  Taken 6/6/2024 0000 by Raffy Esparza RN  Self-Care Promotion: independence encouraged  Environmental Support:   calm environment promoted   environmental consistency  promoted  Taken 6/5/2024 2000 by Raffy Esparza RN  Self-Care Promotion: independence encouraged  Environmental Support:   calm environment promoted   environmental consistency promoted  Goal: Optimal Coping with Heart Surgery  Intervention: Support Psychosocial Response to Surgery  Recent Flowsheet Documentation  Taken 6/6/2024 0400 by Raffy Esparza RN  Supportive Measures:   active listening utilized   goal-setting facilitated   positive reinforcement provided   relaxation techniques promoted   self-care encouraged   self-responsibility promoted   verbalization of feelings encouraged  Taken 6/6/2024 0000 by Raffy Esparza RN  Supportive Measures:   active listening utilized   goal-setting facilitated   positive reinforcement provided   relaxation techniques promoted   self-care encouraged   self-responsibility promoted   verbalization of feelings encouraged  Taken 6/5/2024 2000 by Raffy Esparza RN  Supportive Measures:   active listening utilized   goal-setting facilitated   positive reinforcement provided   relaxation techniques promoted   self-care encouraged   self-responsibility promoted   verbalization of feelings encouraged  Goal: Effective Cardiac Function  Intervention: Optimize Cardiac Output and Blood Flow  Recent Flowsheet Documentation  Taken 6/6/2024 0400 by Raffy Esparza RN  Dysrhythmia Management: pacing wires maintained  Taken 6/6/2024 0000 by Raffy Esparza RN  Dysrhythmia Management: pacing wires maintained  Taken 6/5/2024 2000 by Raffy Esparza RN  Dysrhythmia Management: pacing wires maintained  Goal: Optimal Cerebral Tissue Perfusion  Intervention: Protect and Optimize Cerebral Perfusion  Recent Flowsheet Documentation  Taken 6/6/2024 0600 by Raffy Esparza RN  Head of Bed (HOB) Positioning: HOB at 20-30 degrees  Taken 6/6/2024 0400 by Raffy Esparza RN  Sensory Stimulation Regulation:   care clustered   quiet environment promoted  Cerebral Perfusion  Promotion: blood pressure monitored  Glycemic Management: blood glucose monitored  Head of Bed (HOB) Positioning: HOB at 30 degrees  Taken 6/6/2024 0200 by Raffy Esparza RN  Head of Bed (HOB) Positioning: HOB at 30 degrees  Taken 6/6/2024 0000 by Raffy Esparza RN  Sensory Stimulation Regulation:   care clustered   quiet environment promoted  Cerebral Perfusion Promotion: blood pressure monitored  Glycemic Management: blood glucose monitored  Head of Bed (HOB) Positioning: HOB at 30 degrees  Taken 6/5/2024 2200 by Raffy Esparza RN  Head of Bed (HOB) Positioning: HOB at 30 degrees  Taken 6/5/2024 2000 by Raffy Esparza RN  Sensory Stimulation Regulation:   care clustered   quiet environment promoted  Cerebral Perfusion Promotion: blood pressure monitored  Glycemic Management: blood glucose monitored  Goal: Fluid and Electrolyte Balance  Intervention: Monitor and Manage Fluid and Electrolyte Balance  Recent Flowsheet Documentation  Taken 6/6/2024 0400 by Raffy Esparza RN  Fluid/Electrolyte Management: fluids provided  Taken 6/6/2024 0000 by Raffy Esparza RN  Fluid/Electrolyte Management: fluids provided  Taken 6/5/2024 2000 by Raffy Esparza RN  Fluid/Electrolyte Management: fluids provided  Goal: Blood Glucose Level Within Targeted Range  Intervention: Optimize Glycemic Control  Recent Flowsheet Documentation  Taken 6/6/2024 0400 by Raffy Esparza RN  Glycemic Management: blood glucose monitored  Taken 6/6/2024 0000 by Raffy Esparza RN  Glycemic Management: blood glucose monitored  Taken 6/5/2024 2000 by Raffy Esparza RN  Glycemic Management: blood glucose monitored  Goal: Absence of Infection Signs and Symptoms  Intervention: Prevent or Manage Infection  Recent Flowsheet Documentation  Taken 6/6/2024 0400 by Raffy Esparza RN  Infection Prevention:   rest/sleep promoted   single patient room provided  Taken 6/6/2024 0000 by Raffy Esparza RN  Infection  Prevention:   rest/sleep promoted   single patient room provided  Taken 6/5/2024 2000 by Raffy Esparza RN  Infection Prevention:   rest/sleep promoted   single patient room provided  Goal: Anesthesia/Sedation Recovery  Intervention: Optimize Anesthesia Recovery  Recent Flowsheet Documentation  Taken 6/6/2024 0400 by Raffy Esparza RN  Safety Promotion/Fall Prevention:   activity supervised   lighting adjusted   nonskid shoes/slippers when out of bed   patient and family education   room door open   room near nurse's station   room organization consistent  Reorientation Measures:   calendar in view   clock in view   familiar social contact encouraged   reorientation provided  Taken 6/6/2024 0000 by Raffy Esparza RN  Safety Promotion/Fall Prevention:   activity supervised   lighting adjusted   nonskid shoes/slippers when out of bed   patient and family education   room door open   room near nurse's station   room organization consistent  Reorientation Measures:   calendar in view   clock in view   familiar social contact encouraged   reorientation provided  Taken 6/5/2024 2000 by Raffy Esparza RN  Safety Promotion/Fall Prevention:   activity supervised   lighting adjusted   nonskid shoes/slippers when out of bed   patient and family education   room door open   room near nurse's station   room organization consistent  Reorientation Measures:   calendar in view   clock in view   familiar social contact encouraged   reorientation provided  Goal: Effective Urinary Elimination  Intervention: Monitor and Manage Urinary Retention  Recent Flowsheet Documentation  Taken 6/6/2024 0400 by Raffy Esparza RN  Urinary Elimination Promotion: catheter patency maintained  Taken 6/6/2024 0000 by Raffy Esparza RN  Urinary Elimination Promotion: catheter patency maintained  Taken 6/5/2024 2000 by Raffy Esparza RN  Urinary Elimination Promotion: catheter patency maintained  Goal: Effective Oxygenation and  Ventilation  Intervention: Promote Airway Secretion Clearance  Recent Flowsheet Documentation  Taken 6/6/2024 0400 by Raffy Esparza RN  Cough And Deep Breathing: done with encouragement  Taken 6/6/2024 0000 by Raffy Esparza RN  Cough And Deep Breathing: done with encouragement  Taken 6/5/2024 2000 by Raffy Esparza RN  Cough And Deep Breathing: done with encouragement  Intervention: Optimize Oxygenation and Ventilation  Recent Flowsheet Documentation  Taken 6/6/2024 0400 by Raffy Esparza RN  Chest Tube Safety:   all connections secured   suction checked  Taken 6/6/2024 0000 by Raffy Esparza RN  Chest Tube Safety:   all connections secured   suction checked  Taken 6/5/2024 2000 by Raffy Esparza RN  Chest Tube Safety:   all connections secured   suction checked     Problem: Comorbidity Management  Goal: Maintenance of Seizure Control  Intervention: Maintain Seizure Symptom Control  Recent Flowsheet Documentation  Taken 6/6/2024 0400 by Raffy Esparza RN  Sensory Stimulation Regulation:   care clustered   quiet environment promoted  Seizure Precautions:   activity supervised   clutter-free environment maintained  Medication Review/Management:   medications reviewed   high-risk medications identified   infusion titrated  Taken 6/6/2024 0000 by Raffy Esparza RN  Sensory Stimulation Regulation:   care clustered   quiet environment promoted  Seizure Precautions:   activity supervised   clutter-free environment maintained  Medication Review/Management:   medications reviewed   high-risk medications identified   infusion titrated  Taken 6/5/2024 2000 by Raffy Esparza RN  Sensory Stimulation Regulation:   care clustered   quiet environment promoted  Seizure Precautions:   activity supervised   clutter-free environment maintained  Medication Review/Management:   medications reviewed   high-risk medications identified   infusion titrated  Goal: Blood Pressure in Desired  Range  Intervention: Maintain Blood Pressure Management  Recent Flowsheet Documentation  Taken 6/6/2024 0400 by Raffy Esparza RN  Medication Review/Management:   medications reviewed   high-risk medications identified   infusion titrated  Taken 6/6/2024 0000 by Raffy Esparza RN  Medication Review/Management:   medications reviewed   high-risk medications identified   infusion titrated  Taken 6/5/2024 2000 by Raffy Esparza RN  Medication Review/Management:   medications reviewed   high-risk medications identified   infusion titrated     Problem: Acute Coronary Syndrome  Goal: Optimal Adaptation to Illness  Intervention: Support Adjustment to Life-Change Event  Recent Flowsheet Documentation  Taken 6/6/2024 0400 by Raffy Esparza RN  Supportive Measures:   active listening utilized   goal-setting facilitated   positive reinforcement provided   relaxation techniques promoted   self-care encouraged   self-responsibility promoted   verbalization of feelings encouraged  Taken 6/6/2024 0000 by Raffy Esparza RN  Supportive Measures:   active listening utilized   goal-setting facilitated   positive reinforcement provided   relaxation techniques promoted   self-care encouraged   self-responsibility promoted   verbalization of feelings encouraged  Taken 6/5/2024 2000 by Raffy Esparza RN  Supportive Measures:   active listening utilized   goal-setting facilitated   positive reinforcement provided   relaxation techniques promoted   self-care encouraged   self-responsibility promoted   verbalization of feelings encouraged  Goal: Normalized Cardiac Rhythm  Intervention: Monitor and Manage Cardiac Rhythm Effect  Recent Flowsheet Documentation  Taken 6/6/2024 0400 by Raffy Esparza RN  Dysrhythmia Management: pacing wires maintained  VTE Prevention/Management: SCDs (sequential compression devices) on  Taken 6/6/2024 0000 by Raffy Esparza RN  Dysrhythmia Management: pacing wires maintained  VTE  Prevention/Management: SCDs (sequential compression devices) on  Taken 6/5/2024 2000 by Raffy Esparza RN  Dysrhythmia Management: pacing wires maintained  VTE Prevention/Management: SCDs (sequential compression devices) on  Goal: Effective Cardiac Pump Function  Intervention: Optimize Cardiac Function and Blood Flow  Recent Flowsheet Documentation  Taken 6/6/2024 0400 by Raffy Esparza RN  Fluid/Electrolyte Management: fluids provided  Taken 6/6/2024 0000 by Raffy Esparza RN  Fluid/Electrolyte Management: fluids provided  Taken 6/5/2024 2000 by Raffy Esparza RN  Fluid/Electrolyte Management: fluids provided  Goal: Adequate Tissue Perfusion  Intervention: Optimize Cardiac Tissue Perfusion  Recent Flowsheet Documentation  Taken 6/6/2024 0600 by Raffy Esparza RN  Activity Management: activity adjusted per tolerance  Taken 6/6/2024 0400 by Raffy Esparza RN  Activity Management: activity adjusted per tolerance  Environmental Support:   calm environment promoted   environmental consistency promoted  Taken 6/6/2024 0200 by Raffy Esparza RN  Activity Management: activity adjusted per tolerance  Taken 6/6/2024 0000 by Raffy Esparza RN  Activity Management: activity adjusted per tolerance  Environmental Support:   calm environment promoted   environmental consistency promoted  Taken 6/5/2024 2200 by Raffy Esparza RN  Activity Management: back to bed  Taken 6/5/2024 2000 by Raffy Esparza RN  Activity Management: up in chair  Environmental Support:   calm environment promoted   environmental consistency promoted   Goal Outcome Evaluation:      Plan of Care Reviewed With: patient    Overall Patient Progress: improvingOverall Patient Progress: improving    Outcome Evaluation: Pt VSS on RA-2L NC overnight. Pt off pressors since 2130. Chest tube output minimal (see I&O). Pain managed with PRN medications and scheduled tylenol. Pt remains A/Oxx4. Febrile overnight with Tmax 102F. Pt  using IS to about 500. No BM during shift. Cano in place post-op with marginal output (see I&O).

## 2024-06-06 NOTE — PROGRESS NOTES
[facility]  Cardiovascular and Thoracic Surgery Daily Note    Assessment and Plan  POD #2 s/p coronary artery bypass grafting x 3 (LIMA to LAD, SVG to OM1, SVG to OM2) on 6/4/2024 with Dr. Morejon    - CVS: Pre-op TTE with 45-50%  Trace mitral regurg  Trace tricuspid regurg.   Carotid US (5/31/24) - less than 50% stenosis of bilateral ICA    -130s/40-50s, HR 70-80s, weaned off of all gtt, start lopressor 12.5 mg PO bid with parameters, lasix 20 mg IV daily, aspirin 162 mg, atorvastatin 40 mg, continues on flomax, plans to remove chest tubes/TPW    - Resp: Extubated POD #0 at 1945. Working with IS and flutter valve, pulmonary toilet.    - Neuro: Neuro intact, pain controlled on current regimen, will add prn toradol while continuing to monitor renal function, continues on home lamictal 250 mg bid    - Renal: No history of significant renal disease. Trend BMP. Diuretic as above.  Recent Labs   Lab 06/06/24  0403 06/05/24  0413 06/04/24  1824   CR 1.27* 1.22* 1.24*       - GI: No BM, +flatus, continue bowel regimen, continues on PTA linzess 290 mcg every morning    - : ok to remove pearson    - Endo: sliding scale insulin    Hemoglobin A1C   Date Value Ref Range Status   05/29/2024 4.8 <5.7 % Final     Comment:     Normal <5.7%   Prediabetes 5.7-6.4%    Diabetes 6.5% or higher     Note: Adopted from ADA consensus guidelines.   04/05/2018 4.8 0 - 6.4 % Final     Comment:     Normal <5.7% Prediabetes 5.7-6.4%  Diabetes 6.5% or higher - adopted from ADA   consensus guidelines.          - FEN: Replace electrolytes as needed. Advance to regular diet as tolerated    - ID: Postop leukocytosis.  Tmax 102. finished periop abx prophylaxis. Needs increased pulmonary toilet. Trend CBC and fever curve.   Recent Labs   Lab 06/06/24  0809 06/05/24  0413 06/04/24  1824   WBC 13.1* 16.3* 15.5*       - Heme: Acute blood loss anemia and thrombocytopenia due to surgery. Trend CBC, transfuse PRN.   Recent Labs   Lab  "06/06/24  0809 06/05/24  0413 06/04/24  1824   HGB 8.1* 9.8* 9.4*   * 162 127*       - Proph: SCD, subcutaneous heparin, PPI    - Other:  Clinically Significant Risk Factors        # Hypokalemia: Lowest K = 3.3 mmol/L in last 2 days, will replace as needed   # Hypocalcemia: Lowest iCa = 4.2 mg/dL in last 2 days, will monitor and replace as appropriate        # Coagulation Defect: INR = 1.25 (Ref range: 0.85 - 1.15) and/or PTT = 49 Seconds (Ref range: 22 - 38 Seconds), will monitor for bleeding  # Thrombocytopenia: Lowest platelets = 109 in last 2 days, will monitor for bleeding   # Hypertension: Noted on problem list           #Precipitous drop in Hgb/Hct: Lowest Hgb this hospitalization: 8.1 g/dL. Will continue to monitor and treat/transfuse as appropriate.     # Obesity: Estimated body mass index is 30.73 kg/m  as calculated from the following:    Height as of 5/29/24: 1.753 m (5' 9\").    Weight as of this encounter: 94.4 kg (208 lb 1.8 oz).        # Financial/Environmental Concerns: none   # History of CABG: noted on surgical history       - Dispo: Encouraged IS/TCDB/amb. Sternal precautions. Start lopressor 12.5 mg PO bid with parameters. Continue lasix 20 mg IV daily. Will remove chest tubes and pearson. No noted air leak today. Plans to transfer to the step down unit. Continue to monitor.     Interval History  States that pain is better controlled. Denies any hallucinations. Breathing is ok. Working with IS and flutter valve. Appetite is ok. Denies any nausea. +flatus/-BM. Denies any popping/clicking in his breast bone. Was able to get some sleep. Has not ambulated but was up in chair.      Medications  Current Facility-Administered Medications   Medication Dose Route Frequency Provider Last Rate Last Admin    acetaminophen (TYLENOL) tablet 975 mg  975 mg Oral Q8H Tiara Yoon PA-C   975 mg at 06/06/24 0245    aspirin (ASA) chewable tablet 162 mg  162 mg Oral or NG Tube Daily Tiara Yoon PA-C   " 162 mg at 06/05/24 0800    atorvastatin (LIPITOR) tablet 40 mg  40 mg Oral QPM Tiara Yoon PA-C   40 mg at 06/05/24 1945    heparin ANTICOAGULANT injection 5,000 Units  5,000 Units Subcutaneous Q8H Tiara Yoon PA-C   5,000 Units at 06/06/24 0559    insulin aspart (NovoLOG) injection (RAPID ACTING)  1-7 Units Subcutaneous TID AC Guido Abel PA-C        insulin aspart (NovoLOG) injection (RAPID ACTING)  1-5 Units Subcutaneous At Bedtime Guido Abel PA-C        lamoTRIgine (LaMICtal) tablet 250 mg  250 mg Oral BID Tiara Yoon PA-C   250 mg at 06/05/24 2107    Lidocaine (LIDOCARE) 4 % Patch 1-2 patch  1-2 patch Transdermal Q24H Tiara Yoon PA-C   1 patch at 06/05/24 1944    linaclotide (LINZESS) capsule 290 mcg  290 mcg Oral QAM AC Tiara Yoon PA-C   290 mcg at 06/05/24 0829    magnesium oxide (MAG-OX) tablet 400 mg  400 mg Oral Q4H Melonie Morejon MD   400 mg at 06/06/24 0559    pantoprazole (PROTONIX) 2 mg/mL suspension 40 mg  40 mg Oral or NG Tube Daily Tiara Yoon PA-C   40 mg at 06/04/24 1855    Or    pantoprazole (PROTONIX) EC tablet 40 mg  40 mg Oral Daily Tiara Yoon PA-C   40 mg at 06/05/24 0800    polyethylene glycol (MIRALAX) Packet 17 g  17 g Oral Daily Tiara Yoon PA-C   17 g at 06/05/24 0800    senna-docusate (SENOKOT-S/PERICOLACE) 8.6-50 MG per tablet 1 tablet  1 tablet Oral BID Tiara Yoon PA-C   1 tablet at 06/05/24 2111    sodium chloride (PF) 0.9% PF flush 3 mL  3 mL Intracatheter Q8H Tiara Yoon PA-C   3 mL at 06/06/24 0223    tamsulosin (FLOMAX) capsule 0.4 mg  0.4 mg Oral Daily Yoon, Tiara C, PA-C   0.4 mg at 06/05/24 0800     Current Facility-Administered Medications   Medication Dose Route Frequency Provider Last Rate Last Admin    [START ON 6/7/2024] acetaminophen (TYLENOL) tablet 650 mg  650 mg Oral Q4H PRN Tiara Yoon PA-C        alum & mag hydroxide-simethicone (MAALOX) suspension 30 mL  30 mL Oral Q4H PRN Car,  Tiara JASON PA-C        [START ON 6/7/2024] bisacodyl (DULCOLAX) suppository 10 mg  10 mg Rectal Daily PRN Tiara Yoon PA-C        calcium carbonate (TUMS) chewable tablet 500 mg  500 mg Oral 4x Daily PRN Tiara Yoon PA-C        calcium gluconate 1 g in 50 mL in sodium chloride intermittent infusion  1 g Intravenous Q6H PRN Tiara Yoon PA-C        calcium gluconate 2 g in  mL intermittent infusion  2 g Intravenous Q6H PRN Tiara Yoon PA-C        calcium gluconate 3 g in sodium chloride 0.9 % 100 mL intermittent infusion  3 g Intravenous Q6H PRN Tiara Yoon PA-C        glucose gel 15-30 g  15-30 g Oral Q15 Min PRN Guido Abel PA-C        Or    dextrose 50 % injection 25-50 mL  25-50 mL Intravenous Q15 Min PRN Guido Abel PA-C        Or    glucagon injection 1 mg  1 mg Subcutaneous Q15 Min PRN Guido Abel PA-C        EPINEPHrine (ADRENALIN) 5 mg in  mL infusion  0.01-0.1 mcg/kg/min Intravenous Continuous PRN Tiara Yoon PA-C        hydrALAZINE (APRESOLINE) injection 10 mg  10 mg Intravenous Q30 Min PRN Tiara Yoon PA-C   10 mg at 06/05/24 0951    HYDROmorphone (DILAUDID) injection 0.2 mg  0.2 mg Intravenous Q2H PRN Tiara Yoon PA-C   0.2 mg at 06/05/24 1707    Or    HYDROmorphone (DILAUDID) injection 0.4 mg  0.4 mg Intravenous Q2H PRN Tiara Yoon PA-C   0.4 mg at 06/06/24 0245    hydrOXYzine HCl (ATARAX) tablet 10 mg  10 mg Oral Q6H PRN Tiara Yoon PA-C   10 mg at 06/05/24 2226    ketorolac (TORADOL) injection 15 mg  15 mg Intravenous Q6H PRN Guido Abel PA-C   15 mg at 06/06/24 0446    lactated ringers BOLUS 250 mL  250 mL Intravenous Q10 Min PRN Tiara Yoon PA-C   250 mL at 06/06/24 0607    lidocaine (LMX4) cream   Topical Q1H PRN Tiara Yoon PA-C        lidocaine 1 % 0.1-1 mL  0.1-1 mL Other Q1H PRN Tiara Yoon PA-C        magnesium hydroxide (MILK OF MAGNESIA) suspension 30 mL  30 mL Oral Daily PRN Tiara Yoon PA-C         methocarbamol (ROBAXIN) tablet 500 mg  500 mg Oral Q6H PRN Yoon, Tiara C, PA-C   500 mg at 06/05/24 1945    naloxone (NARCAN) injection 0.2 mg  0.2 mg Intravenous Q2 Min PRN Melonie Morejon MD        Or    naloxone (NARCAN) injection 0.4 mg  0.4 mg Intravenous Q2 Min PRN Melonie Morejon MD        Or    naloxone (NARCAN) injection 0.2 mg  0.2 mg Intramuscular Q2 Min PRN Melonie Morejon MD        Or    naloxone (NARCAN) injection 0.4 mg  0.4 mg Intramuscular Q2 Min PRN Melonie Morejon MD        nitroGLYcerin 50 mg in D5W 250 mL (adult std) infusion CENTRAL   mcg/min Intravenous Continuous PRN Yoon, Tiara JASON, PA-C        norepinephrine (LEVOPHED) 16 mg in  mL infusion MAX CONC CENTRAL LINE  0.01-0.4 mcg/kg/min Intravenous Continuous PRN Yoon, Tiara JASON, PA-C        ondansetron (ZOFRAN ODT) ODT tab 4 mg  4 mg Oral Q6H PRN Yoon, Tiara JASON, PA-C        Or    ondansetron (ZOFRAN) injection 4 mg  4 mg Intravenous Q6H PRN Yoon, Tiara C, PA-C        oxyCODONE (ROXICODONE) tablet 5 mg  5 mg Oral Q4H PRN Yoon, Tiara C, PA-C   5 mg at 06/05/24 0537    Or    oxyCODONE (ROXICODONE) tablet 10 mg  10 mg Oral Q4H PRN Yoon, Tiara C, PA-C   10 mg at 06/06/24 0245    prochlorperazine (COMPAZINE) injection 5 mg  5 mg Intravenous Q6H PRN Yoon, Tiara C, PA-C        Or    prochlorperazine (COMPAZINE) tablet 5 mg  5 mg Oral Q6H PRN Yoon, Tiara C, PA-C        Reason beta blocker order not selected   Does not apply DOES NOT GO TO MAR Tiara Yoon C, PA-C        sodium chloride (PF) 0.9% PF flush 3 mL  3 mL Intracatheter q1 min prn Yoon, Tiara C, PA-C        vasopressin 0.2 units/mL in NS (PITRESSIN) standard conc infusion  0.5-4 Units/hr Intravenous Continuous PRN Tiara Yoon PA-C             Physical Exam  Vitals were reviewed  Blood pressure 91/57, pulse 78, temperature (!) 100.8  F (38.2  C), temperature source Bladder, resp. rate 16, weight 94.4 kg (208 lb 1.8 oz),  SpO2 91%.  Gen: up in bed, comfortable, +O2    Lungs: diminished bases,  ml    Cardiovascular: RRR, telemetry SR 70s, -edema LE    Abdomen: BS+, NT/ND, soft    Derm: sternal incision D/I   R LE incisions D/I    CT: decreased serous output, -air leak    CXR (6/5/24) - extubated, chest tubes present, +atelectasis    UO 15-50 ml/hr    Weight:   Vitals:    06/02/24 0543 06/03/24 0638 06/04/24 0517 06/05/24 0400   Weight: 88.9 kg (195 lb 14.4 oz) 89.8 kg (198 lb) 90.1 kg (198 lb 9.6 oz) 91.2 kg (201 lb 1 oz)    06/06/24 0200   Weight: 94.4 kg (208 lb 1.8 oz)         Data  Recent Labs   Lab 06/06/24  0809 06/06/24  0808 06/06/24  0408 06/06/24  0403 06/05/24  0819 06/05/24  0413 06/04/24 2005 06/04/24  1824 06/04/24  1821 06/04/24  1658   WBC 13.1*  --   --   --   --  16.3*  --  15.5*  --  21.2*   HGB 8.1*  --   --   --   --  9.8*  --  9.4*  --  8.8*   MCV 85  --   --   --   --  86  --  85  --  85   *  --   --   --   --  162  --  127*  --  185   INR  --   --   --   --   --   --   --  1.25*  --  1.35*   NA  --   --   --  140  --  139  --  143  --  141   POTASSIUM  --   --   --  3.7  3.7  --  3.8  --  3.3*  --  3.6   CHLORIDE  --   --   --  105  --  106  --  110*  --  109*   CO2  --   --   --  22  --  19*  --  20*  --  20*   BUN  --   --   --  21.4  --  19.1  --  18.9  --  18.2   CR  --   --   --  1.27*  --  1.22*  --  1.24*  --  1.23*   ANIONGAP  --   --   --  13  --  14  --  13  --  12   MOE  --   --   --  8.5*  --  8.6*  --  8.6*  --  9.3   GLC  --  130* 125* 126*   < > 142*   < > 143*   < > 188*   ALBUMIN  --   --   --   --   --  3.8  --  3.8  --   --    PROTTOTAL  --   --   --   --   --  5.8*  --  5.4*  --   --    BILITOTAL  --   --   --   --   --  0.5  --  0.5  --   --    ALKPHOS  --   --   --   --   --  87  --  84  --   --    ALT  --   --   --   --   --  44  --  49  --   --    AST  --   --   --   --   --  50*  --  42  --   --     < > = values in this interval not displayed.       Imaging:  No results  found for this or any previous visit (from the past 24 hour(s)).      Patient seen and discussed with Dr Jean-Pierre Abel PA-C  Cardiothoracic Surgery  [phone]

## 2024-06-06 NOTE — PLAN OF CARE
Goal Outcome Evaluation:      Plan of Care Reviewed With: patient, family    Overall Patient Progress: no changeOverall Patient Progress: no change    Outcome Evaluation: VSS on RA-2 LPM NC. Pt required supplemental O2 while napping this shift. C/O incisional pain, generalized pain this shift; prn oxycodone, atarax, scheduled tylenol given. Entered room to administer prn dilaudid s/p chest tube removal, pt sleeping when writer arrived. Up to chair with PT today, tolerated for several hours before returning to bed. Tolerating PO meds, liquids; requires encouragement for meals but tolerating. No BM this shift, passing gas; pt reports feeling full. Cano in place with good UO s/p lasix administration. Able to remove CVC, art line; chest tubes removed by provider at bedside. PIV RYANN moise. Tele SR. Plan to transfer to [facility] pending bed availability. Bed alarm in place, not attempting to exit. Compliant with sternal precautions with reminders. Alert and oriented, able to make needs known. Continue to bedside.      Problem: Acute Coronary Syndrome  Goal: Normalized Cardiac Rhythm  Outcome: Progressing  Intervention: Monitor and Manage Cardiac Rhythm Effect  Recent Flowsheet Documentation  Taken 6/6/2024 1600 by Shirley Heard RN  Dysrhythmia Management: pacing wires maintained  VTE Prevention/Management: SCDs (sequential compression devices) on  Taken 6/6/2024 1200 by Shirley Heard RN  Dysrhythmia Management: pacing wires maintained  VTE Prevention/Management: SCDs (sequential compression devices) on  Taken 6/6/2024 0730 by Shirley Heard, RN  Dysrhythmia Management: pacing wires maintained  VTE Prevention/Management: SCDs (sequential compression devices) on     Problem: Acute Coronary Syndrome  Goal: Effective Cardiac Pump Function  Outcome: Progressing  Intervention: Optimize Cardiac Function and Blood Flow  Recent Flowsheet Documentation  Taken 6/6/2024 1600 by Shirley Heard, RN  Fluid/Electrolyte  Management: fluids provided  Taken 6/6/2024 1200 by Shirley Heard, RN  Fluid/Electrolyte Management: fluids provided  Taken 6/6/2024 0730 by Shirley Heard, RN  Fluid/Electrolyte Management: fluids provided     Problem: Comorbidity Management  Goal: Maintenance of Seizure Control  Outcome: Progressing  Intervention: Maintain Seizure Symptom Control  Recent Flowsheet Documentation  Taken 6/6/2024 1600 by Shirley Heard, RN  Sensory Stimulation Regulation:   care clustered   lighting decreased   quiet environment promoted  Medication Review/Management:   medications reviewed   high-risk medications identified  Taken 6/6/2024 1200 by Shirley Heard, RN  Sensory Stimulation Regulation:   care clustered   lighting decreased   quiet environment promoted  Medication Review/Management:   medications reviewed   high-risk medications identified  Taken 6/6/2024 0730 by Shirley Heard, RN  Sensory Stimulation Regulation:   care clustered   lighting decreased   quiet environment promoted  Medication Review/Management:   medications reviewed   high-risk medications identified

## 2024-06-06 NOTE — CONSULTS
"NUTRITION ASSESSMENT      REASON FOR ASSESSMENT:  Cardiac Surgery Nutrition Consult    CURRENT DIET / INTAKE:  Regular diet + Ensure TID with meals    Patient states that appetite is fair post-op  Just had a turkey sandwich and some fruit for lunch  He is drinking the Ensure but liked them better yesterday --> Today they seem too sweet.  He is willing to try the Magic Cup in the afternoon (and keep Ensure in for am snack once daily).        ANTHROPOMETRICS:   Ht: 5'9\"  Wt: 89.2 kg (196#)(5/31)  BMI: 28.9 kg/m^2  IBW: 72.7 kg   Weight Status: Overweight BMI 25-29.9  %IBW: 123%    MALNUTRITION:  Patient does not meet two of the following criteria necessary for diagnosing malnutrition:  significant weight loss, reduced intake, subcutaneous fat loss, muscle loss or fluid retention. Nutrition Focused Physical Assessment (NFPA) not appropriate at this time.    NUTRITION DIAGNOSIS:   Increased nutrient needs (protein) R/t recent open heart surgery AEB need for oral nutritional supplements post-op     INTERVENTIONS:    Nutrition Prescription:  Regular diet as tolerated  Ensure once daily in am and Magic Cup once daily in pm     Implementation:  Nutrition Education (Content):  Discussed the importance of adequate nutrition post-op for healing and energy, emphasizing protein foods, and encouraged patient to order a protein food at each meal.    Goals:  Patient to consume ~75% at meals in the next 3 - 5 days    Follow Up/Monitoring (InPatient):  Food and Fluid intake -  Monitor for adequacy    Follow Up/Monitoring (OutPatient):  Patient will participate in out-patient cardiac rehab and attend nutrition classes during the program    Tori Dallas RD, LD, CNSC   Clinical Dietitian - [facility]       "

## 2024-06-07 ENCOUNTER — APPOINTMENT (OUTPATIENT)
Dept: PHYSICAL THERAPY | Facility: CLINIC | Age: 69
DRG: 236 | End: 2024-06-07
Attending: HOSPITALIST
Payer: COMMERCIAL

## 2024-06-07 ENCOUNTER — APPOINTMENT (OUTPATIENT)
Dept: GENERAL RADIOLOGY | Facility: CLINIC | Age: 69
DRG: 236 | End: 2024-06-07
Attending: PHYSICIAN ASSISTANT
Payer: COMMERCIAL

## 2024-06-07 LAB
ANION GAP SERPL CALCULATED.3IONS-SCNC: 12 MMOL/L (ref 7–15)
BUN SERPL-MCNC: 23.2 MG/DL (ref 8–23)
CALCIUM SERPL-MCNC: 9 MG/DL (ref 8.8–10.2)
CHLORIDE SERPL-SCNC: 102 MMOL/L (ref 98–107)
CREAT SERPL-MCNC: 1.29 MG/DL (ref 0.67–1.17)
DEPRECATED HCO3 PLAS-SCNC: 22 MMOL/L (ref 22–29)
EGFRCR SERPLBLD CKD-EPI 2021: 60 ML/MIN/1.73M2
ERYTHROCYTE [DISTWIDTH] IN BLOOD BY AUTOMATED COUNT: 15.2 % (ref 10–15)
GLUCOSE BLDC GLUCOMTR-MCNC: 121 MG/DL (ref 70–99)
GLUCOSE SERPL-MCNC: 119 MG/DL (ref 70–99)
HCT VFR BLD AUTO: 24.9 % (ref 40–53)
HGB BLD-MCNC: 8.4 G/DL (ref 13.3–17.7)
MAGNESIUM SERPL-MCNC: 2.2 MG/DL (ref 1.7–2.3)
MCH RBC QN AUTO: 29.2 PG (ref 26.5–33)
MCHC RBC AUTO-ENTMCNC: 33.7 G/DL (ref 31.5–36.5)
MCV RBC AUTO: 87 FL (ref 78–100)
PHOSPHATE SERPL-MCNC: 2.9 MG/DL (ref 2.5–4.5)
PLATELET # BLD AUTO: 133 10E3/UL (ref 150–450)
POTASSIUM SERPL-SCNC: 3.8 MMOL/L (ref 3.4–5.3)
RBC # BLD AUTO: 2.88 10E6/UL (ref 4.4–5.9)
SODIUM SERPL-SCNC: 136 MMOL/L (ref 135–145)
WBC # BLD AUTO: 11 10E3/UL (ref 4–11)

## 2024-06-07 PROCEDURE — 82374 ASSAY BLOOD CARBON DIOXIDE: CPT | Performed by: PHYSICIAN ASSISTANT

## 2024-06-07 PROCEDURE — 250N000009 HC RX 250: Performed by: PHYSICIAN ASSISTANT

## 2024-06-07 PROCEDURE — 250N000013 HC RX MED GY IP 250 OP 250 PS 637: Performed by: PHYSICIAN ASSISTANT

## 2024-06-07 PROCEDURE — 71046 X-RAY EXAM CHEST 2 VIEWS: CPT

## 2024-06-07 PROCEDURE — 999N000157 HC STATISTIC RCP TIME EA 10 MIN

## 2024-06-07 PROCEDURE — 250N000011 HC RX IP 250 OP 636: Mod: JZ | Performed by: PHYSICIAN ASSISTANT

## 2024-06-07 PROCEDURE — 84100 ASSAY OF PHOSPHORUS: CPT | Performed by: PHYSICIAN ASSISTANT

## 2024-06-07 PROCEDURE — 120N000013 HC R&B IMCU

## 2024-06-07 PROCEDURE — 97110 THERAPEUTIC EXERCISES: CPT | Mod: GP

## 2024-06-07 PROCEDURE — 82565 ASSAY OF CREATININE: CPT | Performed by: PHYSICIAN ASSISTANT

## 2024-06-07 PROCEDURE — 83735 ASSAY OF MAGNESIUM: CPT | Performed by: PHYSICIAN ASSISTANT

## 2024-06-07 PROCEDURE — 250N000013 HC RX MED GY IP 250 OP 250 PS 637: Performed by: SURGERY

## 2024-06-07 PROCEDURE — 93005 ELECTROCARDIOGRAM TRACING: CPT

## 2024-06-07 PROCEDURE — 93010 ELECTROCARDIOGRAM REPORT: CPT | Performed by: INTERNAL MEDICINE

## 2024-06-07 PROCEDURE — 94640 AIRWAY INHALATION TREATMENT: CPT

## 2024-06-07 PROCEDURE — 85014 HEMATOCRIT: CPT | Performed by: PHYSICIAN ASSISTANT

## 2024-06-07 PROCEDURE — 36415 COLL VENOUS BLD VENIPUNCTURE: CPT | Performed by: PHYSICIAN ASSISTANT

## 2024-06-07 PROCEDURE — 97530 THERAPEUTIC ACTIVITIES: CPT | Mod: GP

## 2024-06-07 PROCEDURE — 94640 AIRWAY INHALATION TREATMENT: CPT | Mod: 76

## 2024-06-07 RX ORDER — LORAZEPAM 0.5 MG/1
0.25 TABLET ORAL 2 TIMES DAILY PRN
Status: DISCONTINUED | OUTPATIENT
Start: 2024-06-07 | End: 2024-06-07

## 2024-06-07 RX ORDER — HYDROXYZINE HYDROCHLORIDE 25 MG/1
25-50 TABLET, FILM COATED ORAL EVERY 6 HOURS PRN
Status: DISCONTINUED | OUTPATIENT
Start: 2024-06-07 | End: 2024-06-07

## 2024-06-07 RX ORDER — POTASSIUM CHLORIDE 1.5 G/1.58G
20 POWDER, FOR SOLUTION ORAL ONCE
Status: COMPLETED | OUTPATIENT
Start: 2024-06-07 | End: 2024-06-07

## 2024-06-07 RX ORDER — HYDROXYZINE HYDROCHLORIDE 25 MG/1
25 TABLET, FILM COATED ORAL EVERY 6 HOURS PRN
Status: DISCONTINUED | OUTPATIENT
Start: 2024-06-07 | End: 2024-06-09

## 2024-06-07 RX ORDER — IPRATROPIUM BROMIDE AND ALBUTEROL SULFATE 2.5; .5 MG/3ML; MG/3ML
3 SOLUTION RESPIRATORY (INHALATION)
Status: COMPLETED | OUTPATIENT
Start: 2024-06-07 | End: 2024-06-09

## 2024-06-07 RX ADMIN — IPRATROPIUM BROMIDE AND ALBUTEROL SULFATE 3 ML: .5; 3 SOLUTION RESPIRATORY (INHALATION) at 15:09

## 2024-06-07 RX ADMIN — TRAZODONE HYDROCHLORIDE 50 MG: 50 TABLET ORAL at 21:46

## 2024-06-07 RX ADMIN — ACETAMINOPHEN 975 MG: 325 TABLET, FILM COATED ORAL at 09:48

## 2024-06-07 RX ADMIN — HEPARIN SODIUM 5000 UNITS: 5000 INJECTION, SOLUTION INTRAVENOUS; SUBCUTANEOUS at 21:50

## 2024-06-07 RX ADMIN — HYDROXYZINE HYDROCHLORIDE 25 MG: 25 TABLET, FILM COATED ORAL at 14:41

## 2024-06-07 RX ADMIN — IPRATROPIUM BROMIDE AND ALBUTEROL SULFATE 3 ML: .5; 3 SOLUTION RESPIRATORY (INHALATION) at 20:20

## 2024-06-07 RX ADMIN — HEPARIN SODIUM 5000 UNITS: 5000 INJECTION, SOLUTION INTRAVENOUS; SUBCUTANEOUS at 05:06

## 2024-06-07 RX ADMIN — LAMOTRIGINE 250 MG: 100 TABLET ORAL at 21:45

## 2024-06-07 RX ADMIN — METHOCARBAMOL 500 MG: 500 TABLET ORAL at 09:49

## 2024-06-07 RX ADMIN — OXYCODONE HYDROCHLORIDE 10 MG: 5 TABLET ORAL at 08:13

## 2024-06-07 RX ADMIN — FUROSEMIDE 20 MG: 10 INJECTION, SOLUTION INTRAMUSCULAR; INTRAVENOUS at 09:50

## 2024-06-07 RX ADMIN — PANTOPRAZOLE SODIUM 40 MG: 40 TABLET, DELAYED RELEASE ORAL at 09:49

## 2024-06-07 RX ADMIN — SENNOSIDES AND DOCUSATE SODIUM 1 TABLET: 50; 8.6 TABLET ORAL at 21:45

## 2024-06-07 RX ADMIN — TAMSULOSIN HYDROCHLORIDE 0.4 MG: 0.4 CAPSULE ORAL at 09:48

## 2024-06-07 RX ADMIN — ACETAMINOPHEN 975 MG: 325 TABLET, FILM COATED ORAL at 03:52

## 2024-06-07 RX ADMIN — METOPROLOL TARTRATE 12.5 MG: 25 TABLET, FILM COATED ORAL at 09:49

## 2024-06-07 RX ADMIN — HYDROXYZINE HYDROCHLORIDE 10 MG: 10 TABLET ORAL at 09:48

## 2024-06-07 RX ADMIN — ALUMINUM HYDROXIDE, MAGNESIUM HYDROXIDE, AND SIMETHICONE 30 ML: 1200; 120; 1200 SUSPENSION ORAL at 09:48

## 2024-06-07 RX ADMIN — SENNOSIDES AND DOCUSATE SODIUM 1 TABLET: 50; 8.6 TABLET ORAL at 09:49

## 2024-06-07 RX ADMIN — HEPARIN SODIUM 5000 UNITS: 5000 INJECTION, SOLUTION INTRAVENOUS; SUBCUTANEOUS at 14:41

## 2024-06-07 RX ADMIN — ATORVASTATIN CALCIUM 40 MG: 40 TABLET, FILM COATED ORAL at 19:52

## 2024-06-07 RX ADMIN — LIDOCAINE 1 PATCH: 4 PATCH TOPICAL at 19:52

## 2024-06-07 RX ADMIN — METHOCARBAMOL 500 MG: 500 TABLET ORAL at 19:52

## 2024-06-07 RX ADMIN — POLYETHYLENE GLYCOL 3350 17 G: 17 POWDER, FOR SOLUTION ORAL at 09:50

## 2024-06-07 RX ADMIN — ACETAMINOPHEN 650 MG: 325 TABLET, FILM COATED ORAL at 21:45

## 2024-06-07 RX ADMIN — POTASSIUM CHLORIDE 20 MEQ: 1.5 POWDER, FOR SOLUTION ORAL at 12:40

## 2024-06-07 RX ADMIN — LAMOTRIGINE 250 MG: 100 TABLET ORAL at 09:49

## 2024-06-07 RX ADMIN — ASPIRIN 81 MG CHEWABLE TABLET 162 MG: 81 TABLET CHEWABLE at 09:48

## 2024-06-07 RX ADMIN — METOPROLOL TARTRATE 12.5 MG: 25 TABLET, FILM COATED ORAL at 21:47

## 2024-06-07 ASSESSMENT — ACTIVITIES OF DAILY LIVING (ADL)
ADLS_ACUITY_SCORE: 36

## 2024-06-07 NOTE — PROGRESS NOTES
[facility]  Cardiovascular and Thoracic Surgery Daily Note    Assessment and Plan  POD #3 s/p coronary artery bypass grafting x 3 (LIMA to LAD, SVG to OM1, SVG to OM2) on 6/4/2024 with Dr. Morejon    - CVS: Pre-op TTE with 45-50%  Trace mitral regurg  Trace tricuspid regurg.   Carotid US (5/31/24) - less than 50% stenosis of bilateral ICA    -120s/50-80s, HR 70-80s, lopressor 12.5 mg PO bid with parameters, lasix 20 mg IV daily, aspirin 162 mg, atorvastatin 40 mg, continues on flomax, chest tubes/TPW removed on 6/6    - Resp: Extubated POD #0 at 1945. Working with IS and flutter valve, needs increased pulmonary toilet. Start duoneb qid x 2 days    - Neuro: Neuro intact, pain controlled on current regimen, continues on home lamictal 250 mg bid, pt having a lot of anxiety with movement, prn ativan PO (up to bid)    - Renal: No history of significant renal disease. Trend BMP. Diuretic as above.  Recent Labs   Lab 06/07/24  0723 06/06/24  0403 06/05/24  0413   CR 1.29* 1.27* 1.22*       - GI: No BM, +flatus, continue bowel regimen, continues on PTA linzess 290 mcg every morning    - : remove pearson    - Endo: discontinue sliding scale insulin    Hemoglobin A1C   Date Value Ref Range Status   05/29/2024 4.8 <5.7 % Final     Comment:     Normal <5.7%   Prediabetes 5.7-6.4%    Diabetes 6.5% or higher     Note: Adopted from ADA consensus guidelines.   04/05/2018 4.8 0 - 6.4 % Final     Comment:     Normal <5.7% Prediabetes 5.7-6.4%  Diabetes 6.5% or higher - adopted from ADA   consensus guidelines.          - FEN: Replace electrolytes as needed. Advance to regular diet as tolerated    - ID: Postop leukocytosis.  Tmax 101.3. finished periop abx prophylaxis. Needs increased pulmonary toilet. Trend CBC and fever curve.   Recent Labs   Lab 06/07/24  0723 06/06/24  0809 06/05/24  0413   WBC 11.0 13.1* 16.3*       - Heme: Acute blood loss anemia and thrombocytopenia due to surgery. Trend CBC, transfuse  "PRN.   Recent Labs   Lab 06/07/24  0723 06/06/24  0809 06/05/24  0413   HGB 8.4* 8.1* 9.8*   * 109* 162       - Proph: SCD, subcutaneous heparin, PPI    - Other:  Clinically Significant Risk Factors                  # Thrombocytopenia: Lowest platelets = 109 in last 2 days, will monitor for bleeding   # Hypertension: Noted on problem list           #Precipitous drop in Hgb/Hct: Lowest Hgb this hospitalization: 8.1 g/dL. Will continue to monitor and treat/transfuse as appropriate.     # Obesity: Estimated body mass index is 30.3 kg/m  as calculated from the following:    Height as of 5/29/24: 1.753 m (5' 9\").    Weight as of this encounter: 93.1 kg (205 lb 3.2 oz).        # Financial/Environmental Concerns: none   # History of CABG: noted on surgical history       - Dispo: Encouraged IS/TCDB/amb. Sternal precautions. CXR shows increased atectasis. Needs pulmonary toilet. Start duonebs x 2 days. Remove pearson. Ativan prn to help with anxiety of moving. Ok to shower. Looking to get out of the hospital likely on Monday. Continue to monitor.     Interval History  States that he is having increased pain. States that he had an elephant on his chest this am. EKG normal. Denies any hallucinations. Breathing is ok. Working with IS and flutter valve. Appetite is ok. Denies any nausea. +flatus/-BM. Denies any popping/clicking in his breast bone. Was able to get some sleep. Has not ambulated but was up in chair.      Medications  Current Facility-Administered Medications   Medication Dose Route Frequency Provider Last Rate Last Admin    aspirin (ASA) chewable tablet 162 mg  162 mg Oral or NG Tube Daily Guido Abel PA-C   162 mg at 06/07/24 0948    atorvastatin (LIPITOR) tablet 40 mg  40 mg Oral QPM Guido Abel PA-C   40 mg at 06/06/24 1946    furosemide (LASIX) injection 20 mg  20 mg Intravenous Daily Guido Abel PA-C   20 mg at 06/07/24 0950    heparin ANTICOAGULANT injection 5,000 Units  5,000 Units " Subcutaneous Q8H Guido Abel PA-C   5,000 Units at 06/07/24 0506    insulin aspart (NovoLOG) injection (RAPID ACTING)  1-7 Units Subcutaneous TID AC Guido Abel PA-C        insulin aspart (NovoLOG) injection (RAPID ACTING)  1-5 Units Subcutaneous At Bedtime Guido Abel PA-C        lamoTRIgine (LaMICtal) tablet 250 mg  250 mg Oral BID Guido Abel PA-C   250 mg at 06/07/24 0949    Lidocaine (LIDOCARE) 4 % Patch 1-2 patch  1-2 patch Transdermal Q24H Guido Abel PA-C   1 patch at 06/06/24 1946    linaclotide (LINZESS) capsule 290 mcg  290 mcg Oral QAM AC Guido Abel PA-C   290 mcg at 06/06/24 0919    metoprolol tartrate (LOPRESSOR) half-tab 12.5 mg  12.5 mg Oral BID Guido Abel PA-C   12.5 mg at 06/07/24 0949    pantoprazole (PROTONIX) EC tablet 40 mg  40 mg Oral Daily Guido Abel PA-C   40 mg at 06/07/24 0949    polyethylene glycol (MIRALAX) Packet 17 g  17 g Oral Daily Guido Abel PA-C   17 g at 06/07/24 0950    senna-docusate (SENOKOT-S/PERICOLACE) 8.6-50 MG per tablet 1 tablet  1 tablet Oral BID Guido Abel PA-C   1 tablet at 06/07/24 0949    sodium chloride (PF) 0.9% PF flush 3 mL  3 mL Intracatheter Q8H Guido Abel PA-C   3 mL at 06/07/24 0353    tamsulosin (FLOMAX) capsule 0.4 mg  0.4 mg Oral Daily Guido Abel PA-C   0.4 mg at 06/07/24 0948    traZODone (DESYREL) tablet 50 mg  50 mg Oral At Bedtime Guido Abel PA-C   50 mg at 06/06/24 2341     Current Facility-Administered Medications   Medication Dose Route Frequency Provider Last Rate Last Admin    acetaminophen (TYLENOL) tablet 650 mg  650 mg Oral Q4H PRN Guido Abel PA-C        alum & mag hydroxide-simethicone (MAALOX) suspension 30 mL  30 mL Oral Q4H PRN Guido Abel PA-C   30 mL at 06/07/24 0948    bisacodyl (DULCOLAX) suppository 10 mg  10 mg Rectal Daily PRN Guido Abel PA-C        calcium carbonate (TUMS) chewable tablet 500 mg  500 mg Oral 4x Daily PRN Guido Abel PA-C         glucose gel 15-30 g  15-30 g Oral Q15 Min PRN Guido Abel PA-C        Or    dextrose 50 % injection 25-50 mL  25-50 mL Intravenous Q15 Min PRN Guido Abel PA-C        Or    glucagon injection 1 mg  1 mg Subcutaneous Q15 Min PRN Guido Abel PA-C        hydrALAZINE (APRESOLINE) injection 10 mg  10 mg Intravenous Q30 Min PRN Guido Abel PA-C   10 mg at 06/05/24 0951    HYDROmorphone (DILAUDID) injection 0.2 mg  0.2 mg Intravenous Q2H PRN Guido Abel PA-C   0.2 mg at 06/05/24 1707    Or    HYDROmorphone (DILAUDID) injection 0.4 mg  0.4 mg Intravenous Q2H PRN Guido Abel PA-C   0.4 mg at 06/06/24 0245    hydrOXYzine HCl (ATARAX) tablet 10 mg  10 mg Oral Q6H PRN Guido Abel PA-C   10 mg at 06/07/24 0948    lidocaine (LMX4) cream   Topical Q1H PRN Guido Abel PA-C        lidocaine 1 % 0.1-1 mL  0.1-1 mL Other Q1H PRN Guido Abel PA-C        magnesium hydroxide (MILK OF MAGNESIA) suspension 30 mL  30 mL Oral Daily PRN Guido bAel PA-C        methocarbamol (ROBAXIN) tablet 500 mg  500 mg Oral Q6H PRN Guido Abel PA-C   500 mg at 06/07/24 0949    naloxone (NARCAN) injection 0.2 mg  0.2 mg Intravenous Q2 Min PRN Guido Abel PA-C        Or    naloxone (NARCAN) injection 0.4 mg  0.4 mg Intravenous Q2 Min PRN Guido Abel PA-C        Or    naloxone (NARCAN) injection 0.2 mg  0.2 mg Intramuscular Q2 Min PRN Guido Abel PA-C        Or    naloxone (NARCAN) injection 0.4 mg  0.4 mg Intramuscular Q2 Min PRN Guido Abel PA-C        ondansetron (ZOFRAN ODT) ODT tab 4 mg  4 mg Oral Q6H PRN Guido Abel PA-C        Or    ondansetron (ZOFRAN) injection 4 mg  4 mg Intravenous Q6H PRN Guido Abel PA-C        oxyCODONE (ROXICODONE) tablet 5 mg  5 mg Oral Q4H PRN Guido Abel PA-C   5 mg at 06/05/24 0537    Or    oxyCODONE (ROXICODONE) tablet 10 mg  10 mg Oral Q4H PRN Guido Abel PA-C   10 mg at 06/07/24 0813    prochlorperazine (COMPAZINE)  injection 5 mg  5 mg Intravenous Q6H PRN Guido Abel PA-C        Or    prochlorperazine (COMPAZINE) tablet 5 mg  5 mg Oral Q6H PRN Guido Abel PA-C        sodium chloride (PF) 0.9% PF flush 3 mL  3 mL Intracatheter q1 min prn Guido Abel PA-C             Physical Exam  Vitals were reviewed  Blood pressure 124/80, pulse 85, temperature 99.2  F (37.3  C), temperature source Oral, resp. rate 18, weight 93.1 kg (205 lb 3.2 oz), SpO2 96%.  Gen: lying in bed, comfortable, -O2    Lungs: diminished bases, -500 ml    Cardiovascular: RRR, telemetry SR 70s, +trace edema LE    Abdomen: BS+, NT/ND, soft    Derm: sternal incision D/I   R LE incisions D/I    CT: removed    CXR (6/7/24) - chest tubes removed, no pneumothorax, increased atelectasis, sm bilateral effusions    CXR (6/5/24) - extubated, chest tubes present, +atelectasis    +pearson    Weight:   Vitals:    06/03/24 0638 06/04/24 0517 06/05/24 0400 06/06/24 0200   Weight: 89.8 kg (198 lb) 90.1 kg (198 lb 9.6 oz) 91.2 kg (201 lb 1 oz) 94.4 kg (208 lb 1.8 oz)    06/07/24 0406   Weight: 93.1 kg (205 lb 3.2 oz)         Data  Recent Labs   Lab 06/07/24  0723 06/06/24  1839 06/06/24  1321 06/06/24  0809 06/06/24  0408 06/06/24  0403 06/05/24  0819 06/05/24  0413 06/04/24  2005 06/04/24  1824 06/04/24  1821 06/04/24  1658   WBC 11.0  --   --  13.1*  --   --   --  16.3*  --  15.5*  --  21.2*   HGB 8.4*  --   --  8.1*  --   --   --  9.8*  --  9.4*  --  8.8*   MCV 87  --   --  85  --   --   --  86  --  85  --  85   *  --   --  109*  --   --   --  162  --  127*  --  185   INR  --   --   --   --   --   --   --   --   --  1.25*  --  1.35*     --   --   --   --  140  --  139  --  143  --  141   POTASSIUM 3.8  --   --   --   --  3.7  3.7  --  3.8  --  3.3*  --  3.6   CHLORIDE 102  --   --   --   --  105  --  106  --  110*  --  109*   CO2 22  --   --   --   --  22  --  19*  --  20*  --  20*   BUN 23.2*  --   --   --   --  21.4  --  19.1  --  18.9  --   18.2   CR 1.29*  --   --   --   --  1.27*  --  1.22*  --  1.24*  --  1.23*   ANIONGAP 12  --   --   --   --  13  --  14  --  13  --  12   MOE 9.0  --   --   --   --  8.5*  --  8.6*  --  8.6*  --  9.3   * 122* 125*  --    < > 126*   < > 142*   < > 143*   < > 188*   ALBUMIN  --   --   --   --   --   --   --  3.8  --  3.8  --   --    PROTTOTAL  --   --   --   --   --   --   --  5.8*  --  5.4*  --   --    BILITOTAL  --   --   --   --   --   --   --  0.5  --  0.5  --   --    ALKPHOS  --   --   --   --   --   --   --  87  --  84  --   --    ALT  --   --   --   --   --   --   --  44  --  49  --   --    AST  --   --   --   --   --   --   --  50*  --  42  --   --     < > = values in this interval not displayed.       Imaging:  Recent Results (from the past 24 hour(s))   XR Chest 2 Views    Narrative    CHEST TWO VIEWS  6/7/2024 8:35 AM     HISTORY: Status post CABG, removal of chest tubes.    COMPARISON: Chest radiograph 6/5/2024.      Impression    IMPRESSION: Interval removal of the chest tubes and right central  venous catheter sheath. No convincing pneumothorax. Increased  pulmonary edema. Bilateral pleural effusions and adjacent atelectasis.  Similar heart size and median sternotomy.       Patient seen and discussed with Dr Jean-Pierre Abel PATamaraC  Cardiothoracic Surgery  [phone]

## 2024-06-07 NOTE — PROGRESS NOTES
Patient is AO X 4; VSS; on RA while awake; very anxious.   Pain is uncontrolled unless Naveen is being administered Atarax 25 mg.  In the morning I administered Oxycodone 10 mg for pain with little relief; however; administration of Atarax , Robaxin, and Tylenol decreased the pain severity from severe to very mild.   Cano catheter was discontinued today.   OOB in the chair for about two hours; up with assist X 1; FWW/GB.   Pills whole with water.

## 2024-06-07 NOTE — PLAN OF CARE
Neuro: A&O x4, anxious and forgetful  Tele/cardiac: SR  Respiration: 2 L when asleep, RA when awake, pt preferred not to use home cpap  Activity: A2 GBW  Pain: Given oxycodone and tylenol  Drips: PIV SL  Drains/tubes: none  Skin: sternal incision STEPHEN, chest tube site dressing CDI, RLE harvest site  GI/: pearson in place  Aggression color: green  Isolation: none

## 2024-06-08 ENCOUNTER — APPOINTMENT (OUTPATIENT)
Dept: PHYSICAL THERAPY | Facility: CLINIC | Age: 69
DRG: 236 | End: 2024-06-08
Attending: HOSPITALIST
Payer: COMMERCIAL

## 2024-06-08 LAB
ANION GAP SERPL CALCULATED.3IONS-SCNC: 12 MMOL/L (ref 7–15)
BUN SERPL-MCNC: 20.7 MG/DL (ref 8–23)
CALCIUM SERPL-MCNC: 8.9 MG/DL (ref 8.8–10.2)
CHLORIDE SERPL-SCNC: 101 MMOL/L (ref 98–107)
CREAT SERPL-MCNC: 1.23 MG/DL (ref 0.67–1.17)
DEPRECATED HCO3 PLAS-SCNC: 24 MMOL/L (ref 22–29)
EGFRCR SERPLBLD CKD-EPI 2021: 63 ML/MIN/1.73M2
ERYTHROCYTE [DISTWIDTH] IN BLOOD BY AUTOMATED COUNT: 14.9 % (ref 10–15)
GLUCOSE SERPL-MCNC: 130 MG/DL (ref 70–99)
HCT VFR BLD AUTO: 23.1 % (ref 40–53)
HGB BLD-MCNC: 7.6 G/DL (ref 13.3–17.7)
MAGNESIUM SERPL-MCNC: 2.3 MG/DL (ref 1.7–2.3)
MCH RBC QN AUTO: 28.4 PG (ref 26.5–33)
MCHC RBC AUTO-ENTMCNC: 32.9 G/DL (ref 31.5–36.5)
MCV RBC AUTO: 86 FL (ref 78–100)
PHOSPHATE SERPL-MCNC: 3.3 MG/DL (ref 2.5–4.5)
PLATELET # BLD AUTO: 145 10E3/UL (ref 150–450)
POTASSIUM SERPL-SCNC: 3.8 MMOL/L (ref 3.4–5.3)
RBC # BLD AUTO: 2.68 10E6/UL (ref 4.4–5.9)
SODIUM SERPL-SCNC: 137 MMOL/L (ref 135–145)
WBC # BLD AUTO: 7.7 10E3/UL (ref 4–11)

## 2024-06-08 PROCEDURE — 250N000011 HC RX IP 250 OP 636: Mod: JZ | Performed by: PHYSICIAN ASSISTANT

## 2024-06-08 PROCEDURE — 36415 COLL VENOUS BLD VENIPUNCTURE: CPT | Performed by: PHYSICIAN ASSISTANT

## 2024-06-08 PROCEDURE — 250N000013 HC RX MED GY IP 250 OP 250 PS 637: Performed by: PHYSICIAN ASSISTANT

## 2024-06-08 PROCEDURE — 84295 ASSAY OF SERUM SODIUM: CPT | Performed by: PHYSICIAN ASSISTANT

## 2024-06-08 PROCEDURE — 120N000001 HC R&B MED SURG/OB

## 2024-06-08 PROCEDURE — 94640 AIRWAY INHALATION TREATMENT: CPT | Mod: 76

## 2024-06-08 PROCEDURE — 84100 ASSAY OF PHOSPHORUS: CPT | Performed by: SURGERY

## 2024-06-08 PROCEDURE — 94640 AIRWAY INHALATION TREATMENT: CPT

## 2024-06-08 PROCEDURE — 85027 COMPLETE CBC AUTOMATED: CPT | Performed by: PHYSICIAN ASSISTANT

## 2024-06-08 PROCEDURE — 97110 THERAPEUTIC EXERCISES: CPT | Mod: GP | Performed by: PHYSICAL THERAPIST

## 2024-06-08 PROCEDURE — 83735 ASSAY OF MAGNESIUM: CPT | Performed by: SURGERY

## 2024-06-08 PROCEDURE — 250N000009 HC RX 250: Performed by: PHYSICIAN ASSISTANT

## 2024-06-08 PROCEDURE — 999N000157 HC STATISTIC RCP TIME EA 10 MIN

## 2024-06-08 PROCEDURE — 250N000013 HC RX MED GY IP 250 OP 250 PS 637: Performed by: SURGERY

## 2024-06-08 PROCEDURE — 97530 THERAPEUTIC ACTIVITIES: CPT | Mod: GP | Performed by: PHYSICAL THERAPIST

## 2024-06-08 RX ORDER — POTASSIUM CHLORIDE 1.5 G/1.58G
20 POWDER, FOR SOLUTION ORAL ONCE
Status: COMPLETED | OUTPATIENT
Start: 2024-06-08 | End: 2024-06-08

## 2024-06-08 RX ADMIN — ATORVASTATIN CALCIUM 40 MG: 40 TABLET, FILM COATED ORAL at 20:12

## 2024-06-08 RX ADMIN — IPRATROPIUM BROMIDE AND ALBUTEROL SULFATE 3 ML: .5; 3 SOLUTION RESPIRATORY (INHALATION) at 12:02

## 2024-06-08 RX ADMIN — HYDROXYZINE HYDROCHLORIDE 25 MG: 25 TABLET, FILM COATED ORAL at 08:51

## 2024-06-08 RX ADMIN — ONDANSETRON 4 MG: 4 TABLET, ORALLY DISINTEGRATING ORAL at 18:01

## 2024-06-08 RX ADMIN — HEPARIN SODIUM 5000 UNITS: 5000 INJECTION, SOLUTION INTRAVENOUS; SUBCUTANEOUS at 23:55

## 2024-06-08 RX ADMIN — POTASSIUM CHLORIDE 20 MEQ: 1.5 POWDER, FOR SOLUTION ORAL at 08:52

## 2024-06-08 RX ADMIN — LINACLOTIDE 290 MCG: 290 CAPSULE, GELATIN COATED ORAL at 08:50

## 2024-06-08 RX ADMIN — IPRATROPIUM BROMIDE AND ALBUTEROL SULFATE 3 ML: .5; 3 SOLUTION RESPIRATORY (INHALATION) at 15:42

## 2024-06-08 RX ADMIN — LIDOCAINE 1 PATCH: 4 PATCH TOPICAL at 20:14

## 2024-06-08 RX ADMIN — METHOCARBAMOL 500 MG: 500 TABLET ORAL at 14:41

## 2024-06-08 RX ADMIN — ACETAMINOPHEN 650 MG: 325 TABLET, FILM COATED ORAL at 21:08

## 2024-06-08 RX ADMIN — IPRATROPIUM BROMIDE AND ALBUTEROL SULFATE 3 ML: .5; 3 SOLUTION RESPIRATORY (INHALATION) at 19:43

## 2024-06-08 RX ADMIN — OXYCODONE HYDROCHLORIDE 5 MG: 5 TABLET ORAL at 00:26

## 2024-06-08 RX ADMIN — HEPARIN SODIUM 5000 UNITS: 5000 INJECTION, SOLUTION INTRAVENOUS; SUBCUTANEOUS at 14:21

## 2024-06-08 RX ADMIN — BISACODYL 10 MG: 10 SUPPOSITORY RECTAL at 18:01

## 2024-06-08 RX ADMIN — IPRATROPIUM BROMIDE AND ALBUTEROL SULFATE 3 ML: .5; 3 SOLUTION RESPIRATORY (INHALATION) at 08:01

## 2024-06-08 RX ADMIN — ASPIRIN 81 MG CHEWABLE TABLET 162 MG: 81 TABLET CHEWABLE at 08:50

## 2024-06-08 RX ADMIN — MAGNESIUM HYDROXIDE 30 ML: 400 SUSPENSION ORAL at 14:40

## 2024-06-08 RX ADMIN — HYDROXYZINE HYDROCHLORIDE 25 MG: 25 TABLET, FILM COATED ORAL at 21:07

## 2024-06-08 RX ADMIN — ACETAMINOPHEN 650 MG: 325 TABLET, FILM COATED ORAL at 14:40

## 2024-06-08 RX ADMIN — TRAZODONE HYDROCHLORIDE 50 MG: 50 TABLET ORAL at 23:55

## 2024-06-08 RX ADMIN — ACETAMINOPHEN 650 MG: 325 TABLET, FILM COATED ORAL at 05:38

## 2024-06-08 RX ADMIN — METHOCARBAMOL 500 MG: 500 TABLET ORAL at 04:44

## 2024-06-08 RX ADMIN — LAMOTRIGINE 250 MG: 100 TABLET ORAL at 20:12

## 2024-06-08 RX ADMIN — SENNOSIDES AND DOCUSATE SODIUM 1 TABLET: 50; 8.6 TABLET ORAL at 20:12

## 2024-06-08 RX ADMIN — METOPROLOL TARTRATE 12.5 MG: 25 TABLET, FILM COATED ORAL at 20:12

## 2024-06-08 RX ADMIN — METHOCARBAMOL 500 MG: 500 TABLET ORAL at 21:07

## 2024-06-08 RX ADMIN — METOPROLOL TARTRATE 12.5 MG: 25 TABLET, FILM COATED ORAL at 08:51

## 2024-06-08 RX ADMIN — SENNOSIDES AND DOCUSATE SODIUM 1 TABLET: 50; 8.6 TABLET ORAL at 08:51

## 2024-06-08 RX ADMIN — FUROSEMIDE 20 MG: 10 INJECTION, SOLUTION INTRAMUSCULAR; INTRAVENOUS at 08:57

## 2024-06-08 RX ADMIN — POLYETHYLENE GLYCOL 3350 17 G: 17 POWDER, FOR SOLUTION ORAL at 08:52

## 2024-06-08 RX ADMIN — PANTOPRAZOLE SODIUM 40 MG: 40 TABLET, DELAYED RELEASE ORAL at 08:51

## 2024-06-08 RX ADMIN — HEPARIN SODIUM 5000 UNITS: 5000 INJECTION, SOLUTION INTRAVENOUS; SUBCUTANEOUS at 05:38

## 2024-06-08 RX ADMIN — LAMOTRIGINE 250 MG: 100 TABLET ORAL at 08:50

## 2024-06-08 RX ADMIN — TAMSULOSIN HYDROCHLORIDE 0.4 MG: 0.4 CAPSULE ORAL at 08:52

## 2024-06-08 RX ADMIN — HYDROXYZINE HYDROCHLORIDE 25 MG: 25 TABLET, FILM COATED ORAL at 14:41

## 2024-06-08 ASSESSMENT — ACTIVITIES OF DAILY LIVING (ADL)
ADLS_ACUITY_SCORE: 32
ADLS_ACUITY_SCORE: 31
ADLS_ACUITY_SCORE: 32
ADLS_ACUITY_SCORE: 31
ADLS_ACUITY_SCORE: 32

## 2024-06-08 NOTE — PLAN OF CARE
[id number]  Orientations: A&O x4, moderately anxious at times   Vitals/Pain: VSS on 2L NC when awake. Tolerated home CPAP overnight. C/o of sternal/chest incision discomfort. PRN robaxin x2, PRN tylenol x2, PRN oxy x1. Has PRN atarax for anxiety.   Tele: SR  Lines/Drains: Cano removed yesterday. Male external cath in place. 1 PIV, SL.   Skin/Wounds: Midline sternal incision, STEPHEN - no drainage. R groin and R knee harvest sites - STEPHEN. CT cites UTV, no visible drainage.   GI/: No BM overnight, BS hypoactive. Urinary frequency and hesitancy, adequate UOP with male external cath  Diet: Regular, poor appetite   Labs: K/phos/mag replacement protocol.   Ambulation/Assist: Ax1 GBW, sternal precautions   Sleep Quality: good   Plan: Encourage PO intake, IS and flutter valve use. Continue plan of care

## 2024-06-08 NOTE — PROGRESS NOTES
Patient is AO X 4; VSS; on RA; ambulatory with minimum assist X 1, FWW/GB.  Naveen has been continent today; voided in the bathroom. He c/o constipation.  Stool softeners were administered as prescribed.   Pain has been managed with Atarax, Robaxin, Tylenol.   He has some pain with ambulation; steady on his feet.   Surgical incisions are CDI.  Showered.

## 2024-06-08 NOTE — PROGRESS NOTES
[facility]  Cardiovascular and Thoracic Surgery Daily Note    Assessment and Plan  POD #4 s/p coronary artery bypass grafting x 3 (LIMA to LAD, SVG to OM1, SVG to OM2) on 6/4/2024 with Dr. Morejon    - CVS: Pre-op TTE with 45-50%  Trace mitral regurg  Trace tricuspid regurg.   Carotid US (5/31/24) - less than 50% stenosis of bilateral ICA    -120s/50-80s, HR 70-80s, lopressor 12.5 mg PO bid with parameters, lasix 20 mg IV daily, aspirin 162 mg, atorvastatin 40 mg, continues on flomax, chest tubes/TPW removed on 6/6 CXR stable with slight pulmonary edema and bilateral small pleural effusions/atelectasis.     - Resp: Extubated POD #0 at 1945. Working with IS and flutter valve, needs increased pulmonary toilet. Start duoneb qid x 2 days    - Neuro: Neuro intact, pain controlled on current regimen, continues on home lamictal 250 mg bid, pt having a lot of anxiety with movement, prn atarax helping with pain    - Renal: No history of significant renal disease. Trend BMP. Diuretic as above.  Recent Labs   Lab 06/08/24  0600 06/07/24  0723 06/06/24  0403   CR 1.23* 1.29* 1.27*       - GI: No BM, +flatus, continue bowel regimen, continues on PTA linzess 290 mcg every morning,nausea today    - : remove pearson    - Endo: discontinue sliding scale insulin    Hemoglobin A1C   Date Value Ref Range Status   05/29/2024 4.8 <5.7 % Final     Comment:     Normal <5.7%   Prediabetes 5.7-6.4%    Diabetes 6.5% or higher     Note: Adopted from ADA consensus guidelines.   04/05/2018 4.8 0 - 6.4 % Final     Comment:     Normal <5.7% Prediabetes 5.7-6.4%  Diabetes 6.5% or higher - adopted from ADA   consensus guidelines.          - FEN: Replace electrolytes as needed. Advance to regular diet as tolerated    - ID: Postop leukocytosis.  Tmax 101.3. finished periop abx prophylaxis. Needs increased pulmonary toilet. Trend CBC and fever curve.   Recent Labs   Lab 06/08/24  0600 06/07/24  0723 06/06/24  0809   WBC 7.7 11.0  "13.1*       - Heme: Acute blood loss anemia and thrombocytopenia due to surgery. Trend CBC, transfuse PRN.   Recent Labs   Lab 06/08/24  0600 06/07/24  0723 06/06/24  0809   HGB 7.6* 8.4* 8.1*   * 133* 109*       - Proph: SCD, subcutaneous heparin, PPI    - Other:  Clinically Significant Risk Factors                    # Hypertension: Noted on problem list           #Precipitous drop in Hgb/Hct: Lowest Hgb this hospitalization: 7.6 g/dL. Will continue to monitor and treat/transfuse as appropriate.     # Overweight: Estimated body mass index is 29.98 kg/m  as calculated from the following:    Height as of 5/29/24: 1.753 m (5' 9\").    Weight as of this encounter: 92.1 kg (203 lb).        # Financial/Environmental Concerns: none   # History of CABG: noted on surgical history       - Dispo: Encouraged IS/TCDB/amb. Sternal precautions. CXR shows increased atectasis. Needs pulmonary toilet. Start duonebs x 2 days. Remove pearson. Ativan prn to help with anxiety of moving. Ok to shower. Therapies recommending discharge to home when medically appropriate. Medically Ready for Discharge: 1-2 days Continue to monitor.     Interval History  No acute events overnight. Continues to feel like an elephant is on his chest, telemetry normal. Saturating well on room air. Nausea today. Has not had a bowel movement since before surgery and has hx of chronic constipation. Tolerating walks and up to chair.       Medications  Current Facility-Administered Medications   Medication Dose Route Frequency Provider Last Rate Last Admin    aspirin (ASA) chewable tablet 162 mg  162 mg Oral or NG Tube Daily Guido Abel PA-C   162 mg at 06/08/24 0850    atorvastatin (LIPITOR) tablet 40 mg  40 mg Oral QPM Guido Abel PA-C   40 mg at 06/07/24 1952    furosemide (LASIX) injection 20 mg  20 mg Intravenous Daily Guido Abel PA-C   20 mg at 06/08/24 0857    heparin ANTICOAGULANT injection 5,000 Units  5,000 Units Subcutaneous Q8H " Guido Abel PA-C   5,000 Units at 06/08/24 1421    ipratropium - albuterol 0.5 mg/2.5 mg/3 mL (DUONEB) neb solution 3 mL  3 mL Nebulization 4x daily Guido Abel PA-C   3 mL at 06/08/24 1542    lamoTRIgine (LaMICtal) tablet 250 mg  250 mg Oral BID Guido Abel PA-C   250 mg at 06/08/24 0850    Lidocaine (LIDOCARE) 4 % Patch 1-2 patch  1-2 patch Transdermal Q24H Guido Abel PA-C   1 patch at 06/07/24 1952    linaclotide (LINZESS) capsule 290 mcg  290 mcg Oral QAM AC Guido Abel PA-C   290 mcg at 06/08/24 0850    metoprolol tartrate (LOPRESSOR) half-tab 12.5 mg  12.5 mg Oral BID Guido Abel PA-C   12.5 mg at 06/08/24 0851    pantoprazole (PROTONIX) EC tablet 40 mg  40 mg Oral Daily Guido Abel PA-C   40 mg at 06/08/24 0851    polyethylene glycol (MIRALAX) Packet 17 g  17 g Oral Daily Guido Abel PA-C   17 g at 06/08/24 0852    senna-docusate (SENOKOT-S/PERICOLACE) 8.6-50 MG per tablet 1 tablet  1 tablet Oral BID Guido Abel PA-C   1 tablet at 06/08/24 0851    sodium chloride (PF) 0.9% PF flush 3 mL  3 mL Intracatheter Q8H Guido Abel PA-C   3 mL at 06/08/24 0149    tamsulosin (FLOMAX) capsule 0.4 mg  0.4 mg Oral Daily Guido Abel PA-C   0.4 mg at 06/08/24 0852    traZODone (DESYREL) tablet 50 mg  50 mg Oral At Bedtime Guido Abel PA-C   50 mg at 06/07/24 2146     Current Facility-Administered Medications   Medication Dose Route Frequency Provider Last Rate Last Admin    acetaminophen (TYLENOL) tablet 650 mg  650 mg Oral Q4H PRN Guido Abel PA-C   650 mg at 06/08/24 1440    alum & mag hydroxide-simethicone (MAALOX) suspension 30 mL  30 mL Oral Q4H PRN Guido Abel PA-C   30 mL at 06/07/24 0948    bisacodyl (DULCOLAX) suppository 10 mg  10 mg Rectal Daily PRN Guido Abel PA-C        calcium carbonate (TUMS) chewable tablet 500 mg  500 mg Oral 4x Daily PRN Guido Abel PA-C        hydrALAZINE (APRESOLINE) injection 10 mg  10 mg Intravenous Q30  Min PRN Guido Abel PA-C   10 mg at 06/05/24 0951    HYDROmorphone (DILAUDID) injection 0.2 mg  0.2 mg Intravenous Q2H PRN Guido Abel PA-C   0.2 mg at 06/05/24 1707    Or    HYDROmorphone (DILAUDID) injection 0.4 mg  0.4 mg Intravenous Q2H PRN Guido Abel PA-C   0.4 mg at 06/06/24 0245    hydrOXYzine HCl (ATARAX) tablet 25 mg  25 mg Oral Q6H PRN Guido Abel PA-C   25 mg at 06/08/24 1441    lidocaine (LMX4) cream   Topical Q1H PRN Guido Abel PA-C        lidocaine 1 % 0.1-1 mL  0.1-1 mL Other Q1H PRN Guido Abel PA-C        magnesium hydroxide (MILK OF MAGNESIA) suspension 30 mL  30 mL Oral Daily PRN Guido Abel PA-C   30 mL at 06/08/24 1440    methocarbamol (ROBAXIN) tablet 500 mg  500 mg Oral Q6H PRN Guido Abel PA-C   500 mg at 06/08/24 1441    naloxone (NARCAN) injection 0.2 mg  0.2 mg Intravenous Q2 Min PRN Guido Abel PA-C        Or    naloxone (NARCAN) injection 0.4 mg  0.4 mg Intravenous Q2 Min PRN Guido Abel PA-C        Or    naloxone (NARCAN) injection 0.2 mg  0.2 mg Intramuscular Q2 Min PRN Guido Abel PA-C        Or    naloxone (NARCAN) injection 0.4 mg  0.4 mg Intramuscular Q2 Min PRN Guido Abel PA-C        ondansetron (ZOFRAN ODT) ODT tab 4 mg  4 mg Oral Q6H PRN Guido Abel PA-C        Or    ondansetron (ZOFRAN) injection 4 mg  4 mg Intravenous Q6H PRN Guido Abel PA-C        oxyCODONE (ROXICODONE) tablet 5 mg  5 mg Oral Q4H PRN Guido Abel PA-C   5 mg at 06/08/24 0026    Or    oxyCODONE (ROXICODONE) tablet 10 mg  10 mg Oral Q4H PRN Guido Abel PA-C   10 mg at 06/07/24 0813    prochlorperazine (COMPAZINE) injection 5 mg  5 mg Intravenous Q6H PRN Guido Abel PA-C        Or    prochlorperazine (COMPAZINE) tablet 5 mg  5 mg Oral Q6H PRN Guido Abel PA-C        sodium chloride (PF) 0.9% PF flush 3 mL  3 mL Intracatheter q1 min prn Guido Abel PA-C             Physical Exam  Vitals were reviewed  Blood pressure  134/72, pulse 84, temperature 98.7  F (37.1  C), temperature source Oral, resp. rate 26, weight 92.1 kg (203 lb), SpO2 94%.  Gen: lying in bed, comfortable, -O2    Lungs: diminished bases, -500 ml    Cardiovascular: RRR, telemetry SR 70s, +trace edema LE    Abdomen: BS+, NT/ND, soft    Derm: sternal incision D/I   R LE incisions D/I    CT: removed    CXR (6/7/24) - chest tubes removed, no pneumothorax, increased atelectasis, sm bilateral effusions    CXR (6/5/24) - extubated, chest tubes present, +atelectasis    +pearson    Weight:   Vitals:    06/04/24 0517 06/05/24 0400 06/06/24 0200 06/07/24 0406   Weight: 90.1 kg (198 lb 9.6 oz) 91.2 kg (201 lb 1 oz) 94.4 kg (208 lb 1.8 oz) 93.1 kg (205 lb 3.2 oz)    06/08/24 0643   Weight: 92.1 kg (203 lb)         Data  Recent Labs   Lab 06/08/24  0600 06/07/24  0723 06/06/24  2152 06/06/24  1321 06/06/24  0809 06/06/24  0408 06/06/24  0403 06/05/24  0819 06/05/24  0413 06/04/24 2005 06/04/24  1824 06/04/24  1821 06/04/24  1658   WBC 7.7 11.0  --   --  13.1*  --   --   --  16.3*  --  15.5*  --  21.2*   HGB 7.6* 8.4*  --   --  8.1*  --   --   --  9.8*  --  9.4*  --  8.8*   MCV 86 87  --   --  85  --   --   --  86  --  85  --  85   * 133*  --   --  109*  --   --   --  162  --  127*  --  185   INR  --   --   --   --   --   --   --   --   --   --  1.25*  --  1.35*    136  --   --   --   --  140  --  139  --  143  --  141   POTASSIUM 3.8 3.8  --   --   --   --  3.7  3.7  --  3.8  --  3.3*  --  3.6   CHLORIDE 101 102  --   --   --   --  105  --  106  --  110*  --  109*   CO2 24 22  --   --   --   --  22  --  19*  --  20*  --  20*   BUN 20.7 23.2*  --   --   --   --  21.4  --  19.1  --  18.9  --  18.2   CR 1.23* 1.29*  --   --   --   --  1.27*  --  1.22*  --  1.24*  --  1.23*   ANIONGAP 12 12  --   --   --   --  13  --  14  --  13  --  12   MOE 8.9 9.0  --   --   --   --  8.5*  --  8.6*  --  8.6*  --  9.3   * 119* 121*   < >  --    < > 126*   < > 142*   < >  143*   < > 188*   ALBUMIN  --   --   --   --   --   --   --   --  3.8  --  3.8  --   --    PROTTOTAL  --   --   --   --   --   --   --   --  5.8*  --  5.4*  --   --    BILITOTAL  --   --   --   --   --   --   --   --  0.5  --  0.5  --   --    ALKPHOS  --   --   --   --   --   --   --   --  87  --  84  --   --    ALT  --   --   --   --   --   --   --   --  44  --  49  --   --    AST  --   --   --   --   --   --   --   --  50*  --  42  --   --     < > = values in this interval not displayed.       Imaging:  No results found for this or any previous visit (from the past 24 hour(s)).      Patient seen and discussed with Dr Apolinar Westbrook PA-C  Cardiothoracic Surgery  [phone]

## 2024-06-09 ENCOUNTER — APPOINTMENT (OUTPATIENT)
Dept: PHYSICAL THERAPY | Facility: CLINIC | Age: 69
DRG: 236 | End: 2024-06-09
Attending: HOSPITALIST
Payer: COMMERCIAL

## 2024-06-09 LAB
ANION GAP SERPL CALCULATED.3IONS-SCNC: 14 MMOL/L (ref 7–15)
ATRIAL RATE - MUSE: 80 BPM
ATRIAL RATE - MUSE: 82 BPM
BUN SERPL-MCNC: 22.9 MG/DL (ref 8–23)
CALCIUM SERPL-MCNC: 8.9 MG/DL (ref 8.8–10.2)
CHLORIDE SERPL-SCNC: 100 MMOL/L (ref 98–107)
CREAT SERPL-MCNC: 1.28 MG/DL (ref 0.67–1.17)
DEPRECATED HCO3 PLAS-SCNC: 23 MMOL/L (ref 22–29)
DIASTOLIC BLOOD PRESSURE - MUSE: NORMAL MMHG
DIASTOLIC BLOOD PRESSURE - MUSE: NORMAL MMHG
EGFRCR SERPLBLD CKD-EPI 2021: 60 ML/MIN/1.73M2
ERYTHROCYTE [DISTWIDTH] IN BLOOD BY AUTOMATED COUNT: 15.2 % (ref 10–15)
GLUCOSE SERPL-MCNC: 122 MG/DL (ref 70–99)
HCT VFR BLD AUTO: 22.8 % (ref 40–53)
HGB BLD-MCNC: 7.4 G/DL (ref 13.3–17.7)
INTERPRETATION ECG - MUSE: NORMAL
INTERPRETATION ECG - MUSE: NORMAL
MAGNESIUM SERPL-MCNC: 2.6 MG/DL (ref 1.7–2.3)
MCH RBC QN AUTO: 28 PG (ref 26.5–33)
MCHC RBC AUTO-ENTMCNC: 32.5 G/DL (ref 31.5–36.5)
MCV RBC AUTO: 86 FL (ref 78–100)
P AXIS - MUSE: 21 DEGREES
P AXIS - MUSE: 7 DEGREES
PHOSPHATE SERPL-MCNC: 3.4 MG/DL (ref 2.5–4.5)
PLATELET # BLD AUTO: 191 10E3/UL (ref 150–450)
POTASSIUM SERPL-SCNC: 3.8 MMOL/L (ref 3.4–5.3)
PR INTERVAL - MUSE: 142 MS
PR INTERVAL - MUSE: 152 MS
QRS DURATION - MUSE: 92 MS
QRS DURATION - MUSE: 94 MS
QT - MUSE: 380 MS
QT - MUSE: 392 MS
QTC - MUSE: 438 MS
QTC - MUSE: 457 MS
R AXIS - MUSE: -22 DEGREES
R AXIS - MUSE: -7 DEGREES
RBC # BLD AUTO: 2.64 10E6/UL (ref 4.4–5.9)
SODIUM SERPL-SCNC: 137 MMOL/L (ref 135–145)
SYSTOLIC BLOOD PRESSURE - MUSE: NORMAL MMHG
SYSTOLIC BLOOD PRESSURE - MUSE: NORMAL MMHG
T AXIS - MUSE: 33 DEGREES
T AXIS - MUSE: 46 DEGREES
VENTRICULAR RATE- MUSE: 80 BPM
VENTRICULAR RATE- MUSE: 82 BPM
WBC # BLD AUTO: 7.1 10E3/UL (ref 4–11)

## 2024-06-09 PROCEDURE — 250N000013 HC RX MED GY IP 250 OP 250 PS 637: Performed by: PHYSICIAN ASSISTANT

## 2024-06-09 PROCEDURE — 94660 CPAP INITIATION&MGMT: CPT

## 2024-06-09 PROCEDURE — 36415 COLL VENOUS BLD VENIPUNCTURE: CPT | Performed by: PHYSICIAN ASSISTANT

## 2024-06-09 PROCEDURE — 93010 ELECTROCARDIOGRAM REPORT: CPT | Performed by: INTERNAL MEDICINE

## 2024-06-09 PROCEDURE — 250N000011 HC RX IP 250 OP 636: Mod: JZ | Performed by: PHYSICIAN ASSISTANT

## 2024-06-09 PROCEDURE — 97530 THERAPEUTIC ACTIVITIES: CPT | Mod: GP

## 2024-06-09 PROCEDURE — 250N000009 HC RX 250: Performed by: PHYSICIAN ASSISTANT

## 2024-06-09 PROCEDURE — 83735 ASSAY OF MAGNESIUM: CPT | Performed by: SURGERY

## 2024-06-09 PROCEDURE — 85041 AUTOMATED RBC COUNT: CPT | Performed by: PHYSICIAN ASSISTANT

## 2024-06-09 PROCEDURE — 94640 AIRWAY INHALATION TREATMENT: CPT

## 2024-06-09 PROCEDURE — 120N000001 HC R&B MED SURG/OB

## 2024-06-09 PROCEDURE — 84100 ASSAY OF PHOSPHORUS: CPT | Performed by: SURGERY

## 2024-06-09 PROCEDURE — 93005 ELECTROCARDIOGRAM TRACING: CPT

## 2024-06-09 PROCEDURE — 80048 BASIC METABOLIC PNL TOTAL CA: CPT | Performed by: PHYSICIAN ASSISTANT

## 2024-06-09 PROCEDURE — 999N000157 HC STATISTIC RCP TIME EA 10 MIN

## 2024-06-09 RX ORDER — FUROSEMIDE 10 MG/ML
20 INJECTION INTRAMUSCULAR; INTRAVENOUS
Status: DISCONTINUED | OUTPATIENT
Start: 2024-06-09 | End: 2024-06-10

## 2024-06-09 RX ORDER — HYDROXYZINE HYDROCHLORIDE 25 MG/1
25-50 TABLET, FILM COATED ORAL EVERY 6 HOURS PRN
Status: DISCONTINUED | OUTPATIENT
Start: 2024-06-09 | End: 2024-06-11 | Stop reason: HOSPADM

## 2024-06-09 RX ORDER — LANOLIN ALCOHOL/MO/W.PET/CERES
3 CREAM (GRAM) TOPICAL
Status: DISCONTINUED | OUTPATIENT
Start: 2024-06-09 | End: 2024-06-11 | Stop reason: HOSPADM

## 2024-06-09 RX ORDER — IPRATROPIUM BROMIDE AND ALBUTEROL SULFATE 2.5; .5 MG/3ML; MG/3ML
3 SOLUTION RESPIRATORY (INHALATION) EVERY 4 HOURS PRN
Status: ACTIVE | OUTPATIENT
Start: 2024-06-09 | End: 2024-06-11

## 2024-06-09 RX ADMIN — METHOCARBAMOL 500 MG: 500 TABLET ORAL at 23:39

## 2024-06-09 RX ADMIN — ACETAMINOPHEN 650 MG: 325 TABLET, FILM COATED ORAL at 09:50

## 2024-06-09 RX ADMIN — SENNOSIDES AND DOCUSATE SODIUM 1 TABLET: 50; 8.6 TABLET ORAL at 08:58

## 2024-06-09 RX ADMIN — SENNOSIDES AND DOCUSATE SODIUM 1 TABLET: 50; 8.6 TABLET ORAL at 21:14

## 2024-06-09 RX ADMIN — LINACLOTIDE 290 MCG: 290 CAPSULE, GELATIN COATED ORAL at 06:57

## 2024-06-09 RX ADMIN — METHOCARBAMOL 500 MG: 500 TABLET ORAL at 04:01

## 2024-06-09 RX ADMIN — HYDROXYZINE HYDROCHLORIDE 25 MG: 25 TABLET, FILM COATED ORAL at 04:01

## 2024-06-09 RX ADMIN — ASPIRIN 81 MG CHEWABLE TABLET 162 MG: 81 TABLET CHEWABLE at 08:57

## 2024-06-09 RX ADMIN — POLYETHYLENE GLYCOL 3350 17 G: 17 POWDER, FOR SOLUTION ORAL at 08:56

## 2024-06-09 RX ADMIN — ONDANSETRON 4 MG: 4 TABLET, ORALLY DISINTEGRATING ORAL at 09:38

## 2024-06-09 RX ADMIN — ACETAMINOPHEN 650 MG: 325 TABLET, FILM COATED ORAL at 23:38

## 2024-06-09 RX ADMIN — LAMOTRIGINE 250 MG: 100 TABLET ORAL at 08:57

## 2024-06-09 RX ADMIN — LIDOCAINE 1 PATCH: 4 PATCH TOPICAL at 21:15

## 2024-06-09 RX ADMIN — HYDROXYZINE HYDROCHLORIDE 50 MG: 25 TABLET, FILM COATED ORAL at 09:50

## 2024-06-09 RX ADMIN — TAMSULOSIN HYDROCHLORIDE 0.4 MG: 0.4 CAPSULE ORAL at 08:57

## 2024-06-09 RX ADMIN — HEPARIN SODIUM 5000 UNITS: 5000 INJECTION, SOLUTION INTRAVENOUS; SUBCUTANEOUS at 21:14

## 2024-06-09 RX ADMIN — METHOCARBAMOL 500 MG: 500 TABLET ORAL at 17:13

## 2024-06-09 RX ADMIN — MELATONIN TAB 3 MG 3 MG: 3 TAB at 23:39

## 2024-06-09 RX ADMIN — ATORVASTATIN CALCIUM 40 MG: 40 TABLET, FILM COATED ORAL at 21:14

## 2024-06-09 RX ADMIN — HYDROXYZINE HYDROCHLORIDE 25 MG: 25 TABLET, FILM COATED ORAL at 17:13

## 2024-06-09 RX ADMIN — FUROSEMIDE 20 MG: 10 INJECTION, SOLUTION INTRAMUSCULAR; INTRAVENOUS at 08:57

## 2024-06-09 RX ADMIN — LAMOTRIGINE 250 MG: 100 TABLET ORAL at 21:14

## 2024-06-09 RX ADMIN — ACETAMINOPHEN 650 MG: 325 TABLET, FILM COATED ORAL at 04:01

## 2024-06-09 RX ADMIN — OXYCODONE HYDROCHLORIDE 5 MG: 5 TABLET ORAL at 12:47

## 2024-06-09 RX ADMIN — METHOCARBAMOL 500 MG: 500 TABLET ORAL at 09:50

## 2024-06-09 RX ADMIN — HEPARIN SODIUM 5000 UNITS: 5000 INJECTION, SOLUTION INTRAVENOUS; SUBCUTANEOUS at 06:57

## 2024-06-09 RX ADMIN — ACETAMINOPHEN 650 MG: 325 TABLET, FILM COATED ORAL at 17:13

## 2024-06-09 RX ADMIN — ALUMINUM HYDROXIDE, MAGNESIUM HYDROXIDE, AND SIMETHICONE 30 ML: 1200; 120; 1200 SUSPENSION ORAL at 11:37

## 2024-06-09 RX ADMIN — FUROSEMIDE 20 MG: 10 INJECTION, SOLUTION INTRAMUSCULAR; INTRAVENOUS at 15:29

## 2024-06-09 RX ADMIN — METOPROLOL TARTRATE 12.5 MG: 25 TABLET, FILM COATED ORAL at 08:57

## 2024-06-09 RX ADMIN — MAGNESIUM HYDROXIDE 30 ML: 400 SUSPENSION ORAL at 09:38

## 2024-06-09 RX ADMIN — IPRATROPIUM BROMIDE AND ALBUTEROL SULFATE 3 ML: .5; 3 SOLUTION RESPIRATORY (INHALATION) at 07:08

## 2024-06-09 RX ADMIN — TRAZODONE HYDROCHLORIDE 50 MG: 50 TABLET ORAL at 23:38

## 2024-06-09 RX ADMIN — PANTOPRAZOLE SODIUM 40 MG: 40 TABLET, DELAYED RELEASE ORAL at 08:57

## 2024-06-09 RX ADMIN — HEPARIN SODIUM 5000 UNITS: 5000 INJECTION, SOLUTION INTRAVENOUS; SUBCUTANEOUS at 12:46

## 2024-06-09 RX ADMIN — HYDROXYZINE HYDROCHLORIDE 25 MG: 25 TABLET, FILM COATED ORAL at 23:39

## 2024-06-09 RX ADMIN — METOPROLOL TARTRATE 12.5 MG: 25 TABLET, FILM COATED ORAL at 21:14

## 2024-06-09 ASSESSMENT — ACTIVITIES OF DAILY LIVING (ADL)
ADLS_ACUITY_SCORE: 31
ADLS_ACUITY_SCORE: 32
ADLS_ACUITY_SCORE: 32
ADLS_ACUITY_SCORE: 31
ADLS_ACUITY_SCORE: 32
ADLS_ACUITY_SCORE: 31
ADLS_ACUITY_SCORE: 32
ADLS_ACUITY_SCORE: 32
ADLS_ACUITY_SCORE: 31
ADLS_ACUITY_SCORE: 31
ADLS_ACUITY_SCORE: 32
ADLS_ACUITY_SCORE: 31

## 2024-06-09 NOTE — PROGRESS NOTES
[facility]  Cardiovascular and Thoracic Surgery Daily Note    Assessment and Plan  POD #5 s/p coronary artery bypass grafting x 3 (LIMA to LAD, SVG to OM1, SVG to OM2) on 6/4/2024 with Dr. Morejon    - CVS: Pre-op TTE with 45-50%  Trace mitral regurg  Trace tricuspid regurg.   Carotid US (5/31/24) - less than 50% stenosis of bilateral ICA    -120s/50-80s, HR 70-80s, lopressor 12.5 mg PO bid with parameters, increase lasix 20 mg IV to BID , aspirin 162 mg, atorvastatin 40 mg, continues on flomax, chest tubes/TPW removed on 6/6 CXR stable with slight pulmonary edema and bilateral small pleural effusions/atelectasis.     - Resp: Extubated POD #0 at 1945. Working with IS and flutter valve, needs increased pulmonary toilet. Start duoneb qid x 2 days    - Neuro: Neuro intact, pain better controlled on current regimen, continues on home lamictal 250 mg bid, pt having a lot of anxiety with movement, prn atarax helping with pain and anxiety    - Renal: No history of significant renal disease. Trend BMP. Diuretic as above.  Recent Labs   Lab 06/09/24  0649 06/08/24  0600 06/07/24  0723   CR 1.28* 1.23* 1.29*       - GI:+ BM, +flatus, continue bowel regimen, continues on PTA linzess 290 mcg every morning,nausea today    - : voiding well on own    - Endo: discontinued sliding scale insulin    Hemoglobin A1C   Date Value Ref Range Status   05/29/2024 4.8 <5.7 % Final     Comment:     Normal <5.7%   Prediabetes 5.7-6.4%    Diabetes 6.5% or higher     Note: Adopted from ADA consensus guidelines.   04/05/2018 4.8 0 - 6.4 % Final     Comment:     Normal <5.7% Prediabetes 5.7-6.4%  Diabetes 6.5% or higher - adopted from ADA   consensus guidelines.          - FEN: Replace electrolytes as needed. Advance to regular diet as tolerated    - ID: Postop leukocytosis.  Tmax 101.3. finished periop abx prophylaxis. Needs increased pulmonary toilet. Trend CBC and fever curve.   Recent Labs   Lab 06/09/24  0649  "06/08/24  0600 06/07/24  0723   WBC 7.1 7.7 11.0       - Heme: Acute blood loss anemia and thrombocytopenia due to surgery. Trend CBC, transfuse PRN.   Recent Labs   Lab 06/09/24  0649 06/08/24  0600 06/07/24  0723   HGB 7.4* 7.6* 8.4*    145* 133*       - Proph: SCD, subcutaneous heparin, PPI    - Other:  Clinically Significant Risk Factors                    # Hypertension: Noted on problem list           #Precipitous drop in Hgb/Hct: Lowest Hgb this hospitalization: 7.4 g/dL. Will continue to monitor and treat/transfuse as appropriate.     # Overweight: Estimated body mass index is 29.65 kg/m  as calculated from the following:    Height as of 5/29/24: 1.753 m (5' 9\").    Weight as of this encounter: 91.1 kg (200 lb 12.8 oz).        # Financial/Environmental Concerns: none   # History of CABG: noted on surgical history       - Dispo: Encouraged IS/TCDB/amb. Sternal precautions. CXR shows increased atectasis. Needs pulmonary toilet. Ok to shower. Therapies recommending discharge to home when medically appropriate. Medically Ready for Discharge: 1-2 days Continue to monitor.     Interval History  No acute events overnight except patient reports feeling significantly short of breath. Patient hadn't been taking oxycodone for several days, pain improved with oxy today. Increased lasix for shortness of breath overnight. Tolerating diet. +BM. Nausea improved      Medications  Current Facility-Administered Medications   Medication Dose Route Frequency Provider Last Rate Last Admin    aspirin (ASA) chewable tablet 162 mg  162 mg Oral or NG Tube Daily Guido Abel PA-C   162 mg at 06/09/24 0857    atorvastatin (LIPITOR) tablet 40 mg  40 mg Oral QPM Guido Abel PA-C   40 mg at 06/08/24 2012    furosemide (LASIX) injection 20 mg  20 mg Intravenous BID Kati Westbrook PA-C   20 mg at 06/09/24 1529    heparin ANTICOAGULANT injection 5,000 Units  5,000 Units Subcutaneous Q8H Guido Abel PA-C   5,000 Units " at 06/09/24 1246    lamoTRIgine (LaMICtal) tablet 250 mg  250 mg Oral BID Guido Abel PA-C   250 mg at 06/09/24 0857    Lidocaine (LIDOCARE) 4 % Patch 1-2 patch  1-2 patch Transdermal Q24H Guido Abel PA-C   1 patch at 06/08/24 2014    linaclotide (LINZESS) capsule 290 mcg  290 mcg Oral QAM AC Guido Abel PA-C   290 mcg at 06/09/24 0657    metoprolol tartrate (LOPRESSOR) half-tab 12.5 mg  12.5 mg Oral BID Guido Abel PA-C   12.5 mg at 06/09/24 0857    pantoprazole (PROTONIX) EC tablet 40 mg  40 mg Oral Daily Guido Abel PA-C   40 mg at 06/09/24 0857    polyethylene glycol (MIRALAX) Packet 17 g  17 g Oral Daily Guido Abel PA-C   17 g at 06/09/24 0856    senna-docusate (SENOKOT-S/PERICOLACE) 8.6-50 MG per tablet 1 tablet  1 tablet Oral BID Guido Abel PA-C   1 tablet at 06/09/24 0858    sodium chloride (PF) 0.9% PF flush 3 mL  3 mL Intracatheter Q8H Guido Abel PA-C   3 mL at 06/09/24 0951    tamsulosin (FLOMAX) capsule 0.4 mg  0.4 mg Oral Daily Guido Abel PA-C   0.4 mg at 06/09/24 0857    traZODone (DESYREL) tablet 50 mg  50 mg Oral At Bedtime Guido Abel PA-C   50 mg at 06/08/24 8309     Current Facility-Administered Medications   Medication Dose Route Frequency Provider Last Rate Last Admin    acetaminophen (TYLENOL) tablet 650 mg  650 mg Oral Q4H PRN Guido Abel PA-C   650 mg at 06/09/24 1713    alum & mag hydroxide-simethicone (MAALOX) suspension 30 mL  30 mL Oral Q4H PRN Guido Abel PA-C   30 mL at 06/09/24 1137    bisacodyl (DULCOLAX) suppository 10 mg  10 mg Rectal Daily PRN Guido Abel PA-C   10 mg at 06/08/24 1801    calcium carbonate (TUMS) chewable tablet 500 mg  500 mg Oral 4x Daily PRN Guido Abel PA-C        hydrALAZINE (APRESOLINE) injection 10 mg  10 mg Intravenous Q30 Min PRN Guido Abel PA-C   10 mg at 06/05/24 0951    HYDROmorphone (DILAUDID) injection 0.2 mg  0.2 mg Intravenous Q2H PRN Guido Abel PA-C   0.2 mg at  06/05/24 1707    Or    HYDROmorphone (DILAUDID) injection 0.4 mg  0.4 mg Intravenous Q2H PRN Guido Abel PA-C   0.4 mg at 06/06/24 0245    hydrOXYzine HCl (ATARAX) tablet 25-50 mg  25-50 mg Oral Q6H PRN Kati Westbrook PA-C   25 mg at 06/09/24 1713    lidocaine (LMX4) cream   Topical Q1H PRN Guido Abel PA-C        lidocaine 1 % 0.1-1 mL  0.1-1 mL Other Q1H PRN Guido Abel PA-C        magnesium hydroxide (MILK OF MAGNESIA) suspension 30 mL  30 mL Oral Daily PRN Guido Abel PA-C   30 mL at 06/09/24 0938    methocarbamol (ROBAXIN) tablet 500 mg  500 mg Oral Q6H PRN Guido Abel PA-C   500 mg at 06/09/24 1713    naloxone (NARCAN) injection 0.2 mg  0.2 mg Intravenous Q2 Min PRN Guido Abel PA-C        Or    naloxone (NARCAN) injection 0.4 mg  0.4 mg Intravenous Q2 Min PRN Guido Abel PA-C        Or    naloxone (NARCAN) injection 0.2 mg  0.2 mg Intramuscular Q2 Min PRN Guido Abel PA-C        Or    naloxone (NARCAN) injection 0.4 mg  0.4 mg Intramuscular Q2 Min PRN Guido Abel PA-C        ondansetron (ZOFRAN ODT) ODT tab 4 mg  4 mg Oral Q6H PRN Guido Abel PA-C   4 mg at 06/09/24 0938    Or    ondansetron (ZOFRAN) injection 4 mg  4 mg Intravenous Q6H PRN Guido Abel PA-C        oxyCODONE (ROXICODONE) tablet 5 mg  5 mg Oral Q4H PRN Guido Abel PA-C   5 mg at 06/09/24 1247    Or    oxyCODONE (ROXICODONE) tablet 10 mg  10 mg Oral Q4H PRN Guido Abel PA-C   10 mg at 06/07/24 0813    prochlorperazine (COMPAZINE) injection 5 mg  5 mg Intravenous Q6H PRN Guido Abel PA-C        Or    prochlorperazine (COMPAZINE) tablet 5 mg  5 mg Oral Q6H PRN Guido Abel PA-C        sodium chloride (PF) 0.9% PF flush 3 mL  3 mL Intracatheter q1 min prn Guido Abel PA-C   3 mL at 06/09/24 0858         Physical Exam  Vitals were reviewed  Blood pressure 106/66, pulse 74, temperature 98.9  F (37.2  C), temperature source Oral, resp. rate 20, weight 91.1 kg (200 lb  12.8 oz), SpO2 95%.  Gen: lying in bed, comfortable, -O2    Lungs: diminished bases, -500 ml    Cardiovascular: RRR, telemetry SR 70s, +trace edema LE    Abdomen: BS+, NT/ND, soft    Derm: sternal incision D/I   R LE incisions D/I    CT: removed    CXR (6/7/24) - chest tubes removed, no pneumothorax, increased atelectasis, sm bilateral effusions    CXR (6/5/24) - extubated, chest tubes present, +atelectasis      Weight:   Vitals:    06/05/24 0400 06/06/24 0200 06/07/24 0406 06/08/24 0643   Weight: 91.2 kg (201 lb 1 oz) 94.4 kg (208 lb 1.8 oz) 93.1 kg (205 lb 3.2 oz) 92.1 kg (203 lb)    06/09/24 0343   Weight: 91.1 kg (200 lb 12.8 oz)         Data  Recent Labs   Lab 06/09/24  0649 06/08/24  0600 06/07/24  0723 06/05/24  0819 06/05/24  0413 06/04/24 2005 06/04/24  1824 06/04/24  1821 06/04/24  1658   WBC 7.1 7.7 11.0   < > 16.3*  --  15.5*  --  21.2*   HGB 7.4* 7.6* 8.4*   < > 9.8*  --  9.4*  --  8.8*   MCV 86 86 87   < > 86  --  85  --  85    145* 133*   < > 162  --  127*  --  185   INR  --   --   --   --   --   --  1.25*  --  1.35*    137 136   < > 139  --  143  --  141   POTASSIUM 3.8 3.8 3.8   < > 3.8  --  3.3*  --  3.6   CHLORIDE 100 101 102   < > 106  --  110*  --  109*   CO2 23 24 22   < > 19*  --  20*  --  20*   BUN 22.9 20.7 23.2*   < > 19.1  --  18.9  --  18.2   CR 1.28* 1.23* 1.29*   < > 1.22*  --  1.24*  --  1.23*   ANIONGAP 14 12 12   < > 14  --  13  --  12   MOE 8.9 8.9 9.0   < > 8.6*  --  8.6*  --  9.3   * 130* 119*   < > 142*   < > 143*   < > 188*   ALBUMIN  --   --   --   --  3.8  --  3.8  --   --    PROTTOTAL  --   --   --   --  5.8*  --  5.4*  --   --    BILITOTAL  --   --   --   --  0.5  --  0.5  --   --    ALKPHOS  --   --   --   --  87  --  84  --   --    ALT  --   --   --   --  44  --  49  --   --    AST  --   --   --   --  50*  --  42  --   --     < > = values in this interval not displayed.       Imaging:  No results found for this or any previous visit (from the  past 24 hour(s)).      Patient seen and discussed with Dr Apolinar Westbrook PA-C  Cardiothoracic Surgery  [phone]

## 2024-06-09 NOTE — PROGRESS NOTES
Patient is AO X 4; VSS; on RA; anxious; oxycodone 5 mg was administered X 1; ambulatory.  Morning EKG was reassuring.

## 2024-06-09 NOTE — PLAN OF CARE
A&O x 4, anxious. Wife and sons at bedside beginning of shift, participating in care. Afebrile, VSS on RA. Tele: SR. Assist x1 w/GBW, ambulated to bathroom x1. Regular diet, tolerating well. Rt PIV SL. Sternal incision, STEPHEN, WNL. Old CT sites, dressing CDI. Rt harvest sites x2 STEPHEN, WNL. Pain managed w/PRN tylenol, atarax, and robaxin q6h. Voiding adequately, +1 BM. Continue plan of care.     Tele SR w/frequent PACs, bigeminal PACs. Writer also made aware by tele tech couple PSVT runs, see chart. Pt given scheduled metoprolol and PRN tylenol, atarax, and robaxin. Pt did not endorse dizziness, SOB, or chest pain outside of ongoing incisional pain. Tele SR after meds.     Goal Outcome Evaluation:      Plan of Care Reviewed With: patient    Overall Patient Progress: improving

## 2024-06-09 NOTE — PROGRESS NOTES
Patient has crushing chest pain; SOB; EKG was ordered: Tony Westbrook authorized me to administer patient Tylenol, Robaxin and Atarax 30 minutes early.

## 2024-06-10 ENCOUNTER — APPOINTMENT (OUTPATIENT)
Dept: PHYSICAL THERAPY | Facility: CLINIC | Age: 69
DRG: 236 | End: 2024-06-10
Attending: HOSPITALIST
Payer: COMMERCIAL

## 2024-06-10 LAB
ANION GAP SERPL CALCULATED.3IONS-SCNC: 15 MMOL/L (ref 7–15)
ATRIAL RATE - MUSE: 71 BPM
BUN SERPL-MCNC: 22.8 MG/DL (ref 8–23)
CALCIUM SERPL-MCNC: 9.1 MG/DL (ref 8.8–10.2)
CHLORIDE SERPL-SCNC: 98 MMOL/L (ref 98–107)
CREAT SERPL-MCNC: 1.32 MG/DL (ref 0.67–1.17)
DEPRECATED HCO3 PLAS-SCNC: 24 MMOL/L (ref 22–29)
DIASTOLIC BLOOD PRESSURE - MUSE: NORMAL MMHG
EGFRCR SERPLBLD CKD-EPI 2021: 58 ML/MIN/1.73M2
ERYTHROCYTE [DISTWIDTH] IN BLOOD BY AUTOMATED COUNT: 15.4 % (ref 10–15)
GLUCOSE SERPL-MCNC: 122 MG/DL (ref 70–99)
HCT VFR BLD AUTO: 26.2 % (ref 40–53)
HGB BLD-MCNC: 8.4 G/DL (ref 13.3–17.7)
INTERPRETATION ECG - MUSE: NORMAL
MCH RBC QN AUTO: 28.2 PG (ref 26.5–33)
MCHC RBC AUTO-ENTMCNC: 32.1 G/DL (ref 31.5–36.5)
MCV RBC AUTO: 88 FL (ref 78–100)
P AXIS - MUSE: 29 DEGREES
PLATELET # BLD AUTO: 239 10E3/UL (ref 150–450)
POTASSIUM SERPL-SCNC: 3.6 MMOL/L (ref 3.4–5.3)
PR INTERVAL - MUSE: 154 MS
QRS DURATION - MUSE: 100 MS
QT - MUSE: 434 MS
QTC - MUSE: 471 MS
R AXIS - MUSE: -10 DEGREES
RBC # BLD AUTO: 2.98 10E6/UL (ref 4.4–5.9)
SODIUM SERPL-SCNC: 137 MMOL/L (ref 135–145)
SYSTOLIC BLOOD PRESSURE - MUSE: NORMAL MMHG
T AXIS - MUSE: 68 DEGREES
VENTRICULAR RATE- MUSE: 71 BPM
WBC # BLD AUTO: 7.1 10E3/UL (ref 4–11)

## 2024-06-10 PROCEDURE — 80048 BASIC METABOLIC PNL TOTAL CA: CPT | Performed by: PHYSICIAN ASSISTANT

## 2024-06-10 PROCEDURE — 93010 ELECTROCARDIOGRAM REPORT: CPT | Performed by: INTERNAL MEDICINE

## 2024-06-10 PROCEDURE — 250N000013 HC RX MED GY IP 250 OP 250 PS 637: Performed by: PHYSICIAN ASSISTANT

## 2024-06-10 PROCEDURE — 97110 THERAPEUTIC EXERCISES: CPT | Mod: GP

## 2024-06-10 PROCEDURE — 36415 COLL VENOUS BLD VENIPUNCTURE: CPT | Performed by: PHYSICIAN ASSISTANT

## 2024-06-10 PROCEDURE — 250N000011 HC RX IP 250 OP 636: Mod: JZ | Performed by: PHYSICIAN ASSISTANT

## 2024-06-10 PROCEDURE — 93005 ELECTROCARDIOGRAM TRACING: CPT

## 2024-06-10 PROCEDURE — 120N000001 HC R&B MED SURG/OB

## 2024-06-10 PROCEDURE — 97530 THERAPEUTIC ACTIVITIES: CPT | Mod: GP

## 2024-06-10 PROCEDURE — 85027 COMPLETE CBC AUTOMATED: CPT | Performed by: PHYSICIAN ASSISTANT

## 2024-06-10 RX ADMIN — OXYCODONE HYDROCHLORIDE 10 MG: 5 TABLET ORAL at 10:59

## 2024-06-10 RX ADMIN — LIDOCAINE 1 PATCH: 4 PATCH TOPICAL at 19:24

## 2024-06-10 RX ADMIN — FUROSEMIDE 20 MG: 10 INJECTION, SOLUTION INTRAMUSCULAR; INTRAVENOUS at 15:49

## 2024-06-10 RX ADMIN — LINACLOTIDE 290 MCG: 290 CAPSULE, GELATIN COATED ORAL at 06:35

## 2024-06-10 RX ADMIN — FUROSEMIDE 20 MG: 10 INJECTION, SOLUTION INTRAMUSCULAR; INTRAVENOUS at 08:19

## 2024-06-10 RX ADMIN — PANTOPRAZOLE SODIUM 40 MG: 40 TABLET, DELAYED RELEASE ORAL at 08:19

## 2024-06-10 RX ADMIN — ACETAMINOPHEN 650 MG: 325 TABLET, FILM COATED ORAL at 19:26

## 2024-06-10 RX ADMIN — ATORVASTATIN CALCIUM 40 MG: 40 TABLET, FILM COATED ORAL at 19:26

## 2024-06-10 RX ADMIN — ASPIRIN 81 MG CHEWABLE TABLET 162 MG: 81 TABLET CHEWABLE at 08:19

## 2024-06-10 RX ADMIN — LAMOTRIGINE 250 MG: 100 TABLET ORAL at 20:18

## 2024-06-10 RX ADMIN — METHOCARBAMOL 500 MG: 500 TABLET ORAL at 06:35

## 2024-06-10 RX ADMIN — OXYCODONE HYDROCHLORIDE 5 MG: 5 TABLET ORAL at 06:52

## 2024-06-10 RX ADMIN — LAMOTRIGINE 250 MG: 100 TABLET ORAL at 08:19

## 2024-06-10 RX ADMIN — SENNOSIDES AND DOCUSATE SODIUM 1 TABLET: 50; 8.6 TABLET ORAL at 20:18

## 2024-06-10 RX ADMIN — HEPARIN SODIUM 5000 UNITS: 5000 INJECTION, SOLUTION INTRAVENOUS; SUBCUTANEOUS at 12:20

## 2024-06-10 RX ADMIN — SENNOSIDES AND DOCUSATE SODIUM 1 TABLET: 50; 8.6 TABLET ORAL at 08:19

## 2024-06-10 RX ADMIN — ACETAMINOPHEN 650 MG: 325 TABLET, FILM COATED ORAL at 06:35

## 2024-06-10 RX ADMIN — HEPARIN SODIUM 5000 UNITS: 5000 INJECTION, SOLUTION INTRAVENOUS; SUBCUTANEOUS at 06:35

## 2024-06-10 RX ADMIN — TAMSULOSIN HYDROCHLORIDE 0.4 MG: 0.4 CAPSULE ORAL at 08:19

## 2024-06-10 RX ADMIN — ACETAMINOPHEN 650 MG: 325 TABLET, FILM COATED ORAL at 10:59

## 2024-06-10 RX ADMIN — METOPROLOL TARTRATE 12.5 MG: 25 TABLET, FILM COATED ORAL at 08:19

## 2024-06-10 RX ADMIN — POLYETHYLENE GLYCOL 3350 17 G: 17 POWDER, FOR SOLUTION ORAL at 08:20

## 2024-06-10 RX ADMIN — TRAZODONE HYDROCHLORIDE 50 MG: 50 TABLET ORAL at 22:06

## 2024-06-10 RX ADMIN — HYDROXYZINE HYDROCHLORIDE 25 MG: 25 TABLET, FILM COATED ORAL at 12:20

## 2024-06-10 RX ADMIN — METOPROLOL TARTRATE 12.5 MG: 25 TABLET, FILM COATED ORAL at 20:18

## 2024-06-10 RX ADMIN — METHOCARBAMOL 500 MG: 500 TABLET ORAL at 12:20

## 2024-06-10 RX ADMIN — HEPARIN SODIUM 5000 UNITS: 5000 INJECTION, SOLUTION INTRAVENOUS; SUBCUTANEOUS at 20:18

## 2024-06-10 RX ADMIN — ACETAMINOPHEN 650 MG: 325 TABLET, FILM COATED ORAL at 14:56

## 2024-06-10 RX ADMIN — HYDROXYZINE HYDROCHLORIDE 25 MG: 25 TABLET, FILM COATED ORAL at 19:25

## 2024-06-10 ASSESSMENT — ACTIVITIES OF DAILY LIVING (ADL)
ADLS_ACUITY_SCORE: 28
ADLS_ACUITY_SCORE: 31
ADLS_ACUITY_SCORE: 27
ADLS_ACUITY_SCORE: 27
ADLS_ACUITY_SCORE: 31
ADLS_ACUITY_SCORE: 31
ADLS_ACUITY_SCORE: 28
ADLS_ACUITY_SCORE: 31
ADLS_ACUITY_SCORE: 31
ADLS_ACUITY_SCORE: 28
ADLS_ACUITY_SCORE: 31
ADLS_ACUITY_SCORE: 28
ADLS_ACUITY_SCORE: 27
ADLS_ACUITY_SCORE: 28
ADLS_ACUITY_SCORE: 28
ADLS_ACUITY_SCORE: 27
ADLS_ACUITY_SCORE: 27

## 2024-06-10 NOTE — PLAN OF CARE
A&O x 4, anxious at times. Afebrile, VSS on RA. Tele: SR w/ ST abnormality.  SBA.  Regular diet, tolerating well. Rt PIV SL. Sternal incision, STEPHEN, WNL. Old CT sites, dressing CDI. Rt harvest sites x2 STEPHEN, WNL. Pain managed w/PRN tylenol,oxy, atarax  and robaxin. Voiding adequately; BM+. C/O chest pain; EKG done. CTM.

## 2024-06-10 NOTE — PROGRESS NOTES
[facility]  Cardiovascular and Thoracic Surgery Daily Note    Assessment and Plan  POD #6 s/p coronary artery bypass grafting x 3 (LIMA to LAD, SVG to OM1, SVG to OM2) on 2024 with Dr. Morejon    - CVS: Pre-op TTE with 45-50%  Trace mitral regurg  Trace tricuspid regurg.   Carotid US (24) - less than 50% stenosis of bilateral ICA    -120s/50-80s, HR 70-90s, lopressor 12.5 mg PO bid with parameters, lasix IV 20mg today, aspirin 162 mg, atorvastatin 40 mg, continues on flomax, chest tubes/TPW removed on  CXR stable with slight pulmonary edema and bilateral small pleural effusions/atelectasis.     - Resp: Extubated POD #0 at 1945. Working with IS and flutter valve, needs increased pulmonary toilet. Completed duonebs.    - Neuro: Neuro intact, pain better controlled on current regimen, continues on home lamictal 250 mg bid, pt having a lot of anxiety with movement, prn atarax helping with pain and anxiety    - Renal: No history of significant renal disease. Trend BMP. Diuretic as above.  Recent Labs   Lab 06/10/24  0642 24  0649 24  0600   CR 1.32* 1.28* 1.23*       - GI:+ BM, +flatus, continue bowel regimen, continues on PTA linzess 290 mcg every morning,nausea today    - : voiding well on own    - Endo: discontinued sliding scale insulin    Hemoglobin A1C   Date Value Ref Range Status   2024 4.8 <5.7 % Final     Comment:     Normal <5.7%   Prediabetes 5.7-6.4%    Diabetes 6.5% or higher     Note: Adopted from ADA consensus guidelines.   2018 4.8 0 - 6.4 % Final     Comment:     Normal <5.7% Prediabetes 5.7-6.4%  Diabetes 6.5% or higher - adopted from ADA   consensus guidelines.          - FEN: Replace electrolytes as needed. Advance to regular diet as tolerated    - ID: Temp (24hrs), Av  F (36.7  C), Min:98  F (36.7  C), Max:98.1  F (36.7  C) Postop leukocytosis. . finished periop abx prophylaxis. Needs increased pulmonary toilet. Trend CBC and fever  "curve.   Recent Labs   Lab 06/10/24  0642 06/09/24  0649 06/08/24  0600   WBC 7.1 7.1 7.7       - Heme: Acute blood loss anemia and thrombocytopenia due to surgery. Trend CBC, transfuse PRN.   Recent Labs   Lab 06/10/24  0642 06/09/24  0649 06/08/24  0600   HGB 8.4* 7.4* 7.6*    191 145*       - Proph: SCD, subcutaneous heparin, PPI    - Other:  Clinically Significant Risk Factors                    # Hypertension: Noted on problem list           #Precipitous drop in Hgb/Hct: Lowest Hgb this hospitalization: 7.4 g/dL. Will continue to monitor and treat/transfuse as appropriate.     # Overweight: Estimated body mass index is 29.27 kg/m  as calculated from the following:    Height as of 5/29/24: 1.753 m (5' 9\").    Weight as of this encounter: 89.9 kg (198 lb 3.2 oz).        # Financial/Environmental Concerns: none   # History of CABG: noted on surgical history       - Dispo: Encouraged IS/TCDB/amb. Sternal precautions. Needs pulmonary toilet. Ok to shower. Therapies recommending discharge to home when medically appropriate. Medically Ready for Discharge: today, but patient nervous. Will plan to discharge tomorrow.     Interval History  No acute events overnight except patient reports feeling chest pain still but is improving. Saturating well. Tolerating diet. +BM. Long discussion about normal expectations and reassurance. Encouraged patient to continue to increase activity and hopeful for discharge to home tomorrow.       Medications  Current Facility-Administered Medications   Medication Dose Route Frequency Provider Last Rate Last Admin    aspirin (ASA) chewable tablet 162 mg  162 mg Oral or NG Tube Daily Guido Abel PA-C   162 mg at 06/10/24 0819    atorvastatin (LIPITOR) tablet 40 mg  40 mg Oral QPM Guido Abel PA-C   40 mg at 06/09/24 2114    furosemide (LASIX) injection 20 mg  20 mg Intravenous BID Kati Westbrook PA-C   20 mg at 06/10/24 0819    heparin ANTICOAGULANT injection 5,000 Units  " 5,000 Units Subcutaneous Q8H Guido Abel PA-C   5,000 Units at 06/10/24 0635    lamoTRIgine (LaMICtal) tablet 250 mg  250 mg Oral BID Guido Abel PA-C   250 mg at 06/10/24 0819    Lidocaine (LIDOCARE) 4 % Patch 1-2 patch  1-2 patch Transdermal Q24H Guido Abel PA-C   1 patch at 06/09/24 2115    linaclotide (LINZESS) capsule 290 mcg  290 mcg Oral QAM AC Guido Abel PA-C   290 mcg at 06/10/24 0635    metoprolol tartrate (LOPRESSOR) half-tab 12.5 mg  12.5 mg Oral BID Guido Abel PA-C   12.5 mg at 06/10/24 0819    pantoprazole (PROTONIX) EC tablet 40 mg  40 mg Oral Daily Guido Abel PA-C   40 mg at 06/10/24 0819    polyethylene glycol (MIRALAX) Packet 17 g  17 g Oral Daily Guido Abel PA-C   17 g at 06/10/24 0820    senna-docusate (SENOKOT-S/PERICOLACE) 8.6-50 MG per tablet 1 tablet  1 tablet Oral BID Guido Abel PA-C   1 tablet at 06/10/24 0819    sodium chloride (PF) 0.9% PF flush 3 mL  3 mL Intracatheter Q8H Guido Abel PA-C   3 mL at 06/10/24 1100    tamsulosin (FLOMAX) capsule 0.4 mg  0.4 mg Oral Daily Guido Abel PA-C   0.4 mg at 06/10/24 0819    traZODone (DESYREL) tablet 50 mg  50 mg Oral At Bedtime Guido Abel PA-C   50 mg at 06/09/24 2338     Current Facility-Administered Medications   Medication Dose Route Frequency Provider Last Rate Last Admin    acetaminophen (TYLENOL) tablet 650 mg  650 mg Oral Q4H PRN Guido Abel PA-C   650 mg at 06/10/24 1059    alum & mag hydroxide-simethicone (MAALOX) suspension 30 mL  30 mL Oral Q4H PRN Guido Abel PA-C   30 mL at 06/09/24 1137    bisacodyl (DULCOLAX) suppository 10 mg  10 mg Rectal Daily PRN Guido Abel PA-C   10 mg at 06/08/24 1801    calcium carbonate (TUMS) chewable tablet 500 mg  500 mg Oral 4x Daily PRN Guido Abel PA-C        hydrALAZINE (APRESOLINE) injection 10 mg  10 mg Intravenous Q30 Min PRN Guido Abel PA-C   10 mg at 06/05/24 0951    HYDROmorphone (DILAUDID) injection 0.2 mg   0.2 mg Intravenous Q2H PRN Guido Abel PA-C   0.2 mg at 06/05/24 1707    Or    HYDROmorphone (DILAUDID) injection 0.4 mg  0.4 mg Intravenous Q2H PRN Guido Abel PA-C   0.4 mg at 06/06/24 0245    hydrOXYzine HCl (ATARAX) tablet 25-50 mg  25-50 mg Oral Q6H PRN Kati Westbrook PA-C   25 mg at 06/09/24 2339    ipratropium - albuterol 0.5 mg/2.5 mg/3 mL (DUONEB) neb solution 3 mL  3 mL Nebulization Q4H PRN Kati Westbrook PA-C        lidocaine (LMX4) cream   Topical Q1H PRN Guido Abel PA-C        lidocaine 1 % 0.1-1 mL  0.1-1 mL Other Q1H PRN Guido Abel PA-C        magnesium hydroxide (MILK OF MAGNESIA) suspension 30 mL  30 mL Oral Daily PRN Guido Abel PA-C   30 mL at 06/09/24 0938    melatonin tablet 3 mg  3 mg Oral At Bedtime PRN Kati Westbrook PA-C   3 mg at 06/09/24 2339    methocarbamol (ROBAXIN) tablet 500 mg  500 mg Oral Q6H PRN Guido Abel PA-C   500 mg at 06/10/24 0635    naloxone (NARCAN) injection 0.2 mg  0.2 mg Intravenous Q2 Min PRN Guido Abel PA-C        Or    naloxone (NARCAN) injection 0.4 mg  0.4 mg Intravenous Q2 Min PRN Guido Abel PA-C        Or    naloxone (NARCAN) injection 0.2 mg  0.2 mg Intramuscular Q2 Min PRN Guido Abel PA-C        Or    naloxone (NARCAN) injection 0.4 mg  0.4 mg Intramuscular Q2 Min PRN Guido Abel PA-C        ondansetron (ZOFRAN ODT) ODT tab 4 mg  4 mg Oral Q6H PRN Guido Abel PA-C   4 mg at 06/09/24 0938    Or    ondansetron (ZOFRAN) injection 4 mg  4 mg Intravenous Q6H PRN Guido Abel PA-C        oxyCODONE (ROXICODONE) tablet 5 mg  5 mg Oral Q4H PRN Guido Abel PA-C   5 mg at 06/10/24 0652    Or    oxyCODONE (ROXICODONE) tablet 10 mg  10 mg Oral Q4H PRN Guido Abel PA-C   10 mg at 06/10/24 1059    prochlorperazine (COMPAZINE) injection 5 mg  5 mg Intravenous Q6H PRN Guido Abel PA-C        Or    prochlorperazine (COMPAZINE) tablet 5 mg  5 mg Oral Q6H PRN Guido Abel PA-C         sodium chloride (PF) 0.9% PF flush 3 mL  3 mL Intracatheter q1 min prn Guido Abel PA-C   3 mL at 06/09/24 0858         Physical Exam  Vitals were reviewed  Blood pressure 118/75, pulse 74, temperature 98.1  F (36.7  C), temperature source Oral, resp. rate 18, weight 89.9 kg (198 lb 3.2 oz), SpO2 95%.  Gen: lying in bed, comfortable, -O2    Lungs: diminished bases, -500 ml    Cardiovascular: RRR, telemetry SR 70s, +trace edema LE    Abdomen: BS+, NT/ND, soft    Derm: sternal incision D/I   R LE incisions D/I    CT: removed    CXR (6/7/24) - chest tubes removed, no pneumothorax, increased atelectasis, sm bilateral effusions    CXR (6/5/24) - extubated, chest tubes present, +atelectasis      Weight:   Vitals:    06/06/24 0200 06/07/24 0406 06/08/24 0643 06/09/24 0343   Weight: 94.4 kg (208 lb 1.8 oz) 93.1 kg (205 lb 3.2 oz) 92.1 kg (203 lb) 91.1 kg (200 lb 12.8 oz)    06/10/24 0652   Weight: 89.9 kg (198 lb 3.2 oz)         Data  Recent Labs   Lab 06/10/24  0642 06/09/24  0649 06/08/24  0600 06/05/24  0819 06/05/24  0413 06/04/24 2005 06/04/24  1824 06/04/24  1821 06/04/24  1658   WBC 7.1 7.1 7.7   < > 16.3*  --  15.5*  --  21.2*   HGB 8.4* 7.4* 7.6*   < > 9.8*  --  9.4*  --  8.8*   MCV 88 86 86   < > 86  --  85  --  85    191 145*   < > 162  --  127*  --  185   INR  --   --   --   --   --   --  1.25*  --  1.35*    137 137   < > 139  --  143  --  141   POTASSIUM 3.6 3.8 3.8   < > 3.8  --  3.3*  --  3.6   CHLORIDE 98 100 101   < > 106  --  110*  --  109*   CO2 24 23 24   < > 19*  --  20*  --  20*   BUN 22.8 22.9 20.7   < > 19.1  --  18.9  --  18.2   CR 1.32* 1.28* 1.23*   < > 1.22*  --  1.24*  --  1.23*   ANIONGAP 15 14 12   < > 14  --  13  --  12   MOE 9.1 8.9 8.9   < > 8.6*  --  8.6*  --  9.3   * 122* 130*   < > 142*   < > 143*   < > 188*   ALBUMIN  --   --   --   --  3.8  --  3.8  --   --    PROTTOTAL  --   --   --   --  5.8*  --  5.4*  --   --    BILITOTAL  --   --   --   --  0.5  --   0.5  --   --    ALKPHOS  --   --   --   --  87  --  84  --   --    ALT  --   --   --   --  44  --  49  --   --    AST  --   --   --   --  50*  --  42  --   --     < > = values in this interval not displayed.       Imaging:  No results found for this or any previous visit (from the past 24 hour(s)).      Patient seen and discussed with Dr Apolinar Westbrook PA-C  Cardiothoracic Surgery  [phone]

## 2024-06-10 NOTE — PLAN OF CARE
A&O x 4, anxious at times. Afebrile, VSS on RA. Tele: SR w/PACs. Stand-by assist w/GBW. Regular diet, tolerating well. Rt PIV SL. Sternal incision, STEPHEN, WNL. Old CT sites, dressing CDI. Rt harvest sites x2 STEHPEN, WNL. Pain managed, see eMAR for analgesic regimen. Voiding adequately, no BM, passing flatus. Discharge to home planned for today. Continue plan of care.     Goal Outcome Evaluation:      Plan of Care Reviewed With: patient    Overall Patient Progress: improving

## 2024-06-11 ENCOUNTER — APPOINTMENT (OUTPATIENT)
Dept: PHYSICAL THERAPY | Facility: CLINIC | Age: 69
DRG: 236 | End: 2024-06-11
Attending: HOSPITALIST
Payer: COMMERCIAL

## 2024-06-11 VITALS
HEART RATE: 82 BPM | SYSTOLIC BLOOD PRESSURE: 126 MMHG | BODY MASS INDEX: 29.17 KG/M2 | OXYGEN SATURATION: 99 % | RESPIRATION RATE: 16 BRPM | TEMPERATURE: 98.5 F | DIASTOLIC BLOOD PRESSURE: 78 MMHG | WEIGHT: 197.5 LBS

## 2024-06-11 LAB
ANION GAP SERPL CALCULATED.3IONS-SCNC: 10 MMOL/L (ref 7–15)
ATRIAL RATE - MUSE: 66 BPM
BUN SERPL-MCNC: 23.9 MG/DL (ref 8–23)
CALCIUM SERPL-MCNC: 8.9 MG/DL (ref 8.8–10.2)
CHLORIDE SERPL-SCNC: 98 MMOL/L (ref 98–107)
CREAT SERPL-MCNC: 1.39 MG/DL (ref 0.67–1.17)
DEPRECATED HCO3 PLAS-SCNC: 27 MMOL/L (ref 22–29)
DIASTOLIC BLOOD PRESSURE - MUSE: NORMAL MMHG
EGFRCR SERPLBLD CKD-EPI 2021: 55 ML/MIN/1.73M2
ERYTHROCYTE [DISTWIDTH] IN BLOOD BY AUTOMATED COUNT: 15.4 % (ref 10–15)
GLUCOSE SERPL-MCNC: 112 MG/DL (ref 70–99)
HCT VFR BLD AUTO: 22.9 % (ref 40–53)
HGB BLD-MCNC: 7.2 G/DL (ref 13.3–17.7)
INTERPRETATION ECG - MUSE: NORMAL
MAGNESIUM SERPL-MCNC: 2.5 MG/DL (ref 1.7–2.3)
MCH RBC QN AUTO: 27.4 PG (ref 26.5–33)
MCHC RBC AUTO-ENTMCNC: 31.4 G/DL (ref 31.5–36.5)
MCV RBC AUTO: 87 FL (ref 78–100)
P AXIS - MUSE: 11 DEGREES
PHOSPHATE SERPL-MCNC: 3.4 MG/DL (ref 2.5–4.5)
PLATELET # BLD AUTO: 258 10E3/UL (ref 150–450)
POTASSIUM SERPL-SCNC: 4.3 MMOL/L (ref 3.4–5.3)
PR INTERVAL - MUSE: 144 MS
QRS DURATION - MUSE: 96 MS
QT - MUSE: 428 MS
QTC - MUSE: 448 MS
R AXIS - MUSE: -4 DEGREES
RBC # BLD AUTO: 2.63 10E6/UL (ref 4.4–5.9)
SODIUM SERPL-SCNC: 135 MMOL/L (ref 135–145)
SYSTOLIC BLOOD PRESSURE - MUSE: NORMAL MMHG
T AXIS - MUSE: -10 DEGREES
VENTRICULAR RATE- MUSE: 66 BPM
WBC # BLD AUTO: 8.4 10E3/UL (ref 4–11)

## 2024-06-11 PROCEDURE — 250N000013 HC RX MED GY IP 250 OP 250 PS 637: Performed by: SURGERY

## 2024-06-11 PROCEDURE — 93005 ELECTROCARDIOGRAM TRACING: CPT

## 2024-06-11 PROCEDURE — 97110 THERAPEUTIC EXERCISES: CPT | Mod: GP

## 2024-06-11 PROCEDURE — 93010 ELECTROCARDIOGRAM REPORT: CPT | Performed by: INTERNAL MEDICINE

## 2024-06-11 PROCEDURE — 250N000013 HC RX MED GY IP 250 OP 250 PS 637: Performed by: PHYSICIAN ASSISTANT

## 2024-06-11 PROCEDURE — 82374 ASSAY BLOOD CARBON DIOXIDE: CPT | Performed by: PHYSICIAN ASSISTANT

## 2024-06-11 PROCEDURE — 36415 COLL VENOUS BLD VENIPUNCTURE: CPT | Performed by: PHYSICIAN ASSISTANT

## 2024-06-11 PROCEDURE — 250N000011 HC RX IP 250 OP 636: Mod: JZ | Performed by: PHYSICIAN ASSISTANT

## 2024-06-11 PROCEDURE — 84100 ASSAY OF PHOSPHORUS: CPT | Performed by: SURGERY

## 2024-06-11 PROCEDURE — 97530 THERAPEUTIC ACTIVITIES: CPT | Mod: GP

## 2024-06-11 PROCEDURE — 85027 COMPLETE CBC AUTOMATED: CPT | Performed by: PHYSICIAN ASSISTANT

## 2024-06-11 PROCEDURE — 83735 ASSAY OF MAGNESIUM: CPT | Performed by: SURGERY

## 2024-06-11 RX ORDER — METOPROLOL TARTRATE 25 MG/1
25 TABLET, FILM COATED ORAL 2 TIMES DAILY
Qty: 60 TABLET | Refills: 3 | Status: SHIPPED | OUTPATIENT
Start: 2024-06-11

## 2024-06-11 RX ORDER — POTASSIUM CHLORIDE 1500 MG/1
20 TABLET, EXTENDED RELEASE ORAL ONCE
Status: COMPLETED | OUTPATIENT
Start: 2024-06-11 | End: 2024-06-11

## 2024-06-11 RX ORDER — POLYETHYLENE GLYCOL 3350 17 G/17G
17 POWDER, FOR SOLUTION ORAL DAILY
Qty: 510 G | Refills: 0 | Status: SHIPPED | OUTPATIENT
Start: 2024-06-11 | End: 2024-06-26

## 2024-06-11 RX ORDER — ASPIRIN 81 MG/1
162 TABLET, CHEWABLE ORAL DAILY
Qty: 60 TABLET | Refills: 3 | Status: SHIPPED | OUTPATIENT
Start: 2024-06-12

## 2024-06-11 RX ORDER — OXYCODONE HYDROCHLORIDE 5 MG/1
5 TABLET ORAL EVERY 6 HOURS PRN
Qty: 30 TABLET | Refills: 0 | Status: SHIPPED | OUTPATIENT
Start: 2024-06-11 | End: 2024-06-19

## 2024-06-11 RX ORDER — METHOCARBAMOL 500 MG/1
500 TABLET, FILM COATED ORAL EVERY 6 HOURS PRN
Qty: 60 TABLET | Refills: 3 | Status: SHIPPED | OUTPATIENT
Start: 2024-06-11 | End: 2024-09-17

## 2024-06-11 RX ORDER — AMOXICILLIN 250 MG
1 CAPSULE ORAL 2 TIMES DAILY
Qty: 60 TABLET | Refills: 0 | Status: SHIPPED | OUTPATIENT
Start: 2024-06-11 | End: 2024-06-25

## 2024-06-11 RX ORDER — FAMOTIDINE 20 MG/1
20 TABLET, FILM COATED ORAL 2 TIMES DAILY
Qty: 28 TABLET | Refills: 0 | Status: SHIPPED | OUTPATIENT
Start: 2024-06-11 | End: 2024-06-25

## 2024-06-11 RX ORDER — HYDROXYZINE HYDROCHLORIDE 25 MG/1
25-50 TABLET, FILM COATED ORAL EVERY 6 HOURS PRN
Qty: 40 TABLET | Refills: 0 | Status: SHIPPED | OUTPATIENT
Start: 2024-06-11

## 2024-06-11 RX ORDER — AMOXICILLIN 250 MG
1 CAPSULE ORAL ONCE
Status: COMPLETED | OUTPATIENT
Start: 2024-06-11 | End: 2024-06-11

## 2024-06-11 RX ORDER — ATORVASTATIN CALCIUM 40 MG/1
40 TABLET, FILM COATED ORAL EVERY EVENING
Qty: 30 TABLET | Refills: 3 | Status: SHIPPED | OUTPATIENT
Start: 2024-06-11 | End: 2024-09-23

## 2024-06-11 RX ORDER — METOPROLOL TARTRATE 25 MG/1
25 TABLET, FILM COATED ORAL 2 TIMES DAILY
Status: DISCONTINUED | OUTPATIENT
Start: 2024-06-11 | End: 2024-06-11 | Stop reason: HOSPADM

## 2024-06-11 RX ADMIN — HEPARIN SODIUM 5000 UNITS: 5000 INJECTION, SOLUTION INTRAVENOUS; SUBCUTANEOUS at 04:56

## 2024-06-11 RX ADMIN — HYDROXYZINE HYDROCHLORIDE 50 MG: 25 TABLET, FILM COATED ORAL at 16:43

## 2024-06-11 RX ADMIN — HEPARIN SODIUM 5000 UNITS: 5000 INJECTION, SOLUTION INTRAVENOUS; SUBCUTANEOUS at 14:27

## 2024-06-11 RX ADMIN — LAMOTRIGINE 250 MG: 100 TABLET ORAL at 08:46

## 2024-06-11 RX ADMIN — POLYETHYLENE GLYCOL 3350 17 G: 17 POWDER, FOR SOLUTION ORAL at 08:48

## 2024-06-11 RX ADMIN — ACETAMINOPHEN 650 MG: 325 TABLET, FILM COATED ORAL at 14:28

## 2024-06-11 RX ADMIN — TAMSULOSIN HYDROCHLORIDE 0.4 MG: 0.4 CAPSULE ORAL at 08:46

## 2024-06-11 RX ADMIN — PANTOPRAZOLE SODIUM 40 MG: 40 TABLET, DELAYED RELEASE ORAL at 08:47

## 2024-06-11 RX ADMIN — OXYCODONE HYDROCHLORIDE 5 MG: 5 TABLET ORAL at 02:00

## 2024-06-11 RX ADMIN — HYDROXYZINE HYDROCHLORIDE 50 MG: 25 TABLET, FILM COATED ORAL at 08:46

## 2024-06-11 RX ADMIN — POTASSIUM CHLORIDE 20 MEQ: 1500 TABLET, EXTENDED RELEASE ORAL at 04:56

## 2024-06-11 RX ADMIN — METOPROLOL TARTRATE 12.5 MG: 25 TABLET, FILM COATED ORAL at 14:27

## 2024-06-11 RX ADMIN — SENNOSIDES AND DOCUSATE SODIUM 1 TABLET: 50; 8.6 TABLET ORAL at 08:47

## 2024-06-11 RX ADMIN — SENNOSIDES AND DOCUSATE SODIUM 1 TABLET: 50; 8.6 TABLET ORAL at 16:43

## 2024-06-11 RX ADMIN — LINACLOTIDE 290 MCG: 290 CAPSULE, GELATIN COATED ORAL at 08:47

## 2024-06-11 RX ADMIN — ASPIRIN 81 MG CHEWABLE TABLET 162 MG: 81 TABLET CHEWABLE at 08:47

## 2024-06-11 RX ADMIN — METOPROLOL TARTRATE 12.5 MG: 25 TABLET, FILM COATED ORAL at 08:46

## 2024-06-11 ASSESSMENT — ACTIVITIES OF DAILY LIVING (ADL)
ADLS_ACUITY_SCORE: 29
ADLS_ACUITY_SCORE: 28
ADLS_ACUITY_SCORE: 29
ADLS_ACUITY_SCORE: 28
ADLS_ACUITY_SCORE: 29
ADLS_ACUITY_SCORE: 28
ADLS_ACUITY_SCORE: 29

## 2024-06-11 NOTE — DISCHARGE INSTRUCTIONS
AFTER YOU GO HOME FROM YOUR HEART SURGERY  Coronary artery bypass grafting x 3 (LIMA to LAD, SVG to OM1, SVG to OM2) on 6/4/2024 with Dr. Morejon     You had a sternotomy, avoid lifting, pushing, or pulling anything greater than ten pounds for 8 weeks after surgery and then less than 20-30 pounds for an additional 4 weeks. Do not reach backwards or use arms to push out of chair. Do not let people pull on your arms to assist with standing. Avoid twisting or reaching too far across your body.  Avoid strenuous activities such as bowling, vacuuming, raking, shoveling, golf or tennis for 12 weeks after your surgery. Splint your chest incision by hugging a pillow or bringing your arms across your chest when coughing or sneezing. Please try to sleep on your back for the first 4-6 weeks to avoid extra stress on your sternum (breastbone) while it is healing.   No driving for 4 weeks after surgery or while on pain medication.     Shower or wash your incisions daily with antibacterial soap and water (or as instructed), pat dry. Keep wound clean and dry, showers are okay after discharge, but don't let spray hit directly on incision. No baths or swimming for 6 weeks and/or until incisions are completely healed over. Cover chest tube sites with gauze until they stop draining, then leave open to air. It is normal for chest tube sites to drain fluid for up to 2-3 weeks after surgery. It is not normal to have drainage from other incisions.   Watch for signs of infection: increased redness, tenderness, warmth or any drainage from sternum incision.  Also a temperature > 100.5 F or chills. Call your surgeon or primary care provider's office immediately.     Exercise is very important in your recovery. Please follow the guidelines set up for you in your cardiac rehab classes at the hospital. If outpatient cardiac rehab was ordered for you, we highly recommend you participate. If you have problems arranging your cardiac rehab, please  call [phone] for all locations, with the exception of [place], please call [phone] and [place], please call [phone].      Avoid sitting for prolonged periods of time, try to walk every hour during the day. If you have a leg incision, elevate your leg often when you are not walking.    Check your weight when you get home from the hospital and continue to check it daily through your recovery for at least a month. If you notice a weight gain of 2-3 pounds in a week, notify your primary care physician, cardiologist or surgeon.    Bowel activity may be slow after surgery. If necessary, you may take an over the counter laxative such as Milk of Magnesia or Miralax. You may have stool softeners prescribed (docusate sodium, Senokot). We recommend using stool softeners while using narcotics for pain (oxycodone/percocet, hydrocodone/vicodin, hydromorphone/dilaudid).      Wean OFF of narcotics (oxycodone, dilaudid, hydrocodone) as soon as possible. You should continue taking acetaminophen as long as you have any surgical pain as the first choice for pain control and add narcotics as necessary for pain to be tolerable.        REGARDING PRESCRIPTION REFILLS.  If you need a refill on your pain medication contact us to discuss your pain and a possible one time refill.   All other medications will be adjusted, discontinued and re-filled by your primary care physician and/or your cardiologist as they were prior to your surgery. We have given you enough for one to three month with possibly one refill. You may have refills available to you for some of your medications through the [facility]. If you would like your refills sent to a different a different pharmacy, please contact your preferred pharmacy and let them know that you have prescriptions at [facility] that you would like transferred.      POST-OPERATIVE CLINIC VISITS  You have a follow up visit with [facility]  Practitioners on 6/19/24 at 2:15pm at the Heart Clinic at [facility] in [place].    Primary Care Doctor, 6/25/24 at 10:30am  Follow up with Dr. Gross in cardiology on on 9/23/24 at 9:15am  If you need to cancel or reschedule your cardiology appointments please call [phone].     SURGICAL QUESTIONS  Please call our nurse coordinator, Maribeth, at [phone] with questions or concerns related to surgery. For other non-urgent questions and requests, our nurse coordinators can also be reached via email; Maribeth at [email or web address] and Bernice at [email or web address]    On weekends or after hours, please call [phone] and ask the  to page the Cardiothoracic Surgeon on-call.      Thank you,    Your Cardiothoracic Surgery Team

## 2024-06-11 NOTE — PROGRESS NOTES
EKG was ordered per Kati Westbrook regarding run of PSVT around 0840 this am. Also several PVCs and PACs in another tele strip. EKG more reassuring, NSR with PVCs.

## 2024-06-11 NOTE — PROGRESS NOTES
Pt discharging via POV with wife. Escorted down to main door by aid. Volunteer aided in bringing down belongings along side aid. All belongings with patient. Discharge instructions provided and questions encouraged. Movement and continued IS after discharge encouraged, pt expressed understanding. Denies pain. IV discontinued and all meds sent with patient.  Pt left in stable condition.

## 2024-06-11 NOTE — PLAN OF CARE
Goal Outcome Evaluation:    Orientation: A& O x4. Calm and cooperative overnight.  Vitals/pain: Vitals stable on RA and home setting CPAP at HS. Pain managed with tylenol and oxy.  Lungs: clear, diminished at bases.   Tele: SR  Diet: tolerating regular diet.  IV lines/drains: R PIV, SL.   GI/: Continent of B&B. Active BS, had BM, passing flatus. Adequately voiding into the bathroom.   Wound/Skin: mid sternal inc, CT sites, R knee and R groin harvest sites. Redness to sacrum/coccyx, mepilex in place.   Labs: abnormal labs/trends: Hgb() electrolyte replacement: On K/Mg/Phos protocols.  Mobility/assist: AX1 GBW.   Plan: manage pain,encourage pul toiet/IS and ambulation.

## 2024-06-11 NOTE — PROGRESS NOTES
[facility]  Cardiovascular and Thoracic Surgery Daily Note    Assessment and Plan  POD #7 s/p coronary artery bypass grafting x 3 (LIMA to LAD, SVG to OM1, SVG to OM2) on 2024 with Dr. Morejon    - CVS: Pre-op TTE with 45-50%  Trace mitral regurg  Trace tricuspid regurg.   Carotid US (24) - less than 50% stenosis of bilateral ICA    -120s/50-80s, HR 70-90s, occasional PACs will increase lopressor to 25mg PO bid with parameters, euvolemic, no diuretic today. aspirin 162 mg, atorvastatin 40 mg, continues on flomax, chest tubes/TPW removed on  CXR stable with slight pulmonary edema and bilateral small pleural effusions/atelectasis.     - Resp: Extubated POD #0 at 1945. Working with IS and flutter valve, needs increased pulmonary toilet. Completed duonebs.    - Neuro: Neuro intact, pain better controlled on current regimen, continues on home lamictal 250 mg bid, pt having a lot of anxiety with movement, prn atarax helping with pain and anxiety    - Renal: No history of significant renal disease. Trend BMP. Diuretic as above.  Recent Labs   Lab 24  0653 06/10/24  0642 24  0649   CR 1.39* 1.32* 1.28*       - GI:+ BM, +flatus, continue bowel regimen, continues on PTA linzess 290 mcg every morning,nausea today    - : voiding well on own    - Endo: discontinued sliding scale insulin    Hemoglobin A1C   Date Value Ref Range Status   2024 4.8 <5.7 % Final     Comment:     Normal <5.7%   Prediabetes 5.7-6.4%    Diabetes 6.5% or higher     Note: Adopted from ADA consensus guidelines.   2018 4.8 0 - 6.4 % Final     Comment:     Normal <5.7% Prediabetes 5.7-6.4%  Diabetes 6.5% or higher - adopted from ADA   consensus guidelines.          - FEN: Replace electrolytes as needed. Advance to regular diet as tolerated    - ID: Temp (24hrs), Av  F (36.7  C), Min:98  F (36.7  C), Max:98.1  F (36.7  C) Postop leukocytosis. . finished periop abx prophylaxis. Needs increased  "pulmonary toilet. Trend CBC and fever curve.   Recent Labs   Lab 06/11/24  0653 06/10/24  0642 06/09/24  0649   WBC 8.4 7.1 7.1       - Heme: Acute blood loss anemia and thrombocytopenia due to surgery. Trend CBC, transfuse PRN.   Recent Labs   Lab 06/11/24  0653 06/10/24  0642 06/09/24  0649   HGB 7.2* 8.4* 7.4*    239 191       - Proph: SCD, subcutaneous heparin, PPI    - Other:  Clinically Significant Risk Factors                    # Hypertension: Noted on problem list           #Precipitous drop in Hgb/Hct: Lowest Hgb this hospitalization: 7.2 g/dL. Will continue to monitor and treat/transfuse as appropriate.     # Overweight: Estimated body mass index is 29.17 kg/m  as calculated from the following:    Height as of 5/29/24: 1.753 m (5' 9\").    Weight as of this encounter: 89.6 kg (197 lb 8 oz).        # Financial/Environmental Concerns: none   # History of CABG: noted on surgical history       - Dispo: Encouraged IS/TCDB/amb. Sternal precautions. Needs pulmonary toilet. Ok to shower. Therapies recommending discharge to home when medically appropriate. Medically Ready for Discharge: today, but patient nervous. Will plan to discharge today    Interval History  No acute events overnight except patient reports feeling chest pain still but is improving. Saturating well. Tolerating diet. +BM. Long discussion about normal expectations and reassurance. Encouraged patient to continue to increase activity and hopeful for discharge to home today after working with therapy      Medications  Current Facility-Administered Medications   Medication Dose Route Frequency Provider Last Rate Last Admin    aspirin (ASA) chewable tablet 162 mg  162 mg Oral or NG Tube Daily Guido Abel PA-C   162 mg at 06/11/24 0847    atorvastatin (LIPITOR) tablet 40 mg  40 mg Oral QPM Guido Abel PA-C   40 mg at 06/10/24 1926    heparin ANTICOAGULANT injection 5,000 Units  5,000 Units Subcutaneous Q8H Guido Abel PA-C   5,000 " Units at 06/11/24 1427    lamoTRIgine (LaMICtal) tablet 250 mg  250 mg Oral BID Guido Abel PA-C   250 mg at 06/11/24 0846    Lidocaine (LIDOCARE) 4 % Patch 1-2 patch  1-2 patch Transdermal Q24H Guido Abel PA-C   1 patch at 06/10/24 1924    linaclotide (LINZESS) capsule 290 mcg  290 mcg Oral QAM AC Guido Abel PA-C   290 mcg at 06/11/24 0847    metoprolol tartrate (LOPRESSOR) tablet 25 mg  25 mg Oral BID Kati Westbrook PA-C        pantoprazole (PROTONIX) EC tablet 40 mg  40 mg Oral Daily Guido Abel PA-C   40 mg at 06/11/24 0847    polyethylene glycol (MIRALAX) Packet 17 g  17 g Oral Daily Guido Abel PA-C   17 g at 06/11/24 0848    senna-docusate (SENOKOT-S/PERICOLACE) 8.6-50 MG per tablet 1 tablet  1 tablet Oral BID Guido Abel PA-C   1 tablet at 06/11/24 0847    sodium chloride (PF) 0.9% PF flush 3 mL  3 mL Intracatheter Q8H Guido Abel PA-C   3 mL at 06/11/24 1051    tamsulosin (FLOMAX) capsule 0.4 mg  0.4 mg Oral Daily Guido Abel PA-C   0.4 mg at 06/11/24 0846    traZODone (DESYREL) tablet 50 mg  50 mg Oral At Bedtime Guido Abel PA-C   50 mg at 06/10/24 2206     Current Facility-Administered Medications   Medication Dose Route Frequency Provider Last Rate Last Admin    acetaminophen (TYLENOL) tablet 650 mg  650 mg Oral Q4H PRN Guido Abel PA-C   650 mg at 06/11/24 1428    alum & mag hydroxide-simethicone (MAALOX) suspension 30 mL  30 mL Oral Q4H PRN Guido Abel PA-C   30 mL at 06/09/24 1137    bisacodyl (DULCOLAX) suppository 10 mg  10 mg Rectal Daily PRN Guido Abel PA-C   10 mg at 06/08/24 1801    calcium carbonate (TUMS) chewable tablet 500 mg  500 mg Oral 4x Daily PRN Guido Abel PA-C        hydrALAZINE (APRESOLINE) injection 10 mg  10 mg Intravenous Q30 Min PRN Guido Abel PA-C   10 mg at 06/05/24 0951    HYDROmorphone (DILAUDID) injection 0.2 mg  0.2 mg Intravenous Q2H PRN Guido Abel PA-C   0.2 mg at 06/05/24 1707    Or     HYDROmorphone (DILAUDID) injection 0.4 mg  0.4 mg Intravenous Q2H PRN Guido Abel PA-C   0.4 mg at 06/06/24 0245    hydrOXYzine HCl (ATARAX) tablet 25-50 mg  25-50 mg Oral Q6H PRN Kati Westbrook PA-C   50 mg at 06/11/24 1643    ipratropium - albuterol 0.5 mg/2.5 mg/3 mL (DUONEB) neb solution 3 mL  3 mL Nebulization Q4H PRN Kati Westbrook PA-C        lidocaine (LMX4) cream   Topical Q1H PRN Guido Abel PA-C        lidocaine 1 % 0.1-1 mL  0.1-1 mL Other Q1H PRN Guido Abel PA-C        magnesium hydroxide (MILK OF MAGNESIA) suspension 30 mL  30 mL Oral Daily PRN Guido Abel PA-C   30 mL at 06/09/24 0938    melatonin tablet 3 mg  3 mg Oral At Bedtime PRN Kati Westbrook PA-C   3 mg at 06/09/24 2339    methocarbamol (ROBAXIN) tablet 500 mg  500 mg Oral Q6H PRN Guido Abel PA-C   500 mg at 06/10/24 1220    naloxone (NARCAN) injection 0.2 mg  0.2 mg Intravenous Q2 Min PRN Guido Abel PA-C        Or    naloxone (NARCAN) injection 0.4 mg  0.4 mg Intravenous Q2 Min PRN Gudio Abel PA-C        Or    naloxone (NARCAN) injection 0.2 mg  0.2 mg Intramuscular Q2 Min PRN Guido Abel PA-C        Or    naloxone (NARCAN) injection 0.4 mg  0.4 mg Intramuscular Q2 Min PRN Guido Abel PA-C        ondansetron (ZOFRAN ODT) ODT tab 4 mg  4 mg Oral Q6H PRN Guido Abel PA-C   4 mg at 06/09/24 0938    Or    ondansetron (ZOFRAN) injection 4 mg  4 mg Intravenous Q6H PRN Guido Abel PA-C        oxyCODONE (ROXICODONE) tablet 5 mg  5 mg Oral Q4H PRN Guido Abel PA-C   5 mg at 06/11/24 0200    Or    oxyCODONE (ROXICODONE) tablet 10 mg  10 mg Oral Q4H PRN Guido Abel PA-C   10 mg at 06/10/24 1059    prochlorperazine (COMPAZINE) injection 5 mg  5 mg Intravenous Q6H PRN Guido Abel PA-C        Or    prochlorperazine (COMPAZINE) tablet 5 mg  5 mg Oral Q6H PRN Guido Abel PA-C        sodium chloride (PF) 0.9% PF flush 3 mL  3 mL Intracatheter q1 min prn Guido Abel  PA-C   3 mL at 06/09/24 0858         Physical Exam  Vitals were reviewed  Blood pressure 126/78, pulse 82, temperature 98.5  F (36.9  C), temperature source Oral, resp. rate 16, weight 89.6 kg (197 lb 8 oz), SpO2 99%.  Gen: lying in bed, comfortable, -O2    Lungs: diminished bases, -500 ml    Cardiovascular: RRR, telemetry SR 70s, +trace edema LE    Abdomen: BS+, NT/ND, soft    Derm: sternal incision D/I   R LE incisions D/I    CT: removed    CXR (6/7/24) - chest tubes removed, no pneumothorax, increased atelectasis, sm bilateral effusions    CXR (6/5/24) - extubated, chest tubes present, +atelectasis      Weight:   Vitals:    06/07/24 0406 06/08/24 0643 06/09/24 0343 06/10/24 0652   Weight: 93.1 kg (205 lb 3.2 oz) 92.1 kg (203 lb) 91.1 kg (200 lb 12.8 oz) 89.9 kg (198 lb 3.2 oz)    06/11/24 0500   Weight: 89.6 kg (197 lb 8 oz)         Data  Recent Labs   Lab 06/11/24  0653 06/10/24  0642 06/09/24  0649 06/05/24  0819 06/05/24  0413 06/04/24 2005 06/04/24  1824   WBC 8.4 7.1 7.1   < > 16.3*  --  15.5*   HGB 7.2* 8.4* 7.4*   < > 9.8*  --  9.4*   MCV 87 88 86   < > 86  --  85    239 191   < > 162  --  127*   INR  --   --   --   --   --   --  1.25*    137 137   < > 139  --  143   POTASSIUM 4.3 3.6 3.8   < > 3.8  --  3.3*   CHLORIDE 98 98 100   < > 106  --  110*   CO2 27 24 23   < > 19*  --  20*   BUN 23.9* 22.8 22.9   < > 19.1  --  18.9   CR 1.39* 1.32* 1.28*   < > 1.22*  --  1.24*   ANIONGAP 10 15 14   < > 14  --  13   MOE 8.9 9.1 8.9   < > 8.6*  --  8.6*   * 122* 122*   < > 142*   < > 143*   ALBUMIN  --   --   --   --  3.8  --  3.8   PROTTOTAL  --   --   --   --  5.8*  --  5.4*   BILITOTAL  --   --   --   --  0.5  --  0.5   ALKPHOS  --   --   --   --  87  --  84   ALT  --   --   --   --  44  --  49   AST  --   --   --   --  50*  --  42    < > = values in this interval not displayed.       Imaging:  No results found for this or any previous visit (from the past 24 hour(s)).      Patient seen  and discussed with Dr Mulvihill Brittany Smith, PA-C  Cardiothoracic Surgery  [phone]

## 2024-06-11 NOTE — DISCHARGE SUMMARY
Discharge Summary    Kashmir Yao MRN# [id number]   YOB: 1955 Age: 69 year old     Date of Admission:  5/30/2024  Date of Discharge:  6/11/2024  6:30 PM  Admitting Physician:  Amari Marquez MD  Discharge Physician:  Kati Westbrook PA-C on behalf of Dr. Melonie Morejon  Discharging Service:  Cardiovascular and Thoracic Surgery     Home clinic:   Primary Address: [address] [place] [address]     Primary Phone: [phone] Primary Fax: [phone]     Primary Provider: Chad Noel          Admission Diagnoses:   Unstable angina  NSTEMI (non-ST elevated myocardial infarction) (H)          Discharge Diagnosis:     Patient Active Problem List   Diagnosis    Hyperlipidemia LDL goal <70    Esophageal reflux    Anxiety state    RIC (obstructive sleep apnea)    Health Care Home    S/P TKR (total knee replacement)    Benign essential hypertension    Physical deconditioning    Other constipation    Trauma    Cognitive change    Class 1 obesity due to excess calories without serious comorbidity in adult, unspecified BMI    Primary osteoarthritis of both hands    Headache    Need for prophylactic vaccination and inoculation against influenza    Cough    Panlobular emphysema (H)    Left carpal tunnel syndrome    Ulnar neuropathy of both upper extremities    Dermatitis    Pedal edema    Trigger finger of left hand, unspecified finger    Pruritic disorder    Vasculogenic erectile dysfunction    Anemia    Alas's palsy    Benign prostatic hyperplasia with urinary frequency    Common wart    Post herpetic neuralgia    Right hand paresthesia    Heartburn    Coronary artery disease due to lipid rich plaque    Oral phase dysphagia    Encounter for immunization    Travel advice encounter    [place] anomaly (H)    Chronic migraine without aura without status migrainosus, not intractable    Generalized tonic clonic epilepsy (H)    Gross hematuria    Visi (volar intercalated segment  instability), right    Elevated glucose    Paronychia of toe, left    Impacted cerumen of left ear    Primary osteoarthritis of both hips    Contusion of left great toe with damage to nail, initial encounter    Need for COVID-19 vaccine    Closed fracture of left shoulder with delayed healing, subsequent encounter    Hypokalemia    Low iron    Insomnia, unspecified type    Hypotension, unspecified hypotension type    Need for shingles vaccine    Need for vaccination against respiratory syncytial virus    Primary osteoarthritis involving multiple joints    Spinal stenosis    Other hypoglycemia    Weight gain    Meningiomatosis (H)    History of seizure    Generalized anxiety disorder    Advanced directives, counseling/discussion    Rash    Elevated troponin    Chest pain, unspecified type    NSTEMI (non-ST elevated myocardial infarction) (H)                 Discharge Disposition:     Discharged to home           Condition on Discharge:     Discharge condition: Stable   Discharge vitals: Blood pressure 126/78, pulse 82, temperature 98.5  F (36.9  C), temperature source Oral, resp. rate 16, weight 89.6 kg (197 lb 8 oz), SpO2 99%.   Code status on discharge: Full Code           Procedures:   On 6/4/24, Naveen Yao underwent successful Coronary artery bypass grafting x 3 with left internal mammary artery to the left anterior descending, reverse saphenous vein graft to the obtuse marginal 1 artery, reverse saphenous vein graft to the obtuse marginal 2 artery, endoscopic vein harvest from the left lower extremity, intraoperative CARLOS by Dr. Melonie Morejon          Medications Prior to Admission:     Medications Prior to Admission   Medication Sig Dispense Refill Last Dose    acetaminophen (TYLENOL) 500 MG tablet Take 1,000 mg by mouth every 8 hours as needed for mild pain       alum & mag hydroxide-simethicone (MAALOX MULTI SYMPTOM MAX ST) 400-400-40 MG/5ML SUSP suspension Take 30 mLs by mouth every 4 hours as needed for  indigestion 355 mL 0     benzonatate (TESSALON) 200 MG capsule Take 1 capsule (200 mg) by mouth 3 times daily as needed for cough 60 capsule 0     fluticasone (FLONASE) 50 MCG/ACT nasal spray Spray 1 spray into both nostrils daily 16 g 0     lamoTRIgine (LAMICTAL) 100 MG tablet Take 0.5 tablets (50 mg) by mouth 2 times daily       lamoTRIgine (LAMICTAL) 200 MG tablet Take 1 tablet (200 mg) by mouth 2 times daily (Patient taking differently: Take 200 mg by mouth 2 times daily 200mg + 50 mg= 250 mg BID)       rizatriptan (MAXALT) 10 MG tablet TAKE 1 TABLET(10 MG) BY MOUTH AT ONSET OF HEADACHE FOR MIGRAINE. MAY REPEAT IN 2 HOURS. MAX 3 TABLETS/ 24 HOURS 15 tablet 2     tamsulosin (FLOMAX) 0.4 MG capsule Take 1 capsule (0.4 mg) by mouth daily 90 capsule 0     topiramate (TOPAMAX) 100 MG tablet Take 1 tablet (100 mg) by mouth daily Use second 90 tablet 1     traZODone (DESYREL) 50 MG tablet Take 2 tablets (100 mg) by mouth At Bedtime 180 tablet 3     zolpidem (AMBIEN) 5 MG tablet Take 1 tablet (5 mg) by mouth nightly as needed for sleep 90 tablet 1     [DISCONTINUED] aspirin 81 MG tablet Take 81 mg by mouth daily       [DISCONTINUED] COMPOUNDED NON-CONTROLLED SUBSTANCE (CMPD RX) - PHARMACY TO MIX COMPOUNDED MEDICATION 1 vial TriMix #9 with syringes.  Inject 0.1 mL intercavernously per MD's instructions. (Patient not taking: Reported on 5/29/2024) 1 each 11     [DISCONTINUED] linaclotide (LINZESS) 290 MCG capsule Take 1 capsule (290 mcg) by mouth every morning (before breakfast) Prn constipatiion (Patient taking differently: Take 290 mcg by mouth daily as needed (constipation)) 90 capsule 1     [DISCONTINUED] naproxen (NAPROSYN) 375 MG tablet TAKE 1 TABLET(375 MG) BY MOUTH TWICE DAILY WITH MEALS 180 tablet 1     [DISCONTINUED] omeprazole (PRILOSEC) 40 MG DR capsule Take 1 capsule (40 mg) by mouth daily for 14 days 14 capsule 0     [DISCONTINUED] sildenafil (VIAGRA) 100 MG tablet Take 1 tablet (100 mg) by mouth daily as  needed (interest) 10 tablet 3     [DISCONTINUED] simvastatin (ZOCOR) 40 MG tablet Take 1 tablet (40 mg) by mouth At Bedtime 90 tablet 3     [DISCONTINUED] sucralfate (CARAFATE) 1 GM tablet Take 1 tablet (1 g) by mouth 4 times daily for 14 days 56 tablet 0     [DISCONTINUED] tirzepatide (MOUNJARO) 2.5 MG/0.5ML pen Inject 2.5 mg Subcutaneous every 7 days (Patient not taking: Reported on 5/29/2024) 2 mL 3              Discharge Medications:     Current Discharge Medication List        START taking these medications    Details   aspirin (ASA) 81 MG chewable tablet 2 tablets (162 mg) by Oral or NG Tube route daily  Qty: 60 tablet, Refills: 3    Associated Diagnoses: S/P CABG x 3      atorvastatin (LIPITOR) 40 MG tablet Take 1 tablet (40 mg) by mouth every evening  Qty: 30 tablet, Refills: 3    Associated Diagnoses: S/P CABG x 3      famotidine (PEPCID) 20 MG tablet Take 1 tablet (20 mg) by mouth 2 times daily for 14 days  Qty: 28 tablet, Refills: 0    Associated Diagnoses: S/P CABG x 3      hydrOXYzine HCl (ATARAX) 25 MG tablet Take 1-2 tablets (25-50 mg) by mouth every 6 hours as needed for anxiety or other (pain)  Qty: 40 tablet, Refills: 0    Associated Diagnoses: S/P CABG x 3      methocarbamol (ROBAXIN) 500 MG tablet Take 1 tablet (500 mg) by mouth every 6 hours as needed for muscle spasms  Qty: 60 tablet, Refills: 3    Associated Diagnoses: S/P CABG x 3      metoprolol tartrate (LOPRESSOR) 25 MG tablet Take 1 tablet (25 mg) by mouth 2 times daily  Qty: 60 tablet, Refills: 3    Associated Diagnoses: S/P CABG x 3      oxyCODONE (ROXICODONE) 5 MG tablet Take 1 tablet (5 mg) by mouth every 6 hours as needed for moderate pain  Qty: 30 tablet, Refills: 0    Associated Diagnoses: S/P CABG x 3      polyethylene glycol (MIRALAX) 17 GM/Dose powder Take 17 g by mouth daily  Qty: 510 g, Refills: 0    Associated Diagnoses: S/P CABG x 3      senna-docusate (SENOKOT-S/PERICOLACE) 8.6-50 MG tablet Take 1 tablet by mouth 2 times  daily  Qty: 60 tablet, Refills: 0    Associated Diagnoses: S/P CABG x 3           CONTINUE these medications which have CHANGED    Details   linaclotide (LINZESS) 290 MCG capsule Take 1 capsule (290 mcg) by mouth every morning (before breakfast) Prn constipatiion  Qty: 90 capsule, Refills: 1    Associated Diagnoses: Other constipation           CONTINUE these medications which have NOT CHANGED    Details   acetaminophen (TYLENOL) 500 MG tablet Take 1,000 mg by mouth every 8 hours as needed for mild pain      alum & mag hydroxide-simethicone (MAALOX MULTI SYMPTOM MAX ST) 400-400-40 MG/5ML SUSP suspension Take 30 mLs by mouth every 4 hours as needed for indigestion  Qty: 355 mL, Refills: 0      benzonatate (TESSALON) 200 MG capsule Take 1 capsule (200 mg) by mouth 3 times daily as needed for cough  Qty: 60 capsule, Refills: 0    Associated Diagnoses: Dry cough      fluticasone (FLONASE) 50 MCG/ACT nasal spray Spray 1 spray into both nostrils daily  Qty: 16 g, Refills: 0    Associated Diagnoses: Dry cough      !! lamoTRIgine (LAMICTAL) 100 MG tablet Take 0.5 tablets (50 mg) by mouth 2 times daily    Comments: With 200 to equal 250 bid  Associated Diagnoses: Seizure disorder (H)      !! lamoTRIgine (LAMICTAL) 200 MG tablet Take 1 tablet (200 mg) by mouth 2 times daily    Associated Diagnoses: Generalized tonic clonic epilepsy (H)      rizatriptan (MAXALT) 10 MG tablet TAKE 1 TABLET(10 MG) BY MOUTH AT ONSET OF HEADACHE FOR MIGRAINE. MAY REPEAT IN 2 HOURS. MAX 3 TABLETS/ 24 HOURS  Qty: 15 tablet, Refills: 2    Comments: profile  Associated Diagnoses: Chronic migraine without aura without status migrainosus, not intractable      tamsulosin (FLOMAX) 0.4 MG capsule Take 1 capsule (0.4 mg) by mouth daily  Qty: 90 capsule, Refills: 0    Associated Diagnoses: Benign prostatic hyperplasia with lower urinary tract symptoms, symptom details unspecified      topiramate (TOPAMAX) 100 MG tablet Take 1 tablet (100 mg) by mouth daily  Use second  Qty: 90 tablet, Refills: 1    Associated Diagnoses: Generalized tonic clonic epilepsy (H)      traZODone (DESYREL) 50 MG tablet Take 2 tablets (100 mg) by mouth At Bedtime  Qty: 180 tablet, Refills: 3    Associated Diagnoses: Primary insomnia      zolpidem (AMBIEN) 5 MG tablet Take 1 tablet (5 mg) by mouth nightly as needed for sleep  Qty: 90 tablet, Refills: 1    Associated Diagnoses: Insomnia, unspecified type       !! - Potential duplicate medications found. Please discuss with provider.        STOP taking these medications       aspirin 81 MG tablet Comments:   Reason for Stopping:         COMPOUNDED NON-CONTROLLED SUBSTANCE (CMPD RX) - PHARMACY TO MIX COMPOUNDED MEDICATION Comments:   Reason for Stopping:         naproxen (NAPROSYN) 375 MG tablet Comments:   Reason for Stopping:         omeprazole (PRILOSEC) 40 MG DR capsule Comments:   Reason for Stopping:         sildenafil (VIAGRA) 100 MG tablet Comments:   Reason for Stopping:         simvastatin (ZOCOR) 40 MG tablet Comments:   Reason for Stopping:         sucralfate (CARAFATE) 1 GM tablet Comments:   Reason for Stopping:         tirzepatide (MOUNJARO) 2.5 MG/0.5ML pen Comments:   Reason for Stopping:                     Consultations:     Nutrition, Intensivist, cardiology, care coordihnation             Brief History of Illness:   Mr. Yao is a very pleasant 69-year-old gentleman with a history of coronary artery disease, status post PCI in the remote past, who was admitted to the hospital with non-ST elevation MI.  He was found to have severe multivessel coronary artery disease.  He was taken to the operating today for coronary artery bypass grafting.           Hospital Course:   He was admitted on 5/29 with NSTEMI. Cardiology was consulted and work up for NSTEMI ordered and he was found to have severe multivessel CAD for which cardiac surgery was consulted.     On 6/4/24, Naveen Yao underwent successful Coronary artery bypass grafting x 3  with left internal mammary artery to the left anterior descending, reverse saphenous vein graft to the obtuse marginal 1 artery, reverse saphenous vein graft to the obtuse marginal 2 artery, endoscopic vein harvest from the left lower extremity, intraoperative CARLOS by Dr. Melonie Morejon    He was admitted to the intensive care unit post operatively. He was extubated on POD#0 per protocol. He had vasoplegic shock post operatively and his blood pressure and cardiac index were managed with vasopressors and ionotropic agents which were continuously weaned until no longer needed on POD#2. He transferred to the intermediate care unit on POD#2    He was fluid overloaded and treated with diuretic therapies. At discharge he was near his admission weight and appeared euvolemic. He was discharged without diuretic therapies but was instructed to monitor his weight at home and alert our team if his weight increased.    His chest tubes and temporary pacing wires were removed when deemed appropriate. His follow up CXR showed basilar atelectasis, very small bilateral effusions and pulmonary edema for which he was diuresed additionally. He weaned off oxygen without issue and had return of bowel and bladder function. His pain and anxiety with activity were initially difficult to control but improved prior to discharge. Once his pain was better controlled he improved with therapies, his adherence to sternal precautions improved and was determined appropriate for discharge to home.     He was medically ready to discharge to home on POD#7. He has CAD and will discharge on ASA, BB, statin. Follow up with cardiac surgery, cardiac rehab, PCP and cardiology were arranged prior to discharge. Discharge teaching, materials and contact information was provided to patient and spouse at discharge. Medications reviewed. All questions and concerns addressed.                Significant Results:     Lab Results   Component Value Date    WBC 8.4  "06/11/2024    WBC 10.3 09/08/2020     Lab Results   Component Value Date    RBC 2.63 06/11/2024    RBC 4.84 09/08/2020     Lab Results   Component Value Date    HGB 7.2 06/11/2024    HGB 13.4 09/08/2020     Lab Results   Component Value Date    HCT 22.9 06/11/2024    HCT 39.5 09/08/2020     No components found for: \"MCT\"  Lab Results   Component Value Date    MCV 87 06/11/2024    MCV 82 09/08/2020     Lab Results   Component Value Date    MCH 27.4 06/11/2024    MCH 27.7 09/08/2020     Lab Results   Component Value Date    MCHC 31.4 06/11/2024    MCHC 33.9 09/08/2020     Lab Results   Component Value Date    RDW 15.4 06/11/2024    RDW 15.3 09/08/2020     Lab Results   Component Value Date     06/11/2024     09/08/2020       Last Basic Metabolic Panel:  Lab Results   Component Value Date     06/11/2024     09/04/2020      Lab Results   Component Value Date    POTASSIUM 4.3 06/11/2024    POTASSIUM 3.6 06/04/2024    POTASSIUM 3.9 09/04/2020     Lab Results   Component Value Date    CHLORIDE 98 06/11/2024    CHLORIDE 107 09/04/2020     Lab Results   Component Value Date    MOE 8.9 06/11/2024    MOE 9.2 09/04/2020     Lab Results   Component Value Date    CO2 27 06/11/2024    CO2 25 09/04/2020     Lab Results   Component Value Date    BUN 23.9 06/11/2024    BUN 25 09/04/2020     Lab Results   Component Value Date    CR 1.39 06/11/2024    CR 1.12 09/04/2020     Lab Results   Component Value Date     06/11/2024     06/06/2024     09/04/2020                  Pending Results:     None           Discharge Instructions and Follow-Up:     Discharge diet: Regular   Discharge activity: Daily weights: Call if weight gain 2-3 lbs over 24 hours or if greater than 5 lbs in 1 week.  No lifting more than 10 lbs for 8-12 weeks.  No driving for 1 month.  Call for pain medication refill.  Call for temperature greater than 101.0  Daily shower with antibacterial soap.   Discharge follow-up: " Follow-up Appointments  Follow-up and recommended labs and tests   *Follow up primary care provider in 7-10 days after discharge in order to review your medication, vital signs, obtain any necessary lab work your doctor may want, and to update them on your hospitalization and medical issues.  *Follow up with Kavita/Kati/ Guido/Tiara/Rose Mary RUDD/Carrie Dillard with Dr Morejon, heart surgeon, on 6/19/24 at 2:45pm at [facility] at [facility] [address]. If any questions or concerns call [phone].  You will see us once at this visit and then if everything is going well you will not need to see us again.  You will follow long term with your cardiologist.  *Follow up with Dr Gross, cardiologist, on 9/23/24 at 9:15am. This is who you will follow with long term about your heart issues. [phone].  *Please follow up with outpatient cardiac rehab on 6/17/24 at 3:30pm, call [phone] if you need to reschedule.      Outpatient therapy: Cardiac rehab   Home Care agency: None    Supplies and equipment: None   Lines and drains: None    Wound care: Wash incision daily with antibacterial soap   Other instructions: None

## 2024-06-12 ENCOUNTER — PATIENT OUTREACH (OUTPATIENT)
Dept: CARE COORDINATION | Facility: CLINIC | Age: 69
End: 2024-06-12
Payer: COMMERCIAL

## 2024-06-12 ENCOUNTER — PATIENT OUTREACH (OUTPATIENT)
Dept: FAMILY MEDICINE | Facility: CLINIC | Age: 69
End: 2024-06-12
Payer: COMMERCIAL

## 2024-06-12 DIAGNOSIS — K21.9 GASTROESOPHAGEAL REFLUX DISEASE WITHOUT ESOPHAGITIS: ICD-10-CM

## 2024-06-12 DIAGNOSIS — R11.0 POSTOPERATIVE NAUSEA: Primary | ICD-10-CM

## 2024-06-12 DIAGNOSIS — R11.0 NAUSEA: ICD-10-CM

## 2024-06-12 DIAGNOSIS — Z98.890 POSTOPERATIVE NAUSEA: Primary | ICD-10-CM

## 2024-06-12 NOTE — PLAN OF CARE
Cardiac Rehab Discharge Summary    Reason for therapy discharge:    Discharged to home with outpatient therapy.    Progress towards therapy goal(s). See goals on Care Plan in [facility] electronic health record for goal details.  Goals partially met.  Barriers to achieving goals:   discharge from facility.    Therapy recommendation(s):    Continued therapy is recommended.  Rationale/Recommendations:   .  Continue home exercise program.  Pt tolerated session well this am, making great progress with therapy. Pt is below baseline functional independence. Pt limited by sternal precautions, pain, decreased activity tolerance, strength. Pt at baseline is ind with mobility, ADLs. Pt currently requiring Demetri for bed mobility, ambulation with FWW, SBA-Raymond. Anticipate with continued mobilization with nursing staff and IP CR, pt will progress to be able to discharge to home with assist as needed for heavier tasks such as laundry, cooking, cleaning, grocery shopping, ect. Pt would benefit from HHPT and OT. Recommend pt use walker for ambulation, continue with OP CR. Will continue to update as appropriate.  PT Brief overview of current status: bed mob, Demetri. STS to FWW, SBA. amb with FWW, SBA-Raymond  PT Equipment Needed at Discharge: walker, rolling

## 2024-06-12 NOTE — PROGRESS NOTES
Connected Care Resource Center: [facility]    Background: Transitional Care Management program identified per system criteria and reviewed by [facility] team for possible outreach.    Assessment: Upon chart review, CCR Team member will not proceed with patient outreach related to this episode of Transitional Care Management program due to reason below:    Patient has active communication with a nurse, provider or care team for reason of post-hospital follow up plan.  Outreach call by CCRC team not indicated to minimize duplicative efforts.     Plan: Transitional Care Management episode addressed appropriately per reason noted above.      VADIM Martinez  [facility]    *Connected Care Resource Team does NOT follow patient ongoing. Referrals are identified based on internal discharge reports and the outreach is to ensure patient has an understanding of their discharge instructions.

## 2024-06-12 NOTE — TELEPHONE ENCOUNTER
"  Transitions of Care Outreach  Chief Complaint   Patient presents with    Hospital F/U     PAL RN      Most Recent Admission Date: 5/30/2024   Most Recent Admission Diagnosis:      Most Recent Discharge Date: 6/11/2024   Most Recent Discharge Diagnosis:  Coronary artery disease involving native heart with unstable angina pectoris, unspecified vessel or lesion type (H) - I25.110  S/P CABG x 3 - Z95.1  Chest pain, unspecified type - R07.9  Other constipation - K59.09  Seizure disorder (H) - G40.909     Transitions of Care Assessment    Discharge Assessment  How are you doing now that you are home?: \"I'm trying to adjust to being home. It's been tough, but I'm doing okay. My family has been helping me a lot.\"  How are your symptoms? (Red Flag symptoms escalate to triage hotline per guidelines): Other (Recovering post-op)  Do you know how to contact your clinic care team if you have future questions or changes to your health status? : Yes  Does the patient have their discharge instructions? : Yes  Does the patient have questions regarding their discharge instructions? : No  Were you started on any new medications or were there changes to any of your previous medications? : Yes  Does the patient have all of their medications?: Yes  Do you have questions regarding any of your medications? : Yes (see comment) (Status of Linzess prior auth)  Do you have all of your needed medical supplies or equipment (DME)?  (i.e. oxygen tank, CPAP, cane, etc.): Yes    Pt reports he is not eating very much due to no appetite and nausea w/ eating  -Has not had anything to eat yet today, only had a piece of Chinese bread w/ cheese and olives yesterday  -Pt states he has good fluid intake, no signs of dehydration - drinking both water and juice without issue  -PAL wondering if pt can get med to help with nausea? Will route to provider to review   -PAL recommended pt drink Ensure in between meals to help with calorie intake, educated on " "importance of proper nutrition for healing   Pt states he does not like the taste of Ensure and is worried this will induce vomiting, will try Boost instead and see if he can tolerate     -Pt recording daily weight at home, reports today: 197.8lbs     Pt wondering about status of Linzess prior authorization  -See 5/21/24 PA telephone encounter, PAL will contact insurance tomorrow regarding forms for appeal   -Currently taking Senna and Miralax daily but states \"It doesn't do much for me\", reports last BM was yesterday 6/11 morning before discharge   -PAL advised pt to call back if no BM within 2 days or if having difficulty passing stool, do not want pt to strain due to recent open heart procedure     Follow up Plan     Discharge Follow-Up  Discharge follow up appointment scheduled in alignment with recommended follow up timeframe or Transitions of Risk Category? (Low = within 30 days; Moderate= within 14 days; High= within 7 days): Yes  Discharge Follow Up Appointment Date: 06/25/24    Future Appointments   Date Time Provider Department Center   6/17/2024  3:30 PM 3, [facility] [place] [facility]   6/19/2024  2:45 PM [facility] CARDIOTHORACIC SURGERY, HER[facility] DIA[facility] [place] [facility]   6/25/2024 10:30 AM Chad Noel MD [facility] CR   6/26/2024  2:30 PM [facility]   7/2/2024  2:00 PM [facility]   9/23/2024  9:15 AM Srinath Gross MD [facility] PSA CLIN     Outpatient Plan as outlined on AVS reviewed with patient.            For any urgent concerns, please contact our 24 hour nurse triage line: [phone] ([phone])       Patient was given an opportunity to ask questions, verbalized understanding of plan, and is agreeable.     Jazmin STEIN RN  Patient Advocate Liaison - [facility]  [phone]   "

## 2024-06-13 ENCOUNTER — TELEPHONE (OUTPATIENT)
Dept: OTHER | Facility: CLINIC | Age: 69
End: 2024-06-13
Payer: COMMERCIAL

## 2024-06-13 ENCOUNTER — TELEPHONE (OUTPATIENT)
Dept: FAMILY MEDICINE | Facility: CLINIC | Age: 69
End: 2024-06-13
Payer: COMMERCIAL

## 2024-06-13 DIAGNOSIS — E87.70 EDEMA DUE TO HYPERVOLEMIA: Primary | ICD-10-CM

## 2024-06-13 RX ORDER — PROCHLORPERAZINE MALEATE 10 MG
5 TABLET ORAL EVERY 6 HOURS PRN
Qty: 30 TABLET | Refills: 0 | Status: SHIPPED | OUTPATIENT
Start: 2024-06-13

## 2024-06-13 RX ORDER — ONDANSETRON 4 MG/1
4 TABLET, ORALLY DISINTEGRATING ORAL EVERY 6 HOURS PRN
Qty: 30 TABLET | Refills: 0 | Status: SHIPPED | OUTPATIENT
Start: 2024-06-13

## 2024-06-13 RX ORDER — FUROSEMIDE 20 MG
TABLET ORAL
Qty: 10 TABLET | Refills: 0 | Status: SHIPPED | OUTPATIENT
Start: 2024-06-13 | End: 2024-06-26

## 2024-06-13 NOTE — TELEPHONE ENCOUNTER
Form and appeal letter completed. Please fax.    Shady Treviño PA-C on 6/13/2024 at 2:33 PM (covering for Dr. Noel)

## 2024-06-13 NOTE — TELEPHONE ENCOUNTER
Sent Zofran. Usually the PA is just a quantity limit so he can check with pharmacy to see if a partial fill is covered while we wait for PA.     Shady Treviño PA-C on 6/13/2024 at 10:30 AM (covering for Dr. Noel)

## 2024-06-13 NOTE — PROGRESS NOTES
Patient called with some SOB and is weight/fluid up. Will send in a taper of lasix and to follow up with patient again next week after taper complete to see if improving.    Thanks,  Rose Mary Allison PA-C

## 2024-06-13 NOTE — TELEPHONE ENCOUNTER
PAL notes PA for Zofran was denied (see 6/13/24 PA encounter)     Rx pended for alternative med- Compazine 5 mg    Routing to Shady Treviño to review and sign    Jazmin STEIN RN  Patient Advocate Liaison - PAL RN  [facility]  [phone]

## 2024-06-13 NOTE — TELEPHONE ENCOUNTER
PAL contacted [facility] at [phone] and spoke with PA department   -Requested [facility] refax denial letter with appeal process forms attached if PCP wants to complete   -Pt would like PA denial appealed if possible     Routing to TC and PCP     Jazmin STEIN RN  Patient Advocate Liaison - PAL RN  [facility]  [phone]

## 2024-06-13 NOTE — TELEPHONE ENCOUNTER
PRIOR AUTHORIZATION DENIED    Medication: ONDANSETRON 4 MG PO TBDP  Insurance Company: [employer] ([facility]) - Phone [phone] Fax [phone]  Denial Date: 6/13/2024  Denial Reason(s):     Appeal Information:     Patient Notified: No

## 2024-06-13 NOTE — TELEPHONE ENCOUNTER
Notified patient of diuretic prescription sent to pharmacy. Instructed to continue daily weights and notify if not losing weight or if is 3 lbs below pre op weight. Wife questioned if he could do his traditional washing of head,arms and feet 6 times a day per his Buddhism. Requested he wait 1 month to allow his breast bone to start healing then can resume if does cause clicking or pain. Encouraged ambulation of up to 30 min's a day as tolerated. States understanding.

## 2024-06-13 NOTE — TELEPHONE ENCOUNTER
48 hour post op call    ACTIVITY    How are you doing with activity? Yesterday I felt like I was feeling better with able to walk around the house. Today I am fatigued just walking to the bathroom.     Are you continuing to use your IS 3-4 times a day and do you have any shortness of breath. IS 1500 ml. I feel like am more short of breath this am.     Are you weighing yourself daily and has it been stable?. 2 lbs up this am. Baseline is 190 lb. Today 199.5 lbs. Will discuss with provider.     PAIN  How is your pain, what are you doing for your pain? Tylenol and Muscle Relaxer during the day. Oxycodone at bedtime.     Are you still doing sternal precautions? Yes   Do you hear any clicking when you are moving or taking a deep breath?No  No      INCISION:   Looks good, no redness or drainage.     ASSISTANCE  Do you have someone at home to help you with your daily activities and transportation needs? Wife       MEDICATIONS  I would like to review your discharge medications and answer any questions you may have.   Reviewed      Are you on a blood thinner/Coumadin  No     Who is managing your Coumadin dosing/INR? No        FOLLOW UP       Scheduled for cardiac rehab? 6/17   Scheduled to see our PA or Surgeon? 6/19  Scheduled to see your cardiologist ? Dr Gross   Scheduled to see NATHAN/Core Na   Scheduled to see your primary care physician? 6/25  Do you have our contact information? Yes

## 2024-06-17 ENCOUNTER — HOSPITAL ENCOUNTER (OUTPATIENT)
Dept: CARDIAC REHAB | Facility: CLINIC | Age: 69
Discharge: HOME | End: 2024-06-17
Attending: PHYSICIAN ASSISTANT
Payer: COMMERCIAL

## 2024-06-17 ENCOUNTER — TELEPHONE (OUTPATIENT)
Dept: CARDIAC REHAB | Facility: CLINIC | Age: 69
End: 2024-06-17

## 2024-06-17 DIAGNOSIS — Z95.1 S/P CABG (CORONARY ARTERY BYPASS GRAFT): ICD-10-CM

## 2024-06-17 PROCEDURE — 93797 PHYS/QHP OP CAR RHAB WO ECG: CPT | Mod: 59

## 2024-06-17 PROCEDURE — 93798 PHYS/QHP OP CAR RHAB W/ECG: CPT

## 2024-06-17 NOTE — TELEPHONE ENCOUNTER
Pt. Was in cardiac rehab today for his 1st session he scored a 16 on the PHQ9 he did score 0 for the last question about feeling better off dead or hurting himself. Pt. Again did not score anything on this but this is an elevated PHQ9 score please review with Pt.

## 2024-06-19 ENCOUNTER — HOSPITAL ENCOUNTER (OUTPATIENT)
Dept: GENERAL RADIOLOGY | Facility: CLINIC | Age: 69
Discharge: HOME | End: 2024-06-19
Attending: PHYSICIAN ASSISTANT | Admitting: PHYSICIAN ASSISTANT
Payer: COMMERCIAL

## 2024-06-19 ENCOUNTER — OFFICE VISIT (OUTPATIENT)
Dept: CARDIOLOGY | Facility: CLINIC | Age: 69
End: 2024-06-19
Payer: COMMERCIAL

## 2024-06-19 VITALS
BODY MASS INDEX: 28.88 KG/M2 | DIASTOLIC BLOOD PRESSURE: 72 MMHG | HEIGHT: 69 IN | WEIGHT: 195 LBS | SYSTOLIC BLOOD PRESSURE: 119 MMHG | OXYGEN SATURATION: 100 % | HEART RATE: 67 BPM

## 2024-06-19 DIAGNOSIS — Z95.1 S/P CABG X 3: Primary | ICD-10-CM

## 2024-06-19 DIAGNOSIS — Z95.1 S/P CABG X 3: ICD-10-CM

## 2024-06-19 PROCEDURE — 71046 X-RAY EXAM CHEST 2 VIEWS: CPT

## 2024-06-19 PROCEDURE — 99024 POSTOP FOLLOW-UP VISIT: CPT | Performed by: PHYSICIAN ASSISTANT

## 2024-06-19 RX ORDER — OXYCODONE HYDROCHLORIDE 5 MG/1
5 TABLET ORAL EVERY 6 HOURS PRN
Qty: 20 TABLET | Refills: 0 | Status: SHIPPED | OUTPATIENT
Start: 2024-06-19 | End: 2024-09-17

## 2024-06-19 NOTE — PROGRESS NOTES
"Pt comes to the clinic for routine follow up of s/p CABG x 3 (LIMA to LAD, SVG to OM1, SVG to OM2) on 6/4/2024 by Dr Moreojn. Pt was initially admitted to the hospital with unstable angina and NSTEMI on 5/30/24. [facility]. His hospital course was unremarkable.     Pt states that pain is being controlled. Continues to use tylenol and oxycodone about 1-2 times a day. Discussed weaning off the oxycodone as able. Breathing is ok. Continues to work with his IS and states that he is getting it up to 6782-7079 ml. Appetite has been non existent. States that he is not feeling like eating anthing. BMs are ok with stool softener. Has not been ambulating much. Has started cardiac rehab. Denies any popping/clicking in his breast bone. Denies any swelling in his lower extremities. Denies any fevers, chills or night sweats. Denies any lightheadedness or dizziness. Has been sleeping well on his back with the trazodone. Was using trazodone prior to surgery. Continues on his aspirin, atorvastatin and lopressor.     Time was taken with pt/wife to re enforce sternal precautions, review how to get in and out of chair without using his arms. Also reviewed importance of ambulating, not taking meds on empty stomach and things to help with his appetite.    Up in chair, comfortable, -O2  /72   Pulse 67   Ht 1.753 m (5' 9\")   Wt 88.5 kg (195 lb)   SpO2 100%   BMI 28.80 kg/m    CV - reg rate, -edema LE  Pulm - CTA, IS 7750-2511 ml  Derm - sternal incision D/I   R LE incisions D/I  CXR - good aeration, wires intact, trace effusion on L    A/P:  Pt is progressing slowly. Reviewed all surgical restrictions. All questions/concerns were addressed. Will get a CXR today. Will call with results. Refill of oxycodone qty #20 with no refills given. No need for follow up with surgery unless questions/concerns were addressed. Pt/wife are agreeable to plan of care. Continue to monitor.   "

## 2024-06-19 NOTE — PATIENT INSTRUCTIONS
No lifting over 10 lbs x 8 weeks after surgery. After the 8 weeks gradually increase what you are lifting.  No driving for 4 weeks after surgery or until you are off of all the stronger pain medications.  Participate in cardiac rehab.  No further surgical restrictions 12 weeks after surgery.  If you have any questions/concerns for your surgeon please call [phone].  Continue to use your incentive spirometer 4 times a day 3-4 deep breaths each time.  Get off of the oxycodone as soon as you can.

## 2024-06-20 ENCOUNTER — HOSPITAL ENCOUNTER (OUTPATIENT)
Dept: CARDIAC REHAB | Facility: CLINIC | Age: 69
Discharge: HOME | End: 2024-06-20
Attending: SURGERY
Payer: COMMERCIAL

## 2024-06-20 PROCEDURE — 93798 PHYS/QHP OP CAR RHAB W/ECG: CPT | Performed by: REHABILITATION PRACTITIONER

## 2024-06-23 NOTE — PROGRESS NOTES
Medication Therapy Management (MTM) Encounter    ASSESSMENT:                            Medication Adherence/Access: No issues identified    Hypertension/Edema/CAD: Stable. Patient is meeting blood pressure goal of < 140/90mmHg.    Hyperlipidemia: Stable.    GERD/Nausea: Unimproved.  Patient has not been taking pantoprazole long enough yet to see effects.  If still ineffective after a month, consider changing to an alternate PPI or vonoprazan.  After that could consider addition of sucralfate.    Anxiety/Insomnia: Stable. Future consider stop zolpidem use due to risk of delirium and falls in the elderly.    Migraine: Stable.    Constipation: it is likely that the patient reached the donut hole for insurance coverage.  Linzess does not have a patient assistance program, would consider reducing dose to every other or every third day and consider supplement with fiber or bulk-forming laxatives in addition to current senna-docusate.  Dr. Noel re-sent Giorgio yesterday for prior authorization.    BPH: Stable.    Rhinitis: Stable.    Epilepsy: Stable.    Pain: Stable, is appropriately avoiding NSAIDs and weaning off of pain medications from surgery.    PLAN:                            Heartburn:  Continue pantoprazole 40 mg once daily - need more time to see effects    Future consideration to try an alternative PPI or vonoprazan.      Follow-up: Return if symptoms worsen or fail to improve.    SUBJECTIVE/OBJECTIVE:                          Naveen Yao is a 69 year old male seen for a transitions of care visit. He was discharged from [facility] on 6/11/2024 for NSTEMI, s/p CABG x3. Patient was accompanied by his wife, Flakito.     Reason for visit: transitions of care medication review.    Allergies/ADRs: Reviewed in chart  Past Medical History: Reviewed in chart  Tobacco: He reports that he quit smoking about 20 years ago. His smoking use included cigarettes. He started smoking about 40 years ago. He has a 10 pack-year  smoking history. He has been exposed to tobacco smoke. He has never used smokeless tobacco.  Alcohol: not currently using    Medication Adherence/Access: Mentions that Linzess is expensive.    The patient fills medications at [facility]: NO, fills medications at [facility].      Hypertension /Edema/CAD  Metoprolol tartrate 25 mg twice daily   mg daily  Patient reports no current medication side effects.  Mentions dizziness, but only when he doesn't sleep well.    Patient does not self-monitor blood pressure.  Checks BP at cardiac rehab, says it's almost perfect.  S/p CABG x3 in May 2024.  BP Readings from Last 3 Encounters:   06/25/24 110/68   06/19/24 119/72   06/11/24 126/78      Hyperlipidemia   atorvastatin 40 mg daily  Patient reports no significant myalgias or other side effects.  Recent Labs   Lab Test 05/30/24  0627 03/14/24  1417   CHOL 133 130   HDL 33* 36*   LDL 59 72   TRIG 203* 110      GERD /Nausea:    Prochlorperazine 5 mg every 6 hours as needed - has not picked up from the pharmacy  Ondansetron 4 mg every 6 hours as needed - has not been taking this  Pantoprazole 40 mg daily - just started yesterday  Past medications: famotidine (ineffective), omeprazole, sucralfate while in the hospital  Avoids fried foods, appetite has been poor since surgery, has had nausea as well  Patient reports heartburn. Wondering what else he can take.       Mental Health   Anxiety and Insomnia.   Zolpidem 5 mg at bedtime as needed - takes 1-2 times per week  Trazodone 100 mg at bedtime  Hydroxyzine 25-50 mg every 6 hours as needed - has taken 3-4 times in the past month  Patient reports no current medication side effects.  No issues today.     Migraine:   Rizatriptan 10 mg as needed  Topiramate 100 mg daily  Acetaminophen 1000 mg every 8 hours as needed  Has headaches once every 2-3 weeks, no issues today    Constipation:   Linzess 290 mcg daily - feels this is the only thing that has worked, taking every  other day  Senna-docusate 1 tablet twice daily - using as needed  Is having bowel movements every other day right now.  Feels this has helped a lot and has no complaints today other than the cost of Linzess.      BPH:   Tamsulosin 0.4 mg daily  Feels this has been helpful, no issues today.    Rhinitis:   Flonase 1 spray each nostril daily  No issues today, feels allergies are well controlled.    Epilepsy:   Lamotrigine 250 mg twice daily  Topiramate 100 mg daily  Occasionally groggy, but most of the time is okay, feels this more when he doesn't sleep well.  Also has sleep apnea and uses CPAP every night.    Pain:   Oxycodone 5 mg every 6 hours as needed - took it more often at first, but has been reducing the dose, hasn't needed the last two days.  Methocarbamol 500 mg every 6 hours as needed - has only needed 2-3 times since discharge.  Acetaminophen 1000 mg every 8 hours as needed  Feels that pain is well controlled most of the time, no issues today.      Today's Vitals: There were no vitals taken for this visit.  ----------------  Post Discharge Medication Reconciliation Status: discharge medications reconciled and changed, per note/orders.    I spent 38 minutes with this patient today. All changes were made via collaborative practice agreement with Chad Noel MD. A copy of the visit note was provided to the patient's provider(s).    A summary of these recommendations was sent via Sendmail.    Shirley Barksdale PharmD  Pharmacy Resident  Pager #[phone]    Kashmir Yao was seen independently by Dr. Shirley Barksdale.  I have reviewed and agree with the resident note and plan of care.      Marina Arizmendi PharmD, BCACP  Medication Therapy Management Provider, [facility]  Phone: [phone]      Telemedicine Visit Details  Type of service:  Telephone visit  Start Time:  2:30 PM  End Time: 3:08 PM     Medication Therapy Recommendations  No medication therapy recommendations to display

## 2024-06-24 ENCOUNTER — HOSPITAL ENCOUNTER (OUTPATIENT)
Dept: CARDIAC REHAB | Facility: CLINIC | Age: 69
Discharge: HOME | End: 2024-06-24
Attending: SURGERY
Payer: COMMERCIAL

## 2024-06-24 PROCEDURE — 93798 PHYS/QHP OP CAR RHAB W/ECG: CPT

## 2024-06-25 ENCOUNTER — OFFICE VISIT (OUTPATIENT)
Dept: FAMILY MEDICINE | Facility: CLINIC | Age: 69
End: 2024-06-25
Payer: COMMERCIAL

## 2024-06-25 ENCOUNTER — HOSPITAL ENCOUNTER (OUTPATIENT)
Dept: CARDIAC REHAB | Facility: CLINIC | Age: 69
Discharge: HOME | End: 2024-06-25
Attending: SURGERY
Payer: COMMERCIAL

## 2024-06-25 VITALS
OXYGEN SATURATION: 100 % | RESPIRATION RATE: 18 BRPM | DIASTOLIC BLOOD PRESSURE: 68 MMHG | HEIGHT: 69 IN | TEMPERATURE: 98.5 F | HEART RATE: 65 BPM | WEIGHT: 190 LBS | BODY MASS INDEX: 28.14 KG/M2 | SYSTOLIC BLOOD PRESSURE: 110 MMHG

## 2024-06-25 DIAGNOSIS — R12 HEARTBURN: ICD-10-CM

## 2024-06-25 DIAGNOSIS — R35.0 BENIGN PROSTATIC HYPERPLASIA WITH URINARY FREQUENCY: ICD-10-CM

## 2024-06-25 DIAGNOSIS — N40.1 BENIGN PROSTATIC HYPERPLASIA WITH URINARY FREQUENCY: ICD-10-CM

## 2024-06-25 DIAGNOSIS — F51.01 PRIMARY INSOMNIA: ICD-10-CM

## 2024-06-25 DIAGNOSIS — K59.09 OTHER CONSTIPATION: ICD-10-CM

## 2024-06-25 DIAGNOSIS — Z95.1 S/P CABG X 3: ICD-10-CM

## 2024-06-25 DIAGNOSIS — F32.9 REACTIVE DEPRESSION: Primary | ICD-10-CM

## 2024-06-25 DIAGNOSIS — G47.00 INSOMNIA, UNSPECIFIED TYPE: ICD-10-CM

## 2024-06-25 DIAGNOSIS — S42.92XG CLOSED FRACTURE OF LEFT SHOULDER WITH DELAYED HEALING, SUBSEQUENT ENCOUNTER: ICD-10-CM

## 2024-06-25 PROBLEM — L03.032 PARONYCHIA OF TOE, LEFT: Status: RESOLVED | Noted: 2023-06-26 | Resolved: 2024-06-25

## 2024-06-25 PROBLEM — R07.9 CHEST PAIN, UNSPECIFIED TYPE: Status: RESOLVED | Noted: 2024-05-29 | Resolved: 2024-06-25

## 2024-06-25 PROBLEM — Z29.11 NEED FOR VACCINATION AGAINST RESPIRATORY SYNCYTIAL VIRUS: Status: RESOLVED | Noted: 2023-11-13 | Resolved: 2024-06-25

## 2024-06-25 PROBLEM — R79.89 ELEVATED TROPONIN: Status: RESOLVED | Noted: 2024-05-29 | Resolved: 2024-06-25

## 2024-06-25 PROBLEM — Z23 NEED FOR SHINGLES VACCINE: Status: RESOLVED | Noted: 2023-11-13 | Resolved: 2024-06-25

## 2024-06-25 PROBLEM — H61.22 IMPACTED CERUMEN OF LEFT EAR: Status: RESOLVED | Noted: 2023-06-26 | Resolved: 2024-06-25

## 2024-06-25 PROCEDURE — 99495 TRANSJ CARE MGMT MOD F2F 14D: CPT | Performed by: FAMILY MEDICINE

## 2024-06-25 PROCEDURE — 93798 PHYS/QHP OP CAR RHAB W/ECG: CPT

## 2024-06-25 RX ORDER — PANTOPRAZOLE SODIUM 20 MG/1
40 TABLET, DELAYED RELEASE ORAL DAILY
Qty: 90 TABLET | Refills: 2 | Status: SHIPPED | OUTPATIENT
Start: 2024-06-25 | End: 2024-09-17

## 2024-06-25 RX ORDER — PANTOPRAZOLE SODIUM 20 MG/1
40 TABLET, DELAYED RELEASE ORAL DAILY
Qty: 180 TABLET | OUTPATIENT
Start: 2024-06-25

## 2024-06-25 RX ORDER — TRAZODONE HYDROCHLORIDE 50 MG/1
100 TABLET, FILM COATED ORAL AT BEDTIME
Qty: 180 TABLET | Refills: 3 | Status: SHIPPED | OUTPATIENT
Start: 2024-06-25

## 2024-06-25 RX ORDER — ZOLPIDEM TARTRATE 5 MG/1
5 TABLET ORAL
Qty: 90 TABLET | Refills: 1 | Status: SHIPPED | OUTPATIENT
Start: 2024-06-25

## 2024-06-25 RX ORDER — AMOXICILLIN 250 MG
1 CAPSULE ORAL 2 TIMES DAILY
Qty: 60 TABLET | Refills: 3 | Status: SHIPPED | OUTPATIENT
Start: 2024-06-25 | End: 2024-09-17

## 2024-06-25 RX ORDER — TAMSULOSIN HYDROCHLORIDE 0.4 MG/1
0.4 CAPSULE ORAL DAILY
Qty: 90 CAPSULE | Refills: 2 | Status: SHIPPED | OUTPATIENT
Start: 2024-06-25

## 2024-06-25 NOTE — ASSESSMENT & PLAN NOTE
Detected by PHQ-9 in Cardiac Rehab. Not suicidal. Monitor. Antidepressants can be epileptogenic. Tincture of time

## 2024-06-25 NOTE — ASSESSMENT & PLAN NOTE
Was on PPI, stopped. Now on famotidine, using mylanta ineffective. Not on Plavix. Stop famotidine, resume PPI. Endoscopy 2021

## 2024-06-25 NOTE — PROGRESS NOTES
Assessment & Plan   Problem List Items Addressed This Visit       Benign prostatic hyperplasia with urinary frequency     Refill effective therapy         Relevant Medications    tamsulosin (FLOMAX) 0.4 MG capsule    Closed fracture of left shoulder with delayed healing, subsequent encounter     PT for remote fx has been suspended due to recent surgery. He should resume in the near future when CV surgery approves         Heartburn     Was on PPI, stopped. Now on famotidine, using mylanta ineffective. Not on Plavix. Stop famotidine, resume PPI. Endoscopy 2021         Relevant Medications    pantoprazole (PROTONIX) 20 MG EC tablet    Other constipation     Linzess effective, failed senokot / miralax. Insurance is [employer]. [place]  Continue Senikot in interim         Relevant Medications    linaclotide (LINZESS) 290 MCG capsule    senna-docusate (SENOKOT-S/PERICOLACE) 8.6-50 MG tablet    Primary insomnia     Refill effective therapies         Relevant Medications    zolpidem (AMBIEN) 5 MG tablet    traZODone (DESYREL) 50 MG tablet    Reactive depression - Primary     Detected by PHQ-9 in Cardiac Rehab. Not suicidal. Monitor. Antidepressants can be epileptogenic. Tincture of time         Relevant Medications    traZODone (DESYREL) 50 MG tablet    S/P CABG x 3     Wounds clean dry. Analgesia adequate, used prn         Relevant Medications    senna-docusate (SENOKOT-S/PERICOLACE) 8.6-50 MG tablet           MED REC REQUIRED  Post Medication Reconciliation Status: discharge medications reconciled and changed, per note/orders        Vijay Hodge is a 69 year old, presenting for the following health issues:  Hospital F/U        6/25/2024    10:24 AM   Additional Questions   Roomed by Andra SMITH     [facility]        Hospital Follow-up Visit:    Hospital/Nursing Home/IP Rehab Facility: [facility]  Date of Admission: 05/30/2024  Date of Discharge: 06/11/2024  Reason(s) for Admission: Cardiovascular and  "Thoracic Sugery   Was the patient in the ICU or did the patient experience delirium during hospitalization?  No  Do you have any other stressors you would like to discuss with your provider? No    Problems taking medications regularly:  None  Medication changes since discharge: None  Problems adhering to non-medication therapy:  None    Patient states he is having heart burn that is getting worse since discharged from hospital. Patient states he is \"hating\" food, and is having fatigue when walking up stairs. Patients he also has Nausea.      Summary of hospitalization:  [facility] discharge summary reviewed  Diagnostic Tests/Treatments reviewed.  Follow up needed: none  Other Healthcare Providers Involved in Patient s Care:         Specialist appointment -   and Surgical follow-up appointment -    Update since discharge: stable.         Plan of care communicated with patient and family               ROS no CP dyspnea fever.       Objective    /68 (BP Location: Right arm, Patient Position: Sitting, Cuff Size: Adult Regular)   Pulse 65   Temp 98.5  F (36.9  C) (Oral)   Resp 18   Ht 1.753 m (5' 9\")   Wt 86.2 kg (190 lb)   SpO2 100%   BMI 28.06 kg/m    Body mass index is 28.06 kg/m .  Physical Exam   As described            Signed Electronically by: Chad Noel MD    "

## 2024-06-25 NOTE — ASSESSMENT & PLAN NOTE
Linzess effective, failed senokot / miralax. Insurance is balking. [place]  Continue Senikot in interim

## 2024-06-25 NOTE — ASSESSMENT & PLAN NOTE
PT for remote fx has been suspended due to recent surgery. He should resume in the near future when CV surgery approves

## 2024-06-26 ENCOUNTER — VIRTUAL VISIT (OUTPATIENT)
Dept: PHARMACY | Facility: CLINIC | Age: 69
End: 2024-06-26
Attending: SURGERY
Payer: COMMERCIAL

## 2024-06-26 DIAGNOSIS — G47.09 OTHER INSOMNIA: ICD-10-CM

## 2024-06-26 DIAGNOSIS — J31.0 CHRONIC RHINITIS: ICD-10-CM

## 2024-06-26 DIAGNOSIS — G43.019 INTRACTABLE MIGRAINE WITHOUT AURA AND WITHOUT STATUS MIGRAINOSUS: ICD-10-CM

## 2024-06-26 DIAGNOSIS — N40.0 BENIGN PROSTATIC HYPERPLASIA, UNSPECIFIED WHETHER LOWER URINARY TRACT SYMPTOMS PRESENT: ICD-10-CM

## 2024-06-26 DIAGNOSIS — K59.09 OTHER CONSTIPATION: ICD-10-CM

## 2024-06-26 DIAGNOSIS — K21.00 GASTROESOPHAGEAL REFLUX DISEASE WITH ESOPHAGITIS WITHOUT HEMORRHAGE: ICD-10-CM

## 2024-06-26 DIAGNOSIS — R60.9 EDEMA, UNSPECIFIED TYPE: ICD-10-CM

## 2024-06-26 DIAGNOSIS — G40.804 OTHER INTRACTABLE EPILEPSY WITHOUT STATUS EPILEPTICUS (H): ICD-10-CM

## 2024-06-26 DIAGNOSIS — I10 BENIGN ESSENTIAL HYPERTENSION: Primary | ICD-10-CM

## 2024-06-26 DIAGNOSIS — R11.0 NAUSEA: ICD-10-CM

## 2024-06-26 DIAGNOSIS — I25.810 CORONARY ARTERY DISEASE INVOLVING CORONARY BYPASS GRAFT OF NATIVE HEART, UNSPECIFIED WHETHER ANGINA PRESENT: ICD-10-CM

## 2024-06-26 DIAGNOSIS — E78.5 HYPERLIPIDEMIA LDL GOAL <70: ICD-10-CM

## 2024-06-26 DIAGNOSIS — R52 PAIN: ICD-10-CM

## 2024-06-26 DIAGNOSIS — F41.9 ANXIETY: ICD-10-CM

## 2024-06-26 PROCEDURE — 99207 PR NO CHARGE LOS: CPT | Mod: 93

## 2024-06-26 NOTE — PATIENT INSTRUCTIONS
"Recommendations from today's MTM visit:                                                    MTM (medication therapy management) is a service provided by a clinical pharmacist designed to help you get the most of out of your medicines.   Today we reviewed what your medicines are for, how to know if they are working, that your medicines are safe and how to make your medicine regimen as easy as possible.      Heartburn:  Give pantoprazole more time (up to 1 week)  If still ineffective, reach out to the clinic for more options    Follow-up: Return if symptoms worsen or fail to improve.    It was great speaking with you today.  I value your experience and would be very thankful for your time in providing feedback in our clinic survey. In the next few days, you may receive an email or text message from [employer] with a link to a survey related to your  clinical pharmacist.\"     To schedule another MTM appointment, please call the clinic directly or you may call the MTM scheduling line at [phone] or toll-free at [phone].     My Clinical Pharmacist's contact information:                                                      Please feel free to contact me with any questions or concerns you have.      Shirley Barksdale, PharmD  Pharmacy Resident  "

## 2024-06-27 ENCOUNTER — HOSPITAL ENCOUNTER (OUTPATIENT)
Dept: CARDIAC REHAB | Facility: CLINIC | Age: 69
Discharge: HOME | End: 2024-06-27
Attending: SURGERY
Payer: COMMERCIAL

## 2024-06-27 PROCEDURE — 93798 PHYS/QHP OP CAR RHAB W/ECG: CPT

## 2024-07-01 ENCOUNTER — HOSPITAL ENCOUNTER (OUTPATIENT)
Dept: CARDIAC REHAB | Facility: CLINIC | Age: 69
Discharge: HOME | End: 2024-07-01
Attending: SURGERY
Payer: COMMERCIAL

## 2024-07-01 PROCEDURE — 93798 PHYS/QHP OP CAR RHAB W/ECG: CPT | Performed by: REHABILITATION PRACTITIONER

## 2024-07-02 ENCOUNTER — PATIENT OUTREACH (OUTPATIENT)
Dept: FAMILY MEDICINE | Facility: CLINIC | Age: 69
End: 2024-07-02

## 2024-07-02 NOTE — TELEPHONE ENCOUNTER
MA/GERI- Please call pt and assist with scheduling an establish care visit with new provider.     Received a vm on PAL RN line,     Pt states he may have missed appointment with new provider to establish care and is requesting a call back to reschedule.     Per chart review,     Pt was to see Dr. Gamboa today at 2:00 PM.     Routed to KVNG Luong (Patient Advocate Liaison)  [facility]

## 2024-07-03 ENCOUNTER — HOSPITAL ENCOUNTER (OUTPATIENT)
Dept: CARDIAC REHAB | Facility: CLINIC | Age: 69
Discharge: HOME | End: 2024-07-03
Attending: SURGERY
Payer: COMMERCIAL

## 2024-07-03 PROCEDURE — 93798 PHYS/QHP OP CAR RHAB W/ECG: CPT

## 2024-07-05 ENCOUNTER — HOSPITAL ENCOUNTER (OUTPATIENT)
Dept: CARDIAC REHAB | Facility: CLINIC | Age: 69
Discharge: HOME | End: 2024-07-05
Attending: SURGERY
Payer: COMMERCIAL

## 2024-07-05 PROCEDURE — 93798 PHYS/QHP OP CAR RHAB W/ECG: CPT

## 2024-07-08 ENCOUNTER — HOSPITAL ENCOUNTER (OUTPATIENT)
Dept: CARDIAC REHAB | Facility: CLINIC | Age: 69
Discharge: HOME | End: 2024-07-08
Attending: SURGERY
Payer: COMMERCIAL

## 2024-07-08 PROCEDURE — 93798 PHYS/QHP OP CAR RHAB W/ECG: CPT | Performed by: OCCUPATIONAL THERAPIST

## 2024-07-09 ENCOUNTER — PATIENT OUTREACH (OUTPATIENT)
Dept: FAMILY MEDICINE | Facility: CLINIC | Age: 69
End: 2024-07-09
Payer: COMMERCIAL

## 2024-07-09 NOTE — TELEPHONE ENCOUNTER
PAL received incoming call from pt  -Pt would like any prescriptions that were at the [facility] during his hospital discharge sent to [facility] in [place]   -Reviewed meds w/ pt, instructed to contact [facility] and request prescription transfer, call back with any questions or concerns   -Scheduled for OV with Dr. Gamboa 7/18/24 to establish care     Patient was given an opportunity to ask questions, verbalized understanding of plan, and is agreeable.     Jazmin STEIN RN  Patient Advocate Liaison - PAL RN  [facility]  [phone]

## 2024-07-10 ENCOUNTER — HOSPITAL ENCOUNTER (OUTPATIENT)
Dept: CARDIAC REHAB | Facility: CLINIC | Age: 69
Discharge: HOME | End: 2024-07-10
Attending: SURGERY
Payer: COMMERCIAL

## 2024-07-10 PROCEDURE — 93798 PHYS/QHP OP CAR RHAB W/ECG: CPT

## 2024-07-12 ENCOUNTER — HOSPITAL ENCOUNTER (OUTPATIENT)
Dept: CARDIAC REHAB | Facility: CLINIC | Age: 69
Discharge: HOME | End: 2024-07-12
Attending: SURGERY
Payer: COMMERCIAL

## 2024-07-12 PROCEDURE — 93798 PHYS/QHP OP CAR RHAB W/ECG: CPT

## 2024-07-15 ENCOUNTER — HOSPITAL ENCOUNTER (OUTPATIENT)
Dept: CARDIAC REHAB | Facility: CLINIC | Age: 69
Discharge: HOME | End: 2024-07-15
Attending: SURGERY
Payer: COMMERCIAL

## 2024-07-15 PROCEDURE — 93798 PHYS/QHP OP CAR RHAB W/ECG: CPT

## 2024-07-17 ENCOUNTER — HOSPITAL ENCOUNTER (OUTPATIENT)
Dept: CARDIAC REHAB | Facility: CLINIC | Age: 69
Discharge: HOME | End: 2024-07-17
Attending: SURGERY
Payer: COMMERCIAL

## 2024-07-17 PROCEDURE — 93798 PHYS/QHP OP CAR RHAB W/ECG: CPT

## 2024-07-18 ENCOUNTER — OFFICE VISIT (OUTPATIENT)
Dept: FAMILY MEDICINE | Facility: CLINIC | Age: 69
End: 2024-07-18
Payer: COMMERCIAL

## 2024-07-18 VITALS
OXYGEN SATURATION: 98 % | HEIGHT: 69 IN | RESPIRATION RATE: 16 BRPM | BODY MASS INDEX: 28.05 KG/M2 | WEIGHT: 189.4 LBS | SYSTOLIC BLOOD PRESSURE: 120 MMHG | DIASTOLIC BLOOD PRESSURE: 74 MMHG | HEART RATE: 78 BPM | TEMPERATURE: 97.4 F

## 2024-07-18 DIAGNOSIS — G40.309 GENERALIZED TONIC CLONIC EPILEPSY (H): ICD-10-CM

## 2024-07-18 DIAGNOSIS — S42.92XG CLOSED FRACTURE OF LEFT SHOULDER WITH DELAYED HEALING, SUBSEQUENT ENCOUNTER: ICD-10-CM

## 2024-07-18 DIAGNOSIS — R35.0 BENIGN PROSTATIC HYPERPLASIA WITH URINARY FREQUENCY: ICD-10-CM

## 2024-07-18 DIAGNOSIS — I10 BENIGN ESSENTIAL HYPERTENSION: ICD-10-CM

## 2024-07-18 DIAGNOSIS — R73.09 ELEVATED GLUCOSE: ICD-10-CM

## 2024-07-18 DIAGNOSIS — Z00.00 ENCOUNTER FOR MEDICARE ANNUAL WELLNESS EXAM: Primary | ICD-10-CM

## 2024-07-18 DIAGNOSIS — I25.10 CORONARY ARTERY DISEASE INVOLVING NATIVE CORONARY ARTERY OF NATIVE HEART WITHOUT ANGINA PECTORIS: ICD-10-CM

## 2024-07-18 DIAGNOSIS — G43.709 CHRONIC MIGRAINE WITHOUT AURA WITHOUT STATUS MIGRAINOSUS, NOT INTRACTABLE: ICD-10-CM

## 2024-07-18 DIAGNOSIS — N40.1 BENIGN PROSTATIC HYPERPLASIA WITH URINARY FREQUENCY: ICD-10-CM

## 2024-07-18 DIAGNOSIS — E78.5 HYPERLIPIDEMIA LDL GOAL <70: ICD-10-CM

## 2024-07-18 DIAGNOSIS — Z23 NEED FOR COVID-19 VACCINE: ICD-10-CM

## 2024-07-18 DIAGNOSIS — J43.1 PANLOBULAR EMPHYSEMA (H): ICD-10-CM

## 2024-07-18 DIAGNOSIS — G51.0 BELL'S PALSY: ICD-10-CM

## 2024-07-18 PROBLEM — E87.6 HYPOKALEMIA: Status: RESOLVED | Noted: 2023-09-07 | Resolved: 2024-07-18

## 2024-07-18 PROBLEM — Z71.89 ADVANCED DIRECTIVES, COUNSELING/DISCUSSION: Status: RESOLVED | Noted: 2024-03-16 | Resolved: 2024-07-18

## 2024-07-18 PROBLEM — L30.9 DERMATITIS: Status: RESOLVED | Noted: 2020-02-13 | Resolved: 2024-07-18

## 2024-07-18 PROBLEM — I25.83 CORONARY ARTERY DISEASE DUE TO LIPID RICH PLAQUE: Status: RESOLVED | Noted: 2021-04-21 | Resolved: 2024-07-18

## 2024-07-18 PROBLEM — R60.0 PEDAL EDEMA: Status: RESOLVED | Noted: 2020-02-14 | Resolved: 2024-07-18

## 2024-07-18 PROBLEM — R63.5 WEIGHT GAIN: Status: RESOLVED | Noted: 2024-03-14 | Resolved: 2024-07-18

## 2024-07-18 PROBLEM — E66.811 CLASS 1 OBESITY DUE TO EXCESS CALORIES WITHOUT SERIOUS COMORBIDITY IN ADULT, UNSPECIFIED BMI: Status: RESOLVED | Noted: 2020-01-02 | Resolved: 2024-07-18

## 2024-07-18 PROBLEM — R41.89 COGNITIVE CHANGE: Status: RESOLVED | Noted: 2020-01-02 | Resolved: 2024-07-18

## 2024-07-18 PROBLEM — I95.9 HYPOTENSION, UNSPECIFIED HYPOTENSION TYPE: Status: RESOLVED | Noted: 2023-10-09 | Resolved: 2024-07-18

## 2024-07-18 PROBLEM — R12 HEARTBURN: Status: RESOLVED | Noted: 2021-04-21 | Resolved: 2024-07-18

## 2024-07-18 PROBLEM — S90.212A CONTUSION OF LEFT GREAT TOE WITH DAMAGE TO NAIL, INITIAL ENCOUNTER: Status: RESOLVED | Noted: 2023-06-26 | Resolved: 2024-07-18

## 2024-07-18 PROBLEM — D64.9 ANEMIA: Status: RESOLVED | Noted: 2020-07-31 | Resolved: 2024-07-18

## 2024-07-18 PROBLEM — R05.9 COUGH: Status: RESOLVED | Noted: 2020-01-02 | Resolved: 2024-07-18

## 2024-07-18 PROBLEM — R51.9 HEADACHE: Status: RESOLVED | Noted: 2020-01-02 | Resolved: 2024-07-18

## 2024-07-18 PROBLEM — E66.09 CLASS 1 OBESITY DUE TO EXCESS CALORIES WITHOUT SERIOUS COMORBIDITY IN ADULT, UNSPECIFIED BMI: Status: RESOLVED | Noted: 2020-01-02 | Resolved: 2024-07-18

## 2024-07-18 PROBLEM — B07.8 COMMON WART: Status: RESOLVED | Noted: 2020-12-02 | Resolved: 2024-07-18

## 2024-07-18 PROBLEM — Z71.84 TRAVEL ADVICE ENCOUNTER: Status: RESOLVED | Noted: 2021-08-04 | Resolved: 2024-07-18

## 2024-07-18 PROBLEM — T14.90XA TRAUMA: Status: RESOLVED | Noted: 2018-10-28 | Resolved: 2024-07-18

## 2024-07-18 PROBLEM — Z87.898 HISTORY OF SEIZURE: Status: RESOLVED | Noted: 2024-03-16 | Resolved: 2024-07-18

## 2024-07-18 PROCEDURE — 91320 SARSCV2 VAC 30MCG TRS-SUC IM: CPT | Performed by: FAMILY MEDICINE

## 2024-07-18 PROCEDURE — 90480 ADMN SARSCOV2 VAC 1/ONLY CMP: CPT | Performed by: FAMILY MEDICINE

## 2024-07-18 PROCEDURE — 99214 OFFICE O/P EST MOD 30 MIN: CPT | Mod: 25 | Performed by: FAMILY MEDICINE

## 2024-07-18 PROCEDURE — G0439 PPPS, SUBSEQ VISIT: HCPCS | Performed by: FAMILY MEDICINE

## 2024-07-18 ASSESSMENT — ANXIETY QUESTIONNAIRES
IF YOU CHECKED OFF ANY PROBLEMS ON THIS QUESTIONNAIRE, HOW DIFFICULT HAVE THESE PROBLEMS MADE IT FOR YOU TO DO YOUR WORK, TAKE CARE OF THINGS AT HOME, OR GET ALONG WITH OTHER PEOPLE: VERY DIFFICULT
3. WORRYING TOO MUCH ABOUT DIFFERENT THINGS: NOT AT ALL
6. BECOMING EASILY ANNOYED OR IRRITABLE: NOT AT ALL
7. FEELING AFRAID AS IF SOMETHING AWFUL MIGHT HAPPEN: NOT AT ALL
2. NOT BEING ABLE TO STOP OR CONTROL WORRYING: NOT AT ALL
8. IF YOU CHECKED OFF ANY PROBLEMS, HOW DIFFICULT HAVE THESE MADE IT FOR YOU TO DO YOUR WORK, TAKE CARE OF THINGS AT HOME, OR GET ALONG WITH OTHER PEOPLE?: VERY DIFFICULT
4. TROUBLE RELAXING: NOT AT ALL
GAD7 TOTAL SCORE: 0
1. FEELING NERVOUS, ANXIOUS, OR ON EDGE: NOT AT ALL
GAD7 TOTAL SCORE: 0
7. FEELING AFRAID AS IF SOMETHING AWFUL MIGHT HAPPEN: NOT AT ALL
GAD7 TOTAL SCORE: 0
5. BEING SO RESTLESS THAT IT IS HARD TO SIT STILL: NOT AT ALL

## 2024-07-18 ASSESSMENT — PATIENT HEALTH QUESTIONNAIRE - PHQ9
10. IF YOU CHECKED OFF ANY PROBLEMS, HOW DIFFICULT HAVE THESE PROBLEMS MADE IT FOR YOU TO DO YOUR WORK, TAKE CARE OF THINGS AT HOME, OR GET ALONG WITH OTHER PEOPLE: NOT DIFFICULT AT ALL
SUM OF ALL RESPONSES TO PHQ QUESTIONS 1-9: 7
SUM OF ALL RESPONSES TO PHQ QUESTIONS 1-9: 7

## 2024-07-18 ASSESSMENT — PAIN SCALES - GENERAL: PAINLEVEL: NO PAIN (0)

## 2024-07-18 NOTE — PROGRESS NOTES
Assessment & Plan   Problem List Items Addressed This Visit       Hyperlipidemia LDL goal <70     Continue on Atorvastatin 40 mg daily.         Benign essential hypertension     Controlled, continue on Metoprolol.         Panlobular emphysema (H)     Asymptomatic, does not use any medication.  Diagnosed by [facility].         Bell's palsy     Mild residual symptoms with mild weakness on the left side.         Benign prostatic hyperplasia with urinary frequency     Denies any symptoms, will continue on Tamsulosin 0.4 mg daily.         Chronic migraine without aura without status migrainosus, not intractable     Uses Topiramate for prophylaxis and when he is getting migraine, he take rizatriptan 10 mg, which is about every 3 months.            Generalized tonic clonic epilepsy (H)     Pt went to see neurology at the [facility], and was evaluated for his memory loss, no signs of alzheimer, thought to be related to his meningioma of the brain, pt to repeat MR of the brain, (he will call and schedule for the follow up appointment).  Meanwhile, seizure is under control, continue on Lamitrogen.            Elevated glucose     Pt has lost significant weight, and blood sugar has been under control.           Closed fracture of left shoulder with delayed healing, subsequent encounter     In 2023, he was in [place], coming out from the swimming pool, he fell and broke his shoulder, pt to orthopedic, [facility], then he moved to [facility], who recommended rehab for his shoulder, then it ended up with heart attack, s/p open heart surgery, so he had to stop.  Plan: pt to discuss with cardiology to see when is he eligible to get back to PT.         Coronary artery disease s/p CABG 2024.     s/p CABG x 3 (LIMA to LAD, SVG to OM1, SVG to OM2) on 6/4/2024   Continue on ASA, Atorvastatin, metoprolol.  Still following up with cardiac rehab, follow up with cardiology.            Other Visit Diagnoses       Encounter for Medicare annual wellness exam     -  Primary    Need for COVID-19 vaccine        Relevant Orders    COVID-19 12+ (2023-24) (PFIZER) (Completed)                        Vijay Hodge is a 69 year old, presenting for the following health issues:  [facility]      7/18/2024    12:50 PM   Additional Questions   Roomed by kelli clark   Accompanied by self     History of Present Illness       Reason for visit:  Seeing and folling up with the doctor    He eats 2-3 servings of fruits and vegetables daily.He consumes 1 sweetened beverage(s) daily.He exercises with enough effort to increase his heart rate 30 to 60 minutes per day.  He exercises with enough effort to increase his heart rate 5 days per week.   He is taking medications regularly.         Annual Wellness Visit     Patient has been advised of split billing requirements and indicates understanding: Yes          Health Care Directive  Patient does not have a Health Care Directive or Living Will: Discussed advance care planning with patient; however, patient declined at this time.  In general, how would you rate your overall physical health? good/Fair  Do you have a special diet?  Regular (no restrictions)-no appetite currently after bypass heart surgery         6/23/2022   Exercise, Social Connection, Stress   Days per week of moderate/strenous exercise 2 days   Average minutes spent exercising at this level 60 min   Frequency of gathering with friends or relatives Once   Feel stress (tense, anxious, or unable to sleep) To some exte        Do you see a dentist two times every year?  (!) NO  The patient was instructed to see the dentist every 6 months.   Have you been more tired than usual lately?  (!) YES   Discussed possible causes of fatigue.   If you drink alcohol do you typically have >3 drinks per day or >7 drinks per week? No  Do you have a current opioid prescription? (!) YES   How severe is your pain on a scale from 1-10? 9/10           Do you use any other controlled substances or  medications that are not prescribed by a provider? None  Social History     Tobacco Use    Smoking status: Former     Current packs/day: 0.00     Average packs/day: 0.5 packs/day for 20.0 years (10.0 ttl pk-yrs)     Types: Cigarettes     Start date: 1983     Quit date: 2003     Years since quittin.9     Passive exposure: Past    Smokeless tobacco: Never   Vaping Use    Vaping status: Never Used   Substance Use Topics    Alcohol use: No     Alcohol/week: 0.0 standard drinks of alcohol    Drug use: No       Needs assistance for the following daily activities:  none  Which of the following safety concerns are present in your home?  none identified   Do you (or your family members) have any concerns about your safety while driving?  No  Do you have any of the following hearing concerns?: No hearing concerns  In the past 6 months, have you been bothered by leaking of urine? No        2023   Social Factors   Worry food won't last until get money to buy more No   Food not last or not have enough money for food? No   Do you have housing? (Housing is defined as stable permanent housing and does not include staying ouside in a car, in a tent, in an abandoned building, in an overnight shelter, or couch-surfing.) Yes   Are you worried about losing your housing? No   Lack of transportation? No   Unable to get utilities (heat,electricity)? No            2024   Fall Risk   Fallen 2 or more times in the past year? No   Trouble with walking or balance? No           Today's PHQ-9 Score:       2024    12:34 PM   PHQ-9 SCORE   PHQ-9 Total Score MyChart 7 (Mild depression)   PHQ-9 Total Score 7     ASCVD Risk   The ASCVD Risk score (April ANDREA, et al., 2019) failed to calculate for the following reasons:    The patient has a prior MI or stroke diagnosis            Reviewed and updated as needed this visit by Provider                    Past Medical History:   Diagnosis Date    Anemia 2020     Arthritis     Bell's palsy 09/08/2020    Benign prostatic hyperplasia with incomplete bladder emptying 07/28/2020    Bilateral carpal tunnel syndrome 02/13/2020    CAD (coronary artery disease) 2010    a subtotally occluded obtuse marginal vessel which was stented with a drug-coated stent.  He had 50% to 60% LAD disease and 25% to 30% right coronary disease    Cognitive change 01/02/2020    Colonic polyps 2009    tubular adenomae with mildly dysplastic features    Common wart 12/02/2020    Disturbance of skin sensation     Encephalocele (H) 2009    left frontal    Epilepsy, partial (H) 2009    with secondary generalization    GERD (gastroesophageal reflux disease)     Heart attack (H)     5 years ago. Cardiology 3 months age    Herpes zoster with complication 10/07/2020    History of spinal cord injury     Hypertension     Iron deficiency 09/09/2020    Meningioma (H) 04/10/2023    Meningiomatosis (H) 03/14/2024    RIC (obstructive sleep apnea)     RIC (obstructive sleep apnea)     Paraneoplastic Antibody  positive[799.89BS] 2009    mildly elevated alpha 3 ganglionic acetylcholine receptor and JEOVANNY 65 receptor  antibody    Paronychia of toe, left 06/26/2023    Pedal edema 02/14/2020    Post herpetic neuralgia 12/02/2020    Primary osteoarthritis of both hands 01/02/2020    Rash 04/19/2024    Reactive depression 06/25/2024    Seizures (H)     Last seizure 2012    Spinal stenosis     Stented coronary artery     Tobacco use disorder     Ulnar neuropathy of both upper extremities 02/13/2020    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    Ventricular Assymetry 2009    likely congenital right lateral ventricular enlargement    Weight gain 03/14/2024     Past Surgical History:   Procedure Laterality Date    ARTHROPLASTY KNEE Left 05/16/2016    Procedure: ARTHROPLASTY KNEE;  Surgeon: Chet Leonardo MD;  Location: [facility] OR    BACK SURGERY      BYPASS GRAFT ARTERY CORONARY N/A 6/4/2024    Procedure: CORONARY ARTERY  BYPASS GRAFT x 3 (LEFT INTERNAL MAMMARY ARTERY - LEFT ANTERIOR DESCENDING ARTERY; SAPHENOUS VEIN - OBTUSE MARGINAL ARTERY 1; SAPHENOUS VEIN -OBTUSE MARGINAL ARTERY 2) WITH ENDOSCOPIC SAPHENOUS VEIN HARVEST ON THE RIGHT LOWER EXTREMITY, AND ON CARDIOPULMONARY PUMP OXYGENATOR (INTRAOPERATIVE TRANSESOPHAGEAL ECHOCARDIOGRAM BY ANESTHESIOLOGIST);  Surgeon: Melonie Morejon    CARPAL TUNNEL RELEASE RT/LT Right 03/11/2020    Right carpal tunnel release, Dr. Rick Frias, [facility]    CARPAL TUNNEL RELEASE RT/LT Left     COLONOSCOPY N/A 10/08/2020    Procedure: COLONOSCOPY WITH POLYPECTOMIES using cold jumbo forceps;  Surgeon: Kevin Song MD;  Location:  GI    CV CORONARY ANGIOGRAM N/A 5/30/2024    Procedure: Coronary Angiogram;  Surgeon: Crow Mukherjee MD;  Location: [facility] CARDIAC CATH LAB    CV INSTANTANEOUS WAVE-FREE RATIO N/A 5/30/2024    Procedure: Instantaneous Wave-Free Ratio;  Surgeon: Crow Mukherjee MD;  Location: [facility] CARDIAC CATH LAB    CYSTOSCOPY      ESOPHAGOSCOPY, GASTROSCOPY, DUODENOSCOPY (EGD), COMBINED  05/24/2012    Procedure:COMBINED ESOPHAGOSCOPY, GASTROSCOPY, DUODENOSCOPY (EGD); ESOPHAGOSCOPY, GASTROSCOPY, DUODENOSCOPY (EGD) ; Surgeon:GILLIAN JOHNSON; Location: GI    ESOPHAGOSCOPY, GASTROSCOPY, DUODENOSCOPY (EGD), COMBINED N/A 10/08/2020    Procedure: ESOPHAGOGASTRODUODENOSCOPY (EGD) (fv);  Surgeon: Kevin Song MD;  Location:  GI    KNEE SURGERY Left     repair of cartilage    RELEASE TRIGGER FINGER Left 10/19/2020    Left ring finger trigger finger release at A1 pulley, Dr. Rick Frias, [facility]    SPINE SURGERY      repair of herniated disc lumbar    STENT      trigger finger release Right 09/15/2020    Right thumb trigger finger release, Dr. Frias    ULNAR NERVE TRANSPOSITION Left     VENTRICULOSTOMY      [facility] ANESTH,DX ARTHROSCOPIC PROC KNEE JOINT      CARTILEGE REPAIR.    [facility] NONSPECIFIC PROCEDURE      stint in heart    [facility]  COLONOSCOPY THRU STOMA, DIAGNOSTIC  2009    tubular adenomae, recommended annual colonoscopy       Current providers sharing in care for this patient include:  Patient Care Team:  Milton Gamboa MD as PCP - General (Family Medicine)  Chad Noel MD as Assigned PCP  Jose Angel Lawson MD as Jose Angel Harry MD as Assigned Surgical Provider  Jazmin George RN as Personal Advocate & Liaison (PAL)  Ame Camarillo EP as Cardiac Rehabilitation Therapist    The following health maintenance items are reviewed in Epic and correct as of today:  Health Maintenance   Topic Date Due    DEPRESSION ACTION PLAN  Never done    COVID-19 Vaccine (8 - 2023-24 season) 11/23/2023    MEDICARE ANNUAL WELLNESS VISIT  06/26/2024    INFLUENZA VACCINE (1) 09/01/2024    ANNUAL REVIEW OF HM ORDERS  09/07/2024    BMP  09/11/2024    PHQ-9  01/18/2025    LIPID  05/30/2025    CMP  06/05/2025    CBC W/DIFFERENTIAL  06/11/2025    FALL RISK ASSESSMENT  06/25/2025    COLORECTAL CANCER SCREENING  10/08/2025    GLUCOSE  06/11/2027    ADVANCE CARE PLANNING  07/18/2029    DTAP/TDAP/TD IMMUNIZATION (4 - Td or Tdap) 09/08/2030    SPIROMETRY  Completed    HEPATITIS C SCREENING  Completed    COPD ACTION PLAN  Completed    Pneumococcal Vaccine: 65+ Years  Completed    ZOSTER IMMUNIZATION  Completed    RSV VACCINE (Pregnancy & 60+)  Completed    AORTIC ANEURYSM SCREENING (SYSTEM ASSIGNED)  Completed    HPV IMMUNIZATION  Aged Out    MENINGITIS IMMUNIZATION  Aged Out    RSV MONOCLONAL ANTIBODY  Aged Out    URINE DRUG SCREEN  Discontinued    IPV IMMUNIZATION  Discontinued       Appropriate preventive services were discussed with this patient, including applicable screening as appropriate for fall prevention, nutrition, physical activity, Tobacco-use cessation, weight loss and cognition.  Checklist reviewing preventive services available has been given to the patient.           No data to display                           Review of  "Systems  Constitutional, HEENT, cardiovascular, pulmonary, GI, , musculoskeletal, neuro, skin, endocrine and psych systems are negative, except as otherwise noted.      Objective    /74 (BP Location: Left arm, Patient Position: Chair, Cuff Size: Adult Regular)   Pulse 78   Temp 97.4  F (36.3  C) (Temporal)   Resp 16   Ht 1.753 m (5' 9\")   Wt 85.9 kg (189 lb 6.4 oz)   SpO2 98%   BMI 27.97 kg/m    Body mass index is 27.97 kg/m .  Physical Exam   GENERAL: alert and no distress  NECK: no adenopathy, no asymmetry, masses, or scars  RESP: lungs clear to auscultation - no rales, rhonchi or wheezes  CV: regular rate and rhythm, normal S1 S2, no S3 or S4, no murmur, click or rub, no peripheral edema  ABDOMEN: soft, nontender, no hepatosplenomegaly, no masses and bowel sounds normal  MS: no gross musculoskeletal defects noted, no edema            Signed Electronically by: Milton Gamboa MD    "

## 2024-07-18 NOTE — ASSESSMENT & PLAN NOTE
In 2023, he was in [place], coming out from the swimming pool, he fell and broke his shoulder, pt to orthopedic, [facility], then he moved to [facility], who recommended rehab for his shoulder, then it ended up with heart attack, s/p open heart surgery, so he had to stop.  Plan: pt to discuss with cardiology to see when is he eligible to get back to PT.

## 2024-07-18 NOTE — ASSESSMENT & PLAN NOTE
Pt went to see neurology at the [facility], and was evaluated for his memory loss, no signs of alzheimer, thought to be related to his meningioma of the brain, pt to repeat MR of the brain, (he will call and schedule for the follow up appointment).  Meanwhile, seizure is under control, continue on Lamitrogen.

## 2024-07-18 NOTE — PATIENT INSTRUCTIONS
Patient Education   Preventive Care Advice   This is general advice given by our system to help you stay healthy. However, your care team may have specific advice just for you. Please talk to your care team about your preventive care needs.  Nutrition  Eat 5 or more servings of fruits and vegetables each day.  Try wheat bread, brown rice and whole grain pasta (instead of white bread, rice, and pasta).  Get enough calcium and vitamin D. Check the label on foods and aim for 100% of the RDA (recommended daily allowance).  Lifestyle  Exercise at least 150 minutes each week  (30 minutes a day, 5 days a week).  Do muscle strengthening activities 2 days a week. These help control your weight and prevent disease.  No smoking.  Wear sunscreen to prevent skin cancer.  Have a dental exam and cleaning every 6 months.  Yearly exams  See your health care team every year to talk about:  Any changes in your health.  Any medicines your care team has prescribed.  Preventive care, family planning, and ways to prevent chronic diseases.  Shots (vaccines)   HPV shots (up to age 26), if you've never had them before.  Hepatitis B shots (up to age 59), if you've never had them before.  COVID-19 shot: Get this shot when it's due.  Flu shot: Get a flu shot every year.  Tetanus shot: Get a tetanus shot every 10 years.  Pneumococcal, hepatitis A, and RSV shots: Ask your care team if you need these based on your risk.  Shingles shot (for age 50 and up)  General health tests  Diabetes screening:  Starting at age 35, Get screened for diabetes at least every 3 years.  If you are younger than age 35, ask your care team if you should be screened for diabetes.  Cholesterol test: At age 39, start having a cholesterol test every 5 years, or more often if advised.  Bone density scan (DEXA): At age 50, ask your care team if you should have this scan for osteoporosis (brittle bones).  Hepatitis C: Get tested at least once in your life.  STIs (sexually  transmitted infections)  Before age 24: Ask your care team if you should be screened for STIs.  After age 24: Get screened for STIs if you're at risk. You are at risk for STIs (including HIV) if:  You are sexually active with more than one person.  You don't use condoms every time.  You or a partner was diagnosed with a sexually transmitted infection.  If you are at risk for HIV, ask about PrEP medicine to prevent HIV.  Get tested for HIV at least once in your life, whether you are at risk for HIV or not.  Cancer screening tests  Cervical cancer screening: If you have a cervix, begin getting regular cervical cancer screening tests starting at age 21.  Breast cancer scan (mammogram): If you've ever had breasts, begin having regular mammograms starting at age 40. This is a scan to check for breast cancer.  Colon cancer screening: It is important to start screening for colon cancer at age 45.  Have a colonoscopy test every 10 years (or more often if you're at risk) Or, ask your provider about stool tests like a FIT test every year or Cologuard test every 3 years.  To learn more about your testing options, visit:   .  For help making a decision, visit:   https://bit.ly/oj67634.  Prostate cancer screening test: If you have a prostate, ask your care team if a prostate cancer screening test (PSA) at age 55 is right for you.  Lung cancer screening: If you are a current or former smoker ages 50 to 80, ask your care team if ongoing lung cancer screenings are right for you.  For informational purposes only. Not to replace the advice of your health care provider. Copyright   2023 [facility]. All rights reserved. Clinically reviewed by the [facility]. ChaoWIFI [id number] - REV 01/24.  Learning About Depression Screening  What is depression screening?  Depression screening is a way to see if you have depression symptoms. It may be done by a doctor or counselor. It's often part of a routine  "checkup. That's because your mental health is just as important as your physical health.  Depression is a mental health condition that affects how you feel, think, and act. You may:  Have less energy.  Lose interest in your daily activities.  Feel sad and grouchy for a long time.  Depression is very common. It affects people of all ages.  Many things can lead to depression. Some people become depressed after they have a stroke or find out they have a major illness like cancer or heart disease. The death of a loved one or a breakup may lead to depression. It can run in families. Most experts believe that a combination of inherited genes and stressful life events can cause it.  What happens during screening?  You may be asked to fill out a form about your depression symptoms. You and the doctor will discuss your answers. The doctor may ask you more questions to learn more about how you think, act, and feel.  What happens after screening?  If you have symptoms of depression, your doctor will talk to you about your options.  Doctors usually treat depression with medicines or counseling. Often, combining the two works best. Many people don't get help because they think that they'll get over the depression on their own. But people with depression may not get better unless they get treatment.  The cause of depression is not well understood. There may be many factors involved. But if you have depression, it's not your fault.  A serious symptom of depression is thinking about death or suicide. If you or someone you care about talks about this or about feeling hopeless, get help right away.  It's important to know that depression can be treated. Medicine, counseling, and self-care may help.  Where can you learn more?  Go to https://www.healthwise.net/patiented  Enter T185 in the search box to learn more about \"Learning About Depression Screening.\"  Current as of: June 24, 2023               Content Version: 14.0    0142-0879 " Healthwise, [employer].   Care instructions adapted under license by your healthcare professional. If you have questions about a medical condition or this instruction, always ask your healthcare professional. Healthwise, [employer] disclaims any warranty or liability for your use of this information.

## 2024-07-18 NOTE — ASSESSMENT & PLAN NOTE
Uses Topiramate for prophylaxis and when he is getting migraine, he take rizatriptan 10 mg, which is about every 3 months.

## 2024-07-18 NOTE — ASSESSMENT & PLAN NOTE
Patient notified. Patient verbalized understanding of information given.      s/p CABG x 3 (LIMA to LAD, SVG to OM1, SVG to OM2) on 6/4/2024   Continue on ASA, Atorvastatin, metoprolol.  Still following up with cardiac rehab, follow up with cardiology.

## 2024-07-19 ENCOUNTER — HOSPITAL ENCOUNTER (OUTPATIENT)
Dept: CARDIAC REHAB | Facility: CLINIC | Age: 69
Discharge: HOME | End: 2024-07-19
Attending: SURGERY
Payer: COMMERCIAL

## 2024-07-19 PROCEDURE — 93798 PHYS/QHP OP CAR RHAB W/ECG: CPT

## 2024-07-22 ENCOUNTER — HOSPITAL ENCOUNTER (OUTPATIENT)
Dept: CARDIAC REHAB | Facility: CLINIC | Age: 69
Discharge: HOME | End: 2024-07-22
Attending: SURGERY
Payer: COMMERCIAL

## 2024-07-22 PROCEDURE — 93798 PHYS/QHP OP CAR RHAB W/ECG: CPT

## 2024-07-24 ENCOUNTER — HOSPITAL ENCOUNTER (OUTPATIENT)
Dept: CARDIAC REHAB | Facility: CLINIC | Age: 69
Discharge: HOME | End: 2024-07-24
Attending: SURGERY
Payer: COMMERCIAL

## 2024-07-24 PROCEDURE — 93798 PHYS/QHP OP CAR RHAB W/ECG: CPT

## 2024-07-29 ENCOUNTER — HOSPITAL ENCOUNTER (OUTPATIENT)
Dept: CARDIAC REHAB | Facility: CLINIC | Age: 69
Discharge: HOME | End: 2024-07-29
Attending: SURGERY
Payer: COMMERCIAL

## 2024-07-29 PROCEDURE — 93798 PHYS/QHP OP CAR RHAB W/ECG: CPT

## 2024-07-30 ENCOUNTER — PATIENT OUTREACH (OUTPATIENT)
Dept: FAMILY MEDICINE | Facility: CLINIC | Age: 69
End: 2024-07-30
Payer: COMMERCIAL

## 2024-07-30 NOTE — TELEPHONE ENCOUNTER
PAL received incoming call from pt   -Reports he received a delivery in the mail with a sealed box to complete at kidney test kit, unsure who ordered it and what it is, company on box is [employer]   -Chart reviewed-  PAL does not see any orders or recent notes mentioning a mail kidney test, has not seen Urologist in over a year   -Pt states he is going to call his insurance to get clarification, will call back and update     Jazmin STEIN RN  Patient Advocate Liaison - PAL RN  [facility]   
complains of pain/discomfort

## 2024-07-31 ENCOUNTER — HOSPITAL ENCOUNTER (OUTPATIENT)
Dept: CARDIAC REHAB | Facility: CLINIC | Age: 69
Discharge: HOME | End: 2024-07-31
Attending: SURGERY
Payer: COMMERCIAL

## 2024-07-31 PROCEDURE — 93798 PHYS/QHP OP CAR RHAB W/ECG: CPT | Performed by: REHABILITATION PRACTITIONER

## 2024-08-02 ENCOUNTER — HOSPITAL ENCOUNTER (OUTPATIENT)
Dept: CARDIAC REHAB | Facility: CLINIC | Age: 69
Discharge: HOME | End: 2024-08-02
Attending: SURGERY
Payer: COMMERCIAL

## 2024-08-02 PROCEDURE — 93798 PHYS/QHP OP CAR RHAB W/ECG: CPT

## 2024-08-05 ENCOUNTER — HOSPITAL ENCOUNTER (OUTPATIENT)
Dept: CARDIAC REHAB | Facility: CLINIC | Age: 69
Discharge: HOME | End: 2024-08-05
Attending: SURGERY
Payer: COMMERCIAL

## 2024-08-05 DIAGNOSIS — Z95.1 S/P CABG X 3: ICD-10-CM

## 2024-08-05 PROCEDURE — 93798 PHYS/QHP OP CAR RHAB W/ECG: CPT

## 2024-08-07 ENCOUNTER — HOSPITAL ENCOUNTER (OUTPATIENT)
Dept: CARDIAC REHAB | Facility: CLINIC | Age: 69
Discharge: HOME | End: 2024-08-07
Attending: SURGERY
Payer: COMMERCIAL

## 2024-08-07 PROCEDURE — 93798 PHYS/QHP OP CAR RHAB W/ECG: CPT

## 2024-08-09 ENCOUNTER — HOSPITAL ENCOUNTER (OUTPATIENT)
Dept: CARDIAC REHAB | Facility: CLINIC | Age: 69
Discharge: HOME | End: 2024-08-09
Attending: SURGERY
Payer: COMMERCIAL

## 2024-08-09 PROCEDURE — 93798 PHYS/QHP OP CAR RHAB W/ECG: CPT

## 2024-08-12 ENCOUNTER — HOSPITAL ENCOUNTER (OUTPATIENT)
Dept: CARDIAC REHAB | Facility: CLINIC | Age: 69
Discharge: HOME | End: 2024-08-12
Attending: SURGERY
Payer: COMMERCIAL

## 2024-08-12 PROCEDURE — 93798 PHYS/QHP OP CAR RHAB W/ECG: CPT

## 2024-08-14 ENCOUNTER — HOSPITAL ENCOUNTER (OUTPATIENT)
Dept: CARDIAC REHAB | Facility: CLINIC | Age: 69
Discharge: HOME | End: 2024-08-14
Attending: SURGERY
Payer: COMMERCIAL

## 2024-08-14 PROCEDURE — 93798 PHYS/QHP OP CAR RHAB W/ECG: CPT

## 2024-08-19 ENCOUNTER — HOSPITAL ENCOUNTER (OUTPATIENT)
Dept: CARDIAC REHAB | Facility: CLINIC | Age: 69
Discharge: HOME | End: 2024-08-19
Attending: SURGERY
Payer: COMMERCIAL

## 2024-08-19 PROCEDURE — 93798 PHYS/QHP OP CAR RHAB W/ECG: CPT

## 2024-08-20 ENCOUNTER — OFFICE VISIT (OUTPATIENT)
Dept: FAMILY MEDICINE | Facility: CLINIC | Age: 69
End: 2024-08-20
Payer: COMMERCIAL

## 2024-08-20 VITALS
RESPIRATION RATE: 14 BRPM | DIASTOLIC BLOOD PRESSURE: 77 MMHG | HEIGHT: 69 IN | OXYGEN SATURATION: 100 % | SYSTOLIC BLOOD PRESSURE: 127 MMHG | BODY MASS INDEX: 28.23 KG/M2 | TEMPERATURE: 98.3 F | HEART RATE: 64 BPM | WEIGHT: 190.6 LBS

## 2024-08-20 DIAGNOSIS — I10 BENIGN ESSENTIAL HYPERTENSION: Primary | ICD-10-CM

## 2024-08-20 DIAGNOSIS — S42.92XG CLOSED FRACTURE OF LEFT SHOULDER WITH DELAYED HEALING, SUBSEQUENT ENCOUNTER: ICD-10-CM

## 2024-08-20 DIAGNOSIS — I25.10 CORONARY ARTERY DISEASE INVOLVING NATIVE CORONARY ARTERY OF NATIVE HEART WITHOUT ANGINA PECTORIS: ICD-10-CM

## 2024-08-20 PROBLEM — R20.2 RIGHT HAND PARESTHESIA: Status: RESOLVED | Noted: 2021-04-19 | Resolved: 2024-08-20

## 2024-08-20 PROBLEM — M15.0 PRIMARY OSTEOARTHRITIS INVOLVING MULTIPLE JOINTS: Status: RESOLVED | Noted: 2023-11-13 | Resolved: 2024-08-20

## 2024-08-20 PROBLEM — M25.331: Status: RESOLVED | Noted: 2023-04-10 | Resolved: 2024-08-20

## 2024-08-20 PROBLEM — E16.1 OTHER HYPOGLYCEMIA: Status: RESOLVED | Noted: 2024-01-12 | Resolved: 2024-08-20

## 2024-08-20 PROBLEM — F32.9 REACTIVE DEPRESSION: Status: RESOLVED | Noted: 2024-06-25 | Resolved: 2024-08-20

## 2024-08-20 PROBLEM — L29.9 PRURITIC DISORDER: Status: RESOLVED | Noted: 2020-06-19 | Resolved: 2024-08-20

## 2024-08-20 PROBLEM — G56.23 ULNAR NEUROPATHY OF BOTH UPPER EXTREMITIES: Status: RESOLVED | Noted: 2020-02-13 | Resolved: 2024-08-20

## 2024-08-20 PROBLEM — R21 RASH: Status: RESOLVED | Noted: 2024-04-19 | Resolved: 2024-08-20

## 2024-08-20 LAB
ANION GAP SERPL CALCULATED.3IONS-SCNC: 11 MMOL/L (ref 7–15)
BUN SERPL-MCNC: 21.2 MG/DL (ref 8–23)
CALCIUM SERPL-MCNC: 9.6 MG/DL (ref 8.8–10.4)
CHLORIDE SERPL-SCNC: 107 MMOL/L (ref 98–107)
CREAT SERPL-MCNC: 1.48 MG/DL (ref 0.67–1.17)
EGFRCR SERPLBLD CKD-EPI 2021: 51 ML/MIN/1.73M2
ERYTHROCYTE [DISTWIDTH] IN BLOOD BY AUTOMATED COUNT: 15.1 % (ref 10–15)
GLUCOSE SERPL-MCNC: 103 MG/DL (ref 70–99)
HCO3 SERPL-SCNC: 21 MMOL/L (ref 22–29)
HCT VFR BLD AUTO: 38.2 % (ref 40–53)
HGB BLD-MCNC: 12.2 G/DL (ref 13.3–17.7)
MCH RBC QN AUTO: 25.9 PG (ref 26.5–33)
MCHC RBC AUTO-ENTMCNC: 31.9 G/DL (ref 31.5–36.5)
MCV RBC AUTO: 81 FL (ref 78–100)
PLATELET # BLD AUTO: 209 10E3/UL (ref 150–450)
POTASSIUM SERPL-SCNC: 4.8 MMOL/L (ref 3.4–5.3)
RBC # BLD AUTO: 4.71 10E6/UL (ref 4.4–5.9)
SODIUM SERPL-SCNC: 139 MMOL/L (ref 135–145)
WBC # BLD AUTO: 6 10E3/UL (ref 4–11)

## 2024-08-20 PROCEDURE — 85027 COMPLETE CBC AUTOMATED: CPT | Performed by: FAMILY MEDICINE

## 2024-08-20 PROCEDURE — 36415 COLL VENOUS BLD VENIPUNCTURE: CPT | Performed by: FAMILY MEDICINE

## 2024-08-20 PROCEDURE — 80048 BASIC METABOLIC PNL TOTAL CA: CPT | Performed by: FAMILY MEDICINE

## 2024-08-20 PROCEDURE — G2211 COMPLEX E/M VISIT ADD ON: HCPCS | Performed by: FAMILY MEDICINE

## 2024-08-20 PROCEDURE — 99214 OFFICE O/P EST MOD 30 MIN: CPT | Performed by: FAMILY MEDICINE

## 2024-08-20 ASSESSMENT — PAIN SCALES - GENERAL: PAINLEVEL: NO PAIN (0)

## 2024-08-20 ASSESSMENT — PATIENT HEALTH QUESTIONNAIRE - PHQ9: SUM OF ALL RESPONSES TO PHQ QUESTIONS 1-9: 0

## 2024-08-20 NOTE — PROGRESS NOTES
Assessment & Plan   Problem List Items Addressed This Visit       Benign essential hypertension - Primary     Controlled, continue on Metoprolol.         Relevant Orders    BASIC METABOLIC PANEL    Closed fracture of left shoulder with delayed healing, subsequent encounter     Per [facility], there were no surgery recommended, and they recommended to have physical therapy, but patient cannot do it, due to his current CABG, the pain is sever when he moves his surgery.  Ref to [facility] for further evaluation.         Relevant Orders    Orthopedic  Referral    Coronary artery disease s/p CABG 2024.     s/p CABG x 3 (LIMA to LAD, SVG to OM1, SVG to OM2) on 6/4/2024   Continue on ASA, Atorvastatin, metoprolol.  Still following up with cardiac rehab, follow up with cardiology.  Pt is still struggling with shortness of breath when he go up stairs, and he is wondering why as he I exercising regularly between walking and physical rehab.           Relevant Orders    CBC with platelets (Completed)                   Vijay Hodge is a 69 year old, presenting for the following health issues:  Follow Up (Follow up from heart surgery and pt is wondering about his breathing changes (breathing more heavy than normal) with activity)        8/20/2024     1:26 PM   Additional Questions   Roomed by [email or web address]   Accompanied by self     History of Present Illness       Heart Failure:  He presents for follow up of heart failure. He is experiencing shortness of breath with activity only, which is slightly worse. He is not experiencing any lower extremity edema.   He has orthopenea and coughs at night. Patient is checking weight daily. He has recently had a None.  He has no side effects from medications.  He has has a medical visit for heart failure 1 time since the last visit.    Reason for visit:  Follow up appt from heart surgery    He eats 2-3 servings of fruits and vegetables daily.He consumes 0 sweetened  "beverage(s) daily.He exercises with enough effort to increase his heart rate 60 or more minutes per day.  He exercises with enough effort to increase his heart rate 4 days per week.   He is taking medications regularly.     Last Echo:   Echo result w/o MOPS: Interpretation Summary The visual ejection fraction is 45-50%.Left ventricular systolic function is mildly reduced.There are regional wall motion abnormalities as specified.                Review of Systems  Constitutional, HEENT, cardiovascular, pulmonary, gi and gu systems are negative, except as otherwise noted.      Objective    /77 (BP Location: Left arm, Patient Position: Sitting, Cuff Size: Adult Regular)   Pulse 64   Temp 98.3  F (36.8  C) (Oral)   Resp 14   Ht 1.753 m (5' 9\")   Wt 86.5 kg (190 lb 9.6 oz)   SpO2 100%   BMI 28.15 kg/m    Body mass index is 28.15 kg/m .  Physical Exam   GENERAL: alert and no distress  NECK: no adenopathy, no asymmetry, masses, or scars  RESP: lungs clear to auscultation - no rales, rhonchi or wheezes  CV: regular rate and rhythm, normal S1 S2, no S3 or S4, no murmur, click or rub, no peripheral edema  CV: surgical scar is well healed. Mld tenderness over the chest.  ABDOMEN: soft, nontender, no hepatosplenomegaly, no masses and bowel sounds normal  MS: no gross musculoskeletal defects noted, no edema            Signed Electronically by: Milton Gamboa MD    "

## 2024-08-20 NOTE — ASSESSMENT & PLAN NOTE
Per [facility], there were no surgery recommended, and they recommended to have physical therapy, but patient cannot do it, due to his current CABG, the pain is sever when he moves his surgery.  Ref to [facility] for further evaluation.

## 2024-08-20 NOTE — ASSESSMENT & PLAN NOTE
s/p CABG x 3 (LIMA to LAD, SVG to OM1, SVG to OM2) on 6/4/2024   Continue on ASA, Atorvastatin, metoprolol.  Still following up with cardiac rehab, follow up with cardiology.  Pt is still struggling with shortness of breath when he go up stairs, and he is wondering why as he I exercising regularly between walking and physical rehab.

## 2024-08-21 ENCOUNTER — HOSPITAL ENCOUNTER (OUTPATIENT)
Dept: CARDIAC REHAB | Facility: CLINIC | Age: 69
Discharge: HOME | End: 2024-08-21
Attending: SURGERY
Payer: COMMERCIAL

## 2024-08-21 PROCEDURE — 93798 PHYS/QHP OP CAR RHAB W/ECG: CPT | Performed by: REHABILITATION PRACTITIONER

## 2024-08-23 ENCOUNTER — HOSPITAL ENCOUNTER (OUTPATIENT)
Dept: CARDIAC REHAB | Facility: CLINIC | Age: 69
Discharge: HOME | End: 2024-08-23
Attending: SURGERY
Payer: COMMERCIAL

## 2024-08-23 PROCEDURE — 93798 PHYS/QHP OP CAR RHAB W/ECG: CPT

## 2024-08-26 ENCOUNTER — HOSPITAL ENCOUNTER (OUTPATIENT)
Dept: CARDIAC REHAB | Facility: CLINIC | Age: 69
Discharge: HOME | End: 2024-08-26
Attending: SURGERY
Payer: COMMERCIAL

## 2024-08-26 PROCEDURE — 93798 PHYS/QHP OP CAR RHAB W/ECG: CPT

## 2024-08-28 ENCOUNTER — HOSPITAL ENCOUNTER (OUTPATIENT)
Dept: CARDIAC REHAB | Facility: CLINIC | Age: 69
Discharge: HOME | End: 2024-08-28
Attending: SURGERY
Payer: COMMERCIAL

## 2024-08-28 PROCEDURE — 93798 PHYS/QHP OP CAR RHAB W/ECG: CPT | Performed by: REHABILITATION PRACTITIONER

## 2024-08-30 ENCOUNTER — HOSPITAL ENCOUNTER (OUTPATIENT)
Dept: CARDIAC REHAB | Facility: CLINIC | Age: 69
Discharge: HOME | End: 2024-08-30
Attending: SURGERY
Payer: COMMERCIAL

## 2024-08-30 PROCEDURE — 93798 PHYS/QHP OP CAR RHAB W/ECG: CPT

## 2024-09-03 ENCOUNTER — HOSPITAL ENCOUNTER (OUTPATIENT)
Dept: CARDIAC REHAB | Facility: CLINIC | Age: 69
Discharge: HOME | End: 2024-09-03
Attending: SURGERY
Payer: COMMERCIAL

## 2024-09-03 PROCEDURE — 93797 PHYS/QHP OP CAR RHAB WO ECG: CPT | Mod: XU

## 2024-09-03 PROCEDURE — 93798 PHYS/QHP OP CAR RHAB W/ECG: CPT

## 2024-09-17 ENCOUNTER — OFFICE VISIT (OUTPATIENT)
Dept: FAMILY MEDICINE | Facility: CLINIC | Age: 69
End: 2024-09-17
Payer: COMMERCIAL

## 2024-09-17 VITALS
TEMPERATURE: 98.5 F | RESPIRATION RATE: 14 BRPM | OXYGEN SATURATION: 100 % | DIASTOLIC BLOOD PRESSURE: 68 MMHG | SYSTOLIC BLOOD PRESSURE: 109 MMHG | HEIGHT: 69 IN | WEIGHT: 191 LBS | BODY MASS INDEX: 28.29 KG/M2 | HEART RATE: 58 BPM

## 2024-09-17 DIAGNOSIS — R12 HEARTBURN: ICD-10-CM

## 2024-09-17 DIAGNOSIS — R05.3 CHRONIC COUGH: Primary | ICD-10-CM

## 2024-09-17 DIAGNOSIS — K21.9 GASTROESOPHAGEAL REFLUX DISEASE WITHOUT ESOPHAGITIS: ICD-10-CM

## 2024-09-17 PROCEDURE — G0008 ADMIN INFLUENZA VIRUS VAC: HCPCS | Performed by: FAMILY MEDICINE

## 2024-09-17 PROCEDURE — 90662 IIV NO PRSV INCREASED AG IM: CPT | Performed by: FAMILY MEDICINE

## 2024-09-17 PROCEDURE — 99214 OFFICE O/P EST MOD 30 MIN: CPT | Performed by: FAMILY MEDICINE

## 2024-09-17 RX ORDER — LORATADINE 10 MG/1
10 TABLET ORAL DAILY
Qty: 30 TABLET | Refills: 0 | Status: SHIPPED | OUTPATIENT
Start: 2024-09-17

## 2024-09-17 RX ORDER — PANTOPRAZOLE SODIUM 40 MG/1
40 TABLET, DELAYED RELEASE ORAL DAILY
Qty: 90 TABLET | Refills: 3 | Status: SHIPPED | OUTPATIENT
Start: 2024-09-17

## 2024-09-17 NOTE — PROGRESS NOTES
Assessment & Plan   Problem List Items Addressed This Visit       Esophageal reflux     Controlled, pt was prescribed Pantoprazole 40 mg but he is still using omeprazole, may start on the new medicine.         Relevant Medications    pantoprazole (PROTONIX) 40 MG EC tablet    Chronic cough - Primary     Pt has silvestre struggling with cleaning throat and cough that comes once a day, last for about 5-15 minutes then it goes away.  Usually triggers from talking too much, or eating, or drinking, and sometimes it can happened at night.  Feels like there is a phlegm in the throat all the time, pt stopped smoking 2003, pt does have plugged nose in the morning from using his CPAP.  Assessment: I wonder if patient does have chronic rhinitis causing phlegm and cough, will give a trial of Loratadine to see it that helps with his symptoms.          Relevant Medications    loratadine (CLARITIN) 10 MG tablet     Other Visit Diagnoses       Heartburn        Relevant Medications    pantoprazole (PROTONIX) 40 MG EC tablet                    31 minutes spent by me on the date of the encounter doing chart review, history and exam, documentation and further activities per the note      Vijay Hodge is a 69 year old, presenting for the following health issues:  No chief complaint on file.      HPI       Concern - General Follow up   Cough : started many years ago, mainly feels like clearing his throat and sometimes the cough gets worse that he cannot catch his breath.          Review of Systems  Constitutional, HEENT, cardiovascular, pulmonary, GI, , musculoskeletal, neuro, skin, endocrine and psych systems are negative, except as otherwise noted.      Objective    There were no vitals taken for this visit.  There is no height or weight on file to calculate BMI.  Physical Exam   GENERAL: alert and no distress  HENT: ear canals and TM's normal, nose and mouth without ulcers or lesions  NECK: no adenopathy, no asymmetry, masses, or  scars  RESP: lungs clear to auscultation - no rales, rhonchi or wheezes            Signed Electronically by: Milton Gamboa MD

## 2024-09-17 NOTE — ASSESSMENT & PLAN NOTE
Controlled, pt was prescribed Pantoprazole 40 mg but he is still using omeprazole, may start on the new medicine.

## 2024-09-17 NOTE — ASSESSMENT & PLAN NOTE
Pt has silvestre struggling with cleaning throat and cough that comes once a day, last for about 5-15 minutes then it goes away.  Usually triggers from talking too much, or eating, or drinking, and sometimes it can happened at night.  Feels like there is a phlegm in the throat all the time, pt stopped smoking 2003, pt does have plugged nose in the morning from using his CPAP.  Assessment: I wonder if patient does have chronic rhinitis causing phlegm and cough, will give a trial of Loratadine to see it that helps with his symptoms.

## 2024-09-23 ENCOUNTER — PATIENT OUTREACH (OUTPATIENT)
Dept: FAMILY MEDICINE | Facility: CLINIC | Age: 69
End: 2024-09-23

## 2024-09-23 ENCOUNTER — OFFICE VISIT (OUTPATIENT)
Dept: CARDIOLOGY | Facility: CLINIC | Age: 69
End: 2024-09-23
Payer: COMMERCIAL

## 2024-09-23 ENCOUNTER — TELEPHONE (OUTPATIENT)
Dept: CARDIOLOGY | Facility: CLINIC | Age: 69
End: 2024-09-23

## 2024-09-23 VITALS
WEIGHT: 196.3 LBS | SYSTOLIC BLOOD PRESSURE: 148 MMHG | BODY MASS INDEX: 29.07 KG/M2 | HEIGHT: 69 IN | DIASTOLIC BLOOD PRESSURE: 83 MMHG | HEART RATE: 50 BPM

## 2024-09-23 DIAGNOSIS — R41.3 MEMORY DYSFUNCTION: ICD-10-CM

## 2024-09-23 DIAGNOSIS — Q03.0: ICD-10-CM

## 2024-09-23 DIAGNOSIS — Z95.1 S/P CABG X 3: Primary | ICD-10-CM

## 2024-09-23 DIAGNOSIS — D42.9 MENINGIOMATOSIS (H): ICD-10-CM

## 2024-09-23 DIAGNOSIS — D32.9 MENINGIOMA (H): Primary | ICD-10-CM

## 2024-09-23 DIAGNOSIS — I25.10 CORONARY ARTERY DISEASE INVOLVING NATIVE HEART WITHOUT ANGINA PECTORIS, UNSPECIFIED VESSEL OR LESION TYPE: ICD-10-CM

## 2024-09-23 DIAGNOSIS — G40.309 GENERALIZED TONIC CLONIC EPILEPSY (H): ICD-10-CM

## 2024-09-23 DIAGNOSIS — Z95.1 S/P CABG X 3: ICD-10-CM

## 2024-09-23 PROCEDURE — 99215 OFFICE O/P EST HI 40 MIN: CPT | Performed by: INTERNAL MEDICINE

## 2024-09-23 RX ORDER — ATORVASTATIN CALCIUM 40 MG/1
40 TABLET, FILM COATED ORAL EVERY EVENING
Qty: 90 TABLET | Refills: 3 | Status: SHIPPED | OUTPATIENT
Start: 2024-09-23

## 2024-09-23 NOTE — TELEPHONE ENCOUNTER
"Topiramate (TOPAMAX) previously managed by Dr. Noel. Patient last saw Neurology at [facility] on 5/28/24 to follow-up on meningioma and memory dysfunction. See below note from visit:  \"I think he needs to be evaluated by our behavioral neurologist or at a memory clinic or memory center. Because of their limited ability to travel long distance, they asked me to send the information to Dr. Chad Noel, his primary care provider, who referred him to [place]. Dr. Noel can help refer him to a memory specialist at [facility].  He needs to have an MRI of the brain repeated in a year to follow the meningiomas. He asked to return in a year to see me with the MRI performed.  I advised him against driving for now. I am concerned that his fairly frequent disorientation may distract him from safe driving.\"     Dr. Noel placed additional Neurology referral for memory specialist on 5/29/24. Per chart review - it appears appt was never scheduled as pt was soon after hospitalized and underwent CABG procedure.      Jazmin STEIN RN  [facility]   "

## 2024-09-23 NOTE — PROGRESS NOTES
CARDIOLOGY CLINIC CONSULTATION    PRIMARY CARE PHYSICIAN:  Milton Gamboa    HISTORY OF PRESENT ILLNESS:  This is a pleasant 69-year-old gentleman who is seeing me in cardiology clinic in follow-up.  He was seen by me in consultation at [facility] in May 2024.  He has the following set of pertinent medical issues    History of coronary disease and circumflex stenting in 2010  Hypertension hyperlipidemia  Presented with unstable angina May 2024 and was noted to have multivessel coronary disease with an EF of 45%.  Underwent CABG x 3 (LIMA to LAD, SVG to OM1, SVG to OM2) on 6/4/2024.  RCA was left to medical management it seems  Chronic kidney disease    The patient is here in cardiology clinic today for follow-up.  Denies any new cardiovascular symptoms.  He is exercising in his gym regularly.  No syncope presyncope heart failure.  Blood pressure today is elevated but previously all his blood pressures at cardiac rehab have been completely normal.  He is quite anxious about his cardiac conditions.    PAST MEDICAL HISTORY:  Past Medical History:   Diagnosis Date    Anemia 07/31/2020    Arthritis     Bell's palsy 09/08/2020    Benign prostatic hyperplasia with incomplete bladder emptying 07/28/2020    Bilateral carpal tunnel syndrome 02/13/2020    CAD (coronary artery disease) 2010    a subtotally occluded obtuse marginal vessel which was stented with a drug-coated stent.  He had 50% to 60% LAD disease and 25% to 30% right coronary disease    Cognitive change 01/02/2020    Colonic polyps 2009    tubular adenomae with mildly dysplastic features    Common wart 12/02/2020    Disturbance of skin sensation     Encephalocele (H) 2009    left frontal    Epilepsy, partial (H) 2009    with secondary generalization    GERD (gastroesophageal reflux disease)     Heart attack (H)     5 years ago. Cardiology 3 months age    Herpes zoster with complication 10/07/2020    History of spinal cord injury     Hypertension     Iron  deficiency 09/09/2020    Meningioma (H) 04/10/2023    Meningiomatosis (H) 03/14/2024    RIC (obstructive sleep apnea)     RIC (obstructive sleep apnea)     Paraneoplastic Antibody  positive[799.89BS] 2009    mildly elevated alpha 3 ganglionic acetylcholine receptor and JEOVANNY 65 receptor  antibody    Paronychia of toe, left 06/26/2023    Pedal edema 02/14/2020    Post herpetic neuralgia 12/02/2020    Primary osteoarthritis of both hands 01/02/2020    Rash 04/19/2024    Reactive depression 06/25/2024    Seizures (H)     Last seizure 2012    Spinal stenosis     Stented coronary artery     Tobacco use disorder     Ulnar neuropathy of both upper extremities 02/13/2020    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    Ventricular Assymetry 2009    likely congenital right lateral ventricular enlargement    Visi (volar intercalated segment instability), right 04/10/2023    Severe Wyman      Weight gain 03/14/2024       MEDICATIONS:  Current Outpatient Medications   Medication Sig Dispense Refill    acetaminophen (TYLENOL) 500 MG tablet Take 1,000 mg by mouth every 8 hours as needed for mild pain      aspirin (ASA) 81 MG chewable tablet 2 tablets (162 mg) by Oral or NG Tube route daily 60 tablet 3    atorvastatin (LIPITOR) 40 MG tablet Take 1 tablet (40 mg) by mouth every evening 30 tablet 3    fluticasone (FLONASE) 50 MCG/ACT nasal spray Spray 1 spray into both nostrils daily (Patient taking differently: Spray 1 spray into both nostrils as needed.) 16 g 0    hydrOXYzine HCl (ATARAX) 25 MG tablet Take 1-2 tablets (25-50 mg) by mouth every 6 hours as needed for anxiety or other (pain) 40 tablet 0    lamoTRIgine (LAMICTAL) 100 MG tablet Take 0.5 tablets (50 mg) by mouth 2 times daily      lamoTRIgine (LAMICTAL) 200 MG tablet Take 1 tablet (200 mg) by mouth 2 times daily (Patient taking differently: Take 200 mg by mouth 2 times daily. 200mg + 50 mg= 250 mg BID)      linaclotide (LINZESS) 290 MCG capsule Take 1 capsule (290 mcg)  by mouth every morning (before breakfast) Prn constipatiion 90 capsule 1    loratadine (CLARITIN) 10 MG tablet Take 1 tablet (10 mg) by mouth daily. (Patient taking differently: Take 10 mg by mouth as needed.) 30 tablet 0    metoprolol tartrate (LOPRESSOR) 25 MG tablet Take 1 tablet (25 mg) by mouth 2 times daily 60 tablet 3    ondansetron (ZOFRAN ODT) 4 MG ODT tab Take 1 tablet (4 mg) by mouth every 6 hours as needed for nausea 30 tablet 0    pantoprazole (PROTONIX) 40 MG EC tablet Take 1 tablet (40 mg) by mouth daily. 90 tablet 3    prochlorperazine (COMPAZINE) 10 MG tablet Take 0.5 tablets (5 mg) by mouth every 6 hours as needed for nausea 30 tablet 0    rizatriptan (MAXALT) 10 MG tablet TAKE 1 TABLET(10 MG) BY MOUTH AT ONSET OF HEADACHE FOR MIGRAINE. MAY REPEAT IN 2 HOURS. MAX 3 TABLETS/ 24 HOURS 15 tablet 2    tamsulosin (FLOMAX) 0.4 MG capsule Take 1 capsule (0.4 mg) by mouth daily 90 capsule 2    topiramate (TOPAMAX) 100 MG tablet Take 1 tablet (100 mg) by mouth daily Use second 90 tablet 1    traZODone (DESYREL) 50 MG tablet Take 2 tablets (100 mg) by mouth at bedtime (Patient taking differently: Take 100 mg by mouth as needed.) 180 tablet 3    zolpidem (AMBIEN) 5 MG tablet Take 1 tablet (5 mg) by mouth nightly as needed for sleep 90 tablet 1     No current facility-administered medications for this visit.       SOCIAL HISTORY:  I have reviewed this patient's social history and updated it with pertinent information if needed. Zarinaponce CANDELARIA Maximilian  reports that he quit smoking about 21 years ago. His smoking use included cigarettes. He started smoking about 41 years ago. He has a 10 pack-year smoking history. He has been exposed to tobacco smoke. He has never used smokeless tobacco. He reports that he does not drink alcohol and does not use drugs.    PHYSICAL EXAM:  Pulse:  [50] 50  BP: (148)/(83) 148/83  196 lbs 4.8 oz    Constitutional: alert, no distress  Respiratory: Good bilateral air entry  Cardiovascular:  Normal regular heart sounds no murmur rubs or gallop sternal incision is healed well no edema  GI: nondistended  Neuropsychiatric: appropriate affact    ASSESSMENT: Pertinent issues addressed/ reviewed during this cardiology visit  Stable coronary artery disease  Mild ischemic cardiomyopathy  Chronic kidney disease  Whitecoat hypertension    RECOMMENDATIONS:  Patient is asymptomatic from a cardiac standpoint.  His LDL in May 2024 was 59.  However prior to recent surgery he was on simvastatin.  Currently he is on atorvastatin   Continue aspirin for life.  Statin for life.  LDL goal 50-70.  Healthy diet exercise weight loss.  No smoking.  Alert cardiology with any clinical changes or any cardiac symptoms.  Otherwise follow-up with [facility] in 6 months or sooner if anything changes clinically.  Pathophysiology of ACS explained to the patient.  He is quite anxious.  All questions answered in detail.  Follow-up with PCP for chronic kidney disease.  Defer need for renal referral to them.    It was a pleasure seeing this patient in clinic today. Please do not hesitate to contact me with any future questions.     ALE Garcia, [facility]  Cardiology - [facility] Heart  September 23, 2024    Total visit time including chart review documentation and in person clinical care today was 40 minutes    This note was completed in part using dictation via the Dragon voice recognition software. Some word and grammatical errors may occur and must be interpreted in the appropriate clinical context.  If there are any questions pertaining to this issue, please contact me for further clarification.

## 2024-09-23 NOTE — TELEPHONE ENCOUNTER
[facility] Call Center    Phone Message    May a detailed message be left on voicemail: yes     Reason for Call: Medication Refill Request    Has the patient contacted the pharmacy for the refill?   Name of medication being requested: atorvastatin (LIPITOR) 40 MG tablet   Provider who prescribed the medication: Hospital - Dr. Gross taking over? Patient was told today, he will be taking this medication for life.    Pharmacy:    [id number] - [place], [place] - [address] AT [facility] [address]      Date medication is needed: Has enough for the next couple of days       Action Taken: Message routed to:  Other: Cardio    Travel Screening: Not Applicable     Date of Service:                                      Thank you!  [facility]

## 2024-09-23 NOTE — LETTER
9/23/2024    Milton Gamboa MD  [address]  [place] [address]    RE: Kashmir Yao       Dear Colleague,     I had the pleasure of seeing Kashmir Yao in the [facility].  CARDIOLOGY CLINIC CONSULTATION    PRIMARY CARE PHYSICIAN:  Milton Gamboa    HISTORY OF PRESENT ILLNESS:  This is a pleasant 69-year-old gentleman who is seeing me in cardiology clinic in follow-up.  He was seen by me in consultation at [facility] in May 2024.  He has the following set of pertinent medical issues    History of coronary disease and circumflex stenting in 2010  Hypertension hyperlipidemia  Presented with unstable angina May 2024 and was noted to have multivessel coronary disease with an EF of 45%.  Underwent CABG x 3 (LIMA to LAD, SVG to OM1, SVG to OM2) on 6/4/2024.  RCA was left to medical management it seems  Chronic kidney disease    The patient is here in cardiology clinic today for follow-up.  Denies any new cardiovascular symptoms.  He is exercising in his gym regularly.  No syncope presyncope heart failure.  Blood pressure today is elevated but previously all his blood pressures at cardiac rehab have been completely normal.  He is quite anxious about his cardiac conditions.    PAST MEDICAL HISTORY:  Past Medical History:   Diagnosis Date     Anemia 07/31/2020     Arthritis      Bell's palsy 09/08/2020     Benign prostatic hyperplasia with incomplete bladder emptying 07/28/2020     Bilateral carpal tunnel syndrome 02/13/2020     CAD (coronary artery disease) 2010    a subtotally occluded obtuse marginal vessel which was stented with a drug-coated stent.  He had 50% to 60% LAD disease and 25% to 30% right coronary disease     Cognitive change 01/02/2020     Colonic polyps 2009    tubular adenomae with mildly dysplastic features     Common wart 12/02/2020     Disturbance of skin sensation      Encephalocele (H) 2009    left frontal     Epilepsy, partial (H) 2009    with secondary generalization      GERD (gastroesophageal reflux disease)      Heart attack (H)     5 years ago. Cardiology 3 months age     Herpes zoster with complication 10/07/2020     History of spinal cord injury      Hypertension      Iron deficiency 09/09/2020     Meningioma (H) 04/10/2023     Meningiomatosis (H) 03/14/2024     RIC (obstructive sleep apnea)      RIC (obstructive sleep apnea)      Paraneoplastic Antibody  positive[799.89BS] 2009    mildly elevated alpha 3 ganglionic acetylcholine receptor and JEOVANNY 65 receptor  antibody     Paronychia of toe, left 06/26/2023     Pedal edema 02/14/2020     Post herpetic neuralgia 12/02/2020     Primary osteoarthritis of both hands 01/02/2020     Rash 04/19/2024     Reactive depression 06/25/2024     Seizures (H)     Last seizure 2012     Spinal stenosis      Stented coronary artery      Tobacco use disorder      Ulnar neuropathy of both upper extremities 02/13/2020     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     Ventricular Assymetry 2009    likely congenital right lateral ventricular enlargement     Visi (volar intercalated segment instability), right 04/10/2023    Severe Wyman       Weight gain 03/14/2024       MEDICATIONS:  Current Outpatient Medications   Medication Sig Dispense Refill     acetaminophen (TYLENOL) 500 MG tablet Take 1,000 mg by mouth every 8 hours as needed for mild pain       aspirin (ASA) 81 MG chewable tablet 2 tablets (162 mg) by Oral or NG Tube route daily 60 tablet 3     atorvastatin (LIPITOR) 40 MG tablet Take 1 tablet (40 mg) by mouth every evening 30 tablet 3     fluticasone (FLONASE) 50 MCG/ACT nasal spray Spray 1 spray into both nostrils daily (Patient taking differently: Spray 1 spray into both nostrils as needed.) 16 g 0     hydrOXYzine HCl (ATARAX) 25 MG tablet Take 1-2 tablets (25-50 mg) by mouth every 6 hours as needed for anxiety or other (pain) 40 tablet 0     lamoTRIgine (LAMICTAL) 100 MG tablet Take 0.5 tablets (50 mg) by mouth 2 times daily        lamoTRIgine (LAMICTAL) 200 MG tablet Take 1 tablet (200 mg) by mouth 2 times daily (Patient taking differently: Take 200 mg by mouth 2 times daily. 200mg + 50 mg= 250 mg BID)       linaclotide (LINZESS) 290 MCG capsule Take 1 capsule (290 mcg) by mouth every morning (before breakfast) Prn constipatiion 90 capsule 1     loratadine (CLARITIN) 10 MG tablet Take 1 tablet (10 mg) by mouth daily. (Patient taking differently: Take 10 mg by mouth as needed.) 30 tablet 0     metoprolol tartrate (LOPRESSOR) 25 MG tablet Take 1 tablet (25 mg) by mouth 2 times daily 60 tablet 3     ondansetron (ZOFRAN ODT) 4 MG ODT tab Take 1 tablet (4 mg) by mouth every 6 hours as needed for nausea 30 tablet 0     pantoprazole (PROTONIX) 40 MG EC tablet Take 1 tablet (40 mg) by mouth daily. 90 tablet 3     prochlorperazine (COMPAZINE) 10 MG tablet Take 0.5 tablets (5 mg) by mouth every 6 hours as needed for nausea 30 tablet 0     rizatriptan (MAXALT) 10 MG tablet TAKE 1 TABLET(10 MG) BY MOUTH AT ONSET OF HEADACHE FOR MIGRAINE. MAY REPEAT IN 2 HOURS. MAX 3 TABLETS/ 24 HOURS 15 tablet 2     tamsulosin (FLOMAX) 0.4 MG capsule Take 1 capsule (0.4 mg) by mouth daily 90 capsule 2     topiramate (TOPAMAX) 100 MG tablet Take 1 tablet (100 mg) by mouth daily Use second 90 tablet 1     traZODone (DESYREL) 50 MG tablet Take 2 tablets (100 mg) by mouth at bedtime (Patient taking differently: Take 100 mg by mouth as needed.) 180 tablet 3     zolpidem (AMBIEN) 5 MG tablet Take 1 tablet (5 mg) by mouth nightly as needed for sleep 90 tablet 1     No current facility-administered medications for this visit.       SOCIAL HISTORY:  I have reviewed this patient's social history and updated it with pertinent information if needed. Kashmir Yao  reports that he quit smoking about 21 years ago. His smoking use included cigarettes. He started smoking about 41 years ago. He has a 10 pack-year smoking history. He has been exposed to tobacco smoke. He has never used  smokeless tobacco. He reports that he does not drink alcohol and does not use drugs.    PHYSICAL EXAM:  Pulse:  [50] 50  BP: (148)/(83) 148/83  196 lbs 4.8 oz    Constitutional: alert, no distress  Respiratory: Good bilateral air entry  Cardiovascular: Normal regular heart sounds no murmur rubs or gallop sternal incision is healed well no edema  GI: nondistended  Neuropsychiatric: appropriate affact    ASSESSMENT: Pertinent issues addressed/ reviewed during this cardiology visit  Stable coronary artery disease  Mild ischemic cardiomyopathy  Chronic kidney disease  Whitecoat hypertension    RECOMMENDATIONS:  Patient is asymptomatic from a cardiac standpoint.  His LDL in May 2024 was 59.  However prior to that he was on simvastatin.  Currently I see atorvastatin on his medication list but he cannot confirm with me whether he is taking it or not.  I will have my MA call him later today to confirm that.  Continue aspirin for life.  Statin for life.  LDL goal 50-70.  Healthy diet exercise weight loss.  No smoking.  Alert cardiology with any clinical changes or any cardiac symptoms.  Otherwise follow-up with NATHAN in 6 months or sooner if anything changes clinically.  Pathophysiology of ACS explained to the patient.  He is quite anxious.  All questions answered in detail.  Follow-up with PCP for chronic kidney disease.  Defer need for renal referral to them.    It was a pleasure seeing this patient in clinic today. Please do not hesitate to contact me with any future questions.     ALE Garcia, [facility]  Cardiology - [facility]  September 23, 2024    Total visit time including chart review documentation and in person clinical care today was 40 minutes    This note was completed in part using dictation via the Dragon voice recognition software. Some word and grammatical errors may occur and must be interpreted in the appropriate clinical context.  If there are any questions pertaining to this issue, please contact me for  further clarification.      Thank you for allowing me to participate in the care of your patient.      Sincerely,     Srinath Gross MD     [facility]  cc:   No referring provider defined for this encounter.

## 2024-09-23 NOTE — TELEPHONE ENCOUNTER
"Routing to Dr. Gamboa-  please review and advise on topiramate (TOPAMAX). Rx pended for refill. Thanks!     Patient left VM on RN PAL direct line today 9/23- requesting call back to discuss a medication question    RN called and spoke with patient   Pt wondering if he should continue taking topiramate (TOPAMAX) - forgot to discuss at recent OV with Dr. Gamboa, pt states he is confused by the SIG that states for him to \"use second\"  No refills remaining, has been without med for a few days    topiramate (TOPAMAX) 100 MG tablet 90 tablet 1 1/18/2024 -- No   Sig - Route: Take 1 tablet (100 mg) by mouth daily Use second - Oral     Per chart review- 7/18/24 OV w/ Dr. Gamboa   \"Chronic migraine without aura without status migrainosus, not intractable   Uses Topiramate for prophylaxis and when he is getting migraine, he take rizatriptan 10 mg, which is about every 3 months.\"    Jazmin STEIN RN  [facility]   "

## 2024-09-24 RX ORDER — TOPIRAMATE 100 MG/1
100 TABLET, FILM COATED ORAL DAILY
Qty: 90 TABLET | Refills: 3 | Status: SHIPPED | OUTPATIENT
Start: 2024-09-24

## 2024-09-24 NOTE — TELEPHONE ENCOUNTER
Thanks Jazmin, very thorough.  I think we should continue on topiramate at this time, and go from there.   Meanwhile, let's refer him to Neurology for his headache and meningioma, I Put the referral in and they will be contacting him soon.  Double checking, is this the right dose he is taking?    Milton Gamboa MD  [facility].   [phone]

## 2024-09-24 NOTE — TELEPHONE ENCOUNTER
Dr. Gamboa-  per [facility]'s note it sounded like pt was going to continue to follow-up with their Neurology team next year. They just recommended he see a memory specialist up here so he wouldn't have to travel long distance to [place] more. That referral was already placed by Dr. Noel - pt just needs to call and schedule. Are you wanting him to see an additional Neurologist around here on top of the memory specialist and [facility] then? Please also review and send Topiramate refill. Thanks!     See last Topiramate rx to confirm dose:  topiramate (TOPAMAX) 100 MG tablet 90 tablet 1 1/18/2024 -- No   Sig - Route: Take 1 tablet (100 mg) by mouth daily Use second - Oral     Jazmin STEIN RN  [facility]

## 2024-09-24 NOTE — TELEPHONE ENCOUNTER
Called pt and relayed provider message below. Patient was given an opportunity to ask questions, verbalized understanding of plan, and is agreeable.    Tavia GARCIAS RN

## 2024-09-25 ENCOUNTER — ALLIED HEALTH/NURSE VISIT (OUTPATIENT)
Dept: FAMILY MEDICINE | Facility: CLINIC | Age: 69
End: 2024-09-25
Payer: COMMERCIAL

## 2024-09-25 VITALS — DIASTOLIC BLOOD PRESSURE: 70 MMHG | SYSTOLIC BLOOD PRESSURE: 128 MMHG

## 2024-09-25 DIAGNOSIS — I10 BENIGN ESSENTIAL HYPERTENSION: Primary | ICD-10-CM

## 2024-09-25 PROCEDURE — 99207 PR NO CHARGE NURSE ONLY: CPT

## 2024-09-25 NOTE — PROGRESS NOTES
Kashmir Yao is a 69 year old patient who comes in today for a Blood Pressure check.  Initial BP:  /70 (BP Location: Left arm, Patient Position: Chair, Cuff Size: Adult Regular)      Data Unavailable  Disposition: follow-up as previously indicated by provider  Leticia Reed CMA

## 2024-10-03 ENCOUNTER — TRANSFERRED RECORDS (OUTPATIENT)
Dept: HEALTH INFORMATION MANAGEMENT | Facility: CLINIC | Age: 69
End: 2024-10-03
Payer: COMMERCIAL

## 2024-10-07 DIAGNOSIS — Z95.1 S/P CABG X 3: ICD-10-CM

## 2024-10-22 ENCOUNTER — TRANSFERRED RECORDS (OUTPATIENT)
Dept: HEALTH INFORMATION MANAGEMENT | Facility: CLINIC | Age: 69
End: 2024-10-22
Payer: COMMERCIAL

## 2024-10-22 ENCOUNTER — TELEPHONE (OUTPATIENT)
Dept: FAMILY MEDICINE | Facility: CLINIC | Age: 69
End: 2024-10-22
Payer: COMMERCIAL

## 2024-10-22 NOTE — TELEPHONE ENCOUNTER
Incoming call from patient. Reports that his pharmacy is not able to dispense his heartburn medication, pantoprazole. Pantoprazole (PROTONIX) 40 MG EC tablet 90 tablets with 3 refills was sent 9/17/24.    Outgoing call to [facility] to clarify situation. They are closed for lunch. Will call back at 2pm when pharmacy reopens after lunch.    Liliane Washington RN

## 2024-10-22 NOTE — TELEPHONE ENCOUNTER
Outgoing call to [facility]. They state there is a refill to soon warning on the pantoprazole 40mg because he picked up that dose last on 9/25/24. I did confirm with pharmacy that this was the 40mg dose he picked up and not the previous prescription.    Outgoing call to patient. He will check around the house for the medication and let us know if he can't find it. He feels certain he did not pick it up.    Liliane Washington RN

## 2024-10-25 ENCOUNTER — TRANSFERRED RECORDS (OUTPATIENT)
Dept: HEALTH INFORMATION MANAGEMENT | Facility: CLINIC | Age: 69
End: 2024-10-25
Payer: COMMERCIAL

## 2024-11-03 RX ORDER — METOPROLOL TARTRATE 25 MG/1
25 TABLET, FILM COATED ORAL 2 TIMES DAILY
Qty: 180 TABLET | OUTPATIENT
Start: 2024-11-03

## 2024-11-04 ENCOUNTER — PATIENT OUTREACH (OUTPATIENT)
Dept: FAMILY MEDICINE | Facility: CLINIC | Age: 69
End: 2024-11-04
Payer: COMMERCIAL

## 2024-11-04 NOTE — LETTER
November 4, 2024      Kashmir Yao  [address] [address]        Dear Naveen,         I want to update you on some changes to your care team.  Currently, I work closely with your primary care provider as a PAL RN to manage your care needs.  In the future, we will be using a collaborative team-based approach, with a larger RN team at our clinic.  The highly skilled RN team will still work closely with your primary care provider to meet your needs in a timely manner.  This will ensure your messages, questions and care needs are answered promptly.  Starting now, please call [phone] when you need to contact us.  Any previous direct numbers to a PAL RN will route to an available nurse or care team member working with your provider.   We understand this is a change and want to reassure you that this team approach is designed to better support your healthcare needs and overall experience with our clinic.  We are committed to continuing to be involved in your care, and our team of nurses will work closely to ensure you receive exceptional care.    Thank you for entrusting your care to [facility].        Jazmin German, RN   Clinic Nurse  [facility]

## 2024-11-04 NOTE — TELEPHONE ENCOUNTER
Sent PAL RN letter notifying pt of end of PAL RN services to address on file.      Pam MARSHALL RN   Clinic RN  [facility]

## 2024-11-11 ENCOUNTER — TELEPHONE (OUTPATIENT)
Dept: FAMILY MEDICINE | Facility: CLINIC | Age: 69
End: 2024-11-11
Payer: COMMERCIAL

## 2024-11-11 NOTE — TELEPHONE ENCOUNTER
Patient left VM on RN PAL direct line requesting call back - states he would like to get a message to Dr. Gamboa regarding some tests     RN attempted (attempt #1) to contact patient  Left VM requesting call back to [phone] and ask to speak with a member of the care team, awaiting call back at this time     Jazmin STEIN RN  [facility]

## 2024-11-12 NOTE — TELEPHONE ENCOUNTER
RN called and spoke with patient   Patient wanting the phone number for Neurology referral placed 5/29/24; Was previously scheduled, but missed appt d/t unexpected cardiac surgery   RN provided phone number to schedule [phone]   Informed patient of PAL services ending, discussed what future RN role will look like and how to contact clinic   Patient upset about PAL role change and would like to contact someone for him to give feedback   RN provided phone number for patient relations [phone]    Patient was given an opportunity to ask questions, verbalized understanding of plan, and is agreeable.     Jazmin STEIN RN  [facility]

## 2024-12-05 DIAGNOSIS — E66.811 CLASS 1 OBESITY DUE TO EXCESS CALORIES WITHOUT SERIOUS COMORBIDITY IN ADULT, UNSPECIFIED BMI: ICD-10-CM

## 2024-12-05 DIAGNOSIS — E66.09 CLASS 1 OBESITY DUE TO EXCESS CALORIES WITHOUT SERIOUS COMORBIDITY IN ADULT, UNSPECIFIED BMI: ICD-10-CM

## 2024-12-05 NOTE — TELEPHONE ENCOUNTER
Incoming call from patient. Requesting refill of Pantoprazole (PROTONIX) 40 MG EC tablet. 90 tablets with 3 refills was sent 9/17/24.     Outgoing call to [facility] to make sure it is available, as I believe this may be too soon to . I see in the past (10/22/24 encounter), I called pharmacy and patient had last picked it up 9/25/24.    I was on hold for 10 min, but my shift ended. Clinic RN please call pharmacy when you get the chance.    Liliane Washington, RN

## 2024-12-06 NOTE — TELEPHONE ENCOUNTER
Message #1 left for patient to return call, please advise to contact pharmacy to  refills and in the future call pharmacy first to see if refills on file     RN placed call to [facility]   Patient does have refills on file for pantoprazole - It is time to refill and they will get ready for patient     Roxanna Moody Registered Nurse  [facility]

## 2024-12-09 NOTE — TELEPHONE ENCOUNTER
Pt returning our call. Advised as below. Pt agrees to contact his pharmacy.     Also, he states that semaglutide is covered by his insurance for weight loss now. Says that he called his insurance to find that out. So, requesting pcp to send a rx for semaglutide to his pharmacy. Pended med, please review & sign if appropriate. Thanks.     Requesting a call back after sending the rx.     Semaglutide-Weight Management (WEGOVY) 1 MG/0.5ML pen (Discontinued) 2 mL 1 1/23/2024 2/5/2024 No   Sig - Route: Inject 1 mg Subcutaneous once a week - Subcutaneous     Lamberto Narayan RN  [facility]

## 2024-12-09 NOTE — TELEPHONE ENCOUNTER
Message #2 left for patient to return call     Please let patient know he did have refills on file at the pharmacy for pantoprazole, they were getting this ready for    Please educate patient that when he needs a refill to call the pharmacy first     Roxanna Moody, Registered Nurse  [facility]

## 2024-12-10 ENCOUNTER — TELEPHONE (OUTPATIENT)
Dept: FAMILY MEDICINE | Facility: CLINIC | Age: 69
End: 2024-12-10
Payer: COMMERCIAL

## 2024-12-10 NOTE — TELEPHONE ENCOUNTER
Called and spoke with pt,     Relayed message below.   -Assisted pt in scheduling a VV on 1/3 to discuss weight loss medications.     Pt verbalizes understanding and states that he is open to wegovy or other weight loss medication.     Pt denies any further questions or concerns at this time.     Pam MARSHALL RN   Clinic RN  [facility]

## 2024-12-10 NOTE — TELEPHONE ENCOUNTER
Medication is not covered, please inform patient.  I really don't remember ordering this medicine for him?

## 2024-12-10 NOTE — TELEPHONE ENCOUNTER
Retail Pharmacy Prior Authorization Team   Phone: [phone]    PRIOR AUTHORIZATION DENIED    Medication: OZEMPIC (0.25 OR 0.5 MG/DOSE) 2 MG/3ML SC SOPN  Insurance Company: [employer] ([facility]) - Phone [phone] Fax [phone]  Denial Date: 12/10/2024  Denial Reason(s): DRUGS USED FOR WEIGHT LOSS ARE EXCLUDED FROM COVERAGE      Appeal Information: N/A  Patient Notified: NO

## 2024-12-29 DIAGNOSIS — Z95.1 S/P CABG X 3: ICD-10-CM

## 2025-01-02 ENCOUNTER — TRANSFERRED RECORDS (OUTPATIENT)
Dept: HEALTH INFORMATION MANAGEMENT | Facility: CLINIC | Age: 70
End: 2025-01-02
Payer: COMMERCIAL

## 2025-01-03 PROBLEM — D42.9: Status: RESOLVED | Noted: 2024-03-14 | Resolved: 2025-01-03

## 2025-01-03 PROBLEM — B35.6 TINEA CRURIS: Status: ACTIVE | Noted: 2025-01-03

## 2025-01-05 RX ORDER — ATORVASTATIN CALCIUM 40 MG/1
40 TABLET, FILM COATED ORAL EVERY EVENING
Qty: 30 TABLET | OUTPATIENT
Start: 2025-01-05

## 2025-01-06 ENCOUNTER — TELEPHONE (OUTPATIENT)
Dept: FAMILY MEDICINE | Facility: CLINIC | Age: 70
End: 2025-01-06
Payer: COMMERCIAL

## 2025-01-06 NOTE — TELEPHONE ENCOUNTER
Prior Authorization Retail Medication Request    Medication/Dose:   Semaglutide-Weight Management (WEGOVY) 0.5 MG/0.5ML     Diagnosis and ICD code (if different than what is on RX):    New/renewal/insurance change PA/secondary ins. PA:  Previously Tried and Failed:    Rationale:      Rx : [id number]    Mitzi Chavez, TC

## 2025-01-07 NOTE — TELEPHONE ENCOUNTER
Retail Pharmacy Prior Authorization Team   Phone: [phone]    PA Initiation    Medication: WEGOVY 1 MG/0.5ML SC SOAJ  Insurance Company: [employer] Part D - Phone [phone] Fax [phone]  Pharmacy Filling the Rx: [id number] [place], [place] - [address] AT [facility] [address]  Filling Pharmacy Phone: [phone]  Filling Pharmacy Fax:    Start Date: 1/7/2025    [id number]

## 2025-01-12 DIAGNOSIS — Z95.1 S/P CABG X 3: ICD-10-CM

## 2025-01-13 NOTE — TELEPHONE ENCOUNTER
Clinic RN: Please investigate patient's chart or contact patient if the information cannot be found because patient should have run out of this medication on 10/11/24. Confirm patient is taking this medication as prescribed. Document findings and route refill encounter to provider for approval or denial.  Jocelyne Cosme RN, BSN  [facility]

## 2025-01-13 NOTE — TELEPHONE ENCOUNTER
"Per chart review:   Last ordered by [facility] provider during admission 06/11/24.  Sees cardiology (last visit 9/23/24). Did not order metoprolol at that time.  Last discussed at visit with Milton Gamboa MD 8/20/24 \"Benign essential hypertension - Primary. Controlled, continue on Metoprolol.\" Did not order metoprolol at that time.    Milton Gamboa MD were you planning to manage this medication going forward or should cardiology?    Liliane Washington RN    "

## 2025-01-14 DIAGNOSIS — Z95.1 S/P CABG X 3: ICD-10-CM

## 2025-01-14 RX ORDER — METOPROLOL TARTRATE 25 MG/1
25 TABLET, FILM COATED ORAL 2 TIMES DAILY
Qty: 180 TABLET | Refills: 3 | OUTPATIENT
Start: 2025-01-14

## 2025-01-14 RX ORDER — METOPROLOL TARTRATE 25 MG/1
25 TABLET, FILM COATED ORAL 2 TIMES DAILY
Qty: 180 TABLET | Refills: 2 | Status: SHIPPED | OUTPATIENT
Start: 2025-01-14

## 2025-01-14 NOTE — TELEPHONE ENCOUNTER
Prior Authorization Approval    Medication: WEGOVY 1 MG/0.5ML SC SOAJ  Authorization Effective Date: 1/8/2025  Authorization Expiration Date: 12/31/2025  Approved Dose/Quantity:   Reference #:     Insurance Company: [employer] Part D - Phone [phone] Fax [phone]  Expected CoPay: $    CoPay Card Available:      Financial Assistance Needed:   Which Pharmacy is filling the prescription: [id number] [address] AT [facility] [address]  Pharmacy Notified: Yes  Patient Notified: **Instructed pharmacy to notify patient when script is ready to /ship.**

## 2025-01-14 NOTE — TELEPHONE ENCOUNTER
Routing refill encounter to cardiology per pcp request     Roxanna Moody, Registered Nurse  [facility]

## 2025-01-15 NOTE — TELEPHONE ENCOUNTER
Patient calling to inquire about refill.    Informed Dr. Gamboa requested ordered by cardiology. Informed patient that cardiologist Dr. Gross placed order for metoprolol yesterday. Advised to contact pharmacy. Patient stated he will call pharmacy to inquire.    Per chart review  metoprolol tartrate (LOPRESSOR) 25 MG tablet 180 tablet 2 1/14/2025 -- No   Sig - Route: Take 1 tablet (25 mg) by mouth 2 times daily. - Oral     [id number] [address] AT [facility] [address]     Kathi Rodriguez RN

## 2025-01-16 ENCOUNTER — TELEPHONE (OUTPATIENT)
Dept: NEUROLOGY | Facility: CLINIC | Age: 70
End: 2025-01-16
Payer: COMMERCIAL

## 2025-01-23 ENCOUNTER — TRANSFERRED RECORDS (OUTPATIENT)
Dept: HEALTH INFORMATION MANAGEMENT | Facility: CLINIC | Age: 70
End: 2025-01-23
Payer: COMMERCIAL

## 2025-02-18 ENCOUNTER — OFFICE VISIT (OUTPATIENT)
Dept: FAMILY MEDICINE | Facility: CLINIC | Age: 70
End: 2025-02-18
Payer: COMMERCIAL

## 2025-02-18 ENCOUNTER — TELEPHONE (OUTPATIENT)
Dept: FAMILY MEDICINE | Facility: CLINIC | Age: 70
End: 2025-02-18

## 2025-02-18 VITALS
RESPIRATION RATE: 20 BRPM | DIASTOLIC BLOOD PRESSURE: 86 MMHG | OXYGEN SATURATION: 98 % | HEART RATE: 74 BPM | TEMPERATURE: 97 F | BODY MASS INDEX: 28.82 KG/M2 | HEIGHT: 69 IN | WEIGHT: 194.6 LBS | SYSTOLIC BLOOD PRESSURE: 131 MMHG

## 2025-02-18 DIAGNOSIS — Z01.818 PREOP GENERAL PHYSICAL EXAM: Primary | ICD-10-CM

## 2025-02-18 DIAGNOSIS — G56.21 ENTRAPMENT OF RIGHT ULNAR NERVE: ICD-10-CM

## 2025-02-18 LAB
ERYTHROCYTE [DISTWIDTH] IN BLOOD BY AUTOMATED COUNT: 15.5 % (ref 10–15)
HCT VFR BLD AUTO: 37.5 % (ref 40–53)
HGB BLD-MCNC: 12.6 G/DL (ref 13.3–17.7)
MCH RBC QN AUTO: 27.4 PG (ref 26.5–33)
MCHC RBC AUTO-ENTMCNC: 33.6 G/DL (ref 31.5–36.5)
MCV RBC AUTO: 82 FL (ref 78–100)
PLATELET # BLD AUTO: 204 10E3/UL (ref 150–450)
RBC # BLD AUTO: 4.6 10E6/UL (ref 4.4–5.9)
WBC # BLD AUTO: 10.5 10E3/UL (ref 4–11)

## 2025-02-18 PROCEDURE — 93000 ELECTROCARDIOGRAM COMPLETE: CPT | Performed by: FAMILY MEDICINE

## 2025-02-18 PROCEDURE — 99214 OFFICE O/P EST MOD 30 MIN: CPT | Performed by: FAMILY MEDICINE

## 2025-02-18 PROCEDURE — 80048 BASIC METABOLIC PNL TOTAL CA: CPT | Performed by: FAMILY MEDICINE

## 2025-02-18 PROCEDURE — 85027 COMPLETE CBC AUTOMATED: CPT | Performed by: FAMILY MEDICINE

## 2025-02-18 PROCEDURE — 36415 COLL VENOUS BLD VENIPUNCTURE: CPT | Performed by: FAMILY MEDICINE

## 2025-02-18 ASSESSMENT — PATIENT HEALTH QUESTIONNAIRE - PHQ9
SUM OF ALL RESPONSES TO PHQ QUESTIONS 1-9: 3
SUM OF ALL RESPONSES TO PHQ QUESTIONS 1-9: 3
10. IF YOU CHECKED OFF ANY PROBLEMS, HOW DIFFICULT HAVE THESE PROBLEMS MADE IT FOR YOU TO DO YOUR WORK, TAKE CARE OF THINGS AT HOME, OR GET ALONG WITH OTHER PEOPLE: NOT DIFFICULT AT ALL

## 2025-02-18 ASSESSMENT — PAIN SCALES - GENERAL: PAINLEVEL_OUTOF10: NO PAIN (0)

## 2025-02-18 ASSESSMENT — ANXIETY QUESTIONNAIRES: GAD7 TOTAL SCORE: INCOMPLETE

## 2025-02-18 NOTE — TELEPHONE ENCOUNTER
Cardiology team   Please review to see if patient should have visit with cardiology to clear prior to upcoming surgery on 2/25/25 for Ulnar Nerve Decompression Right     Thank you   Roxanna Moody, Registered Nurse  [facility]

## 2025-02-18 NOTE — PATIENT INSTRUCTIONS
How to Take Your Medication Before Surgery  Preoperative Medication Instructions   Hold aspirin starting today 2025.  And do not take Semaglutide this 2025.  Otherwise, continue on taking all your other medications.       Patient Education   Preparing for Your Surgery  For Adults  Getting started  In most cases, a nurse will call to review your health history and instructions. They will give you an arrival time based on your scheduled surgery time. Please be ready to share:  Your doctor's clinic name and phone number  Your medical, surgical, and anesthesia history  A list of allergies and sensitivities  A list of medicines, including herbal treatments and over-the-counter drugs  Whether the patient has a legal guardian (ask how to send us the papers in advance)  Note: You may not receive a call if you were seen at our PAC (Preoperative Assessment Center).  Please tell us if you're pregnant--or if there's any chance you might be pregnant. Some surgeries may injure a fetus (unborn baby), so they require a pregnancy test. Surgeries that are safe for a fetus don't always need a test, and you can choose whether to have one.   Preparing for surgery  Within 10 to 30 days of surgery: Have a pre-op exam (sometimes called an H&P, or History and Physical). This can be done at a clinic or pre-operative center.  If you're having a , you may not need this exam. Talk to your care team.  At your pre-op exam, talk to your care team about all medicines you take. (This includes CBD oil and any drugs, such as THC, marijuana, and other forms of cannabis.) If you need to stop any medicine before surgery, ask when to start taking it again.  This is for your safety. Many medicines and drugs can make you bleed too much during surgery. Some change how well surgery (anesthesia) drugs work.  Call your insurance company to let them know you're having surgery. (If you don't have insurance, call [phone].)  Call your  clinic if there's any change in your health. This includes a scrape or scratch near the surgery site, or any signs of a cold (sore throat, runny nose, cough, rash, fever).  Eating and drinking guidelines  For your safety: Unless your surgeon tells you otherwise, follow the guidelines below.  Eat and drink as normal until 8 hours before you arrive for surgery. After that, no food or milk. You can spit out gum when you arrive.  Drink clear liquids until 2 hours before you arrive. These are liquids you can see through, like water, Gatorade, and Propel Water. They also include plain black coffee and tea (no cream or milk).  No alcohol for 24 hours before you arrive. The night before surgery, stop any drinks that contain THC.  If your care team tells you to take medicine on the morning of surgery, it's okay to take it with a sip of water. No other medicines or drugs are allowed (including CBD oil)--follow your care team's instructions.  If you have questions the day of surgery, call your hospital or surgery center.   Preventing infection  Shower or bathe the night before and the morning of surgery. Follow the instructions your clinic gave you. (If no instructions, use regular soap.)  Don't shave or clip hair near your surgery site. We'll remove the hair if needed.  Don't smoke or vape the morning of surgery. No chewing tobacco for 6 hours before you arrive. A nicotine patch is okay. You may spit out nicotine gum when you arrive.  For some surgeries, the surgeon will tell you to fully quit smoking and nicotine.  We will make every effort to keep you safe from infection. We will:  Clean our hands often with soap and water (or an alcohol-based hand rub).  Clean the skin at your surgery site with a special soap that kills germs.  Give you a special gown to keep you warm. (Cold raises the risk of infection.)  Wear hair covers, masks, gowns, and gloves during surgery.  Give antibiotic medicine, if prescribed. Not all surgeries  need this medicine.  What to bring on the day of surgery  Photo ID and insurance card  Copy of your health care directive, if you have one  Glasses and hearing aids (bring cases)  You can't wear contacts during surgery  Inhaler and eye drops, if you use them (tell us about these when you arrive)  CPAP machine or breathing device, if you use them  A few personal items, if spending the night  If you have . . .  A pacemaker, ICD (cardiac defibrillator), or other implant: Bring the ID card.  An implanted stimulator: Bring the remote control.  A legal guardian: Bring a copy of the certified (court-stamped) guardianship papers.  Please remove any jewelry, including body piercings. Leave jewelry and other valuables at home.  If you're going home the day of surgery  You must have a responsible adult drive you home. They should stay with you overnight as well.  If you don't have someone to stay with you, and you aren't safe to go home alone, we may keep you overnight. Insurance often won't pay for this.  After surgery  If it's hard to control your pain or you need more pain medicine, please call your surgeon's office.  Questions?   If you have any questions for your care team, list them here:   ____________________________________________________________________________________________________________________________________________________________________________________________________________________________________________________________  For informational purposes only. Not to replace the advice of your health care provider. Copyright   2003, 2019 [facility]. All rights reserved. Clinically reviewed by Jaun Garay MD. Shaker [id number] - REV 08/24.

## 2025-02-18 NOTE — TELEPHONE ENCOUNTER
I wonder if we can pass a message to cardiology to see if they wish to see patient for cardiology pre op.  The only reason is because he had Triple bypass surgery in 6/2024, otherwise, he doesn't have any symptoms.  Milton Gamboa MD  [facility].   [phone]

## 2025-02-18 NOTE — PROGRESS NOTES
Preoperative Evaluation  [facility]  [address] [facility] [address] [address]  Phone: [phone]  Primary Provider: Milton Gamboa MD  Pre-op Performing Provider: Milton Gamboa MD  Feb 18, 2025 2/18/2025   Surgical Information   What procedure is being done? Ulnar Nerve Decompression -right   Facility or Hospital where procedure/surgery will be performed: [facility]   Who is doing the procedure / surgery? Chang Figueroa   Date of surgery / procedure: 2/25/2025   Time of surgery / procedure: 2:10 PM   Where do you plan to recover after surgery? at home with family     Fax number for surgical facility: [phone]     Assessment & Plan     The proposed surgical procedure is considered INTERMEDIATE risk.    Entrapment of right ulnar nerve  Scheduled for the above surgery    Preop general physical exam  Check below for recommendations.   - CBC with platelets; Future  - Basic metabolic panel  (Ca, Cl, CO2, Creat, Gluc, K, Na, BUN); Future  - EKG 12-lead complete w/read - Clinics  - CBC with platelets  - Basic metabolic panel  (Ca, Cl, CO2, Creat, Gluc, K, Na, BUN)           Risks and Recommendations  The patient has the following additional risks and recommendations for perioperative complications:  Cardiovascular:   - Cardiology  consulted in regards to this patient, given that he had triple bypass surgery in 6/2024, given that the patient does not have any symptoms and he meets METS 4, then he is ok to proceed with surgery.    Antiplatelet or Anticoagulation Medication Instructions   - aspirin: Discontinue aspirin 7 days prior to procedure to reduce bleeding risk. It should be resumed postoperatively.     Additional Medication Instructions   - Beta Blockers (atenolol, metoprolol, propranolol) : Continue taking on the day of surgery.   - Statins (atorvastatin, simvastatin, pravastatin) : Continue taking on the day of surgery.    - GLP-1 Injectable (exenitide,  liraglutide, semaglutide, dulaglutide, etc.): DO NOT TAKE 7 days before surgery    - Antiepileptics: Continue without modification.   - triptans, CGRP inhibitors, migraine abortives: DO NOT TAKE on day of surgery   - SSRIs, SNRIs, TCAs, Antipsychotics: Continue without modification.     Recommendation  Patient may proceed with the upcoming surgery.    Vijay Hodge is a 69 year old, presenting for the following:  Pre-Op Exam          2/18/2025     2:31 PM   Additional Questions   Roomed by kelli clark   Accompanied by self     HPI related to upcoming procedure:   Ulnar Nerve Decompression -right       2/18/2025   Pre-Op Questionnaire   Have you ever had a heart attack or stroke? (!) YES 6/4/2024   Have you ever had surgery on your heart or blood vessels, such as a stent placement, a coronary artery bypass, or surgery on an artery in your head, neck, heart, or legs? (!) YES s/p 3 vessel bypass surgery.   Do you have chest pain with activity? No   Do you have a history of heart failure? No   Do you currently have a cold, bronchitis or symptoms of other infection? No   Do you have a cough, shortness of breath, or wheezing? No   Do you or anyone in your family have previous history of blood clots? No   Do you or does anyone in your family have a serious bleeding problem such as prolonged bleeding following surgeries or cuts? No   Have you ever had problems with anemia or been told to take iron pills? No   Have you had any abnormal blood loss such as black, tarry or bloody stools? No   Have you ever had a blood transfusion? No   Are you willing to have a blood transfusion if it is medically needed before, during, or after your surgery? Yes   Have you or any of your relatives ever had problems with anesthesia? No   Do you have sleep apnea, excessive snoring or daytime drowsiness? (!) YES   Do you have a CPAP machine? Yes   Do you have any artifical heart valves or other implanted medical devices like a pacemaker,  defibrillator, or continuous glucose monitor? No   Do you have artificial joints? (!) YES left knee.   Are you allergic to latex? No     Health Care Directive  Patient does not have a Health Care Directive:     Preoperative Review of    reviewed - no record of controlled substances prescribed.      Status of Chronic Conditions:  See problem list for active medical problems.  Problems all longstanding and stable, except as noted/documented.  See ROS for pertinent symptoms related to these conditions.    CAD - Patient has a longstanding history of moderate-severe CAD. Patient denies recent chest pain or NTG use, denies exercise induced dyspnea or PND.    Patient Active Problem List    Diagnosis Date Noted    Tinea cruris 01/03/2025     Priority: Medium    Coronary artery disease s/p CABG 2024. 06/25/2024     Priority: Medium    NSTEMI (non-ST elevated myocardial infarction) (H) 05/31/2024     Priority: Medium    Generalized anxiety disorder 03/16/2024     Priority: Medium    Spinal stenosis 12/27/2023     Priority: Medium    Primary insomnia 09/28/2023     Priority: Medium    Closed fracture of left shoulder with delayed healing, subsequent encounter 09/07/2023     Priority: Medium    Low iron 09/07/2023     Priority: Medium    Primary osteoarthritis of both hips 06/26/2023     Priority: Medium    Elevated glucose 05/01/2023     Priority: Medium    Gross hematuria 04/10/2023     Priority: Medium    Chronic migraine without aura without status migrainosus, not intractable 06/24/2022     Priority: Medium    Generalized tonic clonic epilepsy (H) 06/24/2022     Priority: Medium    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     Priority: Medium    Oral phase dysphagia 04/21/2021     Priority: Medium    Post herpetic neuralgia 12/02/2020     Priority: Medium    Bell's palsy 09/08/2020     Priority: Medium    Benign prostatic hyperplasia with urinary frequency 09/08/2020     Priority: Medium    Vasculogenic erectile  dysfunction 07/28/2020     Priority: Medium    Trigger finger of left hand, unspecified finger 04/13/2020     Priority: Medium    Left carpal tunnel syndrome 02/13/2020     Priority: Medium    Primary osteoarthritis of both hands 01/02/2020     Priority: Medium    Chronic cough 01/02/2020     Priority: Medium    Panlobular emphysema (H) 01/02/2020     Priority: Medium    Other constipation 05/25/2016     Priority: Medium    Benign essential hypertension 05/20/2016     Priority: Medium    S/P TKR (total knee replacement) 05/16/2016     Priority: Medium    RIC (obstructive sleep apnea)      Priority: Medium    Esophageal reflux 04/10/2008     Priority: Medium    Hyperlipidemia LDL goal <70 02/14/2007     Priority: Medium      Past Medical History:   Diagnosis Date    Anemia 07/31/2020    Arthritis     Bell's palsy 09/08/2020    Benign prostatic hyperplasia with incomplete bladder emptying 07/28/2020    Bilateral carpal tunnel syndrome 02/13/2020    CAD (coronary artery disease) 2010    a subtotally occluded obtuse marginal vessel which was stented with a drug-coated stent.  He had 50% to 60% LAD disease and 25% to 30% right coronary disease    Cognitive change 01/02/2020    Colonic polyps 2009    tubular adenomae with mildly dysplastic features    Common wart 12/02/2020    Disturbance of skin sensation     Encephalocele (H) 2009    left frontal    Epilepsy, partial (H) 2009    with secondary generalization    GERD (gastroesophageal reflux disease)     Heart attack (H)     5 years ago. Cardiology 3 months age    Herpes zoster with complication 10/07/2020    History of spinal cord injury     Hypertension     Iron deficiency 09/09/2020    Meningioma (H) 04/10/2023    RIC (obstructive sleep apnea)     RIC (obstructive sleep apnea)     Paraneoplastic Antibody  positive[799.89BS] 2009    mildly elevated alpha 3 ganglionic acetylcholine receptor and JEOVANNY 65 receptor  antibody    Paronychia of toe, left 06/26/2023    Pedal  edema 02/14/2020    Post herpetic neuralgia 12/02/2020    Primary osteoarthritis of both hands 01/02/2020    Rash 04/19/2024    Reactive depression 06/25/2024    Seizures (H)     Last seizure 2012    Spinal stenosis     Stented coronary artery     Tobacco use disorder     Ulnar neuropathy of both upper extremities 02/13/2020    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    Ventricular Assymetry 2009    likely congenital right lateral ventricular enlargement    Visi (volar intercalated segment instability), right 04/10/2023    Severe Wyman      Weight gain 03/14/2024     Past Surgical History:   Procedure Laterality Date    ARTHROPLASTY KNEE Left 05/16/2016    Procedure: ARTHROPLASTY KNEE;  Surgeon: Chet Leonardo MD;  Location:  OR    BACK SURGERY      BYPASS GRAFT ARTERY CORONARY N/A 6/4/2024    Procedure: CORONARY ARTERY BYPASS GRAFT x 3 (LEFT INTERNAL MAMMARY ARTERY - LEFT ANTERIOR DESCENDING ARTERY; SAPHENOUS VEIN - OBTUSE MARGINAL ARTERY 1; SAPHENOUS VEIN -OBTUSE MARGINAL ARTERY 2) WITH ENDOSCOPIC SAPHENOUS VEIN HARVEST ON THE RIGHT LOWER EXTREMITY, AND ON CARDIOPULMONARY PUMP OXYGENATOR (INTRAOPERATIVE TRANSESOPHAGEAL ECHOCARDIOGRAM BY ANESTHESIOLOGIST);  Surgeon: Melonie Morejon    CARPAL TUNNEL RELEASE RT/LT Right 03/11/2020    Right carpal tunnel release, Dr. Rick Frias, [facility]    CARPAL TUNNEL RELEASE RT/LT Left     COLONOSCOPY N/A 10/08/2020    Procedure: COLONOSCOPY WITH POLYPECTOMIES using cold jumbo forceps;  Surgeon: Kevin Song MD;  Location:  GI    CV CORONARY ANGIOGRAM N/A 5/30/2024    Procedure: Coronary Angiogram;  Surgeon: Crow Mukherjee MD;  Location:  HEART CARDIAC CATH LAB    CV INSTANTANEOUS WAVE-FREE RATIO N/A 5/30/2024    Procedure: Instantaneous Wave-Free Ratio;  Surgeon: Crow Mukherjee MD;  Location:  HEART CARDIAC CATH LAB    CYSTOSCOPY      ESOPHAGOSCOPY, GASTROSCOPY, DUODENOSCOPY (EGD), COMBINED  05/24/2012    Procedure:COMBINED  ESOPHAGOSCOPY, GASTROSCOPY, DUODENOSCOPY (EGD); ESOPHAGOSCOPY, GASTROSCOPY, DUODENOSCOPY (EGD) ; Surgeon:GILLIAN JOHNSON; Location: GI    ESOPHAGOSCOPY, GASTROSCOPY, DUODENOSCOPY (EGD), COMBINED N/A 10/08/2020    Procedure: ESOPHAGOGASTRODUODENOSCOPY (EGD) (fv);  Surgeon: Kevin Song MD;  Location:  GI    KNEE SURGERY Left     repair of cartilage    RELEASE TRIGGER FINGER Left 10/19/2020    Left ring finger trigger finger release at A1 pulley, Dr. Rick Frias, [facility]    SPINE SURGERY      repair of herniated disc lumbar    STENT      trigger finger release Right 09/15/2020    Right thumb trigger finger release, Dr. Frias    ULNAR NERVE TRANSPOSITION Left     VENTRICULOSTOMY      [facility] ANESTH,DX ARTHROSCOPIC PROC KNEE JOINT      CARTILEGE REPAIR.    [facility] NONSPECIFIC PROCEDURE      stint in heart    [facility] COLONOSCOPY THRU STOMA, DIAGNOSTIC  2009    tubular adenomae, recommended annual colonoscopy     Current Outpatient Medications   Medication Sig Dispense Refill    acetaminophen (TYLENOL) 500 MG tablet Take 1,000 mg by mouth every 8 hours as needed for mild pain      aspirin (ASA) 81 MG chewable tablet 2 tablets (162 mg) by Oral or NG Tube route daily 60 tablet 3    atorvastatin (LIPITOR) 40 MG tablet Take 1 tablet (40 mg) by mouth every evening. 90 tablet 3    fluticasone (FLONASE) 50 MCG/ACT nasal spray Spray 1 spray into both nostrils daily (Patient taking differently: Spray 1 spray into both nostrils as needed.) 16 g 0    hydrOXYzine HCl (ATARAX) 25 MG tablet Take 1-2 tablets (25-50 mg) by mouth every 6 hours as needed for anxiety or other (pain) 40 tablet 0    lamoTRIgine (LAMICTAL) 100 MG tablet Take 0.5 tablets (50 mg) by mouth 2 times daily      lamoTRIgine (LAMICTAL) 200 MG tablet Take 1 tablet (200 mg) by mouth 2 times daily      linaclotide (LINZESS) 290 MCG capsule Take 1 capsule (290 mcg) by mouth every morning (before breakfast) Prn constipatiion 90 capsule 1    loratadine  (CLARITIN) 10 MG tablet Take 1 tablet (10 mg) by mouth daily. (Patient taking differently: Take 10 mg by mouth as needed.) 30 tablet 0    metoprolol tartrate (LOPRESSOR) 25 MG tablet Take 1 tablet (25 mg) by mouth 2 times daily. 180 tablet 2    ondansetron (ZOFRAN ODT) 4 MG ODT tab Take 1 tablet (4 mg) by mouth every 6 hours as needed for nausea 30 tablet 0    pantoprazole (PROTONIX) 40 MG EC tablet Take 1 tablet (40 mg) by mouth daily. 90 tablet 3    prochlorperazine (COMPAZINE) 10 MG tablet Take 0.5 tablets (5 mg) by mouth every 6 hours as needed for nausea 30 tablet 0    rizatriptan (MAXALT) 10 MG tablet TAKE 1 TABLET(10 MG) BY MOUTH AT ONSET OF HEADACHE FOR MIGRAINE. MAY REPEAT IN 2 HOURS. MAX 3 TABLETS/ 24 HOURS 15 tablet 2    semaglutide (OZEMPIC) 2 MG/3ML pen Inject 0.25 mg subcutaneously every 7 days. 9 mL 1    semaglutide-weight management (WEGOVY) 0.25 MG/0.5ML pen Inject 0.5 mLs (0.25 mg) subcutaneously once a week. 2 mL 0    Semaglutide-Weight Management (WEGOVY) 0.5 MG/0.5ML pen Inject 0.5 mg subcutaneously once a week. 2 mL 0    Semaglutide-Weight Management (WEGOVY) 1 MG/0.5ML pen Inject 1 mg subcutaneously once a week. 2 mL 0    tamsulosin (FLOMAX) 0.4 MG capsule Take 1 capsule (0.4 mg) by mouth daily 90 capsule 2    topiramate (TOPAMAX) 100 MG tablet Take 1 tablet (100 mg) by mouth daily. 90 tablet 3    traZODone (DESYREL) 50 MG tablet Take 2 tablets (100 mg) by mouth at bedtime (Patient taking differently: Take 100 mg by mouth as needed.) 180 tablet 3    zolpidem (AMBIEN) 5 MG tablet Take 1 tablet (5 mg) by mouth nightly as needed for sleep 90 tablet 1       Allergies   Allergen Reactions    Gabapentin      Fever      Morphine And Codeine      inability to sleep after taking codeine    Other (Do Not Use)      EKG patches for prolonged period (inpatient) developed blister rash. Use hypoallergenic EKG patches.     Vicodin [Hydrocodone-Acetaminophen]      Perspiring, hyper, itchy        Social History  "    Tobacco Use    Smoking status: Former     Current packs/day: 0.00     Average packs/day: 0.5 packs/day for 20.0 years (10.0 ttl pk-yrs)     Types: Cigarettes     Start date: 1983     Quit date: 2003     Years since quittin.5     Passive exposure: Past    Smokeless tobacco: Never   Substance Use Topics    Alcohol use: No     Alcohol/week: 0.0 standard drinks of alcohol       History   Drug Use No             Review of Systems  Constitutional, HEENT, cardiovascular, pulmonary, GI, , musculoskeletal, neuro, skin, endocrine and psych systems are negative, except as otherwise noted.    Objective    /86 (BP Location: Left arm, Patient Position: Chair, Cuff Size: Adult Regular)   Pulse 74   Temp 97  F (36.1  C) (Temporal)   Resp 20   Ht 1.753 m (5' 9\")   Wt 88.3 kg (194 lb 9.6 oz)   SpO2 98%   BMI 28.74 kg/m     Estimated body mass index is 28.74 kg/m  as calculated from the following:    Height as of this encounter: 1.753 m (5' 9\").    Weight as of this encounter: 88.3 kg (194 lb 9.6 oz).  Physical Exam  GENERAL: alert and no distress  EYES: Eyes grossly normal to inspection, PERRL and conjunctivae and sclerae normal  HENT: ear canals and TM's normal, nose and mouth without ulcers or lesions  NECK: no adenopathy, no asymmetry, masses, or scars  RESP: lungs clear to auscultation - no rales, rhonchi or wheezes  CV: regular rate and rhythm, normal S1 S2, no S3 or S4, no murmur, click or rub, no peripheral edema  ABDOMEN: soft, nontender, no hepatosplenomegaly, no masses and bowel sounds normal  MS: no gross musculoskeletal defects noted, no edema  SKIN: no suspicious lesions or rashes  NEURO: Normal strength and tone, mentation intact and speech normal  PSYCH: mentation appears normal, affect normal/bright    Recent Labs   Lab Test 24  1406 24  0653 24  0413 24  1824 24  1658 24  1657 24  1743   HGB 12.2* 7.2*   < > 9.4* 8.8*   < > 13.4    258   " < > 127* 185   < > 196   INR  --   --   --  1.25* 1.35*  --   --     135   < > 143 141   < > 137   POTASSIUM 4.8 4.3   < > 3.3* 3.6   < > 4.3   CR 1.48* 1.39*   < > 1.24* 1.23*   < > 1.34*   A1C  --   --   --   --   --   --  4.8    < > = values in this interval not displayed.        Diagnostics  Labs pending at this time.  Results will be reviewed when available.   EKG: appears normal, NSR, normal axis, normal intervals, no acute ST/T changes c/w ischemia, no LVH by voltage criteria, unchanged from previous tracings    Revised Cardiac Risk Index (RCRI)  The patient has the following serious cardiovascular risks for perioperative complications:   - Coronary Artery Disease (MI, positive stress test, angina, Qs on EKG) = 1 point     RCRI Interpretation: 1 point: Class II (low risk - 0.9% complication rate)         Signed Electronically by: Milton Gamboa MD  A copy of this evaluation report is provided to the requesting physician.         Answers submitted by the patient for this visit:  Patient Health Questionnaire (Submitted on 2/18/2025)  If you checked off any problems, how difficult have these problems made it for you to do your work, take care of things at home, or get along with other people?: Not difficult at all  PHQ9 TOTAL SCORE: 3  Patient Health Questionnaire (G7) (Submitted on 2/18/2025)  JEOVANNY 7 TOTAL SCORE: Incomplete

## 2025-02-19 LAB
ANION GAP SERPL CALCULATED.3IONS-SCNC: 13 MMOL/L (ref 7–15)
BUN SERPL-MCNC: 21.5 MG/DL (ref 8–23)
CALCIUM SERPL-MCNC: 9.3 MG/DL (ref 8.8–10.4)
CHLORIDE SERPL-SCNC: 110 MMOL/L (ref 98–107)
CREAT SERPL-MCNC: 1.32 MG/DL (ref 0.67–1.17)
EGFRCR SERPLBLD CKD-EPI 2021: 58 ML/MIN/1.73M2
GLUCOSE SERPL-MCNC: 87 MG/DL (ref 70–99)
HCO3 SERPL-SCNC: 19 MMOL/L (ref 22–29)
POTASSIUM SERPL-SCNC: 3.8 MMOL/L (ref 3.4–5.3)
SODIUM SERPL-SCNC: 142 MMOL/L (ref 135–145)

## 2025-02-19 NOTE — TELEPHONE ENCOUNTER
If the patient does not have any active symptoms and functional capacity is greater than 4 METS, usually no further cardiac workup is required prior to noncardiac surgery.

## 2025-02-19 NOTE — TELEPHONE ENCOUNTER
Outgoing call to patient. Relayed message from Milton Gamboa MD below. Person was given an opportunity to ask questions, verbalized understanding, and is agreeable to plan.    Liliane Washington RN

## 2025-02-19 NOTE — TELEPHONE ENCOUNTER
Great, we can inform the patient that he is ok to proceed with surgery, no need for cardiology visit before surgery (I think he is scheduled for a visit after)

## 2025-02-24 ENCOUNTER — LAB (OUTPATIENT)
Dept: LAB | Facility: CLINIC | Age: 70
End: 2025-02-24
Payer: COMMERCIAL

## 2025-02-24 ENCOUNTER — HOSPITAL ENCOUNTER (OUTPATIENT)
Dept: CARDIOLOGY | Facility: CLINIC | Age: 70
Discharge: HOME | End: 2025-02-24
Attending: INTERNAL MEDICINE | Admitting: INTERNAL MEDICINE
Payer: COMMERCIAL

## 2025-02-24 DIAGNOSIS — Z95.1 S/P CABG X 3: ICD-10-CM

## 2025-02-24 DIAGNOSIS — I25.10 CORONARY ARTERY DISEASE INVOLVING NATIVE HEART WITHOUT ANGINA PECTORIS, UNSPECIFIED VESSEL OR LESION TYPE: ICD-10-CM

## 2025-02-24 LAB
ALBUMIN SERPL BCG-MCNC: 4.1 G/DL (ref 3.5–5.2)
ALP SERPL-CCNC: 133 U/L (ref 40–150)
ALT SERPL W P-5'-P-CCNC: 29 U/L (ref 0–70)
ANION GAP SERPL CALCULATED.3IONS-SCNC: 10 MMOL/L (ref 7–15)
AST SERPL W P-5'-P-CCNC: 22 U/L (ref 0–45)
BILIRUB SERPL-MCNC: 0.5 MG/DL
BUN SERPL-MCNC: 18.7 MG/DL (ref 8–23)
CALCIUM SERPL-MCNC: 9.4 MG/DL (ref 8.8–10.4)
CHLORIDE SERPL-SCNC: 108 MMOL/L (ref 98–107)
CHOLEST SERPL-MCNC: 95 MG/DL
CREAT SERPL-MCNC: 1.39 MG/DL (ref 0.67–1.17)
EGFRCR SERPLBLD CKD-EPI 2021: 55 ML/MIN/1.73M2
FASTING STATUS PATIENT QL REPORTED: YES
GLUCOSE SERPL-MCNC: 94 MG/DL (ref 70–99)
HCO3 SERPL-SCNC: 21 MMOL/L (ref 22–29)
HDLC SERPL-MCNC: 29 MG/DL
LDLC SERPL CALC-MCNC: 40 MG/DL
LVEF ECHO: NORMAL
NONHDLC SERPL-MCNC: 66 MG/DL
POTASSIUM SERPL-SCNC: 4.1 MMOL/L (ref 3.4–5.3)
PROT SERPL-MCNC: 7.1 G/DL (ref 6.4–8.3)
SODIUM SERPL-SCNC: 139 MMOL/L (ref 135–145)
TRIGL SERPL-MCNC: 132 MG/DL

## 2025-02-24 PROCEDURE — 93306 TTE W/DOPPLER COMPLETE: CPT

## 2025-02-24 PROCEDURE — 80061 LIPID PANEL: CPT | Performed by: INTERNAL MEDICINE

## 2025-02-24 PROCEDURE — 36415 COLL VENOUS BLD VENIPUNCTURE: CPT | Performed by: INTERNAL MEDICINE

## 2025-02-24 PROCEDURE — 93306 TTE W/DOPPLER COMPLETE: CPT | Mod: 26 | Performed by: INTERNAL MEDICINE

## 2025-02-24 PROCEDURE — 80053 COMPREHEN METABOLIC PANEL: CPT | Performed by: INTERNAL MEDICINE

## 2025-02-24 NOTE — PROGRESS NOTES
HISTORY OF PRESENT ILLNESS:    This is a 69 year old male who follows with Dr Gross at [facility]  His past medical history includes:  Coronary artery disease, hypertension, hyperlipidemia, sleep apnea, CKD    Mr Yao underwent stenting to his OM2 following STEMI (2010)  ECHO then showed preserved LVEF    He suffered unstable angina (5/2024) and was found to have significant multivessel CAD  He underwent 3V CABG with LIMA to LAD and separate SVG to OM1, and OM2  LVEF then was 45%    He has upcoming plans for carpal tunnel surgery    He returns today for reassessment            ECHO (2/24/25)  LVEF 55-60%, normal RV function, no significant valvular pathology    Labs (2/24/25)  Total cholesterol:  95  Triglycerides: 132  HDL: 29  LDL: 40  Sodium: 139  Potassium: 4.1  BUN: 18  Creatinine: 1.39    IMPRESSION AND PLAN:    Coronary Artery Disease:  -s/p 3V CABG (6/2024) with LIMA to LAD and separate SVG to OM1, and Om2  -denies angina    Ischemic Cardiomyopathy  -LVEF 45-50% with distal anterolateral and inferolateral hypokinesis prior to CABG  -repeat ECHO  -no signs or symptoms of heart failure  -consider ARB    Hypertension:  -on Metoprolol 25 mg BID  -BP    Hyperlipidemia:  -on Atorvastatin 40 mg  -LDL 59  (needs labs soon)    Stage III CKD:  -creatinine 1.3-1.4        No orders of the defined types were placed in this encounter.      No orders of the defined types were placed in this encounter.      There are no discontinued medications.      No diagnosis found.    CURRENT MEDICATIONS:  Current Outpatient Medications   Medication Sig Dispense Refill    acetaminophen (TYLENOL) 500 MG tablet Take 1,000 mg by mouth every 8 hours as needed for mild pain      aspirin (ASA) 81 MG chewable tablet 2 tablets (162 mg) by Oral or NG Tube route daily 60 tablet 3    atorvastatin (LIPITOR) 40 MG tablet Take 1 tablet (40 mg) by mouth every evening. 90 tablet 3    fluticasone (FLONASE) 50 MCG/ACT nasal spray Spray 1  spray into both nostrils daily (Patient taking differently: Spray 1 spray into both nostrils as needed for rhinitis.) 16 g 0    lamoTRIgine (LAMICTAL) 100 MG tablet Take 0.5 tablets (50 mg) by mouth 2 times daily      lamoTRIgine (LAMICTAL) 200 MG tablet Take 1 tablet (200 mg) by mouth 2 times daily      linaclotide (LINZESS) 290 MCG capsule Take 1 capsule (290 mcg) by mouth every morning (before breakfast) Prn constipatiion 90 capsule 1    metoprolol tartrate (LOPRESSOR) 25 MG tablet Take 1 tablet (25 mg) by mouth 2 times daily. 180 tablet 2    omeprazole (PRILOSEC) 40 MG DR capsule Take 1 capsule (40 mg) by mouth daily. 90 capsule 3    rizatriptan (MAXALT) 10 MG tablet TAKE 1 TABLET(10 MG) BY MOUTH AT ONSET OF HEADACHE FOR MIGRAINE. MAY REPEAT IN 2 HOURS. MAX 3 TABLETS/ 24 HOURS 15 tablet 2    semaglutide-weight management (WEGOVY) 0.25 MG/0.5ML pen Inject 0.5 mLs (0.25 mg) subcutaneously once a week. 2 mL 0    Semaglutide-Weight Management (WEGOVY) 0.5 MG/0.5ML pen Inject 0.5 mg subcutaneously once a week. 2 mL 0    Semaglutide-Weight Management (WEGOVY) 1 MG/0.5ML pen Inject 1 mg subcutaneously once a week. 2 mL 0    tamsulosin (FLOMAX) 0.4 MG capsule Take 1 capsule (0.4 mg) by mouth daily 90 capsule 2    topiramate (TOPAMAX) 100 MG tablet Take 1 tablet (100 mg) by mouth daily. 90 tablet 3    traZODone (DESYREL) 50 MG tablet Take 2 tablets (100 mg) by mouth at bedtime (Patient taking differently: Take 100 mg by mouth as needed.) 180 tablet 3    zolpidem (AMBIEN) 5 MG tablet Take 1 tablet (5 mg) by mouth nightly as needed for sleep 90 tablet 1       ALLERGIES     Allergies   Allergen Reactions    Gabapentin      Fever      Morphine And Codeine      inability to sleep after taking codeine    Other (Do Not Use)      EKG patches for prolonged period (inpatient) developed blister rash. Use hypoallergenic EKG patches.     Vicodin [Hydrocodone-Acetaminophen]      Perspiring, hyper, itchy       PAST MEDICAL  HISTORY:  Past Medical History:   Diagnosis Date    Anemia 07/31/2020    Arthritis     Bell's palsy 09/08/2020    Benign prostatic hyperplasia with incomplete bladder emptying 07/28/2020    Bilateral carpal tunnel syndrome 02/13/2020    CAD (coronary artery disease) 2010    a subtotally occluded obtuse marginal vessel which was stented with a drug-coated stent.  He had 50% to 60% LAD disease and 25% to 30% right coronary disease    Cognitive change 01/02/2020    Colonic polyps 2009    tubular adenomae with mildly dysplastic features    Common wart 12/02/2020    Disturbance of skin sensation     Encephalocele (H) 2009    left frontal    Epilepsy, partial (H) 2009    with secondary generalization    GERD (gastroesophageal reflux disease)     Heart attack (H)     5 years ago. Cardiology 3 months age    Herpes zoster with complication 10/07/2020    History of spinal cord injury     Hypertension     Iron deficiency 09/09/2020    Meningioma (H) 04/10/2023    RIC (obstructive sleep apnea)     RIC (obstructive sleep apnea)     Paraneoplastic Antibody  positive[799.89BS] 2009    mildly elevated alpha 3 ganglionic acetylcholine receptor and JEOVANNY 65 receptor  antibody    Paronychia of toe, left 06/26/2023    Pedal edema 02/14/2020    Post herpetic neuralgia 12/02/2020    Primary osteoarthritis of both hands 01/02/2020    Rash 04/19/2024    Reactive depression 06/25/2024    Seizures (H)     Last seizure 2012    Spinal stenosis     Stented coronary artery     Tobacco use disorder     Ulnar neuropathy of both upper extremities 02/13/2020    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    Ventricular Assymetry 2009    likely congenital right lateral ventricular enlargement    Visi (volar intercalated segment instability), right 04/10/2023    Severe Wyman      Weight gain 03/14/2024       PAST SURGICAL HISTORY:  Past Surgical History:   Procedure Laterality Date    ARTHROPLASTY KNEE Left 05/16/2016    Procedure: ARTHROPLASTY KNEE;   Surgeon: Chet Leonardo MD;  Location: [facility] OR    BACK SURGERY      BYPASS GRAFT ARTERY CORONARY N/A 6/4/2024    Procedure: CORONARY ARTERY BYPASS GRAFT x 3 (LEFT INTERNAL MAMMARY ARTERY - LEFT ANTERIOR DESCENDING ARTERY; SAPHENOUS VEIN - OBTUSE MARGINAL ARTERY 1; SAPHENOUS VEIN -OBTUSE MARGINAL ARTERY 2) WITH ENDOSCOPIC SAPHENOUS VEIN HARVEST ON THE RIGHT LOWER EXTREMITY, AND ON CARDIOPULMONARY PUMP OXYGENATOR (INTRAOPERATIVE TRANSESOPHAGEAL ECHOCARDIOGRAM BY ANESTHESIOLOGIST);  Surgeon: Melonie Morejon    CARPAL TUNNEL RELEASE RT/LT Right 03/11/2020    Right carpal tunnel release, Dr. Rick Frias, [facility]    CARPAL TUNNEL RELEASE RT/LT Left     COLONOSCOPY N/A 10/08/2020    Procedure: COLONOSCOPY WITH POLYPECTOMIES using cold jumbo forceps;  Surgeon: Kevin Song MD;  Location:  GI    CV CORONARY ANGIOGRAM N/A 5/30/2024    Procedure: Coronary Angiogram;  Surgeon: Crow Mukherjee MD;  Location: [facility] CARDIAC CATH LAB    CV INSTANTANEOUS WAVE-FREE RATIO N/A 5/30/2024    Procedure: Instantaneous Wave-Free Ratio;  Surgeon: Crow Mukherjee MD;  Location: [facility] CARDIAC CATH LAB    CYSTOSCOPY      ESOPHAGOSCOPY, GASTROSCOPY, DUODENOSCOPY (EGD), COMBINED  05/24/2012    Procedure:COMBINED ESOPHAGOSCOPY, GASTROSCOPY, DUODENOSCOPY (EGD); ESOPHAGOSCOPY, GASTROSCOPY, DUODENOSCOPY (EGD) ; Surgeon:GILLIAN JOHNSON; Location: GI    ESOPHAGOSCOPY, GASTROSCOPY, DUODENOSCOPY (EGD), COMBINED N/A 10/08/2020    Procedure: ESOPHAGOGASTRODUODENOSCOPY (EGD) (fv);  Surgeon: Kevin Song MD;  Location:  GI    KNEE SURGERY Left     repair of cartilage    RELEASE TRIGGER FINGER Left 10/19/2020    Left ring finger trigger finger release at A1 pulley, Dr. Rick Frias, [facility]    SPINE SURGERY      repair of herniated disc lumbar    STENT      trigger finger release Right 09/15/2020    Right thumb trigger finger release, Dr. Frias    ULNAR NERVE TRANSPOSITION Left     VENTRICULOSTOMY       [facility] ANESTH,DX ARTHROSCOPIC PROC KNEE JOINT      CARTILEGE REPAIR.    ZZC NONSPECIFIC PROCEDURE      stint in heart    ZZ COLONOSCOPY THRU STOMA, DIAGNOSTIC  2009    tubular adenomae, recommended annual colonoscopy       FAMILY HISTORY:  Family History   Problem Relation Age of Onset    Cardiovascular Mother         CHF, HTN, VASC. 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   Tobacco Use    Smoking status: Former     Current packs/day: 0.00     Average packs/day: 0.5 packs/day for 20.0 years (10.0 ttl pk-yrs)     Types: Cigarettes     Start date: 1983     Quit date: 2003     Years since quittin.5     Passive exposure: Past    Smokeless tobacco: Never   Vaping Use    Vaping status: Never Used   Substance and Sexual Activity    Alcohol use: No     Alcohol/week: 0.0 standard drinks of alcohol    Drug use: No    Sexual activity: Yes     Partners: Female   Other Topics Concern    Seat Belt Yes   Social History Narrative    SD - CHECKED AT LEAST TWICE YEARLY.    GUNS: NONE.     Social Drivers of Health     Financial Resource Strain: Low Risk  (2023)    Financial Resource Strain     Within the past 12 months, have you or your family members you live with been unable to get utilities (heat, electricity) when it was really needed?: No   Food Insecurity: No Food Insecurity (2024)    Received from [facility]    Hunger Vital Sign     Worried About Running Out of Food in the Last Year: Never true     Ran Out of Food in the  Last Year: Never true   Transportation Needs: No Transportation Needs (5/28/2024)    Received from [facility]    PRAPARE - Transportation     Lack of Transportation (Medical): No     Lack of Transportation (Non-Medical): No   Physical Activity: Insufficiently Active (5/28/2024)    Received from [facility]    Exercise Vital Sign     Days of Exercise per Week: 1 day     Minutes of Exercise per Session: 30 min   Stress: Stress Concern Present (6/23/2022)    [employer] [place] [employer] - Occupational Stress Questionnaire     Feeling of Stress : To some extent   Social Connections: Unknown (6/23/2022)    Social Connection and Isolation Panel [NHANES]     Frequency of Communication with Friends and Family: Patient declined     Frequency of Social Gatherings with Friends and Family: Once a week     Attends Congregational Services: 1 to 4 times per year     Active Member of Clubs or Organizations: No     Marital Status:    Interpersonal Safety: Low Risk  (2/18/2025)    Interpersonal Safety     Do you feel physically and emotionally safe where you currently live?: Yes     Within the past 12 months, have you been hit, slapped, kicked or otherwise physically hurt by someone?: No     Within the past 12 months, have you been humiliated or emotionally abused in other ways by your partner or ex-partner?: No   Housing Stability: Low Risk  (5/28/2024)    Received from [facility]    Housing Stability     What is your living situation today?: I have a steady place to live       Review of Systems:  Skin:          Eyes:         ENT:         Respiratory:          Cardiovascular:         Gastroenterology:        Genitourinary:         Musculoskeletal:         Neurologic:         Psychiatric:         Heme/Lymph/Imm:         Endocrine:           Physical Exam:  Vitals: There were no vitals taken for this visit.    Constitutional:           Skin:             Head:           Eyes:           Lymph:      ENT:           Neck:            Respiratory:            Cardiac:                                                           GI:           Extremities and Muscular Skeletal:                 Neurological:           Psych:             CC  Srinath Gross MD  [id number]  [place] [id number]

## 2025-02-25 NOTE — PROGRESS NOTES
No orders of the defined types were placed in this encounter.      No orders of the defined types were placed in this encounter.      There are no discontinued medications.      No diagnosis found.    CURRENT MEDICATIONS:  Current Outpatient Medications   Medication Sig Dispense Refill    acetaminophen (TYLENOL) 500 MG tablet Take 1,000 mg by mouth every 8 hours as needed for mild pain      aspirin (ASA) 81 MG chewable tablet 2 tablets (162 mg) by Oral or NG Tube route daily 60 tablet 3    atorvastatin (LIPITOR) 40 MG tablet Take 1 tablet (40 mg) by mouth every evening. 90 tablet 3    fluticasone (FLONASE) 50 MCG/ACT nasal spray Spray 1 spray into both nostrils daily (Patient taking differently: Spray 1 spray into both nostrils as needed for rhinitis.) 16 g 0    lamoTRIgine (LAMICTAL) 100 MG tablet Take 0.5 tablets (50 mg) by mouth 2 times daily      lamoTRIgine (LAMICTAL) 200 MG tablet Take 1 tablet (200 mg) by mouth 2 times daily      linaclotide (LINZESS) 290 MCG capsule Take 1 capsule (290 mcg) by mouth every morning (before breakfast) Prn constipatiion 90 capsule 1    metoprolol tartrate (LOPRESSOR) 25 MG tablet Take 1 tablet (25 mg) by mouth 2 times daily. 180 tablet 2    omeprazole (PRILOSEC) 40 MG DR capsule Take 1 capsule (40 mg) by mouth daily. 90 capsule 3    rizatriptan (MAXALT) 10 MG tablet TAKE 1 TABLET(10 MG) BY MOUTH AT ONSET OF HEADACHE FOR MIGRAINE. MAY REPEAT IN 2 HOURS. MAX 3 TABLETS/ 24 HOURS 15 tablet 2    semaglutide-weight management (WEGOVY) 0.25 MG/0.5ML pen Inject 0.5 mLs (0.25 mg) subcutaneously once a week. 2 mL 0    Semaglutide-Weight Management (WEGOVY) 0.5 MG/0.5ML pen Inject 0.5 mg subcutaneously once a week. 2 mL 0    Semaglutide-Weight Management (WEGOVY) 1 MG/0.5ML pen Inject 1 mg subcutaneously once a week. 2 mL 0    tamsulosin (FLOMAX) 0.4 MG capsule Take 1 capsule (0.4 mg) by mouth daily 90 capsule 2    topiramate (TOPAMAX) 100 MG tablet Take 1 tablet (100 mg) by mouth  daily. 90 tablet 3    traZODone (DESYREL) 50 MG tablet Take 2 tablets (100 mg) by mouth at bedtime (Patient taking differently: Take 100 mg by mouth as needed.) 180 tablet 3    zolpidem (AMBIEN) 5 MG tablet Take 1 tablet (5 mg) by mouth nightly as needed for sleep 90 tablet 1       ALLERGIES     Allergies   Allergen Reactions    Gabapentin      Fever      Morphine And Codeine      inability to sleep after taking codeine    Other (Do Not Use)      EKG patches for prolonged period (inpatient) developed blister rash. Use hypoallergenic EKG patches.     Vicodin [Hydrocodone-Acetaminophen]      Perspiring, hyper, itchy       PAST MEDICAL HISTORY:  Past Medical History:   Diagnosis Date    Anemia 07/31/2020    Arthritis     Bell's palsy 09/08/2020    Benign prostatic hyperplasia with incomplete bladder emptying 07/28/2020    Bilateral carpal tunnel syndrome 02/13/2020    CAD (coronary artery disease) 2010    a subtotally occluded obtuse marginal vessel which was stented with a drug-coated stent.  He had 50% to 60% LAD disease and 25% to 30% right coronary disease    Cognitive change 01/02/2020    Colonic polyps 2009    tubular adenomae with mildly dysplastic features    Common wart 12/02/2020    Disturbance of skin sensation     Encephalocele (H) 2009    left frontal    Epilepsy, partial (H) 2009    with secondary generalization    GERD (gastroesophageal reflux disease)     Heart attack (H)     5 years ago. Cardiology 3 months age    Herpes zoster with complication 10/07/2020    History of spinal cord injury     Hypertension     Iron deficiency 09/09/2020    Meningioma (H) 04/10/2023    RIC (obstructive sleep apnea)     RIC (obstructive sleep apnea)     Paraneoplastic Antibody  positive[799.89BS] 2009    mildly elevated alpha 3 ganglionic acetylcholine receptor and JEOVANNY 65 receptor  antibody    Paronychia of toe, left 06/26/2023    Pedal edema 02/14/2020    Post herpetic neuralgia 12/02/2020    Primary osteoarthritis of  both hands 01/02/2020    Rash 04/19/2024    Reactive depression 06/25/2024    Seizures (H)     Last seizure 2012    Spinal stenosis     Stented coronary artery     Tobacco use disorder     Ulnar neuropathy of both upper extremities 02/13/2020    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    Ventricular Assymetry 2009    likely congenital right lateral ventricular enlargement    Visi (volar intercalated segment instability), right 04/10/2023    Severe Wyman      Weight gain 03/14/2024       PAST SURGICAL HISTORY:  Past Surgical History:   Procedure Laterality Date    ARTHROPLASTY KNEE Left 05/16/2016    Procedure: ARTHROPLASTY KNEE;  Surgeon: Chet Leonardo MD;  Location:  OR    BACK SURGERY      BYPASS GRAFT ARTERY CORONARY N/A 6/4/2024    Procedure: CORONARY ARTERY BYPASS GRAFT x 3 (LEFT INTERNAL MAMMARY ARTERY - LEFT ANTERIOR DESCENDING ARTERY; SAPHENOUS VEIN - OBTUSE MARGINAL ARTERY 1; SAPHENOUS VEIN -OBTUSE MARGINAL ARTERY 2) WITH ENDOSCOPIC SAPHENOUS VEIN HARVEST ON THE RIGHT LOWER EXTREMITY, AND ON CARDIOPULMONARY PUMP OXYGENATOR (INTRAOPERATIVE TRANSESOPHAGEAL ECHOCARDIOGRAM BY ANESTHESIOLOGIST);  Surgeon: Melonie Morejon    CARPAL TUNNEL RELEASE RT/LT Right 03/11/2020    [facility]    CARPAL TUNNEL RELEASE RT/LT Left     COLONOSCOPY N/A 10/08/2020    Procedure: COLONOSCOPY WITH POLYPECTOMIES using cold jumbo forceps;  Surgeon: Kevin Song MD;  Location:  GI    CV CORONARY ANGIOGRAM N/A 5/30/2024    Procedure: Coronary Angiogram;  Surgeon: Crow Mukherjee MD;  Location: [facility] CARDIAC CATH LAB    CV INSTANTANEOUS WAVE-FREE RATIO N/A 5/30/2024    Procedure: Instantaneous Wave-Free Ratio;  Surgeon: Crow Mukherjee MD;  Location:  HEART CARDIAC CATH LAB    CYSTOSCOPY      ESOPHAGOSCOPY, GASTROSCOPY, DUODENOSCOPY (EGD), COMBINED  05/24/2012    Procedure:COMBINED ESOPHAGOSCOPY, GASTROSCOPY, DUODENOSCOPY (EGD);  ESOPHAGOSCOPY, GASTROSCOPY, DUODENOSCOPY (EGD) ; Surgeon:GILLIAN JOHNSON; Location: GI    ESOPHAGOSCOPY, GASTROSCOPY, DUODENOSCOPY (EGD), COMBINED N/A 10/08/2020    Procedure: ESOPHAGOGASTRODUODENOSCOPY (EGD) (fv);  Surgeon: Kevin Song MD;  Location:  GI    KNEE SURGERY Left     repair of cartilage    RELEASE TRIGGER FINGER Left 10/19/2020    Left ring finger trigger finger release at A1 pulley, Dr. Rick Frias, [facility]    SPINE SURGERY      repair of herniated disc lumbar    STENT      trigger finger release Right 09/15/2020    Right thumb trigger finger release, Dr. Frias    ULNAR NERVE TRANSPOSITION Left     VENTRICULOSTOMY      [facility] ANESTH,DX ARTHROSCOPIC PROC KNEE JOINT      CARTILEGE REPAIR.    [facility] NONSPECIFIC PROCEDURE      stint in heart    [facility]      tubular adenomae, recommended annual colonoscopy       FAMILY HISTORY:  Family History   Problem Relation Age of Onset    Cardiovascular Mother         CHF, HTN, VASC. 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   Tobacco Use    Smoking status: Former     Current packs/day: 0.00     Average packs/day: 0.5 packs/day for 20.0 years (10.0 ttl pk-yrs)     Types: Cigarettes     Start date: 1983     Quit date: 2003     Years since quittin.5     Passive exposure: Past    Smokeless tobacco: Never   Vaping Use    Vaping status: Never Used   Substance and Sexual Activity    Alcohol use: No     Alcohol/week: 0.0 standard drinks of  alcohol    Drug use: No    Sexual activity: Yes     Partners: Female   Other Topics Concern    Seat Belt Yes   Social History Narrative    SD - CHECKED AT LEAST TWICE YEARLY.    GUNS: NONE.     Social Drivers of Health     Financial Resource Strain: Low Risk  (12/29/2023)    Financial Resource Strain     Within the past 12 months, have you or your family members you live with been unable to get utilities (heat, electricity) when it was really needed?: No   Food Insecurity: No Food Insecurity (5/28/2024)    Received from [facility]    Hunger Vital Sign     Worried About Running Out of Food in the Last Year: Never true     Ran Out of Food in the Last Year: Never true   Transportation Needs: No Transportation Needs (5/28/2024)    Received from [facility]    PRAPARE - Transportation     Lack of Transportation (Medical): No     Lack of Transportation (Non-Medical): No   Physical Activity: Insufficiently Active (5/28/2024)    Received from [facility]    Exercise Vital Sign     Days of Exercise per Week: 1 day     Minutes of Exercise per Session: 30 min   Stress: Stress Concern Present (6/23/2022)    [employer] [place] [employer] - Occupational Stress Questionnaire     Feeling of Stress : To some extent   Social Connections: Unknown (6/23/2022)    Social Connection and Isolation Panel [NHANES]     Frequency of Communication with Friends and Family: Patient declined     Frequency of Social Gatherings with Friends and Family: Once a week     Attends Congregation Services: 1 to 4 times per year     Active Member of Clubs or Organizations: No     Marital Status:    Interpersonal Safety: Low Risk  (2/18/2025)    Interpersonal Safety     Do you feel physically and emotionally safe where you currently live?: Yes     Within the past 12 months, have you been hit, slapped, kicked or otherwise physically hurt by someone?: No     Within the past 12 months, have you been humiliated or emotionally abused in other ways  by your partner or ex-partner?: No   Housing Stability: Low Risk  (5/28/2024)    Received from [facility]    Housing Stability     What is your living situation today?: I have a steady place to live       Review of Systems:  Skin:          Eyes:         ENT:         Respiratory:          Cardiovascular:         Gastroenterology:        Genitourinary:         Musculoskeletal:         Neurologic:         Psychiatric:         Heme/Lymph/Imm:         Endocrine:           Physical Exam:  Vitals: There were no vitals taken for this visit.    Constitutional:           Skin:             Head:           Eyes:           Lymph:      ENT:           Neck:           Respiratory:            Cardiac:                                                           GI:           Extremities and Muscular Skeletal:                 Neurological:           Psych:             CC  Srinath Gross MD  [address]  [place] [address]

## 2025-02-26 ENCOUNTER — OFFICE VISIT (OUTPATIENT)
Dept: CARDIOLOGY | Facility: CLINIC | Age: 70
End: 2025-02-26
Payer: COMMERCIAL

## 2025-02-26 VITALS
OXYGEN SATURATION: 98 % | HEART RATE: 61 BPM | HEIGHT: 69 IN | DIASTOLIC BLOOD PRESSURE: 78 MMHG | WEIGHT: 192 LBS | BODY MASS INDEX: 28.44 KG/M2 | SYSTOLIC BLOOD PRESSURE: 122 MMHG

## 2025-02-26 DIAGNOSIS — Z95.1 S/P CABG X 3: ICD-10-CM

## 2025-02-26 RX ORDER — NITROGLYCERIN 0.4 MG/1
TABLET SUBLINGUAL
Qty: 25 TABLET | Refills: 2 | Status: SHIPPED | OUTPATIENT
Start: 2025-02-26

## 2025-02-26 RX ORDER — OXYCODONE HYDROCHLORIDE 5 MG/1
TABLET ORAL
COMMUNITY
Start: 2025-02-25

## 2025-02-26 RX ORDER — HYDROXYZINE PAMOATE 25 MG/1
CAPSULE ORAL
COMMUNITY
Start: 2025-02-25

## 2025-02-26 NOTE — PROGRESS NOTES
Cardiology Clinic Progress Note  Kashmir Yao MRN# [id number]   YOB: 1955 Age: 69 year old   Primary Cardiologist: Dr. Gross  Reason for visit: 6 month follow up            Assessment and Plan:   Kashmir Yao is a 69 year old male who is here today for 6 month follow up     1.  Coronary artery disease (CABG 3V; LIMA-LAD, SVG-OM1, SVG-OM2. Dr. Morejon 6/4/24; OM2 PCI 2010) - denies symptoms concerning for angina.  Aspirin and statin lifelong.  Metoprolol tartrate 25 mg twice daily    2. Hyperlipidemia - LDL at goal 40 2/24/25.  Atorvastatin 40 mg every evening    3. Hypertension -controlled.  Metoprolol as above    Changes today:   -Add Sublingual nitroglycerin as needed for chest pain.  Discussed with patient instructions on how and when to take medication and side effects    Follow up plan:   -Follow-up with cardiology NATHAN in 6 months      Jazmin Mchugh PA-C  [facility]        History of Presenting Illness:    Kashmir Yao is a very pleasant 69 year old male with coronary artery disease (CABG three-vessel, LIMA to LAD, SVG to OM1, SVG to OM 2 6/2024), hypertension, hyperlipidemia, RIC, carpal tunnel syndrome, GERD, iron deficiency, chronic migraines, generalized tonic clonic epilepsy, post herpetic neuralgia, and former tobacco use.     Patient originally seen in 2008 for chest pain in which stress echocardiogram and regular echocardiogram were recommended.  Stress echocardiogram showed normal perfusion with wall motion abnormalities.  At this time recommended noncardiac chest discomfort workup and was referred back to his primary care physician.  Unfortunately patient's symptoms worsened and he was recommended for coronary angiogram which did not show significant epicardial coronary disease. He was hospitalized in 2010 for unstable angina in which he underwent coronary angiogram and stenting to OM2, remaining disease in LAD with 50 to 60% occlusion in RCA with 25 to  30% occlusion.  He was lost to follow-up until 5/2024.    He was admitted 5/30/2024 with episodes of chest discomfort with radiation to shoulder.  Echocardiogram showed LVEF 45-50%, LV systolic function mildly reduced, regional wall motion abnormalities noted, TR/MR. Lexiscan showed large area of severe ischemia in lateral and inferolateral segments, recommended coronary angiogram.  Coronary angiography notable for multivessel coronary artery disease and he was recommended CTS consult. CTS deemed patient a candidate  for CABG and he underwent three-vessel bypass 6/4/2024. For ischemic cardiomyopathy he was started on metoprolol tartrate 25 mg twice daily. Simvastatin switched to atorvastatin. Cardiac rehabilitation ordered at discharge, completed Sept 2025.  He had a follow-up with Dr. Caldwell on 9/23/2024 which patient was feeling good and was exercising regularly, no changes were made during last visit. Patient is here today for 6 month follow up.     Patient reports feeling well today.  Significant other present today in visit.  Patient reports that he goes to the gym 4-5 times a week and walks on the treadmill.  He often does not do any strength due to chronic shoulder pain/limited range of motion left shoulder.  When he is exerting himself he denies any chest pain, shortness of breath, lightheadedness, palpitations, or dizziness. He does note he has a significant family history for CAD, with multiple family members with quadruple bypass. He is overall doing quite well, he just underwent right ulnar nerve decompression yesterday and is in a sling for the next 2 weeks.  He recently started Wegovy and is overall tolerating this well. He is overall agreeable with today's plan    Diet - eats out once a week, fairly balanced meals otherwise   Exercise - exercising 4-5x per week on treadmill and bike, limited in strength due to chronic shoulder pain  Alcohol - never   Tobacco- stopped in 2003     Blood pressure 122/78  and HR 61 in clinic today.     Testing Reviewed   Echo 25 - LVEF 55-60%, septal motion consistent with post operative state, LA borderline dilated   FLP 25 TC 93, HDL 29, LDL 40, . BMP 25 stable electrolytes and renal function. Hgb and plats stable 25.       Social History       Social History     Socioeconomic History    Marital status:      Spouse name: Not on file    Number of children: 3    Years of education: Not on file    Highest education level: Not on file   Occupational History    Occupation:  - [employer]   Tobacco Use    Smoking status: Former     Current packs/day: 0.00     Average packs/day: 0.5 packs/day for 20.0 years (10.0 ttl pk-yrs)     Types: Cigarettes     Start date: 1983     Quit date: 2003     Years since quittin.5     Passive exposure: Past    Smokeless tobacco: Never   Vaping Use    Vaping status: Never Used   Substance and Sexual Activity    Alcohol use: No     Alcohol/week: 0.0 standard drinks of alcohol    Drug use: No    Sexual activity: Yes     Partners: Female   Other Topics Concern     Service Not Asked    Blood Transfusions Not Asked    Caffeine Concern Not Asked    Occupational Exposure Not Asked    Hobby Hazards Not Asked    Sleep Concern Not Asked    Stress Concern Not Asked    Weight Concern Not Asked    Special Diet Not Asked    Back Care Not Asked    Exercise Not Asked    Bike Helmet Not Asked    Seat Belt Yes    Self-Exams Not Asked    Parent/sibling w/ CABG, MI or angioplasty before 65F 55M? Not Asked   Social History Narrative    SD - CHECKED AT LEAST TWICE YEARLY.    GUNS: NONE.     Social Drivers of Health     Financial Resource Strain: Low Risk  (2023)    Financial Resource Strain     Within the past 12 months, have you or your family members you live with been unable to get utilities (heat, electricity) when it was really needed?: No   Food Insecurity: No Food Insecurity (2024)    Received  from [facility]    Hunger Vital Sign     Worried About Running Out of Food in the Last Year: Never true     Ran Out of Food in the Last Year: Never true   Transportation Needs: No Transportation Needs (5/28/2024)    Received from [facility]    PRAPARE - Transportation     Lack of Transportation (Medical): No     Lack of Transportation (Non-Medical): No   Physical Activity: Insufficiently Active (5/28/2024)    Received from [facility]    Exercise Vital Sign     Days of Exercise per Week: 1 day     Minutes of Exercise per Session: 30 min   Stress: Stress Concern Present (6/23/2022)    [employer] - Occupational Stress Questionnaire     Feeling of Stress : To some extent   Social Connections: Unknown (6/23/2022)    Social Connection and Isolation Panel [NHANES]     Frequency of Communication with Friends and Family: Patient declined     Frequency of Social Gatherings with Friends and Family: Once a week     Attends [employer] Services: 1 to 4 times per year     Active Member of Clubs or Organizations: No     Attends Club or Organization Meetings: Not on file     Marital Status:    Interpersonal Safety: Low Risk  (2/18/2025)    Interpersonal Safety     Do you feel physically and emotionally safe where you currently live?: Yes     Within the past 12 months, have you been hit, slapped, kicked or otherwise physically hurt by someone?: No     Within the past 12 months, have you been humiliated or emotionally abused in other ways by your partner or ex-partner?: No   Housing Stability: Low Risk  (5/28/2024)    Received from [facility]    Housing Stability     What is your living situation today?: I have a steady place to live            Review of Systems:   Please see HPI         Physical Exam:   Vitals: There were no vitals taken for this visit.   Wt Readings from Last 4 Encounters:   02/18/25 88.3 kg (194 lb 9.6 oz)   09/23/24 89 kg (196 lb 4.8 oz)   09/17/24 86.6 kg (191 lb)    08/20/24 86.5 kg (190 lb 9.6 oz)     GEN: well nourished, in no acute distress.  HEENT:  Pupils equal, round. Sclerae nonicteric.   NECK: Supple, no masses appreciated.  No JVD with patient.  C/V:  Regular rate and rhythm, no murmur, rub or gallop.    RESP: Respirations are unlabored. Clear to auscultation bilaterally without wheezing, rales, or rhonchi.  GI: Abdomen soft, nontender.  EXTREM: No LE edema.  NEURO: Alert and oriented, cooperative.  SKIN: Warm and dry.        Data:   LIPID RESULTS:  Lab Results   Component Value Date    CHOL 95 02/24/2025    CHOL 122 02/03/2020    HDL 29 (L) 02/24/2025    HDL 36 (L) 02/03/2020    LDL 40 02/24/2025    LDL 64 02/03/2020    TRIG 132 02/24/2025    TRIG 109 02/03/2020    CHOLHDLRATIO 2.8 10/07/2015     LIVER ENZYME RESULTS:  Lab Results   Component Value Date    AST 22 02/24/2025    AST 23 07/29/2020    ALT 29 02/24/2025    ALT 45 07/29/2020     CBC RESULTS:  Lab Results   Component Value Date    WBC 10.5 02/18/2025    WBC 10.3 09/08/2020    RBC 4.60 02/18/2025    RBC 4.84 09/08/2020    HGB 12.6 (L) 02/18/2025    HGB 13.4 09/08/2020    HCT 37.5 (L) 02/18/2025    HCT 39.5 (L) 09/08/2020    MCV 82 02/18/2025    MCV 82 09/08/2020    MCH 27.4 02/18/2025    MCH 27.7 09/08/2020    MCHC 33.6 02/18/2025    MCHC 33.9 09/08/2020    RDW 15.5 (H) 02/18/2025    RDW 15.3 (H) 09/08/2020     02/18/2025     09/08/2020     BMP RESULTS:  Lab Results   Component Value Date     02/24/2025     09/04/2020    POTASSIUM 4.1 02/24/2025    POTASSIUM 3.6 06/04/2024    POTASSIUM 3.9 09/04/2020    CHLORIDE 108 (H) 02/24/2025    CHLORIDE 107 09/04/2020    CO2 21 (L) 02/24/2025    CO2 25 09/04/2020    ANIONGAP 10 02/24/2025    ANIONGAP 6 09/04/2020    GLC 94 02/24/2025     (H) 06/06/2024     (H) 09/04/2020    BUN 18.7 02/24/2025    BUN 25 09/04/2020    CR 1.39 (H) 02/24/2025    CR 1.12 09/04/2020    GFRESTIMATED 55 (L) 02/24/2025    GFRESTIMATED 68 09/04/2020     GFRESTBLACK 79 09/04/2020    MOE 9.4 02/24/2025    MOE 9.2 09/04/2020      A1C RESULTS:  Lab Results   Component Value Date    A1C 4.8 05/29/2024    A1C 4.8 04/05/2018     INR RESULTS:  Lab Results   Component Value Date    INR 1.25 (H) 06/04/2024    INR 1.35 (H) 06/04/2024    INR 0.97 04/21/2016    INR 0.98 05/11/2010            Medications     Current Outpatient Medications   Medication Sig Dispense Refill    acetaminophen (TYLENOL) 500 MG tablet Take 1,000 mg by mouth every 8 hours as needed for mild pain      aspirin (ASA) 81 MG chewable tablet 2 tablets (162 mg) by Oral or NG Tube route daily 60 tablet 3    atorvastatin (LIPITOR) 40 MG tablet Take 1 tablet (40 mg) by mouth every evening. 90 tablet 3    fluticasone (FLONASE) 50 MCG/ACT nasal spray Spray 1 spray into both nostrils daily (Patient taking differently: Spray 1 spray into both nostrils as needed for rhinitis.) 16 g 0    lamoTRIgine (LAMICTAL) 100 MG tablet Take 0.5 tablets (50 mg) by mouth 2 times daily      lamoTRIgine (LAMICTAL) 200 MG tablet Take 1 tablet (200 mg) by mouth 2 times daily      linaclotide (LINZESS) 290 MCG capsule Take 1 capsule (290 mcg) by mouth every morning (before breakfast) Prn constipatiion 90 capsule 1    metoprolol tartrate (LOPRESSOR) 25 MG tablet Take 1 tablet (25 mg) by mouth 2 times daily. 180 tablet 2    omeprazole (PRILOSEC) 40 MG DR capsule Take 1 capsule (40 mg) by mouth daily. 90 capsule 3    rizatriptan (MAXALT) 10 MG tablet TAKE 1 TABLET(10 MG) BY MOUTH AT ONSET OF HEADACHE FOR MIGRAINE. MAY REPEAT IN 2 HOURS. MAX 3 TABLETS/ 24 HOURS 15 tablet 2    semaglutide-weight management (WEGOVY) 0.25 MG/0.5ML pen Inject 0.5 mLs (0.25 mg) subcutaneously once a week. 2 mL 0    Semaglutide-Weight Management (WEGOVY) 0.5 MG/0.5ML pen Inject 0.5 mg subcutaneously once a week. 2 mL 0    Semaglutide-Weight Management (WEGOVY) 1 MG/0.5ML pen Inject 1 mg subcutaneously once a week. 2 mL 0    tamsulosin (FLOMAX) 0.4 MG capsule Take  1 capsule (0.4 mg) by mouth daily 90 capsule 2    topiramate (TOPAMAX) 100 MG tablet Take 1 tablet (100 mg) by mouth daily. 90 tablet 3    traZODone (DESYREL) 50 MG tablet Take 2 tablets (100 mg) by mouth at bedtime (Patient taking differently: Take 100 mg by mouth as needed.) 180 tablet 3    zolpidem (AMBIEN) 5 MG tablet Take 1 tablet (5 mg) by mouth nightly as needed for sleep 90 tablet 1          Past Medical History     Past Medical History:   Diagnosis Date    Anemia 07/31/2020    Arthritis     Bell's palsy 09/08/2020    Benign prostatic hyperplasia with incomplete bladder emptying 07/28/2020    Bilateral carpal tunnel syndrome 02/13/2020    CAD (coronary artery disease) 2010    a subtotally occluded obtuse marginal vessel which was stented with a drug-coated stent.  He had 50% to 60% LAD disease and 25% to 30% right coronary disease    Cognitive change 01/02/2020    Colonic polyps 2009    tubular adenomae with mildly dysplastic features    Common wart 12/02/2020    Disturbance of skin sensation     Encephalocele (H) 2009    left frontal    Epilepsy, partial (H) 2009    with secondary generalization    GERD (gastroesophageal reflux disease)     Heart attack (H)     5 years ago. Cardiology 3 months age    Herpes zoster with complication 10/07/2020    History of spinal cord injury     Hypertension     Iron deficiency 09/09/2020    Meningioma (H) 04/10/2023    RIC (obstructive sleep apnea)     RIC (obstructive sleep apnea)     Paraneoplastic Antibody  positive[799.89BS] 2009    mildly elevated alpha 3 ganglionic acetylcholine receptor and JEOVANNY 65 receptor  antibody    Paronychia of toe, left 06/26/2023    Pedal edema 02/14/2020    Post herpetic neuralgia 12/02/2020    Primary osteoarthritis of both hands 01/02/2020    Rash 04/19/2024    Reactive depression 06/25/2024    Seizures (H)     Last seizure 2012    Spinal stenosis     Stented coronary artery     Tobacco use disorder     Ulnar neuropathy of both upper  extremities 02/13/2020    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    Ventricular Assymetry 2009    likely congenital right lateral ventricular enlargement    Visi (volar intercalated segment instability), right 04/10/2023    Severe Wyman      Weight gain 03/14/2024     Past Surgical History:   Procedure Laterality Date    ARTHROPLASTY KNEE Left 05/16/2016    Procedure: ARTHROPLASTY KNEE;  Surgeon: Chet Leonardo MD;  Location:  OR    BACK SURGERY      BYPASS GRAFT ARTERY CORONARY N/A 6/4/2024    Procedure: CORONARY ARTERY BYPASS GRAFT x 3 (LEFT INTERNAL MAMMARY ARTERY - LEFT ANTERIOR DESCENDING ARTERY; SAPHENOUS VEIN - OBTUSE MARGINAL ARTERY 1; SAPHENOUS VEIN -OBTUSE MARGINAL ARTERY 2) WITH ENDOSCOPIC SAPHENOUS VEIN HARVEST ON THE RIGHT LOWER EXTREMITY, AND ON CARDIOPULMONARY PUMP OXYGENATOR (INTRAOPERATIVE TRANSESOPHAGEAL ECHOCARDIOGRAM BY ANESTHESIOLOGIST);  Surgeon: Melonie Morejon    CARPAL TUNNEL RELEASE RT/LT Right 03/11/2020    Right carpal tunnel release, Dr. Rick Frias, [facility]    CARPAL TUNNEL RELEASE RT/LT Left     COLONOSCOPY N/A 10/08/2020    Procedure: COLONOSCOPY WITH POLYPECTOMIES using cold jumbo forceps;  Surgeon: Kevin Song MD;  Location:  GI    CV CORONARY ANGIOGRAM N/A 5/30/2024    Procedure: Coronary Angiogram;  Surgeon: Crow Mukherjee MD;  Location:  HEART CARDIAC CATH LAB    CV INSTANTANEOUS WAVE-FREE RATIO N/A 5/30/2024    Procedure: Instantaneous Wave-Free Ratio;  Surgeon: Crow Mukherjee MD;  Location:  HEART CARDIAC CATH LAB    CYSTOSCOPY      ESOPHAGOSCOPY, GASTROSCOPY, DUODENOSCOPY (EGD), COMBINED  05/24/2012    Procedure:COMBINED ESOPHAGOSCOPY, GASTROSCOPY, DUODENOSCOPY (EGD); ESOPHAGOSCOPY, GASTROSCOPY, DUODENOSCOPY (EGD) ; Surgeon:GILLIAN JOHNSON; Location: GI    ESOPHAGOSCOPY, GASTROSCOPY, DUODENOSCOPY (EGD), COMBINED N/A 10/08/2020    Procedure: ESOPHAGOGASTRODUODENOSCOPY (EGD) (fv);  Surgeon: Kevin Song MD;   Location: [facility] GI    KNEE SURGERY Left     repair of cartilage    RELEASE TRIGGER FINGER Left 10/19/2020    Left ring finger trigger finger release at A1 pulley, Dr. Rick Frias, [facility]    SPINE SURGERY      repair of herniated disc lumbar    STENT      trigger finger release Right 09/15/2020    Right thumb trigger finger release, Dr. Frias    ULNAR NERVE TRANSPOSITION Left     VENTRICULOSTOMY      [facility] ANESTH,DX ARTHROSCOPIC PROC KNEE JOINT      CARTILEGE REPAIR.    [facility] NONSPECIFIC PROCEDURE      stint in [facility]    [facility]  [facility]    tubular adenomae, recommended annual colonoscopy     Family History   Problem Relation Age of Onset    Cardiovascular Mother         CHF, HTN, VASC. INSUFF., DM    No Known Problems Father     Thyroid Disease Sister         HYPOTHYROID    Genitourinary Problems Brother         RENAL FAILURE AND H/O MVA WITH LE PARAPLEGIA    No Known Problems Sister     No Known Problems Brother     No Known Problems Brother     Cancer - colorectal No family hx of     Colon Cancer No family hx of             Allergies   Gabapentin, Morphine and codeine, Other (do not use), and Vicodin [hydrocodone-acetaminophen]    35 minutes spent on the date of the encounter doing chart review, history and exam, documentation and further activities as noted above

## 2025-02-26 NOTE — PATIENT INSTRUCTIONS
It was nice seeing you today, please call [phone] if you have any questions or concerns     Plan   Continue current cardiac medications  Add sublingual nitroglycerin as needed for chest pain   Follow up    - Follow up with Cardiology in 6 months or sooner if needed   - Scheduling phone number [phone]    Jazmin Mchugh PA-C  [facility]

## 2025-02-26 NOTE — LETTER
2/26/2025    Milton Gamboa MD  [id number] [facility] [address]    RE: Kashmir Yao       Dear Colleague,     I had the pleasure of seeing Kashmir Yao in the [facility].  Cardiology Clinic Progress Note  Kashmir Yao MRN# [id number]   YOB: 1955 Age: 69 year old   Primary Cardiologist: Dr. Gross  Reason for visit: 6 month follow up            Assessment and Plan:   Kashmir Yao is a 69 year old male who is here today for 6 month follow up     1.  Coronary artery disease (CABG 3V; LIMA-LAD, SVG-OM1, SVG-OM2. Dr. Morejon 6/4/24; OM2 PCI 2010) - denies symptoms concerning for angina.  Aspirin and statin lifelong.  Metoprolol tartrate 25 mg twice daily    2. Hyperlipidemia - LDL at goal 40 2/24/25.  Atorvastatin 40 mg every evening    3. Hypertension -controlled.  Metoprolol as above    Changes today:   -Add Sublingual nitroglycerin as needed for chest pain.  Discussed with patient instructions on how and when to take medication and side effects    Follow up plan:   -Follow-up with cardiology NATHAN in 6 months      Jazmin Mchugh PA-C  [facility]        History of Presenting Illness:    Kashmir Yao is a very pleasant 69 year old male with coronary artery disease (CABG three-vessel, LIMA to LAD, SVG to OM1, SVG to OM 2 6/2024), hypertension, hyperlipidemia, RIC, carpal tunnel syndrome, GERD, iron deficiency, chronic migraines, generalized tonic clonic epilepsy, post herpetic neuralgia, and former tobacco use.     Patient originally seen in 2008 for chest pain in which stress echocardiogram and regular echocardiogram were recommended.  Stress echocardiogram showed normal perfusion with wall motion abnormalities.  At this time recommended noncardiac chest discomfort workup and was referred back to his primary care physician.  Unfortunately patient's symptoms worsened and he was recommended for coronary angiogram which did not show significant  epicardial coronary disease. He was hospitalized in 2010 for unstable angina in which he underwent coronary angiogram and stenting to OM2, remaining disease in LAD with 50 to 60% occlusion in RCA with 25 to 30% occlusion.  He was lost to follow-up until 5/2024.    He was admitted 5/30/2024 with episodes of chest discomfort with radiation to shoulder.  Echocardiogram showed LVEF 45-50%, LV systolic function mildly reduced, regional wall motion abnormalities noted, TR/MR. Lexiscan showed large area of severe ischemia in lateral and inferolateral segments, recommended coronary angiogram.  Coronary angiography notable for multivessel coronary artery disease and he was recommended CTS consult. CTS deemed patient a candidate  for CABG and he underwent three-vessel bypass 6/4/2024. For ischemic cardiomyopathy he was started on metoprolol tartrate 25 mg twice daily. Simvastatin switched to atorvastatin. Cardiac rehabilitation ordered at discharge, completed Sept 2025.  He had a follow-up with Dr. Caldwell on 9/23/2024 which patient was feeling good and was exercising regularly, no changes were made during last visit. Patient is here today for 6 month follow up.     Patient reports feeling well today.  Significant other present today in visit.  Patient reports that he goes to the gym 4-5 times a week and walks on the treadmill.  He often does not do any strength due to chronic shoulder pain/limited range of motion left shoulder.  When he is exerting himself he denies any chest pain, shortness of breath, lightheadedness, palpitations, or dizziness. He does note he has a significant family history for CAD, with multiple family members with quadruple bypass. He is overall doing quite well, he just underwent right ulnar nerve decompression yesterday and is in a sling for the next 2 weeks.  He recently started Wegovy and is overall tolerating this well. He is overall agreeable with today's plan    Diet - eats out once a week, fairly  balanced meals otherwise   Exercise - exercising 4-5x per week on treadmill and bike, limited in strength due to chronic shoulder pain  Alcohol - never   Tobacco- stopped in      Blood pressure 122/78 and HR 61 in clinic today.     Testing Reviewed   Echo 25 - LVEF 55-60%, septal motion consistent with post operative state, LA borderline dilated   FLP 25 TC 93, HDL 29, LDL 40, . BMP 25 stable electrolytes and renal function. Hgb and plats stable 25.       Social History       Social History     Socioeconomic History     Marital status:      Spouse name: Not on file     Number of children: 3     Years of education: Not on file     Highest education level: Not on file   Occupational History     Occupation:  - [employer]   Tobacco Use     Smoking status: Former     Current packs/day: 0.00     Average packs/day: 0.5 packs/day for 20.0 years (10.0 ttl pk-yrs)     Types: Cigarettes     Start date: 1983     Quit date: 2003     Years since quittin.5     Passive exposure: Past     Smokeless tobacco: Never   Vaping Use     Vaping status: Never Used   Substance and Sexual Activity     Alcohol use: No     Alcohol/week: 0.0 standard drinks of alcohol     Drug use: No     Sexual activity: Yes     Partners: Female   Other Topics Concern      Service Not Asked     Blood Transfusions Not Asked     Caffeine Concern Not Asked     Occupational Exposure Not Asked     Hobby Hazards Not Asked     Sleep Concern Not Asked     Stress Concern Not Asked     Weight Concern Not Asked     Special Diet Not Asked     Back Care Not Asked     Exercise Not Asked     Bike Helmet Not Asked     Seat Belt Yes     Self-Exams Not Asked     Parent/sibling w/ CABG, MI or angioplasty before 65F 55M? Not Asked   Social History Narrative    SD - CHECKED AT LEAST TWICE YEARLY.    GUNS: NONE.     Social Drivers of Health     Financial Resource Strain: Low Risk  (2023)    Financial  Resource Strain      Within the past 12 months, have you or your family members you live with been unable to get utilities (heat, electricity) when it was really needed?: No   Food Insecurity: No Food Insecurity (5/28/2024)    Received from [facility]    Hunger Vital Sign      Worried About Running Out of Food in the Last Year: Never true      Ran Out of Food in the Last Year: Never true   Transportation Needs: No Transportation Needs (5/28/2024)    Received from [facility]    PRAPARE - Transportation      Lack of Transportation (Medical): No      Lack of Transportation (Non-Medical): No   Physical Activity: Insufficiently Active (5/28/2024)    Received from [facility]    Exercise Vital Sign      Days of Exercise per Week: 1 day      Minutes of Exercise per Session: 30 min   Stress: Stress Concern Present (6/23/2022)    [place] [employer] - Occupational Stress Questionnaire      Feeling of Stress : To some extent   Social Connections: Unknown (6/23/2022)    Social Connection and Isolation Panel [NHANES]      Frequency of Communication with Friends and Family: Patient declined      Frequency of Social Gatherings with Friends and Family: Once a week      Attends Restorationist Services: 1 to 4 times per year      Active Member of Clubs or Organizations: No      Attends Club or Organization Meetings: Not on file      Marital Status:    Interpersonal Safety: Low Risk  (2/18/2025)    Interpersonal Safety      Do you feel physically and emotionally safe where you currently live?: Yes      Within the past 12 months, have you been hit, slapped, kicked or otherwise physically hurt by someone?: No      Within the past 12 months, have you been humiliated or emotionally abused in other ways by your partner or ex-partner?: No   Housing Stability: Low Risk  (5/28/2024)    Received from [facility]    Housing Stability      What is your living situation today?: I have a steady place to live             Review of Systems:   Please see HPI         Physical Exam:   Vitals: There were no vitals taken for this visit.   Wt Readings from Last 4 Encounters:   02/18/25 88.3 kg (194 lb 9.6 oz)   09/23/24 89 kg (196 lb 4.8 oz)   09/17/24 86.6 kg (191 lb)   08/20/24 86.5 kg (190 lb 9.6 oz)     GEN: well nourished, in no acute distress.  HEENT:  Pupils equal, round. Sclerae nonicteric.   NECK: Supple, no masses appreciated.  No JVD with patient.  C/V:  Regular rate and rhythm, no murmur, rub or gallop.    RESP: Respirations are unlabored. Clear to auscultation bilaterally without wheezing, rales, or rhonchi.  GI: Abdomen soft, nontender.  EXTREM: No LE edema.  NEURO: Alert and oriented, cooperative.  SKIN: Warm and dry.        Data:   LIPID RESULTS:  Lab Results   Component Value Date    CHOL 95 02/24/2025    CHOL 122 02/03/2020    HDL 29 (L) 02/24/2025    HDL 36 (L) 02/03/2020    LDL 40 02/24/2025    LDL 64 02/03/2020    TRIG 132 02/24/2025    TRIG 109 02/03/2020    CHOLHDLRATIO 2.8 10/07/2015     LIVER ENZYME RESULTS:  Lab Results   Component Value Date    AST 22 02/24/2025    AST 23 07/29/2020    ALT 29 02/24/2025    ALT 45 07/29/2020     CBC RESULTS:  Lab Results   Component Value Date    WBC 10.5 02/18/2025    WBC 10.3 09/08/2020    RBC 4.60 02/18/2025    RBC 4.84 09/08/2020    HGB 12.6 (L) 02/18/2025    HGB 13.4 09/08/2020    HCT 37.5 (L) 02/18/2025    HCT 39.5 (L) 09/08/2020    MCV 82 02/18/2025    MCV 82 09/08/2020    MCH 27.4 02/18/2025    MCH 27.7 09/08/2020    MCHC 33.6 02/18/2025    MCHC 33.9 09/08/2020    RDW 15.5 (H) 02/18/2025    RDW 15.3 (H) 09/08/2020     02/18/2025     09/08/2020     BMP RESULTS:  Lab Results   Component Value Date     02/24/2025     09/04/2020    POTASSIUM 4.1 02/24/2025    POTASSIUM 3.6 06/04/2024    POTASSIUM 3.9 09/04/2020    CHLORIDE 108 (H) 02/24/2025    CHLORIDE 107 09/04/2020    CO2 21 (L) 02/24/2025    CO2 25 09/04/2020    ANIONGAP 10 02/24/2025    ANIONGAP  6 09/04/2020    GLC 94 02/24/2025     (H) 06/06/2024     (H) 09/04/2020    BUN 18.7 02/24/2025    BUN 25 09/04/2020    CR 1.39 (H) 02/24/2025    CR 1.12 09/04/2020    GFRESTIMATED 55 (L) 02/24/2025    GFRESTIMATED 68 09/04/2020    GFRESTBLACK 79 09/04/2020    MOE 9.4 02/24/2025    MOE 9.2 09/04/2020      A1C RESULTS:  Lab Results   Component Value Date    A1C 4.8 05/29/2024    A1C 4.8 04/05/2018     INR RESULTS:  Lab Results   Component Value Date    INR 1.25 (H) 06/04/2024    INR 1.35 (H) 06/04/2024    INR 0.97 04/21/2016    INR 0.98 05/11/2010            Medications     Current Outpatient Medications   Medication Sig Dispense Refill     acetaminophen (TYLENOL) 500 MG tablet Take 1,000 mg by mouth every 8 hours as needed for mild pain       aspirin (ASA) 81 MG chewable tablet 2 tablets (162 mg) by Oral or NG Tube route daily 60 tablet 3     atorvastatin (LIPITOR) 40 MG tablet Take 1 tablet (40 mg) by mouth every evening. 90 tablet 3     fluticasone (FLONASE) 50 MCG/ACT nasal spray Spray 1 spray into both nostrils daily (Patient taking differently: Spray 1 spray into both nostrils as needed for rhinitis.) 16 g 0     lamoTRIgine (LAMICTAL) 100 MG tablet Take 0.5 tablets (50 mg) by mouth 2 times daily       lamoTRIgine (LAMICTAL) 200 MG tablet Take 1 tablet (200 mg) by mouth 2 times daily       linaclotide (LINZESS) 290 MCG capsule Take 1 capsule (290 mcg) by mouth every morning (before breakfast) Prn constipatiion 90 capsule 1     metoprolol tartrate (LOPRESSOR) 25 MG tablet Take 1 tablet (25 mg) by mouth 2 times daily. 180 tablet 2     omeprazole (PRILOSEC) 40 MG DR capsule Take 1 capsule (40 mg) by mouth daily. 90 capsule 3     rizatriptan (MAXALT) 10 MG tablet TAKE 1 TABLET(10 MG) BY MOUTH AT ONSET OF HEADACHE FOR MIGRAINE. MAY REPEAT IN 2 HOURS. MAX 3 TABLETS/ 24 HOURS 15 tablet 2     semaglutide-weight management (WEGOVY) 0.25 MG/0.5ML pen Inject 0.5 mLs (0.25 mg) subcutaneously once a week. 2 mL 0      Semaglutide-Weight Management (WEGOVY) 0.5 MG/0.5ML pen Inject 0.5 mg subcutaneously once a week. 2 mL 0     Semaglutide-Weight Management (WEGOVY) 1 MG/0.5ML pen Inject 1 mg subcutaneously once a week. 2 mL 0     tamsulosin (FLOMAX) 0.4 MG capsule Take 1 capsule (0.4 mg) by mouth daily 90 capsule 2     topiramate (TOPAMAX) 100 MG tablet Take 1 tablet (100 mg) by mouth daily. 90 tablet 3     traZODone (DESYREL) 50 MG tablet Take 2 tablets (100 mg) by mouth at bedtime (Patient taking differently: Take 100 mg by mouth as needed.) 180 tablet 3     zolpidem (AMBIEN) 5 MG tablet Take 1 tablet (5 mg) by mouth nightly as needed for sleep 90 tablet 1          Past Medical History     Past Medical History:   Diagnosis Date     Anemia 07/31/2020     Arthritis      Bell's palsy 09/08/2020     Benign prostatic hyperplasia with incomplete bladder emptying 07/28/2020     Bilateral carpal tunnel syndrome 02/13/2020     CAD (coronary artery disease) 2010    a subtotally occluded obtuse marginal vessel which was stented with a drug-coated stent.  He had 50% to 60% LAD disease and 25% to 30% right coronary disease     Cognitive change 01/02/2020     Colonic polyps 2009    tubular adenomae with mildly dysplastic features     Common wart 12/02/2020     Disturbance of skin sensation      Encephalocele (H) 2009    left frontal     Epilepsy, partial (H) 2009    with secondary generalization     GERD (gastroesophageal reflux disease)      Heart attack (H)     5 years ago. Cardiology 3 months age     Herpes zoster with complication 10/07/2020     History of spinal cord injury      Hypertension      Iron deficiency 09/09/2020     Meningioma (H) 04/10/2023     RIC (obstructive sleep apnea)      RIC (obstructive sleep apnea)      Paraneoplastic Antibody  positive[799.89BS] 2009    mildly elevated alpha 3 ganglionic acetylcholine receptor and JEOVANNY 65 receptor  antibody     Paronychia of toe, left 06/26/2023     Pedal edema 02/14/2020      Post herpetic neuralgia 12/02/2020     Primary osteoarthritis of both hands 01/02/2020     Rash 04/19/2024     Reactive depression 06/25/2024     Seizures (H)     Last seizure 2012     Spinal stenosis      Stented coronary artery      Tobacco use disorder      Ulnar neuropathy of both upper extremities 02/13/2020     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     Ventricular Assymetry 2009    likely congenital right lateral ventricular enlargement     Visi (volar intercalated segment instability), right 04/10/2023    Severe Wyman       Weight gain 03/14/2024     Past Surgical History:   Procedure Laterality Date     ARTHROPLASTY KNEE Left 05/16/2016    Procedure: ARTHROPLASTY KNEE;  Surgeon: Chet Leonardo MD;  Location: [facility] OR     BACK SURGERY       BYPASS GRAFT ARTERY CORONARY N/A 6/4/2024    Procedure: CORONARY ARTERY BYPASS GRAFT x 3 (LEFT INTERNAL MAMMARY ARTERY - LEFT ANTERIOR DESCENDING ARTERY; SAPHENOUS VEIN - OBTUSE MARGINAL ARTERY 1; SAPHENOUS VEIN -OBTUSE MARGINAL ARTERY 2) WITH ENDOSCOPIC SAPHENOUS VEIN HARVEST ON THE RIGHT LOWER EXTREMITY, AND ON CARDIOPULMONARY PUMP OXYGENATOR (INTRAOPERATIVE TRANSESOPHAGEAL ECHOCARDIOGRAM BY ANESTHESIOLOGIST);  Surgeon: Melonie Morejon     CARPAL TUNNEL RELEASE RT/LT Right 03/11/2020    [facility]     CARPAL TUNNEL RELEASE RT/LT Left      COLONOSCOPY N/A 10/08/2020    Procedure: COLONOSCOPY WITH POLYPECTOMIES using cold jumbo forceps;  Surgeon: Kevin Song MD;  Location:  GI     CV CORONARY ANGIOGRAM N/A 5/30/2024    Procedure: Coronary Angiogram;  Surgeon: Crow Mukherjee MD;  Location:  HEART CARDIAC CATH LAB     CV INSTANTANEOUS WAVE-FREE RATIO N/A 5/30/2024    Procedure: Instantaneous Wave-Free Ratio;  Surgeon: Crow Mukherjee MD;  Location:  HEART CARDIAC CATH LAB     CYSTOSCOPY       ESOPHAGOSCOPY, GASTROSCOPY, DUODENOSCOPY (EGD), COMBINED  05/24/2012    Procedure:COMBINED  ESOPHAGOSCOPY, GASTROSCOPY, DUODENOSCOPY (EGD); ESOPHAGOSCOPY, GASTROSCOPY, DUODENOSCOPY (EGD) ; Surgeon:GILLIAN JOHNSON; Location: GI     ESOPHAGOSCOPY, GASTROSCOPY, DUODENOSCOPY (EGD), COMBINED N/A 10/08/2020    Procedure: ESOPHAGOGASTRODUODENOSCOPY (EGD) (fv);  Surgeon: Kevin Song MD;  Location:  GI     KNEE SURGERY Left     repair of cartilage     RELEASE TRIGGER FINGER Left 10/19/2020    Left ring finger trigger finger release at A1 pulley, Dr. Rick Frias, [facility]     SPINE SURGERY      repair of herniated disc lumbar     STENT       trigger finger release Right 09/15/2020    Right thumb trigger finger release, Dr. Frias     ULNAR NERVE TRANSPOSITION Left      VENTRICULOSTOMY      [facility] ANESTH,DX ARTHROSCOPIC PROC KNEE JOINT      CARTILEGE REPAIR.     [facility] NONSPECIFIC PROCEDURE      stint in heart     [facility]  2009    tubular adenomae, recommended annual colonoscopy     Family History   Problem Relation Age of Onset     Cardiovascular Mother         CHF, HTN, VASC. INSUFF., DM     No Known Problems Father      Thyroid Disease Sister         HYPOTHYROID     Genitourinary Problems Brother         RENAL FAILURE AND H/O MVA WITH LE PARAPLEGIA     No Known Problems Sister      No Known Problems Brother      No Known Problems Brother      Cancer - colorectal No family hx of      Colon Cancer No family hx of             Allergies   Gabapentin, Morphine and codeine, Other (do not use), and Vicodin [hydrocodone-acetaminophen]    35 minutes spent on the date of the encounter doing chart review, history and exam, documentation and further activities as noted above      Thank you for allowing me to participate in the care of your patient.      Sincerely,     WANDA Gutiérrez [facility]  cc:   Srinath Gross MD  [address]  [place] [id number]

## 2025-03-14 NOTE — PRE-PROCEDURE
[facility] SURGERY H&P           Chief Complaint: left axillary swelling and pain    History of Present Illness:    Ms. Traci Leal is a 28 y.o. year old * female presents to same day surgery for an electively planned excision of her left axillary recurrent hidradenitis associated with pain and discomfort and drainage.    Past Medical History:   Past Medical History:   Diagnosis Date    Anemia     Hidradenitis     Hypertension     PONV (postoperative nausea and vomiting)        Past Surgical History:   Past Surgical History:   Procedure Laterality Date     SECTION      HIDRADENITIS EXCISION Left     arm    KNEE SURGERY Right     infected knee        Allergy:  Allergies   Allergen Reactions    Sulfamethoxazole-Trimethoprim Hives       Social History:  reports that she has never smoked. She has never used smokeless tobacco. She reports that she does not drink alcohol and does not use drugs.     Family History:  Family History   Problem Relation Age of Onset    Thyroid Disease Mother     Hypertension Father     Breast Cancer Maternal Aunt     Breast Cancer Paternal Aunt     Breast Cancer Maternal Grandmother         Current Medications:No current facility-administered medications for this encounter.    Current Outpatient Medications:     triamterene-hydroCHLOROthiazide (MAXZIDE-25) 37.5-25 MG per tablet, Take 1 tablet by mouth daily, Disp: , Rfl:     doxycycline hyclate (VIBRA-TABS) 100 MG tablet, Take 1 tablet by mouth 2 times daily for 10 days, Disp: 20 tablet, Rfl: 0    clindamycin 1 % gel, Apply topically 2 times daily. (Patient taking differently: Apply topically 2 times daily Apply topically 2 times daily.), Disp: 60 g, Rfl: 0    metoclopramide (REGLAN) 10 MG tablet, Take 1 tablet by mouth 4 times daily, Disp: 120 tablet, Rfl: 3     Immunization History:   There is no immunization history on file for this patient.   Complete    Review of Systems:     Constitutional:  no fever,  no chills,  no sweats, No  I have examined the patient, reviewed the history, medications and pre procedural tests. Urgent CAG with possible mechanical intervention has been requested by his managing cardiologist. He has a history of known CAD sp PCI M2, HTN and dyslipidemia with 2 recent ED evaluations for chest discomfort and a markedly abnormal nuclear stress test.  I have explained to the patient the risks of death, MI, stroke, hematoma, possible urgent bypass surgery for failed PCI, use of stents, thienopyridine agents, possible peripheral vascular complications, arrhythmia, the use of FFR in clinical decision-making and alternative of medical therapy alone in regards to left heart catheterization, left ventriculography, coronary angiography, and possible percutaneous coronary intervention. The patient voiced understanding and wishes to proceed. The patient has a good right radial pulse, normal ulnar pulse and a normal Prashant's sign. Dr.Manoles JUAN PRE-PROCEDURE:   Procedure:  Coronary angiogram possible percutaneous coronary intervention  Date/Time:  5/30/2024 3:07 PM    Verbal consent obtained?: Yes    Written consent obtained?: Yes    Risks and benefits: Risks, benefits and alternatives were discussed    DC Plan: Appropriate discharge home plan in place for patients who are going home after procedure   Consent given by:  Patient  Patient states understanding of procedure being performed: Yes    Patient's understanding of procedure matches consent: Yes    Procedure consent matches procedure scheduled: Yes    Expected level of sedation:  Moderate  Appropriately NPO:  Yes  Mallampati  :  Grade 2- soft palate, base of uvula, tonsillar pillars, and portion of posterior pharyngeal wall visible  Lungs:  Lungs clear with good breath sounds bilaterally  Heart:  Normal heart sounds and rate  History & Physical reviewed:  History and physical reviewed and no updates needed  Statement of review:  I have reviewed the lab findings,  diagnostic data, medications, and the plan for sedation

## 2025-03-19 NOTE — TELEPHONE ENCOUNTER
No, he is on an additional epileptic agent  Chad Noel MD     [Structural Heart and Valve Disease] : structural heart and valve disease [Hypertension] : hypertension [Spouse] : spouse

## 2025-03-29 RX ORDER — METOPROLOL TARTRATE 25 MG/1
25 TABLET, FILM COATED ORAL 2 TIMES DAILY
Qty: 60 TABLET | Refills: 3 | OUTPATIENT
Start: 2025-03-29

## 2025-04-06 NOTE — TELEPHONE ENCOUNTER
This is a duplicate refill request for simvastatin.     Alert and oriented to person, place and time/Patient baseline mental status/Awake/Symptoms improved/Dressing clean and dry

## 2025-04-15 DIAGNOSIS — N40.1 BENIGN PROSTATIC HYPERPLASIA WITH URINARY FREQUENCY: ICD-10-CM

## 2025-04-15 DIAGNOSIS — R35.0 BENIGN PROSTATIC HYPERPLASIA WITH URINARY FREQUENCY: ICD-10-CM

## 2025-04-15 RX ORDER — TAMSULOSIN HYDROCHLORIDE 0.4 MG/1
0.4 CAPSULE ORAL DAILY
Qty: 90 CAPSULE | Refills: 1 | Status: SHIPPED | OUTPATIENT
Start: 2025-04-15

## 2025-05-15 NOTE — TELEPHONE ENCOUNTER
Action    Action Taken Request sent to [place]      NEUROLOGY PRE- VISIT    RECORDS RECEVEIVED FROM: Referred by Dr. Noel   REASON FOR VISIT: Cognitive change   PROVIDER: Theo   DATE OF APPT: 05/22/2025     NOTES (FOR ALL VISITS) STATUS DETAILS   OFFICE NOTE from referring provider  Referral an notes in chart   OFFICE NOTE from other specialist     DISCHARGE SUMMARY from hospital     DISCHARGE REPORT from ER     OPERATIVE REPORT     EMG REPORT     EEG     Physical therapy       IMAGING  (FOR ALL VISITS) STATUS DETAILS   MRI (HEAD, NECK, SPINE) waiting MR brain 05/16/2024   XRAY (SPINE) *NEUROSURGERY*     CT (HEAD, NECK, SPINE)       Does patient have C2C?  Year last updated Action     YES   [x]   2020   Please update at appointment if outdated more than 5 years       NO     []   N/A   Please complete C2C at appointment

## 2025-05-18 DIAGNOSIS — E66.09 OBESITY DUE TO EXCESS CALORIES WITH SERIOUS COMORBIDITY, UNSPECIFIED CLASS: ICD-10-CM

## 2025-05-18 DIAGNOSIS — K59.09 OTHER CONSTIPATION: ICD-10-CM

## 2025-05-18 DIAGNOSIS — I25.10 CORONARY ARTERY DISEASE INVOLVING NATIVE CORONARY ARTERY OF NATIVE HEART WITHOUT ANGINA PECTORIS: ICD-10-CM

## 2025-05-19 RX ORDER — LINACLOTIDE 290 UG/1
CAPSULE, GELATIN COATED ORAL
Qty: 90 CAPSULE | Refills: 3 | Status: SHIPPED | OUTPATIENT
Start: 2025-05-19

## 2025-05-19 RX ORDER — SEMAGLUTIDE 1 MG/.5ML
INJECTION, SOLUTION SUBCUTANEOUS
Qty: 6 ML | Refills: 3 | Status: SHIPPED | OUTPATIENT
Start: 2025-05-19

## 2025-05-21 NOTE — PROGRESS NOTES
INITIAL NEUROLOGY CONSULTATION    DATE OF VISIT: 5/22/2025  CLINIC LOCATION: [facility]  MRN: [id number]  PATIENT NAME: Kashmir Yao  YOB: 1955    REASON FOR VISIT: No chief complaint on file.    HISTORY OF PRESENT ILLNESS:                                                    Mr. 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    Per patient's report, ***.  He is on lamotrigine and topiramate.    Family history is positive for stroke and migraine/headache.    Laboratory evaluation from February 2025 includes elevated creatinine of 1.39 and chloride of 108 on otherwise unremarkable CMP and normal LDL (40).  TSH was normal (1.33) in March 2024 vitamin B12 was also normal (542) at that time.    Brain MRI from 9/4/2020, performed for left facial droop, demonstrated abnormal contrast-enhancement of the IAC portion and geniculate ganglion of the left cranial nerve, consistent with history of Bell's palsy in context of moderate diffuse cerebral volume loss.  Images were personally reviewed and independently interpreted.    According to Care Everywhere, the patient was previously evaluated at [facility] neurology department in April 2024.  At that time he reported cognitive symptoms for the past 1 to 2 years, including occasional disorientation while driving in familiar places.  He denied personality changes, acting out in sleep, or any additional focal neurological symptoms.  It was noted that the patient has history of right frontal meningioma that gradually increases in size that felt to be an etiology of well-controlled epilepsy on lamotrigine.  The note also mentions history of congenital aqueductal stenosis  with successful endoscopic third ventriculostomy in 2019 that led to improvement in his gait by 80% and complete resolution of urinary incontinence, though it was unclear if it helped any cognitive dysfunction.    At the initial visit repeat brain MRI was ordered along with neuropsychologic evaluation.  It was mentioned that B12 and TSH were checked by primary care provider and were normal.    Brain MRI with and without contrast from 5/15/2024 demonstrated slight increased in size of the right parasagittal frontal lobe meningioma with additional stable meningioma in both cerebral hemispheres, including right frontal opercular meningioma, subcentimeter meningioma overlying the right middle frontal gyrus, and small left frontal meningioma in addition to plaque-like meningioma along the lateral margin of the left cavernous sinus and medial aspect of the left middle cranial fossa.  Ventricle size felt stable.    According to telemedicine follow-up note from 5/28/2024 mild multi domain cognitive impairment with recommendations to be evaluated by behavioral neurologist/memory specialist.  Repeat brain MRI in 1 year was also advised along with recommendation against continued driving due to disorientation.  PAST MEDICAL/SURGICAL HISTORY:                                                    I personally reviewed patient's past medical and surgical history with the patient at today's visit.  MEDICATIONS:                                                    I personally reviewed patient's medications and allergies with the patient at today's visit.  ALLERGIES:                                                       Allergies   Allergen Reactions    Gabapentin      Fever      Morphine And Codeine      inability to sleep after taking codeine    Other (Do Not Use)      EKG patches for prolonged period (inpatient) developed blister rash. Use hypoallergenic EKG patches.     Vicodin [Hydrocodone-Acetaminophen]      Perspiring, hyper,  itchy     EXAM:                                                    VITAL SIGNS:   There were no vitals taken for this visit.  Webster Cognitive Assessment:          Webster Cognitive Assessment Score:   /30.     General: pt is in NAD, cooperative.  Skin: normal turgor, moist mucous membranes, no lesions/rashes noticed.  HEENT: ATNC, EOMI, PERRL, white sclera, normal conjunctiva, no nystagmus or ptosis. No carotid bruits bilaterally.  Respiratory: lung sounds clear to auscultation bilaterally, no crackles, wheezes, rhonchi. Symmetric lung excursion, no accessory respiratory muscle use.  Cardiovascular: normal S1/S2, no murmurs/rubs/gallops.   Abdomen: Not distended.  : deferred.    Neurological:  Mental: alert, follows commands, MoCA as per above, no aphasia or dysarthria. Fund of knowledge is {MYAPPROPRIATE:089085}  Cranial Nerves:  CN II: visual acuity - able to accurately count fingers with each eye. Visual fields intact, fundi: discs sharp, no papilledema and normal vessels bilaterally.  CN III, IV, VI: EOM intact, pupils equal and reactive  CN V: facial sensation nl  CN VII: face symmetric, no facial droop  CN VIII: hearing normal  CN IX: palate elevation symmetric, uvula at midline  CN XI SCM normal, shoulder shrug nl  CN XII: tongue midline  Motor: Strength: 5/5 in all major groups of all extremities. Normal tone. No abnormal movements. No pronator drift b/l.  Reflexes: Triceps, biceps, brachioradialis, patellar, and achilles reflexes normal and symmetric. No clonus noted. Toes are down-going b/l.   Sensory: light touch, pinprick, and vibration intact. Romberg: negative.  Coordination: FNF and heel-shin tests intact b/l.   Gait:  Normal, able to tandem walk *** without difficulty.  DATA:   LABS/EEG/IMAGING/OTHER STUDIES: I reviewed pertinent medical records, as detailed in the history of present illness.  ASSESSMENT AND PLAN:      ASSESSMENT: Kashmir Yao is a 69 year old male patient with listed above  past medical history, who presents with ***.    We had a detailed discussion with the patient regarding his presenting complaints.  The neurological exam today is ***.    DIAGNOSES:  No diagnosis found.  PLAN: There are no Patient Instructions on file for this visit.    Total Time: *** minutes spent on the date of the encounter doing chart review, history and exam, documentation and further activities per the note.    Brian Venegas MD  [facility] Neu[facility]  (Chart documentation was completed in part with Dragon voice-recognition software. Even though reviewed, some grammatical, spelling, and word errors may remain.)

## 2025-05-22 ENCOUNTER — OFFICE VISIT (OUTPATIENT)
Dept: NEUROLOGY | Facility: CLINIC | Age: 70
End: 2025-05-22
Payer: COMMERCIAL

## 2025-05-22 ENCOUNTER — PRE VISIT (OUTPATIENT)
Dept: NEUROLOGY | Facility: CLINIC | Age: 70
End: 2025-05-22

## 2025-05-22 VITALS — HEART RATE: 71 BPM | DIASTOLIC BLOOD PRESSURE: 76 MMHG | SYSTOLIC BLOOD PRESSURE: 113 MMHG | OXYGEN SATURATION: 98 %

## 2025-05-22 DIAGNOSIS — G31.84 MILD COGNITIVE IMPAIRMENT: Primary | ICD-10-CM

## 2025-05-22 DIAGNOSIS — D32.9 MENINGIOMA (H): ICD-10-CM

## 2025-05-22 DIAGNOSIS — G40.909 SEIZURE DISORDER (H): ICD-10-CM

## 2025-05-22 DIAGNOSIS — Q03.0: ICD-10-CM

## 2025-05-22 ASSESSMENT — MONTREAL COGNITIVE ASSESSMENT (MOCA)
9. REPEAT EACH SENTENCE: 0
WHAT IS THE TOTAL SCORE (OUT OF 30): 21
8. SERIAL SUBTRACTION OF 7S: 3
11. FOR EACH PAIR OF WORDS, WHAT CATEGORY DO THEY BELONG TO (OUT OF 2): 2
13. ORIENTATION SUBSCORE: 6
4. NAME EACH OF THE THREE ANIMALS SHOWN: 3
10. [FLUENCY] NAME WORDS STARTING WITH DESIGNATED LETTER: 0
7. [VIGILENCE] TAP WHEN HEARING DESIGNATED LETTER: 1
12. MEMORY INDEX SCORE: 2
6. READ LIST OF DIGITS [FORWARD/BACKWARD]: 1
WHAT LEVEL OF EDUCATION WAS ATTAINED: 0
VISUOSPATIAL/EXECUTIVE SUBSCORE: 3

## 2025-05-22 NOTE — PATIENT INSTRUCTIONS
AFTER VISIT SUMMARY (AVS):    At today's visit we thoroughly discussed various diagnostic possibilities for your symptoms, necessary evaluation, and the plan, which includes:  Orders Placed This Encounter   Procedures    MR Brain w/o & w Contrast    [facility]; [id number]; Phosphorylated tau 181 (Laboratory Miscellaneous Order)    [facility]; ; Phosphorylated tau 217 (Laboratory Miscellaneous Order)    Other Laboratory; [facility]; Beta amyloid 42/40 ratio, [place] [id number] (Laboratory Miscellaneous Order)    EEG Awake or Drowsy Routine     No new medications.     Additional recommendations after the work-up.    Next follow-up appointment is in the next 4-6 weeks or earlier if needed.    Please do not hesitate to call me with any questions or concerns.    Thanks.

## 2025-05-22 NOTE — LETTER
5/22/2025      Kashmir Yao  [address]  [place] [address]      Dear Colleague,    Thank you for referring your patient, Kashmir Yao, to the [facility] NEUROLOGY [facility]. Please see a copy of my visit note below.    INITIAL NEUROLOGY CONSULTATION    DATE OF VISIT: 5/22/2025  CLINIC LOCATION: [facility]  MRN: [id number]  PATIENT NAME: Kashmir Yao  YOB: 1955    REASON FOR VISIT:   Chief Complaint   Patient presents with     Consult     Neuropsych with the [facility] cognition is fine     HISTORY OF PRESENT ILLNESS:                                                    Mr. 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 Accompanied by his wife.    Per patient's report, he noticed difficulty with directions going to familiar places approximately 2 years ago.  He denies any forgetfulness or word finding difficulties.  No trouble with taking his medications on time or managing his finances.  No safety issues.  Has history of epilepsy and meningiomas.  Also had brain surgery.  He is on lamotrigine and topiramate.    According to patient's wife, the patient is good with numbers, but seem to be forgetful regarding family events.  He does not repeat himself and does not have any trouble using calendar, but they needed to switch bills to [employer] because he was missing payments.  Also might forget to  wife's prescriptions.  Has trouble driving in familiar places at times.  Might not remember details of medical appointments.  She has no additional concerns.    Family history is positive for stroke and migraine/headache.    Laboratory evaluation from  February 2025 includes elevated creatinine of 1.39 and chloride of 108 on otherwise unremarkable CMP and normal LDL (40).  TSH was normal (1.33) in March 2024. Vitamin B12 was also normal (542) at that time.    Brain MRI from 9/4/2020, performed for left facial droop, demonstrated abnormal contrast-enhancement of the IAC portion and geniculate ganglion of the left cranial nerve, consistent with history of Bell's palsy in context of moderate diffuse cerebral volume loss.  Images were personally reviewed and independently interpreted.    According to Care Everywhere, the patient was previously evaluated at [facility] neurology department in April 2024.  At that time he reported cognitive symptoms for the past 1 to 2 years, including occasional disorientation while driving in familiar places.  He denied personality changes, acting out in sleep, or any additional focal neurological symptoms.  It was noted that the patient has history of right frontal meningioma that gradually increases in size that felt to be an etiology of well-controlled epilepsy on lamotrigine.  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    At the initial visit repeat brain MRI was ordered along with neuropsychologic evaluation.  It was mentioned that B12 and TSH were checked by primary care provider and were normal.    Brain MRI with and without contrast from 5/15/2024 demonstrated slight increased in size of the right parasagittal frontal lobe meningioma with additional stable meningiomas in both cerebral hemispheres, including right frontal opercular meningioma, subcentimeter meningioma overlying the right middle frontal gyrus, and small left frontal meningioma in addition to plaque-like meningioma along the lateral  margin of the left cavernous sinus and medial aspect of the left middle cranial fossa.  Ventricle size felt stable.  I was able to review and independently interpret these images in PACS.    According to telemedicine follow-up note from 5/28/2024, neuropsychologic testing demonstrated mild multi-domain cognitive impairment with recommendations to be evaluated by behavioral neurologist/memory specialist.  Repeat brain MRI in 1 year was also advised along with recommendation against continued driving due to disorientation.  PAST MEDICAL/SURGICAL HISTORY:                                                    I personally reviewed patient's past medical and surgical history with the patient at today's visit.  MEDICATIONS:                                                    I personally reviewed patient's medications and allergies with the patient at today's visit.  ALLERGIES:                                                       Allergies   Allergen Reactions     Gabapentin      Fever       Morphine And Codeine      inability to sleep after taking codeine     Other (Do Not Use)      EKG patches for prolonged period (inpatient) developed blister rash. Use hypoallergenic EKG patches.      Vicodin [Hydrocodone-Acetaminophen]      Perspiring, hyper, itchy     EXAM:                                                    VITAL SIGNS:   /76 (BP Location: Left arm, Patient Position: Sitting, Cuff Size: Adult Regular)   Pulse 71   SpO2 98%   [place] Cognitive Assessment:    Adolfo Cognitive Assessment (MOCA)  Visuospatial/Executive : 3  Naming: 3  Attention - Digits: 1  Attention - Letters: 1  Attention - Subtraction: 3  Language - Repeat: 0  Language - Fluency : 0  Abstraction: 2  Delayed Recall: 2  Orientation: 6  Education: 0  MOCA Score: 21  Administered by: : CAM Joy - MoCA 7.1     Adolfo Cognitive Assessment Score:  MOCA Score: 21/30.     General: pt is in NAD, cooperative.  Skin: normal turgor, moist mucous  membranes, no lesions/rashes noticed.  HEENT: ATNC, EOMI, PERRL, white sclera, normal conjunctiva, no nystagmus or ptosis. No carotid bruits bilaterally.  Respiratory: lung sounds clear to auscultation bilaterally, no crackles, wheezes, rhonchi. Symmetric lung excursion, no accessory respiratory muscle use.  Cardiovascular: normal S1/S2, no murmurs/rubs/gallops.   Abdomen: Not distended.  : deferred.    Neurological:  Mental: alert, follows commands, MoCA as per above, no aphasia or dysarthria. Fund of knowledge is diminished for age.  Cranial Nerves:  CN II: visual acuity - able to accurately count fingers with each eye. Visual fields intact, fundi: discs sharp, no papilledema and normal vessels bilaterally.  CN III, IV, VI: EOM intact, pupils equal and reactive  CN V: facial sensation nl  CN VII: Mild left facial droop (chronic)  CN VIII: hearing normal  CN IX: palate elevation symmetric, uvula at midline  CN XI SCM normal, shoulder shrug nl  CN XII: tongue midline  Motor: Strength: 5/5 in all major groups of all extremities. Normal tone. No abnormal movements. No pronator drift b/l.  Reflexes: Triceps, biceps, and brachioradialis reflexes are normal and symmetric, patellar and achilles reflexes are absent bilaterally. No clonus noted. Toes are down-going b/l.   Sensory: light touch, pinprick, and vibration intact. Romberg: negative.  Coordination: FNF and heel-shin tests intact b/l.   Gait:  Normal, able to tandem walk without difficulty.  DATA:   LABS/EEG/IMAGING/OTHER STUDIES: I reviewed pertinent medical records, as detailed in the history of present illness.  ASSESSMENT AND PLAN:      ASSESSMENT: Kashmir Yao is a 69 year old male patient with listed above past medical history, who presents with cognitive complaints in context of known aqueductal stenosis, status post surgery, epilepsy, and multiple meningiomas.    We had a detailed discussion with the patient and his wife regarding his presenting  complaints.  MoCA is 21/30, consistent with mild cognitive impairment.  The neurological exam today is reflective of known lumbar radiculopathy and residual from left Bell's palsy, but no all other focal findings.    I reviewed his brain MRI.  There was a recommendation to repeat the scan in 1 year, which is reasonable.  I will place the order along with laboratory testing for probability of Alzheimer's disease and EEG.  We will also obtain neuropsychologic testing report from [facility] for detailed review.    DIAGNOSES:    ICD-10-CM    1. Mild cognitive impairment  G31.84 [facility]; [id number]; Phosphorylated tau 181 (Laboratory Miscellaneous Order)   2. 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 Meningioma (H)  D32.9 MR Brain w/o & w Contrast   4. Seizure disorder (H)  G40.909 EEG Awake or Drowsy Routine     PLAN: At today's visit we thoroughly discussed various diagnostic possibilities for patient's symptoms, necessary evaluation, and the plan, which includes:  Orders Placed This Encounter   Procedures     MR Brain w/o & w Contrast     [facility]; [id number]; Phosphorylated tau 181 (Laboratory Miscellaneous Order)     [facility]; ; Phosphorylated tau 217 (Laboratory Miscellaneous Order)     Other Laboratory; [facility]; Beta amyloid 42/40 ratio, [place] [id number] (Laboratory Miscellaneous Order)     EEG Awake or Drowsy Routine     No new medications.     Additional recommendations after the work-up.    Next follow-up appointment is in the next 4-6 weeks or earlier if needed.    Total Time: 76 minutes spent on the date of the encounter doing chart review, history and exam, documentation and further activities per the note.  Additional time was needed to review extensive prior medical records, discussed patient's symptoms, evaluation results, additionally needed testing, and the plan while answering his and his wife questions.    Brian Venegas MD  [facility]  Neurology  (Chart documentation was completed in part with Dragon voice-recognition software. Even though reviewed, some grammatical, spelling, and word errors may remain.)            Again, thank you for allowing me to participate in the care of your patient.        Sincerely,        Brian Venegas MD    Electronically signed Libtayo Counseling- I discussed with the patient the risks of Libtayo including but not limited to nausea, vomiting, diarrhea, and bone or muscle pain.  The patient verbalized understanding of the proper use and possible adverse effects of Libtayo.  All of the patient's questions and concerns were addressed.

## 2025-05-22 NOTE — PROGRESS NOTES
INITIAL NEUROLOGY CONSULTATION    DATE OF VISIT: 5/22/2025  CLINIC LOCATION: [facility]  MRN: [id number]  PATIENT NAME: Kashmir Yao  YOB: 1955    REASON FOR VISIT:   Chief Complaint   Patient presents with    Consult     Neuropsych with the [facility] cognition is fine     HISTORY OF PRESENT ILLNESS:                                                    Mr. 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 Accompanied by his wife.    Per patient's report, he noticed difficulty with directions going to familiar places approximately 2 years ago.  He denies any forgetfulness or word finding difficulties.  No trouble with taking his medications on time or managing his finances.  No safety issues.  Has history of epilepsy and meningiomas.  Also had brain surgery.  He is on lamotrigine and topiramate.    According to patient's wife, the patient is good with numbers, but seem to be forgetful regarding family events.  He does not repeat himself and does not have any trouble using calendar, but they needed to switch bills to [employer] because he was missing payments.  Also might forget to  wife's prescriptions.  Has trouble driving in familiar places at times.  Might not remember details of medical appointments.  She has no additional concerns.    Family history is positive for stroke and migraine/headache.    Laboratory evaluation from February 2025 includes elevated creatinine of 1.39 and chloride of 108 on otherwise unremarkable CMP and normal LDL (40).  TSH was normal (1.33) in March 2024. Vitamin B12 was also normal (542) at that time.    Brain MRI from 9/4/2020, performed for left  facial droop, demonstrated abnormal contrast-enhancement of the IAC portion and geniculate ganglion of the left cranial nerve, consistent with history of Bell's palsy in context of moderate diffuse cerebral volume loss.  Images were personally reviewed and independently interpreted.    According to Care Everywhere, the patient was previously evaluated at [facility] neurology department in April 2024.  At that time he reported cognitive symptoms for the past 1 to 2 years, including occasional disorientation while driving in familiar places.  He denied personality changes, acting out in sleep, or any additional focal neurological symptoms.  It was noted that the patient has history of right frontal meningioma that gradually increases in size that felt to be an etiology of well-controlled epilepsy on lamotrigine.  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    At the initial visit repeat brain MRI was ordered along with neuropsychologic evaluation.  It was mentioned that B12 and TSH were checked by primary care provider and were normal.    Brain MRI with and without contrast from 5/15/2024 demonstrated slight increased in size of the right parasagittal frontal lobe meningioma with additional stable meningiomas in both cerebral hemispheres, including right frontal opercular meningioma, subcentimeter meningioma overlying the right middle frontal gyrus, and small left frontal meningioma in addition to plaque-like meningioma along the lateral margin of the left cavernous sinus and medial aspect of the left middle cranial fossa.  Ventricle size felt stable.  I was able to review and independently interpret these images in PACS.    According to telemedicine follow-up note from 5/28/2024,  neuropsychologic testing demonstrated mild multi-domain cognitive impairment with recommendations to be evaluated by behavioral neurologist/memory specialist.  Repeat brain MRI in 1 year was also advised along with recommendation against continued driving due to disorientation.  PAST MEDICAL/SURGICAL HISTORY:                                                    I personally reviewed patient's past medical and surgical history with the patient at today's visit.  MEDICATIONS:                                                    I personally reviewed patient's medications and allergies with the patient at today's visit.  ALLERGIES:                                                       Allergies   Allergen Reactions    Gabapentin      Fever      Morphine And Codeine      inability to sleep after taking codeine    Other (Do Not Use)      EKG patches for prolonged period (inpatient) developed blister rash. Use hypoallergenic EKG patches.     Vicodin [Hydrocodone-Acetaminophen]      Perspiring, hyper, itchy     EXAM:                                                    VITAL SIGNS:   /76 (BP Location: Left arm, Patient Position: Sitting, Cuff Size: Adult Regular)   Pulse 71   SpO2 98%   Milmay Cognitive Assessment:    Adolfo Cognitive Assessment (MOCA)  Visuospatial/Executive : 3  Naming: 3  Attention - Digits: 1  Attention - Letters: 1  Attention - Subtraction: 3  Language - Repeat: 0  Language - Fluency : 0  Abstraction: 2  Delayed Recall: 2  Orientation: 6  Education: 0  MOCA Score: 21  Administered by: : Espinoza Barber,  - MoCA 7.1     Adolfo Cognitive Assessment Score:  MOCA Score: 21/30.     General: pt is in NAD, cooperative.  Skin: normal turgor, moist mucous membranes, no lesions/rashes noticed.  HEENT: ATNC, EOMI, PERRL, white sclera, normal conjunctiva, no nystagmus or ptosis. No carotid bruits bilaterally.  Respiratory: lung sounds clear to auscultation bilaterally, no crackles, wheezes, rhonchi.  Symmetric lung excursion, no accessory respiratory muscle use.  Cardiovascular: normal S1/S2, no murmurs/rubs/gallops.   Abdomen: Not distended.  : deferred.    Neurological:  Mental: alert, follows commands, MoCA as per above, no aphasia or dysarthria. Fund of knowledge is diminished for age.  Cranial Nerves:  CN II: visual acuity - able to accurately count fingers with each eye. Visual fields intact, fundi: discs sharp, no papilledema and normal vessels bilaterally.  CN III, IV, VI: EOM intact, pupils equal and reactive  CN V: facial sensation nl  CN VII: Mild left facial droop (chronic)  CN VIII: hearing normal  CN IX: palate elevation symmetric, uvula at midline  CN XI SCM normal, shoulder shrug nl  CN XII: tongue midline  Motor: Strength: 5/5 in all major groups of all extremities. Normal tone. No abnormal movements. No pronator drift b/l.  Reflexes: Triceps, biceps, and brachioradialis reflexes are normal and symmetric, patellar and achilles reflexes are absent bilaterally. No clonus noted. Toes are down-going b/l.   Sensory: light touch, pinprick, and vibration intact. Romberg: negative.  Coordination: FNF and heel-shin tests intact b/l.   Gait:  Normal, able to tandem walk without difficulty.  DATA:   LABS/EEG/IMAGING/OTHER STUDIES: I reviewed pertinent medical records, as detailed in the history of present illness.  ASSESSMENT AND PLAN:      ASSESSMENT: Kashmir Yao is a 69 year old male patient with listed above past medical history, who presents with cognitive complaints in context of known aqueductal stenosis, status post surgery, epilepsy, and multiple meningiomas.    We had a detailed discussion with the patient and his wife regarding his presenting complaints.  MoCA is 21/30, consistent with mild cognitive impairment.  The neurological exam today is reflective of known lumbar radiculopathy and residual from left Bell's palsy, but no all other focal findings.    I reviewed his brain MRI.  There  was a recommendation to repeat the scan in 1 year, which is reasonable.  I will place the order along with laboratory testing for probability of Alzheimer's disease and EEG.  We will also obtain neuropsychologic testing report from [facility] for detailed review.    DIAGNOSES:    ICD-10-CM    1. Mild cognitive impairment  G31.84 [facility]; [id number]; Phosphorylated tau 181 (Laboratory Miscellaneous Order)   2. 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 Meningioma (H)  D32.9 MR Brain w/o & w Contrast   4. Seizure disorder (H)  G40.909 EEG Awake or Drowsy Routine     PLAN: At today's visit we thoroughly discussed various diagnostic possibilities for patient's symptoms, necessary evaluation, and the plan, which includes:  Orders Placed This Encounter   Procedures    MR Brain w/o & w Contrast    [facility]; [id number]; Phosphorylated tau 181 (Laboratory Miscellaneous Order)    [facility]; ; Phosphorylated tau 217 (Laboratory Miscellaneous Order)    Other Laboratory; [facility]; Beta amyloid 42/40 ratio, [place] 01[address] (Laboratory Miscellaneous Order)    EEG Awake or Drowsy Routine     No new medications.     Additional recommendations after the work-up.    Next follow-up appointment is in the next 4-6 weeks or earlier if needed.    Total Time: 76 minutes spent on the date of the encounter doing chart review, history and exam, documentation and further activities per the note.  Additional time was needed to review extensive prior medical records, discussed patient's symptoms, evaluation results, additionally needed testing, and the plan while answering his and his wife questions.    Brian Venegas MD  [facility]  (Chart documentation was completed in part with Dragon voice-recognition software. Even though reviewed, some grammatical, spelling, and word errors may remain.)

## 2025-05-27 ENCOUNTER — LAB (OUTPATIENT)
Dept: LAB | Facility: CLINIC | Age: 70
End: 2025-05-27
Payer: COMMERCIAL

## 2025-05-27 DIAGNOSIS — G31.84 MILD COGNITIVE IMPAIRMENT: ICD-10-CM

## 2025-05-27 LAB
LAB ORDER RESULT STATUS: NORMAL
LAB ORDER RESULT STATUS: NORMAL
Lab: NORMAL
Lab: NORMAL
PERFORMING LABORATORY: NORMAL
PERFORMING LABORATORY: NORMAL
TEST NAME: NORMAL
TEST NAME: NORMAL

## 2025-05-27 PROCEDURE — 36415 COLL VENOUS BLD VENIPUNCTURE: CPT

## 2025-05-27 PROCEDURE — 82233 BETA-AMYLOID 1-40 (ABETA 40): CPT

## 2025-05-27 PROCEDURE — 84393 TAU PHOSPHORYLATED EA: CPT

## 2025-05-27 PROCEDURE — 82234 BETA-AMYLOID 1-42 (ABETA 42): CPT

## 2025-05-29 LAB
LAB ORDER RESULT STATUS: NORMAL
LABCORP INTERFACED MISCELLANEOUS TEST RESULT: NORMAL
Lab: NORMAL
MAYO MISC RESULT: NORMAL
PERFORMING LABORATORY: NORMAL
TEST NAME: NORMAL

## 2025-06-03 ENCOUNTER — TELEPHONE (OUTPATIENT)
Dept: NEUROLOGY | Facility: CLINIC | Age: 70
End: 2025-06-03
Payer: COMMERCIAL

## 2025-06-03 NOTE — TELEPHONE ENCOUNTER
Attempted to reach patient to remind them about appointment scheduled with None on 6/4/25 in our [place] clinic.    A voicemail was left with a call back number if the patient has questions or would like to reschedule.

## 2025-06-04 ENCOUNTER — ANCILLARY PROCEDURE (OUTPATIENT)
Dept: NEUROLOGY | Facility: CLINIC | Age: 70
End: 2025-06-04
Payer: COMMERCIAL

## 2025-06-04 ENCOUNTER — RESULTS FOLLOW-UP (OUTPATIENT)
Dept: NEUROLOGY | Facility: CLINIC | Age: 70
End: 2025-06-04

## 2025-06-04 DIAGNOSIS — G31.84 MILD COGNITIVE IMPAIRMENT: ICD-10-CM

## 2025-06-04 DIAGNOSIS — G40.909 SEIZURE DISORDER (H): ICD-10-CM

## 2025-06-04 PROCEDURE — 95816 EEG AWAKE AND DROWSY: CPT | Performed by: PSYCHIATRY & NEUROLOGY

## 2025-06-05 LAB
SCANNED LAB RESULT: NORMAL
TEST NAME: NORMAL

## 2025-06-14 ENCOUNTER — HOSPITAL ENCOUNTER (OUTPATIENT)
Dept: MRI IMAGING | Facility: CLINIC | Age: 70
Discharge: HOME | End: 2025-06-14
Attending: PSYCHIATRY & NEUROLOGY | Admitting: PSYCHIATRY & NEUROLOGY
Payer: COMMERCIAL

## 2025-06-14 DIAGNOSIS — D32.9 MENINGIOMA (H): ICD-10-CM

## 2025-06-14 DIAGNOSIS — G31.84 MILD COGNITIVE IMPAIRMENT: ICD-10-CM

## 2025-06-14 DIAGNOSIS — Q03.0: ICD-10-CM

## 2025-06-14 PROCEDURE — A9585 GADOBUTROL INJECTION: HCPCS | Performed by: PSYCHIATRY & NEUROLOGY

## 2025-06-14 PROCEDURE — 70553 MRI BRAIN STEM W/O & W/DYE: CPT

## 2025-06-14 PROCEDURE — 255N000002 HC RX 255 OP 636: Performed by: PSYCHIATRY & NEUROLOGY

## 2025-06-14 RX ORDER — GADOBUTROL 604.72 MG/ML
9 INJECTION INTRAVENOUS ONCE
Status: COMPLETED | OUTPATIENT
Start: 2025-06-14 | End: 2025-06-14

## 2025-06-14 RX ORDER — GADOBUTROL 604.72 MG/ML
0.1 INJECTION INTRAVENOUS ONCE
Status: COMPLETED | OUTPATIENT
Start: 2025-06-14 | End: 2025-06-14

## 2025-06-14 RX ADMIN — GADOBUTROL 9 ML: 604.72 INJECTION INTRAVENOUS at 16:37

## 2025-06-18 ENCOUNTER — PATIENT OUTREACH (OUTPATIENT)
Dept: CARE COORDINATION | Facility: CLINIC | Age: 70
End: 2025-06-18
Payer: COMMERCIAL

## 2025-06-30 ENCOUNTER — OFFICE VISIT (OUTPATIENT)
Dept: NEUROLOGY | Facility: CLINIC | Age: 70
End: 2025-06-30
Payer: COMMERCIAL

## 2025-06-30 VITALS
DIASTOLIC BLOOD PRESSURE: 76 MMHG | WEIGHT: 187 LBS | HEIGHT: 69 IN | SYSTOLIC BLOOD PRESSURE: 122 MMHG | BODY MASS INDEX: 27.7 KG/M2 | HEART RATE: 63 BPM | OXYGEN SATURATION: 99 %

## 2025-06-30 DIAGNOSIS — G40.909 SEIZURE DISORDER (H): ICD-10-CM

## 2025-06-30 DIAGNOSIS — G31.84 MILD COGNITIVE IMPAIRMENT: Primary | ICD-10-CM

## 2025-06-30 DIAGNOSIS — D32.9 MENINGIOMA (H): ICD-10-CM

## 2025-06-30 DIAGNOSIS — Q03.0: ICD-10-CM

## 2025-06-30 PROCEDURE — 3078F DIAST BP <80 MM HG: CPT | Performed by: PSYCHIATRY & NEUROLOGY

## 2025-06-30 PROCEDURE — 3074F SYST BP LT 130 MM HG: CPT | Performed by: PSYCHIATRY & NEUROLOGY

## 2025-06-30 PROCEDURE — G2211 COMPLEX E/M VISIT ADD ON: HCPCS | Performed by: PSYCHIATRY & NEUROLOGY

## 2025-06-30 PROCEDURE — 99213 OFFICE O/P EST LOW 20 MIN: CPT | Performed by: PSYCHIATRY & NEUROLOGY

## 2025-06-30 NOTE — PROGRESS NOTES
"ESTABLISHED PATIENT NEUROLOGY NOTE    DATE OF VISIT: 6/30/2025  CLINIC LOCATION: [facility]  MRN: [id number]  PATIENT NAME: Kashmir Yao  YOB: 1955    REASON FOR VISIT:   Chief Complaint   Patient presents with    Follow Up     4-6 week follow up - cognitive change. EEG review (done on 6/4/25) MRI done on 6/5/25.     SUBJECTIVE:                                                      HISTORY OF PRESENT ILLNESS: Patient is here to follow up regarding mild cognitive impairment. Please refer to my initial note from 5/22/2025 for further information.  His son was able to join us over the phone.  His other son came back at the end of the visit.    Since the last visit, the patient reports no changes or new focal neurological symptoms.    Brain MRI from 6/14/2025 demonstrated multiple intracranial meningiomas, unchanged compared to May 2024 in addition to unchanged 3 cm cystic peripherally enhancing left frontal bone lesion and prior instrumentation tracking the right frontal lobe.  Images were personally reviewed and independently interpreted.    EEG from 6/4/2025 demonstrated diffuse intermittent slowing, consistent with mild encephalopathy of non-specific etiology, but was negative for interictal epileptiform discharges or seizures (personally read).    Beta amyloid 42/40 ratio was normal (0.201), so as phosphorylated to 181 (0.69) and 217 (0.142).  EXAM:                                                    Physical Exam:   Vitals: /76   Pulse 63   Ht 1.753 m (5' 9\")   Wt 84.8 kg (187 lb)   SpO2 99%   BMI 27.62 kg/m      General: pt is in NAD, cooperative.  Skin: normal turgor, moist mucous membranes, no lesions/rashes noticed.  HEENT: ATNC, white sclera, normal conjunctiva.  Respiratory: Symmetric lung excursion, no accessory respiratory muscle use.  Abdomen: Non distended.  Neurological: awake, cooperative, follows commands, no exam changes compared to previous " visits.  ASSESSMENT AND PLAN:                                                    Assessment: 70 year old male patient presents for follow-up of mild cognitive impairment after completion of recommended workup, which was unrevealing/stable.  Reviewed results, as detailed above.  I discussed with the patient that his clinical presentation might be related to history of aqueductal stenosis and potentially medication side effects, but I do not see evidence of developing Alzheimer's disease, although would still recommend monitoring of his cognitive status over time.    Diagnoses:    ICD-10-CM    1. Mild cognitive impairment  G31.84       2. 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 Seizure disorder (H)  G40.909       4. Meningioma (H)  D32.9         Plan: At today's visit we thoroughly discussed current symptoms, evaluation results, and the plan.    We decided to monitor his cognitive status over time.  We should perform cognitive test next time he comes.    Next follow-up appointment is in the next 6 months or earlier if needed.    Total Time: 22 minutes spent on the date of the encounter doing chart review, history and exam, documentation and further activities per the note.    Brian Venegas MD  [facility]  (Chart documentation was completed in part with Dragon voice-recognition software. Even though reviewed, some grammatical, spelling, and word errors may remain.)

## 2025-06-30 NOTE — LETTER
"6/30/2025      Kashmir Yao  [address] [facility] [address]      Dear Colleague,    Thank you for referring your patient, Kashmir Yao, to the [facility]. Please see a copy of my visit note below.    ESTABLISHED PATIENT NEUROLOGY NOTE    DATE OF VISIT: 6/30/2025  CLINIC LOCATION: [facility]  MRN: [id number]  PATIENT NAME: Kashmir Yao  YOB: 1955    REASON FOR VISIT:   Chief Complaint   Patient presents with     Follow Up     4-6 week follow up - cognitive change. EEG review (done on 6/4/25) MRI done on 6/5/25.     SUBJECTIVE:                                                      HISTORY OF PRESENT ILLNESS: Patient is here to follow up regarding mild cognitive impairment. Please refer to my initial note from 5/22/2025 for further information.  His son was able to join us over the phone.  His other son came back at the end of the visit.    Since the last visit, the patient reports no changes or new focal neurological symptoms.    Brain MRI from 6/14/2025 demonstrated multiple intracranial meningiomas, unchanged compared to May 2024 in addition to unchanged 3 cm cystic peripherally enhancing left frontal bone lesion and prior instrumentation tracking the right frontal lobe.  Images were personally reviewed and independently interpreted.    EEG from 6/4/2025 demonstrated diffuse intermittent slowing, consistent with mild encephalopathy of non-specific etiology, but was negative for interictal epileptiform discharges or seizures (personally read).    Beta amyloid 42/40 ratio was normal (0.201), so as phosphorylated to 181 (0.69) and 217 (0.142).  EXAM:                                                    Physical Exam:   Vitals: /76   Pulse 63   Ht 1.753 m (5' 9\")   Wt 84.8 kg (187 lb)   SpO2 99%   BMI 27.62 kg/m      General: pt is in NAD, cooperative.  Skin: normal turgor, moist mucous membranes, no lesions/rashes " noticed.  HEENT: ATNC, white sclera, normal conjunctiva.  Respiratory: Symmetric lung excursion, no accessory respiratory muscle use.  Abdomen: Non distended.  Neurological: awake, cooperative, follows commands, no exam changes compared to previous visits.  ASSESSMENT AND PLAN:                                                    Assessment: 70 year old male patient presents for follow-up of mild cognitive impairment after completion of recommended workup, which was unrevealing/stable.  Reviewed results, as detailed above.  I discussed with the patient that his clinical presentation might be related to history of aqueductal stenosis and potentially medication side effects, but I do not see evidence of developing Alzheimer's disease, although would still recommend monitoring of his cognitive status over time.    Diagnoses:    ICD-10-CM    1. Mild cognitive impairment  G31.84       2. 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 Seizure disorder (H)  G40.909       4. Meningioma (H)  D32.9         Plan: At today's visit we thoroughly discussed current symptoms, evaluation results, and the plan.    We decided to monitor his cognitive status over time.  We should perform cognitive test next time he comes.    Next follow-up appointment is in the next 6 months or earlier if needed.    Total Time: 22 minutes spent on the date of the encounter doing chart review, history and exam, documentation and further activities per the note.    Brian Venegas MD  [facility]  (Chart documentation was completed in part with Dragon voice-recognition software. Even though reviewed, some grammatical, spelling, and word errors may remain.)    Again, thank you for allowing me to participate in the care of your patient.        Sincerely,        Brian Venegas MD    Electronically signed

## 2025-06-30 NOTE — NURSING NOTE
"Kashmir Yao is a 70 year old male who presents for:  Chief Complaint   Patient presents with    Follow Up     4-6 week follow up - cognitive change. EEG review (done on 6/4/25) MRI done on 6/5/25.      Questions/Concerns:none    Initial Vitals:  /76   Pulse 63   Ht 1.753 m (5' 9\")   Wt 84.8 kg (187 lb)   SpO2 99%   BMI 27.62 kg/m   Estimated body mass index is 27.62 kg/m  as calculated from the following:    Height as of this encounter: 1.753 m (5' 9\").    Weight as of this encounter: 84.8 kg (187 lb).. Body surface area is 2.03 meters squared. BP completed using cuff size: riley Montejo CMA    "

## 2025-06-30 NOTE — PATIENT INSTRUCTIONS
AFTER VISIT SUMMARY (AVS):    At today's visit we thoroughly discussed current symptoms, evaluation results, and the plan.    We decided to monitor your cognitive status over time.  We should perform cognitive test next time you come.    Next follow-up appointment is in the next 6 months or earlier if needed.    Please do not hesitate to call me with any questions or concerns.    Thanks.

## 2025-07-02 ENCOUNTER — PATIENT OUTREACH (OUTPATIENT)
Dept: CARE COORDINATION | Facility: CLINIC | Age: 70
End: 2025-07-02
Payer: COMMERCIAL

## (undated) DEVICE — CATH ANGIO JUDKINS JL4 6FRX100CM INFINITI 534620T

## (undated) DEVICE — SU PROLENE 7-0 BV-1DA 4X30" M8703

## (undated) DEVICE — WIRE GUIDE 0.035"X260CM SAFE-T-J EXCHANGE G00517

## (undated) DEVICE — PUMP CP37 QUANTUM CENTRIFUGAL BLOOD CP37V-V0

## (undated) DEVICE — SU PLEDGET SOFT TFE 3/8"X3/26"X1/16" PCP40

## (undated) DEVICE — ENDO FORCEP BX CAPTURA JUMBO SPIKE 2.8MMX230CM G53042

## (undated) DEVICE — RESERVOIR CELL SAVING BLOOD COLLECTION EL2120

## (undated) DEVICE — SUCTION DRY CHEST DRAIN OASIS 3600-100

## (undated) DEVICE — PACK OPEN HEART PV12OH524

## (undated) DEVICE — LINEN LEG ROLL 5489

## (undated) DEVICE — KIT WASH CELL SAVING ATL2001

## (undated) DEVICE — CANNULA VENOUS 2 STAGE 32-40FR

## (undated) DEVICE — KIT HAND CONTROL ANGIOTOUCH ACIST 65CM AT-P65

## (undated) DEVICE — PACK SURGICAL PROCEDURE CUSTOM 1/2IN X 3/8IN BB11W18R1

## (undated) DEVICE — COVER TABLE POLY 65X90" 8186

## (undated) DEVICE — ENDO BITE BLOCK ADULT OLYMPUS LATEX FREE MAJ-1632

## (undated) DEVICE — SU STEEL 6 CCS 4X18" M654G

## (undated) DEVICE — DECANTER BAG 2002S

## (undated) DEVICE — SU PROLENE 7-0 BV175-6 24' M8737

## (undated) DEVICE — SU PROLENE 6-0 C-1DA 30" 8706H

## (undated) DEVICE — CANNULA PERFUSION ARTERIAL 20FR 12" 77420

## (undated) DEVICE — RX SURGIFLO HEMOSTATIC MATRIX W/THROMBIN 8ML 2994

## (undated) DEVICE — SU ETHIBOND 0 CT-1 CR 8X18" CX21D

## (undated) DEVICE — SOMASENSOR CEREBRAL OXIMETER ADULT SAFB-SM

## (undated) DEVICE — DEFIB PRO-PADZ LVP LQD GEL ADULT 8900-2105-01

## (undated) DEVICE — LINEN TOWEL PACK X5 5464

## (undated) DEVICE — PUNCH AORTIC 4.0MMX8" RCB40

## (undated) DEVICE — CATH ANGIO JUDKINS R4 6FRX100CM INFINITI 534621T

## (undated) DEVICE — LEAD PACER MYOCARDIAL BIPOLAR TEMPORARY 53CM 6495F

## (undated) DEVICE — PATCH SURGICAL EVARREST FIBRIN SEALANT 4X2IN EVT5024

## (undated) DEVICE — POSITIONER ASSIST ESSTECH 3S T401210S

## (undated) DEVICE — SU ETHIBOND 2-0 SHDA 30" X563H

## (undated) DEVICE — BONE WAX OSTENE 3.5GM CT410

## (undated) DEVICE — PREP CHLORAPREP W/ORANGE TINT 10.5ML 930715

## (undated) DEVICE — DRAIN CHEST TUBE RIGHT ANGLED 28FR 8128

## (undated) DEVICE — SU PROLENE 6-0 C-1DA 30" M8706

## (undated) DEVICE — BLADE KNIFE BEAVER MINI STR BEAVER6900

## (undated) DEVICE — CATH GUIDING BLUE YELLOW PTFE XB3 6FRX100CM 67005200

## (undated) DEVICE — SYR EAR BULB 3OZ 0035830

## (undated) DEVICE — SOL WATER IRRIG 1000ML BOTTLE 2F7114

## (undated) DEVICE — SU VICRYL 0 CTX 36" J370H

## (undated) DEVICE — CABLE MYO/LEAD PACING WHITE DISP 019-530

## (undated) DEVICE — RAD INFLATOR BASIC COMPAK  IN4130

## (undated) DEVICE — DRSG KERLIX 4 1/2"X4YDS ROLL 6715

## (undated) DEVICE — SURGICEL HEMOSTAT 2X14" 1951

## (undated) DEVICE — CATH LAUNCHER 6FR JR 4.0 LA6JR40

## (undated) DEVICE — Device

## (undated) DEVICE — SLEEVE TR BAND RADIAL COMPRESSION DEVICE 24CM TRB24-REG

## (undated) DEVICE — SU PROLENE 3-0 SHDA 36" 8522H

## (undated) DEVICE — GW VASC OMNIWIRE J L185CM PRESSURE 89185J

## (undated) DEVICE — LINEN TOWEL PACK X30 5481

## (undated) DEVICE — GUIDEWIRE VASC 0.014INX180CM RUNTHROUGH 25-1011

## (undated) DEVICE — DRAIN CHEST TUBE 28FR STR 8028

## (undated) DEVICE — SPONGE RAY-TEC 4X8" 7318

## (undated) DEVICE — CANNULA PERFUSION AORTIC ROOT 22FR 20012

## (undated) DEVICE — DRAIN CHEST TUBE 32FR STR 8032

## (undated) DEVICE — SU VICRYL 2-0 CT-1 27" UND J259H

## (undated) DEVICE — CONNECTOR PERFUSION STR 1/2X1/2" W/O LL 6025

## (undated) DEVICE — OXYGENATOR PERFUSION AFFINITY FUSION BB841

## (undated) DEVICE — SU VICRYL 3-0 FS-1 27" J442H

## (undated) DEVICE — KIT LG BORE TOUHY ACCESS PLUS MAP152

## (undated) DEVICE — SU PROLENE 4-0 SHDA 36" 8521H

## (undated) DEVICE — DEVICE TISSUE STABILIZATION OCTOBASE 28707

## (undated) DEVICE — CLIP HORIZON MULTI SM YELLOW 001204

## (undated) DEVICE — KIT ENDO VASOVIEW HEMOPRO 2 VH-4000

## (undated) DEVICE — CATH GUIDING BLUE YELLOW PTFE XB3.5 6FRX100CM 67005400

## (undated) DEVICE — CANNULA PERFUSION VENOUS 3 STAGE 29FR 15" 91429

## (undated) DEVICE — PROTECTOR ARM ONE-STEP TRENDELENBURG 40418

## (undated) DEVICE — DRAPE NEW SLUSH/WARMER HUSHSLUSH 2.0 ESD340 ESD340

## (undated) DEVICE — MANIFOLD KIT ANGIO AUTOMATED 014613

## (undated) DEVICE — SU MYOSTERNAL WIRE  046-267

## (undated) DEVICE — SU PROLENE 4-0 RB-1DA 36" 8557H

## (undated) DEVICE — SU ETHIBOND 3-0 BBDA 36" X588H

## (undated) DEVICE — SOL NACL 0.9% IRRIG 1000ML BOTTLE 2F7124

## (undated) DEVICE — SUCTION CANISTER MEDIVAC LINER 3000ML W/LID 65651-530

## (undated) DEVICE — LINEN TOWEL PACK X6 WHITE 5487

## (undated) DEVICE — CATH BALLOON EMERGE 2.5X12MM H7493918912250

## (undated) DEVICE — KIT ENDO TURNOVER/PROCEDURE W/CLEAN A SCOPE LINERS 103888

## (undated) DEVICE — DEVICE ASSEMBLY SUCTION/ANTI COAG BTC93

## (undated) DEVICE — BLOWER/MISTER CLEARVIEW 22150

## (undated) DEVICE — INTRO GLIDESHEATH SLENDER 6FR 10X45CM 60-1060

## (undated) DEVICE — SU SILK 1 TIE 10X30" SA87G

## (undated) DEVICE — CATH DIAGNOSTIC RADIAL 5FR TIG 4.0

## (undated) RX ORDER — HEPARIN SODIUM 1000 [USP'U]/ML
INJECTION, SOLUTION INTRAVENOUS; SUBCUTANEOUS
Status: DISPENSED
Start: 2024-05-30

## (undated) RX ORDER — CEFAZOLIN SODIUM/WATER 2 G/20 ML
SYRINGE (ML) INTRAVENOUS
Status: DISPENSED
Start: 2024-06-04

## (undated) RX ORDER — FENTANYL CITRATE 0.05 MG/ML
INJECTION, SOLUTION INTRAMUSCULAR; INTRAVENOUS
Status: DISPENSED
Start: 2024-06-04

## (undated) RX ORDER — PAPAVERINE HYDROCHLORIDE 30 MG/ML
INJECTION INTRAMUSCULAR; INTRAVENOUS
Status: DISPENSED
Start: 2024-06-04

## (undated) RX ORDER — FENTANYL CITRATE 50 UG/ML
INJECTION, SOLUTION INTRAMUSCULAR; INTRAVENOUS
Status: DISPENSED
Start: 2024-05-30

## (undated) RX ORDER — PROPOFOL 10 MG/ML
INJECTION, EMULSION INTRAVENOUS
Status: DISPENSED
Start: 2024-06-04

## (undated) RX ORDER — CEFAZOLIN SODIUM 1 G/3ML
INJECTION, POWDER, FOR SOLUTION INTRAMUSCULAR; INTRAVENOUS
Status: DISPENSED
Start: 2024-06-04

## (undated) RX ORDER — VERAPAMIL HYDROCHLORIDE 2.5 MG/ML
INJECTION, SOLUTION INTRAVENOUS
Status: DISPENSED
Start: 2024-05-30

## (undated) RX ORDER — PROTAMINE SULFATE 10 MG/ML
INJECTION, SOLUTION INTRAVENOUS
Status: DISPENSED
Start: 2024-06-04

## (undated) RX ORDER — SIMETHICONE 40MG/0.6ML
SUSPENSION, DROPS(FINAL DOSAGE FORM)(ML) ORAL
Status: DISPENSED
Start: 2020-10-08

## (undated) RX ORDER — VECURONIUM BROMIDE 1 MG/ML
INJECTION, POWDER, LYOPHILIZED, FOR SOLUTION INTRAVENOUS
Status: DISPENSED
Start: 2024-06-04

## (undated) RX ORDER — LIDOCAINE HYDROCHLORIDE 10 MG/ML
INJECTION, SOLUTION EPIDURAL; INFILTRATION; INTRACAUDAL; PERINEURAL
Status: DISPENSED
Start: 2024-05-30

## (undated) RX ORDER — ASPIRIN 81 MG/1
TABLET, CHEWABLE ORAL
Status: DISPENSED
Start: 2024-05-30

## (undated) RX ORDER — NITROGLYCERIN 5 MG/ML
VIAL (ML) INTRAVENOUS
Status: DISPENSED
Start: 2024-05-30

## (undated) RX ORDER — HEPARIN SODIUM 1000 [USP'U]/ML
INJECTION, SOLUTION INTRAVENOUS; SUBCUTANEOUS
Status: DISPENSED
Start: 2024-06-04

## (undated) RX ORDER — ADENOSINE 3 MG/ML
INJECTION, SOLUTION INTRAVENOUS
Status: DISPENSED
Start: 2024-05-30

## (undated) RX ORDER — FENTANYL CITRATE 50 UG/ML
INJECTION, SOLUTION INTRAMUSCULAR; INTRAVENOUS
Status: DISPENSED
Start: 2020-10-08